# Patient Record
Sex: FEMALE | Race: WHITE | NOT HISPANIC OR LATINO | Employment: OTHER | URBAN - METROPOLITAN AREA
[De-identification: names, ages, dates, MRNs, and addresses within clinical notes are randomized per-mention and may not be internally consistent; named-entity substitution may affect disease eponyms.]

---

## 2017-04-06 DIAGNOSIS — I10 ESSENTIAL HYPERTENSION: ICD-10-CM

## 2017-04-06 RX ORDER — AMLODIPINE BESYLATE 10 MG/1
TABLET ORAL
Qty: 90 TABLET | Refills: 2 | OUTPATIENT
Start: 2017-04-06

## 2018-10-01 ENCOUNTER — OFFICE VISIT (OUTPATIENT)
Dept: FAMILY MEDICINE | Facility: CLINIC | Age: 53
End: 2018-10-01
Payer: MEDICARE

## 2018-10-01 VITALS
HEART RATE: 82 BPM | WEIGHT: 114.69 LBS | DIASTOLIC BLOOD PRESSURE: 70 MMHG | BODY MASS INDEX: 19.11 KG/M2 | HEIGHT: 65 IN | SYSTOLIC BLOOD PRESSURE: 130 MMHG | TEMPERATURE: 99 F | OXYGEN SATURATION: 99 %

## 2018-10-01 DIAGNOSIS — Z12.39 SCREENING FOR BREAST CANCER: ICD-10-CM

## 2018-10-01 DIAGNOSIS — Z09 HOSPITAL DISCHARGE FOLLOW-UP: Primary | ICD-10-CM

## 2018-10-01 DIAGNOSIS — Z23 NEED FOR IMMUNIZATION AGAINST INFLUENZA: ICD-10-CM

## 2018-10-01 PROCEDURE — 99204 OFFICE O/P NEW MOD 45 MIN: CPT | Mod: ,,, | Performed by: FAMILY MEDICINE

## 2018-10-01 RX ORDER — ONDANSETRON 4 MG/1
8 TABLET, ORALLY DISINTEGRATING ORAL
Qty: 20 TABLET | Refills: 0 | Status: SHIPPED | OUTPATIENT
Start: 2018-10-01 | End: 2020-01-01

## 2018-10-01 RX ORDER — PROMETHAZINE HYDROCHLORIDE 25 MG/1
25 TABLET ORAL EVERY 6 HOURS PRN
Qty: 20 TABLET | Refills: 0 | Status: SHIPPED | OUTPATIENT
Start: 2018-10-01 | End: 2019-01-04 | Stop reason: SDUPTHER

## 2018-10-01 RX ORDER — LISINOPRIL 20 MG/1
1 TABLET ORAL NIGHTLY
Refills: 3 | COMMUNITY
Start: 2018-09-09

## 2018-10-01 NOTE — PROGRESS NOTES
SUBJECTIVE:    Patient ID: Aurelia Saucedo is a 53 y.o. female.    Chief Complaint: Establish Care    HPI   53-year-old  female new to this provider presents today for hospital follow-up patient was admitted on September 12 discharged on September 13, patient was admitted for nausea vomiting and diarrhea after taking lactulose prescribed by her nephrologist. Due to some atypical chest pain symptoms patient was ruled out for ACS with serial cardiac enzymes and EKGs and diarrhea resolved after stopping taking  Lactulose. She is discharged on 7 days of Augmentin.    I reviewed and reconciled her hospital discharge medications with her home medications.    Patient's being treated for cirrhosis secondary to hepatitis C by Dr. Villaseñor.  Patient is on dialysis, followed by nephrology Dr. Mele Fragoso. She understood undergoes dialysis Monday Wednesday Friday she has been on dialysis for the past 5 years patient has renal failure secondary to hypertension.  She's had 2 MIs and 1 stent placed she is not being followed by cardiology.        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 since 12/16/13 8/19/2015    Gastritis     Hypertension 8/19/2015    Kidney transplant candidate 8/19/2015    Renal dialysis status     M, W, F    Secondary hyperparathyroidism of renal origin 8/19/2015     Social History     Socioeconomic History    Marital status:      Spouse name: Not on file    Number of children: 2    Years of education: Not on file    Highest education level: Not on file   Social Needs    Financial resource strain: Not on file    Food insecurity - worry: Not on file    Food insecurity - inability: Not on file    Transportation needs - medical: Not on file    Transportation needs - non-medical: Not on file   Occupational History    Not on file   Tobacco Use    Smoking  status: Current Every Day Smoker     Packs/day: 0.75     Years: 30.00     Pack years: 22.50    Smokeless tobacco: Never Used    Tobacco comment: pt reports actively trying to quit but struggling.  Counseling and resources given.   Substance and Sexual Activity    Alcohol use: No     Alcohol/week: 0.0 oz    Drug use: Yes     Types: Marijuana     Comment: Last used 2 days ago    Sexual activity: Not Currently     Partners: Male     Birth control/protection: Surgical   Other Topics Concern    Are you pregnant or think you may be? Not Asked    Breast-feeding Not Asked   Social History Narrative    Not on file     Past Surgical History:   Procedure Laterality Date    AV FISTULA PLACEMENT  2013    ESOPHAGOGASTRODUODENOSCOPY (EGD) N/A 10/10/2016    [facility]    ESOPHAGOGASTRODUODENOSCOPY (EGD) N/A 2/4/2016    Performed by Adis Isaac MD at [facility] ENDO (4TH FLR)    SALPINGOOPHORECTOMY Right 1997    TOTAL ABDOMINAL HYSTERECTOMY  1999    TRANSJUGULAR LIVER BIOPSY N/A 12/15/2015    Performed by [facility] Diagnostic Provider at [facility] OR 2ND FLR     Family History   Problem Relation Age of Onset    Kidney disease Mother     Stroke Father     Psoriasis Brother     Breast cancer Neg Hx     Cancer Neg Hx     Colon cancer Neg Hx     Ovarian cancer Neg Hx      Current Outpatient Medications   Medication Sig Dispense Refill    amlodipine (NORVASC) 10 MG tablet Take 1 tablet (10 mg total) by mouth once daily. 90 tablet 3    aspirin (ECOTRIN) 81 MG EC tablet Take 1 tablet (81 mg total) by mouth once daily. 90 tablet 1    dicyclomine (BENTYL) 20 mg tablet Take 20 mg by mouth every 6 (six) hours as needed.      FA-VIT B COMP&C-SELENIUM-ZINC 3-70-15 MG-MCG-MG ORAL TAB Take 1 tablet by mouth once daily.      lisinopril (PRINIVIL,ZESTRIL) 20 MG tablet Take 1 tablet by mouth once daily.  3    metoprolol succinate (TOPROL-XL) 50 MG 24 hr tablet Take 50 mg by mouth once daily.       "ondansetron (ZOFRAN-ODT) 4 MG TbDL Take 2 tablets (8 mg total) by mouth every hour as needed. nausea 20 tablet 0    pantoprazole (PROTONIX) 40 MG tablet Take 40 mg by mouth once daily.      promethazine (PHENERGAN) 25 MG tablet Take 1 tablet (25 mg total) by mouth every 6 (six) hours as needed for Nausea. 20 tablet 0     No current facility-administered medications for this visit.      Review of patient's allergies indicates:   Allergen Reactions    Levaquin [levofloxacin] Itching    Ciprofloxacin Itching    Codeine Nausea Only       Review of Systems   Constitutional: Negative for activity change, appetite change, diaphoresis, fatigue, fever and unexpected weight change.   HENT: Negative for ear pain, rhinorrhea, sinus pressure and sinus pain.    Eyes: Negative for pain, discharge, redness and itching.   Respiratory: Negative for chest tightness, shortness of breath, wheezing and stridor.    Cardiovascular: Negative for chest pain, palpitations and leg swelling.   Gastrointestinal: Negative for abdominal distention, abdominal pain, constipation, diarrhea and nausea.   Genitourinary: Positive for decreased urine volume. Negative for difficulty urinating, dysuria, enuresis, flank pain, hematuria and urgency.   Musculoskeletal: Negative for arthralgias, back pain, myalgias and neck pain.   Neurological: Negative for dizziness, seizures, syncope, speech difficulty and numbness.          Blood pressure 130/70, pulse 82, temperature 99 °F (37.2 °C), height 5' 5" (1.651 m), weight 52 kg (114 lb 11.2 oz), SpO2 99 %. Body mass index is 19.09 kg/m².   Objective:      Physical Exam        Assessment:       1. Hospital discharge follow-up    2. Screening for breast cancer    3. Need for immunization against influenza         Plan:           Hospital discharge follow-up  -     ondansetron (ZOFRAN-ODT) 4 MG TbDL; Take 2 tablets (8 mg total) by mouth every hour as needed. nausea  Dispense: 20 tablet; Refill: 0  -     " promethazine (PHENERGAN) 25 MG tablet; Take 1 tablet (25 mg total) by mouth every 6 (six) hours as needed for Nausea.  Dispense: 20 tablet; Refill: 0  53-year-old female with chronic renal failure on dialysis, chronic hepatitis C with cirrhosis, hypertension being treated with 3 medications, recently discharged for nausea vomiting diarrhea. Patient normally keeps Zofran and Phenergan on hand for after she does dialysis she is often nauseated. Will refill her medications, patient in follow-up in 6 months.   Patient is due for influenza vaccination she states she gets when she goes to dialysis.      Screening for breast cancer  -     Mammo Digital Screening Bilat with Tomosynthesis CAD; Future; Expected date: 10/01/2018

## 2018-10-09 ENCOUNTER — TELEPHONE (OUTPATIENT)
Dept: FAMILY MEDICINE | Facility: CLINIC | Age: 53
End: 2018-10-09

## 2018-10-10 ENCOUNTER — TELEPHONE (OUTPATIENT)
Dept: FAMILY MEDICINE | Facility: CLINIC | Age: 53
End: 2018-10-10

## 2018-10-10 RX ORDER — TRAMADOL HYDROCHLORIDE 50 MG/1
50 TABLET ORAL
Qty: 120 TABLET | Refills: 0 | Status: SHIPPED | OUTPATIENT
Start: 2018-10-10 | End: 2018-10-15

## 2018-10-10 NOTE — TELEPHONE ENCOUNTER
I have placed the order, her previous prescription of 120 lasted since December, I expect this prescription to last just as long.

## 2018-10-10 NOTE — TELEPHONE ENCOUNTER
"Pt informed her medications were sent to pharmacy. She stated, "I took pictures of my leg for the  to see r/t blisters in heat".  "

## 2018-10-12 ENCOUNTER — TELEPHONE (OUTPATIENT)
Dept: FAMILY MEDICINE | Facility: CLINIC | Age: 53
End: 2018-10-12

## 2018-10-15 ENCOUNTER — OFFICE VISIT (OUTPATIENT)
Dept: FAMILY MEDICINE | Facility: CLINIC | Age: 53
End: 2018-10-15
Payer: MEDICARE

## 2018-10-15 VITALS
HEIGHT: 65 IN | DIASTOLIC BLOOD PRESSURE: 60 MMHG | HEART RATE: 76 BPM | TEMPERATURE: 98 F | OXYGEN SATURATION: 96 % | BODY MASS INDEX: 18.88 KG/M2 | WEIGHT: 113.31 LBS | SYSTOLIC BLOOD PRESSURE: 100 MMHG

## 2018-10-15 DIAGNOSIS — R21 RASH AND NONSPECIFIC SKIN ERUPTION: Primary | ICD-10-CM

## 2018-10-15 PROCEDURE — 99213 OFFICE O/P EST LOW 20 MIN: CPT | Mod: ,,, | Performed by: FAMILY MEDICINE

## 2018-10-15 RX ORDER — LIDOCAINE AND PRILOCAINE 25; 25 MG/G; MG/G
CREAM TOPICAL
Status: DISCONTINUED | OUTPATIENT
Start: 2018-10-15 | End: 2018-10-15

## 2018-10-15 RX ORDER — HYDROCODONE BITARTRATE AND ACETAMINOPHEN 10; 325 MG/1; MG/1
1 TABLET ORAL
Qty: 20 TABLET | Refills: 0 | Status: SHIPPED | OUTPATIENT
Start: 2018-10-15 | End: 2018-10-25 | Stop reason: SDUPTHER

## 2018-10-15 RX ORDER — PREDNISONE 20 MG/1
20 TABLET ORAL DAILY
Qty: 15 TABLET | Refills: 0 | Status: SHIPPED | OUTPATIENT
Start: 2018-10-15 | End: 2018-10-25 | Stop reason: SDUPTHER

## 2018-10-15 NOTE — PROGRESS NOTES
SUBJECTIVE:    Patient ID: Aurelia Saucedo is a 53 y.o. female.    Chief Complaint: rash on both legs    HPI  52 yo female presents to clinic with a longstanding rash to her lower extremities that is described as painful. Pt states that the rash first appeared about 8 years ago she has seen dermatology in the past was given topical steroid cream but this was ineffective, pt does not remember either the dermatologist's name or the name of the condition, pt states it starts as a warm painful spot on the lower leg that progresses to a blister/ulcer that develops into a scar that remains purple and visible. Pt states that this skin condition waxes and wanes. Pt is currently being followed by GI for Hep C and Nephrology for renal failure secondary to either HTN or Hep C treatment (Interferon).    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 since 12/16/13 8/19/2015    Gastritis     Hypertension 8/19/2015    Kidney transplant candidate 8/19/2015    Renal dialysis status     M, W, F    Secondary hyperparathyroidism of renal origin 8/19/2015     Social History     Socioeconomic History    Marital status:      Spouse name: Not on file    Number of children: 2    Years of education: Not on file    Highest education level: Not on file   Social Needs    Financial resource strain: Not on file    Food insecurity - worry: Not on file    Food insecurity - inability: Not on file    Transportation needs - medical: Not on file    Transportation needs - non-medical: Not on file   Occupational History    Occupation: baker    Tobacco Use    Smoking status: Current Every Day Smoker     Packs/day: 0.75     Years: 30.00     Pack years: 22.50    Smokeless tobacco: Never Used    Tobacco comment: pt reports actively trying to quit but struggling.  Counseling and resources given.   Substance and  Sexual Activity    Alcohol use: No     Alcohol/week: 0.0 oz    Drug use: Yes     Types: Marijuana     Comment: Last used 2 days ago    Sexual activity: Not Currently     Partners: Male     Birth control/protection: Surgical   Other Topics Concern    Are you pregnant or think you may be? Not Asked    Breast-feeding Not Asked   Social History Narrative    Not on file     Past Surgical History:   Procedure Laterality Date    AV FISTULA PLACEMENT  2013    ESOPHAGOGASTRODUODENOSCOPY (EGD) N/A 10/10/2016    [facility]formed [facility] ENDO    ESOPHAGOGASTRODUODENOSCOPY (EGD) N/A 2/4/2016    Performed by Adis Isaac MD at [facility] ENDO (4TH FLR)    SALPINGOOPHORECTOMY Right 1997    TOTAL ABDOMINAL HYSTERECTOMY  1999    TRANSJUGULAR LIVER BIOPSY N/A 12/15/2015    Performed by [facility] Diagnostic Provider at [facility] OR 2ND FLR     Family History   Problem Relation Age of Onset    Kidney disease Mother     Stroke Father     Psoriasis Brother     Breast cancer Neg Hx     Cancer Neg Hx     Colon cancer Neg Hx     Ovarian cancer Neg Hx      Current Outpatient Medications   Medication Sig Dispense Refill    amlodipine (NORVASC) 10 MG tablet Take 1 tablet (10 mg total) by mouth once daily. 90 tablet 3    aspirin (ECOTRIN) 81 MG EC tablet Take 1 tablet (81 mg total) by mouth once daily. 90 tablet 1    dicyclomine (BENTYL) 20 mg tablet Take 20 mg by mouth every 6 (six) hours as needed.      FA-VIT B COMP&C-SELENIUM-ZINC 3-70-15 MG-MCG-MG ORAL TAB Take 1 tablet by mouth once daily.      lisinopril (PRINIVIL,ZESTRIL) 20 MG tablet Take 1 tablet by mouth once daily.  3    metoprolol succinate (TOPROL-XL) 50 MG 24 hr tablet Take 50 mg by mouth once daily.      ondansetron (ZOFRAN-ODT) 4 MG TbDL Take 2 tablets (8 mg total) by mouth every hour as needed. nausea 20 tablet 0    pantoprazole (PROTONIX) 40 MG tablet Take 40 mg by mouth once daily.      promethazine (PHENERGAN) 25 MG tablet Take 1 tablet (25  "mg total) by mouth every 6 (six) hours as needed for Nausea. 20 tablet 0    HYDROcodone-acetaminophen (NORCO)  mg per tablet Take 1 tablet by mouth every 24 hours as needed for Pain. 20 tablet 0    predniSONE (DELTASONE) 20 MG tablet Take 1 tablet (20 mg total) by mouth once daily. for 10 days 15 tablet 0     Current Facility-Administered Medications   Medication Dose Route Frequency Provider Last Rate Last Dose    lidocaine-prilocaine cream   Topical (Top) 1 time in Clinic/HOD Dustin Ireland MD         Review of patient's allergies indicates:   Allergen Reactions    Levaquin [levofloxacin] Itching    Ciprofloxacin Itching    Codeine Nausea Only       Review of Systems   Constitutional: Negative for activity change, appetite change, chills, diaphoresis, fatigue, fever and unexpected weight change.   Musculoskeletal: Negative for arthralgias, gait problem, joint swelling and myalgias.   Skin: Positive for rash.          Blood pressure 100/60, pulse 76, temperature 98.1 °F (36.7 °C), height 5' 5" (1.651 m), weight 51.4 kg (113 lb 4.8 oz), SpO2 96 %. Body mass index is 18.85 kg/m².   Objective:      Physical Exam   Constitutional: She appears well-developed and well-nourished. No distress.   HENT:   Head: Normocephalic and atraumatic.   Eyes: Conjunctivae and EOM are normal. Pupils are equal, round, and reactive to light.   Skin: Skin is warm and dry. Capillary refill takes less than 2 seconds. She is not diaphoretic.   Tender warm lumps appear under the skin, especially on the lower legs that develop into blisters and ulceration. There are larger inflammatory confluent purple plaques. As the plaques heal, they leave patches of postinflammatory pigmentation and scaling.           Assessment:       1. Rash and nonspecific skin eruption         Plan:           Rash and nonspecific skin eruption  -     predniSONE (DELTASONE) 20 MG tablet; Take 1 tablet (20 mg total) by mouth once daily. for 10 days  Dispense: " 15 tablet; Refill: 0  -     Ambulatory referral to Dermatology  -     lidocaine-prilocaine cream; Apply topically one time.  -     HYDROcodone-acetaminophen (NORCO)  mg per tablet; Take 1 tablet by mouth every 24 hours as needed for Pain.  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

## 2018-10-15 NOTE — TELEPHONE ENCOUNTER
Please call the patient, I saw her for hospital follow up for diarrhea secondary to medications, She called for a refill of her tramadol, for leg pain. Please if she is having leg pain she may need an appointment so I can evaluate what I am treating.

## 2018-10-17 ENCOUNTER — OFFICE VISIT (OUTPATIENT)
Dept: GASTROENTEROLOGY | Facility: CLINIC | Age: 53
End: 2018-10-17
Payer: MEDICARE

## 2018-10-17 VITALS
SYSTOLIC BLOOD PRESSURE: 140 MMHG | HEIGHT: 65 IN | BODY MASS INDEX: 19.16 KG/M2 | DIASTOLIC BLOOD PRESSURE: 82 MMHG | RESPIRATION RATE: 16 BRPM | TEMPERATURE: 98 F | WEIGHT: 115 LBS | HEART RATE: 78 BPM

## 2018-10-17 DIAGNOSIS — I85.00 ESOPHAGEAL VARICES WITHOUT BLEEDING, UNSPECIFIED ESOPHAGEAL VARICES TYPE: ICD-10-CM

## 2018-10-17 DIAGNOSIS — Z87.19 HISTORY OF GASTROINTESTINAL BLEEDING: ICD-10-CM

## 2018-10-17 DIAGNOSIS — Z76.82 ORGAN TRANSPLANT CANDIDATE: ICD-10-CM

## 2018-10-17 DIAGNOSIS — I10 ESSENTIAL HYPERTENSION: ICD-10-CM

## 2018-10-17 DIAGNOSIS — K74.60 HEPATIC CIRRHOSIS, UNSPECIFIED HEPATIC CIRRHOSIS TYPE, UNSPECIFIED WHETHER ASCITES PRESENT: ICD-10-CM

## 2018-10-17 DIAGNOSIS — B18.2 CHRONIC HEPATITIS C WITHOUT HEPATIC COMA: Primary | ICD-10-CM

## 2018-10-17 DIAGNOSIS — N18.5 CHRONIC KIDNEY DISEASE (CKD), STAGE V: ICD-10-CM

## 2018-10-17 DIAGNOSIS — K76.6 PORTAL HYPERTENSION: ICD-10-CM

## 2018-10-17 PROCEDURE — 99215 OFFICE O/P EST HI 40 MIN: CPT | Mod: ,,, | Performed by: INTERNAL MEDICINE

## 2018-10-21 NOTE — PROGRESS NOTES
[facility] Established Patient Visit    Subjective:           PCP: Dustin Ireland    Chief Complaint: Hospital Follow Up and Ulcerative Colitis       HPI:  Aurelia Saucedo is a 53 y.o. female here for hospital follow up and ulcerative colitis. Patient has hx of Hep C, fatigue, htn, anemia of chronic renal failure, hx of gastrointestinal hemorrhage with melena. Patient is on kidney dialysis. Patient has ckd stage and is on hemodialysis. Patient has a rash on both lower extremities. Dermatology is on consult. Biopsies have been taken. An evaluation will be done for Hep C and patient may be a candidate for oral antiviral therapy. Stool and lab work were reviewed and is consistent with patient's medical conditions. Patient's occult blood is positive and will probably need an upper and lower endoscopy at some point      ROS:   Constitutional: No fevers, chills, weight loss  ENT: No allergies, sore throat, rhinorrhea  CV: No chest pain, palpitations, edema  Pulm: No cough, shortness of breath, wheezing  Ophtho: No vision changes  GI: No blood in stools, change in bowel habits, nausea/vomiting  Denies alternating diarrhea/constipation.   Derm: No rash  Heme: No easy bruising or lymphadenopathy  MSK: No arthralgias or myalgias  : No dysuria, hematuria, frequency, polyuria, or flank tenderness  Endo: No hot or cold intolerance  Neuro: No syncope or seizure, or dizziness  Psych: No hallucination, depression or suicidal ideation      Medical History:  has a past medical history of Anemia of chronic renal failure, stage 5 (8/19/2015), Chronic hepatitis C (8/19/2015), Colitis, ESRD 2/2 MPGN on HD since 12/16/13  (8/19/2015), Gastritis, Hypertension (8/19/2015), Kidney transplant candidate (8/19/2015), Renal dialysis status, and Secondary hyperparathyroidism of renal origin (8/19/2015).      Surgical History:  has a past surgical history that includes Total abdominal hysterectomy (1999); Salpingoophorectomy  (Right, 1997); AV fistula placement (2013); ESOPHAGOGASTRODUODENOSCOPY (EGD) (N/A, 10/10/2016); ESOPHAGOGASTRODUODENOSCOPY (EGD) (N/A, 2/4/2016); and TRANSJUGULAR LIVER BIOPSY (N/A, 12/15/2015).    Family History:   Family History   Problem Relation Age of Onset    Kidney disease Mother     Stroke Father     Psoriasis Brother     Breast cancer Neg Hx     Cancer Neg Hx     Colon cancer Neg Hx     Ovarian cancer Neg Hx        Social History:   Social History     Tobacco Use    Smoking status: Current Every Day Smoker     Packs/day: 0.75     Years: 30.00     Pack years: 22.50    Smokeless tobacco: Never Used    Tobacco comment: pt reports actively trying to quit but struggling.  Counseling and resources given.   Substance Use Topics    Alcohol use: No     Alcohol/week: 0.0 oz    Drug use: Yes     Types: Marijuana     Comment: Last used 2 days ago       The patient's social and family histories were reviewed by me and updated in the appropriate section of the electronic medical record.    Allergies:   Review of patient's allergies indicates:   Allergen Reactions    Levaquin [levofloxacin] Itching    Ciprofloxacin Itching    Codeine Nausea Only         Medications:   Current Outpatient Medications   Medication Sig Dispense Refill    amlodipine (NORVASC) 10 MG tablet Take 1 tablet (10 mg total) by mouth once daily. 90 tablet 3    aspirin (ECOTRIN) 81 MG EC tablet Take 1 tablet (81 mg total) by mouth once daily. 90 tablet 1    dicyclomine (BENTYL) 20 mg tablet Take 20 mg by mouth every 6 (six) hours as needed.      FA-VIT B COMP&C-SELENIUM-ZINC 3-70-15 MG-MCG-MG ORAL TAB Take 1 tablet by mouth once daily.      HYDROcodone-acetaminophen (NORCO)  mg per tablet Take 1 tablet by mouth every 24 hours as needed for Pain. 20 tablet 0    lisinopril (PRINIVIL,ZESTRIL) 20 MG tablet Take 1 tablet by mouth once daily.  3    metoprolol succinate (TOPROL-XL) 50 MG 24 hr tablet Take 50 mg by mouth once daily.   "    ondansetron (ZOFRAN-ODT) 4 MG TbDL Take 2 tablets (8 mg total) by mouth every hour as needed. nausea 20 tablet 0    pantoprazole (PROTONIX) 40 MG tablet Take 40 mg by mouth once daily.      predniSONE (DELTASONE) 20 MG tablet Take 1 tablet (20 mg total) by mouth once daily. for 10 days 15 tablet 0    promethazine (PHENERGAN) 25 MG tablet Take 1 tablet (25 mg total) by mouth every 6 (six) hours as needed for Nausea. 20 tablet 0     No current facility-administered medications for this visit.      All medications and past history have been reviewed.        Objective:        Vital Signs:  Blood pressure (!) 140/82, pulse 78, temperature 98.2 °F (36.8 °C), resp. rate 16, height 5' 5" (1.651 m), weight 52.2 kg (115 lb). Body mass index is 19.14 kg/m².    Physical Exam:   General Appearance: Well appearing in no acute distress, well developed, well                nourished  Head: Normocephalic, without obvious abnormality  Eyes:  Pupils equal, round and reactive to light  Throat: Lips, mucosa, and tongue normal; teeth and gums normal  Lungs: CTA bilaterally in anterior and posterior fields, no wheezes, no crackles  Heart:  Regular rate and rhythm, no murmurs heard  Abdomen: Soft, non tender, non distended with positive bowel sounds in all four quadrants. No hepatosplenomegaly, ascites, or mass. Negative for succusion splash  : female   Extremities: No cyanosis, edema or deformity  Skin: No rash  Neurologic: Normal exam    Labs:  Lab Results   Component Value Date    WBC 3.60 (L) 10/12/2016    HGB 8.6 (L) 10/12/2016    HCT 26.3 (L) 10/12/2016     10/12/2016    CHOL 154 08/19/2015    TRIG 116 08/19/2015    HDL 46 08/19/2015    ALT 38 10/12/2016    AST 42 (H) 10/12/2016     10/12/2016    K 3.8 10/12/2016     10/12/2016    CREATININE 2.9 (H) 10/12/2016    BUN 28 (H) 10/12/2016    CO2 26 10/12/2016    TSH 1.736 02/11/2016    INR 1.0 10/09/2016    HGBA1C 4.9 10/10/2016       Imaging:     All lab " results and imaging results have been reviewed and discussed with the patient      Assessment:       1. Chronic hepatitis C without hepatic coma          See visit diagnoses  Plan:       Aurelia was seen today for hospital follow up and ulcerative colitis.    Diagnoses and all orders for this visit:    Chronic hepatitis C without hepatic coma  -     Hepatitis C RNA, quantitative, PCR; Future  -     Hepatitis C genotype; Future  -     Hepatitis B core antibody, total; Future  -     Hepatitis B Surface Antibody, Qual/Quant; Future  -     Hepatitis C RNA, quantitative, PCR  -     Hepatitis C genotype  -     Hepatitis B core antibody, total  -     Hepatitis B Surface Antibody, Qual/Quant        See HPI    Follow-up in about 8 weeks (around 12/12/2018).    The plan was discussed with the patient and all questions/concerns have been answered to the patient's satisfaction.        Electronically signed by Margarita Saul MD    This note was dictated using voice recognition software and may contain grammatical errors.

## 2018-10-25 DIAGNOSIS — R21 RASH AND NONSPECIFIC SKIN ERUPTION: ICD-10-CM

## 2018-10-25 RX ORDER — HYDROCODONE BITARTRATE AND ACETAMINOPHEN 10; 325 MG/1; MG/1
1 TABLET ORAL
Qty: 20 TABLET | Refills: 0 | Status: SHIPPED | OUTPATIENT
Start: 2018-10-25 | End: 2018-12-05

## 2018-10-25 RX ORDER — PREDNISONE 20 MG/1
20 TABLET ORAL DAILY
Qty: 15 TABLET | Refills: 0 | Status: SHIPPED | OUTPATIENT
Start: 2018-10-25 | End: 2018-11-04

## 2018-10-25 NOTE — TELEPHONE ENCOUNTER
Patient states that she has already seen the dermatologist and that they are waiting for the biopsies to come back. She states that the rash is doing ok. Once the biopsies came back they would go from there on a treatment plan.

## 2018-10-25 NOTE — TELEPHONE ENCOUNTER
Please call patient and find out how the RASH is doing and does she have an appointment with derm if not or is too long to wait (Past) November let me know I will place a consult to a new derm in town who should have openings.

## 2018-10-30 LAB — HBV SURFACE AB SER-ACNC: NON REACTIVE M[IU]/ML

## 2018-11-09 ENCOUNTER — TELEPHONE (OUTPATIENT)
Dept: FAMILY MEDICINE | Facility: CLINIC | Age: 53
End: 2018-11-09

## 2018-11-09 RX ORDER — DOXYCYCLINE 100 MG/1
100 CAPSULE ORAL 2 TIMES DAILY
Qty: 20 CAPSULE | Refills: 0 | Status: SHIPPED | OUTPATIENT
Start: 2018-11-09 | End: 2018-12-05

## 2018-11-09 NOTE — TELEPHONE ENCOUNTER
Patient called to state that the rash/sores have become infected. She would like to to know if something can be called into the pharmacy to help beat the infection. She does have an appointment this coming Tuesday.

## 2018-11-12 RX ORDER — LACTULOSE 10 G/15ML
30 SOLUTION ORAL; RECTAL 3 TIMES DAILY
Refills: 3 | COMMUNITY
Start: 2018-09-10 | End: 2018-12-05

## 2018-11-13 ENCOUNTER — OFFICE VISIT (OUTPATIENT)
Dept: FAMILY MEDICINE | Facility: CLINIC | Age: 53
End: 2018-11-13
Payer: MEDICARE

## 2018-11-13 VITALS
DIASTOLIC BLOOD PRESSURE: 80 MMHG | TEMPERATURE: 98 F | RESPIRATION RATE: 16 BRPM | WEIGHT: 111.5 LBS | OXYGEN SATURATION: 98 % | SYSTOLIC BLOOD PRESSURE: 120 MMHG | HEART RATE: 83 BPM | HEIGHT: 65 IN | BODY MASS INDEX: 18.58 KG/M2

## 2018-11-13 DIAGNOSIS — D89.1 CRYOGLOBULINEMIA: ICD-10-CM

## 2018-11-13 DIAGNOSIS — R21 RASH: Primary | ICD-10-CM

## 2018-11-13 PROCEDURE — 99213 OFFICE O/P EST LOW 20 MIN: CPT | Mod: ,,, | Performed by: FAMILY MEDICINE

## 2018-11-13 NOTE — PROGRESS NOTES
SUBJECTIVE:    Patient ID: Aurelia Saucedo is a 53 y.o. female.    Chief Complaint: Rash  Pt with a bilateral lower extremity rash since [id number], it is painful and blisters. I had seen this rash previously and referred to derm pt states that Derm's biopsy was inconclusive. 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 There are plans by Dr Saul to begin treatment again in one month. Pt called me last week because the open sores of the rash had become infected and was RED, Hot and Purulent so I called in a prescription of Doxycycline and the patient states that it looks much better.    Rash   This is a chronic problem. The current episode started more than 1 year ago. The problem has been waxing and waning since onset. The affected locations include the left lower leg and right lower leg. The rash is characterized by blistering. Associated with: Hep C. Pertinent negatives include no fatigue or fever. Treatments tried: Topical steroids, oral steroids. The treatment provided no relief.         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 since 12/16/13 8/19/2015    Gastritis     Hypertension 8/19/2015    Kidney transplant candidate 8/19/2015    Renal dialysis status     M, W, F    Secondary hyperparathyroidism of renal origin 8/19/2015     Social History     Socioeconomic History    Marital status:      Spouse name: Not on file    Number of children: 2    Years of education: Not on file    Highest education level: Not on file   Social Needs    Financial resource strain: Not on file    Food insecurity - worry: Not on file    Food insecurity - inability: Not on file    Transportation needs - medical: Not on file    Transportation needs - non-medical: Not on file   Occupational History     Occupation: baker    Tobacco Use    Smoking status: Current Every Day Smoker     Packs/day: 0.75     Years: 30.00     Pack years: 22.50    Smokeless tobacco: Never Used    Tobacco comment: pt reports actively trying to quit but struggling.  Counseling and resources given.   Substance and Sexual Activity    Alcohol use: No     Alcohol/week: 0.0 oz    Drug use: Yes     Types: Marijuana     Comment: Last used 2 days ago    Sexual activity: Not Currently     Partners: Male     Birth control/protection: Surgical   Other Topics Concern    Are you pregnant or think you may be? Not Asked    Breast-feeding Not Asked   Social History Narrative    Not on file     Past Surgical History:   Procedure Laterality Date    AV FISTULA PLACEMENT  2013    ESOPHAGOGASTRODUODENOSCOPY (EGD) N/A 10/10/2016    [facility]    ESOPHAGOGASTRODUODENOSCOPY (EGD) N/A 2/4/2016    Performed by Adis Isaac [facility] at [facility] ENDO (4TH FLR)    SALPINGOOPHORECTOMY Right 1997    TOTAL ABDOMINAL HYSTERECTOMY  1999    TRANSJUGULAR LIVER BIOPSY N/A 12/15/2015    Performed by [facility] Diagnostic Provider at [facility] OR 2ND FLR     Family History   Problem Relation Age of Onset    Kidney disease Mother     Stroke Father     Psoriasis Brother     Breast cancer Neg Hx     Cancer Neg Hx     Colon cancer Neg Hx     Ovarian cancer Neg Hx      Current Outpatient Medications   Medication Sig Dispense Refill    amlodipine (NORVASC) 10 MG tablet Take 1 tablet (10 mg total) by mouth once daily. 90 tablet 3    aspirin (ECOTRIN) 81 MG EC tablet Take 1 tablet (81 mg total) by mouth once daily. 90 tablet 1    dicyclomine (BENTYL) 20 mg tablet Take 20 mg by mouth every 6 (six) hours as needed.      doxycycline (VIBRAMYCIN) 100 MG Cap Take 1 capsule (100 mg total) by mouth 2 (two) times daily. 20 capsule 0    FA-VIT B COMP&C-SELENIUM-ZINC 3-70-15 MG-MCG-MG ORAL TAB Take 1 tablet by mouth once daily.       "HYDROcodone-acetaminophen (NORCO)  mg per tablet Take 1 tablet by mouth every 24 hours as needed for Pain. 20 tablet 0    lactulose (CHRONULAC) 10 gram/15 mL solution Take 30 mLs by mouth 3 (three) times daily.  3    lisinopril (PRINIVIL,ZESTRIL) 20 MG tablet Take 1 tablet by mouth once daily.  3    metoprolol succinate (TOPROL-XL) 50 MG 24 hr tablet Take 50 mg by mouth once daily.      ondansetron (ZOFRAN-ODT) 4 MG TbDL Take 2 tablets (8 mg total) by mouth every hour as needed. nausea 20 tablet 0    pantoprazole (PROTONIX) 40 MG tablet Take 40 mg by mouth once daily.      promethazine (PHENERGAN) 25 MG tablet Take 1 tablet (25 mg total) by mouth every 6 (six) hours as needed for Nausea. 20 tablet 0     No current facility-administered medications for this visit.      Review of patient's allergies indicates:   Allergen Reactions    Levaquin [levofloxacin] Itching    Ciprofloxacin Itching    Codeine Nausea Only       Review of Systems   Constitutional: Negative for activity change, appetite change, fatigue, fever and unexpected weight change.   Skin: Positive for rash.          Blood pressure 120/80, pulse 83, temperature 97.9 °F (36.6 °C), temperature source Oral, resp. rate 16, height 5' 5" (1.651 m), weight 50.6 kg (111 lb 8 oz), SpO2 98 %. Body mass index is 18.55 kg/m².   Objective:      Physical Exam   Skin:   lesions consist of erythematous macules and purpuric papules of the lower extremities, hemorrhagic crusts, and ulcers            Assessment:       1. 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                    "

## 2018-11-27 ENCOUNTER — OFFICE VISIT (OUTPATIENT)
Dept: FAMILY MEDICINE | Facility: CLINIC | Age: 53
End: 2018-11-27
Payer: MEDICARE

## 2018-11-27 VITALS
OXYGEN SATURATION: 98 % | TEMPERATURE: 98 F | WEIGHT: 108.31 LBS | BODY MASS INDEX: 18.05 KG/M2 | HEART RATE: 88 BPM | SYSTOLIC BLOOD PRESSURE: 122 MMHG | RESPIRATION RATE: 16 BRPM | HEIGHT: 65 IN | DIASTOLIC BLOOD PRESSURE: 86 MMHG

## 2018-11-27 DIAGNOSIS — N39.0 URINARY TRACT INFECTION WITHOUT HEMATURIA, SITE UNSPECIFIED: Primary | ICD-10-CM

## 2018-11-27 LAB
BILIRUB UR QL STRIP: POSITIVE
GLUCOSE UR QL STRIP: NEGATIVE
KETONES UR QL STRIP: NEGATIVE
LEUKOCYTE ESTERASE UR QL STRIP: NEGATIVE
PH, POC UA: 7
POC BLOOD, URINE: POSITIVE
POC NITRATES, URINE: NEGATIVE
PROT UR QL STRIP: POSITIVE
SP GR UR STRIP: 1.01 (ref 1–1.03)
UROBILINOGEN UR STRIP-ACNC: NORMAL (ref 0.1–1.1)

## 2018-11-27 PROCEDURE — 81003 URINALYSIS AUTO W/O SCOPE: CPT | Mod: QW,,, | Performed by: FAMILY MEDICINE

## 2018-11-27 PROCEDURE — 99213 OFFICE O/P EST LOW 20 MIN: CPT | Mod: 25,,, | Performed by: FAMILY MEDICINE

## 2018-11-27 RX ORDER — SULFAMETHOXAZOLE AND TRIMETHOPRIM 400; 80 MG/1; MG/1
1 TABLET ORAL 2 TIMES DAILY
Qty: 6 TABLET | Refills: 0 | Status: SHIPPED | OUTPATIENT
Start: 2018-11-27 | End: 2018-12-05

## 2018-11-27 NOTE — PROGRESS NOTES
SUBJECTIVE:    Patient ID: Aurelia Saucedo is a 53 y.o. female.    Chief Complaint: Urinary Tract Infection    52 yo female with chronic renal disease on dialysis 3 times a week, is complaining of frequency and uregncy. Pt normally has dark concentrated urine her dialysis dry weight is 50kg and she is fluid restricted.  Pt is denying and fevers chills, or flank pain.    Urinary Tract Infection    This is a new problem. The current episode started yesterday. The problem occurs every urination. The problem has been unchanged. The quality of the pain is described as burning and stabbing. The pain is at a severity of 4/10. There has been no fever. She is sexually active. There is no history of pyelonephritis. Associated symptoms include frequency and urgency. Pertinent negatives include no flank pain, hematuria or possible pregnancy. She has tried nothing for the symptoms. The treatment provided no relief.         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 since 12/16/13 8/19/2015    Gastritis     Hypertension 8/19/2015    Kidney transplant candidate 8/19/2015    Renal dialysis status     M, W, F    Secondary hyperparathyroidism of renal origin 8/19/2015     Social History     Socioeconomic History    Marital status:      Spouse name: Not on file    Number of children: 2    Years of education: Not on file    Highest education level: Not on file   Social Needs    Financial resource strain: Not on file    Food insecurity - worry: Not on file    Food insecurity - inability: Not on file    Transportation needs - medical: Not on file    Transportation needs - non-medical: Not on file   Occupational History    Occupation: baker    Tobacco Use    Smoking status: Current Every Day Smoker     Packs/day: 0.75     Years: 30.00     Pack years: 22.50    Smokeless tobacco: Never Used     Tobacco comment: pt reports actively trying to quit but struggling.  Counseling and resources given.   Substance and Sexual Activity    Alcohol use: No     Alcohol/week: 0.0 oz    Drug use: Yes     Types: Marijuana     Comment: Last used 2 days ago    Sexual activity: Not Currently     Partners: Male     Birth control/protection: Surgical   Other Topics Concern    Are you pregnant or think you may be? Not Asked    Breast-feeding Not Asked   Social History Narrative    Not on file     Past Surgical History:   Procedure Laterality Date    AV FISTULA PLACEMENT  2013    ESOPHAGOGASTRODUODENOSCOPY (EGD) N/A 10/10/2016    [facility] at [facility] END[facility]    ESOPHAGOGASTRODUODENOSCOPY (EGD) N/A 2/4/2016    Performed by Adis Isaac MD at [facility] ENDO (4TH FLR)    SALPINGOOPHORECTOMY Right 1997    TOTAL ABDOMINAL HYSTERECTOMY  1999    TRANSJUGULAR LIVER BIOPSY N/A 12/15/2015    Performed by [facility] Diagnostic Provider at [facility] OR 2ND FLR     Family History   Problem Relation Age of Onset    Kidney disease Mother     Stroke Father     Psoriasis Brother     Breast cancer Neg Hx     Cancer Neg Hx     Colon cancer Neg Hx     Ovarian cancer Neg Hx      Current Outpatient Medications   Medication Sig Dispense Refill    amlodipine (NORVASC) 10 MG tablet Take 1 tablet (10 mg total) by mouth once daily. 90 tablet 3    aspirin (ECOTRIN) 81 MG EC tablet Take 1 tablet (81 mg total) by mouth once daily. 90 tablet 1    dicyclomine (BENTYL) 20 mg tablet Take 20 mg by mouth every 6 (six) hours as needed.      doxycycline (VIBRAMYCIN) 100 MG Cap Take 1 capsule (100 mg total) by mouth 2 (two) times daily. 20 capsule 0    FA-VIT B COMP&C-SELENIUM-ZINC 3-70-15 MG-MCG-MG ORAL TAB Take 1 tablet by mouth once daily.      HYDROcodone-acetaminophen (NORCO)  mg per tablet Take 1 tablet by mouth every 24 hours as needed for Pain. 20 tablet 0    lactulose (CHRONULAC) 10 gram/15 mL solution Take 30 mLs by  "mouth 3 (three) times daily.  3    lisinopril (PRINIVIL,ZESTRIL) 20 MG tablet Take 1 tablet by mouth once daily.  3    metoprolol succinate (TOPROL-XL) 50 MG 24 hr tablet Take 50 mg by mouth once daily.      ondansetron (ZOFRAN-ODT) 4 MG TbDL Take 2 tablets (8 mg total) by mouth every hour as needed. nausea 20 tablet 0    pantoprazole (PROTONIX) 40 MG tablet Take 40 mg by mouth once daily.      promethazine (PHENERGAN) 25 MG tablet Take 1 tablet (25 mg total) by mouth every 6 (six) hours as needed for Nausea. 20 tablet 0    sulfamethoxazole-trimethoprim 400-80mg (BACTRIM,SEPTRA) 400-80 mg per tablet Take 1 tablet by mouth 2 (two) times daily. 6 tablet 0     No current facility-administered medications for this visit.      Review of patient's allergies indicates:   Allergen Reactions    Levaquin [levofloxacin] Itching    Ciprofloxacin Itching    Codeine Nausea Only       Review of Systems   Constitutional: Negative for activity change, appetite change, fatigue, fever and unexpected weight change.   Genitourinary: Positive for frequency and urgency. Negative for decreased urine volume, difficulty urinating, dysuria, flank pain, hematuria and pelvic pain.          Blood pressure 122/86, pulse 88, temperature 98.2 °F (36.8 °C), temperature source Oral, resp. rate 16, height 5' 5" (1.651 m), weight 49.1 kg (108 lb 4.8 oz), SpO2 98 %. Body mass index is 18.02 kg/m².   Objective:      Physical Exam   Constitutional: She is oriented to person, place, and time. She appears well-developed and well-nourished. No distress.   Eyes: Conjunctivae and EOM are normal. Pupils are equal, round, and reactive to light.   Abdominal: Soft. She exhibits no distension. There is no tenderness. There is no guarding.   Neurological: She is alert and oriented to person, place, and time.   Skin: Skin is warm and dry. She is not diaphoretic.           Assessment:       1. Urinary tract infection without hematuria, site unspecified       "   Plan:           Urinary tract infection without hematuria, site unspecified  -     POCT Urinalysis, Dipstick, Automated, W/O Scope  -     sulfamethoxazole-trimethoprim 400-80mg (BACTRIM,SEPTRA) 400-80 mg per tablet; Take 1 tablet by mouth 2 (two) times daily.  Dispense: 6 tablet; Refill: 0  -     Urinalysis, Complete with Reflex To Culture    Pt with symptoms consistent with a UTI, pt has had Urinary tract infections in the past and this feel similar. Will treat with 3 days of bactrum at half of the normal dose secondary to renal failure and on hemodialysis. I have placed an order for Urine with reflex culture if her symptoms persist so we can identify the organism and focus the treatment.

## 2018-11-28 ENCOUNTER — TELEPHONE (OUTPATIENT)
Dept: GASTROENTEROLOGY | Facility: CLINIC | Age: 53
End: 2018-11-28

## 2018-11-28 DIAGNOSIS — B19.20 HEPATITIS C VIRUS INFECTION WITHOUT HEPATIC COMA, UNSPECIFIED CHRONICITY: Primary | ICD-10-CM

## 2018-11-28 NOTE — TELEPHONE ENCOUNTER
You want to start the patient on Mavyret but you did not order a Fibrosure. All other labs are in.

## 2018-12-05 ENCOUNTER — OFFICE VISIT (OUTPATIENT)
Dept: GASTROENTEROLOGY | Facility: CLINIC | Age: 53
End: 2018-12-05
Payer: MEDICARE

## 2018-12-05 VITALS
WEIGHT: 108.88 LBS | DIASTOLIC BLOOD PRESSURE: 82 MMHG | BODY MASS INDEX: 18.14 KG/M2 | TEMPERATURE: 99 F | RESPIRATION RATE: 18 BRPM | HEIGHT: 65 IN | SYSTOLIC BLOOD PRESSURE: 148 MMHG | HEART RATE: 90 BPM

## 2018-12-05 DIAGNOSIS — Z76.82 ORGAN TRANSPLANT CANDIDATE: ICD-10-CM

## 2018-12-05 DIAGNOSIS — I10 ESSENTIAL HYPERTENSION: ICD-10-CM

## 2018-12-05 DIAGNOSIS — K21.9 GASTROESOPHAGEAL REFLUX DISEASE WITHOUT ESOPHAGITIS: ICD-10-CM

## 2018-12-05 DIAGNOSIS — Z23 NEED FOR INFLUENZA VACCINATION: ICD-10-CM

## 2018-12-05 DIAGNOSIS — I85.00 ESOPHAGEAL VARICES WITHOUT BLEEDING, UNSPECIFIED ESOPHAGEAL VARICES TYPE: ICD-10-CM

## 2018-12-05 DIAGNOSIS — K74.60 HEPATIC CIRRHOSIS, UNSPECIFIED HEPATIC CIRRHOSIS TYPE, UNSPECIFIED WHETHER ASCITES PRESENT: ICD-10-CM

## 2018-12-05 DIAGNOSIS — R11.0 NAUSEA: ICD-10-CM

## 2018-12-05 DIAGNOSIS — N18.5 ANEMIA OF CHRONIC RENAL FAILURE, STAGE 5: ICD-10-CM

## 2018-12-05 DIAGNOSIS — B18.2 CHRONIC HEPATITIS C WITHOUT HEPATIC COMA: Primary | ICD-10-CM

## 2018-12-05 DIAGNOSIS — D63.1 ANEMIA OF CHRONIC RENAL FAILURE, STAGE 5: ICD-10-CM

## 2018-12-05 DIAGNOSIS — D89.1 CRYOGLOBULINEMIA: ICD-10-CM

## 2018-12-05 PROCEDURE — 99215 OFFICE O/P EST HI 40 MIN: CPT | Mod: 25,,, | Performed by: INTERNAL MEDICINE

## 2018-12-05 PROCEDURE — 90686 IIV4 VACC NO PRSV 0.5 ML IM: CPT | Mod: ,,, | Performed by: INTERNAL MEDICINE

## 2018-12-05 PROCEDURE — G0008 ADMIN INFLUENZA VIRUS VAC: HCPCS | Mod: ,,, | Performed by: INTERNAL MEDICINE

## 2018-12-05 NOTE — PROGRESS NOTES
[facility] Patient Visit    Subjective:           PCP: Dustin Ireland    Chief Complaint: Follow-up and Hepatitis C       HPI:  Aurelia Saucedo is a 53 y.o. female here for treatment of hepatitis C patient's lab tests were reviewed, hep  B reviewed need to get hepatitis B core antibody and hepatitis B surface antigen A. She has cryoglobulinemia and this was discussed with Dr. Mejia. Patient is probably a candidate for Mavik, we will get this approved from  the appropriate authorities. Used to smoke and has been appropriately counseled.  She has cut down and ultimately will stop. Continue supportive care at this time. Current labs reveal leukopenia, anemia, abnormal liver function, creatinine and bun elevated.       ROS:   Constitutional: No fevers, chills, weight loss  ENT: No allergies, sore throat, rhinorrhea  CV: No chest pain, palpitations, edema  Pulm: No cough, shortness of breath, wheezing  Ophtho: No vision changes  GI: No blood in stools, change in bowel habits, nausea/vomiting  Denies alternating diarrhea/constipation.   Derm: No rash  Heme: No easy bruising or lymphadenopathy  MSK: No arthralgias or myalgias  : No dysuria, hematuria, frequency, polyuria, or flank tenderness  Endo: No hot or cold intolerance  Neuro: No syncope or seizure, or dizziness  Psych: No hallucination, depression or suicidal ideation      Medical History:  has a past medical history of Anemia of chronic renal failure, stage 5 (8/19/2015), Chronic hepatitis C (8/19/2015), Colitis, ESRD 2/2 MPGN on HD since 12/16/13  (8/19/2015), Gastritis, Hypertension (8/19/2015), Kidney transplant candidate (8/19/2015), Renal dialysis status, and Secondary hyperparathyroidism of renal origin (8/19/2015).      Surgical History:  has a past surgical history that includes Total abdominal hysterectomy (1999); Salpingoophorectomy (Right, 1997); AV fistula placement (2013); ESOPHAGOGASTRODUODENOSCOPY (EGD) (N/A,  10/10/2016); ESOPHAGOGASTRODUODENOSCOPY (EGD) (N/A, 2/4/2016); and TRANSJUGULAR LIVER BIOPSY (N/A, 12/15/2015).    Family History:   Family History   Problem Relation Age of Onset    Kidney disease Mother     Stroke Father     Psoriasis Brother     Breast cancer Neg Hx     Cancer Neg Hx     Colon cancer Neg Hx     Ovarian cancer Neg Hx        Social History:   Social History     Tobacco Use    Smoking status: Current Every Day Smoker     Packs/day: 0.75     Years: 30.00     Pack years: 22.50    Smokeless tobacco: Never Used    Tobacco comment: pt reports actively trying to quit but struggling.  Counseling and resources given.   Substance Use Topics    Alcohol use: No     Alcohol/week: 0.0 oz    Drug use: Yes     Types: Marijuana     Comment: Last used 2 days ago       The patient's social and family histories were reviewed by me and updated in the appropriate section of the electronic medical record.    Allergies:   Review of patient's allergies indicates:   Allergen Reactions    Levaquin [levofloxacin] Itching    Ciprofloxacin Itching    Codeine Nausea Only         Medications:   Current Outpatient Medications   Medication Sig Dispense Refill    amlodipine (NORVASC) 10 MG tablet Take 1 tablet (10 mg total) by mouth once daily. 90 tablet 3    aspirin (ECOTRIN) 81 MG EC tablet Take 1 tablet (81 mg total) by mouth once daily. 90 tablet 1    dicyclomine (BENTYL) 20 mg tablet Take 20 mg by mouth every 6 (six) hours as needed.      FA-VIT B COMP&C-SELENIUM-ZINC 3-70-15 MG-MCG-MG ORAL TAB Take 1 tablet by mouth once daily.      lisinopril (PRINIVIL,ZESTRIL) 20 MG tablet Take 1 tablet by mouth once daily.  3    metoprolol succinate (TOPROL-XL) 50 MG 24 hr tablet Take 50 mg by mouth once daily.      ondansetron (ZOFRAN-ODT) 4 MG TbDL Take 2 tablets (8 mg total) by mouth every hour as needed. nausea 20 tablet 0    pantoprazole (PROTONIX) 40 MG tablet Take 40 mg by mouth once daily.      promethazine  "(PHENERGAN) 25 MG tablet Take 1 tablet (25 mg total) by mouth every 6 (six) hours as needed for Nausea. 20 tablet 0     No current facility-administered medications for this visit.      All medications and past history have been reviewed.        Objective:        Vital Signs:  Blood pressure (!) 148/82, pulse 90, temperature 98.6 °F (37 °C), resp. rate 18, height 5' 5" (1.651 m), weight 49.4 kg (108 lb 14.4 oz). Body mass index is 18.12 kg/m².    Physical Exam:   General Appearance: Well appearing in no acute distress, well developed, well                nourished  Head: Normocephalic, without obvious abnormality  Eyes:  Pupils equal, round and reactive to light  Throat: Lips, mucosa, and tongue normal; teeth and gums normal  Lungs: CTA bilaterally in anterior and posterior fields, no wheezes, no crackles  Heart:  Regular rate and rhythm, no murmurs heard  Abdomen: Soft, non tender, non distended with positive bowel sounds in all four quadrants. No hepatosplenomegaly, ascites, or mass. Negative for succusion splash  : female  No hernia  Extremities: No cyanosis, edema or deformity  Skin: No rash  Neurologic: Normal exam    Labs:  Lab Results   Component Value Date    WBC 3.60 (L) 10/12/2016    HGB 8.6 (L) 10/12/2016    HCT 26.3 (L) 10/12/2016     10/12/2016    CHOL 154 08/19/2015    TRIG 116 08/19/2015    HDL 46 08/19/2015    ALT 38 10/12/2016    AST 42 (H) 10/12/2016     10/12/2016    K 3.8 10/12/2016     10/12/2016    CREATININE 2.9 (H) 10/12/2016    BUN 28 (H) 10/12/2016    CO2 26 10/12/2016    TSH 1.736 02/11/2016    INR 1.0 10/09/2016    HGBA1C 4.9 10/10/2016       Imaging:     All lab results and imaging results have been reviewed and discussed with the patient      Assessment:       No diagnosis found.    1. Cryoglobulinemia  2. Hep C  3. Cirrhosis  4. Anemia  5. Kidney transplant candidate  6. Nausea  7. GERD  8. ESRD  See visit diagnoses  Plan:       There are no diagnoses linked to " this encounter.    See HPI    No Follow-up on file.    The plan was discussed with the patient and all questions/concerns have been answered to the patient's satisfaction.        Electronically signed by Margarita Saul MD    This note was dictated using voice recognition software and may contain grammatical errors.

## 2019-01-01 ENCOUNTER — TELEPHONE (OUTPATIENT)
Dept: TRANSPLANT | Facility: CLINIC | Age: 54
End: 2019-01-01

## 2019-01-01 ENCOUNTER — HOSPITAL ENCOUNTER (OUTPATIENT)
Facility: HOSPITAL | Age: 54
LOS: 1 days | Discharge: HOME | End: 2019-12-23
Attending: EMERGENCY MEDICINE | Admitting: FAMILY MEDICINE
Payer: MEDICARE

## 2019-01-01 ENCOUNTER — TELEPHONE (OUTPATIENT)
Dept: FAMILY MEDICINE | Facility: CLINIC | Age: 54
End: 2019-01-01

## 2019-01-01 ENCOUNTER — OFFICE VISIT (OUTPATIENT)
Dept: GASTROENTEROLOGY | Facility: CLINIC | Age: 54
End: 2019-01-01
Payer: MEDICARE

## 2019-01-01 ENCOUNTER — HOSPITAL ENCOUNTER (EMERGENCY)
Facility: HOSPITAL | Age: 54
Discharge: HOME | End: 2019-12-19
Attending: STUDENT IN AN ORGANIZED HEALTH CARE EDUCATION/TRAINING PROGRAM | Admitting: STUDENT IN AN ORGANIZED HEALTH CARE EDUCATION/TRAINING PROGRAM
Payer: MEDICARE

## 2019-01-01 ENCOUNTER — TELEPHONE (OUTPATIENT)
Dept: GASTROENTEROLOGY | Facility: CLINIC | Age: 54
End: 2019-01-01

## 2019-01-01 ENCOUNTER — HOSPITAL ENCOUNTER (INPATIENT)
Facility: HOSPITAL | Age: 54
LOS: 1 days | Discharge: HOME | DRG: 441 | End: 2019-10-18
Attending: EMERGENCY MEDICINE | Admitting: INTERNAL MEDICINE
Payer: MEDICARE

## 2019-01-01 ENCOUNTER — LAB VISIT (OUTPATIENT)
Dept: LAB | Facility: HOSPITAL | Age: 54
End: 2019-01-01
Attending: INTERNAL MEDICINE
Payer: MEDICARE

## 2019-01-01 ENCOUNTER — HOSPITAL ENCOUNTER (OUTPATIENT)
Facility: HOSPITAL | Age: 54
LOS: 2 days | Discharge: HOME | End: 2019-12-03
Attending: EMERGENCY MEDICINE | Admitting: HOSPITALIST
Payer: MEDICARE

## 2019-01-01 ENCOUNTER — HOSPITAL ENCOUNTER (INPATIENT)
Facility: HOSPITAL | Age: 54
LOS: 4 days | Discharge: HOME | DRG: 193 | End: 2019-11-23
Attending: INTERNAL MEDICINE | Admitting: INTERNAL MEDICINE
Payer: MEDICARE

## 2019-01-01 ENCOUNTER — OFFICE VISIT (OUTPATIENT)
Dept: FAMILY MEDICINE | Facility: CLINIC | Age: 54
End: 2019-01-01
Payer: MEDICARE

## 2019-01-01 ENCOUNTER — HOSPITAL ENCOUNTER (OUTPATIENT)
Dept: RADIOLOGY | Facility: HOSPITAL | Age: 54
Discharge: HOME | End: 2019-11-13
Attending: INTERNAL MEDICINE
Payer: MEDICARE

## 2019-01-01 ENCOUNTER — CLINICAL SUPPORT (OUTPATIENT)
Dept: CARDIOLOGY | Facility: HOSPITAL | Age: 54
DRG: 193 | End: 2019-01-01
Attending: INTERNAL MEDICINE
Payer: MEDICARE

## 2019-01-01 VITALS
HEIGHT: 65 IN | BODY MASS INDEX: 18.06 KG/M2 | TEMPERATURE: 99 F | BODY MASS INDEX: 20.99 KG/M2 | HEART RATE: 84 BPM | HEIGHT: 65 IN | DIASTOLIC BLOOD PRESSURE: 60 MMHG | HEART RATE: 101 BPM | SYSTOLIC BLOOD PRESSURE: 110 MMHG | SYSTOLIC BLOOD PRESSURE: 103 MMHG | RESPIRATION RATE: 16 BRPM | TEMPERATURE: 99 F | OXYGEN SATURATION: 98 % | DIASTOLIC BLOOD PRESSURE: 70 MMHG | WEIGHT: 126 LBS | WEIGHT: 108.38 LBS

## 2019-01-01 VITALS
RESPIRATION RATE: 16 BRPM | HEIGHT: 65 IN | DIASTOLIC BLOOD PRESSURE: 68 MMHG | HEART RATE: 84 BPM | WEIGHT: 112 LBS | OXYGEN SATURATION: 98 % | TEMPERATURE: 97 F | SYSTOLIC BLOOD PRESSURE: 110 MMHG | BODY MASS INDEX: 18.66 KG/M2

## 2019-01-01 VITALS
SYSTOLIC BLOOD PRESSURE: 121 MMHG | BODY MASS INDEX: 19.43 KG/M2 | HEART RATE: 85 BPM | OXYGEN SATURATION: 98 % | HEIGHT: 65 IN | DIASTOLIC BLOOD PRESSURE: 75 MMHG | RESPIRATION RATE: 18 BRPM | WEIGHT: 116.63 LBS | TEMPERATURE: 98 F

## 2019-01-01 VITALS
RESPIRATION RATE: 20 BRPM | SYSTOLIC BLOOD PRESSURE: 106 MMHG | BODY MASS INDEX: 21.67 KG/M2 | TEMPERATURE: 98 F | WEIGHT: 130.06 LBS | HEIGHT: 65 IN | OXYGEN SATURATION: 98 % | HEART RATE: 91 BPM | DIASTOLIC BLOOD PRESSURE: 58 MMHG

## 2019-01-01 VITALS
HEART RATE: 86 BPM | RESPIRATION RATE: 20 BRPM | SYSTOLIC BLOOD PRESSURE: 122 MMHG | OXYGEN SATURATION: 99 % | DIASTOLIC BLOOD PRESSURE: 66 MMHG

## 2019-01-01 VITALS
SYSTOLIC BLOOD PRESSURE: 135 MMHG | OXYGEN SATURATION: 100 % | RESPIRATION RATE: 18 BRPM | HEIGHT: 65 IN | BODY MASS INDEX: 17.74 KG/M2 | HEART RATE: 79 BPM | DIASTOLIC BLOOD PRESSURE: 79 MMHG | TEMPERATURE: 98 F | WEIGHT: 106.5 LBS

## 2019-01-01 VITALS
WEIGHT: 129.88 LBS | BODY MASS INDEX: 22.17 KG/M2 | TEMPERATURE: 98 F | SYSTOLIC BLOOD PRESSURE: 161 MMHG | HEART RATE: 91 BPM | DIASTOLIC BLOOD PRESSURE: 81 MMHG | RESPIRATION RATE: 16 BRPM | OXYGEN SATURATION: 100 % | HEIGHT: 64 IN

## 2019-01-01 VITALS
RESPIRATION RATE: 19 BRPM | OXYGEN SATURATION: 96 % | HEART RATE: 81 BPM | WEIGHT: 126 LBS | DIASTOLIC BLOOD PRESSURE: 79 MMHG | SYSTOLIC BLOOD PRESSURE: 134 MMHG | HEIGHT: 64 IN | TEMPERATURE: 98 F | BODY MASS INDEX: 21.51 KG/M2

## 2019-01-01 DIAGNOSIS — R18.8 OTHER ASCITES: ICD-10-CM

## 2019-01-01 DIAGNOSIS — G47.09 OTHER INSOMNIA: ICD-10-CM

## 2019-01-01 DIAGNOSIS — K76.6 PORTAL HYPERTENSION: ICD-10-CM

## 2019-01-01 DIAGNOSIS — N18.6 ESRD (END STAGE RENAL DISEASE): ICD-10-CM

## 2019-01-01 DIAGNOSIS — K74.60 HEPATIC CIRRHOSIS, UNSPECIFIED HEPATIC CIRRHOSIS TYPE, UNSPECIFIED WHETHER ASCITES PRESENT: ICD-10-CM

## 2019-01-01 DIAGNOSIS — I10 ESSENTIAL HYPERTENSION: ICD-10-CM

## 2019-01-01 DIAGNOSIS — Z09 HOSPITAL DISCHARGE FOLLOW-UP: Primary | ICD-10-CM

## 2019-01-01 DIAGNOSIS — R06.02 SHORTNESS OF BREATH: ICD-10-CM

## 2019-01-01 DIAGNOSIS — E87.70 VOLUME OVERLOAD: ICD-10-CM

## 2019-01-01 DIAGNOSIS — R07.9 CHEST PAIN: ICD-10-CM

## 2019-01-01 DIAGNOSIS — R18.8 CIRRHOSIS OF LIVER WITH ASCITES, UNSPECIFIED HEPATIC CIRRHOSIS TYPE: ICD-10-CM

## 2019-01-01 DIAGNOSIS — N25.81 SECONDARY HYPERPARATHYROIDISM OF RENAL ORIGIN: Chronic | ICD-10-CM

## 2019-01-01 DIAGNOSIS — K74.60 HEPATIC CIRRHOSIS, UNSPECIFIED HEPATIC CIRRHOSIS TYPE, UNSPECIFIED WHETHER ASCITES PRESENT: Primary | ICD-10-CM

## 2019-01-01 DIAGNOSIS — E87.5 HYPERKALEMIA: ICD-10-CM

## 2019-01-01 DIAGNOSIS — R18.8 OTHER ASCITES: Primary | ICD-10-CM

## 2019-01-01 DIAGNOSIS — R06.02 SOB (SHORTNESS OF BREATH): ICD-10-CM

## 2019-01-01 DIAGNOSIS — E87.70 HYPERVOLEMIA: ICD-10-CM

## 2019-01-01 DIAGNOSIS — J18.9 PNEUMONIA DUE TO INFECTIOUS ORGANISM, UNSPECIFIED LATERALITY, UNSPECIFIED PART OF LUNG: ICD-10-CM

## 2019-01-01 DIAGNOSIS — E87.5 HYPERPOTASSEMIA: Primary | ICD-10-CM

## 2019-01-01 DIAGNOSIS — R10.9 ABDOMINAL PAIN, UNSPECIFIED ABDOMINAL LOCATION: ICD-10-CM

## 2019-01-01 DIAGNOSIS — K70.11 ASCITES DUE TO ALCOHOLIC HEPATITIS: ICD-10-CM

## 2019-01-01 DIAGNOSIS — K72.10 END STAGE LIVER DISEASE: ICD-10-CM

## 2019-01-01 DIAGNOSIS — K72.90 LIVER FAILURE WITHOUT HEPATIC COMA, UNSPECIFIED CHRONICITY: Primary | ICD-10-CM

## 2019-01-01 DIAGNOSIS — I50.33 ACUTE ON CHRONIC DIASTOLIC HEART FAILURE: Chronic | ICD-10-CM

## 2019-01-01 DIAGNOSIS — J18.9 PNEUMONIA DUE TO INFECTIOUS ORGANISM: ICD-10-CM

## 2019-01-01 DIAGNOSIS — R18.8 ASCITES: ICD-10-CM

## 2019-01-01 DIAGNOSIS — R06.00 DYSPNEA, UNSPECIFIED TYPE: ICD-10-CM

## 2019-01-01 DIAGNOSIS — R06.03 ACUTE RESPIRATORY DISTRESS: ICD-10-CM

## 2019-01-01 DIAGNOSIS — R79.89 TROPONIN LEVEL ELEVATED: ICD-10-CM

## 2019-01-01 DIAGNOSIS — B18.2 HEP C W/O COMA, CHRONIC: Primary | ICD-10-CM

## 2019-01-01 DIAGNOSIS — R19.00 ABDOMINAL SWELLING: ICD-10-CM

## 2019-01-01 DIAGNOSIS — K21.9 GASTROESOPHAGEAL REFLUX DISEASE, ESOPHAGITIS PRESENCE NOT SPECIFIED: ICD-10-CM

## 2019-01-01 DIAGNOSIS — K74.60 CIRRHOSIS OF LIVER WITH ASCITES, UNSPECIFIED HEPATIC CIRRHOSIS TYPE: ICD-10-CM

## 2019-01-01 DIAGNOSIS — B19.20 HEPATITIS C VIRUS INFECTION WITHOUT HEPATIC COMA, UNSPECIFIED CHRONICITY: Primary | ICD-10-CM

## 2019-01-01 DIAGNOSIS — J18.9 PNEUMONIA OF RIGHT MIDDLE LOBE DUE TO INFECTIOUS ORGANISM: Primary | ICD-10-CM

## 2019-01-01 LAB
ABO + RH BLD: NORMAL
ALBUMIN FLD-MCNC: 1.3 G/DL
ALBUMIN SERPL BCP-MCNC: 2.4 G/DL (ref 3.5–5.2)
ALBUMIN SERPL BCP-MCNC: 2.6 G/DL (ref 3.5–5.2)
ALBUMIN SERPL BCP-MCNC: 2.7 G/DL (ref 3.5–5.2)
ALBUMIN SERPL BCP-MCNC: 2.7 G/DL (ref 3.5–5.2)
ALBUMIN SERPL BCP-MCNC: 2.8 G/DL (ref 3.5–5.2)
ALBUMIN SERPL BCP-MCNC: 2.8 G/DL (ref 3.5–5.2)
ALBUMIN SERPL BCP-MCNC: 3.1 G/DL (ref 3.5–5.2)
ALBUMIN SERPL BCP-MCNC: 3.2 G/DL (ref 3.5–5.2)
ALP SERPL-CCNC: 59 U/L (ref 55–135)
ALP SERPL-CCNC: 59 U/L (ref 55–135)
ALP SERPL-CCNC: 61 U/L (ref 55–135)
ALP SERPL-CCNC: 70 U/L (ref 55–135)
ALP SERPL-CCNC: 72 U/L (ref 55–135)
ALP SERPL-CCNC: 75 U/L (ref 55–135)
ALP SERPL-CCNC: 75 U/L (ref 55–135)
ALP SERPL-CCNC: 76 U/L (ref 55–135)
ALP SERPL-CCNC: 79 U/L (ref 55–135)
ALP SERPL-CCNC: 79 U/L (ref 55–135)
ALP SERPL-CCNC: 80 U/L (ref 55–135)
ALP SERPL-CCNC: 82 U/L (ref 55–135)
ALT SERPL W/O P-5'-P-CCNC: 13 U/L (ref 10–44)
ALT SERPL W/O P-5'-P-CCNC: 14 U/L (ref 10–44)
ALT SERPL W/O P-5'-P-CCNC: 15 U/L (ref 10–44)
ALT SERPL W/O P-5'-P-CCNC: 15 U/L (ref 10–44)
ALT SERPL W/O P-5'-P-CCNC: 16 U/L (ref 10–44)
ALT SERPL W/O P-5'-P-CCNC: 24 U/L (ref 10–44)
ALT SERPL W/O P-5'-P-CCNC: 24 U/L (ref 10–44)
ALT SERPL W/O P-5'-P-CCNC: 27 U/L (ref 10–44)
ALT SERPL W/O P-5'-P-CCNC: 32 U/L (ref 10–44)
ALT SERPL W/O P-5'-P-CCNC: 32 U/L (ref 10–44)
ALT SERPL W/O P-5'-P-CCNC: 41 U/L (ref 10–44)
ALT SERPL W/O P-5'-P-CCNC: 44 U/L (ref 10–44)
AMMONIA PLAS-SCNC: 17 UMOL/L (ref 10–50)
AMPHET+METHAMPHET UR QL: NEGATIVE
AMYLASE SERPL-CCNC: 48 U/L (ref 20–110)
ANION GAP SERPL CALC-SCNC: 10 MMOL/L (ref 8–16)
ANION GAP SERPL CALC-SCNC: 11 MMOL/L (ref 8–16)
ANION GAP SERPL CALC-SCNC: 12 MMOL/L (ref 8–16)
ANION GAP SERPL CALC-SCNC: 13 MMOL/L (ref 8–16)
ANION GAP SERPL CALC-SCNC: 14 MMOL/L (ref 8–16)
ANION GAP SERPL CALC-SCNC: 15 MMOL/L (ref 8–16)
ANION GAP SERPL CALC-SCNC: 15 MMOL/L (ref 8–16)
ANION GAP SERPL CALC-SCNC: 16 MMOL/L (ref 8–16)
ANION GAP SERPL CALC-SCNC: 18 MMOL/L (ref 8–16)
AORTIC ROOT ANNULUS: 3.11 CM
AORTIC VALVE CUSP SEPERATION: 0.47 CM
APPEARANCE FLD: CLEAR
APTT PPP: 29.8 SEC (ref 23.6–33.3)
APTT PPP: 30.3 SEC (ref 23.6–33.3)
AST SERPL-CCNC: 19 U/L (ref 10–40)
AST SERPL-CCNC: 19 U/L (ref 10–40)
AST SERPL-CCNC: 22 U/L (ref 10–40)
AST SERPL-CCNC: 28 U/L (ref 10–40)
AST SERPL-CCNC: 29 U/L (ref 10–40)
AST SERPL-CCNC: 30 U/L (ref 10–40)
AST SERPL-CCNC: 30 U/L (ref 10–40)
AST SERPL-CCNC: 31 U/L (ref 10–40)
AST SERPL-CCNC: 31 U/L (ref 10–40)
AST SERPL-CCNC: 34 U/L (ref 10–40)
AST SERPL-CCNC: 38 U/L (ref 10–40)
AST SERPL-CCNC: 43 U/L (ref 10–40)
AV INDEX (PROSTH): 0.59
AV MEAN GRADIENT: 16 MMHG
AV PEAK GRADIENT: 23 MMHG
AV VALVE AREA: 1.7 CM2
AV VELOCITY RATIO: 53.35
BACTERIA #/AREA URNS HPF: ABNORMAL /HPF
BACTERIA #/AREA URNS HPF: NEGATIVE /HPF
BACTERIA #/AREA URNS HPF: NEGATIVE /HPF
BACTERIA FLD AEROBE CULT: NO GROWTH
BACTERIA FLD AEROBE CULT: NO GROWTH
BACTERIA UR CULT: ABNORMAL
BACTERIA UR CULT: NORMAL
BACTERIA UR CULT: NORMAL
BARBITURATES UR QL SCN>200 NG/ML: NEGATIVE
BASOPHILS # BLD AUTO: 0.01 K/UL (ref 0–0.2)
BASOPHILS # BLD AUTO: 0.02 K/UL (ref 0–0.2)
BASOPHILS # BLD AUTO: 0.03 K/UL (ref 0–0.2)
BASOPHILS NFR BLD: 0.1 % (ref 0–1.9)
BASOPHILS NFR BLD: 0.2 % (ref 0–1.9)
BASOPHILS NFR BLD: 0.3 % (ref 0–1.9)
BASOPHILS NFR BLD: 0.3 % (ref 0–1.9)
BASOPHILS NFR BLD: 0.4 % (ref 0–1.9)
BASOPHILS NFR BLD: 0.5 % (ref 0–1.9)
BASOPHILS NFR BLD: 0.5 % (ref 0–1.9)
BASOPHILS NFR BLD: 0.6 % (ref 0–1.9)
BASOPHILS NFR BLD: 0.7 % (ref 0–1.9)
BENZODIAZ UR QL SCN>200 NG/ML: NEGATIVE
BILIRUB SERPL-MCNC: 0.7 MG/DL (ref 0.1–1)
BILIRUB SERPL-MCNC: 0.8 MG/DL (ref 0.1–1)
BILIRUB SERPL-MCNC: 0.9 MG/DL (ref 0.1–1)
BILIRUB SERPL-MCNC: 1 MG/DL (ref 0.1–1)
BILIRUB SERPL-MCNC: 1.3 MG/DL (ref 0.1–1)
BILIRUB UR QL STRIP: NEGATIVE
BLD GP AB SCN CELLS X3 SERPL QL: NORMAL
BLD PROD TYP BPU: NORMAL
BLOOD UNIT EXPIRATION DATE: NORMAL
BLOOD UNIT TYPE CODE: 9500
BLOOD UNIT TYPE: NORMAL
BNP SERPL-MCNC: 224 PG/ML (ref 0–99)
BNP SERPL-MCNC: 469 PG/ML (ref 0–99)
BNP SERPL-MCNC: 573 PG/ML (ref 0–99)
BNP SERPL-MCNC: 573 PG/ML (ref 0–99)
BODY FLD TYPE: NORMAL
BODY FLUID SOURCE, LDH: NORMAL
BSA FOR ECHO PROCEDURE: 1.56 M2
BUN SERPL-MCNC: 125 MG/DL (ref 6–20)
BUN SERPL-MCNC: 126 MG/DL (ref 6–20)
BUN SERPL-MCNC: 130 MG/DL (ref 6–20)
BUN SERPL-MCNC: 35 MG/DL (ref 6–20)
BUN SERPL-MCNC: 35 MG/DL (ref 6–20)
BUN SERPL-MCNC: 38 MG/DL (ref 6–20)
BUN SERPL-MCNC: 38 MG/DL (ref 6–20)
BUN SERPL-MCNC: 39 MG/DL (ref 6–20)
BUN SERPL-MCNC: 42 MG/DL (ref 6–20)
BUN SERPL-MCNC: 44 MG/DL (ref 6–20)
BUN SERPL-MCNC: 49 MG/DL (ref 6–20)
BUN SERPL-MCNC: 49 MG/DL (ref 6–20)
BUN SERPL-MCNC: 51 MG/DL (ref 6–20)
BUN SERPL-MCNC: 51 MG/DL (ref 6–20)
BUN SERPL-MCNC: 65 MG/DL (ref 6–20)
BUN SERPL-MCNC: 74 MG/DL (ref 6–20)
BUN SERPL-MCNC: 82 MG/DL (ref 6–20)
BUN SERPL-MCNC: 84 MG/DL (ref 6–20)
BZE UR QL SCN: NEGATIVE
CALCIUM SERPL-MCNC: 7.7 MG/DL (ref 8.7–10.5)
CALCIUM SERPL-MCNC: 7.7 MG/DL (ref 8.7–10.5)
CALCIUM SERPL-MCNC: 8.2 MG/DL (ref 8.7–10.5)
CALCIUM SERPL-MCNC: 8.2 MG/DL (ref 8.7–10.5)
CALCIUM SERPL-MCNC: 8.3 MG/DL (ref 8.7–10.5)
CALCIUM SERPL-MCNC: 8.3 MG/DL (ref 8.7–10.5)
CALCIUM SERPL-MCNC: 8.4 MG/DL (ref 8.7–10.5)
CALCIUM SERPL-MCNC: 8.5 MG/DL (ref 8.7–10.5)
CALCIUM SERPL-MCNC: 8.6 MG/DL (ref 8.7–10.5)
CALCIUM SERPL-MCNC: 8.7 MG/DL (ref 8.7–10.5)
CALCIUM SERPL-MCNC: 8.8 MG/DL (ref 8.7–10.5)
CALCIUM SERPL-MCNC: 8.8 MG/DL (ref 8.7–10.5)
CALCIUM SERPL-MCNC: 8.9 MG/DL (ref 8.7–10.5)
CANNABINOIDS UR QL SCN: NORMAL
CHLORIDE SERPL-SCNC: 100 MMOL/L (ref 95–110)
CHLORIDE SERPL-SCNC: 100 MMOL/L (ref 95–110)
CHLORIDE SERPL-SCNC: 102 MMOL/L (ref 95–110)
CHLORIDE SERPL-SCNC: 102 MMOL/L (ref 95–110)
CHLORIDE SERPL-SCNC: 94 MMOL/L (ref 95–110)
CHLORIDE SERPL-SCNC: 95 MMOL/L (ref 95–110)
CHLORIDE SERPL-SCNC: 96 MMOL/L (ref 95–110)
CHLORIDE SERPL-SCNC: 96 MMOL/L (ref 95–110)
CHLORIDE SERPL-SCNC: 97 MMOL/L (ref 95–110)
CHLORIDE SERPL-SCNC: 98 MMOL/L (ref 95–110)
CHLORIDE SERPL-SCNC: 99 MMOL/L (ref 95–110)
CHOLEST SERPL-MCNC: 109 MG/DL (ref 120–199)
CHOLEST/HDLC SERPL: 4.7 {RATIO} (ref 2–5)
CK MB SERPL-MCNC: 2.2 NG/ML (ref 0.1–6.5)
CK MB SERPL-MCNC: 2.3 NG/ML (ref 0.1–6.5)
CK MB SERPL-MCNC: 2.3 NG/ML (ref 0.1–6.5)
CK MB SERPL-MCNC: 6.9 NG/ML (ref 0.1–6.5)
CK SERPL-CCNC: 32 U/L (ref 20–180)
CK SERPL-CCNC: 32 U/L (ref 20–180)
CK SERPL-CCNC: 37 U/L (ref 20–180)
CK SERPL-CCNC: 47 U/L (ref 20–180)
CLARITY UR: ABNORMAL
CLARITY UR: ABNORMAL
CLARITY UR: CLEAR
CO2 SERPL-SCNC: 19 MMOL/L (ref 23–29)
CO2 SERPL-SCNC: 19 MMOL/L (ref 23–29)
CO2 SERPL-SCNC: 20 MMOL/L (ref 23–29)
CO2 SERPL-SCNC: 23 MMOL/L (ref 23–29)
CO2 SERPL-SCNC: 24 MMOL/L (ref 23–29)
CO2 SERPL-SCNC: 25 MMOL/L (ref 23–29)
CO2 SERPL-SCNC: 26 MMOL/L (ref 23–29)
CO2 SERPL-SCNC: 27 MMOL/L (ref 23–29)
CO2 SERPL-SCNC: 27 MMOL/L (ref 23–29)
CO2 SERPL-SCNC: 29 MMOL/L (ref 23–29)
CO2 SERPL-SCNC: 30 MMOL/L (ref 23–29)
CODING SYSTEM: NORMAL
COLOR FLD: YELLOW
COLOR UR: ABNORMAL
COLOR UR: YELLOW
COLOR UR: YELLOW
CREAT SERPL-MCNC: 10.3 MG/DL (ref 0.5–1.4)
CREAT SERPL-MCNC: 10.4 MG/DL (ref 0.5–1.4)
CREAT SERPL-MCNC: 10.4 MG/DL (ref 0.5–1.4)
CREAT SERPL-MCNC: 10.6 MG/DL (ref 0.5–1.4)
CREAT SERPL-MCNC: 10.7 MG/DL (ref 0.5–1.4)
CREAT SERPL-MCNC: 11.4 MG/DL (ref 0.5–1.4)
CREAT SERPL-MCNC: 11.4 MG/DL (ref 0.5–1.4)
CREAT SERPL-MCNC: 5.2 MG/DL (ref 0.5–1.4)
CREAT SERPL-MCNC: 5.2 MG/DL (ref 0.5–1.4)
CREAT SERPL-MCNC: 5.5 MG/DL (ref 0.5–1.4)
CREAT SERPL-MCNC: 6.1 MG/DL (ref 0.5–1.4)
CREAT SERPL-MCNC: 6.7 MG/DL (ref 0.5–1.4)
CREAT SERPL-MCNC: 6.7 MG/DL (ref 0.5–1.4)
CREAT SERPL-MCNC: 6.8 MG/DL (ref 0.5–1.4)
CREAT SERPL-MCNC: 6.8 MG/DL (ref 0.5–1.4)
CREAT SERPL-MCNC: 6.9 MG/DL (ref 0.5–1.4)
CREAT SERPL-MCNC: 7.6 MG/DL (ref 0.5–1.4)
CREAT SERPL-MCNC: 8.3 MG/DL (ref 0.5–1.4)
CREAT SERPL-MCNC: 8.5 MG/DL (ref 0.5–1.4)
CREAT UR-MCNC: 53 MG/DL (ref 15–325)
CV ECHO LV RWT: 0.36 CM
DIFFERENTIAL METHOD: ABNORMAL
DISPENSE STATUS: NORMAL
DOP CALC AO PEAK VEL: 2.41 M/S
DOP CALC AO VTI: 48.53 CM
DOP CALC LVOT AREA: 2.9 CM2
DOP CALC LVOT DIAMETER: 1.92 CM
DOP CALC LVOT PEAK VEL: 128.58 M/S
DOP CALC LVOT STROKE VOLUME: 82.73 CM3
DOP CALCLVOT PEAK VEL VTI: 28.59 CM
E WAVE DECELERATION TIME: 219.63 MSEC
E/A RATIO: 0.74
E/E' RATIO: 14 M/S
ECHO LV POSTERIOR WALL: 0.9 CM (ref 0.6–1.1)
EOSINOPHIL # BLD AUTO: 0 K/UL (ref 0–0.5)
EOSINOPHIL # BLD AUTO: 0.1 K/UL (ref 0–0.5)
EOSINOPHIL # BLD AUTO: 0.2 K/UL (ref 0–0.5)
EOSINOPHIL # BLD AUTO: 0.2 K/UL (ref 0–0.5)
EOSINOPHIL NFR BLD: 0.7 % (ref 0–8)
EOSINOPHIL NFR BLD: 0.8 % (ref 0–8)
EOSINOPHIL NFR BLD: 0.8 % (ref 0–8)
EOSINOPHIL NFR BLD: 0.9 % (ref 0–8)
EOSINOPHIL NFR BLD: 0.9 % (ref 0–8)
EOSINOPHIL NFR BLD: 1.3 % (ref 0–8)
EOSINOPHIL NFR BLD: 1.5 % (ref 0–8)
EOSINOPHIL NFR BLD: 1.5 % (ref 0–8)
EOSINOPHIL NFR BLD: 1.7 % (ref 0–8)
EOSINOPHIL NFR BLD: 2.2 % (ref 0–8)
EOSINOPHIL NFR BLD: 2.3 % (ref 0–8)
EOSINOPHIL NFR BLD: 2.3 % (ref 0–8)
EOSINOPHIL NFR BLD: 2.4 % (ref 0–8)
EOSINOPHIL NFR BLD: 2.8 % (ref 0–8)
EOSINOPHIL NFR BLD: 3.1 % (ref 0–8)
EOSINOPHIL NFR BLD: 3.1 % (ref 0–8)
EOSINOPHIL NFR BLD: 3.2 % (ref 0–8)
EOSINOPHIL NFR BLD: 3.2 % (ref 0–8)
EOSINOPHIL NFR BLD: 3.7 % (ref 0–8)
EOSINOPHIL NFR BLD: 3.7 % (ref 0–8)
EOSINOPHIL NFR FLD MANUAL: 3 %
ERYTHROCYTE [DISTWIDTH] IN BLOOD BY AUTOMATED COUNT: 13.4 % (ref 11.5–14.5)
ERYTHROCYTE [DISTWIDTH] IN BLOOD BY AUTOMATED COUNT: 13.4 % (ref 11.5–14.5)
ERYTHROCYTE [DISTWIDTH] IN BLOOD BY AUTOMATED COUNT: 13.5 % (ref 11.5–14.5)
ERYTHROCYTE [DISTWIDTH] IN BLOOD BY AUTOMATED COUNT: 13.6 % (ref 11.5–14.5)
ERYTHROCYTE [DISTWIDTH] IN BLOOD BY AUTOMATED COUNT: 13.8 % (ref 11.5–14.5)
ERYTHROCYTE [DISTWIDTH] IN BLOOD BY AUTOMATED COUNT: 14.1 % (ref 11.5–14.5)
ERYTHROCYTE [DISTWIDTH] IN BLOOD BY AUTOMATED COUNT: 14.1 % (ref 11.5–14.5)
ERYTHROCYTE [DISTWIDTH] IN BLOOD BY AUTOMATED COUNT: 14.4 % (ref 11.5–14.5)
ERYTHROCYTE [DISTWIDTH] IN BLOOD BY AUTOMATED COUNT: 14.4 % (ref 11.5–14.5)
ERYTHROCYTE [DISTWIDTH] IN BLOOD BY AUTOMATED COUNT: 14.7 % (ref 11.5–14.5)
ERYTHROCYTE [DISTWIDTH] IN BLOOD BY AUTOMATED COUNT: 14.7 % (ref 11.5–14.5)
ERYTHROCYTE [DISTWIDTH] IN BLOOD BY AUTOMATED COUNT: 15.1 % (ref 11.5–14.5)
ERYTHROCYTE [DISTWIDTH] IN BLOOD BY AUTOMATED COUNT: 15.3 % (ref 11.5–14.5)
ERYTHROCYTE [DISTWIDTH] IN BLOOD BY AUTOMATED COUNT: 15.3 % (ref 11.5–14.5)
ERYTHROCYTE [DISTWIDTH] IN BLOOD BY AUTOMATED COUNT: 15.4 % (ref 11.5–14.5)
ERYTHROCYTE [DISTWIDTH] IN BLOOD BY AUTOMATED COUNT: 15.4 % (ref 11.5–14.5)
ERYTHROCYTE [DISTWIDTH] IN BLOOD BY AUTOMATED COUNT: 16.3 % (ref 11.5–14.5)
EST. GFR  (AFRICAN AMERICAN): 10.2 ML/MIN/1.73 M^2
EST. GFR  (AFRICAN AMERICAN): 10.2 ML/MIN/1.73 M^2
EST. GFR  (AFRICAN AMERICAN): 3.9 ML/MIN/1.73 M^2
EST. GFR  (AFRICAN AMERICAN): 3.9 ML/MIN/1.73 M^2
EST. GFR  (AFRICAN AMERICAN): 4.3 ML/MIN/1.73 M^2
EST. GFR  (AFRICAN AMERICAN): 4.3 ML/MIN/1.73 M^2
EST. GFR  (AFRICAN AMERICAN): 4.4 ML/MIN/1.73 M^2
EST. GFR  (AFRICAN AMERICAN): 4.4 ML/MIN/1.73 M^2
EST. GFR  (AFRICAN AMERICAN): 4.5 ML/MIN/1.73 M^2
EST. GFR  (AFRICAN AMERICAN): 5.6 ML/MIN/1.73 M^2
EST. GFR  (AFRICAN AMERICAN): 5.8 ML/MIN/1.73 M^2
EST. GFR  (AFRICAN AMERICAN): 6.4 ML/MIN/1.73 M^2
EST. GFR  (AFRICAN AMERICAN): 7.2 ML/MIN/1.73 M^2
EST. GFR  (AFRICAN AMERICAN): 7.4 ML/MIN/1.73 M^2
EST. GFR  (AFRICAN AMERICAN): 7.4 ML/MIN/1.73 M^2
EST. GFR  (AFRICAN AMERICAN): 7.5 ML/MIN/1.73 M^2
EST. GFR  (AFRICAN AMERICAN): 7.5 ML/MIN/1.73 M^2
EST. GFR  (AFRICAN AMERICAN): 8.4 ML/MIN/1.73 M^2
EST. GFR  (AFRICAN AMERICAN): 9.5 ML/MIN/1.73 M^2
EST. GFR  (NON AFRICAN AMERICAN): 3.4 ML/MIN/1.73 M^2
EST. GFR  (NON AFRICAN AMERICAN): 3.4 ML/MIN/1.73 M^2
EST. GFR  (NON AFRICAN AMERICAN): 3.7 ML/MIN/1.73 M^2
EST. GFR  (NON AFRICAN AMERICAN): 3.7 ML/MIN/1.73 M^2
EST. GFR  (NON AFRICAN AMERICAN): 3.8 ML/MIN/1.73 M^2
EST. GFR  (NON AFRICAN AMERICAN): 3.8 ML/MIN/1.73 M^2
EST. GFR  (NON AFRICAN AMERICAN): 3.9 ML/MIN/1.73 M^2
EST. GFR  (NON AFRICAN AMERICAN): 4.9 ML/MIN/1.73 M^2
EST. GFR  (NON AFRICAN AMERICAN): 5 ML/MIN/1.73 M^2
EST. GFR  (NON AFRICAN AMERICAN): 5.6 ML/MIN/1.73 M^2
EST. GFR  (NON AFRICAN AMERICAN): 6.3 ML/MIN/1.73 M^2
EST. GFR  (NON AFRICAN AMERICAN): 6.4 ML/MIN/1.73 M^2
EST. GFR  (NON AFRICAN AMERICAN): 6.4 ML/MIN/1.73 M^2
EST. GFR  (NON AFRICAN AMERICAN): 6.5 ML/MIN/1.73 M^2
EST. GFR  (NON AFRICAN AMERICAN): 6.5 ML/MIN/1.73 M^2
EST. GFR  (NON AFRICAN AMERICAN): 7.3 ML/MIN/1.73 M^2
EST. GFR  (NON AFRICAN AMERICAN): 8.3 ML/MIN/1.73 M^2
EST. GFR  (NON AFRICAN AMERICAN): 8.8 ML/MIN/1.73 M^2
EST. GFR  (NON AFRICAN AMERICAN): 8.8 ML/MIN/1.73 M^2
ESTIMATED AVG GLUCOSE: NORMAL MG/DL (ref 68–131)
FRACTIONAL SHORTENING: 40 % (ref 28–44)
GLUCOSE SERPL-MCNC: 101 MG/DL (ref 70–110)
GLUCOSE SERPL-MCNC: 101 MG/DL (ref 70–110)
GLUCOSE SERPL-MCNC: 103 MG/DL (ref 70–110)
GLUCOSE SERPL-MCNC: 103 MG/DL (ref 70–110)
GLUCOSE SERPL-MCNC: 108 MG/DL (ref 70–110)
GLUCOSE SERPL-MCNC: 110 MG/DL (ref 70–110)
GLUCOSE SERPL-MCNC: 116 MG/DL (ref 70–110)
GLUCOSE SERPL-MCNC: 80 MG/DL (ref 70–110)
GLUCOSE SERPL-MCNC: 89 MG/DL (ref 70–110)
GLUCOSE SERPL-MCNC: 91 MG/DL (ref 70–110)
GLUCOSE SERPL-MCNC: 93 MG/DL (ref 70–110)
GLUCOSE SERPL-MCNC: 94 MG/DL (ref 70–110)
GLUCOSE SERPL-MCNC: 95 MG/DL (ref 70–110)
GLUCOSE SERPL-MCNC: 95 MG/DL (ref 70–110)
GLUCOSE SERPL-MCNC: 96 MG/DL (ref 70–110)
GLUCOSE SERPL-MCNC: 96 MG/DL (ref 70–110)
GLUCOSE SERPL-MCNC: 99 MG/DL (ref 70–110)
GLUCOSE UR QL STRIP: NEGATIVE
GRAM STN SPEC: NORMAL
GRAM STN SPEC: NORMAL
HBA1C MFR BLD HPLC: NORMAL % (ref 4.5–6.2)
HBV CORE AB SERPL QL IA: NEGATIVE
HBV CORE AB SERPL QL IA: NEGATIVE
HBV SURFACE AB SER QL: NON REACTIVE
HBV SURFACE AB SER QL: NON REACTIVE
HBV SURFACE AG SERPL QL IA: NEGATIVE
HBV SURFACE AG SERPL QL IA: NEGATIVE
HCT VFR BLD AUTO: 22.7 % (ref 37–48.5)
HCT VFR BLD AUTO: 22.7 % (ref 37–48.5)
HCT VFR BLD AUTO: 25.5 % (ref 37–48.5)
HCT VFR BLD AUTO: 26.1 % (ref 37–48.5)
HCT VFR BLD AUTO: 26.3 % (ref 37–48.5)
HCT VFR BLD AUTO: 26.3 % (ref 37–48.5)
HCT VFR BLD AUTO: 26.4 % (ref 37–48.5)
HCT VFR BLD AUTO: 26.6 % (ref 37–48.5)
HCT VFR BLD AUTO: 26.9 % (ref 37–48.5)
HCT VFR BLD AUTO: 26.9 % (ref 37–48.5)
HCT VFR BLD AUTO: 27.7 % (ref 37–48.5)
HCT VFR BLD AUTO: 29 % (ref 37–48.5)
HCT VFR BLD AUTO: 29.5 % (ref 37–48.5)
HCT VFR BLD AUTO: 29.5 % (ref 37–48.5)
HCT VFR BLD AUTO: 31.5 % (ref 37–48.5)
HCT VFR BLD AUTO: 32.4 % (ref 37–48.5)
HCT VFR BLD AUTO: 34.6 % (ref 37–48.5)
HCT VFR BLD AUTO: 34.7 % (ref 37–48.5)
HDLC SERPL-MCNC: 23 MG/DL (ref 40–75)
HDLC SERPL: 21.1 % (ref 20–50)
HGB BLD-MCNC: 10.3 G/DL (ref 12–16)
HGB BLD-MCNC: 10.7 G/DL (ref 12–16)
HGB BLD-MCNC: 11.8 G/DL (ref 12–16)
HGB BLD-MCNC: 11.8 G/DL (ref 12–16)
HGB BLD-MCNC: 7.6 G/DL (ref 12–16)
HGB BLD-MCNC: 7.6 G/DL (ref 12–16)
HGB BLD-MCNC: 8.4 G/DL (ref 12–16)
HGB BLD-MCNC: 8.6 G/DL (ref 12–16)
HGB BLD-MCNC: 8.6 G/DL (ref 12–16)
HGB BLD-MCNC: 8.7 G/DL (ref 12–16)
HGB BLD-MCNC: 8.9 G/DL (ref 12–16)
HGB BLD-MCNC: 8.9 G/DL (ref 12–16)
HGB BLD-MCNC: 9 G/DL (ref 12–16)
HGB BLD-MCNC: 9.7 G/DL (ref 12–16)
HGB UR QL STRIP: ABNORMAL
HYALINE CASTS #/AREA URNS LPF: 1 /LPF
HYALINE CASTS #/AREA URNS LPF: 14 /LPF
HYALINE CASTS #/AREA URNS LPF: 3 /LPF
IMM GRANULOCYTES # BLD AUTO: 0.01 K/UL (ref 0–0.04)
IMM GRANULOCYTES # BLD AUTO: 0.01 K/UL (ref 0–0.04)
IMM GRANULOCYTES # BLD AUTO: 0.02 K/UL (ref 0–0.04)
IMM GRANULOCYTES # BLD AUTO: 0.03 K/UL (ref 0–0.04)
IMM GRANULOCYTES # BLD AUTO: 0.04 K/UL (ref 0–0.04)
IMM GRANULOCYTES # BLD AUTO: 0.05 K/UL (ref 0–0.04)
IMM GRANULOCYTES # BLD AUTO: 0.05 K/UL (ref 0–0.04)
IMM GRANULOCYTES NFR BLD AUTO: 0.2 % (ref 0–0.5)
IMM GRANULOCYTES NFR BLD AUTO: 0.2 % (ref 0–0.5)
IMM GRANULOCYTES NFR BLD AUTO: 0.3 % (ref 0–0.5)
IMM GRANULOCYTES NFR BLD AUTO: 0.4 % (ref 0–0.5)
IMM GRANULOCYTES NFR BLD AUTO: 0.5 % (ref 0–0.5)
IMM GRANULOCYTES NFR BLD AUTO: 0.5 % (ref 0–0.5)
IMM GRANULOCYTES NFR BLD AUTO: 0.6 % (ref 0–0.5)
IMM GRANULOCYTES NFR BLD AUTO: 0.7 % (ref 0–0.5)
IMM GRANULOCYTES NFR BLD AUTO: 0.7 % (ref 0–0.5)
IMM GRANULOCYTES NFR BLD AUTO: 0.8 % (ref 0–0.5)
IMM GRANULOCYTES NFR BLD AUTO: 0.8 % (ref 0–0.5)
IMM GRANULOCYTES NFR BLD AUTO: 1 % (ref 0–0.5)
IMM GRANULOCYTES NFR BLD AUTO: 1 % (ref 0–0.5)
IMM GRANULOCYTES NFR BLD AUTO: 1.2 % (ref 0–0.5)
IMM GRANULOCYTES NFR BLD AUTO: 1.2 % (ref 0–0.5)
INFLUENZA A, MOLECULAR: NEGATIVE
INFLUENZA B, MOLECULAR: NEGATIVE
INR PPP: 1
INR PPP: 1.1
INTERVENTRICULAR SEPTUM: 1.23 CM (ref 0.6–1.1)
KETONES UR QL STRIP: ABNORMAL
KETONES UR QL STRIP: NEGATIVE
KETONES UR QL STRIP: NEGATIVE
LABCORP MISC TEST CODE: 1453
LABCORP MISC TEST NAME: NORMAL
LABCORP MISCELLANEOUS TEST: NORMAL
LACTATE SERPL-SCNC: 0.8 MMOL/L (ref 0.5–1.9)
LDH FLD L TO P-CCNC: 65 U/L
LDH SERPL L TO P-CCNC: 142 U/L (ref 110–260)
LDLC SERPL CALC-MCNC: 56.8 MG/DL (ref 63–159)
LEFT ATRIUM SIZE: 4.45 CM
LEFT INTERNAL DIMENSION IN SYSTOLE: 3.05 CM (ref 2.1–4)
LEFT VENTRICLE MASS INDEX: 129 G/M2
LEFT VENTRICULAR INTERNAL DIMENSION IN DIASTOLE: 5.05 CM (ref 3.5–6)
LEFT VENTRICULAR MASS: 201.41 G
LEUKOCYTE ESTERASE UR QL STRIP: ABNORMAL
LEUKOCYTE ESTERASE UR QL STRIP: ABNORMAL
LEUKOCYTE ESTERASE UR QL STRIP: NEGATIVE
LIPASE SERPL-CCNC: 22 U/L (ref 4–60)
LIPASE SERPL-CCNC: 29 U/L (ref 4–60)
LIPASE SERPL-CCNC: 37 U/L (ref 4–60)
LV LATERAL E/E' RATIO: 13 M/S
LV SEPTAL E/E' RATIO: 15.17 M/S
LYMPHOCYTES # BLD AUTO: 0.4 K/UL (ref 1–4.8)
LYMPHOCYTES # BLD AUTO: 0.5 K/UL (ref 1–4.8)
LYMPHOCYTES # BLD AUTO: 0.5 K/UL (ref 1–4.8)
LYMPHOCYTES # BLD AUTO: 0.6 K/UL (ref 1–4.8)
LYMPHOCYTES # BLD AUTO: 0.7 K/UL (ref 1–4.8)
LYMPHOCYTES # BLD AUTO: 0.9 K/UL (ref 1–4.8)
LYMPHOCYTES # BLD AUTO: 0.9 K/UL (ref 1–4.8)
LYMPHOCYTES # BLD AUTO: 1 K/UL (ref 1–4.8)
LYMPHOCYTES # BLD AUTO: 1.1 K/UL (ref 1–4.8)
LYMPHOCYTES # BLD AUTO: 1.1 K/UL (ref 1–4.8)
LYMPHOCYTES # BLD AUTO: 1.2 K/UL (ref 1–4.8)
LYMPHOCYTES NFR BLD: 10.2 % (ref 18–48)
LYMPHOCYTES NFR BLD: 11.9 % (ref 18–48)
LYMPHOCYTES NFR BLD: 11.9 % (ref 18–48)
LYMPHOCYTES NFR BLD: 13.1 % (ref 18–48)
LYMPHOCYTES NFR BLD: 13.1 % (ref 18–48)
LYMPHOCYTES NFR BLD: 14.2 % (ref 18–48)
LYMPHOCYTES NFR BLD: 14.2 % (ref 18–48)
LYMPHOCYTES NFR BLD: 14.7 % (ref 18–48)
LYMPHOCYTES NFR BLD: 16.4 % (ref 18–48)
LYMPHOCYTES NFR BLD: 16.4 % (ref 18–48)
LYMPHOCYTES NFR BLD: 16.9 % (ref 18–48)
LYMPHOCYTES NFR BLD: 17.5 % (ref 18–48)
LYMPHOCYTES NFR BLD: 17.9 % (ref 18–48)
LYMPHOCYTES NFR BLD: 18.3 % (ref 18–48)
LYMPHOCYTES NFR BLD: 20.2 % (ref 18–48)
LYMPHOCYTES NFR BLD: 21.8 % (ref 18–48)
LYMPHOCYTES NFR BLD: 22 % (ref 18–48)
LYMPHOCYTES NFR BLD: 6.2 % (ref 18–48)
LYMPHOCYTES NFR BLD: 7.4 % (ref 18–48)
LYMPHOCYTES NFR BLD: 7.4 % (ref 18–48)
LYMPHOCYTES NFR FLD MANUAL: 55 %
MAGNESIUM SERPL-MCNC: 2.1 MG/DL (ref 1.6–2.6)
MAGNESIUM SERPL-MCNC: 2.2 MG/DL (ref 1.6–2.6)
MAGNESIUM SERPL-MCNC: 2.2 MG/DL (ref 1.6–2.6)
MAGNESIUM SERPL-MCNC: 2.3 MG/DL (ref 1.6–2.6)
MAGNESIUM SERPL-MCNC: 2.4 MG/DL (ref 1.6–2.6)
MAGNESIUM SERPL-MCNC: 2.6 MG/DL (ref 1.6–2.6)
MAGNESIUM SERPL-MCNC: 2.6 MG/DL (ref 1.6–2.6)
MCH RBC QN AUTO: 31.2 PG (ref 27–31)
MCH RBC QN AUTO: 31.2 PG (ref 27–31)
MCH RBC QN AUTO: 31.4 PG (ref 27–31)
MCH RBC QN AUTO: 31.4 PG (ref 27–31)
MCH RBC QN AUTO: 31.5 PG (ref 27–31)
MCH RBC QN AUTO: 31.5 PG (ref 27–31)
MCH RBC QN AUTO: 31.6 PG (ref 27–31)
MCH RBC QN AUTO: 31.9 PG (ref 27–31)
MCH RBC QN AUTO: 32.1 PG (ref 27–31)
MCH RBC QN AUTO: 32.2 PG (ref 27–31)
MCH RBC QN AUTO: 32.3 PG (ref 27–31)
MCH RBC QN AUTO: 32.3 PG (ref 27–31)
MCH RBC QN AUTO: 32.4 PG (ref 27–31)
MCH RBC QN AUTO: 32.5 PG (ref 27–31)
MCH RBC QN AUTO: 32.6 PG (ref 27–31)
MCHC RBC AUTO-ENTMCNC: 32.5 G/DL (ref 32–36)
MCHC RBC AUTO-ENTMCNC: 32.6 G/DL (ref 32–36)
MCHC RBC AUTO-ENTMCNC: 32.7 G/DL (ref 32–36)
MCHC RBC AUTO-ENTMCNC: 32.9 G/DL (ref 32–36)
MCHC RBC AUTO-ENTMCNC: 33 G/DL (ref 32–36)
MCHC RBC AUTO-ENTMCNC: 33 G/DL (ref 32–36)
MCHC RBC AUTO-ENTMCNC: 33.1 G/DL (ref 32–36)
MCHC RBC AUTO-ENTMCNC: 33.4 G/DL (ref 32–36)
MCHC RBC AUTO-ENTMCNC: 33.5 G/DL (ref 32–36)
MCHC RBC AUTO-ENTMCNC: 33.5 G/DL (ref 32–36)
MCHC RBC AUTO-ENTMCNC: 34 G/DL (ref 32–36)
MCHC RBC AUTO-ENTMCNC: 34.1 G/DL (ref 32–36)
MCV RBC AUTO: 94 FL (ref 82–98)
MCV RBC AUTO: 94 FL (ref 82–98)
MCV RBC AUTO: 95 FL (ref 82–98)
MCV RBC AUTO: 95 FL (ref 82–98)
MCV RBC AUTO: 96 FL (ref 82–98)
MCV RBC AUTO: 97 FL (ref 82–98)
MCV RBC AUTO: 98 FL (ref 82–98)
MCV RBC AUTO: 99 FL (ref 82–98)
MICROSCOPIC COMMENT: ABNORMAL
MONOCYTES # BLD AUTO: 0.3 K/UL (ref 0.3–1)
MONOCYTES # BLD AUTO: 0.4 K/UL (ref 0.3–1)
MONOCYTES # BLD AUTO: 0.5 K/UL (ref 0.3–1)
MONOCYTES NFR BLD: 10.9 % (ref 4–15)
MONOCYTES NFR BLD: 10.9 % (ref 4–15)
MONOCYTES NFR BLD: 4.8 % (ref 4–15)
MONOCYTES NFR BLD: 4.8 % (ref 4–15)
MONOCYTES NFR BLD: 6.1 % (ref 4–15)
MONOCYTES NFR BLD: 6.1 % (ref 4–15)
MONOCYTES NFR BLD: 6.4 % (ref 4–15)
MONOCYTES NFR BLD: 6.5 % (ref 4–15)
MONOCYTES NFR BLD: 7.1 % (ref 4–15)
MONOCYTES NFR BLD: 7.1 % (ref 4–15)
MONOCYTES NFR BLD: 7.4 % (ref 4–15)
MONOCYTES NFR BLD: 7.5 % (ref 4–15)
MONOCYTES NFR BLD: 8.3 % (ref 4–15)
MONOCYTES NFR BLD: 8.3 % (ref 4–15)
MONOCYTES NFR BLD: 8.4 % (ref 4–15)
MONOCYTES NFR BLD: 8.6 % (ref 4–15)
MONOCYTES NFR BLD: 8.6 % (ref 4–15)
MONOCYTES NFR BLD: 8.9 % (ref 4–15)
MONOCYTES NFR BLD: 9.3 % (ref 4–15)
MONOCYTES NFR BLD: 9.9 % (ref 4–15)
MONOS+MACROS NFR FLD MANUAL: 8 %
MRSA SCREEN BY PCR: NEGATIVE
MV PEAK A VEL: 1.23 M/S
MV PEAK E VEL: 0.91 M/S
NEUTROPHILS # BLD AUTO: 2.5 K/UL (ref 1.8–7.7)
NEUTROPHILS # BLD AUTO: 2.5 K/UL (ref 1.8–7.7)
NEUTROPHILS # BLD AUTO: 3 K/UL (ref 1.8–7.7)
NEUTROPHILS # BLD AUTO: 3.1 K/UL (ref 1.8–7.7)
NEUTROPHILS # BLD AUTO: 3.1 K/UL (ref 1.8–7.7)
NEUTROPHILS # BLD AUTO: 3.4 K/UL (ref 1.8–7.7)
NEUTROPHILS # BLD AUTO: 3.5 K/UL (ref 1.8–7.7)
NEUTROPHILS # BLD AUTO: 3.9 K/UL (ref 1.8–7.7)
NEUTROPHILS # BLD AUTO: 4 K/UL (ref 1.8–7.7)
NEUTROPHILS # BLD AUTO: 4.9 K/UL (ref 1.8–7.7)
NEUTROPHILS # BLD AUTO: 5 K/UL (ref 1.8–7.7)
NEUTROPHILS # BLD AUTO: 5 K/UL (ref 1.8–7.7)
NEUTROPHILS # BLD AUTO: 5.6 K/UL (ref 1.8–7.7)
NEUTROPHILS # BLD AUTO: 5.8 K/UL (ref 1.8–7.7)
NEUTROPHILS # BLD AUTO: 5.8 K/UL (ref 1.8–7.7)
NEUTROPHILS NFR BLD: 65.5 % (ref 38–73)
NEUTROPHILS NFR BLD: 65.8 % (ref 38–73)
NEUTROPHILS NFR BLD: 68.2 % (ref 38–73)
NEUTROPHILS NFR BLD: 68.2 % (ref 38–73)
NEUTROPHILS NFR BLD: 70.3 % (ref 38–73)
NEUTROPHILS NFR BLD: 70.5 % (ref 38–73)
NEUTROPHILS NFR BLD: 70.7 % (ref 38–73)
NEUTROPHILS NFR BLD: 72.6 % (ref 38–73)
NEUTROPHILS NFR BLD: 74.1 % (ref 38–73)
NEUTROPHILS NFR BLD: 75.9 % (ref 38–73)
NEUTROPHILS NFR BLD: 75.9 % (ref 38–73)
NEUTROPHILS NFR BLD: 76.4 % (ref 38–73)
NEUTROPHILS NFR BLD: 76.4 % (ref 38–73)
NEUTROPHILS NFR BLD: 76.6 % (ref 38–73)
NEUTROPHILS NFR BLD: 83.4 % (ref 38–73)
NEUTROPHILS NFR BLD: 83.8 % (ref 38–73)
NEUTROPHILS NFR BLD: 83.8 % (ref 38–73)
NEUTROPHILS NFR BLD: 87.5 % (ref 38–73)
NEUTROPHILS NFR FLD MANUAL: 34 %
NITRITE UR QL STRIP: NEGATIVE
NONHDLC SERPL-MCNC: 86 MG/DL
NRBC BLD-RTO: 0 /100 WBC
NUM UNITS TRANS PACKED RBC: NORMAL
OPIATES UR QL SCN: NORMAL
PCP UR QL SCN>25 NG/ML: NEGATIVE
PH UR STRIP: 7 [PH] (ref 5–8)
PH UR STRIP: >8 [PH] (ref 5–8)
PH UR STRIP: >8 [PH] (ref 5–8)
PHOSPHATE SERPL-MCNC: 4.3 MG/DL (ref 2.7–4.5)
PHOSPHATE SERPL-MCNC: 8.9 MG/DL (ref 2.7–4.5)
PISA TR MAX VEL: 2.98 M/S
PLATELET # BLD AUTO: 127 K/UL (ref 150–350)
PLATELET # BLD AUTO: 127 K/UL (ref 150–350)
PLATELET # BLD AUTO: 130 K/UL (ref 150–350)
PLATELET # BLD AUTO: 130 K/UL (ref 150–350)
PLATELET # BLD AUTO: 139 K/UL (ref 150–350)
PLATELET # BLD AUTO: 139 K/UL (ref 150–350)
PLATELET # BLD AUTO: 140 K/UL (ref 150–350)
PLATELET # BLD AUTO: 140 K/UL (ref 150–350)
PLATELET # BLD AUTO: 146 K/UL (ref 150–350)
PLATELET # BLD AUTO: 147 K/UL (ref 150–350)
PLATELET # BLD AUTO: 147 K/UL (ref 150–350)
PLATELET # BLD AUTO: 148 K/UL (ref 150–350)
PLATELET # BLD AUTO: 148 K/UL (ref 150–350)
PLATELET # BLD AUTO: 151 K/UL (ref 150–350)
PLATELET # BLD AUTO: 158 K/UL (ref 150–350)
PLATELET # BLD AUTO: 163 K/UL (ref 150–350)
PLATELET # BLD AUTO: 166 K/UL (ref 150–350)
PLATELET # BLD AUTO: 175 K/UL (ref 150–350)
PLATELET # BLD AUTO: 176 K/UL (ref 150–350)
PLATELET # BLD AUTO: 213 K/UL (ref 150–350)
PLATELET # BLD AUTO: 285 K/UL (ref 150–350)
PMV BLD AUTO: 10.1 FL (ref 9.2–12.9)
PMV BLD AUTO: 9 FL (ref 9.2–12.9)
PMV BLD AUTO: 9.1 FL (ref 9.2–12.9)
PMV BLD AUTO: 9.1 FL (ref 9.2–12.9)
PMV BLD AUTO: 9.3 FL (ref 9.2–12.9)
PMV BLD AUTO: 9.3 FL (ref 9.2–12.9)
PMV BLD AUTO: 9.4 FL (ref 9.2–12.9)
PMV BLD AUTO: 9.6 FL (ref 9.2–12.9)
PMV BLD AUTO: 9.7 FL (ref 9.2–12.9)
PMV BLD AUTO: 9.7 FL (ref 9.2–12.9)
PMV BLD AUTO: 9.8 FL (ref 9.2–12.9)
POTASSIUM SERPL-SCNC: 3.9 MMOL/L (ref 3.5–5.1)
POTASSIUM SERPL-SCNC: 3.9 MMOL/L (ref 3.5–5.1)
POTASSIUM SERPL-SCNC: 4 MMOL/L (ref 3.5–5.1)
POTASSIUM SERPL-SCNC: 4.1 MMOL/L (ref 3.5–5.1)
POTASSIUM SERPL-SCNC: 4.1 MMOL/L (ref 3.5–5.1)
POTASSIUM SERPL-SCNC: 4.3 MMOL/L (ref 3.5–5.1)
POTASSIUM SERPL-SCNC: 4.4 MMOL/L (ref 3.5–5.1)
POTASSIUM SERPL-SCNC: 4.6 MMOL/L (ref 3.5–5.1)
POTASSIUM SERPL-SCNC: 4.6 MMOL/L (ref 3.5–5.1)
POTASSIUM SERPL-SCNC: 4.7 MMOL/L (ref 3.5–5.1)
POTASSIUM SERPL-SCNC: 5 MMOL/L (ref 3.5–5.1)
POTASSIUM SERPL-SCNC: 5.3 MMOL/L (ref 3.5–5.1)
POTASSIUM SERPL-SCNC: 5.3 MMOL/L (ref 3.5–5.1)
POTASSIUM SERPL-SCNC: 5.4 MMOL/L (ref 3.5–5.1)
POTASSIUM SERPL-SCNC: 5.6 MMOL/L (ref 3.5–5.1)
POTASSIUM SERPL-SCNC: 5.6 MMOL/L (ref 3.5–5.1)
POTASSIUM SERPL-SCNC: 5.8 MMOL/L (ref 3.5–5.1)
POTASSIUM SERPL-SCNC: 5.9 MMOL/L (ref 3.5–5.1)
POTASSIUM SERPL-SCNC: 6.1 MMOL/L (ref 3.5–5.1)
PROCALCITONIN SERPL IA-MCNC: 0.67 NG/ML (ref 0–0.5)
PROCALCITONIN SERPL IA-MCNC: 3.21 NG/ML (ref 0–0.5)
PROT FLD-MCNC: <3 G/DL
PROT SERPL-MCNC: 5.2 G/DL (ref 6–8.4)
PROT SERPL-MCNC: 5.2 G/DL (ref 6–8.4)
PROT SERPL-MCNC: 5.4 G/DL (ref 6–8.4)
PROT SERPL-MCNC: 5.5 G/DL (ref 6–8.4)
PROT SERPL-MCNC: 5.6 G/DL (ref 6–8.4)
PROT SERPL-MCNC: 5.6 G/DL (ref 6–8.4)
PROT SERPL-MCNC: 5.7 G/DL (ref 6–8.4)
PROT SERPL-MCNC: 5.7 G/DL (ref 6–8.4)
PROT SERPL-MCNC: 6 G/DL (ref 6–8.4)
PROT SERPL-MCNC: 6.1 G/DL (ref 6–8.4)
PROT SERPL-MCNC: 6.2 G/DL (ref 6–8.4)
PROT SERPL-MCNC: 6.3 G/DL (ref 6–8.4)
PROT UR QL STRIP: ABNORMAL
PROTHROMBIN TIME: 12.8 SEC (ref 10.6–14.8)
PROTHROMBIN TIME: 12.9 SEC (ref 10.6–14.8)
PROTHROMBIN TIME: 13 SEC (ref 10.6–14.8)
PROTHROMBIN TIME: 13.2 SEC (ref 10.6–14.8)
PROTHROMBIN TIME: 13.3 SEC (ref 10.6–14.8)
PROTHROMBIN TIME: 13.4 SEC (ref 10.6–14.8)
PROTHROMBIN TIME: 13.9 SEC (ref 10.6–14.8)
PV PEAK VELOCITY: 94.11 CM/S
RA PRESSURE: 8 MMHG
RBC # BLD AUTO: 2.33 M/UL (ref 4–5.4)
RBC # BLD AUTO: 2.33 M/UL (ref 4–5.4)
RBC # BLD AUTO: 2.6 M/UL (ref 4–5.4)
RBC # BLD AUTO: 2.68 M/UL (ref 4–5.4)
RBC # BLD AUTO: 2.7 M/UL (ref 4–5.4)
RBC # BLD AUTO: 2.7 M/UL (ref 4–5.4)
RBC # BLD AUTO: 2.72 M/UL (ref 4–5.4)
RBC # BLD AUTO: 2.76 M/UL (ref 4–5.4)
RBC # BLD AUTO: 2.76 M/UL (ref 4–5.4)
RBC # BLD AUTO: 2.79 M/UL (ref 4–5.4)
RBC # BLD AUTO: 2.83 M/UL (ref 4–5.4)
RBC # BLD AUTO: 2.83 M/UL (ref 4–5.4)
RBC # BLD AUTO: 2.99 M/UL (ref 4–5.4)
RBC # BLD AUTO: 3.01 M/UL (ref 4–5.4)
RBC # BLD AUTO: 3.01 M/UL (ref 4–5.4)
RBC # BLD AUTO: 3.23 M/UL (ref 4–5.4)
RBC # BLD AUTO: 3.32 M/UL (ref 4–5.4)
RBC # BLD AUTO: 3.62 M/UL (ref 4–5.4)
RBC # BLD AUTO: 3.63 M/UL (ref 4–5.4)
RBC #/AREA URNS HPF: 30 /HPF (ref 0–4)
RBC #/AREA URNS HPF: 30 /HPF (ref 0–4)
RBC #/AREA URNS HPF: >100 /HPF (ref 0–4)
RIGHT VENTRICULAR END-DIASTOLIC DIMENSION: 217 CM
SODIUM SERPL-SCNC: 132 MMOL/L (ref 136–145)
SODIUM SERPL-SCNC: 133 MMOL/L (ref 136–145)
SODIUM SERPL-SCNC: 134 MMOL/L (ref 136–145)
SODIUM SERPL-SCNC: 135 MMOL/L (ref 136–145)
SODIUM SERPL-SCNC: 136 MMOL/L (ref 136–145)
SODIUM SERPL-SCNC: 137 MMOL/L (ref 136–145)
SODIUM SERPL-SCNC: 138 MMOL/L (ref 136–145)
SODIUM SERPL-SCNC: 140 MMOL/L (ref 136–145)
SP GR UR STRIP: 1.01 (ref 1–1.03)
SPECIMEN SOURCE: NORMAL
SQUAMOUS #/AREA URNS HPF: 1 /HPF
SQUAMOUS #/AREA URNS HPF: 3 /HPF
TDI LATERAL: 0.07 M/S
TDI SEPTAL: 0.06 M/S
TDI: 0.07 M/S
TOXICOLOGY INFORMATION: NORMAL
TR MAX PG: 36 MMHG
TRIGL SERPL-MCNC: 146 MG/DL (ref 30–150)
TROPONIN I SERPL DL<=0.01 NG/ML-MCNC: 0.03 NG/ML (ref 0.02–0.04)
TROPONIN I SERPL DL<=0.01 NG/ML-MCNC: 0.04 NG/ML (ref 0.02–0.04)
TROPONIN I SERPL DL<=0.01 NG/ML-MCNC: 0.04 NG/ML (ref 0.02–0.04)
TROPONIN I SERPL DL<=0.01 NG/ML-MCNC: 0.07 NG/ML (ref 0.02–0.04)
TROPONIN I SERPL DL<=0.01 NG/ML-MCNC: 0.07 NG/ML (ref 0.02–0.04)
TROPONIN I SERPL DL<=0.01 NG/ML-MCNC: 0.22 NG/ML
TROPONIN I SERPL DL<=0.01 NG/ML-MCNC: 0.26 NG/ML
TROPONIN I SERPL DL<=0.01 NG/ML-MCNC: 0.26 NG/ML
TROPONIN I SERPL DL<=0.01 NG/ML-MCNC: 0.27 NG/ML
TROPONIN I SERPL DL<=0.01 NG/ML-MCNC: 2.63 NG/ML (ref 0.02–0.04)
TROPONIN I SERPL DL<=0.01 NG/ML-MCNC: 3.05 NG/ML (ref 0.02–0.04)
TROPONIN I SERPL DL<=0.01 NG/ML-MCNC: 3.14 NG/ML (ref 0.02–0.04)
TROPONIN I SERPL DL<=0.01 NG/ML-MCNC: 3.69 NG/ML (ref 0.02–0.04)
TROPONIN I SERPL DL<=0.01 NG/ML-MCNC: <0.03 NG/ML (ref 0.02–0.04)
TSH SERPL DL<=0.005 MIU/L-ACNC: 5.52 UIU/ML (ref 0.34–5.6)
TV REST PULMONARY ARTERY PRESSURE: 44 MMHG
URN SPEC COLLECT METH UR: ABNORMAL
UROBILINOGEN UR STRIP-ACNC: NEGATIVE EU/DL
WBC # BLD AUTO: 3.59 K/UL (ref 3.9–12.7)
WBC # BLD AUTO: 3.59 K/UL (ref 3.9–12.7)
WBC # BLD AUTO: 4.09 K/UL (ref 3.9–12.7)
WBC # BLD AUTO: 4.09 K/UL (ref 3.9–12.7)
WBC # BLD AUTO: 4.12 K/UL (ref 3.9–12.7)
WBC # BLD AUTO: 4.12 K/UL (ref 3.9–12.7)
WBC # BLD AUTO: 4.55 K/UL (ref 3.9–12.7)
WBC # BLD AUTO: 4.55 K/UL (ref 3.9–12.7)
WBC # BLD AUTO: 4.6 K/UL (ref 3.9–12.7)
WBC # BLD AUTO: 4.82 K/UL (ref 3.9–12.7)
WBC # BLD AUTO: 4.98 K/UL (ref 3.9–12.7)
WBC # BLD AUTO: 5.21 K/UL (ref 3.9–12.7)
WBC # BLD AUTO: 5.21 K/UL (ref 3.9–12.7)
WBC # BLD AUTO: 5.42 K/UL (ref 3.9–12.7)
WBC # BLD AUTO: 5.7 K/UL (ref 3.9–12.7)
WBC # BLD AUTO: 5.94 K/UL (ref 3.9–12.7)
WBC # BLD AUTO: 5.94 K/UL (ref 3.9–12.7)
WBC # BLD AUTO: 6.63 K/UL (ref 3.9–12.7)
WBC # BLD AUTO: 6.66 K/UL (ref 3.9–12.7)
WBC # BLD AUTO: 6.93 K/UL (ref 3.9–12.7)
WBC # BLD AUTO: 7.29 K/UL (ref 3.9–12.7)
WBC # FLD: 12 /CU MM
WBC #/AREA URNS HPF: 1 /HPF (ref 0–5)
WBC #/AREA URNS HPF: >100 /HPF (ref 0–5)
WBC #/AREA URNS HPF: >100 /HPF (ref 0–5)

## 2019-01-01 PROCEDURE — 25000003 PHARM REV CODE 250: Performed by: INTERNAL MEDICINE

## 2019-01-01 PROCEDURE — 85025 COMPLETE CBC W/AUTO DIFF WBC: CPT

## 2019-01-01 PROCEDURE — 63600175 PHARM REV CODE 636 W HCPCS: Performed by: NURSE PRACTITIONER

## 2019-01-01 PROCEDURE — 83735 ASSAY OF MAGNESIUM: CPT

## 2019-01-01 PROCEDURE — G0378 HOSPITAL OBSERVATION PER HR: HCPCS

## 2019-01-01 PROCEDURE — 84484 ASSAY OF TROPONIN QUANT: CPT | Mod: 91

## 2019-01-01 PROCEDURE — 25000242 PHARM REV CODE 250 ALT 637 W/ HCPCS: Performed by: INTERNAL MEDICINE

## 2019-01-01 PROCEDURE — 84484 ASSAY OF TROPONIN QUANT: CPT

## 2019-01-01 PROCEDURE — 80307 DRUG TEST PRSMV CHEM ANLYZR: CPT

## 2019-01-01 PROCEDURE — 90935 HEMODIALYSIS ONE EVALUATION: CPT

## 2019-01-01 PROCEDURE — 96365 THER/PROPH/DIAG IV INF INIT: CPT

## 2019-01-01 PROCEDURE — 81001 URINALYSIS AUTO W/SCOPE: CPT

## 2019-01-01 PROCEDURE — 85610 PROTHROMBIN TIME: CPT

## 2019-01-01 PROCEDURE — 83036 HEMOGLOBIN GLYCOSYLATED A1C: CPT

## 2019-01-01 PROCEDURE — P9047 ALBUMIN (HUMAN), 25%, 50ML: HCPCS | Mod: JG | Performed by: INTERNAL MEDICINE

## 2019-01-01 PROCEDURE — 99215 OFFICE O/P EST HI 40 MIN: CPT | Performed by: FAMILY MEDICINE

## 2019-01-01 PROCEDURE — 63600175 PHARM REV CODE 636 W HCPCS: Performed by: HOSPITALIST

## 2019-01-01 PROCEDURE — 87205 SMEAR GRAM STAIN: CPT

## 2019-01-01 PROCEDURE — 94640 AIRWAY INHALATION TREATMENT: CPT

## 2019-01-01 PROCEDURE — 63600175 PHARM REV CODE 636 W HCPCS: Performed by: INTERNAL MEDICINE

## 2019-01-01 PROCEDURE — 21400001 HC TELEMETRY ROOM

## 2019-01-01 PROCEDURE — 87502 INFLUENZA DNA AMP PROBE: CPT

## 2019-01-01 PROCEDURE — P9016 RBC LEUKOCYTES REDUCED: HCPCS

## 2019-01-01 PROCEDURE — 86706 HEP B SURFACE ANTIBODY: CPT

## 2019-01-01 PROCEDURE — 94761 N-INVAS EAR/PLS OXIMETRY MLT: CPT

## 2019-01-01 PROCEDURE — 80048 BASIC METABOLIC PNL TOTAL CA: CPT

## 2019-01-01 PROCEDURE — 99233 SBSQ HOSP IP/OBS HIGH 50: CPT | Mod: ,,, | Performed by: INTERNAL MEDICINE

## 2019-01-01 PROCEDURE — 25000242 PHARM REV CODE 250 ALT 637 W/ HCPCS: Performed by: STUDENT IN AN ORGANIZED HEALTH CARE EDUCATION/TRAINING PROGRAM

## 2019-01-01 PROCEDURE — 96375 TX/PRO/DX INJ NEW DRUG ADDON: CPT

## 2019-01-01 PROCEDURE — 99223 PR INITIAL HOSPITAL CARE,LEVL III: ICD-10-PCS | Mod: ,,, | Performed by: INTERNAL MEDICINE

## 2019-01-01 PROCEDURE — 99285 EMERGENCY DEPT VISIT HI MDM: CPT | Mod: 25

## 2019-01-01 PROCEDURE — 83605 ASSAY OF LACTIC ACID: CPT

## 2019-01-01 PROCEDURE — 85730 THROMBOPLASTIN TIME PARTIAL: CPT

## 2019-01-01 PROCEDURE — 80061 LIPID PANEL: CPT

## 2019-01-01 PROCEDURE — 49083 ABD PARACENTESIS W/IMAGING: CPT

## 2019-01-01 PROCEDURE — 82150 ASSAY OF AMYLASE: CPT

## 2019-01-01 PROCEDURE — 96374 THER/PROPH/DIAG INJ IV PUSH: CPT

## 2019-01-01 PROCEDURE — 96366 THER/PROPH/DIAG IV INF ADDON: CPT

## 2019-01-01 PROCEDURE — 93005 ELECTROCARDIOGRAM TRACING: CPT

## 2019-01-01 PROCEDURE — 80053 COMPREHEN METABOLIC PANEL: CPT

## 2019-01-01 PROCEDURE — 83615 LACTATE (LD) (LDH) ENZYME: CPT

## 2019-01-01 PROCEDURE — 93306 TTE W/DOPPLER COMPLETE: CPT

## 2019-01-01 PROCEDURE — 36430 TRANSFUSION BLD/BLD COMPNT: CPT

## 2019-01-01 PROCEDURE — 99900035 HC TECH TIME PER 15 MIN (STAT)

## 2019-01-01 PROCEDURE — 89051 BODY FLUID CELL COUNT: CPT

## 2019-01-01 PROCEDURE — 86920 COMPATIBILITY TEST SPIN: CPT

## 2019-01-01 PROCEDURE — 36415 COLL VENOUS BLD VENIPUNCTURE: CPT

## 2019-01-01 PROCEDURE — 82553 CREATINE MB FRACTION: CPT

## 2019-01-01 PROCEDURE — 99213 OFFICE O/P EST LOW 20 MIN: CPT | Mod: S$PBB,,, | Performed by: FAMILY MEDICINE

## 2019-01-01 PROCEDURE — 63600175 PHARM REV CODE 636 W HCPCS: Mod: JG | Performed by: INTERNAL MEDICINE

## 2019-01-01 PROCEDURE — 25000003 PHARM REV CODE 250: Performed by: EMERGENCY MEDICINE

## 2019-01-01 PROCEDURE — 83880 ASSAY OF NATRIURETIC PEPTIDE: CPT

## 2019-01-01 PROCEDURE — 27000221 HC OXYGEN, UP TO 24 HOURS

## 2019-01-01 PROCEDURE — 96375 TX/PRO/DX INJ NEW DRUG ADDON: CPT | Mod: 59

## 2019-01-01 PROCEDURE — 84443 ASSAY THYROID STIM HORMONE: CPT

## 2019-01-01 PROCEDURE — 87070 CULTURE OTHR SPECIMN AEROBIC: CPT

## 2019-01-01 PROCEDURE — 25000003 PHARM REV CODE 250: Performed by: STUDENT IN AN ORGANIZED HEALTH CARE EDUCATION/TRAINING PROGRAM

## 2019-01-01 PROCEDURE — 63600175 PHARM REV CODE 636 W HCPCS: Performed by: RADIOLOGY

## 2019-01-01 PROCEDURE — 84132 ASSAY OF SERUM POTASSIUM: CPT | Mod: AY

## 2019-01-01 PROCEDURE — 82550 ASSAY OF CK (CPK): CPT

## 2019-01-01 PROCEDURE — 84145 PROCALCITONIN (PCT): CPT

## 2019-01-01 PROCEDURE — 82553 CREATINE MB FRACTION: CPT | Mod: 91

## 2019-01-01 PROCEDURE — 25000242 PHARM REV CODE 250 ALT 637 W/ HCPCS: Performed by: NURSE PRACTITIONER

## 2019-01-01 PROCEDURE — 83690 ASSAY OF LIPASE: CPT

## 2019-01-01 PROCEDURE — 99233 PR SUBSEQUENT HOSPITAL CARE,LEVL III: ICD-10-PCS | Mod: ,,, | Performed by: INTERNAL MEDICINE

## 2019-01-01 PROCEDURE — 94799 UNLISTED PULMONARY SVC/PX: CPT

## 2019-01-01 PROCEDURE — 87077 CULTURE AEROBIC IDENTIFY: CPT

## 2019-01-01 PROCEDURE — 99223 1ST HOSP IP/OBS HIGH 75: CPT | Mod: ,,, | Performed by: INTERNAL MEDICINE

## 2019-01-01 PROCEDURE — 63600175 PHARM REV CODE 636 W HCPCS: Performed by: STUDENT IN AN ORGANIZED HEALTH CARE EDUCATION/TRAINING PROGRAM

## 2019-01-01 PROCEDURE — 86901 BLOOD TYPING SEROLOGIC RH(D): CPT

## 2019-01-01 PROCEDURE — 96365 THER/PROPH/DIAG IV INF INIT: CPT | Mod: 59

## 2019-01-01 PROCEDURE — 82140 ASSAY OF AMMONIA: CPT

## 2019-01-01 PROCEDURE — 99213 PR OFFICE/OUTPT VISIT, EST, LEVL III, 20-29 MIN: ICD-10-PCS | Mod: S$PBB,,, | Performed by: FAMILY MEDICINE

## 2019-01-01 PROCEDURE — 84100 ASSAY OF PHOSPHORUS: CPT

## 2019-01-01 PROCEDURE — 25000003 PHARM REV CODE 250: Performed by: NURSE PRACTITIONER

## 2019-01-01 PROCEDURE — 85610 PROTHROMBIN TIME: CPT | Mod: 91

## 2019-01-01 PROCEDURE — 82042 OTHER SOURCE ALBUMIN QUAN EA: CPT

## 2019-01-01 PROCEDURE — 30000890 LABCORP MISCELLANEOUS TEST

## 2019-01-01 PROCEDURE — 86704 HEP B CORE ANTIBODY TOTAL: CPT

## 2019-01-01 PROCEDURE — 87340 HEPATITIS B SURFACE AG IA: CPT

## 2019-01-01 PROCEDURE — 51702 INSERT TEMP BLADDER CATH: CPT

## 2019-01-01 PROCEDURE — 99215 PR OFFICE/OUTPT VISIT, EST, LEVL V, 40-54 MIN: ICD-10-PCS | Mod: S$GLB,,, | Performed by: INTERNAL MEDICINE

## 2019-01-01 PROCEDURE — 87086 URINE CULTURE/COLONY COUNT: CPT

## 2019-01-01 PROCEDURE — 25500020 PHARM REV CODE 255: Performed by: STUDENT IN AN ORGANIZED HEALTH CARE EDUCATION/TRAINING PROGRAM

## 2019-01-01 PROCEDURE — 84157 ASSAY OF PROTEIN OTHER: CPT

## 2019-01-01 PROCEDURE — 99215 OFFICE O/P EST HI 40 MIN: CPT | Mod: S$GLB,,, | Performed by: INTERNAL MEDICINE

## 2019-01-01 PROCEDURE — 87641 MR-STAPH DNA AMP PROBE: CPT

## 2019-01-01 PROCEDURE — 87186 SC STD MICRODIL/AGAR DIL: CPT

## 2019-01-01 PROCEDURE — 63600175 PHARM REV CODE 636 W HCPCS: Performed by: EMERGENCY MEDICINE

## 2019-01-01 PROCEDURE — 88305 TISSUE EXAM BY PATHOLOGIST: CPT | Mod: TC

## 2019-01-01 PROCEDURE — 99283 EMERGENCY DEPT VISIT LOW MDM: CPT

## 2019-01-01 RX ORDER — AMOXICILLIN 250 MG
1 CAPSULE ORAL 2 TIMES DAILY
Status: DISCONTINUED | OUTPATIENT
Start: 2019-01-01 | End: 2019-01-01 | Stop reason: HOSPADM

## 2019-01-01 RX ORDER — POTASSIUM CHLORIDE 7.45 MG/ML
40 INJECTION INTRAVENOUS
Status: DISCONTINUED | OUTPATIENT
Start: 2019-01-01 | End: 2019-01-01 | Stop reason: HOSPADM

## 2019-01-01 RX ORDER — METOPROLOL SUCCINATE 25 MG/1
50 TABLET, EXTENDED RELEASE ORAL NIGHTLY
Status: DISCONTINUED | OUTPATIENT
Start: 2019-01-01 | End: 2019-01-01 | Stop reason: HOSPADM

## 2019-01-01 RX ORDER — DEXTROSE 50 % IN WATER (D50W) INTRAVENOUS SYRINGE
25
Status: COMPLETED | OUTPATIENT
Start: 2019-01-01 | End: 2019-01-01

## 2019-01-01 RX ORDER — FUROSEMIDE 10 MG/ML
20 INJECTION INTRAMUSCULAR; INTRAVENOUS ONCE
Status: DISCONTINUED | OUTPATIENT
Start: 2019-01-01 | End: 2019-01-01 | Stop reason: HOSPADM

## 2019-01-01 RX ORDER — MAGNESIUM SULFATE HEPTAHYDRATE 40 MG/ML
2 INJECTION, SOLUTION INTRAVENOUS
Status: DISCONTINUED | OUTPATIENT
Start: 2019-01-01 | End: 2019-01-01 | Stop reason: HOSPADM

## 2019-01-01 RX ORDER — LORAZEPAM 2 MG/ML
0.5 INJECTION INTRAMUSCULAR
Status: COMPLETED | OUTPATIENT
Start: 2019-01-01 | End: 2019-01-01

## 2019-01-01 RX ORDER — HYDROMORPHONE HYDROCHLORIDE 1 MG/ML
0.5 INJECTION, SOLUTION INTRAMUSCULAR; INTRAVENOUS; SUBCUTANEOUS EVERY 4 HOURS PRN
Status: DISCONTINUED | OUTPATIENT
Start: 2019-01-01 | End: 2019-01-01

## 2019-01-01 RX ORDER — POTASSIUM CHLORIDE 20 MEQ/1
40 TABLET, EXTENDED RELEASE ORAL
Status: DISCONTINUED | OUTPATIENT
Start: 2019-01-01 | End: 2019-01-01 | Stop reason: HOSPADM

## 2019-01-01 RX ORDER — METOPROLOL SUCCINATE 25 MG/1
50 TABLET, EXTENDED RELEASE ORAL DAILY
Status: DISCONTINUED | OUTPATIENT
Start: 2019-01-01 | End: 2019-01-01 | Stop reason: HOSPADM

## 2019-01-01 RX ORDER — ACETAMINOPHEN 325 MG/1
650 TABLET ORAL EVERY 8 HOURS PRN
Status: DISCONTINUED | OUTPATIENT
Start: 2019-01-01 | End: 2019-01-01 | Stop reason: HOSPADM

## 2019-01-01 RX ORDER — URSODIOL 250 MG/1
250 TABLET, FILM COATED ORAL DAILY
Status: DISCONTINUED | OUTPATIENT
Start: 2019-01-01 | End: 2019-01-01

## 2019-01-01 RX ORDER — ONDANSETRON 2 MG/ML
4 INJECTION INTRAMUSCULAR; INTRAVENOUS EVERY 8 HOURS PRN
Status: DISCONTINUED | OUTPATIENT
Start: 2019-01-01 | End: 2019-01-01 | Stop reason: HOSPADM

## 2019-01-01 RX ORDER — INDOMETHACIN 25 MG/1
50 CAPSULE ORAL
Status: COMPLETED | OUTPATIENT
Start: 2019-01-01 | End: 2019-01-01

## 2019-01-01 RX ORDER — FUROSEMIDE 10 MG/ML
100 INJECTION INTRAMUSCULAR; INTRAVENOUS
Status: COMPLETED | OUTPATIENT
Start: 2019-01-01 | End: 2019-01-01

## 2019-01-01 RX ORDER — ONDANSETRON 4 MG/1
8 TABLET, ORALLY DISINTEGRATING ORAL EVERY 6 HOURS PRN
Status: DISCONTINUED | OUTPATIENT
Start: 2019-01-01 | End: 2019-01-01 | Stop reason: HOSPADM

## 2019-01-01 RX ORDER — HYDROCODONE BITARTRATE AND ACETAMINOPHEN 5; 325 MG/1; MG/1
1 TABLET ORAL EVERY 6 HOURS PRN
Status: DISCONTINUED | OUTPATIENT
Start: 2019-01-01 | End: 2019-01-01 | Stop reason: HOSPADM

## 2019-01-01 RX ORDER — ASPIRIN 81 MG/1
81 TABLET ORAL DAILY
Status: DISCONTINUED | OUTPATIENT
Start: 2019-01-01 | End: 2019-01-01 | Stop reason: HOSPADM

## 2019-01-01 RX ORDER — HYDROCODONE BITARTRATE AND ACETAMINOPHEN 10; 325 MG/1; MG/1
1 TABLET ORAL EVERY 8 HOURS PRN
Qty: 60 TABLET | Refills: 0 | Status: SHIPPED | OUTPATIENT
Start: 2019-01-01 | End: 2019-01-01 | Stop reason: SDUPTHER

## 2019-01-01 RX ORDER — AMLODIPINE BESYLATE 5 MG/1
10 TABLET ORAL NIGHTLY
Status: DISCONTINUED | OUTPATIENT
Start: 2019-01-01 | End: 2019-01-01 | Stop reason: HOSPADM

## 2019-01-01 RX ORDER — URSODIOL 300 MG/1
300 CAPSULE ORAL DAILY
Status: DISCONTINUED | OUTPATIENT
Start: 2019-01-01 | End: 2019-01-01 | Stop reason: HOSPADM

## 2019-01-01 RX ORDER — ONDANSETRON 2 MG/ML
4 INJECTION INTRAMUSCULAR; INTRAVENOUS
Status: COMPLETED | OUTPATIENT
Start: 2019-01-01 | End: 2019-01-01

## 2019-01-01 RX ORDER — IBUPROFEN 200 MG
24 TABLET ORAL
Status: DISCONTINUED | OUTPATIENT
Start: 2019-01-01 | End: 2019-01-01 | Stop reason: HOSPADM

## 2019-01-01 RX ORDER — BISACODYL 10 MG
10 SUPPOSITORY, RECTAL RECTAL DAILY PRN
Status: DISCONTINUED | OUTPATIENT
Start: 2019-01-01 | End: 2019-01-01 | Stop reason: HOSPADM

## 2019-01-01 RX ORDER — PANTOPRAZOLE SODIUM 40 MG/1
40 TABLET, DELAYED RELEASE ORAL NIGHTLY
Status: DISCONTINUED | OUTPATIENT
Start: 2019-01-01 | End: 2019-01-01 | Stop reason: HOSPADM

## 2019-01-01 RX ORDER — BALSAM PERU/CASTOR OIL
OINTMENT (GRAM) TOPICAL 2 TIMES DAILY PRN
Status: DISCONTINUED | OUTPATIENT
Start: 2019-01-01 | End: 2019-01-01 | Stop reason: HOSPADM

## 2019-01-01 RX ORDER — ALBUMIN HUMAN 50 G/1000ML
50 SOLUTION INTRAVENOUS ONCE AS NEEDED
Status: DISCONTINUED | OUTPATIENT
Start: 2019-01-01 | End: 2019-01-01 | Stop reason: HOSPADM

## 2019-01-01 RX ORDER — SODIUM CHLORIDE 9 MG/ML
INJECTION, SOLUTION INTRAVENOUS
Status: DISCONTINUED | OUTPATIENT
Start: 2019-01-01 | End: 2019-01-01 | Stop reason: HOSPADM

## 2019-01-01 RX ORDER — METOPROLOL SUCCINATE 50 MG/1
50 TABLET, EXTENDED RELEASE ORAL NIGHTLY
Status: DISCONTINUED | OUTPATIENT
Start: 2019-01-01 | End: 2019-01-01 | Stop reason: HOSPADM

## 2019-01-01 RX ORDER — MUPIROCIN 20 MG/G
OINTMENT TOPICAL 2 TIMES DAILY
Status: CANCELLED | OUTPATIENT
Start: 2019-01-01 | End: 2019-01-01

## 2019-01-01 RX ORDER — HYDROMORPHONE HYDROCHLORIDE 1 MG/ML
0.5 INJECTION, SOLUTION INTRAMUSCULAR; INTRAVENOUS; SUBCUTANEOUS EVERY 4 HOURS PRN
Status: DISCONTINUED | OUTPATIENT
Start: 2019-01-01 | End: 2019-01-01 | Stop reason: HOSPADM

## 2019-01-01 RX ORDER — POTASSIUM CHLORIDE 7.45 MG/ML
20 INJECTION INTRAVENOUS
Status: DISCONTINUED | OUTPATIENT
Start: 2019-01-01 | End: 2019-01-01 | Stop reason: HOSPADM

## 2019-01-01 RX ORDER — SODIUM CHLORIDE 0.9 % (FLUSH) 0.9 %
10 SYRINGE (ML) INJECTION
Status: DISCONTINUED | OUTPATIENT
Start: 2019-01-01 | End: 2019-01-01 | Stop reason: HOSPADM

## 2019-01-01 RX ORDER — LISINOPRIL 5 MG/1
20 TABLET ORAL DAILY
Status: DISCONTINUED | OUTPATIENT
Start: 2019-01-01 | End: 2019-01-01 | Stop reason: HOSPADM

## 2019-01-01 RX ORDER — PANTOPRAZOLE SODIUM 40 MG/1
40 TABLET, DELAYED RELEASE ORAL DAILY
Status: DISCONTINUED | OUTPATIENT
Start: 2019-01-01 | End: 2019-01-01 | Stop reason: HOSPADM

## 2019-01-01 RX ORDER — GLUCAGON 1 MG
1 KIT INJECTION
Status: DISCONTINUED | OUTPATIENT
Start: 2019-01-01 | End: 2019-01-01 | Stop reason: HOSPADM

## 2019-01-01 RX ORDER — ONDANSETRON 2 MG/ML
4 INJECTION INTRAMUSCULAR; INTRAVENOUS ONCE
Status: COMPLETED | OUTPATIENT
Start: 2019-01-01 | End: 2019-01-01

## 2019-01-01 RX ORDER — AMLODIPINE BESYLATE 5 MG/1
10 TABLET ORAL DAILY
Status: DISCONTINUED | OUTPATIENT
Start: 2019-01-01 | End: 2019-01-01 | Stop reason: HOSPADM

## 2019-01-01 RX ORDER — TRAZODONE HYDROCHLORIDE 50 MG/1
50 TABLET ORAL NIGHTLY
Qty: 30 TABLET | Refills: 11 | Status: SHIPPED | OUTPATIENT
Start: 2019-01-01 | End: 2020-01-01

## 2019-01-01 RX ORDER — HYDROMORPHONE HYDROCHLORIDE 1 MG/ML
0.5 INJECTION, SOLUTION INTRAMUSCULAR; INTRAVENOUS; SUBCUTANEOUS
Status: COMPLETED | OUTPATIENT
Start: 2019-01-01 | End: 2019-01-01

## 2019-01-01 RX ORDER — AMLODIPINE BESYLATE 5 MG/1
5 TABLET ORAL NIGHTLY
Status: DISCONTINUED | OUTPATIENT
Start: 2019-01-01 | End: 2019-01-01

## 2019-01-01 RX ORDER — AZITHROMYCIN 250 MG/1
TABLET, FILM COATED ORAL
Qty: 6 TABLET | Refills: 0 | Status: SHIPPED | OUTPATIENT
Start: 2019-01-01 | End: 2019-01-01

## 2019-01-01 RX ORDER — HYDROCODONE BITARTRATE AND ACETAMINOPHEN 10; 325 MG/1; MG/1
1 TABLET ORAL EVERY 6 HOURS PRN
Status: DISCONTINUED | OUTPATIENT
Start: 2019-01-01 | End: 2019-01-01 | Stop reason: HOSPADM

## 2019-01-01 RX ORDER — TRAZODONE HYDROCHLORIDE 50 MG/1
50 TABLET ORAL NIGHTLY
Status: DISCONTINUED | OUTPATIENT
Start: 2019-01-01 | End: 2019-01-01 | Stop reason: HOSPADM

## 2019-01-01 RX ORDER — HYDROCODONE BITARTRATE AND ACETAMINOPHEN 10; 325 MG/1; MG/1
1 TABLET ORAL EVERY 8 HOURS PRN
Qty: 60 TABLET | Refills: 0 | Status: ON HOLD | OUTPATIENT
Start: 2019-01-01 | End: 2020-01-01 | Stop reason: SDUPTHER

## 2019-01-01 RX ORDER — DEXTROSE 50 % IN WATER (D50W) INTRAVENOUS SYRINGE
25
Status: ACTIVE | OUTPATIENT
Start: 2019-01-01 | End: 2019-01-01

## 2019-01-01 RX ORDER — HYDROMORPHONE HYDROCHLORIDE 1 MG/ML
1 INJECTION, SOLUTION INTRAMUSCULAR; INTRAVENOUS; SUBCUTANEOUS
Status: COMPLETED | OUTPATIENT
Start: 2019-01-01 | End: 2019-01-01

## 2019-01-01 RX ORDER — POTASSIUM CHLORIDE 20 MEQ/1
20 TABLET, EXTENDED RELEASE ORAL
Status: DISCONTINUED | OUTPATIENT
Start: 2019-01-01 | End: 2019-01-01 | Stop reason: HOSPADM

## 2019-01-01 RX ORDER — PROMETHAZINE HYDROCHLORIDE 25 MG/1
25 TABLET ORAL EVERY 6 HOURS PRN
Status: DISCONTINUED | OUTPATIENT
Start: 2019-01-01 | End: 2019-01-01 | Stop reason: HOSPADM

## 2019-01-01 RX ORDER — ALBUMIN HUMAN 250 G/1000ML
25 SOLUTION INTRAVENOUS ONCE
Status: COMPLETED | OUTPATIENT
Start: 2019-01-01 | End: 2019-01-01

## 2019-01-01 RX ORDER — IBUPROFEN 200 MG
16 TABLET ORAL
Status: DISCONTINUED | OUTPATIENT
Start: 2019-01-01 | End: 2019-01-01 | Stop reason: HOSPADM

## 2019-01-01 RX ORDER — MUPIROCIN 20 MG/G
OINTMENT TOPICAL 2 TIMES DAILY
Status: DISCONTINUED | OUTPATIENT
Start: 2019-01-01 | End: 2019-01-01 | Stop reason: HOSPADM

## 2019-01-01 RX ORDER — ACETAMINOPHEN 325 MG/1
650 TABLET ORAL EVERY 4 HOURS PRN
Status: DISCONTINUED | OUTPATIENT
Start: 2019-01-01 | End: 2019-01-01 | Stop reason: HOSPADM

## 2019-01-01 RX ORDER — MORPHINE SULFATE 2 MG/ML
2 INJECTION, SOLUTION INTRAMUSCULAR; INTRAVENOUS
Status: COMPLETED | OUTPATIENT
Start: 2019-01-01 | End: 2019-01-01

## 2019-01-01 RX ORDER — AMLODIPINE BESYLATE 5 MG/1
10 TABLET ORAL DAILY
Status: DISCONTINUED | OUTPATIENT
Start: 2019-01-01 | End: 2019-01-01

## 2019-01-01 RX ORDER — SODIUM CHLORIDE 9 MG/ML
INJECTION, SOLUTION INTRAVENOUS ONCE
Status: DISCONTINUED | OUTPATIENT
Start: 2019-01-01 | End: 2019-01-01 | Stop reason: HOSPADM

## 2019-01-01 RX ORDER — SODIUM CHLORIDE 9 MG/ML
INJECTION, SOLUTION INTRAVENOUS
Status: CANCELLED | OUTPATIENT
Start: 2019-01-01

## 2019-01-01 RX ORDER — HYDROCODONE BITARTRATE AND ACETAMINOPHEN 10; 325 MG/1; MG/1
1 TABLET ORAL EVERY 8 HOURS PRN
Status: DISCONTINUED | OUTPATIENT
Start: 2019-01-01 | End: 2019-01-01 | Stop reason: HOSPADM

## 2019-01-01 RX ORDER — MAGNESIUM SULFATE 1 G/100ML
1 INJECTION INTRAVENOUS
Status: DISCONTINUED | OUTPATIENT
Start: 2019-01-01 | End: 2019-01-01 | Stop reason: HOSPADM

## 2019-01-01 RX ORDER — SODIUM CHLORIDE 9 MG/ML
INJECTION, SOLUTION INTRAVENOUS ONCE
Status: CANCELLED | OUTPATIENT
Start: 2019-01-01 | End: 2019-01-01

## 2019-01-01 RX ORDER — MAGNESIUM SULFATE HEPTAHYDRATE 40 MG/ML
4 INJECTION, SOLUTION INTRAVENOUS
Status: DISCONTINUED | OUTPATIENT
Start: 2019-01-01 | End: 2019-01-01 | Stop reason: HOSPADM

## 2019-01-01 RX ORDER — HYDROMORPHONE HYDROCHLORIDE 1 MG/ML
2 INJECTION, SOLUTION INTRAMUSCULAR; INTRAVENOUS; SUBCUTANEOUS EVERY 6 HOURS PRN
Status: DISCONTINUED | OUTPATIENT
Start: 2019-01-01 | End: 2019-01-01 | Stop reason: HOSPADM

## 2019-01-01 RX ORDER — OXYCODONE HYDROCHLORIDE 5 MG/1
5 TABLET ORAL EVERY 6 HOURS PRN
Status: DISCONTINUED | OUTPATIENT
Start: 2019-01-01 | End: 2019-01-01 | Stop reason: HOSPADM

## 2019-01-01 RX ORDER — ONDANSETRON 2 MG/ML
4 INJECTION INTRAMUSCULAR; INTRAVENOUS EVERY 6 HOURS PRN
Status: DISCONTINUED | OUTPATIENT
Start: 2019-01-01 | End: 2019-01-01 | Stop reason: HOSPADM

## 2019-01-01 RX ORDER — LISINOPRIL 5 MG/1
20 TABLET ORAL NIGHTLY
Status: DISCONTINUED | OUTPATIENT
Start: 2019-01-01 | End: 2019-01-01

## 2019-01-01 RX ORDER — HEPARIN SODIUM 5000 [USP'U]/ML
5000 INJECTION, SOLUTION INTRAVENOUS; SUBCUTANEOUS EVERY 8 HOURS
Status: DISCONTINUED | OUTPATIENT
Start: 2019-01-01 | End: 2019-01-01 | Stop reason: HOSPADM

## 2019-01-01 RX ORDER — POLYETHYLENE GLYCOL 3350 17 G/17G
17 POWDER, FOR SOLUTION ORAL DAILY
Status: DISCONTINUED | OUTPATIENT
Start: 2019-01-01 | End: 2019-01-01 | Stop reason: HOSPADM

## 2019-01-01 RX ORDER — IPRATROPIUM BROMIDE AND ALBUTEROL SULFATE 2.5; .5 MG/3ML; MG/3ML
3 SOLUTION RESPIRATORY (INHALATION) EVERY 6 HOURS
Status: DISCONTINUED | OUTPATIENT
Start: 2019-01-01 | End: 2019-01-01

## 2019-01-01 RX ORDER — LISINOPRIL 20 MG/1
20 TABLET ORAL NIGHTLY
Status: DISCONTINUED | OUTPATIENT
Start: 2019-01-01 | End: 2019-01-01 | Stop reason: HOSPADM

## 2019-01-01 RX ORDER — TALC
6 POWDER (GRAM) TOPICAL NIGHTLY PRN
Status: DISCONTINUED | OUTPATIENT
Start: 2019-01-01 | End: 2019-01-01 | Stop reason: HOSPADM

## 2019-01-01 RX ORDER — OXYCODONE HYDROCHLORIDE 5 MG/1
10 TABLET ORAL EVERY 6 HOURS PRN
Status: DISCONTINUED | OUTPATIENT
Start: 2019-01-01 | End: 2019-01-01 | Stop reason: HOSPADM

## 2019-01-01 RX ORDER — SODIUM CHLORIDE 0.9 % (FLUSH) 0.9 %
3 SYRINGE (ML) INJECTION EVERY 6 HOURS PRN
Status: DISCONTINUED | OUTPATIENT
Start: 2019-01-01 | End: 2019-01-01 | Stop reason: HOSPADM

## 2019-01-01 RX ORDER — ALBUMIN HUMAN 250 G/1000ML
12.5 SOLUTION INTRAVENOUS ONCE
Status: DISCONTINUED | OUTPATIENT
Start: 2019-01-01 | End: 2019-01-01

## 2019-01-01 RX ORDER — CALCIUM CHLORIDE IN 0.9 % NACL 1 G/100 ML
1 INTRAVENOUS SOLUTION, PIGGYBACK (ML) INTRAVENOUS
Status: DISCONTINUED | OUTPATIENT
Start: 2019-01-01 | End: 2019-01-01 | Stop reason: HOSPADM

## 2019-01-01 RX ORDER — HYDROCODONE BITARTRATE AND ACETAMINOPHEN 500; 5 MG/1; MG/1
TABLET ORAL
Status: DISCONTINUED | OUTPATIENT
Start: 2019-01-01 | End: 2019-01-01 | Stop reason: HOSPADM

## 2019-01-01 RX ORDER — HYDRALAZINE HYDROCHLORIDE 20 MG/ML
5 INJECTION INTRAMUSCULAR; INTRAVENOUS EVERY 8 HOURS PRN
Status: DISCONTINUED | OUTPATIENT
Start: 2019-01-01 | End: 2019-01-01 | Stop reason: HOSPADM

## 2019-01-01 RX ORDER — IPRATROPIUM BROMIDE AND ALBUTEROL SULFATE 2.5; .5 MG/3ML; MG/3ML
3 SOLUTION RESPIRATORY (INHALATION) EVERY 8 HOURS
Status: DISCONTINUED | OUTPATIENT
Start: 2019-01-01 | End: 2019-01-01 | Stop reason: HOSPADM

## 2019-01-01 RX ORDER — ONDANSETRON 4 MG/1
8 TABLET, ORALLY DISINTEGRATING ORAL EVERY 8 HOURS PRN
Status: DISCONTINUED | OUTPATIENT
Start: 2019-01-01 | End: 2019-01-01 | Stop reason: HOSPADM

## 2019-01-01 RX ORDER — ASPIRIN 81 MG/1
81 TABLET ORAL DAILY
COMMUNITY

## 2019-01-01 RX ORDER — TALC
8 POWDER (GRAM) TOPICAL NIGHTLY PRN
Status: DISCONTINUED | OUTPATIENT
Start: 2019-01-01 | End: 2019-01-01 | Stop reason: HOSPADM

## 2019-01-01 RX ORDER — SODIUM CHLORIDE 9 MG/ML
INJECTION, SOLUTION INTRAVENOUS ONCE
Status: COMPLETED | OUTPATIENT
Start: 2019-01-01 | End: 2019-01-01

## 2019-01-01 RX ORDER — TRAMADOL HYDROCHLORIDE 50 MG/1
50 TABLET ORAL EVERY 6 HOURS PRN
Status: DISCONTINUED | OUTPATIENT
Start: 2019-01-01 | End: 2019-01-01 | Stop reason: HOSPADM

## 2019-01-01 RX ORDER — IPRATROPIUM BROMIDE AND ALBUTEROL SULFATE 2.5; .5 MG/3ML; MG/3ML
3 SOLUTION RESPIRATORY (INHALATION)
Status: DISCONTINUED | OUTPATIENT
Start: 2019-01-01 | End: 2019-01-01 | Stop reason: HOSPADM

## 2019-01-01 RX ORDER — AZITHROMYCIN 250 MG/1
500 TABLET, FILM COATED ORAL
Status: COMPLETED | OUTPATIENT
Start: 2019-01-01 | End: 2019-01-01

## 2019-01-01 RX ORDER — IODIXANOL 320 MG/ML
100 INJECTION, SOLUTION INTRAVASCULAR
Status: COMPLETED | OUTPATIENT
Start: 2019-01-01 | End: 2019-01-01

## 2019-01-01 RX ORDER — AMLODIPINE BESYLATE 10 MG/1
5 TABLET ORAL NIGHTLY
Qty: 30 TABLET | Refills: 2 | Status: SHIPPED | OUTPATIENT
Start: 2019-01-01 | End: 2020-01-01

## 2019-01-01 RX ORDER — ALBUMIN HUMAN 250 G/1000ML
50 SOLUTION INTRAVENOUS ONCE
Status: COMPLETED | OUTPATIENT
Start: 2019-01-01 | End: 2019-01-01

## 2019-01-01 RX ORDER — LANOLIN ALCOHOL/MO/W.PET/CERES
800 CREAM (GRAM) TOPICAL
Status: DISCONTINUED | OUTPATIENT
Start: 2019-01-01 | End: 2019-01-01 | Stop reason: HOSPADM

## 2019-01-01 RX ADMIN — CEFTRIAXONE 1 G: 1 INJECTION, SOLUTION INTRAVENOUS at 08:11

## 2019-01-01 RX ADMIN — HYDROMORPHONE HYDROCHLORIDE 0.5 MG: 1 INJECTION, SOLUTION INTRAMUSCULAR; INTRAVENOUS; SUBCUTANEOUS at 08:12

## 2019-01-01 RX ADMIN — METOPROLOL SUCCINATE 50 MG: 25 TABLET, FILM COATED, EXTENDED RELEASE ORAL at 08:10

## 2019-01-01 RX ADMIN — ONDANSETRON 4 MG: 2 INJECTION INTRAMUSCULAR; INTRAVENOUS at 11:11

## 2019-01-01 RX ADMIN — METOPROLOL SUCCINATE 50 MG: 25 TABLET, FILM COATED, EXTENDED RELEASE ORAL at 08:11

## 2019-01-01 RX ADMIN — ONDANSETRON 4 MG: 2 INJECTION INTRAMUSCULAR; INTRAVENOUS at 08:12

## 2019-01-01 RX ADMIN — LISINOPRIL 20 MG: 5 TABLET ORAL at 11:12

## 2019-01-01 RX ADMIN — ONDANSETRON 4 MG: 2 INJECTION INTRAMUSCULAR; INTRAVENOUS at 11:12

## 2019-01-01 RX ADMIN — TRAZODONE HYDROCHLORIDE 50 MG: 50 TABLET ORAL at 10:11

## 2019-01-01 RX ADMIN — MUPIROCIN: 20 OINTMENT TOPICAL at 12:12

## 2019-01-01 RX ADMIN — IPRATROPIUM BROMIDE AND ALBUTEROL SULFATE 3 ML: .5; 3 SOLUTION RESPIRATORY (INHALATION) at 12:11

## 2019-01-01 RX ADMIN — METOPROLOL SUCCINATE 50 MG: 50 TABLET, FILM COATED, EXTENDED RELEASE ORAL at 09:12

## 2019-01-01 RX ADMIN — TRAZODONE HYDROCHLORIDE 50 MG: 50 TABLET ORAL at 08:11

## 2019-01-01 RX ADMIN — IPRATROPIUM BROMIDE AND ALBUTEROL SULFATE 3 ML: .5; 3 SOLUTION RESPIRATORY (INHALATION) at 07:11

## 2019-01-01 RX ADMIN — MUPIROCIN: 20 OINTMENT TOPICAL at 08:11

## 2019-01-01 RX ADMIN — MUPIROCIN: 20 OINTMENT TOPICAL at 09:12

## 2019-01-01 RX ADMIN — LISINOPRIL 20 MG: 5 TABLET ORAL at 08:11

## 2019-01-01 RX ADMIN — ALBUMIN (HUMAN) 25 G: 25 SOLUTION INTRAVENOUS at 11:12

## 2019-01-01 RX ADMIN — IPRATROPIUM BROMIDE AND ALBUTEROL SULFATE 3 ML: .5; 3 SOLUTION RESPIRATORY (INHALATION) at 08:11

## 2019-01-01 RX ADMIN — HUMAN INSULIN 10 UNITS: 100 INJECTION, SOLUTION SUBCUTANEOUS at 11:10

## 2019-01-01 RX ADMIN — ALBUMIN (HUMAN) 25 G: 25 SOLUTION INTRAVENOUS at 03:12

## 2019-01-01 RX ADMIN — ONDANSETRON 4 MG: 2 INJECTION INTRAMUSCULAR; INTRAVENOUS at 03:12

## 2019-01-01 RX ADMIN — IPRATROPIUM BROMIDE AND ALBUTEROL SULFATE 3 ML: .5; 3 SOLUTION RESPIRATORY (INHALATION) at 12:12

## 2019-01-01 RX ADMIN — CALCIUM GLUCONATE 1 G: 98 INJECTION, SOLUTION INTRAVENOUS at 07:12

## 2019-01-01 RX ADMIN — AZITHROMYCIN MONOHYDRATE 500 MG: 500 INJECTION, POWDER, LYOPHILIZED, FOR SOLUTION INTRAVENOUS at 09:11

## 2019-01-01 RX ADMIN — TRAZODONE HYDROCHLORIDE 50 MG: 50 TABLET ORAL at 09:12

## 2019-01-01 RX ADMIN — PANTOPRAZOLE SODIUM 40 MG: 40 TABLET, DELAYED RELEASE ORAL at 06:10

## 2019-01-01 RX ADMIN — ONDANSETRON 4 MG: 2 INJECTION INTRAMUSCULAR; INTRAVENOUS at 03:11

## 2019-01-01 RX ADMIN — IPRATROPIUM BROMIDE AND ALBUTEROL SULFATE 3 ML: .5; 3 SOLUTION RESPIRATORY (INHALATION) at 02:11

## 2019-01-01 RX ADMIN — HYDROMORPHONE HYDROCHLORIDE 0.5 MG: 1 INJECTION, SOLUTION INTRAMUSCULAR; INTRAVENOUS; SUBCUTANEOUS at 04:12

## 2019-01-01 RX ADMIN — SODIUM CHLORIDE: 0.9 INJECTION, SOLUTION INTRAVENOUS at 09:10

## 2019-01-01 RX ADMIN — LISINOPRIL 20 MG: 20 TABLET ORAL at 09:12

## 2019-01-01 RX ADMIN — HYDROMORPHONE HYDROCHLORIDE 2 MG: 1 INJECTION, SOLUTION INTRAMUSCULAR; INTRAVENOUS; SUBCUTANEOUS at 10:11

## 2019-01-01 RX ADMIN — HYDROCODONE BITARTRATE AND ACETAMINOPHEN 1 TABLET: 10; 325 TABLET ORAL at 08:11

## 2019-01-01 RX ADMIN — PANTOPRAZOLE SODIUM 40 MG: 40 TABLET, DELAYED RELEASE ORAL at 11:12

## 2019-01-01 RX ADMIN — PANTOPRAZOLE SODIUM 40 MG: 40 TABLET, DELAYED RELEASE ORAL at 05:11

## 2019-01-01 RX ADMIN — IPRATROPIUM BROMIDE AND ALBUTEROL SULFATE 3 ML: .5; 3 SOLUTION RESPIRATORY (INHALATION) at 09:11

## 2019-01-01 RX ADMIN — EPOETIN ALFA-EPBX 2400 UNITS: 10000 INJECTION, SOLUTION INTRAVENOUS; SUBCUTANEOUS at 03:10

## 2019-01-01 RX ADMIN — CEFTRIAXONE 1 G: 1 INJECTION, SOLUTION INTRAVENOUS at 07:11

## 2019-01-01 RX ADMIN — CEFTRIAXONE 1 G: 1 INJECTION, SOLUTION INTRAVENOUS at 02:11

## 2019-01-01 RX ADMIN — HYDROMORPHONE HYDROCHLORIDE 1 MG: 1 INJECTION, SOLUTION INTRAMUSCULAR; INTRAVENOUS; SUBCUTANEOUS at 03:11

## 2019-01-01 RX ADMIN — DEXTROSE MONOHYDRATE 25 G: 500 INJECTION PARENTERAL at 11:10

## 2019-01-01 RX ADMIN — TRAZODONE HYDROCHLORIDE 50 MG: 50 TABLET ORAL at 12:12

## 2019-01-01 RX ADMIN — ONDANSETRON 4 MG: 2 INJECTION INTRAMUSCULAR; INTRAVENOUS at 10:10

## 2019-01-01 RX ADMIN — LISINOPRIL 20 MG: 5 TABLET ORAL at 09:11

## 2019-01-01 RX ADMIN — PANTOPRAZOLE SODIUM 40 MG: 40 TABLET, DELAYED RELEASE ORAL at 06:11

## 2019-01-01 RX ADMIN — IODIXANOL 100 ML: 320 INJECTION, SOLUTION INTRAVASCULAR at 01:11

## 2019-01-01 RX ADMIN — ONDANSETRON 4 MG: 2 INJECTION INTRAMUSCULAR; INTRAVENOUS at 04:12

## 2019-01-01 RX ADMIN — AMLODIPINE BESYLATE 10 MG: 5 TABLET ORAL at 09:11

## 2019-01-01 RX ADMIN — AMLODIPINE BESYLATE 10 MG: 5 TABLET ORAL at 08:10

## 2019-01-01 RX ADMIN — HYDROMORPHONE HYDROCHLORIDE 0.5 MG: 1 INJECTION, SOLUTION INTRAMUSCULAR; INTRAVENOUS; SUBCUTANEOUS at 09:12

## 2019-01-01 RX ADMIN — OXYCODONE HYDROCHLORIDE 10 MG: 5 TABLET ORAL at 11:12

## 2019-01-01 RX ADMIN — AMLODIPINE BESYLATE 10 MG: 5 TABLET ORAL at 09:12

## 2019-01-01 RX ADMIN — ACETAMINOPHEN 650 MG: 325 TABLET ORAL at 09:11

## 2019-01-01 RX ADMIN — AZITHROMYCIN MONOHYDRATE 500 MG: 500 INJECTION, POWDER, LYOPHILIZED, FOR SOLUTION INTRAVENOUS at 08:11

## 2019-01-01 RX ADMIN — MUPIROCIN: 20 OINTMENT TOPICAL at 09:11

## 2019-01-01 RX ADMIN — PANTOPRAZOLE SODIUM 40 MG: 40 TABLET, DELAYED RELEASE ORAL at 05:12

## 2019-01-01 RX ADMIN — MUPIROCIN: 20 OINTMENT TOPICAL at 10:11

## 2019-01-01 RX ADMIN — FUROSEMIDE 100 MG: 10 INJECTION, SOLUTION INTRAMUSCULAR; INTRAVENOUS at 11:10

## 2019-01-01 RX ADMIN — AMLODIPINE BESYLATE 10 MG: 5 TABLET ORAL at 10:11

## 2019-01-01 RX ADMIN — CEFTRIAXONE 1 G: 1 INJECTION, SOLUTION INTRAVENOUS at 10:11

## 2019-01-01 RX ADMIN — HYDROCODONE BITARTRATE AND ACETAMINOPHEN 1 TABLET: 10; 325 TABLET ORAL at 09:10

## 2019-01-01 RX ADMIN — LORAZEPAM 0.5 MG: 2 INJECTION INTRAMUSCULAR; INTRAVENOUS at 11:10

## 2019-01-01 RX ADMIN — HYDROCODONE BITARTRATE AND ACETAMINOPHEN 1 TABLET: 10; 325 TABLET ORAL at 06:11

## 2019-01-01 RX ADMIN — HYDROCODONE BITARTRATE AND ACETAMINOPHEN 1 TABLET: 10; 325 TABLET ORAL at 11:11

## 2019-01-01 RX ADMIN — SODIUM BICARBONATE 50 MEQ: 84 INJECTION, SOLUTION INTRAVENOUS at 11:10

## 2019-01-01 RX ADMIN — CALCIUM GLUCONATE 1 G: 98 INJECTION, SOLUTION INTRAVENOUS at 11:10

## 2019-01-01 RX ADMIN — AZITHROMYCIN 500 MG: 250 TABLET, FILM COATED ORAL at 02:11

## 2019-01-01 RX ADMIN — PANTOPRAZOLE SODIUM 40 MG: 40 TABLET, DELAYED RELEASE ORAL at 10:11

## 2019-01-01 RX ADMIN — METOPROLOL SUCCINATE 50 MG: 25 TABLET, FILM COATED, EXTENDED RELEASE ORAL at 09:11

## 2019-01-01 RX ADMIN — IPRATROPIUM BROMIDE AND ALBUTEROL SULFATE 3 ML: .5; 3 SOLUTION RESPIRATORY (INHALATION) at 07:12

## 2019-01-01 RX ADMIN — SODIUM BICARBONATE 50 MEQ: 84 INJECTION, SOLUTION INTRAVENOUS at 07:12

## 2019-01-01 RX ADMIN — MORPHINE SULFATE 2 MG: 2 INJECTION, SOLUTION INTRAMUSCULAR; INTRAVENOUS at 08:12

## 2019-01-01 RX ADMIN — HYDROMORPHONE HYDROCHLORIDE 0.5 MG: 1 INJECTION, SOLUTION INTRAMUSCULAR; INTRAVENOUS; SUBCUTANEOUS at 10:10

## 2019-01-01 RX ADMIN — MUPIROCIN: 20 OINTMENT TOPICAL at 10:10

## 2019-01-01 RX ADMIN — ALBUMIN (HUMAN) 50 G: 0.25 INJECTION, SOLUTION INTRAVENOUS at 01:11

## 2019-01-01 RX ADMIN — ONDANSETRON 4 MG: 2 INJECTION INTRAMUSCULAR; INTRAVENOUS at 09:11

## 2019-01-01 RX ADMIN — METOPROLOL SUCCINATE 50 MG: 25 TABLET, FILM COATED, EXTENDED RELEASE ORAL at 10:11

## 2019-01-01 RX ADMIN — METOPROLOL SUCCINATE 50 MG: 25 TABLET, FILM COATED, EXTENDED RELEASE ORAL at 11:12

## 2019-01-01 RX ADMIN — ONDANSETRON 8 MG: 4 TABLET, ORALLY DISINTEGRATING ORAL at 10:11

## 2019-01-01 RX ADMIN — PANTOPRAZOLE SODIUM 40 MG: 40 TABLET, DELAYED RELEASE ORAL at 06:12

## 2019-01-01 RX ADMIN — IPRATROPIUM BROMIDE AND ALBUTEROL SULFATE 3 ML: .5; 3 SOLUTION RESPIRATORY (INHALATION) at 01:11

## 2019-01-01 RX ADMIN — TRAZODONE HYDROCHLORIDE 50 MG: 50 TABLET ORAL at 11:12

## 2019-01-04 DIAGNOSIS — Z09 HOSPITAL DISCHARGE FOLLOW-UP: ICD-10-CM

## 2019-01-04 RX ORDER — PROMETHAZINE HYDROCHLORIDE 25 MG/1
25 TABLET ORAL EVERY 6 HOURS PRN
Qty: 20 TABLET | Refills: 0 | Status: SHIPPED | OUTPATIENT
Start: 2019-01-04 | End: 2019-01-07 | Stop reason: SDUPTHER

## 2019-01-04 NOTE — TELEPHONE ENCOUNTER
Patient called the office and requested a RX for Promethazine. She stated that her Gastro doctor is out of the country.    Patient was last seen on 11/27/2018.

## 2019-01-07 ENCOUNTER — OFFICE VISIT (OUTPATIENT)
Dept: FAMILY MEDICINE | Facility: CLINIC | Age: 54
End: 2019-01-07
Payer: MEDICARE

## 2019-01-07 VITALS
SYSTOLIC BLOOD PRESSURE: 118 MMHG | BODY MASS INDEX: 18.18 KG/M2 | WEIGHT: 109.13 LBS | DIASTOLIC BLOOD PRESSURE: 80 MMHG | TEMPERATURE: 99 F | HEART RATE: 87 BPM | RESPIRATION RATE: 16 BRPM | HEIGHT: 65 IN | OXYGEN SATURATION: 97 %

## 2019-01-07 DIAGNOSIS — D89.1 CRYOGLOBULINEMIA: ICD-10-CM

## 2019-01-07 DIAGNOSIS — R11.0 NAUSEA: Primary | ICD-10-CM

## 2019-01-07 PROCEDURE — 99213 OFFICE O/P EST LOW 20 MIN: CPT | Mod: ,,, | Performed by: FAMILY MEDICINE

## 2019-01-07 PROCEDURE — 99213 PR OFFICE/OUTPT VISIT, EST, LEVL III, 20-29 MIN: ICD-10-PCS | Mod: ,,, | Performed by: FAMILY MEDICINE

## 2019-01-07 RX ORDER — PROMETHAZINE HYDROCHLORIDE 25 MG/1
25 TABLET ORAL EVERY 6 HOURS PRN
Qty: 20 TABLET | Refills: 0 | Status: SHIPPED | OUTPATIENT
Start: 2019-01-07 | End: 2020-01-01

## 2019-01-07 NOTE — PROGRESS NOTES
SUBJECTIVE:    Patient ID: Aurelia Saucedo is a 53 y.o. female.    Chief Complaint: leg wounds and Nausea  54 yo female with chronic Hep C and renal failure on dialysis 3 times a week, presents to follow up with her chronic leg rash secondary to Cryoglobulinemia, pts wounds are improving since starting wound care.  Pt also has chronic cholitis and known gall bladder disease with a 9mm stone last imaged in 2016. Pt has nausea that she is associating with her gallbladder disease with right upper quadrant tenderness. Pt has been able to manage her gallbladder disease with diet, she is not currently intrested in surgery. Pt initially called on Friday for an appointment but she states that the pain appears to be resolving.      Nausea   This is a recurrent problem. The current episode started in the past 7 days. The problem occurs intermittently. The problem has been waxing and waning. Associated symptoms include abdominal pain, nausea and a rash. Pertinent negatives include no anorexia, arthralgias, change in bowel habit, chest pain, chills, congestion, coughing, diaphoresis, fatigue, fever, headaches, joint swelling, myalgias, neck pain, numbness, sore throat, swollen glands, urinary symptoms, vertigo, visual change, vomiting or weakness. The symptoms are aggravated by eating. She has tried nothing for the symptoms. The treatment provided no relief.       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 since 12/16/13 8/19/2015    Gastritis     Hypertension 8/19/2015    Kidney transplant candidate 8/19/2015    Renal dialysis status     M, W, F    Secondary hyperparathyroidism of renal origin 8/19/2015     Social History     Socioeconomic History    Marital status:      Spouse name: Not on file    Number of children: 2    Years of education: Not on file    Highest education level: Not  on file   Social Needs    Financial resource strain: Not on file    Food insecurity - worry: Not on file    Food insecurity - inability: Not on file    Transportation needs - medical: Not on file    Transportation needs - non-medical: Not on file   Occupational History    Occupation: baker    Tobacco Use    Smoking status: Current Every Day Smoker     Packs/day: 0.75     Years: 30.00     Pack years: 22.50    Smokeless tobacco: Never Used    Tobacco comment: pt reports actively trying to quit but struggling.  Counseling and resources given.   Substance and Sexual Activity    Alcohol use: No     Alcohol/week: 0.0 oz    Drug use: Yes     Types: Marijuana     Comment: Last used 2 days ago    Sexual activity: Not Currently     Partners: Male     Birth control/protection: Surgical   Other Topics Concern    Are you pregnant or think you may be? Not Asked    Breast-feeding Not Asked   Social History Narrative    Not on file     Past Surgical History:   Procedure Laterality Date    AV FISTULA PLACEMENT  2013    ESOPHAGOGASTRODUODENOSCOPY (EGD) N/A 10/10/2016    [facility]    ESOPHAGOGASTRODUODENOSCOPY (EGD) N/A 2/4/2016    Performed by [facility] at [facility] ENDO (4TH FLR)    SALPINGOOPHORECTOMY Right 1997    TOTAL ABDOMINAL HYSTERECTOMY  1999    TRANSJUGULAR LIVER BIOPSY N/A 12/15/2015    Performed by [facility] Diagnostic Provider at [facility] OR 2ND FLR     Family History   Problem Relation Age of Onset    Kidney disease Mother     Stroke Father     Psoriasis Brother     Breast cancer Neg Hx     Cancer Neg Hx     Colon cancer Neg Hx     Ovarian cancer Neg Hx      Current Outpatient Medications   Medication Sig Dispense Refill    amlodipine (NORVASC) 10 MG tablet Take 1 tablet (10 mg total) by mouth once daily. 90 tablet 3    aspirin (ECOTRIN) 81 MG EC tablet Take 1 tablet (81 mg total) by mouth once daily. 90 tablet 1    dicyclomine (BENTYL) 20 mg tablet Take  "20 mg by mouth every 6 (six) hours as needed.      FA-VIT B COMP&C-SELENIUM-ZINC 3-70-15 MG-MCG-MG ORAL TAB Take 1 tablet by mouth once daily.      lisinopril (PRINIVIL,ZESTRIL) 20 MG tablet Take 1 tablet by mouth once daily.  3    metoprolol succinate (TOPROL-XL) 50 MG 24 hr tablet Take 50 mg by mouth once daily.      ondansetron (ZOFRAN-ODT) 4 MG TbDL Take 2 tablets (8 mg total) by mouth every hour as needed. nausea 20 tablet 0    pantoprazole (PROTONIX) 40 MG tablet Take 40 mg by mouth once daily.      promethazine (PHENERGAN) 25 MG tablet Take 1 tablet (25 mg total) by mouth every 6 (six) hours as needed for Nausea. 20 tablet 0     No current facility-administered medications for this visit.      Review of patient's allergies indicates:   Allergen Reactions    Levaquin [levofloxacin] Itching    Ciprofloxacin Itching    Codeine Nausea Only       Review of Systems   Constitutional: Negative for chills, diaphoresis, fatigue and fever.   HENT: Negative for congestion and sore throat.    Respiratory: Negative for cough.    Cardiovascular: Negative for chest pain.   Gastrointestinal: Positive for abdominal pain and nausea. Negative for anal bleeding, anorexia, blood in stool, change in bowel habit, constipation, diarrhea and vomiting.   Musculoskeletal: Negative for arthralgias, joint swelling, myalgias and neck pain.   Skin: Positive for rash.   Neurological: Negative for vertigo, weakness, numbness and headaches.          Blood pressure 118/80, pulse 87, temperature 99.1 °F (37.3 °C), temperature source Oral, resp. rate 16, height 5' 5" (1.651 m), weight 49.5 kg (109 lb 1.6 oz), SpO2 97 %. Body mass index is 18.16 kg/m².   Objective:      Physical Exam   Constitutional: No distress.   Cachectic, with yellow tinged skin   HENT:   Head: Normocephalic and atraumatic.   Mouth/Throat: No oropharyngeal exudate.   Eyes: Conjunctivae and EOM are normal. Pupils are equal, round, and reactive to light. No scleral " icterus.   Cardiovascular: Normal rate, regular rhythm and normal heart sounds.   No murmur heard.  Pulmonary/Chest: Effort normal and breath sounds normal. No respiratory distress. She has no wheezes.   Abdominal: Soft. Bowel sounds are normal. She exhibits no distension and no mass. There is tenderness. There is no rebound and no guarding.   RUQ tenderness and tenderness along her lower abdomen.   Skin: Skin is warm and dry. Rash noted. She is not diaphoretic.   lesions consist of erythematous macules and purpuric papules of the lower extremities, hemorrhagic crusts, and ulcers            Assessment:       1. Nausea    2. Cryoglobulinemia         Plan:           Nausea  -     promethazine (PHENERGAN) 25 MG tablet; Take 1 tablet (25 mg total) by mouth every 6 (six) hours as needed for Nausea.  Dispense: 20 tablet; Refill: 0  Suspect that the nausea is secondary to her chronic gall bladder disease, discussed surgical referral pt declined, she will be starting treatment for her Hep C in a couple weeks, she would like to consider surgery after the Hep C treatment. Physical exam demonstrated a tender RUQ but not currently an acute abdomen.    Cryoglobulinemia  Rash is not improving but the wounds appear to be drying up and no evidence of infection.

## 2019-01-15 ENCOUNTER — TELEPHONE (OUTPATIENT)
Dept: INTERNAL MEDICINE | Facility: CLINIC | Age: 54
End: 2019-01-15

## 2019-01-15 NOTE — TELEPHONE ENCOUNTER
HER INS DENIED MAVYRET, WILL ONLY PAY FOR ZEPATEIR.  DOES DR HERNANDEZ WANT TO CHANGE TO THAT?  IF SO, [facility] WILL SEND NEW REFERRAL FORM TO [facility]

## 2019-01-15 NOTE — TELEPHONE ENCOUNTER
----- Message from Giana Pickard sent at 1/10/2019  5:22 PM CST -----  Contact: [facility]  HER INS DENIED MAVYRET, WILL ONLY PAY FOR ZEPATEIR.  DOES DR HERNANDEZ WANT TO CHANGE TO THAT?  IF SO, [facility] WILL SEND NEW REFERRAL FORM TO [facility].

## 2019-02-14 ENCOUNTER — TELEPHONE (OUTPATIENT)
Dept: GASTROENTEROLOGY | Facility: CLINIC | Age: 54
End: 2019-02-14

## 2019-02-14 NOTE — TELEPHONE ENCOUNTER
----- Message from Giana Pickard sent at 2/14/2019 12:27 PM CST -----  Contact: [facility]  INS IS AGAIN DENYING MAVYRET.  THEY WANT HER TO TAKE ZEPETIER.  GENE LOOKED UP GUIDELINES FOR PEOPLE WITH CKD STAGE 4 OF 5.  THIS PATIENT IS STAGE 4.  THE GUIDELINES RECOMMEND ZEPETIER FOR 12 WEEKS OR MAVYRET FOR 8-12 WEEKS.  CAN THE MEDS BE CHANGED TO ZEPETIER?  PLEASE CALL [facility] [phone].

## 2019-02-25 ENCOUNTER — OFFICE VISIT (OUTPATIENT)
Dept: GASTROENTEROLOGY | Facility: CLINIC | Age: 54
End: 2019-02-25
Payer: MEDICARE

## 2019-02-25 VITALS
RESPIRATION RATE: 17 BRPM | BODY MASS INDEX: 18.49 KG/M2 | HEIGHT: 65 IN | TEMPERATURE: 100 F | DIASTOLIC BLOOD PRESSURE: 88 MMHG | WEIGHT: 111 LBS | HEART RATE: 94 BPM | SYSTOLIC BLOOD PRESSURE: 162 MMHG

## 2019-02-25 DIAGNOSIS — R10.9 ABDOMINAL PAIN, UNSPECIFIED ABDOMINAL LOCATION: Primary | ICD-10-CM

## 2019-02-25 DIAGNOSIS — D63.1 ANEMIA OF CHRONIC RENAL FAILURE, STAGE 5: ICD-10-CM

## 2019-02-25 DIAGNOSIS — B18.2 CHRONIC HEPATITIS C WITHOUT HEPATIC COMA: ICD-10-CM

## 2019-02-25 DIAGNOSIS — K76.6 PORTAL HYPERTENSION: ICD-10-CM

## 2019-02-25 DIAGNOSIS — R14.0 ABDOMINAL DISTENSION: ICD-10-CM

## 2019-02-25 DIAGNOSIS — N18.5 ANEMIA OF CHRONIC RENAL FAILURE, STAGE 5: ICD-10-CM

## 2019-02-25 DIAGNOSIS — K74.60 HEPATIC CIRRHOSIS, UNSPECIFIED HEPATIC CIRRHOSIS TYPE, UNSPECIFIED WHETHER ASCITES PRESENT: ICD-10-CM

## 2019-02-25 DIAGNOSIS — I10 ESSENTIAL HYPERTENSION: ICD-10-CM

## 2019-02-25 DIAGNOSIS — N18.6 ESRD (END STAGE RENAL DISEASE): ICD-10-CM

## 2019-02-25 DIAGNOSIS — K21.9 GASTROESOPHAGEAL REFLUX DISEASE, ESOPHAGITIS PRESENCE NOT SPECIFIED: ICD-10-CM

## 2019-02-25 DIAGNOSIS — R11.0 NAUSEA: ICD-10-CM

## 2019-02-25 PROCEDURE — 99215 PR OFFICE/OUTPT VISIT, EST, LEVL V, 40-54 MIN: ICD-10-PCS | Mod: ,,, | Performed by: INTERNAL MEDICINE

## 2019-02-25 PROCEDURE — 99215 OFFICE O/P EST HI 40 MIN: CPT | Mod: ,,, | Performed by: INTERNAL MEDICINE

## 2019-02-25 NOTE — PROGRESS NOTES
[facility] Established Patient Visit    Subjective:           PCP: Dustin Ireland    Chief Complaint: Hospital Follow Up; Abdominal Pain; and Gastroesophageal Reflux       HPI:  Aurelia Saucedo is a 53 y.o. female here for follow up. She was recently discharged from the hospital. Patient was given antibiotics which she cannot take. Trying to get her an alternative. She has abdominal pain, nausea, and distention which become worse on eating. There are no fever, chills, or rigors. She has cirrhosis with decompensation and end-stage renal disease. She underwent dialysis today and had dry weight was 50.5 kg. Will try to find out if she can take the Alinia and give her a 10 day course. Will see what happens. She is scheduled for an ultrasound. She is high-risk and she will try to as soon as possible.      ROS:   Constitutional: No fevers, chills, weight loss  ENT: No allergies, sore throat, rhinorrhea  CV: No chest pain, palpitations, edema  Pulm: No cough, shortness of breath, wheezing  Ophtho: No vision changes  GI: Nausea, abd pain, abd distension  Denies alternating diarrhea/constipation.   Derm: No rash  Heme: No easy bruising or lymphadenopathy  MSK: No arthralgias or myalgias  : No dysuria, hematuria, frequency, polyuria, or flank tenderness  Endo: No hot or cold intolerance  Neuro: No syncope or seizure, or dizziness  Psych: No hallucination, depression or suicidal ideation      Medical History:  has a past medical history of Anemia of chronic renal failure, stage 5 (8/19/2015), Chronic hepatitis C (8/19/2015), Colitis, ESRD 2/2 MPGN on HD since 12/16/13  (8/19/2015), Gastritis, Hypertension (8/19/2015), Kidney transplant candidate (8/19/2015), Renal dialysis status, and Secondary hyperparathyroidism of renal origin (8/19/2015).      Surgical History:  has a past surgical history that includes Total abdominal hysterectomy (1999); Salpingoophorectomy (Right, 1997); and AV fistula placement  (2013).    Family History:   Family History   Problem Relation Age of Onset    Kidney disease Mother     Stroke Father     Psoriasis Brother     Breast cancer Neg Hx     Cancer Neg Hx     Colon cancer Neg Hx     Ovarian cancer Neg Hx        Social History:   Social History     Tobacco Use    Smoking status: Current Every Day Smoker     Packs/day: 0.75     Years: 30.00     Pack years: 22.50    Smokeless tobacco: Never Used    Tobacco comment: pt reports actively trying to quit but struggling.  Counseling and resources given.   Substance Use Topics    Alcohol use: No     Alcohol/week: 0.0 oz    Drug use: Yes     Types: Marijuana     Comment: Last used 2 days ago       The patient's social and family histories were reviewed by me and updated in the appropriate section of the electronic medical record.    Allergies:   Review of patient's allergies indicates:   Allergen Reactions    Levaquin [levofloxacin] Itching    Ciprofloxacin Itching    Codeine Nausea Only         Medications:   Current Outpatient Medications   Medication Sig Dispense Refill    amlodipine (NORVASC) 10 MG tablet Take 1 tablet (10 mg total) by mouth once daily. 90 tablet 3    aspirin (ECOTRIN) 81 MG EC tablet Take 1 tablet (81 mg total) by mouth once daily. 90 tablet 1    dicyclomine (BENTYL) 20 mg tablet Take 20 mg by mouth every 6 (six) hours as needed.      FA-VIT B COMP&C-SELENIUM-ZINC 3-70-15 MG-MCG-MG ORAL TAB Take 1 tablet by mouth once daily.      lisinopril (PRINIVIL,ZESTRIL) 20 MG tablet Take 1 tablet by mouth once daily.  3    metoprolol succinate (TOPROL-XL) 50 MG 24 hr tablet Take 50 mg by mouth once daily.      ondansetron (ZOFRAN-ODT) 4 MG TbDL Take 2 tablets (8 mg total) by mouth every hour as needed. nausea 20 tablet 0    pantoprazole (PROTONIX) 40 MG tablet Take 40 mg by mouth once daily.      promethazine (PHENERGAN) 25 MG tablet Take 1 tablet (25 mg total) by mouth every 6 (six) hours as needed for Nausea.  "20 tablet 0    ursodiol (ACTIGALL) 250 mg Tab Take 1 tablet (250 mg total) by mouth once daily. 90 tablet 0     No current facility-administered medications for this visit.      All medications and past history have been reviewed.        Objective:        Vital Signs:  Blood pressure (!) 162/88, pulse 94, temperature 100 °F (37.8 °C), resp. rate 17, height 5' 5" (1.651 m), weight 50.3 kg (111 lb). Body mass index is 18.47 kg/m².    Physical Exam:   General Appearance: Well appearing in no acute distress, well developed, well                nourished  Head: Normocephalic, without obvious abnormality  Eyes:  Pupils equal, round and reactive to light  Throat: Lips, mucosa, and tongue normal; teeth and gums normal  Lungs: CTA bilaterally in anterior and posterior fields, no wheezes, no crackles  Heart:  Regular rate and rhythm, no murmurs heard  Abdomen: Soft, non tender, non distended with positive bowel sounds in all four quadrants. No hepatosplenomegaly, ascites, or mass. Negative for succusion splash  : female   Extremities: No cyanosis, edema or deformity  Skin: No rash  Neurologic: Normal exam    Labs:  Lab Results   Component Value Date    WBC 3.60 (L) 10/12/2016    HGB 8.6 (L) 10/12/2016    HCT 26.3 (L) 10/12/2016     10/12/2016    CHOL 154 08/19/2015    TRIG 116 08/19/2015    HDL 46 08/19/2015    ALT 38 10/12/2016    AST 42 (H) 10/12/2016     10/12/2016    K 3.8 10/12/2016     10/12/2016    CREATININE 2.9 (H) 10/12/2016    BUN 28 (H) 10/12/2016    CO2 26 10/12/2016    TSH 1.736 02/11/2016    INR 1.0 10/09/2016    HGBA1C 4.9 10/10/2016       Imaging:     All lab results and imaging results have been reviewed and discussed with the patient      Assessment:       1. Abdominal pain, unspecified abdominal location    2. Hepatic cirrhosis, unspecified hepatic cirrhosis type, unspecified whether ascites present    3. Abdominal distension    4. Nausea    5. Chronic hepatitis C without hepatic coma "    6. Gastroesophageal reflux disease, esophagitis presence not specified    7. Anemia of chronic renal failure, stage 5    8. Portal hypertension    9. Essential hypertension    10. ESRD 2/2 MPGN on HD since 12/16/13         Plan:       Aurelia was seen today for hospital follow up, abdominal pain and gastroesophageal reflux.    Diagnoses and all orders for this visit:    Abdominal pain, unspecified abdominal location    Hepatic cirrhosis, unspecified hepatic cirrhosis type, unspecified whether ascites present    Abdominal distension    Nausea    Chronic hepatitis C without hepatic coma    Gastroesophageal reflux disease, esophagitis presence not specified    Anemia of chronic renal failure, stage 5    Portal hypertension    Essential hypertension    ESRD 2/2 MPGN on HD since 12/16/13         See HPI    Follow-up in about 4 weeks (around 3/25/2019).    The plan was discussed with the patient and all questions/concerns have been answered to the patient's satisfaction.        Electronically signed by Margarita Saul MD    This note was dictated using voice recognition software and may contain grammatical errors.

## 2019-03-07 DIAGNOSIS — K80.20 GALLSTONES: Primary | ICD-10-CM

## 2019-03-07 RX ORDER — URSODIOL 250 MG/1
250 TABLET, FILM COATED ORAL DAILY
Qty: 90 TABLET | Refills: 0 | Status: SHIPPED | OUTPATIENT
Start: 2019-03-07 | End: 2020-01-01

## 2019-03-07 NOTE — TELEPHONE ENCOUNTER
----- Message from Giana Pickard sent at 3/7/2019 11:02 AM CST -----  DR HERNANDEZ SPOKE TO THE PATIENT ABOUT HER RESULTS AND HER STOMACH.  HE WOULD LIKE HER TO START TAKING LIAT, THE LOWEST STRENGTH, 1 TAB ONCE A DAY.

## 2019-03-11 ENCOUNTER — TELEPHONE (OUTPATIENT)
Dept: GASTROENTEROLOGY | Facility: CLINIC | Age: 54
End: 2019-03-11

## 2019-03-11 NOTE — TELEPHONE ENCOUNTER
----- Message from Giana Pickard sent at 3/11/2019  2:31 PM CDT -----  Contact: [employer]  Pt ins company is now saying that they will only pay for zepetier if it is given in conjunction with ribavirin.  Tamar has no idea about ribavirin.

## 2019-03-15 NOTE — TELEPHONE ENCOUNTER
Please call [facility] and leave a message for her to call Dr. Saul regarding the Ribavarin for Ms. Saucedo.

## 2019-03-21 NOTE — TELEPHONE ENCOUNTER
Tamar is appealing the Ribavirin because she did some research and found nothing that stated she had to take it.

## 2019-04-02 ENCOUNTER — TELEPHONE (OUTPATIENT)
Dept: GASTROENTEROLOGY | Facility: CLINIC | Age: 54
End: 2019-04-02

## 2019-04-02 NOTE — TELEPHONE ENCOUNTER
Patient states she is still having trouble with her stomach. She looks like she is 5 month pregnant. She is really, really uncomfortable all the time. Please give her a call at [phone]

## 2019-04-09 ENCOUNTER — OFFICE VISIT (OUTPATIENT)
Dept: FAMILY MEDICINE | Facility: CLINIC | Age: 54
End: 2019-04-09
Payer: MEDICARE

## 2019-04-09 VITALS
DIASTOLIC BLOOD PRESSURE: 72 MMHG | HEART RATE: 87 BPM | BODY MASS INDEX: 17.94 KG/M2 | OXYGEN SATURATION: 98 % | HEIGHT: 65 IN | TEMPERATURE: 98 F | WEIGHT: 107.69 LBS | RESPIRATION RATE: 16 BRPM | SYSTOLIC BLOOD PRESSURE: 116 MMHG

## 2019-04-09 DIAGNOSIS — N30.00 ACUTE CYSTITIS WITHOUT HEMATURIA: Primary | ICD-10-CM

## 2019-04-09 PROCEDURE — 99213 OFFICE O/P EST LOW 20 MIN: CPT | Mod: ,,, | Performed by: FAMILY MEDICINE

## 2019-04-09 PROCEDURE — 99213 PR OFFICE/OUTPT VISIT, EST, LEVL III, 20-29 MIN: ICD-10-PCS | Mod: ,,, | Performed by: FAMILY MEDICINE

## 2019-04-09 RX ORDER — CEPHALEXIN 500 MG/1
500 CAPSULE ORAL DAILY
COMMUNITY
End: 2019-04-23

## 2019-04-09 NOTE — PROGRESS NOTES
SUBJECTIVE:    Patient ID: Aurelia Saucedo is a 53 y.o. female.    Chief Complaint: Urinary Tract Infection    52 yo female ESRD on dialysis 3 days a week  Was started on Keflex  500mg q day for her urinary tract symptoms while at dialysis, pt did not give a urine sampe for C&S,  She is currently asymptomatic and getting a sample after starting abx would not be useful.        Urinary Tract Infection    This is a new problem. The current episode started in the past 7 days. The problem has been resolved. The quality of the pain is described as burning. The patient is experiencing no pain. There has been no fever. She is not sexually active. There is no history of pyelonephritis. Pertinent negatives include no chills, flank pain, frequency, hematuria, hesitancy, nausea, urgency or vomiting. She has tried antibiotics for the symptoms. The treatment provided significant relief.         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 since 12/16/13 8/19/2015    Gastritis     Hypertension 8/19/2015    Kidney transplant candidate 8/19/2015    Renal dialysis status     M, W, F    Secondary hyperparathyroidism of renal origin 8/19/2015     Social History     Socioeconomic History    Marital status:      Spouse name: Not on file    Number of children: 2    Years of education: Not on file    Highest education level: Not on file   Occupational History    Occupation: baker    Social Needs    Financial resource strain: Not on file    Food insecurity:     Worry: Not on file     Inability: Not on file    Transportation needs:     Medical: Not on file     Non-medical: Not on file   Tobacco Use    Smoking status: Current Every Day Smoker     Packs/day: 0.75     Years: 30.00     Pack years: 22.50    Smokeless tobacco: Never Used    Tobacco comment: pt reports actively trying to quit but  struggling.  Counseling and resources given.   Substance and Sexual Activity    Alcohol use: No     Alcohol/week: 0.0 oz    Drug use: Yes     Types: Marijuana     Comment: Last used 2 days ago    Sexual activity: Not Currently     Partners: Male     Birth control/protection: Surgical   Lifestyle    Physical activity:     Days per week: Not on file     Minutes per session: Not on file    Stress: Not on file   Relationships    Social connections:     Talks on phone: Not on file     Gets together: Not on file     Attends Christian service: Not on file     Active member of club or organization: Not on file     Attends meetings of clubs or organizations: Not on file     Relationship status: Not on file   Other Topics Concern    Are you pregnant or think you may be? Not Asked    Breast-feeding Not Asked   Social History Narrative    Not on file     Past Surgical History:   Procedure Laterality Date    AV FISTULA PLACEMENT  2013    ESOPHAGOGASTRODUODENOSCOPY (EGD) N/A 10/10/2016    [facility] ENDO    ESOPHAGOGASTRODUODENOSCOPY (EGD) N/A 2/4/2016    Performed by [facility] at [facility] ENDO (4TH FLR)    SALPINGOOPHORECTOMY Right 1997    TOTAL ABDOMINAL HYSTERECTOMY  1999    TRANSJUGULAR LIVER BIOPSY N/A 12/15/2015    Performed by [facility] Diagnostic Provider at [facility] OR 2ND FLR     Family History   Problem Relation Age of Onset    Kidney disease Mother     Stroke Father     Psoriasis Brother     Breast cancer Neg Hx     Cancer Neg Hx     Colon cancer Neg Hx     Ovarian cancer Neg Hx      Current Outpatient Medications   Medication Sig Dispense Refill    amlodipine (NORVASC) 10 MG tablet Take 1 tablet (10 mg total) by mouth once daily. 90 tablet 3    aspirin (ECOTRIN) 81 MG EC tablet Take 1 tablet (81 mg total) by mouth once daily. 90 tablet 1    cephALEXin (KEFLEX) 500 MG capsule Take 500 mg by mouth Daily.      dicyclomine (BENTYL) 20 mg tablet Take 20 mg by mouth  "every 6 (six) hours as needed.      FA-VIT B COMP&C-SELENIUM-ZINC 3-70-15 MG-MCG-MG ORAL TAB Take 1 tablet by mouth once daily.      lisinopril (PRINIVIL,ZESTRIL) 20 MG tablet Take 1 tablet by mouth once daily.  3    metoprolol succinate (TOPROL-XL) 50 MG 24 hr tablet Take 50 mg by mouth once daily.      ondansetron (ZOFRAN-ODT) 4 MG TbDL Take 2 tablets (8 mg total) by mouth every hour as needed. nausea 20 tablet 0    pantoprazole (PROTONIX) 40 MG tablet Take 40 mg by mouth once daily.      promethazine (PHENERGAN) 25 MG tablet Take 1 tablet (25 mg total) by mouth every 6 (six) hours as needed for Nausea. 20 tablet 0    ursodiol (ACTIGALL) 250 mg Tab Take 1 tablet (250 mg total) by mouth once daily. 90 tablet 0     No current facility-administered medications for this visit.      Review of patient's allergies indicates:   Allergen Reactions    Levaquin [levofloxacin] Itching    Ciprofloxacin Itching    Codeine Nausea Only       Review of Systems   Constitutional: Negative for activity change, chills, diaphoresis, fatigue, fever and unexpected weight change.   Cardiovascular: Negative for chest pain and palpitations.   Gastrointestinal: Negative for abdominal pain, nausea and vomiting.   Genitourinary: Negative for dysuria, enuresis, flank pain, frequency, hematuria, hesitancy and urgency.   Musculoskeletal: Negative for back pain.          Blood pressure 116/72, pulse 87, temperature 98 °F (36.7 °C), temperature source Oral, resp. rate 16, height 5' 5" (1.651 m), weight 48.9 kg (107 lb 11.2 oz), SpO2 98 %. Body mass index is 17.92 kg/m².   Objective:      Physical Exam        Assessment:       1. Acute cystitis without hematuria         Plan:           Acute cystitis without hematuria    Pt to continue the Keflex until completed, if the symptoms return she will call and we can order a UA with culture.                    "

## 2019-04-16 NOTE — TELEPHONE ENCOUNTER
Spoke to pt.  Still in the hospital.  After dischage, may order u/s gallbladder.  Make office appt in 2 weeks.

## 2019-04-23 ENCOUNTER — OFFICE VISIT (OUTPATIENT)
Dept: GASTROENTEROLOGY | Facility: CLINIC | Age: 54
End: 2019-04-23
Payer: MEDICARE

## 2019-04-23 VITALS
SYSTOLIC BLOOD PRESSURE: 118 MMHG | DIASTOLIC BLOOD PRESSURE: 66 MMHG | HEIGHT: 65 IN | RESPIRATION RATE: 16 BRPM | HEART RATE: 95 BPM | TEMPERATURE: 99 F | BODY MASS INDEX: 18.56 KG/M2 | WEIGHT: 111.38 LBS

## 2019-04-23 DIAGNOSIS — R11.0 NAUSEA: ICD-10-CM

## 2019-04-23 DIAGNOSIS — B19.20 HEPATITIS C VIRUS INFECTION WITHOUT HEPATIC COMA, UNSPECIFIED CHRONICITY: ICD-10-CM

## 2019-04-23 DIAGNOSIS — K21.9 GASTROESOPHAGEAL REFLUX DISEASE, ESOPHAGITIS PRESENCE NOT SPECIFIED: ICD-10-CM

## 2019-04-23 DIAGNOSIS — R14.0 ABDOMINAL DISTENSION: ICD-10-CM

## 2019-04-23 DIAGNOSIS — R10.9 ABDOMINAL PAIN, UNSPECIFIED ABDOMINAL LOCATION: ICD-10-CM

## 2019-04-23 DIAGNOSIS — R18.8 CIRRHOSIS OF LIVER WITH ASCITES, UNSPECIFIED HEPATIC CIRRHOSIS TYPE: Primary | ICD-10-CM

## 2019-04-23 DIAGNOSIS — K74.60 CIRRHOSIS OF LIVER WITH ASCITES, UNSPECIFIED HEPATIC CIRRHOSIS TYPE: Primary | ICD-10-CM

## 2019-04-23 DIAGNOSIS — N18.6 ESRD (END STAGE RENAL DISEASE): ICD-10-CM

## 2019-04-23 PROCEDURE — 99215 PR OFFICE/OUTPT VISIT, EST, LEVL V, 40-54 MIN: ICD-10-PCS | Mod: ,,, | Performed by: INTERNAL MEDICINE

## 2019-04-23 PROCEDURE — 99215 OFFICE O/P EST HI 40 MIN: CPT | Mod: ,,, | Performed by: INTERNAL MEDICINE

## 2019-04-23 NOTE — PROGRESS NOTES
[facility] Established Patient Visit    Subjective:           PCP: Dustin Ireland    Chief Complaint: Bloated and Abdominal Pain       HPI:  Aurelia Saucedo is a 53 y.o. female here for hospital followed up admitted for lower GI bleeding with possible enteritis seen on CAT scan, colitis, she may have distal small vessel disease of the colon,and has hepatitis C and is being contemplated to be started on  antiviral therapy and has a distended abdomen and may benefit from paracentesis. Mild tenderness abdomen but no definite evidenceof SBP. Incidental paracentesis with cell count and culture will be ordered.  Patient has ESRD, hepatitis C with cirrhosis, chronic anemia, abdomen pain. Takes Zofran for nausea. Protonix for GERD. Hospital records reviewed. Spent more than 1 hours with patient explaining her medical condition and answering questions.        ROS:   Constitutional: No fevers, chills, weight loss  ENT: No allergies, sore throat, rhinorrhea  CV: No chest pain, palpitations, edema  Pulm: No cough, shortness of breath, wheezing  Ophtho: No vision changes  GI: No blood in stools, change in bowel habits, nausea/vomiting  Denies alternating diarrhea/constipation.   Derm: No rash  Heme: No easy bruising or lymphadenopathy  MSK: No arthralgias or myalgias  : No dysuria, hematuria, frequency, polyuria, or flank tenderness  Endo: No hot or cold intolerance  Neuro: No syncope or seizure, or dizziness  Psych: No hallucination, depression or suicidal ideation      Medical History:  has a past medical history of Anemia of chronic renal failure, stage 5 (8/19/2015), Chronic hepatitis C (8/19/2015), Colitis, ESRD 2/2 MPGN on HD since 12/16/13  (8/19/2015), Gastritis, Hypertension (8/19/2015), Kidney transplant candidate (8/19/2015), Renal dialysis status, and Secondary hyperparathyroidism of renal origin (8/19/2015).      Surgical History:  has a past surgical history that includes Total abdominal  hysterectomy (1999); Salpingoophorectomy (Right, 1997); and AV fistula placement (2013).    Family History:   Family History   Problem Relation Age of Onset    Kidney disease Mother     Stroke Father     Psoriasis Brother     Breast cancer Neg Hx     Cancer Neg Hx     Colon cancer Neg Hx     Ovarian cancer Neg Hx        Social History:   Social History     Tobacco Use    Smoking status: Current Every Day Smoker     Packs/day: 0.75     Years: 30.00     Pack years: 22.50    Smokeless tobacco: Never Used    Tobacco comment: pt reports actively trying to quit but struggling.  Counseling and resources given.   Substance Use Topics    Alcohol use: No     Alcohol/week: 0.0 oz    Drug use: Yes     Types: Marijuana     Comment: Last used 2 days ago       The patient's social and family histories were reviewed by me and updated in the appropriate section of the electronic medical record.    Allergies:   Review of patient's allergies indicates:   Allergen Reactions    Levaquin [levofloxacin] Itching    Ciprofloxacin Itching    Codeine Nausea Only         Medications:   Current Outpatient Medications   Medication Sig Dispense Refill    amlodipine (NORVASC) 10 MG tablet Take 1 tablet (10 mg total) by mouth once daily. 90 tablet 3    aspirin (ECOTRIN) 81 MG EC tablet Take 1 tablet (81 mg total) by mouth once daily. 90 tablet 1    dicyclomine (BENTYL) 20 mg tablet Take 20 mg by mouth every 6 (six) hours as needed.      FA-VIT B COMP&C-SELENIUM-ZINC 3-70-15 MG-MCG-MG ORAL TAB Take 1 tablet by mouth once daily.      lisinopril (PRINIVIL,ZESTRIL) 20 MG tablet Take 1 tablet by mouth once daily.  3    metoprolol succinate (TOPROL-XL) 50 MG 24 hr tablet Take 50 mg by mouth once daily.      ondansetron (ZOFRAN-ODT) 4 MG TbDL Take 2 tablets (8 mg total) by mouth every hour as needed. nausea 20 tablet 0    pantoprazole (PROTONIX) 40 MG tablet Take 40 mg by mouth once daily.      promethazine (PHENERGAN) 25 MG tablet  "Take 1 tablet (25 mg total) by mouth every 6 (six) hours as needed for Nausea. 20 tablet 0    ursodiol (ACTIGALL) 250 mg Tab Take 1 tablet (250 mg total) by mouth once daily. 90 tablet 0     No current facility-administered medications for this visit.      All medications and past history have been reviewed.        Objective:        Vital Signs:  Blood pressure 118/66, pulse 95, temperature 99.1 °F (37.3 °C), resp. rate 16, height 5' 5" (1.651 m), weight 50.5 kg (111 lb 6.4 oz). Body mass index is 18.54 kg/m².    Physical Exam:   General Appearance: Well appearing in no acute distress, well developed, well                nourished  Head: Normocephalic, without obvious abnormality  Eyes:  Pupils equal, round and reactive to light  Throat: Lips, mucosa, and tongue normal; teeth and gums normal  Lungs: CTA bilaterally in anterior and posterior fields, no wheezes, no crackles  Heart:  Regular rate and rhythm, no murmurs heard  Abdomen: Soft, non tender, non distended with positive bowel sounds in all four quadrants. No hepatosplenomegaly, ascites, or mass. Negative for succusion splash  : female   Extremities: No cyanosis, edema or deformity  Skin: No rash  Neurologic: Normal exam    Labs:  Lab Results   Component Value Date    WBC 3.60 (L) 10/12/2016    HGB 8.6 (L) 10/12/2016    HCT 26.3 (L) 10/12/2016     10/12/2016    CHOL 154 08/19/2015    TRIG 116 08/19/2015    HDL 46 08/19/2015    ALT 38 10/12/2016    AST 42 (H) 10/12/2016     10/12/2016    K 3.8 10/12/2016     10/12/2016    CREATININE 2.9 (H) 10/12/2016    BUN 28 (H) 10/12/2016    CO2 26 10/12/2016    TSH 1.736 02/11/2016    INR 1.0 10/09/2016    HGBA1C 4.9 10/10/2016       Imaging:     All lab results and imaging results have been reviewed and discussed with the patient      Assessment:       1. Cirrhosis of liver with ascites, unspecified hepatic cirrhosis type    2. Hepatitis C virus infection without hepatic coma, unspecified chronicity  "   3. Abdominal pain, unspecified abdominal location    4. Abdominal distension    5. ESRD (end stage renal disease)    6. Nausea    7. Gastroesophageal reflux disease, esophagitis presence not specified        Plan:       Aurelia was seen today for bloated and abdominal pain.    Diagnoses and all orders for this visit:    Cirrhosis of liver with ascites, unspecified hepatic cirrhosis type  -     Paracentesis; Future    Hepatitis C virus infection without hepatic coma, unspecified chronicity  -     Paracentesis; Future    Abdominal pain, unspecified abdominal location    Abdominal distension    ESRD (end stage renal disease)    Nausea    Gastroesophageal reflux disease, esophagitis presence not specified        See HPI    Follow up if symptoms worsen or fail to improve, for After Test(s).    The plan was discussed with the patient and all questions/concerns have been answered to the patient's satisfaction.        Electronically signed by Margarita Saul MD    This note was dictated using voice recognition software and may contain grammatical errors.

## 2019-05-13 ENCOUNTER — TELEPHONE (OUTPATIENT)
Dept: GASTROENTEROLOGY | Facility: CLINIC | Age: 54
End: 2019-05-13

## 2019-05-13 DIAGNOSIS — R18.8 OTHER ASCITES: ICD-10-CM

## 2019-05-13 DIAGNOSIS — R06.02 SOB (SHORTNESS OF BREATH): ICD-10-CM

## 2019-05-13 DIAGNOSIS — K72.10 END STAGE LIVER DISEASE: Primary | ICD-10-CM

## 2019-05-13 NOTE — TELEPHONE ENCOUNTER
----- Message from James Mast MA sent at 5/9/2019 12:59 PM CDT -----  I called the patient to see how she is doing and she stated she needs another Paracentesis.

## 2019-05-14 LAB
APTT PPP: 28 SEC (ref 26.2–34.7)
BASOPHILS NFR BLD: 0 K/UL (ref 0–0.2)
BASOPHILS NFR BLD: 0.2 %
EOSINOPHIL NFR BLD: 0.1 K/UL (ref 0–0.7)
EOSINOPHIL NFR BLD: 1.2 %
ERYTHROCYTE [DISTWIDTH] IN BLOOD BY AUTOMATED COUNT: 15.2 % (ref 11.7–14.9)
GRAN #: 3.1 K/UL (ref 1.4–6.5)
GRAN%: 75.9 %
HCT VFR BLD AUTO: 35.7 % (ref 36–48)
HGB BLD-MCNC: 11.3 G/DL (ref 12–15)
IMMATURE GRANS (ABS): 0 K/UL (ref 0–1)
IMMATURE GRANULOCYTES: 0.5 %
INR PPP: 1
LYMPH #: 0.6 K/UL (ref 1.2–3.4)
LYMPH%: 15.3 %
MCH RBC QN AUTO: 30.1 PG (ref 25–35)
MCHC RBC AUTO-ENTMCNC: 31.7 G/DL (ref 31–36)
MCV RBC AUTO: 95.2 FL (ref 79–98)
MONO #: 0.3 K/UL (ref 0.1–0.6)
MONO%: 6.9 %
NUCLEATED RBCS: 0 %
PLATELET # BLD AUTO: 179 K/UL (ref 140–440)
PMV BLD AUTO: 9.7 FL (ref 8.8–12.7)
PROTHROMBIN TIME: 13 SEC (ref 11.7–14)
RBC # BLD AUTO: 3.75 M/UL (ref 3.5–5.5)
WBC # BLD AUTO: 4.1 K/UL (ref 5–10)

## 2019-05-20 ENCOUNTER — TELEPHONE (OUTPATIENT)
Dept: GASTROENTEROLOGY | Facility: CLINIC | Age: 54
End: 2019-05-20

## 2019-05-20 NOTE — TELEPHONE ENCOUNTER
----- Message from Giana Neeraj sent at 5/20/2019  9:15 AM CDT -----  Contact: self  Pt had her paracentesis last Tuesday, 5/14/19.  They only took out 1 litre and it was foamy and tinged with blood.  They had to try several times to get anything out.  She was back to being swollen within 24 hours.  She cant eat or sleep more than 45 minutes, and is short of breath.  Can Dr Saul order another one?  If so, she does NOT want whoever did the last one to do it again.

## 2019-05-23 ENCOUNTER — TELEPHONE (OUTPATIENT)
Dept: GASTROENTEROLOGY | Facility: CLINIC | Age: 54
End: 2019-05-23

## 2019-05-23 NOTE — TELEPHONE ENCOUNTER
----- Message from Giana Pickard sent at 5/23/2019 11:35 AM CDT -----  Contact: SELF  PT IS IN TEARS.  SHE IS IN A LOT OF PAIN, CAN'T SLEEP, CAN'T EAT.  PLEASE DO SOMETHING, SHE JUST CAN'T TAKE IT ANYMORE.  FEELS LIKE HER STOMACH IS GOING TO EXPLODE.

## 2019-05-24 DIAGNOSIS — R18.8 OTHER ASCITES: Primary | ICD-10-CM

## 2019-05-24 DIAGNOSIS — K74.60 HEPATIC CIRRHOSIS, UNSPECIFIED HEPATIC CIRRHOSIS TYPE, UNSPECIFIED WHETHER ASCITES PRESENT: ICD-10-CM

## 2019-05-24 DIAGNOSIS — R10.9 ABDOMINAL PAIN, UNSPECIFIED ABDOMINAL LOCATION: ICD-10-CM

## 2019-06-14 ENCOUNTER — OFFICE VISIT (OUTPATIENT)
Dept: FAMILY MEDICINE | Facility: CLINIC | Age: 54
End: 2019-06-14
Payer: MEDICARE

## 2019-06-14 VITALS
TEMPERATURE: 98 F | BODY MASS INDEX: 18.27 KG/M2 | OXYGEN SATURATION: 100 % | HEART RATE: 92 BPM | SYSTOLIC BLOOD PRESSURE: 120 MMHG | HEIGHT: 65 IN | WEIGHT: 109.69 LBS | DIASTOLIC BLOOD PRESSURE: 70 MMHG | RESPIRATION RATE: 16 BRPM

## 2019-06-14 DIAGNOSIS — R18.8 CIRRHOSIS OF LIVER WITH ASCITES, UNSPECIFIED HEPATIC CIRRHOSIS TYPE: ICD-10-CM

## 2019-06-14 DIAGNOSIS — I10 ESSENTIAL HYPERTENSION: Primary | ICD-10-CM

## 2019-06-14 DIAGNOSIS — K74.60 CIRRHOSIS OF LIVER WITH ASCITES, UNSPECIFIED HEPATIC CIRRHOSIS TYPE: ICD-10-CM

## 2019-06-14 PROCEDURE — 99213 OFFICE O/P EST LOW 20 MIN: CPT | Mod: ,,, | Performed by: FAMILY MEDICINE

## 2019-06-14 PROCEDURE — 99213 PR OFFICE/OUTPT VISIT, EST, LEVL III, 20-29 MIN: ICD-10-PCS | Mod: ,,, | Performed by: FAMILY MEDICINE

## 2019-06-14 RX ORDER — HYDROCODONE BITARTRATE AND ACETAMINOPHEN 10; 325 MG/1; MG/1
1 TABLET ORAL EVERY 8 HOURS PRN
Qty: 60 TABLET | Refills: 0 | Status: SHIPPED | OUTPATIENT
Start: 2019-06-14 | End: 2019-07-30 | Stop reason: SDUPTHER

## 2019-06-14 NOTE — PROGRESS NOTES
"  SUBJECTIVE:    Patient ID: Aurelia Saucedo is a 54 y.o. female.    Chief Complaint: Anemia and Hypertension    55 yo female with ESRD on dialysis, hepatitis C with cirrhosis with ascites, chronic anemia, and abdominal pain secondary to ascites. She is here today to follow up on HTN, keep me informed of her current treatments, and discuss treatment option for her ascites. She has an appointment with a "Liver transplant surgeon next week" pt pt states that she is not a candidate for transplant due to due to her history of PAD? She is being considered for antiviral therapy apparently there is a logistical issue currently getting the medication shipped.     According to the patient she has had 2 recent paracentesis, I can see labs from one of these event and she does not appear to have any evidence of infection.       Hypertension   This is a chronic problem. The current episode started more than 1 year ago. The problem is unchanged. The problem is controlled. Pertinent negatives include no chest pain, palpitations or shortness of breath. There are no associated agents to hypertension. There are no known risk factors for coronary artery disease. The current treatment provides significant improvement. There are no compliance problems.          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 since 12/16/13 8/19/2015    Gastritis     Hypertension 8/19/2015    Kidney transplant candidate 8/19/2015    Renal dialysis status     M, W, F    Secondary hyperparathyroidism of renal origin 8/19/2015     Social History     Socioeconomic History    Marital status:      Spouse name: Not on file    Number of children: 2    Years of education: Not on file    Highest education level: Not on file   Occupational History    Occupation: baker    Social Needs    Financial resource strain: Not on file    " Food insecurity:     Worry: Not on file     Inability: Not on file    Transportation needs:     Medical: Not on file     Non-medical: Not on file   Tobacco Use    Smoking status: Current Every Day Smoker     Packs/day: 0.75     Years: 30.00     Pack years: 22.50    Smokeless tobacco: Never Used    Tobacco comment: pt reports actively trying to quit but struggling.  Counseling and resources given.   Substance and Sexual Activity    Alcohol use: No     Alcohol/week: 0.0 oz    Drug use: Yes     Types: Marijuana     Comment: nightly to help sleep    Sexual activity: Not Currently     Partners: Male     Birth control/protection: Surgical   Lifestyle    Physical activity:     Days per week: Not on file     Minutes per session: Not on file    Stress: To some extent   Relationships    Social connections:     Talks on phone: Not on file     Gets together: Not on file     Attends Buddhist service: Not on file     Active member of club or organization: Not on file     Attends meetings of clubs or organizations: Not on file     Relationship status: Not on file   Other Topics Concern    Are you pregnant or think you may be? Not Asked    Breast-feeding Not Asked   Social History Narrative    Not on file     Past Surgical History:   Procedure Laterality Date    AV FISTULA PLACEMENT  2013    ESOPHAGOGASTRODUODENOSCOPY (EGD) N/A 10/10/2016    [facility]    ESOPHAGOGASTRODUODENOSCOPY (EGD) N/A 2/4/2016    Performed by Adis Isaac [facility] at [facility] ENDO (4TH FLR)    SALPINGOOPHORECTOMY Right 1997    TOTAL ABDOMINAL HYSTERECTOMY  1999    TRANSJUGULAR LIVER BIOPSY N/A 12/15/2015    Performed by [facility] Provider at [facility] OR 2ND FLR     Family History   Problem Relation Age of Onset    Kidney disease Mother     Stroke Father     Psoriasis Brother     Breast cancer Neg Hx     Cancer Neg Hx     Colon cancer Neg Hx     Ovarian cancer Neg Hx      Current Outpatient  Medications   Medication Sig Dispense Refill    amlodipine (NORVASC) 10 MG tablet Take 1 tablet (10 mg total) by mouth once daily. 90 tablet 3    aspirin (ECOTRIN) 81 MG EC tablet Take 1 tablet (81 mg total) by mouth once daily. 90 tablet 1    dicyclomine (BENTYL) 20 mg tablet Take 20 mg by mouth every 6 (six) hours as needed.      FA-VIT B COMP&C-SELENIUM-ZINC 3-70-15 MG-MCG-MG ORAL TAB Take 1 tablet by mouth once daily.      lisinopril (PRINIVIL,ZESTRIL) 20 MG tablet Take 1 tablet by mouth once daily.  3    metoprolol succinate (TOPROL-XL) 50 MG 24 hr tablet Take 50 mg by mouth once daily.      ondansetron (ZOFRAN-ODT) 4 MG TbDL Take 2 tablets (8 mg total) by mouth every hour as needed. nausea 20 tablet 0    pantoprazole (PROTONIX) 40 MG tablet Take 40 mg by mouth once daily.      promethazine (PHENERGAN) 25 MG tablet Take 1 tablet (25 mg total) by mouth every 6 (six) hours as needed for Nausea. 20 tablet 0    ursodiol (ACTIGALL) 250 mg Tab Take 1 tablet (250 mg total) by mouth once daily. 90 tablet 0    HYDROcodone-acetaminophen (NORCO)  mg per tablet Take 1 tablet by mouth every 8 (eight) hours as needed for Pain. 60 tablet 0     No current facility-administered medications for this visit.      Review of patient's allergies indicates:   Allergen Reactions    Levaquin [levofloxacin] Itching    Ciprofloxacin Itching    Codeine Nausea Only       Review of Systems   Constitutional: Negative for activity change, appetite change, diaphoresis, fatigue, fever and unexpected weight change.   HENT: Negative for congestion, rhinorrhea, sinus pressure and sinus pain.    Respiratory: Negative for cough, chest tightness, shortness of breath and wheezing.    Cardiovascular: Negative for chest pain and palpitations.   Gastrointestinal: Positive for abdominal distention and abdominal pain. Negative for constipation, diarrhea, nausea and vomiting.   Genitourinary: Negative for dysuria, flank pain, frequency  "and urgency.          Blood pressure 120/70, pulse 92, temperature 97.6 °F (36.4 °C), temperature source Oral, resp. rate 16, height 5' 5" (1.651 m), weight 49.8 kg (109 lb 11.2 oz), SpO2 100 %. Body mass index is 18.26 kg/m².   Objective:      Physical Exam   Constitutional: She is oriented to person, place, and time. She appears well-developed and well-nourished. No distress.   HENT:   Head: Normocephalic and atraumatic.   Nose: Nose normal.   Mouth/Throat: Oropharynx is clear and moist.   Eyes: Pupils are equal, round, and reactive to light. Conjunctivae and EOM are normal. No scleral icterus.   Cardiovascular: Normal rate, regular rhythm and normal heart sounds.   No murmur heard.  Pulmonary/Chest: Effort normal and breath sounds normal. No respiratory distress. She has no wheezes.   Abdominal: Bowel sounds are normal. She exhibits distension. She exhibits no mass. There is tenderness. There is no rebound and no guarding.   Neurological: She is alert and oriented to person, place, and time.   Skin: Skin is warm and dry. Capillary refill takes less than 2 seconds. She is not diaphoretic. No pallor.           Assessment:       1. Essential hypertension    2. Cirrhosis of liver with ascites, unspecified hepatic cirrhosis type         Plan:           Essential hypertension  Pt doing well on her medications, will continue.    Cirrhosis of liver with ascites, unspecified hepatic cirrhosis type  -     HYDROcodone-acetaminophen (NORCO)  mg per tablet; Take 1 tablet by mouth every 8 (eight) hours as needed for Pain.  Dispense: 60 tablet; Refill: 0  Discussed different  Methods for decreasing her ascites, controlling HTN, decreasing sodium, periodic paracentesis, I suspect that her appoint on Monday she will be offered possibly a TIPS procedure.                        "

## 2019-06-27 ENCOUNTER — TELEPHONE (OUTPATIENT)
Dept: GASTROENTEROLOGY | Facility: CLINIC | Age: 54
End: 2019-06-27

## 2019-06-27 NOTE — TELEPHONE ENCOUNTER
----- Message from Giana Pickard sent at 6/27/2019  3:29 PM CDT -----  Contact: SELF  PT FEELS SHE NEED PARACENTESIS AS SOON AS POSSIBLE.  MAY BE ABLE TO HOLD ON FOR A WEEK, NOT SURE.  PLEASE CALL HER [phone].

## 2019-07-03 NOTE — TELEPHONE ENCOUNTER
PT CALLED TO SEE WHEN DR HERNANDEZ IS GOING TO GIVE HER AN ORDER FOR A PARACENTESIS.  SHE HAS CX HER PLANS FOR THE FOURTH OF July BECAUSE SHE IS NOT ABLE TO GET AROUND WITH THE EXTRA WEIGHT IN HER STOMACH.  HE WANTS TO REVIEW DR GISSELLE STEELE NOTES FIRST, WHICH HE HAS.

## 2019-07-08 DIAGNOSIS — K74.60 HEPATIC CIRRHOSIS, UNSPECIFIED HEPATIC CIRRHOSIS TYPE, UNSPECIFIED WHETHER ASCITES PRESENT: ICD-10-CM

## 2019-07-08 DIAGNOSIS — R18.8 OTHER ASCITES: Primary | ICD-10-CM

## 2019-07-10 LAB
APTT PPP: 27.6 SEC (ref 26.2–34.7)
HCT VFR BLD AUTO: 36.9 % (ref 36–48)
HGB BLD-MCNC: 12 G/DL (ref 12–15)
INR PPP: 1
MCH RBC QN AUTO: 30.8 PG (ref 25–35)
MCHC RBC AUTO-ENTMCNC: 32.5 G/DL (ref 31–36)
MCV RBC AUTO: 94.9 FL (ref 79–98)
NUCLEATED RBCS: 0 %
PLATELET # BLD AUTO: 140 K/UL (ref 140–440)
PROTHROMBIN TIME: 13 SEC (ref 11.7–14)
RBC # BLD AUTO: 3.89 M/UL (ref 3.5–5.5)
WBC # BLD AUTO: 4.2 K/UL (ref 5–10)

## 2019-07-30 DIAGNOSIS — K74.60 CIRRHOSIS OF LIVER WITH ASCITES, UNSPECIFIED HEPATIC CIRRHOSIS TYPE: ICD-10-CM

## 2019-07-30 DIAGNOSIS — R18.8 CIRRHOSIS OF LIVER WITH ASCITES, UNSPECIFIED HEPATIC CIRRHOSIS TYPE: ICD-10-CM

## 2019-07-30 RX ORDER — CEPHALEXIN 500 MG/1
500 CAPSULE ORAL DAILY
Qty: 5 CAPSULE | Refills: 0 | Status: ON HOLD | OUTPATIENT
Start: 2019-07-30 | End: 2019-01-01 | Stop reason: HOSPADM

## 2019-07-30 RX ORDER — HYDROCODONE BITARTRATE AND ACETAMINOPHEN 10; 325 MG/1; MG/1
1 TABLET ORAL EVERY 8 HOURS PRN
Qty: 60 TABLET | Refills: 0 | Status: SHIPPED | OUTPATIENT
Start: 2019-07-30 | End: 2019-09-09 | Stop reason: SDUPTHER

## 2019-07-30 NOTE — TELEPHONE ENCOUNTER
C/o another UTI and needs antibiotic and also a refill of Hydrocodone 10 mg last written 6/14/2019 and last seen 06/14/2019

## 2019-07-31 ENCOUNTER — TELEPHONE (OUTPATIENT)
Dept: GASTROENTEROLOGY | Facility: CLINIC | Age: 54
End: 2019-07-31

## 2019-07-31 DIAGNOSIS — K74.60 CIRRHOSIS OF LIVER WITH ASCITES, UNSPECIFIED HEPATIC CIRRHOSIS TYPE: Primary | ICD-10-CM

## 2019-07-31 DIAGNOSIS — R18.8 CIRRHOSIS OF LIVER WITH ASCITES, UNSPECIFIED HEPATIC CIRRHOSIS TYPE: Primary | ICD-10-CM

## 2019-07-31 NOTE — TELEPHONE ENCOUNTER
----- Message from Giana Pickard sent at 7/31/2019  2:40 PM CDT -----  Contact: self  Thinks its time for another paracentesis.  Is getting very uncomfortable and starting to get hard to breathe.  Is sleeping sitting up again for about a week.

## 2019-08-06 NOTE — TELEPHONE ENCOUNTER
Pt called again.  Is getting to the point where she doesn't know what to do.  Please order a paracentesis.

## 2019-08-08 ENCOUNTER — TELEPHONE (OUTPATIENT)
Dept: PREADMISSION TESTING | Facility: HOSPITAL | Age: 54
End: 2019-08-08

## 2019-08-13 ENCOUNTER — HOSPITAL ENCOUNTER (OUTPATIENT)
Dept: RADIOLOGY | Facility: HOSPITAL | Age: 54
Discharge: HOME | End: 2019-08-13
Attending: INTERNAL MEDICINE
Payer: MEDICARE

## 2019-08-13 VITALS
DIASTOLIC BLOOD PRESSURE: 78 MMHG | SYSTOLIC BLOOD PRESSURE: 131 MMHG | RESPIRATION RATE: 16 BRPM | HEART RATE: 72 BPM | OXYGEN SATURATION: 99 %

## 2019-08-13 DIAGNOSIS — K74.60 CIRRHOSIS OF LIVER WITH ASCITES, UNSPECIFIED HEPATIC CIRRHOSIS TYPE: ICD-10-CM

## 2019-08-13 DIAGNOSIS — R18.8 CIRRHOSIS OF LIVER WITH ASCITES, UNSPECIFIED HEPATIC CIRRHOSIS TYPE: ICD-10-CM

## 2019-08-13 DIAGNOSIS — K74.60 CHRONIC HEPATITIS C WITH CIRRHOSIS: Primary | ICD-10-CM

## 2019-08-13 DIAGNOSIS — B18.2 CHRONIC HEPATITIS C WITH CIRRHOSIS: Primary | ICD-10-CM

## 2019-08-13 PROCEDURE — 27202445

## 2019-08-13 PROCEDURE — 49083 ABD PARACENTESIS W/IMAGING: CPT

## 2019-08-13 NOTE — PLAN OF CARE
Procedure ended, pt deisy well.  Band aid applied to right abd, no reddness/ bleeding noted.  Pt given d/c instructions, verbalized understanding. Pt amb out of dept with steady gait.

## 2019-09-03 ENCOUNTER — HOSPITAL ENCOUNTER (OUTPATIENT)
Dept: RADIOLOGY | Facility: HOSPITAL | Age: 54
Discharge: HOME | End: 2019-09-03
Attending: INTERNAL MEDICINE
Payer: MEDICARE

## 2019-09-03 VITALS
SYSTOLIC BLOOD PRESSURE: 104 MMHG | HEART RATE: 83 BPM | RESPIRATION RATE: 18 BRPM | DIASTOLIC BLOOD PRESSURE: 70 MMHG | OXYGEN SATURATION: 100 %

## 2019-09-03 DIAGNOSIS — R06.00 DYSPNEA: ICD-10-CM

## 2019-09-03 DIAGNOSIS — R10.84 ABDOMINAL PAIN, GENERALIZED: ICD-10-CM

## 2019-09-03 DIAGNOSIS — R06.02 SHORTNESS OF BREATH: ICD-10-CM

## 2019-09-03 DIAGNOSIS — R18.8 ASCITIC FLUID: Primary | ICD-10-CM

## 2019-09-03 DIAGNOSIS — R18.8 ASCITIC FLUID: ICD-10-CM

## 2019-09-03 PROCEDURE — 27000342 US GUIDED PARACENTESIS INC IMAGING

## 2019-09-03 NOTE — DISCHARGE INSTRUCTIONS
Discharge Instructions:  Resume diet   Resume medications  Rest today. Avoid strenuous activities for 1 day.  Do not remove dressing for 24 hours.  Report to MD:  Any severe pain, new onset pain or worsening symptoms   Excessive bleeding, bruising or swelling.  Temperature 101 or above  Any sudden shortness of breath  IV site: may become tender. If so place a warm compress, wet compress on IV site 4 x per day. This should relieve symptoms in a few days.

## 2019-09-09 DIAGNOSIS — R18.8 CIRRHOSIS OF LIVER WITH ASCITES, UNSPECIFIED HEPATIC CIRRHOSIS TYPE: ICD-10-CM

## 2019-09-09 DIAGNOSIS — K74.60 CIRRHOSIS OF LIVER WITH ASCITES, UNSPECIFIED HEPATIC CIRRHOSIS TYPE: ICD-10-CM

## 2019-09-09 RX ORDER — HYDROCODONE BITARTRATE AND ACETAMINOPHEN 10; 325 MG/1; MG/1
1 TABLET ORAL EVERY 8 HOURS PRN
Qty: 60 TABLET | Refills: 0 | Status: SHIPPED | OUTPATIENT
Start: 2019-09-09 | End: 2019-01-01 | Stop reason: SDUPTHER

## 2019-09-18 ENCOUNTER — TELEPHONE (OUTPATIENT)
Dept: GASTROENTEROLOGY | Facility: CLINIC | Age: 54
End: 2019-09-18

## 2019-09-18 DIAGNOSIS — K74.60 HEPATIC CIRRHOSIS, UNSPECIFIED HEPATIC CIRRHOSIS TYPE, UNSPECIFIED WHETHER ASCITES PRESENT: Primary | ICD-10-CM

## 2019-09-18 DIAGNOSIS — R18.8 OTHER ASCITES: ICD-10-CM

## 2019-09-18 NOTE — TELEPHONE ENCOUNTER
----- Message from Giana Pickard sent at 9/18/2019  3:03 PM CDT -----  Contact: self  She needs another paracentesis, no later that the beginning of next week.  Last time she had to wait 5 weeks and they drained 5.3 liters off her.  She said that's about 11 pounds and she doesn't weigh hardly anything.  Cannot get that big again.

## 2019-09-20 ENCOUNTER — OFFICE VISIT (OUTPATIENT)
Dept: FAMILY MEDICINE | Facility: CLINIC | Age: 54
End: 2019-09-20
Payer: MEDICARE

## 2019-09-20 VITALS
WEIGHT: 117.63 LBS | SYSTOLIC BLOOD PRESSURE: 110 MMHG | HEART RATE: 85 BPM | TEMPERATURE: 98 F | BODY MASS INDEX: 19.6 KG/M2 | DIASTOLIC BLOOD PRESSURE: 60 MMHG | HEIGHT: 65 IN | RESPIRATION RATE: 16 BRPM | OXYGEN SATURATION: 98 %

## 2019-09-20 DIAGNOSIS — I10 ESSENTIAL HYPERTENSION: Primary | Chronic | ICD-10-CM

## 2019-09-20 PROCEDURE — 99215 OFFICE O/P EST HI 40 MIN: CPT | Performed by: FAMILY MEDICINE

## 2019-09-20 PROCEDURE — 99213 PR OFFICE/OUTPT VISIT, EST, LEVL III, 20-29 MIN: ICD-10-PCS | Mod: S$PBB,,, | Performed by: FAMILY MEDICINE

## 2019-09-20 PROCEDURE — 99213 OFFICE O/P EST LOW 20 MIN: CPT | Mod: S$PBB,,, | Performed by: FAMILY MEDICINE

## 2019-09-20 NOTE — PROGRESS NOTES
SUBJECTIVE:    Patient ID: Aurelia Saucedo is a 54 y.o. female.    Chief Complaint: Hypertension and Ascites  53 yo female with multiple chronic medical issues that follows up routinely. She is doing well today her only complaint is the pain caused by her ascites. She has been getting paracentesis approximately once a month that is usually scheduled by  her gastroenterologist. Last paracentesis 3 Sep 2019.    Pt thinks that she needs a new cardiologist, has has a hx of coronary stenting in 2014, but no hx of MI.     PMHX:  Chronic Hepatitis C: not being treated.  Anemia of chronic renal failure  Secondary Hyperparathyroidism  ESRD on dialysis since 2013  Cryoglobulinemia  HTN; she has HTN that has been well controlled on [id number], Lisinopril 20, Metoprolol XL 50.    Specialists:  Gastro: Dr Saul  Nephrologist: Dr Ramos  Cardiologist:?    Hypertension   This is a chronic problem. The current episode started more than 1 year ago. The problem is unchanged. The problem is controlled. Pertinent negatives include no anxiety, blurred vision, chest pain, headaches, malaise/fatigue, neck pain, orthopnea, palpitations, peripheral edema, PND, shortness of breath or sweats. There are no associated agents to hypertension. Risk factors for coronary artery disease include post-menopausal state. Past treatments include calcium channel blockers, ACE inhibitors and beta blockers. The current treatment provides moderate improvement. There are no compliance problems.  Hypertensive end-organ damage includes kidney disease and CAD/MI.         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 since 12/16/13 8/19/2015    Gastritis     Hypertension 8/19/2015    Kidney transplant candidate 8/19/2015    Renal dialysis status     M, W, F    Secondary hyperparathyroidism of renal origin 8/19/2015     Social  History     Socioeconomic History    Marital status:      Spouse name: Not on file    Number of children: 2    Years of education: Not on file    Highest education level: Not on file   Occupational History    Occupation: baker    Social Needs    Financial resource strain: Not on file    Food insecurity:     Worry: Not on file     Inability: Not on file    Transportation needs:     Medical: Not on file     Non-medical: Not on file   Tobacco Use    Smoking status: Current Every Day Smoker     Packs/day: 0.75     Years: 30.00     Pack years: 22.50    Smokeless tobacco: Never Used    Tobacco comment: pt reports actively trying to quit but struggling.  Counseling and resources given.   Substance and Sexual Activity    Alcohol use: No     Alcohol/week: 0.0 oz    Drug use: Yes     Types: Marijuana     Comment: nightly to help sleep    Sexual activity: Not Currently     Partners: Male     Birth control/protection: Surgical   Lifestyle    Physical activity:     Days per week: Not on file     Minutes per session: Not on file    Stress: To some extent   Relationships    Social connections:     Talks on phone: Not on file     Gets together: Not on file     Attends Hinduism service: Not on file     Active member of club or organization: Not on file     Attends meetings of clubs or organizations: Not on file     Relationship status: Not on file   Other Topics Concern    Are you pregnant or think you may be? Not Asked    Breast-feeding Not Asked   Social History Narrative    Not on file     Past Surgical History:   Procedure Laterality Date    AV FISTULA PLACEMENT  2013    ESOPHAGOGASTRODUODENOSCOPY (EGD) N/A 10/10/2016    Performed by [facility]le [facility]    ESOPHAGOGASTRODUODENOSCOPY (EGD) N/A 2/4/2016    Performed by Adis Isaac [facility] at [facility] (4TH FLR)    SALPINGOOPHORECTOMY Right 1997    TOTAL ABDOMINAL HYSTERECTOMY  1999    TRANSJUGULAR LIVER BIOPSY N/A 12/15/2015     Performed by [facility] Provider at [facility] OR [address] [place] [address]     Family History   Problem Relation Age of Onset    Kidney disease Mother     Stroke Father     Psoriasis Brother     Breast cancer Neg Hx     Cancer Neg Hx     Colon cancer Neg Hx     Ovarian cancer Neg Hx      Current Outpatient Medications   Medication Sig Dispense Refill    amlodipine (NORVASC) 10 MG tablet Take 1 tablet (10 mg total) by mouth once daily. 90 tablet 3    aspirin (ECOTRIN) 81 MG EC tablet Take 1 tablet (81 mg total) by mouth once daily. 90 tablet 1    cephALEXin (KEFLEX) 500 MG capsule Take 1 capsule (500 mg total) by mouth once daily. On dialysis days take after dialysis 5 capsule 0    dicyclomine (BENTYL) 20 mg tablet Take 20 mg by mouth every 6 (six) hours as needed.      FA-VIT B COMP&C-SELENIUM-ZINC 3-70-15 MG-MCG-MG ORAL TAB Take 1 tablet by mouth once daily.      HYDROcodone-acetaminophen (NORCO)  mg per tablet Take 1 tablet by mouth every 8 (eight) hours as needed for Pain. 60 tablet 0    lisinopril (PRINIVIL,ZESTRIL) 20 MG tablet Take 1 tablet by mouth once daily.  3    metoprolol succinate (TOPROL-XL) 50 MG 24 hr tablet Take 50 mg by mouth once daily.      ondansetron (ZOFRAN-ODT) 4 MG TbDL Take 2 tablets (8 mg total) by mouth every hour as needed. nausea 20 tablet 0    pantoprazole (PROTONIX) 40 MG tablet Take 40 mg by mouth once daily.      promethazine (PHENERGAN) 25 MG tablet Take 1 tablet (25 mg total) by mouth every 6 (six) hours as needed for Nausea. 20 tablet 0    ursodiol (ACTIGALL) 250 mg Tab Take 1 tablet (250 mg total) by mouth once daily. 90 tablet 0     No current facility-administered medications for this visit.      Review of patient's allergies indicates:   Allergen Reactions    Levaquin [levofloxacin] Itching    Ciprofloxacin Itching    Codeine Nausea Only       Review of Systems   Constitutional: Negative for activity change, appetite change, diaphoresis, fatigue, malaise/fatigue and  "unexpected weight change.   HENT: Negative for congestion, rhinorrhea, sinus pressure, sinus pain and sneezing.    Eyes: Negative for blurred vision, pain, discharge, redness and itching.   Respiratory: Negative for cough, chest tightness, shortness of breath and wheezing.    Cardiovascular: Negative for chest pain, palpitations, orthopnea, leg swelling and PND.   Gastrointestinal: Positive for abdominal distention and abdominal pain. Negative for blood in stool, constipation, diarrhea, nausea and vomiting.   Genitourinary: Negative.    Musculoskeletal: Negative.  Negative for neck pain.   Neurological: Negative for headaches.          Blood pressure 110/60, pulse 85, temperature 97.9 °F (36.6 °C), temperature source Oral, resp. rate 16, height 5' 5" (1.651 m), weight 53.3 kg (117 lb 9.6 oz), SpO2 98 %. Body mass index is 19.57 kg/m².   Objective:      Physical Exam   Constitutional: She is oriented to person, place, and time. No distress.   Very thin, with large ascitic abdomin   HENT:   Head: Normocephalic and atraumatic.   Nose: Nose normal.   Mouth/Throat: Oropharynx is clear and moist.   Eyes: Pupils are equal, round, and reactive to light. Conjunctivae and EOM are normal. No scleral icterus.   Cardiovascular: Normal rate, regular rhythm and normal heart sounds.   No murmur heard.  Pulmonary/Chest: Effort normal and breath sounds normal. No respiratory distress. She has no wheezes.   Abdominal: Bowel sounds are normal. She exhibits distension. She exhibits no mass. There is no tenderness. There is no guarding.   Neurological: She is alert and oriented to person, place, and time.   Skin: Skin is warm and dry. Capillary refill takes less than 2 seconds. She is not diaphoretic. No pallor.           Assessment:       1. Essential hypertension         Plan:           Essential hypertension  -     Ambulatory referral to Cardiology    Sent message to Dr Kg niño the ascites, pt is stable and doing well today. " Recommended that she r educe the amount of salt in your diet; Lose weight; Avoid drinking too much alcohol; Exercise at least 30 minutes per day most days of the week.  Continue current medications and home BP monitoring.

## 2019-09-26 DIAGNOSIS — R18.8 CIRRHOSIS OF LIVER WITH ASCITES, UNSPECIFIED HEPATIC CIRRHOSIS TYPE: Primary | ICD-10-CM

## 2019-09-26 DIAGNOSIS — K74.60 CIRRHOSIS OF LIVER WITH ASCITES, UNSPECIFIED HEPATIC CIRRHOSIS TYPE: Primary | ICD-10-CM

## 2019-09-27 ENCOUNTER — LAB VISIT (OUTPATIENT)
Dept: LAB | Facility: HOSPITAL | Age: 54
End: 2019-09-27
Attending: RADIOLOGY
Payer: COMMERCIAL

## 2019-09-27 ENCOUNTER — HOSPITAL ENCOUNTER (OUTPATIENT)
Dept: RADIOLOGY | Facility: HOSPITAL | Age: 54
Discharge: HOME | End: 2019-09-27
Attending: INTERNAL MEDICINE
Payer: MEDICARE

## 2019-09-27 VITALS
HEART RATE: 75 BPM | SYSTOLIC BLOOD PRESSURE: 120 MMHG | DIASTOLIC BLOOD PRESSURE: 81 MMHG | RESPIRATION RATE: 18 BRPM | OXYGEN SATURATION: 100 %

## 2019-09-27 DIAGNOSIS — R18.8 CIRRHOSIS OF LIVER WITH ASCITES, UNSPECIFIED HEPATIC CIRRHOSIS TYPE: ICD-10-CM

## 2019-09-27 DIAGNOSIS — K74.60 CIRRHOSIS OF LIVER WITH ASCITES, UNSPECIFIED HEPATIC CIRRHOSIS TYPE: ICD-10-CM

## 2019-09-27 DIAGNOSIS — K74.60 HEPATIC CIRRHOSIS, UNSPECIFIED HEPATIC CIRRHOSIS TYPE, UNSPECIFIED WHETHER ASCITES PRESENT: ICD-10-CM

## 2019-09-27 DIAGNOSIS — R18.8 OTHER ASCITES: ICD-10-CM

## 2019-09-27 LAB
APTT PPP: 28.1 SEC (ref 26.2–34.7)
BASOPHILS # BLD AUTO: 0.02 K/UL (ref 0–0.2)
BASOPHILS NFR BLD: 0.4 % (ref 0–1.9)
DIFFERENTIAL METHOD: ABNORMAL
EOSINOPHIL # BLD AUTO: 0.1 K/UL (ref 0–0.5)
EOSINOPHIL NFR BLD: 1 % (ref 0–8)
ERYTHROCYTE [DISTWIDTH] IN BLOOD BY AUTOMATED COUNT: 15.8 % (ref 11.5–14.5)
HCT VFR BLD AUTO: 37.5 % (ref 37–48.5)
HGB BLD-MCNC: 12.4 G/DL (ref 12–16)
IMM GRANULOCYTES # BLD AUTO: 0.03 K/UL (ref 0–0.04)
IMM GRANULOCYTES NFR BLD AUTO: 0.6 % (ref 0–0.5)
INR PPP: 1
LYMPHOCYTES # BLD AUTO: 0.8 K/UL (ref 1–4.8)
LYMPHOCYTES NFR BLD: 16.9 % (ref 18–48)
MCH RBC QN AUTO: 32.5 PG (ref 27–31)
MCHC RBC AUTO-ENTMCNC: 33.1 G/DL (ref 32–36)
MCV RBC AUTO: 98 FL (ref 82–98)
MONOCYTES # BLD AUTO: 0.4 K/UL (ref 0.3–1)
MONOCYTES NFR BLD: 8.1 % (ref 4–15)
NEUTROPHILS # BLD AUTO: 3.6 K/UL (ref 1.8–7.7)
NEUTROPHILS NFR BLD: 73 % (ref 38–73)
NRBC BLD-RTO: 0 /100 WBC
PLATELET # BLD AUTO: 174 K/UL (ref 150–350)
PMV BLD AUTO: 9.8 FL (ref 9.2–12.9)
PROTHROMBIN TIME: 13 SEC (ref 11.7–14)
RBC # BLD AUTO: 3.82 M/UL (ref 4–5.4)
WBC # BLD AUTO: 4.92 K/UL (ref 3.9–12.7)

## 2019-09-27 PROCEDURE — 49083 ABD PARACENTESIS W/IMAGING: CPT

## 2019-09-27 PROCEDURE — 85610 PROTHROMBIN TIME: CPT

## 2019-09-27 PROCEDURE — 36415 COLL VENOUS BLD VENIPUNCTURE: CPT

## 2019-09-27 PROCEDURE — 85730 THROMBOPLASTIN TIME PARTIAL: CPT

## 2019-09-27 PROCEDURE — 85025 COMPLETE CBC W/AUTO DIFF WBC: CPT

## 2019-10-10 ENCOUNTER — OFFICE VISIT (OUTPATIENT)
Dept: GASTROENTEROLOGY | Facility: CLINIC | Age: 54
End: 2019-10-10
Payer: MEDICARE

## 2019-10-10 VITALS
BODY MASS INDEX: 20.26 KG/M2 | DIASTOLIC BLOOD PRESSURE: 76 MMHG | WEIGHT: 121.63 LBS | SYSTOLIC BLOOD PRESSURE: 131 MMHG | TEMPERATURE: 98 F | HEART RATE: 85 BPM | HEIGHT: 65 IN

## 2019-10-10 DIAGNOSIS — B19.20 HEPATITIS C VIRUS INFECTION WITHOUT HEPATIC COMA, UNSPECIFIED CHRONICITY: ICD-10-CM

## 2019-10-10 DIAGNOSIS — R18.8 CIRRHOSIS OF LIVER WITH ASCITES, UNSPECIFIED HEPATIC CIRRHOSIS TYPE: ICD-10-CM

## 2019-10-10 DIAGNOSIS — K74.60 HEPATIC CIRRHOSIS, UNSPECIFIED HEPATIC CIRRHOSIS TYPE, UNSPECIFIED WHETHER ASCITES PRESENT: Primary | ICD-10-CM

## 2019-10-10 DIAGNOSIS — Z76.82 ORGAN TRANSPLANT CANDIDATE: ICD-10-CM

## 2019-10-10 DIAGNOSIS — R10.9 ABDOMINAL PAIN, UNSPECIFIED ABDOMINAL LOCATION: ICD-10-CM

## 2019-10-10 DIAGNOSIS — I10 ESSENTIAL HYPERTENSION: ICD-10-CM

## 2019-10-10 DIAGNOSIS — K76.6 PORTAL HYPERTENSION: ICD-10-CM

## 2019-10-10 DIAGNOSIS — K21.9 GASTROESOPHAGEAL REFLUX DISEASE, ESOPHAGITIS PRESENCE NOT SPECIFIED: ICD-10-CM

## 2019-10-10 DIAGNOSIS — N18.6 ESRD (END STAGE RENAL DISEASE): ICD-10-CM

## 2019-10-10 DIAGNOSIS — K74.60 CIRRHOSIS OF LIVER WITH ASCITES, UNSPECIFIED HEPATIC CIRRHOSIS TYPE: ICD-10-CM

## 2019-10-10 DIAGNOSIS — K72.10 END STAGE LIVER DISEASE: ICD-10-CM

## 2019-10-10 DIAGNOSIS — Z87.19 HISTORY OF GASTROINTESTINAL BLEEDING: ICD-10-CM

## 2019-10-10 DIAGNOSIS — D89.1 CRYOGLOBULINEMIA: ICD-10-CM

## 2019-10-10 PROCEDURE — 99215 OFFICE O/P EST HI 40 MIN: CPT | Mod: S$GLB,,, | Performed by: INTERNAL MEDICINE

## 2019-10-10 PROCEDURE — 99215 PR OFFICE/OUTPT VISIT, EST, LEVL V, 40-54 MIN: ICD-10-PCS | Mod: S$GLB,,, | Performed by: INTERNAL MEDICINE

## 2019-10-10 RX ORDER — ELBASVIR AND GRAZOPREVIR 50; 100 MG/1; MG/1
1 TABLET, FILM COATED ORAL DAILY
COMMUNITY
Start: 2019-10-03 | End: 2019-01-01

## 2019-10-10 NOTE — PROGRESS NOTES
[facility] Established Patient Visit    Subjective:           PCP: Dustin Lang by our [address] by our ED  Chief Complaint: Follow-up (start [facility] MEDS)       HPI:  Aurelia Saucedo is a 54 y.o. female here for evaluation of treatment of hepatitis C. Patient is genotype 1. She is on chronic hemodialysis. Will get rheumatoid factor and cryoglobulinemia draw. Consideration is being given for possible Vosevi x 12 weeks vs foe 16 weeks. Patient presents a complex problem. Spoke with the [employer] pharmacist and explained to him the reason why this patient needs these medications and applied for complete prior authorization for the medications. The company is probably going to send one of the two drugs. Zepetier is unfortunately not a good alternative.     ROS:   Constitutional: No fevers, chills, weight loss review by our ED  ENT: No allergies, sore throat, rhinorrhea  CV: No chest pain, palpitations, edema  Pulm: No cough, shortness of breath, wheezing  Ophtho: No vision changes  GI: No blood in stools, change in bowel habits, nausea/vomiting  Denies alternating diarrhea/constipation.   Derm: No rash  Heme: No easy bruising or lymphadenopathy  MSK: No arthralgias or myalgias  : No dysuria, hematuria, frequency, polyuria, or flank tenderness  Endo: No hot or cold intolerance  Neuro: No syncope or seizure, or dizziness  Psych: No hallucination, depression or suicidal ideation      Medical History:  has a past medical history of Anemia of chronic renal failure, stage 5 (8/19/2015), Chronic hepatitis C (8/19/2015), Colitis, ESRD 2/2 MPGN on HD since 12/16/13  (8/19/2015), Gastritis, Hypertension (8/19/2015), Kidney transplant candidate (8/19/2015), Renal dialysis status, and Secondary hyperparathyroidism of renal origin (8/19/2015).      Surgical History:  has a past surgical history that includes Total abdominal hysterectomy (1999); Salpingoophorectomy (Right, 1997); and AV fistula  placement (2013).    Family History:   Family History   Problem Relation Age of Onset    Kidney disease Mother     Stroke Father     Psoriasis Brother     Breast cancer Neg Hx     Cancer Neg Hx     Colon cancer Neg Hx     Ovarian cancer Neg Hx        Social History:   Social History     Tobacco Use    Smoking status: Current Every Day Smoker     Packs/day: 0.75     Years: 30.00     Pack years: 22.50    Smokeless tobacco: Never Used    Tobacco comment: pt reports actively trying to quit but struggling.  Counseling and resources given.   Substance Use Topics    Alcohol use: No     Alcohol/week: 0.0 standard drinks    Drug use: Yes     Types: Marijuana     Comment: nightly to help sleep       The patient's social and family histories were reviewed by me and updated in the appropriate section of the electronic medical record.    Allergies:   Review of patient's allergies indicates:   Allergen Reactions    Levaquin [levofloxacin] Itching    Ciprofloxacin Itching    Codeine Nausea Only         Medications:   Current Outpatient Medications   Medication Sig Dispense Refill    amlodipine (NORVASC) 10 MG tablet Take 1 tablet (10 mg total) by mouth once daily. 90 tablet 3    dicyclomine (BENTYL) 20 mg tablet Take 20 mg by mouth every 6 (six) hours as needed.      FA-VIT B COMP&C-SELENIUM-ZINC 3-70-15 MG-MCG-MG ORAL TAB Take 1 tablet by mouth once daily.      HYDROcodone-acetaminophen (NORCO)  mg per tablet Take 1 tablet by mouth every 8 (eight) hours as needed for Pain. 60 tablet 0    lisinopril (PRINIVIL,ZESTRIL) 20 MG tablet Take 1 tablet by mouth once daily.  3    metoprolol succinate (TOPROL-XL) 50 MG 24 hr tablet Take 50 mg by mouth once daily.      ondansetron (ZOFRAN-ODT) 4 MG TbDL Take 2 tablets (8 mg total) by mouth every hour as needed. nausea 20 tablet 0    pantoprazole (PROTONIX) 40 MG tablet Take 40 mg by mouth once daily.      promethazine (PHENERGAN) 25 MG tablet Take 1 tablet (25  "mg total) by mouth every 6 (six) hours as needed for Nausea. 20 tablet 0    ursodiol (ACTIGALL) 250 mg Tab Take 1 tablet (250 mg total) by mouth once daily. 90 tablet 0    aspirin (ECOTRIN) 81 MG EC tablet Take 1 tablet (81 mg total) by mouth once daily. 90 tablet 1    cephALEXin (KEFLEX) 500 MG capsule Take 1 capsule (500 mg total) by mouth once daily. On dialysis days take after dialysis 5 capsule 0    ZEPATIER  mg Tab Take 1 tablet by mouth once daily.       No current facility-administered medications for this visit.      All medications and past history have been reviewed.        Objective:        Vital Signs:  Blood pressure 131/76, pulse 85, temperature 97.6 °F (36.4 °C), height 5' 5" (1.651 m), weight 55.2 kg (121 lb 9.6 oz). Body mass index is 20.24 kg/m².    Physical Exam:   General Appearance: Well appearing in no acute distress, well developed, well                nourished  Head: Normocephalic, without obvious abnormality  Eyes:  Pupils equal, round and reactive to light  Throat: Lips, mucosa, and tongue normal; teeth and gums normal  Lungs: CTA bilaterally in anterior and posterior fields, no wheezes, no crackles  Heart:  Regular rate and rhythm, no murmurs heard  Abdomen: Soft, non tender, non distended with positive bowel sounds in all four quadrants. No hepatosplenomegaly, ascites, or mass. Negative for succusion splash  : female   Extremities: No cyanosis, edema or deformity  Skin: No rash  Neurologic: Normal exam    Labs:  Lab Results   Component Value Date    WBC 4.92 09/27/2019    HGB 12.4 09/27/2019    HCT 37.5 09/27/2019     09/27/2019    CHOL 154 08/19/2015    TRIG 116 08/19/2015    HDL 46 08/19/2015    ALT 38 10/12/2016    AST 42 (H) 10/12/2016     10/12/2016    K 3.8 10/12/2016     10/12/2016    CREATININE 2.9 (H) 10/12/2016    BUN 28 (H) 10/12/2016    CO2 26 10/12/2016    TSH 1.736 02/11/2016    INR 1.0 09/27/2019    HGBA1C 4.9 10/10/2016       Imaging: "     All lab results and imaging results have been reviewed and discussed with the patient      Assessment:       No diagnosis found.    Plan:       There are no diagnoses linked to this encounter.    See HPI    No follow-ups on file.    The plan was discussed with the patient and all questions/concerns have been answered to the patient's satisfaction.        Electronically signed by Margarita Saul MD    This note was dictated using voice recognition software and may contain grammatical errors.

## 2019-10-14 NOTE — TELEPHONE ENCOUNTER
----- Message from Giana Pickard sent at 10/14/2019  1:01 PM CDT -----  Contact: [employer] Prior Auth  She has a couple of questions about the auth for Mavyret.  Please call [phone] and reference Case #[id number].

## 2019-10-14 NOTE — TELEPHONE ENCOUNTER
Giana Bell Staff   Caller: [employer] Prior Auth (Today,  1:01 PM)             She has a couple of questions about the auth for Mavyret.  Please call [phone] and reference Case #[id number].

## 2019-10-14 NOTE — TELEPHONE ENCOUNTER
Message from Giana Pickard sent at 10/11/2019  8:35 AM CDT -----  Dr. Saul does not want her to take the HCV meds she received.  He wants her to take Mavyret.  Wants to know how to send these back and get what she needs.  He was on the phone last night initiating a prior auth with [employer].  He wants the following labs done at dialysis on Wednesday so they can be sent to get the Mavyret:  Genosure, NS3, NS4A, NS5A, Rheumatoid Factor, and Cryoglobulinemia.  He also needs to speak to Chanelle at Dialysis.  She can be reached at [phone].

## 2019-10-14 NOTE — TELEPHONE ENCOUNTER
----- Message from Giana Pickard sent at 10/11/2019  8:35 AM CDT -----  Dr. Saul does not want her to take the HCV meds she received.  He wants her to take Mavyret.  Wants to know how to send these back and get what she needs.  He was on the phone last night initiating a prior auth with [employer].  He wants the following labs done at dialysis on Wednesday so they can be sent to get the Mavyret:  Genosure, NS3, NS4A, NS5A, Rheumatoid Factor, and Cryoglobulinemia.  He also needs to speak to Chanelle at Dialysis.  She can be reached at [phone].

## 2019-10-17 PROBLEM — R18.8 OTHER ASCITES: Status: ACTIVE | Noted: 2019-01-01

## 2019-10-17 PROBLEM — E87.70 HYPERVOLEMIA: Status: ACTIVE | Noted: 2019-01-01

## 2019-10-17 NOTE — PROGRESS NOTES
[facility]  Progress Note    Patient Name: Aurelia Saucedo  MRN: [id number]  Patient Class: IP- Inpatient   Admission Date: 10/16/2019  Length of Stay: 0 days  Attending Physician: Yessenia Barrera MD  Primary Care Provider: Dustin Ireland MD        Subjective:     Principal Problem:Other ascites        HPI:  54-year-old  female presents to the emergency room with complaints increased abdominal distension and shortness of breath.      She also complaints note and chest discomfort.  This patient has a known history of end-stage renal disease which is requiring hemodialysis twice a week on Mondays and Fridays, end-stage liver disease secondary to hep, MP she in and hyperlipidemia, anemia of chronic kidney disease and secondary hyperparathyroid disease.  The patient states over the past 4-5 increased abdominal spoke falling girth.  T    he patient stayed about ago she did have a paracentesis performed and a removed a little over 5 L of fluid.      The patient states she normally dialyzes on Mondays and Fridays but she missed her last hemodialysis session secondary to abdominal swelling and the inability to fit in her car per the per the patient's report         .      Overview/Hospital Course:  Patient has significant ascites on examination  She missed her dialysis as an outpatient  Per patient her usual dialysis days going to be tomorrow  Patient sitting comfortably on the bed without any issues    Interval History:     Review of Systems   Constitutional: Negative for activity change and appetite change.   HENT: Negative for congestion and dental problem.    Eyes: Negative for discharge and itching.   Respiratory: Positive for shortness of breath.    Cardiovascular: Negative for chest pain.   Gastrointestinal: Negative for abdominal distention and abdominal pain.   Endocrine: Negative for cold intolerance.   Genitourinary: Negative for difficulty urinating and dysuria.    Musculoskeletal: Negative for arthralgias and back pain.   Skin: Negative for color change.   Neurological: Negative for dizziness and facial asymmetry.   Hematological: Negative for adenopathy.   Psychiatric/Behavioral: Negative for agitation and behavioral problems.     Objective:     Vital Signs (Most Recent):  Temp: 98.1 °F (36.7 °C) (10/17/19 1553)  Pulse: 75 (10/17/19 1615)  Resp: 19 (10/17/19 1615)  BP: 122/66 (10/17/19 1615)  SpO2: 98 % (10/17/19 1615) Vital Signs (24h Range):  Temp:  [97.6 °F (36.4 °C)-98.1 °F (36.7 °C)] 98.1 °F (36.7 °C)  Pulse:  [75-88] 75  Resp:  [15-20] 19  SpO2:  [96 %-100 %] 98 %  BP: (122-155)/(66-90) 122/66     Weight: 54.6 kg (120 lb 5.9 oz)  Body mass index is 20.03 kg/m².    Intake/Output Summary (Last 24 hours) at 10/17/2019 1636  Last data filed at 10/17/2019 0800  Gross per 24 hour   Intake 100 ml   Output 350 ml   Net -250 ml      Physical Exam   Constitutional: She is oriented to person, place, and time. No distress.   Eyes: Pupils are equal, round, and reactive to light. EOM are normal.   Neck: Neck supple.   Cardiovascular: Normal rate.   Pulmonary/Chest: Effort normal and breath sounds normal.   Reduced air entry to bases   Abdominal: Soft. Bowel sounds are normal.   Ascites   Musculoskeletal: Normal range of motion. She exhibits no edema.   Neurological: She is alert and oriented to person, place, and time.   Skin: Skin is warm.   Psychiatric: She has a normal mood and affect.   Nursing note and vitals reviewed.      Significant Labs:   CBC:   Recent Labs   Lab 10/16/19  2134 10/17/19  0600   WBC 5.70 4.82   HGB 11.8* 11.8*   HCT 34.6* 34.7*    158     CMP:   Recent Labs   Lab 10/16/19  2134 10/17/19  0102 10/17/19  0559   * 136 136   K 5.8* 4.7 5.0   CL 99 100 99   CO2 19* 20* 19*   GLU 93 80 89   * 126* 125*   CREATININE 10.4* 10.4* 10.7*   CALCIUM 8.5* 8.8 8.8   PROT 6.0  --  6.3   ALBUMIN 3.1*  --  3.2*   BILITOT 1.0  --  1.3*   ALKPHOS 72  --   75   AST 43*  --  38   ALT 44  --  41   ANIONGAP 15 16 18*   EGFRNONAA 3.8* 3.8* 3.7*       Significant Imaging: I have reviewed all pertinent imaging results/findings within the past 24 hours.      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 in the background of chronic hepatitis-C  Patient will have had dialysis tomorrow      Anemia of chronic renal failure, stage 5  Anemia of chronic disease the background of end-stage renal disease      Secondary hyperparathyroidism of renal origin  -continue binders with meals          Essential hypertension  Blood pressure level stable  Will monitor      Chronic hepatitis C with cirrhosis  History of chronic hepatitis C with cirrhosis  History of cryoglobulinemia  She has been rejected by higher centers for liver transplantation        VTE Risk Mitigation (From admission, onward)         Ordered     Place sequential compression device  Until discontinued      10/17/19 0229     IP VTE LOW RISK PATIENT  Once      10/17/19 0229                      Matthew Dickinson MD  Department of Hospital Medicine   [facility]

## 2019-10-17 NOTE — SUBJECTIVE & OBJECTIVE
Interval History:     Review of Systems   Constitutional: Negative for activity change and appetite change.   HENT: Negative for congestion and dental problem.    Eyes: Negative for discharge and itching.   Respiratory: Positive for shortness of breath.    Cardiovascular: Negative for chest pain.   Gastrointestinal: Negative for abdominal distention and abdominal pain.   Endocrine: Negative for cold intolerance.   Genitourinary: Negative for difficulty urinating and dysuria.   Musculoskeletal: Negative for arthralgias and back pain.   Skin: Negative for color change.   Neurological: Negative for dizziness and facial asymmetry.   Hematological: Negative for adenopathy.   Psychiatric/Behavioral: Negative for agitation and behavioral problems.     Objective:     Vital Signs (Most Recent):  Temp: 98.1 °F (36.7 °C) (10/17/19 1553)  Pulse: 75 (10/17/19 1615)  Resp: 19 (10/17/19 1615)  BP: 122/66 (10/17/19 1615)  SpO2: 98 % (10/17/19 1615) Vital Signs (24h Range):  Temp:  [97.6 °F (36.4 °C)-98.1 °F (36.7 °C)] 98.1 °F (36.7 °C)  Pulse:  [75-88] 75  Resp:  [15-20] 19  SpO2:  [96 %-100 %] 98 %  BP: (122-155)/(66-90) 122/66     Weight: 54.6 kg (120 lb 5.9 oz)  Body mass index is 20.03 kg/m².    Intake/Output Summary (Last 24 hours) at 10/17/2019 1636  Last data filed at 10/17/2019 0800  Gross per 24 hour   Intake 100 ml   Output 350 ml   Net -250 ml      Physical Exam   Constitutional: She is oriented to person, place, and time. No distress.   Eyes: Pupils are equal, round, and reactive to light. EOM are normal.   Neck: Neck supple.   Cardiovascular: Normal rate.   Pulmonary/Chest: Effort normal and breath sounds normal.   Reduced air entry to bases   Abdominal: Soft. Bowel sounds are normal.   Ascites   Musculoskeletal: Normal range of motion. She exhibits no edema.   Neurological: She is alert and oriented to person, place, and time.   Skin: Skin is warm.   Psychiatric: She has a normal mood and affect.   Nursing note and  vitals reviewed.      Significant Labs:   CBC:   Recent Labs   Lab 10/16/19  2134 10/17/19  0600   WBC 5.70 4.82   HGB 11.8* 11.8*   HCT 34.6* 34.7*    158     CMP:   Recent Labs   Lab 10/16/19  2134 10/17/19  0102 10/17/19  0559   * 136 136   K 5.8* 4.7 5.0   CL 99 100 99   CO2 19* 20* 19*   GLU 93 80 89   * 126* 125*   CREATININE 10.4* 10.4* 10.7*   CALCIUM 8.5* 8.8 8.8   PROT 6.0  --  6.3   ALBUMIN 3.1*  --  3.2*   BILITOT 1.0  --  1.3*   ALKPHOS 72  --  75   AST 43*  --  38   ALT 44  --  41   ANIONGAP 15 16 18*   EGFRNONAA 3.8* 3.8* 3.7*       Significant Imaging: I have reviewed all pertinent imaging results/findings within the past 24 hours.

## 2019-10-17 NOTE — ASSESSMENT & PLAN NOTE
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

## 2019-10-17 NOTE — PLAN OF CARE
Problem: Oral Intake Inadequate  Goal: Improved Oral Intake  Outcome: Ongoing, Progressing  Intervention: Promote and Optimize Oral Intake  Flowsheets (Taken 10/17/2019 1840)  Oral Nutrition Promotion: other (see comments)   Encourage PO intake.

## 2019-10-17 NOTE — ASSESSMENT & PLAN NOTE
Patient has significant ascites on examination  She will be scheduled to have a paracentesis today or tomorrow  Pt  is asymptomatic

## 2019-10-17 NOTE — ED PROVIDER NOTES
Encounter Date: 10/16/2019       History     Chief Complaint   Patient presents with    Abdominal Pain     swelling    Shortness of Breath     54-year-old female with a past medical history of end-stage liver disease secondary to hepatitis-C, end-stage renal disease secondary to MPGN (HD on Mondays and Fridays-last HD on Friday), hypertension presents to the emergency department with chest pain, dyspnea and abdominal swelling.  The patient states that for the past 4-5 days she has had progressively worsening abdominal distention.  This has led to progressively worsening pressure-like chest ruthy and dyspnea initially with exertion and now at baseline.  The patient endorses a tightness sensation her abdomen but denies any danae pain. She has now fevers or chills.  She is requesting a paracentesis.  She states her last 1 was 3 weeks ago and they removed over 5 L at that time.  Additionally she missed her dialysis on Monday because she could not fit in her car because her abdomen was so distended.  She states she is taking all her medications as prescribed.        Review of patient's allergies indicates:   Allergen Reactions    Quinolones Itching    Levaquin [levofloxacin] Itching    Ciprofloxacin Itching    Codeine Nausea Only     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 since 12/16/13 8/19/2015    Gastritis     Hypertension 8/19/2015    Kidney transplant candidate 8/19/2015    Renal dialysis status     M, W, F    Secondary hyperparathyroidism of renal origin 8/19/2015     Past Surgical History:   Procedure Laterality Date    AV FISTULA PLACEMENT  2013    SALPINGOOPHORECTOMY Right 1997    TOTAL ABDOMINAL HYSTERECTOMY  1999     Family History   Problem Relation Age of Onset    Kidney disease Mother     Stroke Father     Psoriasis Brother     Breast cancer Neg Hx     Cancer Neg  Hx     Colon cancer Neg Hx     Ovarian cancer Neg Hx      Social History     Tobacco Use    Smoking status: Current Every Day Smoker     Packs/day: 0.75     Years: 30.00     Pack years: 22.50    Smokeless tobacco: Never Used    Tobacco comment: pt reports actively trying to quit but struggling.  Counseling and resources given.   Substance Use Topics    Alcohol use: No     Alcohol/week: 0.0 standard drinks    Drug use: Yes     Types: Marijuana     Comment: nightly to help sleep     Review of Systems   Constitutional: Negative for chills, diaphoresis, fatigue and fever.   HENT: Negative for congestion.    Eyes: Negative for visual disturbance.   Respiratory: Positive for shortness of breath. Negative for cough, wheezing and stridor.    Cardiovascular: Positive for chest pain. Negative for palpitations.   Gastrointestinal: Positive for abdominal distention. Negative for diarrhea, nausea and vomiting.   Genitourinary: Negative for dysuria, frequency, hematuria and urgency.   Musculoskeletal: Negative for back pain.   Skin: Negative for pallor.   Neurological: Negative for weakness, light-headedness, numbness and headaches.   Psychiatric/Behavioral: Negative for confusion.   All other systems reviewed and are negative.      Physical Exam     Initial Vitals [10/16/19 1908]   BP Pulse Resp Temp SpO2   (!) 142/66 84 20 97.8 °F (36.6 °C) 100 %      MAP       --         Physical Exam    Nursing note and vitals reviewed.  Constitutional: She appears well-developed and well-nourished. She appears distressed.   HENT:   Head: Normocephalic and atraumatic.   Eyes: EOM are normal.   Neck: Normal range of motion. Neck supple.   Cardiovascular: Normal rate, regular rhythm and intact distal pulses.   Murmur (4/6 systolic ejection murmur) heard.  Pulmonary/Chest: She has no wheezes. She has no rhonchi. She has no rales.   Decreased air entry   Abdominal: Soft. She exhibits distension. There is no tenderness. There is no rebound  and no guarding.   + fluid wave   Genitourinary:   Genitourinary Comments: No cva tenderness   Musculoskeletal: Normal range of motion. She exhibits no edema.   Neurological: She is alert and oriented to person, place, and time. She has normal strength. GCS score is 15. GCS eye subscore is 4. GCS verbal subscore is 5. GCS motor subscore is 6.   Skin: Skin is warm and dry. Capillary refill takes less than 2 seconds.   Hyperpigmentation with multiple excoriated lesions to bilateral lower extremities   Psychiatric: She has a normal mood and affect.         ED Course   Procedures  Labs Reviewed   CBC W/ AUTO DIFFERENTIAL - Abnormal; Notable for the following components:       Result Value    RBC 3.62 (*)     Hemoglobin 11.8 (*)     Hematocrit 34.6 (*)     Mean Corpuscular Hemoglobin 32.6 (*)     All other components within normal limits   COMPREHENSIVE METABOLIC PANEL - Abnormal; Notable for the following components:    Sodium 133 (*)     Potassium 5.8 (*)     CO2 19 (*)     BUN, Bld 130 (*)     Creatinine 10.4 (*)     Calcium 8.5 (*)     Albumin 3.1 (*)     AST 43 (*)     eGFR if  4.4 (*)     eGFR if non  3.8 (*)     All other components within normal limits   B-TYPE NATRIURETIC PEPTIDE - Abnormal; Notable for the following components:     (*)     All other components within normal limits   LIPASE   PROTIME-INR   TROPONIN I   B-TYPE NATRIURETIC PEPTIDE   TROPONIN I   URINALYSIS, REFLEX TO URINE CULTURE   BASIC METABOLIC PANEL     EKG Readings: (Independently Interpreted)   Normal sinus rhythm at a rate of 91 beats per minute with no ST elevations or depressions and normal intervals       Imaging Results          US Abdomen Complete (Final result)  Result time 10/16/19 23:35:27    Final result by Weston Ernst MD (10/16/19 23:35:27)                 Narrative:    Abdominal ultrasound complete    CLINICAL DATA: Abdominal swelling    FINDINGS: Sonographic assessment of abdomen is  compared to prior  studies, the most recent from ultrasound-guided paracentesis dated  September 27, 2019.    The pancreas is mostly obscured by bowel gas. Visualized portions of  the aorta and inferior vena cava are unremarkable, although these are  also partially obscured.    No hepatic mass lesion is identified. Liver contour is slightly  nodular. Hepatopedal flow is noted within the portal vein.    Small intraluminal gallstones are noted. There is no gallbladder wall  thickening or pericholecystic edema. Biliary tree is nondilated.  Common bile duct is difficult to visualized, however.    Right kidney measures 7 cm in length, with slightly increased cortical  echogenicity. Left kidney measures 8.7 cm in length with slightly  increased renal cortical echogenicity. The spleen is enlarged,  measuring 19 cm in longitudinal dimension.    Note is made of a moderate to large amount of ascites throughout the  abdomen, similar to prior studies.    IMPRESSION:  1. Moderate to large amount of ascites, similar to prior studies.  2. Slightly nodular liver contour suggesting cirrhosis. The spleen is  enlarged. Hepatopedal flow is noted within the portal vein.  3. Small intraluminal gallstones without evidence of cholecystitis or  obstruction.        Electronically Signed by Feliciano Ernst M.D. on 10/16/2019 11:53 PM                             X-Ray Chest AP Portable (In process)               X-Rays:   Independently Interpreted Readings:   Chest X-Ray: No acute abnormalities.     Medical Decision Making:   ED Management:  54-year-old female with abdominal distention, shortness of breath and chest pain. The patient has a mild anemia.  She is mildly acidotic with a potassium of 5.8 and a BUN of 130 consistent with her end-stage renal disease.  Troponin is undetectable would BNP is marginally elevated.  Ultrasound with moderate to large volume ascites as well as cholelithiasis but no other acute abnormalities.  I have very low  suspicion for SBP and thus emergent paracentesis is not indicated at this time.  I did discuss the patient's hyperkalemia with the on-call nephrologist, Dr Campos, who recommends temporizing with medications and he will dialyze her 1st thing in the morning.  The patient will require admission for dialysis and therapeutic paracentesis.    Moriah Guerin MD  Emergency Medicine  10/17/2019 1:23 AM                     ED Course as of Oct 17 0117   Wed Oct 16, 2019   2317 EKG normal sinus rhythm at a rate of 77 beats per minute with no ST elevations or depressions and normal intervals, no peaked T-waves, no STEMI    [EF]      ED Course User Index  [EF] Moriah Guerin MD     Clinical Impression:       ICD-10-CM ICD-9-CM   1. Shortness of breath R06.02 786.05   2. SOB (shortness of breath) R06.02 786.05   3. Abdominal swelling R19.00 789.30                                Moriah Guerin MD  10/17/19 0123

## 2019-10-17 NOTE — SUBJECTIVE & OBJECTIVE
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 since 12/16/13 8/19/2015    Gastritis     Hypertension 8/19/2015    Kidney transplant candidate 8/19/2015    Renal dialysis status     M, W, F    Secondary hyperparathyroidism of renal origin 8/19/2015       Past Surgical History:   Procedure Laterality Date    AV FISTULA PLACEMENT  2013    SALPINGOOPHORECTOMY Right 1997    TOTAL ABDOMINAL HYSTERECTOMY  1999       Review of patient's allergies indicates:   Allergen Reactions    Quinolones Itching    Levaquin [levofloxacin] Itching    Ciprofloxacin Itching    Codeine Nausea Only       No current facility-administered medications on file prior to encounter.      Current Outpatient Medications on File Prior to Encounter   Medication Sig    amlodipine (NORVASC) 10 MG tablet Take 1 tablet (10 mg total) by mouth once daily.    aspirin (ECOTRIN) 81 MG EC tablet Take 1 tablet (81 mg total) by mouth once daily.    cephALEXin (KEFLEX) 500 MG capsule Take 1 capsule (500 mg total) by mouth once daily. On dialysis days take after dialysis    dicyclomine (BENTYL) 20 mg tablet Take 20 mg by mouth every 6 (six) hours as needed.    FA-VIT B COMP&C-SELENIUM-ZINC 3-70-15 MG-MCG-MG ORAL TAB Take 1 tablet by mouth once daily.    HYDROcodone-acetaminophen (NORCO)  mg per tablet Take 1 tablet by mouth every 8 (eight) hours as needed for Pain.    lisinopril (PRINIVIL,ZESTRIL) 20 MG tablet Take 1 tablet by mouth once daily.    metoprolol succinate (TOPROL-XL) 50 MG 24 hr tablet Take 50 mg by mouth once daily.    ondansetron (ZOFRAN-ODT) 4 MG TbDL Take 2 tablets (8 mg total) by mouth every hour as needed. nausea    pantoprazole (PROTONIX) 40 MG tablet Take 40 mg by mouth once daily.    promethazine (PHENERGAN) 25 MG tablet Take 1 tablet (25 mg total) by mouth every 6 (six) hours as needed for Nausea.     ursodiol (ACTIGALL) 250 mg Tab Take 1 tablet (250 mg total) by mouth once daily.    ZEPATIER  mg Tab Take 1 tablet by mouth once daily.     Family History     Problem Relation (Age of Onset)    Kidney disease Mother    Psoriasis Brother    Stroke Father        Tobacco Use    Smoking status: Current Every Day Smoker     Packs/day: 0.75     Years: 30.00     Pack years: 22.50    Smokeless tobacco: Never Used    Tobacco comment: pt reports actively trying to quit but struggling.  Counseling and resources given.   Substance and Sexual Activity    Alcohol use: No     Alcohol/week: 0.0 standard drinks    Drug use: Yes     Types: Marijuana     Comment: nightly to help sleep    Sexual activity: Not Currently     Partners: Male     Birth control/protection: Surgical     Review of Systems   Constitutional: Positive for fatigue and unexpected weight change. Negative for activity change, chills and fever.   HENT: Negative for congestion, drooling, ear pain, hearing loss, nosebleeds, rhinorrhea, sinus pressure, sore throat and trouble swallowing.    Eyes: Negative for pain and visual disturbance.   Respiratory: Positive for chest tightness and shortness of breath. Negative for apnea, cough, choking and wheezing.    Cardiovascular: Positive for leg swelling. Negative for chest pain and palpitations.   Gastrointestinal: Negative for abdominal distention, abdominal pain, blood in stool, constipation, diarrhea, nausea and vomiting.   Endocrine: Negative for polydipsia, polyphagia and polyuria.   Genitourinary: Negative for decreased urine volume, dysuria, flank pain, frequency and hematuria.   Musculoskeletal: Negative for back pain, gait problem, joint swelling, myalgias, neck pain and neck stiffness.   Skin: Negative for rash and wound.   Neurological: Positive for weakness. Negative for dizziness, tremors, seizures, syncope, facial asymmetry, speech difficulty, light-headedness, numbness and headaches.    Psychiatric/Behavioral: Negative for behavioral problems, confusion, hallucinations and sleep disturbance. The patient is not nervous/anxious.      Objective:     Vital Signs (Most Recent):  Temp: 97.8 °F (36.6 °C) (10/16/19 1908)  Pulse: 77 (10/17/19 0130)  Resp: 15 (10/17/19 0130)  BP: 127/70 (10/17/19 0130)  SpO2: 100 % (10/17/19 0130) Vital Signs (24h Range):  Temp:  [97.8 °F (36.6 °C)] 97.8 °F (36.6 °C)  Pulse:  [76-84] 77  Resp:  [15-20] 15  SpO2:  [98 %-100 %] 100 %  BP: (126-143)/(66-75) 127/70     Weight: 49.9 kg (110 lb)  Body mass index is 18.3 kg/m².    Physical Exam   Constitutional: She is oriented to person, place, and time. She appears well-developed and well-nourished.   HENT:   Head: Normocephalic.   Eyes: Pupils are equal, round, and reactive to light. EOM are normal.   Neck: Normal range of motion. Neck supple.   Cardiovascular: Normal rate, regular rhythm and intact distal pulses. Exam reveals gallop.   Murmur heard.  Pulmonary/Chest: Effort normal and breath sounds normal.   Abdominal: Soft. Bowel sounds are normal. She exhibits distension.   Moderate ascites   Musculoskeletal: Normal range of motion.   Neurological: She is alert and oriented to person, place, and time.   Skin: Skin is warm and dry. Capillary refill takes less than 2 seconds.   Psychiatric: She has a normal mood and affect.         CRANIAL NERVES     CN III, IV, VI   Pupils are equal, round, and reactive to light.  Extraocular motions are normal.        Significant Labs:   CBC:   Recent Labs   Lab 10/16/19  2134   WBC 5.70   HGB 11.8*   HCT 34.6*        CMP:   Recent Labs   Lab 10/16/19  2134 10/17/19  0102   * 136   K 5.8* 4.7   CL 99 100   CO2 19* 20*   GLU 93 80   * 126*   CREATININE 10.4* 10.4*   CALCIUM 8.5* 8.8   PROT 6.0  --    ALBUMIN 3.1*  --    BILITOT 1.0  --    ALKPHOS 72  --    AST 43*  --    ALT 44  --    ANIONGAP 15 16   EGFRNONAA 3.8* 3.8*     Cardiac Markers:   Recent Labs   Lab  10/16/19  2134   *     Coagulation:   Recent Labs   Lab 10/16/19  2134   INR 1.0     Lactic Acid: No results for input(s): LACTATE in the last 48 hours.  Lipase:   Recent Labs   Lab 10/16/19  2134   LIPASE 37     Lipid Panel: No results for input(s): CHOL, HDL, LDLCALC, TRIG, CHOLHDL in the last 48 hours.  Magnesium: No results for input(s): MG in the last 48 hours.  Prealbumin: No results for input(s): PREALBUMIN in the last 48 hours.  Respiratory Culture: No results for input(s): GSRESP, RESPIRATORYC in the last 48 hours.  Troponin:   Recent Labs   Lab 10/16/19  2134   TROPONINI <0.030     TSH: No results for input(s): TSH in the last 4320 hours.  Urine Studies: No results for input(s): COLORU, APPEARANCEUA, PHUR, SPECGRAV, PROTEINUA, GLUCUA, KETONESU, BILIRUBINUA, OCCULTUA, NITRITE, UROBILINOGEN, LEUKOCYTESUR, RBCUA, WBCUA, BACTERIA, SQUAMEPITHEL, HYALINECASTS in the last 48 hours.    Invalid input(s): WRIGHTSUR  All pertinent labs within the past 24 hours have been reviewed.    Significant Imaging: I have reviewed all pertinent imaging results/findings within the past 24 hours.   Us Abdomen Complete    Result Date: 10/16/2019  Abdominal ultrasound complete CLINICAL DATA: Abdominal swelling FINDINGS: Sonographic assessment of abdomen is compared to prior studies, the most recent from ultrasound-guided paracentesis dated September 27, 2019. The pancreas is mostly obscured by bowel gas. Visualized portions of the aorta and inferior vena cava are unremarkable, although these are also partially obscured. No hepatic mass lesion is identified. Liver contour is slightly nodular. Hepatopedal flow is noted within the portal vein. Small intraluminal gallstones are noted. There is no gallbladder wall thickening or pericholecystic edema. Biliary tree is nondilated. Common bile duct is difficult to visualized, however. Right kidney measures 7 cm in length, with slightly increased cortical echogenicity. Left kidney  measures 8.7 cm in length with slightly increased renal cortical echogenicity. The spleen is enlarged, measuring 19 cm in longitudinal dimension. Note is made of a moderate to large amount of ascites throughout the abdomen, similar to prior studies. IMPRESSION: 1. Moderate to large amount of ascites, similar to prior studies. 2. Slightly nodular liver contour suggesting cirrhosis. The spleen is enlarged. Hepatopedal flow is noted within the portal vein. 3. Small intraluminal gallstones without evidence of cholecystitis or obstruction. Electronically Signed by Feliciano Ernst M.D. on 10/16/2019 11:53 PM    Us [facility]    Result Date: 9/27/2019  EXAMINATION: US GUIDED PARACENTESIS INC IMAGING CLINICAL HISTORY: Unspecified cirrhosis of liver Hepatic cirrhosis, ascites; COMPARISON: 09/03/2019 FINDINGS: After obtaining written informed consent, including a discussion of the risks and benefits of the procedure to include infection, bleeding, puncture of the bowel, bleeding into the belly requiring surgery, and allowing the patient the opportunity to ask questions, the patient agreed to proceed. Large volume of anechoic simple ascites was noted with ultrasound. A suitable skin entrance site overlying the right lower quadrant was localized with ultrasound guidance. The skin was prepped and draped in sterile fashion, with several milliliters of lidocaine 1% injected subcutaneously to achieve adequate local anesthesia. Following, a small skin incision was made, and a [id number]-B-[id number] catheter over a needle was advanced into the right lower quadrant peritoneal space. Approximately 5.3 L of ascites was obtained. There was no blood loss. The patient tolerated the procedure well, with no immediate postprocedural complications observed.     Successful ultrasound guided paracentesis. Electronically signed by: Harley Jimenez MD Date:    09/27/2019 Time:    15:13

## 2019-10-17 NOTE — PLAN OF CARE
Tolerated well.  5.8L clear, yellow fluid drained and sent for testing.  Discharged to [facility] [id number] via w/c

## 2019-10-17 NOTE — ASSESSMENT & PLAN NOTE
-hypovolemia most likely secondary to advanced liver disease and increased ascites in the setting end-stage renal disease with hemodialysis noncompliance with underlying component of heart failure    -plan for hemodialysis today    -consult GI for possible paracentesis    -continue with supplemental O2    -strictI&O's    - -the patient does have a Pedraza with about 400 cc of urine out since admit

## 2019-10-17 NOTE — ASSESSMENT & PLAN NOTE
-end-stage renal disease requiring hemodialysis twice a week the patient states she missed her hemodialysis on Monday her last dialysis was on Friday.      Nephrology was consulted and plan to dialyze the patient in the a.m.    -left upper arm fistula with good thrill and bruit

## 2019-10-17 NOTE — HPI
54-year-old  female presents to the emergency room with complaints increased abdominal distension and shortness of breath.      She also complaints note and chest discomfort.  This patient has a known history of end-stage renal disease which is requiring hemodialysis twice a week on Mondays and Fridays, end-stage liver disease secondary to hep, MP she in and hyperlipidemia, anemia of chronic kidney disease and secondary hyperparathyroid disease.  The patient states over the past 4-5 increased abdominal spoke falling girth.  T    he patient stayed about ago she did have a paracentesis performed and a removed a little over 5 L of fluid.      The patient states she normally dialyzes on Mondays and Fridays but she missed her last hemodialysis session secondary to abdominal swelling and the inability to fit in her car per the per the patient's report         .

## 2019-10-17 NOTE — PROGRESS NOTES
54 yr old female        10/17/19 1100 10/17/19 1415        Wound 10/17/19 1100 Other (comment)   Date First Assessed/Time First Assessed: 10/17/19 1100   Pre-existing: Yes  Primary Wound Type: (c) Other (comment)  Side: (c)    Wound Image  --      Dressing Appearance  --  Open to air   Appearance  --    (splotchy discolored red/maroon healing blister to the lt )        Pressure Injury 10/17/19 1107 Coccyx #1 Stage 1   Date First Assessed/Time First Assessed: 10/17/19 1107   Pressure Injury Present on Admission: yes  Location: Coccyx  Wound/PI Number (optional): #1  Is this injury device related?: No  Staging: Stage 1   Wound Image   --    Staging Stage 1  --    Dressing Appearance Intact  --    Drainage Amount None  --    Appearance Red  (very slow blanching  scar)  --    Periwound Area Redness  --    Wound Length (cm) 2 cm  --    Wound Width (cm) 1 cm  --    Wound Surface Area (cm^2) 2 cm^2  --    Dressing Island/border  --      Recommendation: sacral/coccyx  Clean area with chlorhexidine/ns  Pat dry  Apply venelex and cover with mepilex. Reposition Q2hr.   Turn reposition q2 as pt's condition will allow.  Float and elevate heels of bed with pillows.  Air mattress

## 2019-10-17 NOTE — PROGRESS NOTES
"[facility]  Adult Nutrition   Progress Note (Initial Assessment)     SUMMARY     Recommendations  Recommendation/Intervention:   1. Continue to encourage PO intake.     Goals:   1. Patient to meet at least 75% of estimated needs PO.     Nutrition Goal Status: new    Reason for Assessment    Reason For Assessment: identified at risk by screening criteria(MST 3 )  Diagnosis: liver disease, renal disease  Relevant Medical History: ESRD on HD, Cirrhosis, HTN, ascites     Nutrition Risk Screen    Nutrition Risk Screen: other (see comments)(MST 3 )       Pressure Injury Coccyx, Stage 1  MST Score: 3  Have you recently lost weight without trying?: Yes: Unsure how much  Weight loss score: 2  Have you been eating poorly because of a decreased appetite?: Yes  Appetite score: 1    Nutrition/Diet History    Patient Reported Diet/Restrictions/Preferences: renal  Spiritual, Cultural Beliefs, Voodoo Practices, Values that Affect Care: no  Supplemental Drinks or Food Habits: Other (see comments)(Ensure Max (Mocha Flavor only) )  Food Allergies: NKFA  Factors Affecting Nutritional Intake: abdominal pain, abdominal distension    Anthropometrics    Temp: 98.1 °F (36.7 °C)  Height Method: Stated  Height: 5' 5" (165.1 cm)  Height (inches): 65 in  Weight Method: Standard Scale  Weight: 54.6 kg (120 lb 5.9 oz)(dry weight per patient )  Weight (lb): 120.37 lb  Ideal Body Weight (IBW), Female: 125 lb  % Ideal Body Weight, Female (lb): 96.3 lb  BMI (Calculated): 20.1  BMI Grade: 18.5-24.9 - normal(BMI 18.6 using dry weight )  Usual Body Weight (UBW), k.5 kg(Dry weight per patient )  % Usual Body Weight: 108.34  % Weight Change From Usual Weight: 8.12 %       Weight History:  Wt Readings from Last 10 Encounters:   10/17/19 54.6 kg (120 lb 5.9 oz)   10/10/19 55.2 kg (121 lb 9.6 oz)   19 53.3 kg (117 lb 9.6 oz)   19 49.8 kg (109 lb 11.2 oz)   19 50.5 kg (111 lb 6.4 oz)   19 48.9 kg (107 lb 11.2 oz) "   02/25/19 50.3 kg (111 lb)   01/07/19 49.5 kg (109 lb 1.6 oz)   12/05/18 49.4 kg (108 lb 14.4 oz)   11/27/18 49.1 kg (108 lb 4.8 oz)     Lab/Procedures/Meds: Pertinent Labs Reviewed  Clinical Chemistry:  Recent Labs   Lab 10/16/19  2134 10/17/19  0559   * 136   K 5.8* 5.0   CL 99 99   CO2 19* 19*   GLU 93 89   * 125*   CREATININE 10.4* 10.7*   CALCIUM 8.5* 8.8   PROT 6.0 6.3   ALBUMIN 3.1* 3.2*   BILITOT 1.0 1.3*   ALKPHOS 72 75   AST 43* 38   ALT 44 41   ANIONGAP 15 18*   ESTGFRAFRICA 4.4* 4.3*   EGFRNONAA 3.8* 3.7*   LIPASE 37  --     < > = values in this interval not displayed.     CBC:   Recent Labs   Lab 10/17/19  0600   WBC 4.82   RBC 3.63*   HGB 11.8*   HCT 34.7*      MCV 96   MCH 32.5*   MCHC 34.0     Cardiac Profile:  Recent Labs   Lab 10/16/19  2134 10/17/19  0600 10/17/19  1319   *  --   --    TROPONINI <0.030 0.075* 0.070*     Medications: Pertinent Medications reviewed  Scheduled Meds:   amLODIPine  10 mg Oral Daily    aspirin  81 mg Oral Daily    elbasvir-grazoprevir  1 tablet Oral Daily    metoprolol succinate  50 mg Oral Daily    mupirocin   Nasal BID    pantoprazole  40 mg Oral Daily    ursodiol  300 mg Oral Daily     PRN Meds:.acetaminophen, albumin human 5%, balsam peru-castor oil, bisacodyl, HYDROcodone-acetaminophen, sodium chloride 0.9%    Estimated/Assessed Needs    Weight Used For Calorie Calculations: 50.5 kg (111 lb 5.3 oz)  Energy Calorie Requirements (kcal): 1515 - 1768 (30 - 35)   Energy Need Method: Kcal/kg  Protein Requirements: 76 - 101 (1.5 - 2.0 g/kg)   Weight Used For Protein Calculations: 50.5 kg (111 lb 5.3 oz)  Fluid Requirements (mL): UOP + 1000 ml/kg or per MD   Estimated Fluid Requirement Method: other (see comments)  RDA Method (mL): 1515       Nutrition Prescription Ordered    Current Diet Order: Low Sodium     Evaluation of Received Nutrient/Fluid Intake    Energy Calories Required: not meeting needs  Protein Required: not meeting  "needs  Fluid Required: not meeting needs  Comments: RD offered supplment, patient states she drinks ensure at home. Patient refused.   Tolerance: not tolerating     Intake/Output Summary (Last 24 hours) at 10/17/2019 1848  Last data filed at 10/17/2019 1834  Gross per 24 hour   Intake 280 ml   Output 350 ml   Net -70 ml      % Intake of Estimated Energy Needs: 0 - 25 %  % Meal Intake: 0 - 25 %    Dietitian Rounds Brief    PO intake poor due to "no room to fit food" due to ascites per patient. Patient states she will eat better at food. Patient did not seem accepting of any diet recommendations. RD offered supplment, patient states she drinks ensure at home. Patient refused.     Nutrition Risk    Level of Risk/Frequency of Follow-up: high     Monitor and Evaluation    Food and Nutrient Intake: energy intake, food and beverage intake  Food and Nutrient Adminstration: diet order  Physical Activity and Function: nutrition-related ADLs and IADLs  Anthropometric Measurements: weight, weight change  Biochemical Data, Medical Tests and Procedures: electrolyte and renal panel, glucose/endocrine profile, lipid profile, gastrointestinal profile, inflammatory profile  Nutrition-Focused Physical Findings: overall appearance     Nutrition Follow-Up    RD Follow-up?: Yes     Lelia Yepez RD 10/17/2019 6:52 PM     "

## 2019-10-17 NOTE — UM SECONDARY REVIEW
Patient is not Meeting Interqual  Inpatient Criteria. Referral sent to [facility] EHR. Dr Matthew Dickinson notified.  Decision pending. EHR referral faxed into [employer].

## 2019-10-17 NOTE — H&P
[facility] Medicine  History & Physical    Patient Name: Aurelia Saucedo  MRN: [id number]  Admission Date: 10/16/2019  Attending Physician: Dr. Barrera  Primary Care Provider: Dustin Ireland MD         Patient information was obtained from patient and ER records.     Subjective:     Principal Problem:Hypervolemia    Chief Complaint:   Chief Complaint   Patient presents with    Abdominal Pain     swelling    Shortness of Breath        HPI: 54-year-old  female presents to the emergency room with complaints increased abdominal distension and shortness of breath.      She also complaints note and chest discomfort.  This patient has a known history of end-stage renal disease which is requiring hemodialysis twice a week on Mondays and Fridays, end-stage liver disease secondary to hep, MP she in and hyperlipidemia, anemia of chronic kidney disease and secondary hyperparathyroid disease.  The patient states over the past 4-5 increased abdominal spoke falling girth.  T    he patient stayed about ago she did have a paracentesis performed and a removed a little over 5 L of fluid.      The patient states she normally dialyzes on Mondays and Fridays but she missed her last hemodialysis session secondary to abdominal swelling and the inability to fit in her car per the per the patient's report         .      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 since 12/16/13 8/19/2015    Gastritis     Hypertension 8/19/2015    Kidney transplant candidate 8/19/2015    Renal dialysis status     M, W, F    Secondary hyperparathyroidism of renal origin 8/19/2015       Past Surgical History:   Procedure Laterality Date    AV FISTULA PLACEMENT  2013    SALPINGOOPHORECTOMY Right 1997    TOTAL ABDOMINAL HYSTERECTOMY  1999       Review of patient's allergies indicates:   Allergen  Reactions    Quinolones Itching    Levaquin [levofloxacin] Itching    Ciprofloxacin Itching    Codeine Nausea Only       No current facility-administered medications on file prior to encounter.      Current Outpatient Medications on File Prior to Encounter   Medication Sig    amlodipine (NORVASC) 10 MG tablet Take 1 tablet (10 mg total) by mouth once daily.    aspirin (ECOTRIN) 81 MG EC tablet Take 1 tablet (81 mg total) by mouth once daily.    cephALEXin (KEFLEX) 500 MG capsule Take 1 capsule (500 mg total) by mouth once daily. On dialysis days take after dialysis    dicyclomine (BENTYL) 20 mg tablet Take 20 mg by mouth every 6 (six) hours as needed.    FA-VIT B COMP&C-SELENIUM-ZINC 3-70-15 MG-MCG-MG ORAL TAB Take 1 tablet by mouth once daily.    HYDROcodone-acetaminophen (NORCO)  mg per tablet Take 1 tablet by mouth every 8 (eight) hours as needed for Pain.    lisinopril (PRINIVIL,ZESTRIL) 20 MG tablet Take 1 tablet by mouth once daily.    metoprolol succinate (TOPROL-XL) 50 MG 24 hr tablet Take 50 mg by mouth once daily.    ondansetron (ZOFRAN-ODT) 4 MG TbDL Take 2 tablets (8 mg total) by mouth every hour as needed. nausea    pantoprazole (PROTONIX) 40 MG tablet Take 40 mg by mouth once daily.    promethazine (PHENERGAN) 25 MG tablet Take 1 tablet (25 mg total) by mouth every 6 (six) hours as needed for Nausea.    ursodiol (ACTIGALL) 250 mg Tab Take 1 tablet (250 mg total) by mouth once daily.    ZEPATIER  mg Tab Take 1 tablet by mouth once daily.     Family History     Problem Relation (Age of Onset)    Kidney disease Mother    Psoriasis Brother    Stroke Father        Tobacco Use    Smoking status: Current Every Day Smoker     Packs/day: 0.75     Years: 30.00     Pack years: 22.50    Smokeless tobacco: Never Used    Tobacco comment: pt reports actively trying to quit but struggling.  Counseling and resources given.   Substance and Sexual Activity    Alcohol use: No      Alcohol/week: 0.0 standard drinks    Drug use: Yes     Types: Marijuana     Comment: nightly to help sleep    Sexual activity: Not Currently     Partners: Male     Birth control/protection: Surgical     Review of Systems   Constitutional: Positive for fatigue and unexpected weight change. Negative for activity change, chills and fever.   HENT: Negative for congestion, drooling, ear pain, hearing loss, nosebleeds, rhinorrhea, sinus pressure, sore throat and trouble swallowing.    Eyes: Negative for pain and visual disturbance.   Respiratory: Positive for chest tightness and shortness of breath. Negative for apnea, cough, choking and wheezing.    Cardiovascular: Positive for leg swelling. Negative for chest pain and palpitations.   Gastrointestinal: Negative for abdominal distention, abdominal pain, blood in stool, constipation, diarrhea, nausea and vomiting.   Endocrine: Negative for polydipsia, polyphagia and polyuria.   Genitourinary: Negative for decreased urine volume, dysuria, flank pain, frequency and hematuria.   Musculoskeletal: Negative for back pain, gait problem, joint swelling, myalgias, neck pain and neck stiffness.   Skin: Negative for rash and wound.   Neurological: Positive for weakness. Negative for dizziness, tremors, seizures, syncope, facial asymmetry, speech difficulty, light-headedness, numbness and headaches.   Psychiatric/Behavioral: Negative for behavioral problems, confusion, hallucinations and sleep disturbance. The patient is not nervous/anxious.      Objective:     Vital Signs (Most Recent):  Temp: 97.8 °F (36.6 °C) (10/16/19 1908)  Pulse: 77 (10/17/19 0130)  Resp: 15 (10/17/19 0130)  BP: 127/70 (10/17/19 0130)  SpO2: 100 % (10/17/19 0130) Vital Signs (24h Range):  Temp:  [97.8 °F (36.6 °C)] 97.8 °F (36.6 °C)  Pulse:  [76-84] 77  Resp:  [15-20] 15  SpO2:  [98 %-100 %] 100 %  BP: (126-143)/(66-75) 127/70     Weight: 49.9 kg (110 lb)  Body mass index is 18.3 kg/m².    Physical Exam    Constitutional: She is oriented to person, place, and time. She appears well-developed and well-nourished.   HENT:   Head: Normocephalic.   Eyes: Pupils are equal, round, and reactive to light. EOM are normal.   Neck: Normal range of motion. Neck supple.   Cardiovascular: Normal rate, regular rhythm and intact distal pulses. Exam reveals gallop.   Murmur heard.  Pulmonary/Chest: Effort normal and breath sounds normal.   Abdominal: Soft. Bowel sounds are normal. She exhibits distension.   Moderate ascites   Musculoskeletal: Normal range of motion.   Neurological: She is alert and oriented to person, place, and time.   Skin: Skin is warm and dry. Capillary refill takes less than 2 seconds.   Psychiatric: She has a normal mood and affect.         CRANIAL NERVES     CN III, IV, VI   Pupils are equal, round, and reactive to light.  Extraocular motions are normal.        Significant Labs:   CBC:   Recent Labs   Lab 10/16/19  2134   WBC 5.70   HGB 11.8*   HCT 34.6*        CMP:   Recent Labs   Lab 10/16/19  2134 10/17/19  0102   * 136   K 5.8* 4.7   CL 99 100   CO2 19* 20*   GLU 93 80   * 126*   CREATININE 10.4* 10.4*   CALCIUM 8.5* 8.8   PROT 6.0  --    ALBUMIN 3.1*  --    BILITOT 1.0  --    ALKPHOS 72  --    AST 43*  --    ALT 44  --    ANIONGAP 15 16   EGFRNONAA 3.8* 3.8*     Cardiac Markers:   Recent Labs   Lab 10/16/19  2134   *     Coagulation:   Recent Labs   Lab 10/16/19  2134   INR 1.0     Lactic Acid: No results for input(s): LACTATE in the last 48 hours.  Lipase:   Recent Labs   Lab 10/16/19  2134   LIPASE 37     Lipid Panel: No results for input(s): CHOL, HDL, LDLCALC, TRIG, CHOLHDL in the last 48 hours.  Magnesium: No results for input(s): MG in the last 48 hours.  Prealbumin: No results for input(s): PREALBUMIN in the last 48 hours.  Respiratory Culture: No results for input(s): GSRESP, RESPIRATORYC in the last 48 hours.  Troponin:   Recent Labs   Lab 10/16/19  2134   TROPONINI  <0.030     TSH: No results for input(s): TSH in the last 4320 hours.  Urine Studies: No results for input(s): COLORU, APPEARANCEUA, PHUR, SPECGRAV, PROTEINUA, GLUCUA, KETONESU, BILIRUBINUA, OCCULTUA, NITRITE, UROBILINOGEN, LEUKOCYTESUR, RBCUA, WBCUA, BACTERIA, SQUAMEPITHEL, HYALINECASTS in the last 48 hours.    Invalid input(s): WRIGHTSUR  All pertinent labs within the past 24 hours have been reviewed.    Significant Imaging: I have reviewed all pertinent imaging results/findings within the past 24 hours.   Us Abdomen Complete    Result Date: 10/16/2019  Abdominal ultrasound complete CLINICAL DATA: Abdominal swelling FINDINGS: Sonographic assessment of abdomen is compared to prior studies, the most recent from ultrasound-guided paracentesis dated September 27, 2019. The pancreas is mostly obscured by bowel gas. Visualized portions of the aorta and inferior vena cava are unremarkable, although these are also partially obscured. No hepatic mass lesion is identified. Liver contour is slightly nodular. Hepatopedal flow is noted within the portal vein. Small intraluminal gallstones are noted. There is no gallbladder wall thickening or pericholecystic edema. Biliary tree is nondilated. Common bile duct is difficult to visualized, however. Right kidney measures 7 cm in length, with slightly increased cortical echogenicity. Left kidney measures 8.7 cm in length with slightly increased renal cortical echogenicity. The spleen is enlarged, measuring 19 cm in longitudinal dimension. Note is made of a moderate to large amount of ascites throughout the abdomen, similar to prior studies. IMPRESSION: 1. Moderate to large amount of ascites, similar to prior studies. 2. Slightly nodular liver contour suggesting cirrhosis. The spleen is enlarged. Hepatopedal flow is noted within the portal vein. 3. Small intraluminal gallstones without evidence of cholecystitis or obstruction. Electronically Signed by Feliciano Ernst M.D. on  10/16/2019 11:53 PM    Us Paracentesis Inc Imaging    Result Date: 9/27/2019  EXAMINATION: US GUIDED PARACENTESIS INC IMAGING CLINICAL HISTORY: Unspecified cirrhosis of liver Hepatic cirrhosis, ascites; COMPARISON: 09/03/2019 FINDINGS: After obtaining written informed consent, including a discussion of the risks and benefits of the procedure to include infection, bleeding, puncture of the bowel, bleeding into the belly requiring surgery, and allowing the patient the opportunity to ask questions, the patient agreed to proceed. Large volume of anechoic simple ascites was noted with ultrasound. A suitable skin entrance site overlying the right lower quadrant was localized with ultrasound guidance. The skin was prepped and draped in sterile fashion, with several milliliters of lidocaine 1% injected subcutaneously to achieve adequate local anesthesia. Following, a small skin incision was made, and a Vrb-E-jviumpdl catheter over a needle was advanced into the right lower quadrant peritoneal space. Approximately 5.3 L of ascites was obtained. There was no blood loss. The patient tolerated the procedure well, with no immediate postprocedural complications observed.     Successful ultrasound guided paracentesis. Electronically signed by: Harley Jimenez MD Date:    09/27/2019 Time:    15:13    Assessment/Plan:     * Hypervolemia  -hypovolemia most likely secondary to advanced liver disease and increased ascites in the setting end-stage renal disease with hemodialysis noncompliance with underlying component of heart failure    -plan for hemodialysis today    -consult GI for possible paracentesis    -continue with supplemental O2    -strictI&O's    - -the patient does have a Pedraza with about 400 cc of urine out since admit      Other ascites  -consult GI for possible paracentesis    - Dr. 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      Nephrology was consulted and plan to dialyze the patient in the a.m.    -left upper arm fistula with good thrill and bruit      Essential hypertension  -p.r.n. hydralazine for elevated blood pressure    -continue current pharmacologic regime to manage blood pressure        VTE Risk Mitigation (From admission, onward)         Ordered     Place sequential compression device  Until discontinued      10/17/19 0229     IP VTE LOW RISK PATIENT  Once      10/17/19 0229                   Barbara Borges NP  Department of Hospital Medicine   [facility]

## 2019-10-17 NOTE — PROGRESS NOTES
Urgent nephrology note    Case d/w ER MD.    Will receive temporizing measures for hyperkalemia, incl lasix, bicarb, kayaxelate, insulin and D50 and will be dialyzed in AM. Orders in. Dr. Parrish will formally consult in AM.    Luciano Campos MD

## 2019-10-17 NOTE — CONSULTS
INPATIENT NEPHROLOGY CONSULT   [facility] NEPHROLOGY    Patient Name: Aurelia Saucedo  Date: 10/17/2019    Reason for consultation: ESRD    Chief Complaint:   Chief Complaint   Patient presents with    Abdominal Pain     swelling    Shortness of Breath       History of Present Illness:  55 y/o F with hx of ESRD on HD MW and cirrhosis with ascites requiring q3-4 week paracenteses who p/w dyspnea, abd distention over the last week. She missed her HD treatment on Monday due to symptoms. She is due for a paracentesis. There are no exac/reliev factors. We are consulted for dialysis.    CXR clear  Abd u/s notes ascites    Plan of Care:    Assessment:  ESRD  HTN  Hyperkalemia  Metabolic acidosis  SHPT  Anemia of CKD  Cirrhosis with ascites    Plan:    - Ordered HD today but patient refuses- she wants a para only today. Will resume MF schedule.  - Ordered 1-3L UF as tolerated.  - She got calcium gluc, IV lasix, insulin/D50, IV bicarb push last night. K is better. Plan for 2K bath.  - Ordered 35 bicarb bath.  - Check phos.  - Heme parameters are at baseline. She doesn't need MERON therapy right now.  - GI has been consulted- will need para with albumin.    Thank you for allowing us to participate in this patient's care. We will continue to follow.    Vital Signs:  Temp Readings from Last 3 Encounters:   10/17/19 97.6 °F (36.4 °C) (Oral)   10/10/19 97.6 °F (36.4 °C)   09/20/19 97.9 °F (36.6 °C) (Oral)       Pulse Readings from Last 3 Encounters:   10/17/19 84   10/10/19 85   09/27/19 75       BP Readings from Last 3 Encounters:   10/17/19 (!) 148/86   10/10/19 131/76   09/27/19 120/81       Weight:  Wt Readings from Last 3 Encounters:   10/17/19 54.6 kg (120 lb 5.9 oz)   10/10/19 55.2 kg (121 lb 9.6 oz)   09/20/19 53.3 kg (117 lb 9.6 oz)       Past Medical & Surgical History:  Past Medical History:   Diagnosis Date    Anemia of chronic renal failure, stage 5 8/19/2015    Chronic hepatitis C 8/19/2015    Colitis     ESRD  2/2 MPGN on HD since 12/16/13 8/19/2015    Gastritis     Hypertension 8/19/2015    Kidney transplant candidate 8/19/2015    Renal dialysis status     M, W, F    Secondary hyperparathyroidism of renal origin 8/19/2015       Past Surgical History:   Procedure Laterality Date    AV FISTULA PLACEMENT  2013    SALPINGOOPHORECTOMY Right 1997    TOTAL ABDOMINAL HYSTERECTOMY  1999       Past Social History:  Social History     Socioeconomic History    Marital status:      Spouse name: Not on file    Number of children: 2    Years of education: Not on file    Highest education level: Not on file   Occupational History    Occupation: baker    Social Needs    Financial resource strain: Not on file    Food insecurity:     Worry: Not on file     Inability: Not on file    Transportation needs:     Medical: Not on file     Non-medical: Not on file   Tobacco Use    Smoking status: Current Every Day Smoker     Packs/day: 0.75     Years: 30.00     Pack years: 22.50    Smokeless tobacco: Never Used    Tobacco comment: pt reports actively trying to quit but struggling.  Counseling and resources given.   Substance and Sexual Activity    Alcohol use: No     Alcohol/week: 0.0 standard drinks    Drug use: Yes     Types: Marijuana     Comment: nightly to help sleep    Sexual activity: Not Currently     Partners: Male     Birth control/protection: Surgical   Lifestyle    Physical activity:     Days per week: Not on file     Minutes per session: Not on file    Stress: To some extent   Relationships    Social connections:     Talks on phone: Not on file     Gets together: Not on file     Attends Protestant service: Not on file     Active member of club or organization: Not on file     Attends meetings of clubs or organizations: Not on file     Relationship status: Not on file   Other Topics Concern    Are you pregnant or think you may be? Not Asked    Breast-feeding Not Asked   Social History Narrative    Not  on file       Medications:  No current facility-administered medications on file prior to encounter.      Current Outpatient Medications on File Prior to Encounter   Medication Sig Dispense Refill    amlodipine (NORVASC) 10 MG tablet Take 1 tablet (10 mg total) by mouth once daily. 90 tablet 3    aspirin (ECOTRIN) 81 MG EC tablet Take 1 tablet (81 mg total) by mouth once daily. 90 tablet 1    cephALEXin (KEFLEX) 500 MG capsule Take 1 capsule (500 mg total) by mouth once daily. On dialysis days take after dialysis 5 capsule 0    dicyclomine (BENTYL) 20 mg tablet Take 20 mg by mouth every 6 (six) hours as needed.      FA-VIT B COMP&C-SELENIUM-ZINC 3-70-15 MG-MCG-MG ORAL TAB Take 1 tablet by mouth once daily.      HYDROcodone-acetaminophen (NORCO)  mg per tablet Take 1 tablet by mouth every 8 (eight) hours as needed for Pain. 60 tablet 0    lisinopril (PRINIVIL,ZESTRIL) 20 MG tablet Take 1 tablet by mouth once daily.  3    metoprolol succinate (TOPROL-XL) 50 MG 24 hr tablet Take 50 mg by mouth once daily.      ondansetron (ZOFRAN-ODT) 4 MG TbDL Take 2 tablets (8 mg total) by mouth every hour as needed. nausea 20 tablet 0    pantoprazole (PROTONIX) 40 MG tablet Take 40 mg by mouth once daily.      promethazine (PHENERGAN) 25 MG tablet Take 1 tablet (25 mg total) by mouth every 6 (six) hours as needed for Nausea. 20 tablet 0    ursodiol (ACTIGALL) 250 mg Tab Take 1 tablet (250 mg total) by mouth once daily. 90 tablet 0    ZEPATIER  mg Tab Take 1 tablet by mouth once daily.       Scheduled Meds:   amLODIPine  10 mg Oral Daily    aspirin  81 mg Oral Daily    elbasvir-grazoprevir  1 tablet Oral Daily    metoprolol succinate  50 mg Oral Daily    mupirocin   Nasal BID    pantoprazole  40 mg Oral Daily    ursodiol  300 mg Oral Daily     Continuous Infusions:  PRN Meds:.acetaminophen, bisacodyl, HYDROcodone-acetaminophen, sodium chloride 0.9%    Allergies:  Quinolones; Levaquin [levofloxacin];  Ciprofloxacin; and Codeine    Past Family History:  Reviewed; refer to Hospitalist Admission Note    Review of Systems:  Review of Systems - All 14 systems reviewed and negative, except as noted in HPI    Physical Exam:  General Appearance:    NAD, AAO x 3, cooperative, appears stated age   Head:    Normocephalic, atraumatic   Eyes:    PER, EOMI, and conjunctiva/sclera clear bilaterally       Throat:   Moist mucus membranes   Back:     No CVA tenderness   Lungs:     Clear to auscultation bilaterally, no wheezes, crackles, rales    or rhonchi, symmetric air movement, respirations unlabored   Chest wall:    No tenderness or deformity   Heart:    Regular rate and rhythm, S1 and S2 normal, no murmur, rub   or gallop   Abdomen:     Soft, nontender, + ascites   Extremities:   Warm and well perfused, no peripheral edema   MSK:   No joint or muscle swelling, tenderness or deformity   Skin:   Skin color, texture, turgor normal, no rashes or lesions   Neurologic/Psychiatric:   CNII-XII intact, normal strength and sensation       throughout, no asterixis; normal affect, memory, judgement     and insight      Results:  Lab Results   Component Value Date     10/17/2019    K 5.0 10/17/2019    CL 99 10/17/2019    CO2 19 (L) 10/17/2019     (H) 10/17/2019    CREATININE 10.7 (H) 10/17/2019    CALCIUM 8.8 10/17/2019    ANIONGAP 18 (H) 10/17/2019    ESTGFRAFRICA 4.3 (A) 10/17/2019    EGFRNONAA 3.7 (A) 10/17/2019       Lab Results   Component Value Date    CALCIUM 8.8 10/17/2019    PHOS 3.9 10/12/2016       Recent Labs   Lab 10/17/19  0600   WBC 4.82   RBC 3.63*   HGB 11.8*   HCT 34.7*      MCV 96   MCH 32.5*   MCHC 34.0       I have personally reviewed pertinent radiological imaging and reports.    I have spent > 70 minutes providing care for this patient for the above diagnoses. These services have included chart/data/imaging review, evaluation, exam, formulation of plan, , note preparation, and  discussions with staff involved in this patient's care.    Amy Parrish MD MPH  [facility] [address] [address]  [phone] (p)  [phone] (f)

## 2019-10-17 NOTE — ASSESSMENT & PLAN NOTE
-hypovolemia most likely secondary to advanced liver disease and  associated increased ascites in the setting end-stage renal disease with hemodialysis noncompliance with underlying component of heart failure  Patient will have dialysis as scheduled

## 2019-10-17 NOTE — HOSPITAL COURSE
Patient has significant ascites on examination  She missed her dialysis as an outpatient  Per patient her usual dialysis days going to be tomorrow  Patient sitting comfortably on the bed without any issues

## 2019-10-17 NOTE — ASSESSMENT & PLAN NOTE
-p.r.n. hydralazine for elevated blood pressure    -continue current pharmacologic regime to manage blood pressure

## 2019-10-17 NOTE — PROGRESS NOTES
54 yr old female        10/17/19 1100        Pressure Injury 10/17/19 1107 Coccyx #1 Stage 1   Date First Assessed/Time First Assessed: 10/17/19 1107   Pressure Injury Present on Admission: yes  Location: Coccyx  Wound/PI Number (optional): #1  Is this injury device related?: No  Staging: Stage 1   Wound Image    Staging Stage 1   Dressing Appearance Intact   Drainage Amount None   Appearance Red  (very slow blanching  scar)   Periwound Area Redness   Wound Length (cm) 2 cm   Wound Width (cm) 1 cm   Wound Surface Area (cm^2) 2 cm^2   Dressing Island/border   Recommendation:  Clean area with chlorhexidine/ns  Pat dry  Apply venelex and cover with mepilex. Reposition Q2hr.

## 2019-10-17 NOTE — ASSESSMENT & PLAN NOTE
History of chronic hepatitis C with cirrhosis  History of cryoglobulinemia  She has been rejected by higher centers for liver transplantation

## 2019-10-18 NOTE — SUBJECTIVE & OBJECTIVE
Subjective:     Interval History:     Review of Systems   Constitutional: Positive for fatigue.        Dialysis today, fatigue afterwards   HENT: Negative for hearing loss and trouble swallowing.    Eyes: Negative for visual disturbance.        Wears glassses   Respiratory:        Breathing better since paracenthesis   Cardiovascular: Negative for chest pain.   Gastrointestinal: Positive for abdominal distention.        Decompensated liver disease  Hepatitis C   Endocrine: Negative.    Genitourinary:        Dialysis twice a week   Musculoskeletal:        ROM normal   Skin: Negative for rash and wound.   Allergic/Immunologic: Positive for immunocompromised state.   Neurological: Positive for weakness.   Hematological:        Anemic   Psychiatric/Behavioral: Negative for confusion.     Objective:     Vital Signs (Most Recent):  Temp: 98 °F (36.7 °C) (10/18/19 1640)  Pulse: 79 (10/18/19 1640)  Resp: 18 (10/18/19 1640)  BP: 135/79 (10/18/19 1640)  SpO2: 100 % (10/18/19 1223) Vital Signs (24h Range):  Temp:  [97.7 °F (36.5 °C)-98.2 °F (36.8 °C)] 98 °F (36.7 °C)  Pulse:  [63-82] 79  Resp:  [18-20] 18  SpO2:  [98 %-100 %] 100 %  BP: (104-158)/(65-88) 135/79     Weight: 48.3 kg (106 lb 7.7 oz) (10/18/19 0500)  Body mass index is 17.72 kg/m².      Intake/Output Summary (Last 24 hours) at 10/18/2019 1815  Last data filed at 10/18/2019 1640  Gross per 24 hour   Intake 860 ml   Output 1500 ml   Net -640 ml       Lines/Drains/Airways     Drain                 Hemodialysis AV Fistula Left upper arm -- days          Airway                 Airway - Non-Surgical 10/10/16 0913 Nasal Cannula 1103 days                Physical Exam   Constitutional: She is oriented to person, place, and time. She appears well-developed and well-nourished.   HENT:   Head: Normocephalic and atraumatic.   Eyes: Pupils are equal, round, and reactive to light. EOM are normal.   Neck: Normal range of motion. Neck supple.   Cardiovascular: Regular rhythm.    Pulmonary/Chest: Breath sounds normal.   Abdominal: Soft. Bowel sounds are normal. She exhibits distension. There is no rebound.   Genitourinary:   Genitourinary Comments: wnl   Musculoskeletal: Normal range of motion.   Neurological: She is oriented to person, place, and time.   Skin: No rash noted.   Psychiatric: She has a normal mood and affect. Her behavior is normal. Judgment normal.       Significant Labs:  CBC:   Recent Labs   Lab 10/16/19  2134 10/17/19  0600 10/18/19  0327   WBC 5.70 4.82 4.55  4.55   HGB 11.8* 11.8* 9.7*  9.7*   HCT 34.6* 34.7* 29.5*  29.5*    158 127*  127*     BMP:   Recent Labs   Lab 10/18/19  0327     103   *  135*   K 5.6*  5.6*     102   CO2 20*  20*   *  126*   CREATININE 11.4*  11.4*   CALCIUM 7.7*  7.7*         Significant Imaging:  Imaging results within the past 24 hours have been reviewed.

## 2019-10-18 NOTE — NURSING
Instructed by Dr. Saul to change diet to regular for one meal so that pt can eat prior to dialysis, being that pt refuses to eat anything that is available with her current- ordered, low sodium diet.

## 2019-10-18 NOTE — PROGRESS NOTES
[facility]  Gastroenterology  Progress Note    Patient Name: Aurelia Saucedo  MRN: [id number]  Admission Date: 10/16/2019  Hospital Length of Stay: 1 days  Code Status: Full Code   Attending Provider: Cherry Patton DO  Consulting Provider: Margarita Saul MD  Primary Care Physician: Dustin Ireland MD  Principal Problem: Other ascites      Subjective:     Interval History:     Review of Systems   Constitutional: Positive for fatigue.        Dialysis today, fatigue afterwards   HENT: Negative for hearing loss and trouble swallowing.    Eyes: Negative for visual disturbance.        Wears glassses   Respiratory:        Breathing better since paracenthesis   Cardiovascular: Negative for chest pain.   Gastrointestinal: Positive for abdominal distention.        Decompensated liver disease  Hepatitis C   Endocrine: Negative.    Genitourinary:        Dialysis twice a week   Musculoskeletal:        ROM normal   Skin: Negative for rash and wound.   Allergic/Immunologic: Positive for immunocompromised state.   Neurological: Positive for weakness.   Hematological:        Anemic   Psychiatric/Behavioral: Negative for confusion.     Objective:     Vital Signs (Most Recent):  Temp: 98 °F (36.7 °C) (10/18/19 1640)  Pulse: 79 (10/18/19 1640)  Resp: 18 (10/18/19 1640)  BP: 135/79 (10/18/19 1640)  SpO2: 100 % (10/18/19 1223) Vital Signs (24h Range):  Temp:  [97.7 °F (36.5 °C)-98.2 °F (36.8 °C)] 98 °F (36.7 °C)  Pulse:  [63-82] 79  Resp:  [18-20] 18  SpO2:  [98 %-100 %] 100 %  BP: (104-158)/(65-88) 135/79     Weight: 48.3 kg (106 lb 7.7 oz) (10/18/19 0500)  Body mass index is 17.72 kg/m².      Intake/Output Summary (Last 24 hours) at 10/18/2019 1815  Last data filed at 10/18/2019 1640  Gross per 24 hour   Intake 860 ml   Output 1500 ml   Net -640 ml       Lines/Drains/Airways     Drain                 Hemodialysis AV Fistula Left upper arm -- days          Airway                 Airway - Non-Surgical 10/10/16 7726  Nasal Cannula 1103 days                Physical Exam   Constitutional: She is oriented to person, place, and time. She appears well-developed and well-nourished.   HENT:   Head: Normocephalic and atraumatic.   Eyes: Pupils are equal, round, and reactive to light. EOM are normal.   Neck: Normal range of motion. Neck supple.   Cardiovascular: Regular rhythm.   Pulmonary/Chest: Breath sounds normal.   Abdominal: Soft. Bowel sounds are normal. She exhibits distension. There is no rebound.   Genitourinary:   Genitourinary Comments: wnl   Musculoskeletal: Normal range of motion.   Neurological: She is oriented to person, place, and time.   Skin: No rash noted.   Psychiatric: She has a normal mood and affect. Her behavior is normal. Judgment normal.       Significant Labs:  CBC:   Recent Labs   Lab 10/16/19  2134 10/17/19  0600 10/18/19  0327   WBC 5.70 4.82 4.55  4.55   HGB 11.8* 11.8* 9.7*  9.7*   HCT 34.6* 34.7* 29.5*  29.5*    158 127*  127*     BMP:   Recent Labs   Lab 10/18/19  0327     103   *  135*   K 5.6*  5.6*     102   CO2 20*  20*   *  126*   CREATININE 11.4*  11.4*   CALCIUM 7.7*  7.7*         Significant Imaging:  Imaging results within the past 24 hours have been reviewed.    Assessment/Plan:     * Other ascites  paracenthesis    Anemia of chronic renal failure, stage 5  Monitor blood work    ESRD 2/2 MPGN on HD since 12/16/13   dialysis        Thank you for your consult. I will follow-up with patient. Please contact us if you have any additional questions. Will follow up as outpatient.    Margarita Saul MD  Gastroenterology  [facility]

## 2019-10-18 NOTE — ASSESSMENT & PLAN NOTE
-continue binders with meals  - nephrology following  Lab Results   Component Value Date    .0 (H) 09/02/2015    CALCIUM 7.7 (L) 10/18/2019    CALCIUM 7.7 (L) 10/18/2019    PHOS 3.9 10/12/2016

## 2019-10-18 NOTE — ASSESSMENT & PLAN NOTE
-hypovolemia most likely secondary to advanced liver disease and  associated increased ascites in the setting end-stage renal disease with hemodialysis noncompliance with underlying component of heart failure  S/p paracentesis with 5.3 L fluid removal  HD planned for today

## 2019-10-18 NOTE — HPI
54-year-old  female presents to the emergency room with complaints increased abdominal distension and shortness of breath.   This patient has a known history of end-stage renal disease, which is requiring hemodialysis twice a week on Mondays and Fridays, end-stage liver disease secondary to hep, decompensated cirrhosis, CAD and hyperlipidemia, anemia of chronic kidney disease and secondary hyperparathyroid disease.  The patient had a paracentesis done three weeks ago and large amount of fluid removed.    Tolerated paracentesis. More than 5 L of fluid removed.    10-18 Just returned from dialysis.  Patient will be discharged tonight.  Will follow up in the office.

## 2019-10-18 NOTE — SUBJECTIVE & OBJECTIVE
Interval History: s/p paracentesis yesterday evening with 5.3 L obtained.  Status post albumin.  Patient did well overnight, afebrile vital signs stable.  Patient seen and examined at bedside this morning eating a hamburger.  Plans for HD today.   Denies any CP, SOB, N/V/D, headaches, fever or chills.       Review of Systems   Per interval history, all other systems reviewed and negative.       Objective:     Vital Signs (Most Recent):  Temp: 97.7 °F (36.5 °C) (10/18/19 1223)  Pulse: 76 (10/18/19 1223)  Resp: 20 (10/18/19 1223)  BP: 138/81 (10/18/19 1223)  SpO2: 100 % (10/18/19 1223) Vital Signs (24h Range):  Temp:  [97.7 °F (36.5 °C)-98.2 °F (36.8 °C)] 97.7 °F (36.5 °C)  Pulse:  [74-84] 76  Resp:  [18-20] 20  SpO2:  [97 %-100 %] 100 %  BP: (104-155)/(58-82) 138/81     Weight: 48.3 kg (106 lb 7.7 oz)  Body mass index is 17.72 kg/m².    Intake/Output Summary (Last 24 hours) at 10/18/2019 1348  Last data filed at 10/18/2019 1225  Gross per 24 hour   Intake 360 ml   Output --   Net 360 ml      Physical Exam   Constitutional: She is oriented to person, place, and time. No distress.   HENT:   Head: Atraumatic.   Mouth/Throat: No oropharyngeal exudate.   Eyes: Pupils are equal, round, and reactive to light. No scleral icterus.   Neck: Neck supple.   Cardiovascular: Normal rate.   Murmur heard.  Pulmonary/Chest: Effort normal and breath sounds normal. No respiratory distress.   Abdominal: Bowel sounds are normal. She exhibits distension (mild). There is no tenderness.   Musculoskeletal: She exhibits no edema.   Neurological: She is oriented to person, place, and time.   Skin: Skin is warm and dry. She is not diaphoretic.   Psychiatric: She has a normal mood and affect. Her behavior is normal.   Nursing note and vitals reviewed.      Significant Labs:   CBC:   Recent Labs   Lab 10/16/19  2134 10/17/19  0600 10/18/19  0327   WBC 5.70 4.82 4.55  4.55   HGB 11.8* 11.8* 9.7*  9.7*   HCT 34.6* 34.7* 29.5*  29.5*    158  127*  127*     CMP:   Recent Labs   Lab 10/16/19  2134 10/17/19  0102 10/17/19  0559 10/18/19  0327   * 136 136 135*  135*   K 5.8* 4.7 5.0 5.6*  5.6*   CL 99 100 99 102  102   CO2 19* 20* 19* 20*  20*   GLU 93 80 89 103  103   * 126* 125* 126*  126*   CREATININE 10.4* 10.4* 10.7* 11.4*  11.4*   CALCIUM 8.5* 8.8 8.8 7.7*  7.7*   PROT 6.0  --  6.3 5.2*  5.2*   ALBUMIN 3.1*  --  3.2* 2.6*  2.6*   BILITOT 1.0  --  1.3* 0.9  0.9   ALKPHOS 72  --  75 59  59   AST 43*  --  38 30  30   ALT 44  --  41 32  32   ANIONGAP 15 16 18* 13  13   EGFRNONAA 3.8* 3.8* 3.7* 3.4*  3.4*     All pertinent labs within the past 24 hours have been reviewed.    Significant Imaging: I have reviewed all pertinent imaging results/findings within the past 24 hours.

## 2019-10-18 NOTE — ASSESSMENT & PLAN NOTE
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

## 2019-10-18 NOTE — PROGRESS NOTES
Net UF: 1000 mL  Total UF: 1000 mL       10/18/19 1640   Vital Signs   Temp 98 °F (36.7 °C)   Temp src Oral   Pulse 79   Heart Rate Source Monitor   Resp 18   O2 Device (Oxygen Therapy) room air   /79   BP Location Right arm   BP Method Automatic   Patient Position Sitting   Post-Hemodialysis Assessment   Rinseback Volume (mL) 250 mL   Blood Volume Processed (Liters) 48.4 L   Dialyzer Clearance Lightly streaked   Duration of Treatment (minutes) 180 minutes   Hemodialysis Intake (mL) 500 mL   Total UF (mL) 1500 mL   Net Fluid Removal 1000   Patient Response to Treatment Tolerated well   Post-Treatment Weight 47.3 kg (104 lb 4.4 oz)   Treatment Weight Change -1   Arterial bleeding stop time (min) 5 min   Venous bleeding stop time (min) 5 min   Post-Hemodialysis Comments Tx complete; Pt stable.

## 2019-10-18 NOTE — PROGRESS NOTES
INPATIENT NEPHROLOGY PROGRESS NOTE  [facility] NEPHROLOGY    Patient Name: Aurelia Saucedo  Date: 10/18/2019    Reason for consultation: ESRD    Chief Complaint:   Chief Complaint   Patient presents with    Abdominal Pain     swelling    Shortness of Breath       History of Present Illness:  53 y/o F with hx of ESRD on HD MW and cirrhosis with ascites requiring q3-4 week paracenteses who p/w dyspnea, abd distention over the last week. She missed her HD treatment on Monday due to symptoms. She is due for a paracentesis. We are consulted for dialysis.    · Interval History/Subjective:    - got off 5.8L via para  - no cp, dyspnea, abd pain, edema  - she feels so much better    · Review of Systems: All 14 systems reviewed and negative, except as noted in HPI    Plan of Care:    Assessment:  ESRD  HTN  Hyperkalemia  Metabolic acidosis  SHPT  Anemia of CKD  Cirrhosis with ascites     Plan:     - Continue HD MF.  - BP/VS are stable. Ordered 1-3L UF as tolerated.  - Remains hyperK. Ordered 2K bath.  - Remains acidotic. Ordered 35 bicarb bath.  - Check phos.  - Heme parameters are at baseline. Ordered MERON 50 units/kg with HD.  - She is s/p para.    She is stable for d/c after HD today.    Thank you for allowing us to participate in this patient's care. We will continue to follow.    Medications:  No current facility-administered medications on file prior to encounter.      Current Outpatient Medications on File Prior to Encounter   Medication Sig Dispense Refill    amlodipine (NORVASC) 10 MG tablet Take 1 tablet (10 mg total) by mouth once daily. 90 tablet 3    aspirin (ECOTRIN) 81 MG EC tablet Take 1 tablet (81 mg total) by mouth once daily. 90 tablet 1    cephALEXin (KEFLEX) 500 MG capsule Take 1 capsule (500 mg total) by mouth once daily. On dialysis days take after dialysis 5 capsule 0    dicyclomine (BENTYL) 20 mg tablet Take 20 mg by mouth every 6 (six) hours as needed.      FA-VIT B COMP&C-SELENIUM-ZINC  3-70-15 MG-MCG-MG ORAL TAB Take 1 tablet by mouth once daily.      HYDROcodone-acetaminophen (NORCO)  mg per tablet Take 1 tablet by mouth every 8 (eight) hours as needed for Pain. 60 tablet 0    lisinopril (PRINIVIL,ZESTRIL) 20 MG tablet Take 1 tablet by mouth once daily.  3    metoprolol succinate (TOPROL-XL) 50 MG 24 hr tablet Take 50 mg by mouth once daily.      ondansetron (ZOFRAN-ODT) 4 MG TbDL Take 2 tablets (8 mg total) by mouth every hour as needed. nausea 20 tablet 0    pantoprazole (PROTONIX) 40 MG tablet Take 40 mg by mouth once daily.      promethazine (PHENERGAN) 25 MG tablet Take 1 tablet (25 mg total) by mouth every 6 (six) hours as needed for Nausea. 20 tablet 0    ursodiol (ACTIGALL) 250 mg Tab Take 1 tablet (250 mg total) by mouth once daily. 90 tablet 0    ZEPATIER  mg Tab Take 1 tablet by mouth once daily.       Scheduled Meds:   amLODIPine  10 mg Oral Daily    aspirin  81 mg Oral Daily    metoprolol succinate  50 mg Oral Daily    mupirocin   Nasal BID    pantoprazole  40 mg Oral Daily    ursodiol  300 mg Oral Daily     Continuous Infusions:  PRN Meds:.sodium chloride 0.9%, acetaminophen, albumin human 5%, balsam peru-castor oil, bisacodyl, HYDROcodone-acetaminophen, sodium chloride 0.9%    Allergies:  Quinolones; Levaquin [levofloxacin]; Ciprofloxacin; and Codeine    Vital Signs:  Temp Readings from Last 3 Encounters:   10/18/19 98.1 °F (36.7 °C) (Oral)   10/10/19 97.6 °F (36.4 °C)   09/20/19 97.9 °F (36.6 °C) (Oral)       Pulse Readings from Last 3 Encounters:   10/18/19 74   10/10/19 85   09/27/19 75       BP Readings from Last 3 Encounters:   10/18/19 134/82   10/10/19 131/76   09/27/19 120/81       Weight:  Wt Readings from Last 3 Encounters:   10/18/19 48.3 kg (106 lb 7.7 oz)   10/10/19 55.2 kg (121 lb 9.6 oz)   09/20/19 53.3 kg (117 lb 9.6 oz)       Physical Exam:  Constitutional: nad, aao x 3  Heart: rrr, no m/r/g, wwp, no edema  Lungs: ctab, no w/r/r/c, no  lb  Abdomen: s/nt/nd, +BS      Results:  Lab Results   Component Value Date     (L) 10/18/2019     (L) 10/18/2019    K 5.6 (H) 10/18/2019    K 5.6 (H) 10/18/2019     10/18/2019     10/18/2019    CO2 20 (L) 10/18/2019    CO2 20 (L) 10/18/2019     (H) 10/18/2019     (H) 10/18/2019    CREATININE 11.4 (H) 10/18/2019    CREATININE 11.4 (H) 10/18/2019    CALCIUM 7.7 (L) 10/18/2019    CALCIUM 7.7 (L) 10/18/2019    ANIONGAP 13 10/18/2019    ANIONGAP 13 10/18/2019    ESTGFRAFRICA 3.9 (A) 10/18/2019    ESTGFRAFRICA 3.9 (A) 10/18/2019    EGFRNONAA 3.4 (A) 10/18/2019    EGFRNONAA 3.4 (A) 10/18/2019       Lab Results   Component Value Date    CALCIUM 7.7 (L) 10/18/2019    CALCIUM 7.7 (L) 10/18/2019    PHOS 3.9 10/12/2016       Recent Labs   Lab 10/18/19  0327   WBC 4.55  4.55   RBC 3.01*  3.01*   HGB 9.7*  9.7*   HCT 29.5*  29.5*   *  127*   MCV 98  98   MCH 32.2*  32.2*   MCHC 32.9  32.9       I have personally reviewed pertinent radiological imaging and reports.    I have spent > 35 minutes providing care for this patient for the above diagnoses. These services have included chart/data/imaging review, evaluation, exam, formulation of plan, , note preparation, and discussions with staff involved in this patient's care.    Amy Parrish MD MPH  [facility] [address] [address]  [phone] (p)  [phone] (f)

## 2019-10-18 NOTE — DISCHARGE SUMMARY
"[facility] Medicine  Discharge Summary      Patient Name: Aurelia Saucedo  MRN: [id number]  Admission Date: 10/16/2019  Hospital Length of Stay: 1 days  Discharge Date and Time:  10/18/2019 6:02 PM  Attending Physician: Cherry Patton DO   Discharging Provider: Cherry Patton DO  Primary Care Provider: Dustin Ireland MD      HPI:   54-year-old  female presents to the emergency room with complaints increased abdominal distension and shortness of breath.      She also complaints note and chest discomfort.  This patient has a known history of end-stage renal disease which is requiring hemodialysis twice a week on Mondays and Fridays, end-stage liver disease secondary to hep, MP she in and hyperlipidemia, anemia of chronic kidney disease and secondary hyperparathyroid disease.  The patient states over the past 4-5 increased abdominal spoke falling girth.  T    the patient stayed about ago she did have a paracentesis performed and a removed a little over 5 L of fluid.      The patient states she normally dialyzes on Mondays and Fridays but she missed her last hemodialysis session secondary to abdominal swelling and the inability to fit in her car per the per the patient's report      Hospital Course:   Patient was admitted for hypovolemia.  Paracentesis was performed and approximately 5 L of fluid was removed.  Patient received IV albumin patient tolerated the procedure well and vital signs remained stable overnight.  Patient had routine Monday Friday dialysis.  Patient was seen and examined following dialysis, hemodynamically stable and deemed appropriate for discharge. Patient was agreeable to plan.  She will resume current regimen of dialysis Monday Fridays and paracentesis as needed.    Physical exam on day of discharge  /79 (BP Location: Right arm, Patient Position: Sitting)   Pulse 79   Temp 98 °F (36.7 °C) (Oral)   Resp 18   Ht 5' 5" (1.651 m)   Wt 48.3 kg (106 lb 7.7 " oz)   SpO2 100%   Breastfeeding? No   BMI 17.72 kg/m²   Gen: nad, sitting up in bed  CV: +murmur, regular rate  Resp: CTA B/l  AB: +bs soft mild ttp  Skin: dry, warm      Consults:   Consults (From admission, onward)        Status Ordering Provider     Inpatient consult to Gastroenterology  Once     Provider:  Margarita Saul MD    Acknowledged BARBARA OSUNA     Inpatient consult to Hospitalist  Once     Provider:  Barbara Osuna, PAVEL    Acknowledged JOSÉ MANUEL ROWELL     Inpatient consult to Nephrology  Once     Provider:  Mele Ramos MD    Completed BARBARA OSUNA          No new Assessment & Plan notes have been filed under this hospital service since the last note was generated.  Service: Hospital Medicine    Final Active Diagnoses:    Diagnosis Date Noted POA    PRINCIPAL PROBLEM:  Other ascites [R18.8] 10/17/2019 Yes    Hypervolemia [E87.70] 10/17/2019 Yes    Chronic hepatitis C with cirrhosis [B18.2, K74.60] 12/15/2015 Yes    Essential hypertension [I10] 08/19/2015 Yes     Chronic    ESRD 2/2 MPGN on HD since 12/16/13  [N18.6] 08/19/2015 Yes     Chronic    Anemia of chronic renal failure, stage 5 [N18.5, D63.1] 08/19/2015 Yes     Chronic    Secondary hyperparathyroidism of renal origin [N25.81] 08/19/2015 Yes     Chronic      Problems Resolved During this Admission:       Discharged Condition: stable    Disposition: Home or Self Care    Follow Up:  Follow-up Information     Dustin Ireland MD.    Specialty:  Family Medicine  Why:  As needed  Contact information:  [address] [place] [address]  [address] [place] [address] [id number] [phone]             routine HD .               Patient Instructions:      Diet renal     Notify your health care provider if you experience any of the following:  temperature >100.4     Notify your health care provider if you experience any of the following:  persistent nausea and vomiting or diarrhea     Notify your health care provider if you experience any of the following:   redness, tenderness, or signs of infection (pain, swelling, redness, odor or green/yellow discharge around incision site)     Notify your health care provider if you experience any of the following:  difficulty breathing or increased cough     Notify your health care provider if you experience any of the following:  persistent dizziness, light-headedness, or visual disturbances     Activity as tolerated       Significant Diagnostic Studies: Labs: All labs within the past 24 hours have been reviewed    Pending Diagnostic Studies:     Procedure Component Value Units Date/Time    Cytology Specimen-Medical Cytology (Fluid/Wash/Brush) [[id number]] Collected:  10/17/19 1625    Order Status:  Sent Lab Status:  No result     Specimen:  Ascites     Hepatitis B core antibody, total [[id number]] Collected:  10/18/19 1234    Order Status:  Sent Lab Status:  In process Updated:  10/18/19 1239    Specimen:  Blood     Hepatitis B surface antibody [[id number]] Collected:  10/18/19 1234    Order Status:  Sent Lab Status:  In process Updated:  10/18/19 1239    Specimen:  Blood     Hepatitis B surface antigen [[id number]] Collected:  10/18/19 1234    Order Status:  Sent Lab Status:  In process Updated:  10/18/19 1239    Specimen:  Blood          Medications:  Reconciled Home Medications:      Medication List      CONTINUE taking these medications    amLODIPine 10 MG tablet  Commonly known as:  NORVASC  Take 1 tablet (10 mg total) by mouth once daily.     aspirin 81 MG EC tablet  Commonly known as:  ECOTRIN  Take 1 tablet (81 mg total) by mouth once daily.     cephALEXin 500 MG capsule  Commonly known as:  KEFLEX  Take 1 capsule (500 mg total) by mouth once daily. On dialysis days take after dialysis     DIALYVITE 3000 3-70-15 mg-mcg-mg Tab  Generic drug:  folic acid-B oeqy-K-kgrnr-zinc  Take 1 tablet by mouth once daily.     dicyclomine 20 mg tablet  Commonly known as:  BENTYL  Take 20 mg by mouth every 6 (six) hours as needed.      HYDROcodone-acetaminophen  mg per tablet  Commonly known as:  NORCO  Take 1 tablet by mouth every 8 (eight) hours as needed for Pain.     lisinopril 20 MG tablet  Commonly known as:  PRINIVIL,ZESTRIL  Take 1 tablet by mouth once daily.     metoprolol succinate 50 MG 24 hr tablet  Commonly known as:  TOPROL-XL  Take 50 mg by mouth once daily.     ondansetron 4 MG Tbdl  Commonly known as:  ZOFRAN-ODT  Take 2 tablets (8 mg total) by mouth every hour as needed. nausea     pantoprazole 40 MG tablet  Commonly known as:  PROTONIX  Take 40 mg by mouth once daily.     promethazine 25 MG tablet  Commonly known as:  PHENERGAN  Take 1 tablet (25 mg total) by mouth every 6 (six) hours as needed for Nausea.     ursodiol 250 mg Tab  Commonly known as:  ACTIGALL  Take 1 tablet (250 mg total) by mouth once daily.     ZEPATIER  mg Tab  Generic drug:  elbasvir-grazoprevir  Take 1 tablet by mouth once daily.            Indwelling Lines/Drains at time of discharge:   Lines/Drains/Airways     Drain                 Hemodialysis AV Fistula Left upper arm -- days          Airway                 Airway - Non-Surgical 10/10/16 0913 Nasal Cannula 1103 days          Pressure Ulcer                 Pressure Injury 10/17/19 1107 Coccyx #1 Stage 1 1 day                Time spent on the discharge of patient: 35 minutes  Patient was seen and examined on the date of discharge and determined to be suitable for discharge.         Cherry Patton DO  Department of Hospital Medicine  [facility]

## 2019-10-18 NOTE — CONSULTS
[facility]  Gastroenterology  Consult Note    Patient Name: Aurelia Saucedo  MRN: [id number]  Admission Date: 10/16/2019  Hospital Length of Stay: 0 days  Code Status: Full Code   Attending Provider: Yessenia Barrera MD   Consulting Provider: Margarita Saul MD  Primary Care Physician: Dustin Ireland MD  Principal Problem:Other ascites    Consults  Subjective:     HPI:  54-year-old  female presents to the emergency room with complaints increased abdominal distension and shortness of breath.   This patient has a known history of end-stage renal disease, which is requiring hemodialysis twice a week on Mondays and Fridays, end-stage liver disease secondary to hep, decompensated cirrhosis, CAD and hyperlipidemia, anemia of chronic kidney disease and secondary hyperparathyroid disease.  The patient had a paracentesis done three weeks ago and large amount of fluid removed.    Patient has significant ascites        Review of Systems   Constitutional: Negative for activity change and appetite change.   HENT: Negative for congestion and dental problem.    Eyes: Negative for discharge and itching.   Respiratory: Positive for shortness of breath.    Cardiovascular: Negative for chest pain.   Gastrointestinal: Negative for abdominal distention and abdominal pain.   Endocrine: Negative for cold intolerance.   Genitourinary: Negative for difficulty urinating and dysuria.   Musculoskeletal: Negative for arthralgias and back pain.   Skin: Negative for color change.   Neurological: Negative for dizziness and facial asymmetry.   Hematological: Negative for adenopathy.   Psychiatric/Behavioral: Negative for agitation and behavioral problems.      Objective:      Vital Signs (Most Recent):  Temp: 98.1 °F (36.7 °C) (10/17/19 1553)  Pulse: 75 (10/17/19 1615)  Resp: 19 (10/17/19 1615)  BP: 122/66 (10/17/19 1615)  SpO2: 98 % (10/17/19 1615) Vital Signs (24h Range):  Temp:  [97.6 °F (36.4 °C)-98.1 °F (36.7  °C)] 98.1 °F (36.7 °C)  Pulse:  [75-88] 75  Resp:  [15-20] 19  SpO2:  [96 %-100 %] 98 %  BP: (122-155)/(66-90) 122/66      Weight: 54.6 kg (120 lb 5.9 oz)  Body mass index is 20.03 kg/m².     Intake/Output Summary (Last 24 hours) at 10/17/2019 1636  Last data filed at 10/17/2019 0800      Gross per 24 hour   Intake 100 ml   Output 350 ml   Net -250 ml      Physical Exam   Constitutional: She is oriented to person, place, and time. No distress.   Eyes: Pupils are equal, round, and reactive to light. EOM are normal.   Neck: Neck supple.   Cardiovascular: Normal rate.   Pulmonary/Chest: Effort normal and breath sounds normal.   Reduced air entry to bases   Abdominal: Soft. Bowel sounds are normal.   Ascites   Musculoskeletal: Normal range of motion. She exhibits no edema.   Neurological: She is alert and oriented to person, place, and time.   Skin: Skin is warm.   Psychiatric: She has a normal mood and affect.   Nursing note and vitals reviewed.        Significant Labs:   CBC:        Recent Labs   Lab 10/16/19  2134 10/17/19  0600   WBC 5.70 4.82   HGB 11.8* 11.8*   HCT 34.6* 34.7*    158      CMP:         Recent Labs   Lab 10/16/19  2134 10/17/19  0102 10/17/19  0559   * 136 136   K 5.8* 4.7 5.0   CL 99 100 99   CO2 19* 20* 19*   GLU 93 80 89   * 126* 125*   CREATININE 10.4* 10.4* 10.7*   CALCIUM 8.5* 8.8 8.8   PROT 6.0  --  6.3   ALBUMIN 3.1*  --  3.2*   BILITOT 1.0  --  1.3*   ALKPHOS 72  --  75   AST 43*  --  38   ALT 44  --  41   ANIONGAP 15 16 18*   EGFRNONAA 3.8* 3.8* 3.7*         Significant Imaging: I have reviewed all pertinent imaging results/findings within the past 24 hours.        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 in the background of chronic hepatitis-C  Patient will have had dialysis tomorrow        Anemia of chronic renal failure, stage 5  Anemia of chronic disease the background of end-stage renal disease        Secondary hyperparathyroidism of renal origin  -continue binders with meals              Essential hypertension  Blood pressure level stable  Will monitor        Chronic hepatitis C with cirrhosis  History of chronic hepatitis C with cirrhosis  History of cryoglobulinemia  She has been rejected by [facility] for liver transplantation               VTE Risk Mitigation (From admission, onward)                  Ordered        Place sequential compression device  Until discontinued      10/17/19 0229        IP VTE LOW RISK PATIENT  Once      10/17/19 0229                                     Thank you for your consult. I will follow-up with patient. Please contact us if you have any additional questions.    Margarita Saul MD  Gastroenterology  [facility]

## 2019-10-18 NOTE — UM SECONDARY REVIEW
Received Determination from [facility] EHR, Dr Magy Bashir Recommends Inpatient status. Scanned into Media Mgmt.             No

## 2019-10-18 NOTE — PROGRESS NOTES
[facility] Medicine  Progress Note    Patient Name: Aurelia Saucedo  MRN: [id number]  Patient Class: IP- Inpatient   Admission Date: 10/16/2019  Length of Stay: 1 days  Attending Physician: Cherry Patton DO  Primary Care Provider: Dustin Ireland MD      Subjective:     Principal Problem:Other ascites    HPI:  54-year-old  female presents to the emergency room with complaints increased abdominal distension and shortness of breath.      She also complaints note and chest discomfort.  This patient has a known history of end-stage renal disease which is requiring hemodialysis twice a week on Mondays and Fridays, end-stage liver disease secondary to hep, MP she in and hyperlipidemia, anemia of chronic kidney disease and secondary hyperparathyroid disease.  The patient states over the past 4-5 increased abdominal spoke falling girth.  T    he patient stayed about ago she did have a paracentesis performed and a removed a little over 5 L of fluid.      The patient states she normally dialyzes on Mondays and Fridays but she missed her last hemodialysis session secondary to abdominal swelling and the inability to fit in her car per the per the patient's report    Overview/Hospital Course:  Patient has significant ascites on examination  She missed her dialysis as an outpatient  Per patient her usual dialysis days going to be tomorrow  Patient sitting comfortably on the bed without any issues    Interval History: s/p paracentesis yesterday evening with 5.3 L obtained.  Status post albumin.  Patient did well overnight, afebrile vital signs stable.  Patient seen and examined at bedside this morning eating a hamburger.  Plans for HD today.   Denies any CP, SOB, N/V/D, headaches, fever or chills.       Review of Systems   Per interval history, all other systems reviewed and negative.       Objective:     Vital Signs (Most Recent):  Temp: 97.7 °F (36.5 °C) (10/18/19 1223)  Pulse: 76 (10/18/19  1223)  Resp: 20 (10/18/19 1223)  BP: 138/81 (10/18/19 1223)  SpO2: 100 % (10/18/19 1223) Vital Signs (24h Range):  Temp:  [97.7 °F (36.5 °C)-98.2 °F (36.8 °C)] 97.7 °F (36.5 °C)  Pulse:  [74-84] 76  Resp:  [18-20] 20  SpO2:  [97 %-100 %] 100 %  BP: (104-155)/(58-82) 138/81     Weight: 48.3 kg (106 lb 7.7 oz)  Body mass index is 17.72 kg/m².    Intake/Output Summary (Last 24 hours) at 10/18/2019 1348  Last data filed at 10/18/2019 1225  Gross per 24 hour   Intake 360 ml   Output --   Net 360 ml      Physical Exam   Constitutional: She is oriented to person, place, and time. No distress.   HENT:   Head: Atraumatic.   Mouth/Throat: No oropharyngeal exudate.   Eyes: Pupils are equal, round, and reactive to light. No scleral icterus.   Neck: Neck supple.   Cardiovascular: Normal rate.   Murmur heard.  Pulmonary/Chest: Effort normal and breath sounds normal. No respiratory distress.   Abdominal: Bowel sounds are normal. She exhibits distension (mild). There is no tenderness.   Musculoskeletal: She exhibits no edema.   Neurological: She is oriented to person, place, and time.   Skin: Skin is warm and dry. She is not diaphoretic.   Psychiatric: She has a normal mood and affect. Her behavior is normal.   Nursing note and vitals reviewed.      Significant Labs:   CBC:   Recent Labs   Lab 10/16/19  2134 10/17/19  0600 10/18/19  0327   WBC 5.70 4.82 4.55  4.55   HGB 11.8* 11.8* 9.7*  9.7*   HCT 34.6* 34.7* 29.5*  29.5*    158 127*  127*     CMP:   Recent Labs   Lab 10/16/19  2134 10/17/19  0102 10/17/19  0559 10/18/19  0327   * 136 136 135*  135*   K 5.8* 4.7 5.0 5.6*  5.6*   CL 99 100 99 102  102   CO2 19* 20* 19* 20*  20*   GLU 93 80 89 103  103   * 126* 125* 126*  126*   CREATININE 10.4* 10.4* 10.7* 11.4*  11.4*   CALCIUM 8.5* 8.8 8.8 7.7*  7.7*   PROT 6.0  --  6.3 5.2*  5.2*   ALBUMIN 3.1*  --  3.2* 2.6*  2.6*   BILITOT 1.0  --  1.3* 0.9  0.9   ALKPHOS 72  --  75 59  59   AST 43*  --  38  30  30   ALT 44  --  41 32  32   ANIONGAP 15 16 18* 13  13   EGFRNONAA 3.8* 3.8* 3.7* 3.4*  3.4*     All pertinent labs within the past 24 hours have been reviewed.    Significant Imaging: I have reviewed all pertinent imaging results/findings within the past 24 hours.      Assessment/Plan:      * Other ascites  S/p paracentesis yesterday  Continue with routine management      Hypervolemia  -hypovolemia most likely secondary to advanced liver disease and  associated increased ascites in the setting end-stage renal disease with hemodialysis noncompliance with underlying component of heart failure  S/p paracentesis with 5.3 L fluid removal  HD planned for today    Chronic hepatitis C with cirrhosis  History of chronic hepatitis C with cirrhosis  History of cryoglobulinemia  She has been rejected by 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

## 2019-10-21 NOTE — TELEPHONE ENCOUNTER
Attempted to reach out to patient about her 10/22/18 appt.  No response and VM has not been set up.  I did add to the appointment note that she was Hosp DC on 10/18

## 2019-10-22 NOTE — PROGRESS NOTES
SUBJECTIVE:    Patient ID: Aurelia Saucedo is a 54 y.o. female.    Chief Complaint: Hospital Follow Up (discharged 10-) and Hypertension    53 yo female presents to clinic today to follow up on her most recent hospital admission. She was admitted on 16Oct and discharged on 18 Oct . Paracentesis was performed and approximately 5 L of fluid was removed.  Patient received IV albumin patient tolerated the procedure well. Pt fell like when she was an inpatient too much fluids were removed via dialysis and the paracentesis, she is back up to her dry weight.  Since she was in the hospital in bed her chronic back pain has returned and she has been having a difficult time falling asleep.    PMHX:  Chronic Hepatitis C Anny Type 1B: Not currently being treated. But is planning to start 16 week [place] treatment  Anemia of chronic renal failure:  Secondary Hyperparathyroidism:  ESRD: on dialysis since 2013 current regimen of dialysis is mondays/fridays.  Cryoglobulinemia:  HTN; She has HTN that has been well controlled on Cebrrlmhlp74, Lisinopril 20, Metoprolol XL 50.  GERD: On Pantoprazole 40mg    Specialists:  Gastro: Dr Saul  Nephrologist: Dr Ramos  Cardiologist: Pt has appointment with Dr Patricia ARANA      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 since 12/16/13 8/19/2015    Gastritis     Hypertension 8/19/2015    Kidney transplant candidate 8/19/2015    Renal dialysis status     M, W, F    Secondary hyperparathyroidism of renal origin 8/19/2015     Social History     Socioeconomic History    Marital status:      Spouse name: Not on file    Number of children: 2    Years of education: Not on file    Highest education level: Not on file   Occupational History    Occupation: baker    Social Needs    Financial resource strain: Not on file    Food insecurity:     Worry:  Not on file     Inability: Not on file    Transportation needs:     Medical: Not on file     Non-medical: Not on file   Tobacco Use    Smoking status: Current Every Day Smoker     Packs/day: 0.75     Years: 30.00     Pack years: 22.50    Smokeless tobacco: Never Used    Tobacco comment: pt reports actively trying to quit but struggling.  Counseling and resources given.   Substance and Sexual Activity    Alcohol use: No     Alcohol/week: 0.0 standard drinks    Drug use: Yes     Types: Marijuana     Comment: nightly to help sleep    Sexual activity: Not Currently     Partners: Male     Birth control/protection: Surgical   Lifestyle    Physical activity:     Days per week: Not on file     Minutes per session: Not on file    Stress: To some extent   Relationships    Social connections:     Talks on phone: Not on file     Gets together: Not on file     Attends Buddhist service: Not on file     Active member of club or organization: Not on file     Attends meetings of clubs or organizations: Not on file     Relationship status: Not on file   Other Topics Concern    Are you pregnant or think you may be? Not Asked    Breast-feeding Not Asked   Social History Narrative    Not on file     Past Surgical History:   Procedure Laterality Date    AV FISTULA PLACEMENT  2013    SALPINGOOPHORECTOMY Right 1997    TOTAL ABDOMINAL HYSTERECTOMY  1999     Family History   Problem Relation Age of Onset    Kidney disease Mother     Stroke Father     Psoriasis Brother     Breast cancer Neg Hx     Cancer Neg Hx     Colon cancer Neg Hx     Ovarian cancer Neg Hx      Current Outpatient Medications   Medication Sig Dispense Refill    amlodipine (NORVASC) 10 MG tablet Take 1 tablet (10 mg total) by mouth once daily. 90 tablet 3    cephALEXin (KEFLEX) 500 MG capsule Take 1 capsule (500 mg total) by mouth once daily. On dialysis days take after dialysis 5 capsule 0    dicyclomine (BENTYL) 20 mg tablet Take 20 mg by mouth  "every 6 (six) hours as needed.      FA-VIT B COMP&C-SELENIUM-ZINC 3-70-15 MG-MCG-MG ORAL TAB Take 1 tablet by mouth once daily.      HYDROcodone-acetaminophen (NORCO)  mg per tablet Take 1 tablet by mouth every 8 (eight) hours as needed for Pain. 60 tablet 0    lisinopril (PRINIVIL,ZESTRIL) 20 MG tablet Take 1 tablet by mouth once daily.  3    metoprolol succinate (TOPROL-XL) 50 MG 24 hr tablet Take 50 mg by mouth once daily.      ondansetron (ZOFRAN-ODT) 4 MG TbDL Take 2 tablets (8 mg total) by mouth every hour as needed. nausea 20 tablet 0    pantoprazole (PROTONIX) 40 MG tablet Take 40 mg by mouth once daily.      promethazine (PHENERGAN) 25 MG tablet Take 1 tablet (25 mg total) by mouth every 6 (six) hours as needed for Nausea. 20 tablet 0    ursodiol (ACTIGALL) 250 mg Tab Take 1 tablet (250 mg total) by mouth once daily. 90 tablet 0    traZODone (DESYREL) 50 MG tablet Take 1 tablet (50 mg total) by mouth every evening. 30 tablet 11     No current facility-administered medications for this visit.      Review of patient's allergies indicates:   Allergen Reactions    Quinolones Itching    Levaquin [levofloxacin] Itching    Ciprofloxacin Itching    Codeine Nausea Only       Review of Systems   Constitutional: Negative for activity change, appetite change, diaphoresis and fatigue.   HENT: Negative.    Eyes: Negative.    Respiratory: Negative for cough, chest tightness, shortness of breath and wheezing.    Cardiovascular: Negative for chest pain, palpitations and leg swelling.   Gastrointestinal: Positive for abdominal distention (mild since the paracentesis). Negative for blood in stool, constipation and diarrhea.   Musculoskeletal: Positive for back pain.   Psychiatric/Behavioral: Positive for sleep disturbance.          Blood pressure 110/68, pulse 84, temperature 97.3 °F (36.3 °C), temperature source Oral, resp. rate 16, height 5' 5" (1.651 m), weight 50.8 kg (112 lb), SpO2 98 %. Body mass " index is 18.64 kg/m².   Objective:      Physical Exam   Constitutional: She is oriented to person, place, and time. No distress.   Thin cahchectic   HENT:   Head: Normocephalic and atraumatic.   Nose: Nose normal.   Mouth/Throat: Oropharynx is clear and moist.   Eyes: Pupils are equal, round, and reactive to light. Conjunctivae and EOM are normal. No scleral icterus.   Cardiovascular: Normal rate, regular rhythm and normal heart sounds.   No murmur heard.  Pulmonary/Chest: Effort normal and breath sounds normal. No respiratory distress. She has no wheezes.   Musculoskeletal:        Lumbar back: She exhibits tenderness. She exhibits normal range of motion, no swelling and no edema.   Neurological: She is alert and oriented to person, place, and time.   Skin: Skin is warm and dry. She is not diaphoretic.           Assessment:       1. Hospital discharge follow-up    2. Cirrhosis of liver with ascites, unspecified hepatic cirrhosis type    3. Other insomnia         Plan:           Hospital discharge follow-up  Pt is doing well since discharge will start on medications to help her get her sllep cycle back in order, and will treat her back pain, due to the tylenol in the norco to take only when her pain is severe, use a heating pad stretch and rest.  Cirrhosis of liver with ascites, unspecified hepatic cirrhosis type  -     HYDROcodone-acetaminophen (NORCO)  mg per tablet; Take 1 tablet by mouth every 8 (eight) hours as needed for Pain.  Dispense: 60 tablet; Refill: 0    Other insomnia  -     traZODone (DESYREL) 50 MG tablet; Take 1 tablet (50 mg total) by mouth every evening.  Dispense: 30 tablet; Refill: 11

## 2019-11-04 NOTE — TELEPHONE ENCOUNTER
----- Message from Giana Pickard sent at 11/4/2019 10:48 AM CST -----  Contact: self  She received a letter from [facility] saying they need more information from the doctor in order to make a decision on the HCV meds.  Any idea on when she might get them?

## 2019-11-07 NOTE — TELEPHONE ENCOUNTER
NEEDS ORDERS FOR PARACENTESIS.  CANNOT WALK 10 FEET WITHOUT FEELING LIKE SHE IS GOING TO COLLAPSE.  HARD TO BREATHE.  HAS A HORRIBLE BURNING IN HER CHEST, FEELS LIKE A HEART ATTACK.  THINKS ITS THE FLUID PRESSING ON HER HEART

## 2019-11-09 NOTE — TELEPHONE ENCOUNTER
Please check with [facility] to see about the hcv meds.  Please pend orders for paracentesis.  Referring pt to [place] [facility].

## 2019-11-13 NOTE — PLAN OF CARE
Pt. Nauseated and vomited approx. 40-50cc of green bile.  Dr. Patton aware and new order obtained.  1428-Zofran 4mg IVP given as ordered.

## 2019-11-13 NOTE — PLAN OF CARE
Tolerated well.   Denies any more nausea.  Lg. bandaid to LLQ.  5.8L clear yellow fluid drained.  Verbal and written discharge instructions give and patient verbalized understanding.  Discharged to home via private vehicle with spouse.

## 2019-11-14 NOTE — TELEPHONE ENCOUNTER
Phone call returned to Giana.  Dr. Saul will be referring patient for liver transplant, but requesting documentation of why patient previously declined.  Denial letter faxed to [phone].    =================================     ----- Message from Rik Bella sent at 11/14/2019 12:27 PM CST -----  Contact: Giana bee/ Dr. Nieto's Office  Calling to speak w/ a nurse to find out why pt was declined for a liver txp     Contact: [phone]   ----- Message -----  From: Jesus Ritter  Sent: 11/14/2019  12:18 PM CST  To: [facility] Liver Referral Pool    Calling to speak w/ a nurse to find out why pt was declined for a liver txp    Contact: [phone]

## 2019-11-18 PROBLEM — R79.89 TROPONIN LEVEL ELEVATED: Status: ACTIVE | Noted: 2019-01-01

## 2019-11-18 PROBLEM — J18.9 PNEUMONIA OF RIGHT MIDDLE LOBE DUE TO INFECTIOUS ORGANISM: Status: ACTIVE | Noted: 2019-01-01

## 2019-11-18 PROBLEM — J18.9 PNEUMONIA DUE TO INFECTIOUS ORGANISM: Status: ACTIVE | Noted: 2019-01-01

## 2019-11-18 NOTE — H&P
[facility] Medicine  History & Physical    Patient Name: Aurelia Saucedo  MRN: [id number]  Patient Class: OP- Observation   Admission Date: 11/18/2019  9:57 AM  Length of Stay: 0  Attending Physician: Jake Ingram MD  Primary Care Provider: Dustin Ireland MD  Face-to-Face encounter date: 11/18/2019  Code Status: DNR/DNI  MPOA:   Chief Complaint: Shortness of Breath        Patient information was obtained from patient, past medical records and ER records.   HISTORY OF PRESENT ILLNESS:   Mr. Aurelia Saucedo is a 54 y.o. female who has past history of Decompensated Cirrhosis due to hepatitis C complicated with ascites requiring paracentesis every 4-6 weeks by Dr. Hadley,, ESRD due to MPGN Dialysis dependent MF twice weekly complicated by secondary hyperparathyrodisim, Hypertension, Gastritis and Colitis presented today with her son and  to our emergency room with right sided chest pain, shortness of breath and associated fevers. As per the patient, she recently had paracentesis in her gastroenterologist office which she tolerated well. After two days of that she started to have severe sharp chest pain worst with breathing, with high grade fevers that broke yesterday. This morning around 2 AM she had to take 2 NORCO 10 mg to control her pain after which she presented here. She hasn't taken any thing beside the pain pills. She was diagnosed with Pneumonia in the emergency room and hospitalist service was called for evaluation.     At the time of my evaluation, patient was in sharp pain and wasn't able to have conversation. I had to ask the nurse to give her Dilaudid to that I can obtain further history. Dilaudid was able to keep the pain to tolerable limit. She reported not going to dialysis today and will require one. She has been evaluated for liver transplant but not accepted and will be referred to [place] for further evaluation.  REVIEW OF SYSTEMS:   10 Point  Review of System was performed and was found to be negative except for that mentioned already in the HPI above.     PAST MEDICAL HISTORY:   Decompensated Cirrhosis due to hepatitis C complicated with ascites requiring paracentesis every 4-6 weeks by Dr. Hadley,, ESRD due to MPGN Dialysis dependent MF twice weekly complicated by secondary hyperparathyrodisim, Hypertension, Gastritis and Colitis, cryoglobulinemia     PAST SURGICAL HISTORY:     Past Surgical History:   Procedure Laterality Date    AV FISTULA PLACEMENT  2013    SALPINGOOPHORECTOMY Right 1997    TOTAL ABDOMINAL HYSTERECTOMY  1999       ALLERGIES:   Quinolones; Levaquin [levofloxacin]; Ciprofloxacin; and Codeine    FAMILY HISTORY:     Family History   Problem Relation Age of Onset    Kidney disease Mother     Stroke Father     Psoriasis Brother     Breast cancer Neg Hx     Cancer Neg Hx     Colon cancer Neg Hx     Ovarian cancer Neg Hx        SOCIAL HISTORY:     Social History     Tobacco Use    Smoking status: Current Every Day Smoker     Packs/day: 0.75     Years: 30.00     Pack years: 22.50    Smokeless tobacco: Never Used    Tobacco comment: pt reports actively trying to quit but struggling.  Counseling and resources given.   Substance Use Topics    Alcohol use: No     Alcohol/week: 0.0 standard drinks        Social History     Substance and Sexual Activity   Sexual Activity Not Currently    Partners: Male    Birth control/protection: Surgical      40 pack year smoking history and still smoking. Lives with his  and son in [place], [place]    HOME MEDICATIONS:     Prior to Admission medications    Medication Sig Start Date End Date Taking? Authorizing Provider   amlodipine (NORVASC) 10 MG tablet Take 1 tablet (10 mg total) by mouth once daily. 3/17/16   Katlin Heaton MD   cephALEXin (KEFLEX) 500 MG capsule Take 1 capsule (500 mg total) by mouth once daily. On dialysis days take after dialysis 7/30/19   Dustin Ireland MD  "  dicyclomine (BENTYL) 20 mg tablet Take 20 mg by mouth every 6 (six) hours as needed.    Historical Provider, MD   FA-VIT B COMP&C-SELENIUM-ZINC 3-70-15 MG-MCG-MG ORAL TAB Take 1 tablet by mouth once daily.    Historical Provider, MD   HYDROcodone-acetaminophen (NORCO)  mg per tablet Take 1 tablet by mouth every 8 (eight) hours as needed for Pain. 10/22/19   Dustin Ireland MD   lisinopril (PRINIVIL,ZESTRIL) 20 MG tablet Take 1 tablet by mouth once daily. 9/9/18   Historical Provider, MD   metoprolol succinate (TOPROL-XL) 50 MG 24 hr tablet Take 50 mg by mouth once daily.    Historical Provider, MD   ondansetron (ZOFRAN-ODT) 4 MG TbDL Take 2 tablets (8 mg total) by mouth every hour as needed. nausea 10/1/18   Dustin Ireland MD   pantoprazole (PROTONIX) 40 MG tablet Take 40 mg by mouth once daily.    Historical Provider, MD   promethazine (PHENERGAN) 25 MG tablet Take 1 tablet (25 mg total) by mouth every 6 (six) hours as needed for Nausea. 1/7/19   Dustin Ireland MD   traZODone (DESYREL) 50 MG tablet Take 1 tablet (50 mg total) by mouth every evening. 10/22/19 10/21/20  Dustin Ireland MD   ursodiol (ACTIGALL) 250 mg Tab Take 1 tablet (250 mg total) by mouth once daily. 3/7/19 3/6/20  Margarita Saul MD         PHYSICAL EXAM:   BP (!) 146/89   Pulse 103   Temp 97.2 °F (36.2 °C) (Oral)   Resp (!) 22   Ht 5' 4" (1.626 m)   Wt 52.6 kg (116 lb)   SpO2 99%   BMI 19.91 kg/m²   Vitals Reviewed  General appearance: very thin, malnourished female in distress  Skin: rash all over lower extremities  Neuro: Motor and sensory exams grossly intact. Good tone. Power in all 4 extremities 5/5.   HENT: Atraumatic head. Moist mucous membranes of oral cavity.  Eyes: Normal extraocular movements.   Neck: Supple. No evidence of lymphadenopathy. No thyroidomegaly.  Lungs: Diminished breath sounds bilaterally. Coarse breath sounds on the RLL   Heart: Regular rate and rhythm. S1 and S2 present with no murmurs/gallop/rub. " No pedal edema. No JVD present.   Abdomen: Soft, mildly-distended, non-tender. No rebound tenderness/guarding. No masses or organomegaly. Some ascitic fluid present. Bowel sounds are normal. Bladder is not palpable.   Extremities: No cyanosis, clubbing, or edema.  Psych/mental status: Alert and oriented. Cooperative. Responds appropriately to questions.   EMERGENCY DEPARTMENT LABS AND IMAGING:     Labs Reviewed   TROPONIN I - Abnormal; Notable for the following components:       Result Value    Troponin I 3.054 (*)     All other components within normal limits    Narrative:       Troponin critical result(s) called and verbal readback obtained   from Pepito Ozuna RN/ED by JBGabe 11/18/2019 13:49   COMPREHENSIVE METABOLIC PANEL - Abnormal; Notable for the following components:    Potassium 5.3 (*)     BUN, Bld 74 (*)     Creatinine 8.3 (*)     Albumin 2.8 (*)     eGFR if  5.8 (*)     eGFR if non  5.0 (*)     All other components within normal limits   CK-MB - Abnormal; Notable for the following components:    CPK MB 6.9 (*)     All other components within normal limits   CBC W/ AUTO DIFFERENTIAL - Abnormal; Notable for the following components:    RBC 3.32 (*)     Hemoglobin 10.7 (*)     Hematocrit 32.4 (*)     Mean Corpuscular Hemoglobin 32.2 (*)     Lymph # 0.4 (*)     Gran% 87.5 (*)     Lymph% 6.2 (*)     All other components within normal limits   CULTURE, RESPIRATORY   CK   APTT   PROTIME-INR   AMYLASE   LIPASE   MAGNESIUM   AMMONIA   INFLUENZA A AND B ANTIGEN    Narrative:     Specimen Source->Nasopharyngeal Swab   HEPATITIS PANEL, ACUTE   DRUG SCREEN PANEL, URINE EMERGENCY   URINALYSIS, REFLEX TO URINE CULTURE   HEPATITIS B SURFACE ANTIBODY   HEPATITIS B CORE ANTIBODY, TOTAL   HEPATITIS B SURFACE ANTIGEN   TROPONIN I   TROPONIN I       CTA Chest Non-Coronary (PE Study)   Final Result      1. No pulmonary embolism, aortic aneurysm or dissection.   2. There is consolidation of the  right middle lobe, and patchy consolidation of the right lower lobe, consistent with pneumonia.   3. Mediastinal lymphadenopathy is felt to be reactive.         Electronically signed by: Guy Patton MD   Date:    11/18/2019   Time:    14:13      X-Ray Chest AP Portable   Final Result      Development of dense airspace consolidation involving the right lung base suspicious for pneumonia.      Follow-up PA and lateral chest radiography is recommended.         Electronically signed by: Harley Jimenez MD   Date:    11/18/2019   Time:    10:57          ASSESSMENT & PLAN:   Aurelia Saucedo is a 54 y.o. female admitted for    1. Typical CAP: Needs admission for inpatient antibiotics. I will continue Rocephin and azithromycin. Respiratory cultures. Inhalers q6h. Repeat CXR tomorrow    2. Troponin: Appears more of demand ischemia plus ESRD?. However she has enough risk factors that put her at very high risk and I believe this warrant further workup. I will consult cardiology to address this. For now, I will do serial troponin and repeat EKG. Ordered Echo for shortness of breath. C/s Dr. Mcdaniels    3. Decompensated Cirrhosis due to hepatitis C: Asymptomatic. Recently had paracentesis. Monitor.     4. Hepatitis C: Getting treatment outpatient.     5. Ascites: See as #3     6. ESRD: she has missed dialysis today. I will consult her Nephrologist    7. Hypertension: IV hydralazine.       DVT Prophylaxis: will be placed on Heparin for DVT prophylaxis and will be advised to be as mobile as possible and sit in a chair as tolerated.   ________________________________________________________________________________    Discharge Planning and Disposition: No mobility needs. Ambulating well. Good social support system. Patient will be discharged after completion of therapy  Patient high risk due to pneumonia requiring inpatient antibiotics complicated with multiple comorbidity and requires IV pain medications  Face-to-Face  encounter date: 11/18/2019  Encounter included review of the medical records, interviewing and examining the patient face-to-face, discussion with family and other health care providers, ordering and interpreting lab/test results and formulating a plan of care.   Medical Decision Making during this encounter was  [_] Low Complexity  [_] Moderate Complexity  [x] High Complexity  _________________________________________________________________________________    INPATIENT LIST OF MEDICATIONS     Current Facility-Administered Medications:     albuterol-ipratropium 2.5 mg-0.5 mg/3 mL nebulizer solution 3 mL, 3 mL, Nebulization, Q6H, Jake Ingram MD    [START ON 11/19/2019] amLODIPine tablet 10 mg, 10 mg, Oral, Daily, Jake Ingram MD    azithromycin 500 mg in dextrose 5 % 250 mL IVPB (ready to mix system), 500 mg, Intravenous, Q24H, Jake Ingram MD    cefTRIAXone (ROCEPHIN) 1 g in dextrose 5 % 50 mL IVPB, 1 g, Intravenous, Q24H, Jake Ingram MD    HYDROcodone-acetaminophen  mg per tablet 1 tablet, 1 tablet, Oral, Q6H PRN, Jake Ingram MD    [START ON 11/19/2019] lisinopril tablet 20 mg, 20 mg, Oral, Daily, Jake Ingram MD    [START ON 11/19/2019] metoprolol succinate (TOPROL-XL) 24 hr tablet 50 mg, 50 mg, Oral, Daily, Jake Ingram MD    mupirocin 2 % ointment, , Nasal, BID, Luciano Campos MD    ondansetron disintegrating tablet 8 mg, 8 mg, Oral, Q6H PRN, Jake Ingram MD    [START ON 11/19/2019] pantoprazole EC tablet 40 mg, 40 mg, Oral, Daily, Jake Ingram MD    promethazine tablet 25 mg, 25 mg, Oral, Q6H PRN, Jake Ingram MD    traZODone tablet 50 mg, 50 mg, Oral, QHS, Jake Ingram MD      Scheduled Meds:   albuterol-ipratropium  3 mL Nebulization Q6H    [START ON 11/19/2019] amLODIPine  10 mg Oral Daily    azithromycin  500 mg Intravenous Q24H    cefTRIAXone (ROCEPHIN) IVPB  1 g Intravenous Q24H    [START ON  11/19/2019] lisinopril  20 mg Oral Daily    [START ON 11/19/2019] metoprolol succinate  50 mg Oral Daily    mupirocin   Nasal BID    [START ON 11/19/2019] pantoprazole  40 mg Oral Daily    traZODone  50 mg Oral QHS     Continuous Infusions:  PRN Meds:.HYDROcodone-acetaminophen, ondansetron, promethazine

## 2019-11-18 NOTE — ED PROVIDER NOTES
Encounter Date: 11/18/2019       History     Chief Complaint   Patient presents with    Shortness of Breath     HPI   54-year-old female history of end-stage renal disease dialysis Monday Wednesday Friday, cirrhosis secondary to hep C presenting to the ED with complaints of right sided pleuritic chest pain since 02/30 this morning.  Patient reports dry cough over the last 3 days.  Patient also notes low-grade fevers high of 100.8 this weekend.  Patient denies nausea vomiting. Denies abdominal pain. Patient had a paracentesis on 11/11/19 where they remove several L of fluid.  Patient was last dialyzed on Friday.  Patient states she was taking Norco for pain with relief.  Patient has been taking Tylenol as needed for fever.  Patient denies shortness of breath. Patient states she has still been able to ambulate.  Patient is primarily here due to pain with deep breathing.  Patient denies lower extremity swelling. Denies history of DVT or PE.  Denies active cancer.  Patient denies any trauma.    The patient had a flu shot and pneumonia vaccine that is up-to-date.    Review of patient's allergies indicates:   Allergen Reactions    Quinolones Itching    Levaquin [levofloxacin] Itching    Ciprofloxacin Itching    Codeine Nausea Only     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 since 12/16/13 8/19/2015    Gastritis     Hypertension 8/19/2015    Kidney transplant candidate 8/19/2015    Renal dialysis status     M, W, F    Secondary hyperparathyroidism of renal origin 8/19/2015     Past Surgical History:   Procedure Laterality Date    AV FISTULA PLACEMENT  2013    SALPINGOOPHORECTOMY Right 1997    TOTAL ABDOMINAL HYSTERECTOMY  1999     Family History   Problem Relation Age of Onset    Kidney disease Mother     Stroke Father     Psoriasis Brother     Breast cancer Neg Hx     Cancer Neg  Hx     Colon cancer Neg Hx     Ovarian cancer Neg Hx      Social History     Tobacco Use    Smoking status: Current Every Day Smoker     Packs/day: 0.75     Years: 30.00     Pack years: 22.50    Smokeless tobacco: Never Used    Tobacco comment: pt reports actively trying to quit but struggling.  Counseling and resources given.   Substance Use Topics    Alcohol use: No     Alcohol/week: 0.0 standard drinks    Drug use: Yes     Types: Marijuana     Comment: nightly to help sleep     Review of Systems   Constitutional: Positive for fatigue and fever. Negative for diaphoresis.   HENT: Negative for sore throat.    Respiratory: Positive for cough. Negative for shortness of breath.    Cardiovascular: Positive for chest pain.        Rib pain    Gastrointestinal: Positive for nausea. Negative for blood in stool, diarrhea and vomiting.   Musculoskeletal: Negative for back pain.   Skin: Negative for rash.   Neurological: Negative for syncope, weakness, light-headedness and headaches.   Hematological: Does not bruise/bleed easily.       Physical Exam     Initial Vitals [11/18/19 0959]   BP Pulse Resp Temp SpO2   109/61 103 18 97.2 °F (36.2 °C) 100 %      MAP       --         Physical Exam    Constitutional:   Chronically ill-appearing female.  No acute respiratory distress.   HENT:   Head: Normocephalic and atraumatic.   Eyes: EOM are normal. Pupils are equal, round, and reactive to light.   Neck: Normal range of motion.   Cardiovascular:   No murmur heard.  Heart rate 80 in the room, regular rhythm.   Pulmonary/Chest:   Crackles appreciated with adventitious sounds on the right middle lung fields and lower lung fields.  Left lung fields sound clear.  No respiratory distress. No wheezing appreciated   Abdominal:   Distended.  There is some tenderness over the right upper abdomen and right ribs.  No crepitus appreciated. No bruising.   Musculoskeletal:   Lower extremities with venous stasis.  1+ DP pulses. No swelling.   No erythema or warmth.  Legs are symmetric.   Neurological: She is alert and oriented to person, place, and time. No cranial nerve deficit or sensory deficit.   Skin: Skin is warm and dry. Capillary refill takes less than 2 seconds.         ED Course   Procedures  Labs Reviewed   TROPONIN I   DRUG SCREEN PANEL, URINE EMERGENCY   URINALYSIS, REFLEX TO URINE CULTURE   CK   COMPREHENSIVE METABOLIC PANEL   CK-MB   CBC W/ AUTO DIFFERENTIAL   APTT   PROTIME-INR   AMYLASE   LIPASE   MAGNESIUM   AMMONIA         MDM PGY 3  54-year-old history of end-stage renal disease, cirrhosis presenting to the ED with complaints of right-sided pleuritic chest pain acute onset at 230am, cough and fevers x 3d.  Vital signs notable for blood pressure of 109/61, pulse of 85, satting 100%, afebrile here.  On exam patient has asymmetric lung exam with crackles appreciated on the right side.  No crepitus over right ribs.  No bruising.  Abdomen is mildly tender diffusely without rebound or guarding. Chest x-ray shows a lower pneumonia on the right side which is new from prior imaging.  The patient's disposition is pending lab evaluation.  Given patient is a dialysis and cirrhotic patient will cover for healthcare related pneumonia's. Pt notes being more immobile due to her chronic medical conditions. Will evaluate with CT PE study given pleuritic chest pain that is acute in onset, recent immobility, elevated HR.  Influenza swab pending.    Will treat with CAP coverage with ceftriaxone and azithromycin.     Will admit to medicine. Will require dialysis today after receiving contrast.     [facility]   Emergency Medicine PGY2   12:00 PM    UPDATE   The patient has an elevated K of 5.3 without signs of hyperkalemia on EKG. The patient has an elevated troponin of 3 without c/f CP, SOB, N/V, or lightheadedness. CT PE study is negative for PE but shows a multilobar pneumonia.     Pt admitted to hospital medicine for IV abx, dialysis and trending  "of abnormal labs.     Kayli Bosch   Emergency Medicine PGY2   3:02 PM        Imaging Results          X-Ray Chest AP Portable (Final result)  Result time 11/18/19 10:57:12    Final result by Harley Jimenez MD (11/18/19 10:57:12)                 Impression:      Development of dense airspace consolidation involving the right lung base suspicious for pneumonia.    Follow-up PA and lateral chest radiography is recommended.      Electronically signed by: Harley Jimenez MD  Date:    11/18/2019  Time:    10:57             Narrative:    EXAMINATION:  XR CHEST AP PORTABLE    CLINICAL HISTORY:  Shortness of breath    COMPARISON:  10/16/2019    FINDINGS:  Since the reference exam there has been development of extensive consolidative airspace disease involving the right lung base.  Minimal left basilar atelectasis or scarring.  Left lung is otherwise clear.  No pneumothorax.  Atherosclerotic calcification of the aorta.  Stable cardiac silhouette size.                                                   ED Course as of Nov 18 1146   Mon Nov 18, 2019   1002 53 Y/o female with ho/ hep c and cirrhosis with recurrent ascites. Had over 5 liters removed last week and here today complaining of increased swelling SOB with R "rib" pain.  Dr. Yousif is patient's gastroenterologist.    Patient also does hemodialysis with Dr. Ramos    [BF]      ED Course User Index  [BF] SYBIL Beverly                Clinical Impression:       ICD-10-CM ICD-9-CM   1. Pneumonia of right middle lobe due to infectious organism J18.1 486   2. SOB (shortness of breath) R06.02 786.05   3. Troponin level elevated R79.89 790.6                             Kayli Bosch MD  Resident  11/18/19 1502    "

## 2019-11-18 NOTE — CONSULTS
Nephrology Consult Note        Patient Name: Aurelia Saucedo  MRN: [id number]    Patient Class: Emergency   Admission Date: 11/18/2019  Length of Stay: 0 days  Date of Service: 11/18/2019    Attending Physician: No att. providers found  Primary Care Provider: Dustin Ireland MD    Reason for Consult: esrd/anemia/htn/shpt/pna    SUBJECTIVE:     HPI: 54F with ESRD on HD MWF is admitted with chest pain after recent thoracentesis, because XR shows PNA and she has low-grade fever. She has been feeling tired also for a few days. She did outpatient dialysis per schedule.    Seen and examined on HD today, tolerating well, no complains.    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 since 12/16/13 8/19/2015    Gastritis     Hypertension 8/19/2015    Kidney transplant candidate 8/19/2015    Renal dialysis status     M, W, F    Secondary hyperparathyroidism of renal origin 8/19/2015     Past Surgical History:   Procedure Laterality Date    AV FISTULA PLACEMENT  2013    SALPINGOOPHORECTOMY Right 1997    TOTAL ABDOMINAL HYSTERECTOMY  1999     Family History   Problem Relation Age of Onset    Kidney disease Mother     Stroke Father     Psoriasis Brother     Breast cancer Neg Hx     Cancer Neg Hx     Colon cancer Neg Hx     Ovarian cancer Neg Hx      Social History     Tobacco Use    Smoking status: Current Every Day Smoker     Packs/day: 0.75     Years: 30.00     Pack years: 22.50    Smokeless tobacco: Never Used    Tobacco comment: pt reports actively trying to quit but struggling.  Counseling and resources given.   Substance Use Topics    Alcohol use: No     Alcohol/week: 0.0 standard drinks    Drug use: Yes     Types: Marijuana     Comment: nightly to help sleep       Review of patient's allergies indicates:   Allergen Reactions    Quinolones Itching    Levaquin [levofloxacin] Itching     Ciprofloxacin Itching    Codeine Nausea Only       Outpatient meds:  No current facility-administered medications on file prior to encounter.      Current Outpatient Medications on File Prior to Encounter   Medication Sig Dispense Refill    amlodipine (NORVASC) 10 MG tablet Take 1 tablet (10 mg total) by mouth once daily. 90 tablet 3    cephALEXin (KEFLEX) 500 MG capsule Take 1 capsule (500 mg total) by mouth once daily. On dialysis days take after dialysis 5 capsule 0    dicyclomine (BENTYL) 20 mg tablet Take 20 mg by mouth every 6 (six) hours as needed.      FA-VIT B COMP&C-SELENIUM-ZINC 3-70-15 MG-MCG-MG ORAL TAB Take 1 tablet by mouth once daily.      HYDROcodone-acetaminophen (NORCO)  mg per tablet Take 1 tablet by mouth every 8 (eight) hours as needed for Pain. 60 tablet 0    lisinopril (PRINIVIL,ZESTRIL) 20 MG tablet Take 1 tablet by mouth once daily.  3    metoprolol succinate (TOPROL-XL) 50 MG 24 hr tablet Take 50 mg by mouth once daily.      ondansetron (ZOFRAN-ODT) 4 MG TbDL Take 2 tablets (8 mg total) by mouth every hour as needed. nausea 20 tablet 0    pantoprazole (PROTONIX) 40 MG tablet Take 40 mg by mouth once daily.      promethazine (PHENERGAN) 25 MG tablet Take 1 tablet (25 mg total) by mouth every 6 (six) hours as needed for Nausea. 20 tablet 0    traZODone (DESYREL) 50 MG tablet Take 1 tablet (50 mg total) by mouth every evening. 30 tablet 11    ursodiol (ACTIGALL) 250 mg Tab Take 1 tablet (250 mg total) by mouth once daily. 90 tablet 0       Scheduled meds:   azithromycin  500 mg Oral ED 1 Time    cefTRIAXone (ROCEPHIN) IVPB  1 g Intravenous Once       Infusions:      PRN meds:      Review of Systems:  Review of Systems   Constitutional: Negative for chills, fever, malaise/fatigue and weight loss.   HENT: Negative for hearing loss and nosebleeds.    Eyes: Negative for blurred vision, double vision and photophobia.   Respiratory: Negative for cough, shortness of breath  and wheezing.    Cardiovascular: Negative for chest pain, palpitations and leg swelling.   Gastrointestinal: Negative for abdominal pain, constipation, diarrhea, heartburn, nausea and vomiting.   Genitourinary: Negative for dysuria, frequency and urgency.   Musculoskeletal: Negative for falls, joint pain and myalgias.   Skin: Negative for itching and rash.   Neurological: Negative for dizziness, speech change, focal weakness, loss of consciousness and headaches.   Endo/Heme/Allergies: Does not bruise/bleed easily.   Psychiatric/Behavioral: Negative for depression and substance abuse. The patient is not nervous/anxious.        OBJECTIVE:     Vital Signs and IO (Last 24H):  Temp:  [97.2 °F (36.2 °C)]   Pulse:  [103]   Resp:  [18]   BP: (109)/(61)   SpO2:  [100 %]   No intake/output data recorded.    Wt Readings from Last 5 Encounters:   11/18/19 52.6 kg (116 lb)   10/22/19 50.8 kg (112 lb)   10/18/19 48.3 kg (106 lb 7.7 oz)   10/10/19 55.2 kg (121 lb 9.6 oz)   09/20/19 53.3 kg (117 lb 9.6 oz)         Physical Exam:  Physical Exam   Constitutional: She is oriented to person, place, and time. She appears well-developed and well-nourished.   HENT:   Head: Normocephalic and atraumatic.   Mouth/Throat: Oropharynx is clear and moist.   Eyes: Pupils are equal, round, and reactive to light. EOM are normal. No scleral icterus.   Neck: Neck supple.   Cardiovascular: Normal rate and regular rhythm.   Pulmonary/Chest: Effort normal. No stridor. No respiratory distress.   Abdominal: Soft. She exhibits no distension.   Musculoskeletal: Normal range of motion. She exhibits no edema or deformity.   Neurological: She is alert and oriented to person, place, and time. No cranial nerve deficit.   Skin: Skin is warm and dry. No rash noted. She is not diaphoretic. No erythema.   Psychiatric: She has a normal mood and affect. Her behavior is normal.       Body mass index is 19.91 kg/m².    Laboratory:  Recent Labs   Lab 11/14/19  1454   K  6.1*       No results for input(s): CALCIUM, ALBUMIN, PHOS, MG in the last 168 hours.          No results for input(s): POCTGLUCOSE in the last 168 hours.          Recent Labs   Lab 11/18/19  1216   WBC 6.66   HGB 10.7*   HCT 32.4*      MCV 98   MCHC 33.0   MONO 4.8  0.3       No results for input(s): BILITOT, BILIDIR, PROT, ALBUMIN, ALKPHOS, ALT, AST in the last 168 hours.    Recent Labs   Lab 11/27/18  0924 10/17/19  0850   Color, UA  --  [place] A   Appearance, UA  --  Hazy A   pH, UA 7.0 7.0   Specific Volin, UA  --  1.010   Protein, UA  --  2+ A   Glucose, UA  --  Negative   Ketones, UA  --  Trace A   Urobilinogen, UA  --  Negative   Bilirubin (UA)  --  Negative   Occult Blood UA  --  3+ A   Nitrite, UA  --  Negative   RBC, UA  --  >100 H   WBC, UA  --  >100 H   Bacteria  --  Negative   Hyaline Casts, UA  --  14 A             Microbiology Results (last 7 days)     ** No results found for the last 168 hours. **          ASSESSMENT/PLAN:     ESRD on HD MWF via AVF  Continue current dialysis prescription.  Next HD on Wed or PRN.  Renal diet - low K, low phos.  No IVs or BP checks on access and/or non-dominant arm.    Anemia of CKD  Hgb and HCT are acceptable. Monitor.  Will provide MERON and/or IV iron PRN.    MBD / Secondary HPT  Ca, phos, PTH and vitamin D levels are acceptable.   Phos binders, vitamin D analogues and calcimimetics as needed.    HTN  BP seem controlled.   Tolerate asymptomatic HTN up to -160.  Continue home meds.  Low sodium diet.    PNA  Agree with abx and management per primary team.    Thank you for allowing us to participate in the care of your patient!   We will follow the patient and provide recommendations as needed.    Luciano Campos MD    [facility]  [address] [place] [address]  [place] [id number]    [phone] - tel  [phone] - fax    11/18/2019 12:56 PM

## 2019-11-18 NOTE — TELEPHONE ENCOUNTER
----- Message from Giana Pickard sent at 11/14/2019  3:07 PM CST -----  Contact: SELF  Wanted Dr Saul to know that she threw up when they gave her the albumin.  She has never done that before.  Also, solid foods make her feel burning pain from her throat to her sternum and up under her left armpit.  Is there a test to make sure nothing got damaged with all the fluid buildup?  The heartburn and burning are worse when she is full of fluid.

## 2019-11-19 NOTE — ASSESSMENT & PLAN NOTE
ESRD   - HD MF,  access  AVF, makes urine  - Nephrology consulted  - s/p HD 11/18  - plans for next HD Wednesday or PRN per nephrology   - c/w home medications

## 2019-11-19 NOTE — PLAN OF CARE
11/19/19 0938   HEART Message   Medicare Outpatient and Observation Notification regarding financial responsibility Given to patient/caregiver;Explained to patient/caregiver;Signed/date by patient/caregiver   Date HEART was signed 11/19/19   Time HEART was signed 0926     Introduced self and asked pt if ok to take few min to discuss Medicare form, and ok with pt.  Explained that pt in observation status and Medicare wants to inform them of HEART form, pt verbalized an understanding to form, form signed and form scanned into CM notes.

## 2019-11-19 NOTE — ASSESSMENT & PLAN NOTE
Demand ischemia in setting of ESRD and PNA vs ACS  Cardiology consulted  Trop trended and peaked at 3.692 now downtrending  EKG no signs of acute ischemic events

## 2019-11-19 NOTE — CONSULTS
[facility]  Cardiology  Consult Note    Patient Name: Aurelia Saucedo  MRN: [id number]  Admission Date: 11/18/2019  Hospital Length of Stay: 0 days  Code Status: DNR   Attending Provider: Cherry Patton DO   Consulting Provider: SYBIL Cornell  Primary Care Physician: Dustin Ireland MD  Principal Problem:Pneumonia due to infectious organism    Patient information was obtained from patient, past medical records and ER records.     Inpatient consult to Cardiology  Consult performed by: SYBIL Cornell  Consult ordered by: Jake Ingram MD        Subjective:     Chief Complaint:  weakness     HPI: 55 y/o female PMHx ESRD on HD, Liver cirrhosis/hep C, recurrent ascites/paracentesis, 2014 CAD h/o RCA 3x12 Promus,  Dynamic Severe MR by MAXINE 2013, noncompliant with cardiology follow-up since 2014 admitted with pneumonia and abnormal troponin.  Has multiple complains but does have some chest discomfort at times.  EKG shows high lat TWI which is new.  TI 3  Has had hypotension issues on HD.    Echo - pt. Refused to have it done earlier when the tech came by.     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 since 12/16/13 8/19/2015    Gastritis     Hypertension 8/19/2015    Kidney transplant candidate 8/19/2015    Renal dialysis status     M, W, F    Secondary hyperparathyroidism of renal origin 8/19/2015       Past Surgical History:   Procedure Laterality Date    AV FISTULA PLACEMENT  2013    SALPINGOOPHORECTOMY Right 1997    TOTAL ABDOMINAL HYSTERECTOMY  1999       Review of patient's allergies indicates:   Allergen Reactions    Quinolones Itching    Levaquin [levofloxacin] Itching    Ciprofloxacin Itching    Codeine Nausea Only       No current facility-administered medications on file prior to encounter.      Current Outpatient Medications on File Prior to Encounter    Medication Sig    amlodipine (NORVASC) 10 MG tablet Take 1 tablet (10 mg total) by mouth once daily.    cephALEXin (KEFLEX) 500 MG capsule Take 1 capsule (500 mg total) by mouth once daily. On dialysis days take after dialysis    dicyclomine (BENTYL) 20 mg tablet Take 20 mg by mouth every 6 (six) hours as needed.    FA-VIT B COMP&C-SELENIUM-ZINC 3-70-15 MG-MCG-MG ORAL TAB Take 1 tablet by mouth once daily.    HYDROcodone-acetaminophen (NORCO)  mg per tablet Take 1 tablet by mouth every 8 (eight) hours as needed for Pain.    lisinopril (PRINIVIL,ZESTRIL) 20 MG tablet Take 1 tablet by mouth once daily.    metoprolol succinate (TOPROL-XL) 50 MG 24 hr tablet Take 50 mg by mouth once daily.    ondansetron (ZOFRAN-ODT) 4 MG TbDL Take 2 tablets (8 mg total) by mouth every hour as needed. nausea    pantoprazole (PROTONIX) 40 MG tablet Take 40 mg by mouth once daily.    promethazine (PHENERGAN) 25 MG tablet Take 1 tablet (25 mg total) by mouth every 6 (six) hours as needed for Nausea.    traZODone (DESYREL) 50 MG tablet Take 1 tablet (50 mg total) by mouth every evening.    ursodiol (ACTIGALL) 250 mg Tab Take 1 tablet (250 mg total) by mouth once daily.     Family History     Problem Relation (Age of Onset)    Kidney disease Mother    Psoriasis Brother    Stroke Father        Tobacco Use    Smoking status: Current Every Day Smoker     Packs/day: 0.75     Years: 30.00     Pack years: 22.50    Smokeless tobacco: Never Used    Tobacco comment: pt reports actively trying to quit but struggling.  Counseling and resources given.   Substance and Sexual Activity    Alcohol use: No     Alcohol/week: 0.0 standard drinks    Drug use: Yes     Types: Marijuana     Comment: nightly to help sleep    Sexual activity: Not Currently     Partners: Male     Birth control/protection: Surgical     Review of Systems   Cardiovascular: Positive for chest pain and dyspnea on exertion. Negative for irregular heartbeat, leg  swelling, near-syncope, orthopnea, palpitations, paroxysmal nocturnal dyspnea and syncope.     Objective:     Vital Signs (Most Recent):  Temp: (!) 100.8 °F (38.2 °C) (11/19/19 0931)  Pulse: 98 (11/19/19 0727)  Resp: 20 (11/19/19 0727)  BP: 109/66 (11/19/19 0727)  SpO2: (!) 94 % (11/19/19 0727) Vital Signs (24h Range):  Temp:  [98.5 °F (36.9 °C)-100.8 °F (38.2 °C)] 100.8 °F (38.2 °C)  Pulse:  [] 98  Resp:  [16-23] 20  SpO2:  [94 %-99 %] 94 %  BP: (101-146)/(55-89) 109/66     Weight: 52.4 kg (115 lb 8.3 oz)  Body mass index is 19.22 kg/m².    SpO2: (!) 94 %  O2 Device (Oxygen Therapy): room air      Intake/Output Summary (Last 24 hours) at 11/19/2019 1019  Last data filed at 11/19/2019 0841  Gross per 24 hour   Intake 5340 ml   Output 2000 ml   Net 3340 ml       Lines/Drains/Airways     Drain                 Hemodialysis AV Fistula Left upper arm -- days          Airway                 Airway - Non-Surgical 10/10/16 0913 Nasal Cannula 1135 days          Peripheral Intravenous Line                 Peripheral IV - Single Lumen 11/18/19 1210 20 G Right Forearm less than 1 day                Physical Exam   Constitutional: She is oriented to person, place, and time. She appears well-developed and well-nourished.   Neck: No JVD present.   Bilat. Bruits Vs radiation of murmur or bruit from AV fistual   Cardiovascular: Normal rate and regular rhythm.   Murmur (3/6 systolic murmur apex) heard.  Pulses:       Dorsalis pedis pulses are 1+ on the right side, and 1+ on the left side.   Pulmonary/Chest: Effort normal and breath sounds normal.   Abdominal: Soft.   Musculoskeletal: She exhibits no edema.   Neurological: She is alert and oriented to person, place, and time.   Skin: Skin is warm and dry.   Chronic skin changes to legs   Psychiatric: She has a normal mood and affect.       Significant Labs:   Recent Lab Results       11/19/19  0447   11/19/19  0318   11/19/19  0316   11/18/19  2247   11/18/19  1218         Influenza A, Molecular         Negative     Influenza B, Molecular         Negative     Benzodiazepines   Negative           Phencyclidine   Negative           Albumin 2.6              2.6             Alkaline Phosphatase 79              79             ALT 15              15             Ammonia               Amphetamine Screen, Ur   Negative           Amylase               Anion Gap 14              14              14             Appearance, UA     Hazy         aPTT               AST 31              31             Bacteria, UA     Moderate         Barbiturate Screen, Ur   Negative           Baso # 0.01              0.01             Basophil% 0.2              0.2             Bilirubin (UA)     Negative         BILIRUBIN TOTAL 0.7  Comment:  For infants and newborns, interpretation of results should be based  on gestational age, weight and in agreement with clinical  observations.  Premature Infant recommended reference ranges:  Up to 24 hours.............<8.0 mg/dL  Up to 48 hours............<12.0 mg/dL  3-5 days..................<15.0 mg/dL  6-29 days.................<15.0 mg/dL                0.7  Comment:  For infants and newborns, interpretation of results should be based  on gestational age, weight and in agreement with clinical  observations.  Premature Infant recommended reference ranges:  Up to 24 hours.............<8.0 mg/dL  Up to 48 hours............<12.0 mg/dL  3-5 days..................<15.0 mg/dL  6-29 days.................<15.0 mg/dL               BUN, Bld 39              39              39             Calcium 8.7              8.7              8.7             Chloride 95              95              95             Cholesterol 109  Comment:  The National Cholesterol Education Program (NCEP) has set the  following guidelines (reference ranges) for Cholesterol:  Optimal.....................<200 mg/dL  Borderline High.............200-239 mg/dL  High........................> or = 240 mg/dL               CO2  26              26              26             Cocaine (Metab.)   Negative           Color, UA     Yellow         CPK               CPK MB               Creatinine 5.5              5.5              5.5             Creatinine, Random Ur   53.0  Comment:  The random urine reference ranges provided were established   for 24 hour urine collections.  No reference ranges exist for  random urine specimens.  Correlate clinically.             Differential Method Automated              Automated             eGFR if  9.5              9.5              9.5             eGFR if non  8.3  Comment:  Calculation used to obtain the estimated glomerular filtration  rate (eGFR) is the CKD-EPI equation.                 8.3  Comment:  Calculation used to obtain the estimated glomerular filtration  rate (eGFR) is the CKD-EPI equation.                 8.3  Comment:  Calculation used to obtain the estimated glomerular filtration  rate (eGFR) is the CKD-EPI equation.                Eos # 0.1              0.1             Eosinophil% 0.8              0.8             Flu A & B Source         Nasal swab     Glucose 108              108              108             Glucose, UA     Negative         Gran # (ANC) 5.0              5.0             Gran% 83.8              83.8             HDL 23  Comment:  The National Cholesterol Education Program (NCEP) has set the  following guidelines (reference values) for HDL Cholesterol:  Low...............<40 mg/dL  Optimal...........>60 mg/dL               Hdl/Cholesterol Ratio 21.1             Hematocrit 26.6              26.6             Hemoglobin 8.7              8.7             Hep B Core Total Ab               Hep B S Ab               Hepatitis B Surface Ag               Hyaline Casts, UA     1         Immature Grans (Abs) 0.04  Comment:  Mild elevation in immature granulocytes is non specific and   can be seen in a variety of conditions including stress response,   acute  inflammation, trauma and pregnancy. Correlation with other   laboratory and clinical findings is essential.                0.04  Comment:  Mild elevation in immature granulocytes is non specific and   can be seen in a variety of conditions including stress response,   acute inflammation, trauma and pregnancy. Correlation with other   laboratory and clinical findings is essential.               Immature Granulocytes 0.7              0.7             Coumadin Monitoring INR               Ketones, UA     Negative         LDL Cholesterol External 56.8  Comment:  The National Cholesterol Education Program (NCEP) has set the  following guidelines (reference values) for LDL Cholesterol:  Optimal.......................<130 mg/dL  Borderline High...............130-159 mg/dL  High..........................160-189 mg/dL  Very High.....................>190 mg/dL               Leukocytes, UA     3+         Lipase               Lymph # 0.4              0.4             Lymph% 7.4              7.4             Magnesium 2.1              2.1             MCH 32.2              32.2             MCHC 32.7              32.7             MCV 99              99             Microscopic Comment     SEE COMMENT  Comment:  Other formed elements not mentioned in the report are not   present in the microscopic examination.            Mono # 0.4              0.4             Mono% 7.1              7.1             MPV 9.6              9.6             NITRITE UA     Negative         Non-HDL Cholesterol 86  Comment:  Risk category and Non-HDL cholesterol goals:  Coronary heart disease (CHD)or equivalent (10-year risk of CHD >20%):  Non-HDL cholesterol goal     <130 mg/dL  Two or more CHD risk factors and 10-year risk of CHD <= 20%:  Non-HDL cholesterol goal     <160 mg/dL  0 to 1 CHD risk factor:  Non-HDL cholesterol goal     <190 mg/dL               nRBC 0              0             Occult Blood UA     2+         Opiate Scrn, Ur   Presumptive Positive            pH, UA     >8.0         Phosphorus 4.3             Platelets 147              147             Potassium 4.4              4.4              4.4             PROTEIN TOTAL 5.7              5.7             Protein, UA     2+  Comment:  Recommend a 24 hour urine protein or a urine   protein/creatinine ratio if globulin induced proteinuria is  clinically suspected.           PT               RBC 2.70              2.70             RBC, UA     30         RDW 13.8              13.8             Sodium 135              135              135             Specific [place], UA     1.010         Specimen UA     Urine, Clean Catch         Marijuana (THC) Metabolite   Presumptive Positive           Total Cholesterol/HDL Ratio 4.7             Toxicology Information   SEE COMMENT  Comment:  This screen includes the following classes of drugs at the   listed cut-off:  Benzodiazepines                  200 ng/ml  Cocaine metabolite               300 ng/ml  Opiates                          300 ng/ml  Barbiturates                     200 ng/ml  Amphetamines                    1000 ng/ml  Marijuana metabs (THC)            50 ng/ml  Phencyclidine (PCP)               25 ng/ml  High concentrations of Methylenedioxymethamphetamine (MDMA aka  Ectasy) and other structurally similar compounds may cross-   react with the Amphetamine/Methamphetamine screening   immunoassay giving a false positive result.  Note: This exception list includes only more common   interferants in toxicology screen testing.  Because of many   cross-reactantspositive results on toxicology drug screens   should be confirmed whenever results do not correlate with   clinical presentation.  This report is intended for use in clinical monitoring and  management of patients. It is not intended for use in   employment related drug testing.  Because of any cross-reactants, positive results on toxicology  drug screens should be confirmed whenever results do not  correlate with  clinical presentation.  Presumptive positive results are unconfirmed and may be used   only for medical purposes.             Triglycerides 146  Comment:  The National Cholesterol Education Program (NCEP) has set the  following guidelines (reference values) for triglycerides:  Normal......................<150 mg/dL  Borderline High.............150-199 mg/dL  High........................200-499 mg/dL               Troponin I 3.141  Comment:  Troponin critical result(s) called and verbal readback obtained   from Jamia Hurtado RN Cardio B  by MS1 11/19/2019 06:06       3.692  Comment:  Troponin critical result(s) called and verbal readback obtained   from Ruchi Callahan RN Cardio B by MS1 11/19/2019 00:16         UROBILINOGEN UA     Negative         WBC, UA     >100         WBC 5.94              5.94                              11/18/19  1216   11/18/19  1115        Influenza A, Molecular         Influenza B, Molecular         Benzodiazepines         Phencyclidine         Albumin 2.8       Alkaline Phosphatase 82       ALT 14       Ammonia   17     Amphetamine Screen, Ur         Amylase 48       Anion Gap 15       Appearance, UA         aPTT 30.3  Comment:  Therapeutic Range of 67.5-105.1 secs.  Equivalent to Heparin Concentration of anti-Xa 0.3-0.7 IU/ml.         AST 19       Bacteria, UA         Barbiturate Screen, Ur         Baso # 0.02       Basophil% 0.3       Bilirubin (UA)         BILIRUBIN TOTAL 0.8  Comment:  For infants and newborns, interpretation of results should be based  on gestational age, weight and in agreement with clinical  observations.  Premature Infant recommended reference ranges:  Up to 24 hours.............<8.0 mg/dL  Up to 48 hours............<12.0 mg/dL  3-5 days..................<15.0 mg/dL  6-29 days.................<15.0 mg/dL         BUN, Bld 74       Calcium 8.9       Chloride 97       Cholesterol         CO2 24       Cocaine (Metab.)         Color, UA         CPK 47       CPK MB 6.9        Creatinine 8.3       Creatinine, Random Ur         Differential Method Automated       eGFR if African American 5.8       eGFR if non  5.0  Comment:  Calculation used to obtain the estimated glomerular filtration  rate (eGFR) is the CKD-EPI equation.          Eos # 0.1       Eosinophil% 0.9       Flu A & B Source         Glucose 110       Glucose, UA         Gran # (ANC) 5.8       Gran% 87.5       HDL         Hdl/Cholesterol Ratio         Hematocrit 32.4       Hemoglobin 10.7       Hep B Core Total Ab Negative  Comment:  Performed at:  [address]  [id number]  : Frantz Araujo MD, Phone:  [phone]         Hep B S Ab Non Reactive  Comment:  Non Reactive: Inconsistent with immunity,  less than 10 mIU/mL  Reactive:     Consistent with immunity,  greater than 9.9 mIU/mL  Performed at:  [address]  [id number]  : Frantz Araujo MD, Phone:  [phone]         Hepatitis B Surface Ag Negative  Comment:  Performed at:  [address]  [id number]  : Frantz Araujo MD, Phone:  [phone]         Hyaline Casts, UA         Immature Grans (Abs) 0.02  Comment:  Mild elevation in immature granulocytes is non specific and   can be seen in a variety of conditions including stress response,   acute inflammation, trauma and pregnancy. Correlation with other   laboratory and clinical findings is essential.         Immature Granulocytes 0.3       Coumadin Monitoring INR 1.1  Comment:  Coumadin Therapy:  INR: 2.0-3.0 conventional anticoagulation  INR: 2.5-3.5 intensive anticoagulation         Ketones, UA         LDL Cholesterol External         Leukocytes, UA         Lipase 22       Lymph # 0.4       Lymph% 6.2       Magnesium 2.4       MCH 32.2       MCHC 33.0       MCV 98       Microscopic Comment         Mono # 0.3       Mono% 4.8        MPV 9.8       NITRITE UA         Non-HDL Cholesterol         nRBC 0       Occult Blood UA         Opiate Scrn, Ur         pH, UA         Phosphorus         Platelets 151       Potassium 5.3       PROTEIN TOTAL 6.2       Protein, UA         PT 13.9       RBC 3.32       RBC, UA         RDW 13.8       Sodium 136       Specific Gravity, UA         Specimen UA         Marijuana (THC) Metabolite         Total Cholesterol/HDL Ratio         Toxicology Information         Triglycerides         Troponin I 3.054  Comment:  Troponin critical result(s) called and verbal readback obtained   from Pepito Ozuna RN/ED by BEATRICE 11/18/2019 13:49         UROBILINOGEN UA         WBC, UA         WBC 6.66             Significant Imaging:   Assessment and Plan:     CAP  NSTEMI  2014 CAD RCA KARL, otherwise mild-mod CAD  2013 Severe Dynamic MR - pt declined surgery   Carotid bruits Vs radiation of murmur/AV fistula bruit   Noncompliant with cardiology f/u since 2014  ESRD on HD  Liver cirrhosis/Hep C, recurrent ascites  Anemia chronic disease    Plan    Echo - pt. States she is now agreeable  Will discuss cardiac cath, anti-platelets/anti-coagulant with Dr. Starkey - pt. May not be a candidate due to GI bleeds.   No statin due to liver cirrhosis.     SYBIL Cornell  Cardiology   [facility]

## 2019-11-19 NOTE — CONSULTS
Nephrology Consult Note        Patient Name: Aurelia Saucedo  MRN: [id number]    Patient Class: OP- Observation   Admission Date: 11/18/2019  Length of Stay: 0 days  Date of Service: 11/19/2019    Attending Physician: Jake Ingram MD  Primary Care Provider: Dustin Ireland MD    Reason for Consult: esrd/anemia/htn/shpt/pna    SUBJECTIVE:     HPI: 54F with ESRD on HD MWF is admitted with chest pain after recent thoracentesis, because XR shows PNA and she has low-grade fever. She has been feeling tired also for a few days. She did outpatient dialysis per schedule.    11/18 Seen and examined on HD today, tolerating well, no complains.  111/19 VSS, no new complains, HD in AM. Can be dc today if otherwise stable.    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 since 12/16/13 8/19/2015    Gastritis     Hypertension 8/19/2015    Kidney transplant candidate 8/19/2015    Renal dialysis status     M, W, F    Secondary hyperparathyroidism of renal origin 8/19/2015     Past Surgical History:   Procedure Laterality Date    AV FISTULA PLACEMENT  2013    SALPINGOOPHORECTOMY Right 1997    TOTAL ABDOMINAL HYSTERECTOMY  1999     Family History   Problem Relation Age of Onset    Kidney disease Mother     Stroke Father     Psoriasis Brother     Breast cancer Neg Hx     Cancer Neg Hx     Colon cancer Neg Hx     Ovarian cancer Neg Hx      Social History     Tobacco Use    Smoking status: Current Every Day Smoker     Packs/day: 0.75     Years: 30.00     Pack years: 22.50    Smokeless tobacco: Never Used    Tobacco comment: pt reports actively trying to quit but struggling.  Counseling and resources given.   Substance Use Topics    Alcohol use: No     Alcohol/week: 0.0 standard drinks    Drug use: Yes     Types: Marijuana     Comment: nightly to help sleep       Review of patient's allergies  indicates:   Allergen Reactions    Quinolones Itching    Levaquin [levofloxacin] Itching    Ciprofloxacin Itching    Codeine Nausea Only       Outpatient meds:  No current facility-administered medications on file prior to encounter.      Current Outpatient Medications on File Prior to Encounter   Medication Sig Dispense Refill    amlodipine (NORVASC) 10 MG tablet Take 1 tablet (10 mg total) by mouth once daily. 90 tablet 3    cephALEXin (KEFLEX) 500 MG capsule Take 1 capsule (500 mg total) by mouth once daily. On dialysis days take after dialysis 5 capsule 0    dicyclomine (BENTYL) 20 mg tablet Take 20 mg by mouth every 6 (six) hours as needed.      FA-VIT B COMP&C-SELENIUM-ZINC 3-70-15 MG-MCG-MG ORAL TAB Take 1 tablet by mouth once daily.      HYDROcodone-acetaminophen (NORCO)  mg per tablet Take 1 tablet by mouth every 8 (eight) hours as needed for Pain. 60 tablet 0    lisinopril (PRINIVIL,ZESTRIL) 20 MG tablet Take 1 tablet by mouth once daily.  3    metoprolol succinate (TOPROL-XL) 50 MG 24 hr tablet Take 50 mg by mouth once daily.      ondansetron (ZOFRAN-ODT) 4 MG TbDL Take 2 tablets (8 mg total) by mouth every hour as needed. nausea 20 tablet 0    pantoprazole (PROTONIX) 40 MG tablet Take 40 mg by mouth once daily.      promethazine (PHENERGAN) 25 MG tablet Take 1 tablet (25 mg total) by mouth every 6 (six) hours as needed for Nausea. 20 tablet 0    traZODone (DESYREL) 50 MG tablet Take 1 tablet (50 mg total) by mouth every evening. 30 tablet 11    ursodiol (ACTIGALL) 250 mg Tab Take 1 tablet (250 mg total) by mouth once daily. 90 tablet 0       Scheduled meds:   albuterol-ipratropium  3 mL Nebulization Q6H    amLODIPine  10 mg Oral Daily    azithromycin  500 mg Intravenous Q24H    cefTRIAXone (ROCEPHIN) IVPB  1 g Intravenous Q24H    lisinopril  20 mg Oral Daily    metoprolol succinate  50 mg Oral Daily    mupirocin   Nasal BID    pantoprazole  40 mg Oral Daily    traZODone  50  mg Oral QHS       Infusions:      PRN meds:      Review of Systems:  ROS    OBJECTIVE:     Vital Signs and IO (Last 24H):  Temp:  [97.2 °F (36.2 °C)-100.8 °F (38.2 °C)]   Pulse:  []   Resp:  [16-23]   BP: (101-146)/(55-89)   SpO2:  [94 %-100 %]   I/O last 3 completed shifts:  In: 5100 [P.O.:50; Other:5000; IV Piggyback:50]  Out: 2000 [Other:2000]    Wt Readings from Last 5 Encounters:   11/19/19 52.4 kg (115 lb 8.3 oz)   10/22/19 50.8 kg (112 lb)   10/18/19 48.3 kg (106 lb 7.7 oz)   10/10/19 55.2 kg (121 lb 9.6 oz)   09/20/19 53.3 kg (117 lb 9.6 oz)         Physical Exam:  Physical Exam   Constitutional: She is oriented to person, place, and time. She appears well-developed and well-nourished.   HENT:   Head: Normocephalic and atraumatic.   Mouth/Throat: Oropharynx is clear and moist.   Eyes: Pupils are equal, round, and reactive to light. EOM are normal. No scleral icterus.   Neck: Neck supple.   Cardiovascular: Normal rate and regular rhythm.   Pulmonary/Chest: Effort normal. No stridor. No respiratory distress.   Abdominal: Soft. She exhibits no distension.   Musculoskeletal: Normal range of motion. She exhibits no edema or deformity.   Neurological: She is alert and oriented to person, place, and time. No cranial nerve deficit.   Skin: Skin is warm and dry. No rash noted. She is not diaphoretic. No erythema.   Psychiatric: She has a normal mood and affect. Her behavior is normal.       Body mass index is 19.22 kg/m².    Laboratory:  Recent Labs   Lab 11/14/19  1454 11/18/19  1216 11/19/19  0447   NA  --  136 135*  135*  135*   K 6.1* 5.3* 4.4  4.4  4.4   CL  --  97 95  95  95   CO2  --  24 26  26  26   BUN  --  74* 39*  39*  39*   CREATININE  --  8.3* 5.5*  5.5*  5.5*   ESTGFRAFRICA  --  5.8* 9.5*  9.5*  9.5*   EGFRNONAA  --  5.0* 8.3*  8.3*  8.3*   GLU  --  110 108  108  108       Recent Labs   Lab 11/18/19  1216 11/19/19  0447   CALCIUM 8.9 8.7  8.7  8.7   ALBUMIN 2.8* 2.6*  2.6*    PHOS  --  4.3   MG 2.4 2.1  2.1             No results for input(s): POCTGLUCOSE in the last 168 hours.          Recent Labs   Lab 11/18/19  1216 11/19/19  0447   WBC 6.66 5.94  5.94   HGB 10.7* 8.7*  8.7*   HCT 32.4* 26.6*  26.6*    147*  147*   MCV 98 99*  99*   MCHC 33.0 32.7  32.7   MONO 4.8  0.3 7.1  7.1  0.4  0.4       Recent Labs   Lab 11/18/19  1216 11/19/19  0447   BILITOT 0.8 0.7  0.7   PROT 6.2 5.7*  5.7*   ALBUMIN 2.8* 2.6*  2.6*   ALKPHOS 82 79  79   ALT 14 15  15   AST 19 31  31       Recent Labs   Lab 11/27/18  0924 10/17/19  0850 11/19/19  0316   Color, UA  --  Orange A Yellow   Appearance, UA  --  Hazy A Hazy A   pH, UA 7.0 7.0 >8.0 A   Specific Gravity, UA  --  1.010 1.010   Protein, UA  --  2+ A 2+ A   Glucose, UA  --  Negative Negative   Ketones, UA  --  Trace A Negative   Urobilinogen, UA  --  Negative Negative   Bilirubin (UA)  --  Negative Negative   Occult Blood UA  --  3+ A 2+ A   Nitrite, UA  --  Negative Negative   RBC, UA  --  >100 H 30 H   WBC, UA  --  >100 H >100 H   Bacteria  --  Negative Moderate A   Hyaline Casts, UA  --  14 A 1             Microbiology Results (last 7 days)     Procedure Component Value Units Date/Time    Urine culture [[id number]] Collected:  11/19/19 0316    Order Status:  No result Specimen:  Urine Updated:  11/19/19 0430    Culture, Respiratory with Gram Stain [[id number]]     Order Status:  Sent Specimen:  Respiratory from Sputum           ASSESSMENT/PLAN:     ESRD on HD MWF via AVF  Continue current dialysis prescription.  Next HD on Wed or PRN.  Renal diet - low K, low phos.  No IVs or BP checks on access and/or non-dominant arm.    Anemia of CKD  Hgb and HCT are acceptable. Monitor.  Will provide MERON and/or IV iron PRN.    MBD / Secondary HPT  Ca, phos, PTH and vitamin D levels are acceptable.   Phos binders, vitamin D analogues and calcimimetics as needed.    HTN  BP seem controlled.   Tolerate asymptomatic HTN up to SBP  150-160.  Continue home meds.  Low sodium diet.    PNA  Agree with abx and management per primary team.    Thank you for allowing us to participate in the care of your patient!   We will follow the patient and provide recommendations as needed.    Luciano Campos MD    [facility]  [address] [place] [address]  [place] [id number]    [phone] - tel  [phone] - fax    11/19/2019 12:56 PM

## 2019-11-19 NOTE — PROGRESS NOTES
[facility] Medicine  Progress Note    Patient Name: Aurelia Saucedo  MRN: [id number]  Patient Class: IP- Inpatient   Admission Date: 11/18/2019  Length of Stay: 0 days  Attending Physician: Cherry Patton DO  Primary Care Provider: Dustin Ireland MD        Subjective:     Principal Problem:Pneumonia due to infectious organism   Chief Complaint   Patient presents with    Shortness of Breath       Interval History:  Tmax 100.8 at 930am.  Procalcitonin elevated 3.1.  S/p HD yesterday. Trop elevated with peak 3.69 to now down trending.  Cardiology consulted.  Patient seen and examined at bedside.  States she feels somewhat better today.  Reports mild abdominal discomfort secondary to distension.  Denies chest pain, shortness of breath, palpitations, nausea, cough, vomiting, diarrhea, constipation, fever or chills.     Review of Systems   Per interval history, all other systems reviewed and negative.     Objective:     Vital Signs (Most Recent):  Temp: 99.2 °F (37.3 °C) (11/19/19 1135)  Pulse: 89 (11/19/19 1135)  Resp: 16 (11/19/19 1135)  BP: (!) 97/58 (11/19/19 1135)  SpO2: (!) 94 % (11/19/19 0727) Vital Signs (24h Range):  Temp:  [98.5 °F (36.9 °C)-100.8 °F (38.2 °C)] 99.2 °F (37.3 °C)  Pulse:  [] 89  Resp:  [16-23] 16  SpO2:  [94 %-99 %] 94 %  BP: ()/(55-89) 97/58     Weight: 52.4 kg (115 lb 8.3 oz)  Body mass index is 19.22 kg/m².    Intake/Output Summary (Last 24 hours) at 11/19/2019 1342  Last data filed at 11/19/2019 1137  Gross per 24 hour   Intake 5340 ml   Output 2100 ml   Net 3240 ml      Physical Exam  Constitutional: She is oriented to person, place, and time. No distress.   HENT:   Head: Atraumatic.   Mouth/Throat: No oropharyngeal exudate.   Eyes: Pupils are equal, round, and reactive to light. No scleral icterus.   Neck: Neck supple.   Cardiovascular: Normal rate.   Murmur heard.  Pulmonary/Chest: Effort normal and breath sounds normal. No respiratory distress.    Abdominal: Bowel sounds are normal. She exhibits distension (mild). There is no tenderness.   Musculoskeletal: She exhibits no edema.   Neurological: She is oriented to person, place, and time.   Skin: Skin is warm and dry. She is not diaphoretic.   Psychiatric: She has a normal mood and affect. Her behavior is normal.   Nursing note and vitals reviewed.      Significant Labs:   CBC:   Recent Labs   Lab 11/18/19  1216 11/19/19  0447   WBC 6.66 5.94  5.94   HGB 10.7* 8.7*  8.7*   HCT 32.4* 26.6*  26.6*    147*  147*     CMP:   Recent Labs   Lab 11/18/19  1216 11/19/19  0447    135*  135*  135*   K 5.3* 4.4  4.4  4.4   CL 97 95  95  95   CO2 24 26  26  26    108  108  108   BUN 74* 39*  39*  39*   CREATININE 8.3* 5.5*  5.5*  5.5*   CALCIUM 8.9 8.7  8.7  8.7   PROT 6.2 5.7*  5.7*   ALBUMIN 2.8* 2.6*  2.6*   BILITOT 0.8 0.7  0.7   ALKPHOS 82 79  79   AST 19 31  31   ALT 14 15  15   ANIONGAP 15 14  14  14   EGFRNONAA 5.0* 8.3*  8.3*  8.3*     Troponin:   Recent Labs   Lab 11/18/19  2247 11/19/19  0447 11/19/19  1028   TROPONINI 3.692* 3.141* 2.631*     PCT: 3.21    All pertinent labs within the past 24 hours have been reviewed.    Significant Imaging: I have reviewed all pertinent imaging results/findings within the past 24 hours.   CXR  Persistent dense alveolar consolidation in the right lower lobe suggestive of pneumonia.  Continued follow-up is recommended to document clearing  ECHO pending      Assessment/Plan:      * Pneumonia due to infectious organism  Pneumonia of right middle lobe due to infectious organism  CXR: Persistent dense alveolar consolidation in the right lower lobe suggestive of pneumonia  Tmax 100.8  Blood, resp, urine cx pending  PCT elevated at 3.21  IVabx:  Azithromycin and Rocephin        Troponin level elevated  Demand ischemia in setting of ESRD and PNA vs ACS  Cardiology consulted  Trop trended and peaked at 3.692 now downtrending  EKG no  signs of acute ischemic events        ESRD 2/2 MPGN on HD since 12/16/13   ESRD   - HD MF,  access  AVF, makes urine  - Nephrology consulted  - s/p HD 11/18  - plans for next HD Wednesday or PRN per nephrology   - c/w home medications    econdary hyperparathyroidism of renal origin  Phos binders, vitamin D analogues and calcimimetics prn        Anemia of chronic renal failure, stage 5  Chronic  Stable  MERON and or IV iron prn per nephrology      Essential hypertension  Stable continue with home medications      Decompensated cirrhosis secondary to hep C with ascites  monitoring  Receiving outpatient treatment for hep C  paracentesis p.r.n.  patient recently had paracentesis      VTE Risk Mitigation (From admission, onward)         Ordered     Place sequential compression device  Until discontinued      11/18/19 8227                  Cherry Patton DO  Department of Hospital Medicine   [facility]

## 2019-11-19 NOTE — SUBJECTIVE & OBJECTIVE
Interval History:  Tmax 100.8 at 930am.  Procalcitonin elevated 3.1.  S/p HD yesterday. Trop elevated with peak 3.69 to now down trending.  Cardiology consulted.  Patient seen and examined at bedside.  States she feels somewhat better today.  Reports mild abdominal discomfort secondary to distension.  Denies chest pain, shortness of breath, palpitations, nausea, cough, vomiting, diarrhea, constipation, fever or chills.     Review of Systems   Per interval history, all other systems reviewed and negative.     Objective:     Vital Signs (Most Recent):  Temp: 99.2 °F (37.3 °C) (11/19/19 1135)  Pulse: 89 (11/19/19 1135)  Resp: 16 (11/19/19 1135)  BP: (!) 97/58 (11/19/19 1135)  SpO2: (!) 94 % (11/19/19 0727) Vital Signs (24h Range):  Temp:  [98.5 °F (36.9 °C)-100.8 °F (38.2 °C)] 99.2 °F (37.3 °C)  Pulse:  [] 89  Resp:  [16-23] 16  SpO2:  [94 %-99 %] 94 %  BP: ()/(55-89) 97/58     Weight: 52.4 kg (115 lb 8.3 oz)  Body mass index is 19.22 kg/m².    Intake/Output Summary (Last 24 hours) at 11/19/2019 1342  Last data filed at 11/19/2019 1137  Gross per 24 hour   Intake 5340 ml   Output 2100 ml   Net 3240 ml      Physical Exam  Constitutional: She is oriented to person, place, and time. No distress.   HENT:   Head: Atraumatic.   Mouth/Throat: No oropharyngeal exudate.   Eyes: Pupils are equal, round, and reactive to light. No scleral icterus.   Neck: Neck supple.   Cardiovascular: Normal rate.   Murmur heard.  Pulmonary/Chest: Effort normal and breath sounds normal. No respiratory distress.   Abdominal: Bowel sounds are normal. She exhibits distension (mild). There is no tenderness.   Musculoskeletal: She exhibits no edema.   Neurological: She is oriented to person, place, and time.   Skin: Skin is warm and dry. She is not diaphoretic.   Psychiatric: She has a normal mood and affect. Her behavior is normal.   Nursing note and vitals reviewed.      Significant Labs:   CBC:   Recent Labs   Lab 11/18/19  1216  11/19/19  0447   WBC 6.66 5.94  5.94   HGB 10.7* 8.7*  8.7*   HCT 32.4* 26.6*  26.6*    147*  147*     CMP:   Recent Labs   Lab 11/18/19  1216 11/19/19  0447    135*  135*  135*   K 5.3* 4.4  4.4  4.4   CL 97 95  95  95   CO2 24 26  26  26    108  108  108   BUN 74* 39*  39*  39*   CREATININE 8.3* 5.5*  5.5*  5.5*   CALCIUM 8.9 8.7  8.7  8.7   PROT 6.2 5.7*  5.7*   ALBUMIN 2.8* 2.6*  2.6*   BILITOT 0.8 0.7  0.7   ALKPHOS 82 79  79   AST 19 31  31   ALT 14 15  15   ANIONGAP 15 14  14  14   EGFRNONAA 5.0* 8.3*  8.3*  8.3*     Troponin:   Recent Labs   Lab 11/18/19  2247 11/19/19 0447 11/19/19  1028   TROPONINI 3.692* 3.141* 2.631*     PCT: 3.21    All pertinent labs within the past 24 hours have been reviewed.    Significant Imaging: I have reviewed all pertinent imaging results/findings within the past 24 hours.   CXR  Persistent dense alveolar consolidation in the right lower lobe suggestive of pneumonia.  Continued follow-up is recommended to document clearing  ECHO pending

## 2019-11-19 NOTE — NURSING
Patient had home medications at bedside while patient in dialysis. Will keep secure in sealed envelope. Narcotics counted with Krystle JONES and given to Charge Nurse.

## 2019-11-19 NOTE — CARE UPDATE
11/18/19 2155   Patient Assessment/Suction   Level of Consciousness (AVPU) alert   Respiratory Effort Unlabored   Expansion/Accessory Muscles/Retractions no use of accessory muscles   All Lung Fields Breath Sounds clear;diminished   Rhythm/Pattern, Respiratory no shortness of breath reported   Cough Frequency no cough   PRE-TX-O2   SpO2 98 %   Pulse (!) 112   Resp 17   Aerosol Therapy   $ Aerosol Therapy Charges Aerosol Treatment   Daily Review of Necessity (SVN) completed   Respiratory Treatment Status (SVN) given   Treatment Route (SVN) mask;oxygen   Patient Position (SVN) HOB elevated   Post Treatment Assessment (SVN) breath sounds improved   Signs of Intolerance (SVN) none   Breath Sounds Post-Respiratory Treatment   Throughout All Fields Post-Treatment All Fields   Throughout All Fields Post-Treatment clear;diminished   Post-treatment Heart Rate (beats/min) 85   Post-treatment Resp Rate (breaths/min) 15

## 2019-11-19 NOTE — ASSESSMENT & PLAN NOTE
CXR: Persistent dense alveolar consolidation in the right lower lobe suggestive of pneumonia  Tmax 100.8  Blood, resp, urine cx pending  PCT elevated at 3.21  IVabx:  Azithromycin and Rocephin

## 2019-11-19 NOTE — NURSING
Called interventional cardiology and notified them that pt no longer refusing echo. Nurse put pt back on schedule     Cecile Maria RN

## 2019-11-20 NOTE — CONSULTS
Nephrology Consult Note        Patient Name: Aurelia Saucedo  MRN: [id number]    Patient Class: IP- Inpatient   Admission Date: 11/18/2019  Length of Stay: 1 days  Date of Service: 11/20/2019    Attending Physician: Cherry Patton DO  Primary Care Provider: Dustin Ireland MD    Reason for Consult: esrd/anemia/htn/shpt/pna    SUBJECTIVE:     HPI: 54F with ESRD on HD MWF is admitted with chest pain after recent thoracentesis, because XR shows PNA and she has low-grade fever. She has been feeling tired also for a few days. She did outpatient dialysis per schedule.    11/18 Seen and examined on HD today, tolerating well, no complains.  11/19 VSS, no new complains, HD in AM. Can be dc today if otherwise stable.  11/20 Seen and examined on HD today, tolerating well, no complains. Also giving 1 PRBC for anemia.    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 since 12/16/13 8/19/2015    Gastritis     Hypertension 8/19/2015    Kidney transplant candidate 8/19/2015    Renal dialysis status     M, W, F    Secondary hyperparathyroidism of renal origin 8/19/2015     Past Surgical History:   Procedure Laterality Date    AV FISTULA PLACEMENT  2013    SALPINGOOPHORECTOMY Right 1997    TOTAL ABDOMINAL HYSTERECTOMY  1999     Family History   Problem Relation Age of Onset    Kidney disease Mother     Stroke Father     Psoriasis Brother     Breast cancer Neg Hx     Cancer Neg Hx     Colon cancer Neg Hx     Ovarian cancer Neg Hx      Social History     Tobacco Use    Smoking status: Current Every Day Smoker     Packs/day: 0.75     Years: 30.00     Pack years: 22.50    Smokeless tobacco: Never Used    Tobacco comment: pt reports actively trying to quit but struggling.  Counseling and resources given.   Substance Use Topics    Alcohol use: No     Alcohol/week: 0.0 standard drinks    Drug use: Yes      Types: Marijuana     Comment: nightly to help sleep       Review of patient's allergies indicates:   Allergen Reactions    Quinolones Itching    Levaquin [levofloxacin] Itching    Ciprofloxacin Itching    Codeine Nausea Only       Outpatient meds:  No current facility-administered medications on file prior to encounter.      Current Outpatient Medications on File Prior to Encounter   Medication Sig Dispense Refill    amlodipine (NORVASC) 10 MG tablet Take 1 tablet (10 mg total) by mouth once daily. 90 tablet 3    cephALEXin (KEFLEX) 500 MG capsule Take 1 capsule (500 mg total) by mouth once daily. On dialysis days take after dialysis 5 capsule 0    dicyclomine (BENTYL) 20 mg tablet Take 20 mg by mouth every 6 (six) hours as needed.      FA-VIT B COMP&C-SELENIUM-ZINC 3-70-15 MG-MCG-MG ORAL TAB Take 1 tablet by mouth once daily.      HYDROcodone-acetaminophen (NORCO)  mg per tablet Take 1 tablet by mouth every 8 (eight) hours as needed for Pain. 60 tablet 0    lisinopril (PRINIVIL,ZESTRIL) 20 MG tablet Take 1 tablet by mouth once daily.  3    metoprolol succinate (TOPROL-XL) 50 MG 24 hr tablet Take 50 mg by mouth once daily.      ondansetron (ZOFRAN-ODT) 4 MG TbDL Take 2 tablets (8 mg total) by mouth every hour as needed. nausea 20 tablet 0    pantoprazole (PROTONIX) 40 MG tablet Take 40 mg by mouth once daily.      promethazine (PHENERGAN) 25 MG tablet Take 1 tablet (25 mg total) by mouth every 6 (six) hours as needed for Nausea. 20 tablet 0    traZODone (DESYREL) 50 MG tablet Take 1 tablet (50 mg total) by mouth every evening. 30 tablet 11    ursodiol (ACTIGALL) 250 mg Tab Take 1 tablet (250 mg total) by mouth once daily. 90 tablet 0       Scheduled meds:   albuterol-ipratropium  3 mL Nebulization Q6H    amLODIPine  10 mg Oral Daily    azithromycin  500 mg Intravenous Q24H    cefTRIAXone (ROCEPHIN) IVPB  1 g Intravenous Q24H    lisinopril  20 mg Oral Daily    metoprolol succinate  50 mg  Oral Daily    mupirocin   Nasal BID    pantoprazole  40 mg Oral Daily    traZODone  50 mg Oral QHS       Infusions:      PRN meds:      Review of Systems:  ROS    OBJECTIVE:     Vital Signs and IO (Last 24H):  Temp:  [98.4 °F (36.9 °C)-99.9 °F (37.7 °C)]   Pulse:  []   Resp:  [16-18]   BP: ()/(58-69)   SpO2:  [95 %-99 %]   I/O last 3 completed shifts:  In: 5770 [P.O.:770; Other:5000]  Out: 2500 [Urine:500; Other:2000]    Wt Readings from Last 5 Encounters:   11/20/19 53.3 kg (117 lb 8.1 oz)   10/22/19 50.8 kg (112 lb)   10/18/19 48.3 kg (106 lb 7.7 oz)   10/10/19 55.2 kg (121 lb 9.6 oz)   09/20/19 53.3 kg (117 lb 9.6 oz)         Physical Exam:  Physical Exam   Constitutional: She is oriented to person, place, and time. She appears well-developed and well-nourished.   HENT:   Head: Normocephalic and atraumatic.   Mouth/Throat: Oropharynx is clear and moist.   Eyes: Pupils are equal, round, and reactive to light. EOM are normal. No scleral icterus.   Neck: Neck supple.   Cardiovascular: Normal rate and regular rhythm.   Pulmonary/Chest: Effort normal. No stridor. No respiratory distress.   Abdominal: Soft. She exhibits no distension.   Musculoskeletal: Normal range of motion. She exhibits no edema or deformity.   Neurological: She is alert and oriented to person, place, and time. No cranial nerve deficit.   Skin: Skin is warm and dry. No rash noted. She is not diaphoretic. No erythema.   Psychiatric: She has a normal mood and affect. Her behavior is normal.       Body mass index is 19.55 kg/m².    Laboratory:  Recent Labs   Lab 11/18/19  1216 11/19/19  0447 11/20/19  0456    135*  135*  135* 134*  134*   K 5.3* 4.4  4.4  4.4 4.6  4.6   CL 97 95  95  95 95  95   CO2 24 26  26  26 25  25   BUN 74* 39*  39*  39* 51*  51*   CREATININE 8.3* 5.5*  5.5*  5.5* 6.8*  6.8*   ESTGFRAFRICA 5.8* 9.5*  9.5*  9.5* 7.4*  7.4*   EGFRNONAA 5.0* 8.3*  8.3*  8.3* 6.4*  6.4*    108  108   108 101  101       Recent Labs   Lab 11/18/19  1216 11/19/19  0447 11/20/19  0456   CALCIUM 8.9 8.7  8.7  8.7 8.5*  8.5*   ALBUMIN 2.8* 2.6*  2.6*  --    PHOS  --  4.3  --    MG 2.4 2.1  2.1 2.1  2.1             No results for input(s): POCTGLUCOSE in the last 168 hours.          Recent Labs   Lab 11/18/19  1216 11/19/19  0447 11/20/19  0457   WBC 6.66 5.94  5.94 5.21  5.21   HGB 10.7* 8.7*  8.7* 7.6*  7.6*   HCT 32.4* 26.6*  26.6* 22.7*  22.7*    147*  147* 139*  139*   MCV 98 99*  99* 97  97   MCHC 33.0 32.7  32.7 33.5  33.5   MONO 4.8  0.3 7.1  7.1  0.4  0.4 8.6  8.6  0.5  0.5       Recent Labs   Lab 11/18/19  1216 11/19/19  0447   BILITOT 0.8 0.7  0.7   PROT 6.2 5.7*  5.7*   ALBUMIN 2.8* 2.6*  2.6*   ALKPHOS 82 79  79   ALT 14 15  15   AST 19 31  31       Recent Labs   Lab 11/27/18  0924 10/17/19  0850 11/19/19  0316   Color, UA  --  Orange A Yellow   Appearance, UA  --  Hazy A Hazy A   pH, UA 7.0 7.0 >8.0 A   Specific Gravity, UA  --  1.010 1.010   Protein, UA  --  2+ A 2+ A   Glucose, UA  --  Negative Negative   Ketones, UA  --  Trace A Negative   Urobilinogen, UA  --  Negative Negative   Bilirubin (UA)  --  Negative Negative   Occult Blood UA  --  3+ A 2+ A   Nitrite, UA  --  Negative Negative   RBC, UA  --  >100 H 30 H   WBC, UA  --  >100 H >100 H   Bacteria  --  Negative Moderate A   Hyaline Casts, UA  --  14 A 1             Microbiology Results (last 7 days)     Procedure Component Value Units Date/Time    Urine culture [[id number]] Collected:  11/19/19 0316    Order Status:  Completed Specimen:  Urine Updated:  11/20/19 0812     Urine Culture, Routine Multiple organisms isolated. None in predominance.  Repeat if      clinically necessary.    Narrative:       Specimen Source->Urine    Culture, Respiratory with Gram Stain [[id number]]     Order Status:  Sent Specimen:  Respiratory from Sputum           ASSESSMENT/PLAN:     ESRD on HD MWF via AVF  Continue current  dialysis prescription.  Next HD on Wed or PRN.  Renal diet - low K, low phos.  No IVs or BP checks on access and/or non-dominant arm.    Anemia of CKD  Hgb and HCT are acceptable. Monitor.  Will provide MERON and/or IV iron PRN.  Will transfuse 1 PRBC today for severe anemia.    MBD / Secondary HPT  Ca, phos, PTH and vitamin D levels are acceptable.   Phos binders, vitamin D analogues and calcimimetics as needed.    HTN  BP seem controlled.   Tolerate asymptomatic HTN up to -160.  Continue home meds.  Low sodium diet.    PNA  Agree with abx and management per primary team.    Thank you for allowing us to participate in the care of your patient!   We will follow the patient and provide recommendations as needed.    Luciano Campos MD    [facility]  [address] [place] [address]  [place] [id number]    [phone] - tel  [phone] - fax    11/20/2019 12:56 PM

## 2019-11-20 NOTE — PROGRESS NOTES
Cardiac Rehab     Aurelia Saucedo   [id number]   11/20/2019         Cardiac Rehab Phase Taught: Phase 1    Teaching Method: Verbal    Handouts: Cardiac Rehab Tear Sheet    Educational Videos: None    Understanding:  Learning indicated by feedback and Verbalize understanding    Comments: Education on heart attack, cad risk factors and cardiac rehab. Questions answered.    Total Time Spent: 15 min            Isela Leon RN

## 2019-11-20 NOTE — SUBJECTIVE & OBJECTIVE
Interval History: last temp 100.8 @ 0931 11/19.  IV access obtained yesterday reassessed today with good flow.      Patient seen and examined at bedside. She reports feeling tired and fatigued. +chest discomfort and sob when ambulating to restroom but none at rest. + dysuria.  Denies any  N/V/D, headaches, fever or chills.       Review of Systems   Per interval history, all other systems reviewed and negative.     Objective:     Vital Signs (Most Recent):  Temp: 99.1 °F (37.3 °C) (11/20/19 0748)  Pulse: 104 (11/20/19 0748)  Resp: 16 (11/20/19 0748)  BP: 110/66 (11/20/19 0748)  SpO2: 98 % (11/20/19 0748) Vital Signs (24h Range):  Temp:  [98.4 °F (36.9 °C)-99.9 °F (37.7 °C)] 99.1 °F (37.3 °C)  Pulse:  [] 104  Resp:  [16-18] 16  SpO2:  [95 %-99 %] 98 %  BP: ()/(58-69) 110/66     Weight: 53.3 kg (117 lb 8.1 oz)  Body mass index is 19.55 kg/m².    Intake/Output Summary (Last 24 hours) at 11/20/2019 1016  Last data filed at 11/20/2019 0949  Gross per 24 hour   Intake 720 ml   Output 500 ml   Net 220 ml      Physical Exam   Constitutional: She is oriented to person, place, and time. She appears well-developed and well-nourished.   HENT:   Head: Atraumatic.   Eyes: Pupils are equal, round, and reactive to light. Conjunctivae and EOM are normal.   Neck: Normal range of motion. Neck supple. No JVD present. No thyromegaly present.   Cardiovascular: Normal rate and regular rhythm. Exam reveals no gallop and no friction rub.   Murmur heard.  Pulmonary/Chest: Effort normal and breath sounds normal. She has no wheezes. She has no rales.   Abdominal: Soft. Bowel sounds are normal. She exhibits distension (mild). There is no tenderness.   Musculoskeletal: Normal range of motion.   Neurological: She is alert and oriented to person, place, and time. She has normal reflexes.   Skin: Skin is warm and dry.   Psychiatric: She has a normal mood and affect. Her behavior is normal.   Nursing note and vitals  reviewed.      Significant Labs:   CBC:   Recent Labs   Lab 11/18/19  1216 11/19/19  0447 11/20/19  0457   WBC 6.66 5.94  5.94 5.21  5.21   HGB 10.7* 8.7*  8.7* 7.6*  7.6*   HCT 32.4* 26.6*  26.6* 22.7*  22.7*    147*  147* 139*  139*     CMP:   Recent Labs   Lab 11/18/19  1216 11/19/19  0447 11/20/19  0456    135*  135*  135* 134*  134*   K 5.3* 4.4  4.4  4.4 4.6  4.6   CL 97 95  95  95 95  95   CO2 24 26  26  26 25  25    108  108  108 101  101   BUN 74* 39*  39*  39* 51*  51*   CREATININE 8.3* 5.5*  5.5*  5.5* 6.8*  6.8*   CALCIUM 8.9 8.7  8.7  8.7 8.5*  8.5*   PROT 6.2 5.7*  5.7*  --    ALBUMIN 2.8* 2.6*  2.6*  --    BILITOT 0.8 0.7  0.7  --    ALKPHOS 82 79  79  --    AST 19 31  31  --    ALT 14 15  15  --    ANIONGAP 15 14  14  14 14  14   EGFRNONAA 5.0* 8.3*  8.3*  8.3* 6.4*  6.4*     All pertinent labs within the past 24 hours have been reviewed.    Significant Imaging: no new images today     Microbiology Results (last 7 days)     Procedure Component Value Units Date/Time    Urine culture [[id number]] Collected:  11/19/19 0316    Order Status:  Completed Specimen:  Urine Updated:  11/20/19 0812     Urine Culture, Routine Multiple organisms isolated. None in predominance.  Repeat if      clinically necessary.    Narrative:       Specimen Source->Urine    Culture, Respiratory with Gram Stain [[id number]]     Order Status:  Sent Specimen:  Respiratory from Sputum

## 2019-11-20 NOTE — TELEPHONE ENCOUNTER
----- Message from Tri Santiago sent at 11/20/2019  2:29 PM CST -----  Contact: Giana bee/ Dr. Nieto's Office  Giana bee/ Dr. Nieto's office is calling to see why pt was denied.    Giana# [phone]

## 2019-11-20 NOTE — PROGRESS NOTES
[facility]  Cardiology Note      Subjective:     HPI: Seen and examined this AM. Anemia worsening, hemoglobin 7.6. Scheduled for HD M/F. ECHO pending read. Trop trended down from 3.692 to 2.631. BP controlled after holding Norvasc this AM, HR low 100s in SR.     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 since 12/16/13 8/19/2015    Gastritis     Hypertension 8/19/2015    Kidney transplant candidate 8/19/2015    Renal dialysis status     M, W, F    Secondary hyperparathyroidism of renal origin 8/19/2015       Past Surgical History:   Procedure Laterality Date    AV FISTULA PLACEMENT  2013    SALPINGOOPHORECTOMY Right 1997    TOTAL ABDOMINAL HYSTERECTOMY  1999       Review of patient's allergies indicates:   Allergen Reactions    Quinolones Itching    Levaquin [levofloxacin] Itching    Ciprofloxacin Itching    Codeine Nausea Only       No current facility-administered medications on file prior to encounter.      Current Outpatient Medications on File Prior to Encounter   Medication Sig    amlodipine (NORVASC) 10 MG tablet Take 1 tablet (10 mg total) by mouth once daily.    cephALEXin (KEFLEX) 500 MG capsule Take 1 capsule (500 mg total) by mouth once daily. On dialysis days take after dialysis    dicyclomine (BENTYL) 20 mg tablet Take 20 mg by mouth every 6 (six) hours as needed.    FA-VIT B COMP&C-SELENIUM-ZINC 3-70-15 MG-MCG-MG ORAL TAB Take 1 tablet by mouth once daily.    HYDROcodone-acetaminophen (NORCO)  mg per tablet Take 1 tablet by mouth every 8 (eight) hours as needed for Pain.    lisinopril (PRINIVIL,ZESTRIL) 20 MG tablet Take 1 tablet by mouth once daily.    metoprolol succinate (TOPROL-XL) 50 MG 24 hr tablet Take 50 mg by mouth once daily.    ondansetron (ZOFRAN-ODT) 4 MG TbDL Take 2 tablets (8 mg total) by mouth every hour as needed. nausea     pantoprazole (PROTONIX) 40 MG tablet Take 40 mg by mouth once daily.    promethazine (PHENERGAN) 25 MG tablet Take 1 tablet (25 mg total) by mouth every 6 (six) hours as needed for Nausea.    traZODone (DESYREL) 50 MG tablet Take 1 tablet (50 mg total) by mouth every evening.    ursodiol (ACTIGALL) 250 mg Tab Take 1 tablet (250 mg total) by mouth once daily.     Family History     Problem Relation (Age of Onset)    Kidney disease Mother    Psoriasis Brother    Stroke Father        Tobacco Use    Smoking status: Current Every Day Smoker     Packs/day: 0.75     Years: 30.00     Pack years: 22.50    Smokeless tobacco: Never Used    Tobacco comment: pt reports actively trying to quit but struggling.  Counseling and resources given.   Substance and Sexual Activity    Alcohol use: No     Alcohol/week: 0.0 standard drinks    Drug use: Yes     Types: Marijuana     Comment: nightly to help sleep    Sexual activity: Not Currently     Partners: Male     Birth control/protection: Surgical       Objective:     Vital Signs (Most Recent):  Temp: 99.1 °F (37.3 °C) (11/20/19 0748)  Pulse: 104 (11/20/19 0748)  Resp: 16 (11/20/19 0748)  BP: 110/66 (11/20/19 0748)  SpO2: 98 % (11/20/19 0748) Vital Signs (24h Range):  Temp:  [98.4 °F (36.9 °C)-99.9 °F (37.7 °C)] 99.1 °F (37.3 °C)  Pulse:  [] 104  Resp:  [16-18] 16  SpO2:  [95 %-99 %] 98 %  BP: ()/(58-69) 110/66     Weight: 53.3 kg (117 lb 8.1 oz)  Body mass index is 19.55 kg/m².    SpO2: 98 %  O2 Device (Oxygen Therapy): room air      Intake/Output Summary (Last 24 hours) at 11/20/2019 1107  Last data filed at 11/20/2019 0949  Gross per 24 hour   Intake 720 ml   Output 500 ml   Net 220 ml       Lines/Drains/Airways     Drain                 Hemodialysis AV Fistula Left upper arm -- days          Airway                 Airway - Non-Surgical 10/10/16 0913 Nasal Cannula 1136 days          Peripheral Intravenous Line                 Peripheral IV - Single Lumen  11/19/19 1108 20 G;Other (Comments) Anterior;Right Forearm less than 1 day                Physical Exam   Constitutional: She is oriented to person, place, and time. She appears well-developed and well-nourished.   Neck: No JVD present.   Bilat. Bruits Vs radiation of murmur or bruit from AV fistual   Cardiovascular: Normal rate and regular rhythm.   Murmur (3/6 systolic murmur apex) heard.  Pulses:       Dorsalis pedis pulses are 1+ on the right side, and 1+ on the left side.   Pulmonary/Chest: Effort normal and breath sounds normal.   Abdominal: Soft.   Musculoskeletal: She exhibits no edema.   Neurological: She is alert and oriented to person, place, and time.   Skin: Skin is warm and dry.   Chronic skin changes to legs   Psychiatric: She has a normal mood and affect.       Significant Labs:   Recent Lab Results       11/20/19 0457   11/20/19 0456        Anion Gap   14        14     Baso # 0.01        0.01       Basophil% 0.2        0.2       BUN, Bld   51        51     Calcium   8.5        8.5     Chloride   95        95     CO2   25        25     Creatinine   6.8        6.8     Differential Method Automated        Automated       eGFR if    7.4        7.4     eGFR if non    6.4  Comment:  Calculation used to obtain the estimated glomerular filtration  rate (eGFR) is the CKD-EPI equation.           6.4  Comment:  Calculation used to obtain the estimated glomerular filtration  rate (eGFR) is the CKD-EPI equation.        Eos # 0.1        0.1       Eosinophil% 2.3        2.3       Glucose   101        101     Gran # (ANC) 4.0        4.0       Gran% 76.4        76.4       Hematocrit 22.7        22.7       Hemoglobin 7.6        7.6       Immature Grans (Abs) 0.03  Comment:  Mild elevation in immature granulocytes is non specific and   can be seen in a variety of conditions including stress response,   acute inflammation, trauma and pregnancy. Correlation with other   laboratory and  clinical findings is essential.          0.03  Comment:  Mild elevation in immature granulocytes is non specific and   can be seen in a variety of conditions including stress response,   acute inflammation, trauma and pregnancy. Correlation with other   laboratory and clinical findings is essential.         Immature Granulocytes 0.6        0.6       Lymph # 0.6        0.6       Lymph% 11.9        11.9       Magnesium   2.1        2.1     MCH 32.6        32.6       MCHC 33.5        33.5       MCV 97        97       Mono # 0.5        0.5       Mono% 8.6        8.6       MPV 9.3        9.3       nRBC 0        0       Platelets 139        139       Potassium   4.6        4.6     RBC 2.33        2.33       RDW 13.6        13.6       Sodium   134        134     WBC 5.21        5.21             Significant Imaging:   Assessment and Plan:     ASSESSMENT:  CAP  NSTEMI  2014 CAD RCA KARL, otherwise mild-mod CAD  2013 Severe Dynamic MR - pt declined surgery   Carotid bruits Vs radiation of murmur/AV fistula bruit   Noncompliant with cardiology f/u since 2014  ESRD on HD  Liver cirrhosis/Hep C, recurrent ascites  Anemia chronic disease    PLAN:  ECHO pending read.  Hold off on LHC at this time with demand ischemia likely reasoning for elevated trop considering worsening anemia (GIB?), ESRD, and PNA. Consider outpatient MPI.   pRBC transfusion per hospital medicine/nephrology.  Hold Norvasc indefinitely, continue Toprol and Lisinorpil.   No statin due to liver cirrhosis.     Alex Casas PA-C  Cardiology   [facility]

## 2019-11-20 NOTE — PLAN OF CARE
"   11/20/19 1114   Discharge Assessment   Assessment Type Discharge Planning Assessment   Confirmed/corrected address and phone number on facesheet? Yes   Assessment information obtained from? Patient   Communicated expected length of stay with patient/caregiver yes   Prior to hospitilization cognitive status: Alert/Oriented   Prior to hospitalization functional status: Independent   Current cognitive status: Alert/Oriented   Current Functional Status: Independent   Lives With spouse;child(tegan), dependent  (Pt resides with her spouse, Jose Saucedo [phone])   Able to Return to Prior Arrangements yes   Is patient able to care for self after discharge? Yes   Who are your caregiver(s) and their phone number(s)? Pt's primary caregiver is her spouse, Jose [phone]   Patient's perception of discharge disposition home or selfcare   Readmission Within the Last 30 Days no previous admission in last 30 days;current reason for admission unrelated to previous admission   If yes, most recent facility name: [facility]   Patient currently being followed by outpatient case management? No   Patient currently receives any other outside agency services? No   Equipment Currently Used at Home none   Do you have any problems affording any of your prescribed medications? Yes   If yes, what medications? "Pretty much anything"    Is the patient taking medications as prescribed? yes   Does the patient have transportation home? Yes   Transportation Anticipated family or friend will provide   Dialysis Name and Scheduled days [facility] on [address] in [place] on Monday and Friday   Does the patient receive services at the [facility]? No   Discharge Plan A Home;Home with family   Discharge Plan B Home;Home with family   DME Needed Upon Discharge  none   Patient/Family in Agreement with Plan yes   Readmission Questionnaire   At the time of your discharge, did someone talk to you about what your health problems were? Yes   At the time of " discharge, did someone talk to you about what to watch out for regarding worsening of your health problem? Yes   At the time of discharge, did someone talk to you about what to do if you experienced worsening of your health problem? Yes   At the time of discharge, did someone talk to you about which medication to take when you left the hospital and which ones to stop taking? Yes   At the time of discharge, did someone talk to you about when and where to follow up with a doctor after you left the hospital? Yes   How often do you need to have someone help you when you read instructions, pamphlets, or other written material from your doctor or pharmacy? Never   Do you have problems taking your medications as prescribed? No   Do you have any problems affording any of  your prescribed medications? Yes   Do you have problems obtaining/receiving your medications? Yes   Does the patient have transportation to healthcare appointments? Yes   Living Arrangements house   Does the patient have family/friends to help with healtcare needs after discharge? yes   Does your caregiver provide all the help you need? Yes   Are you currently feeling confused? No   Are you currently having problems thinking? No   Are you currently having memory problems? No

## 2019-11-20 NOTE — PROGRESS NOTES
[facility]  Progress Note    Patient Name: Aurelia Saucedo  MRN: [id number]  Patient Class: IP- Inpatient   Admission Date: 11/18/2019  Length of Stay: 1 days  Attending Physician: Cherry Patton DO  Primary Care Provider: Dustin Ireland MD      Subjective:     Principal Problem:Pneumonia due to infectious organism  Chief Complaint   Patient presents with    Shortness of Breath       Interval History: last temp 100.8 @ 0931 11/19.  IV access obtained yesterday reassessed today with good flow.      Patient seen and examined at bedside. She reports feeling tired and fatigued. +chest discomfort and sob when ambulating to restroom but none at rest. + dysuria.  Denies any  N/V/D, headaches, fever or chills.       Review of Systems   Per interval history, all other systems reviewed and negative.     Objective:     Vital Signs (Most Recent):  Temp: 99.1 °F (37.3 °C) (11/20/19 0748)  Pulse: 104 (11/20/19 0748)  Resp: 16 (11/20/19 0748)  BP: 110/66 (11/20/19 0748)  SpO2: 98 % (11/20/19 0748) Vital Signs (24h Range):  Temp:  [98.4 °F (36.9 °C)-99.9 °F (37.7 °C)] 99.1 °F (37.3 °C)  Pulse:  [] 104  Resp:  [16-18] 16  SpO2:  [95 %-99 %] 98 %  BP: ()/(58-69) 110/66     Weight: 53.3 kg (117 lb 8.1 oz)  Body mass index is 19.55 kg/m².    Intake/Output Summary (Last 24 hours) at 11/20/2019 1016  Last data filed at 11/20/2019 0949  Gross per 24 hour   Intake 720 ml   Output 500 ml   Net 220 ml      Physical Exam   Constitutional: She is oriented to person, place, and time. She appears well-developed and well-nourished.   HENT:   Head: Atraumatic.   Eyes: Pupils are equal, round, and reactive to light. Conjunctivae and EOM are normal.   Neck: Normal range of motion. Neck supple. No JVD present. No thyromegaly present.   Cardiovascular: Normal rate and regular rhythm. Exam reveals no gallop and no friction rub.   Murmur heard.  Pulmonary/Chest: Effort normal and breath sounds normal. She  has no wheezes. She has no rales.   Abdominal: Soft. Bowel sounds are normal. She exhibits distension (mild). There is no tenderness.   Musculoskeletal: Normal range of motion.   Neurological: She is alert and oriented to person, place, and time. She has normal reflexes.   Skin: Skin is warm and dry.   Psychiatric: She has a normal mood and affect. Her behavior is normal.   Nursing note and vitals reviewed.      Significant Labs:   CBC:   Recent Labs   Lab 11/18/19  1216 11/19/19 0447 11/20/19 0457   WBC 6.66 5.94  5.94 5.21  5.21   HGB 10.7* 8.7*  8.7* 7.6*  7.6*   HCT 32.4* 26.6*  26.6* 22.7*  22.7*    147*  147* 139*  139*     CMP:   Recent Labs   Lab 11/18/19  1216 11/19/19 0447 11/20/19 0456    135*  135*  135* 134*  134*   K 5.3* 4.4  4.4  4.4 4.6  4.6   CL 97 95  95  95 95  95   CO2 24 26  26  26 25  25    108  108  108 101  101   BUN 74* 39*  39*  39* 51*  51*   CREATININE 8.3* 5.5*  5.5*  5.5* 6.8*  6.8*   CALCIUM 8.9 8.7  8.7  8.7 8.5*  8.5*   PROT 6.2 5.7*  5.7*  --    ALBUMIN 2.8* 2.6*  2.6*  --    BILITOT 0.8 0.7  0.7  --    ALKPHOS 82 79  79  --    AST 19 31  31  --    ALT 14 15  15  --    ANIONGAP 15 14  14  14 14  14   EGFRNONAA 5.0* 8.3*  8.3*  8.3* 6.4*  6.4*     All pertinent labs within the past 24 hours have been reviewed.    Significant Imaging: no new images today     Microbiology Results (last 7 days)     Procedure Component Value Units Date/Time    Urine culture [[id number]] Collected:  11/19/19 0316    Order Status:  Completed Specimen:  Urine Updated:  11/20/19 0812     Urine Culture, Routine Multiple organisms isolated. None in predominance.  Repeat if      clinically necessary.    Narrative:       Specimen Source->Urine    Culture, Respiratory with Gram Stain [[id number]]     Order Status:  Sent Specimen:  Respiratory from Sputum               Assessment/Plan:      Pneumonia due to infectious organism  Pneumonia of right  middle lobe due to infectious organism  CXR: Persistent dense alveolar consolidation in the right lower lobe suggestive of pneumonia  Tmax 100.8 yesterday morning  resp cx: pending  urine cx multiple organisms none and predominance  PCT elevated at 3.21  IVabx:  Azithromycin and Rocephin           Troponin level elevated  Demand ischemia in setting of ESRD and PNA vs ACS  Cardiology consulted, patient states she was seen by Cardiology this morning.  They will defer continued cardiac workup until current infection resolves and in an outpatient setting.  Trop trended and peaked at 3.692 now downtrending  EKG no signs of acute ischemic events           ESRD 2/2 MPGN on HD since 12/16/13   ESRD   - HD MF,  access  AVF, makes urine  - Nephrology consulted  - s/p HD 11/18      - c/w home medications     secondary hyperparathyroidism of renal origin  Phos binders, vitamin D analogues and calcimimetics prn        Anemia of chronic renal failure, stage 5  Chronic  Stable  MERON and or IV iron prn per nephrology        Essential hypertension  Stable continue with home medications        Decompensated cirrhosis secondary to hep C with ascites  monitoring  Receiving outpatient treatment for hep C  paracentesis p.r.n.  patient recently had paracentesis    Dysuria  - will repeat ua with ucx today  -  rocephin      VTE Risk Mitigation (From admission, onward)         Ordered     Place sequential compression device  Until discontinued      11/18/19 2665                  Cherry Patton DO  Department of Hospital Medicine   [facility]

## 2019-11-20 NOTE — PLAN OF CARE
"   11/20/19 0731   Patient Assessment/Suction   Level of Consciousness (AVPU) alert   Respiratory Effort Unlabored;Normal   Expansion/Accessory Muscles/Retractions no use of accessory muscles   All Lung Fields Breath Sounds clear   Rhythm/Pattern, Respiratory no shortness of breath reported   Cough Frequency no cough   PRE-TX-O2   O2 Device (Oxygen Therapy) room air   SpO2 97 %   Pulse Oximetry Type Intermittent   $ Pulse Oximetry - Multiple Charge Pulse Oximetry - Multiple   Pulse 99   Resp 18   Aerosol Therapy   $ Aerosol Therapy Charges Aerosol Treatment   Respiratory Treatment Status (SVN) given   Treatment Route (SVN) mask;oxygen   Patient Position (SVN) semi-Magallanes's   Post Treatment Assessment (SVN) breath sounds unchanged   Signs of Intolerance (SVN) none   Breath Sounds Post-Respiratory Treatment   Post-treatment Heart Rate (beats/min) 95   Post-treatment Resp Rate (breaths/min) 20       Pt is agitated with staff. Feels like she is "being ignored." Also stated "People don't want to do their job. I don't like [facility]'s rules or regulations." Patient advocate will be notified to make a visit.   "

## 2019-11-20 NOTE — PROGRESS NOTES
54 yr old female       11/20/19 1454        Pressure Injury 11/19/19 1107 Coccyx #1 Stage 2   Date First Assessed/Time First Assessed: 11/19/19 1107   Pressure Injury Present on Admission: yes  Location: Coccyx  Wound/PI Number (optional): #1  Is this injury device related?: No  Staging: Stage 2   Wound Image      Staging Stage 2   Dressing Appearance Dry   Drainage Amount None   Appearance [place]   Periwound Area Intact;Dry;Redness   Wound Length (cm) 0.8 cm   Wound Width (cm) 0.8 cm   Wound Depth (cm) 0.1 cm   Wound Volume (cm^3) 0.06 cm^3   Wound Surface Area (cm^2) 0.64 cm^2   Dressing Island/border     Recommendation: sacral  Clean area with chlorhexidine/ns  Pat dry  Apply venelex and cover with mepilex. Reposition Q2hr.

## 2019-11-20 NOTE — PHYSICIAN QUERY
PT Name: Aurelia Saucedo  MR #: [id number]     Physician Query Form - Documentation Clarification      CDS/: Diana Harris Pe               Contact information: [phone]    This form is a permanent document in the medical record.     Query Date: November 20, 2019    By submitting this query, we are merely seeking further clarification of documentation. Please utilize your independent clinical judgment when addressing the question(s) below.    The Medical record reflects the following:    Supporting Clinical Findings     11/19 - UA - Hazy, > 100 WBC, 2+ Blood,  Moderate Bacteria                                                                                Doctor, Please specify diagnosis or diagnoses associated with above clinical findings.    Provider Use Only        Likely UTI pending cultures                                                                                                                         [  ] Clinically Undetermined

## 2019-11-20 NOTE — PROGRESS NOTES
54 yr old female        11/20/19 1454        Pressure Injury 11/19/19 1107 Coccyx #1 Stage 2   Date First Assessed/Time First Assessed: 11/19/19 1107   Pressure Injury Present on Admission: yes  Location: Coccyx  Wound/PI Number (optional): #1  Is this injury device related?: No  Staging: Stage 2   Staging Stage 2   Dressing Appearance Dry   Drainage Amount None   Appearance [place]   Periwound Area Intact;Dry;Redness   Wound Length (cm) 0.8 cm   Wound Width (cm) 0.8 cm   Wound Depth (cm) 0.1 cm   Wound Volume (cm^3) 0.06 cm^3   Wound Surface Area (cm^2) 0.64 cm^2   Dressing Island/border   Recommendation:  Clean area with chlorhexidine/ns  Pat dry  Apply venelex and cover with mepilex. Reposition Q2hr.

## 2019-11-21 NOTE — PLAN OF CARE
11/21/19 1441   Patient Assessment/Suction   Level of Consciousness (AVPU) alert   Respiratory Effort Normal;Unlabored   All Lung Fields Breath Sounds crackles;diminished   LEXIS Breath Sounds diminished   LLL Breath Sounds crackles   RUL Breath Sounds diminished   RML Breath Sounds crackles   RLL Breath Sounds crackles   Cough Frequency infrequent   Cough Type nonproductive   PRE-TX-O2   SpO2 97 %   Pulse 80   Resp 18   Aerosol Therapy   $ Aerosol Therapy Charges Aerosol Treatment   Daily Review of Necessity (SVN) completed   Respiratory Treatment Status (SVN) given   Treatment Route (SVN) mask;oxygen   Patient Position (SVN) semi-Magallanes's   Post Treatment Assessment (SVN) increased aeration   Signs of Intolerance (SVN) none   Breath Sounds Post-Respiratory Treatment   Throughout All Fields Post-Treatment All Fields   Throughout All Fields Post-Treatment crackles;aeration increased   Post-treatment Heart Rate (beats/min) 90   Post-treatment Resp Rate (breaths/min) 16   CONTINUE THERAPY AS ORDERED

## 2019-11-21 NOTE — PROGRESS NOTES
[facility]  Cardiology Note      Subjective:     HPI: Seen and examined this AM. Anemia improved following pRBC transfusion with dialysis yesterday with hemoglobin now 8.9.  2 L removed during session. Echocardiogram yesterday demonstrated normal LVEF with stage III diastolic dysfunction representing restrictive physiology, mild aortic stenosis with OPAL 1.7 cm2, moderate AI/MR. Patient reports no major change in symptoms and denies any cardiac complaints this morning. Vital signs stable.    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 since 12/16/13 8/19/2015    Gastritis     Hypertension 8/19/2015    Kidney transplant candidate 8/19/2015    Renal dialysis status     M, W, F    Secondary hyperparathyroidism of renal origin 8/19/2015       Past Surgical History:   Procedure Laterality Date    AV FISTULA PLACEMENT  2013    SALPINGOOPHORECTOMY Right 1997    TOTAL ABDOMINAL HYSTERECTOMY  1999       Review of patient's allergies indicates:   Allergen Reactions    Quinolones Itching    Levaquin [levofloxacin] Itching    Ciprofloxacin Itching    Codeine Nausea Only       No current facility-administered medications on file prior to encounter.      Current Outpatient Medications on File Prior to Encounter   Medication Sig    amlodipine (NORVASC) 10 MG tablet Take 1 tablet (10 mg total) by mouth once daily.    cephALEXin (KEFLEX) 500 MG capsule Take 1 capsule (500 mg total) by mouth once daily. On dialysis days take after dialysis    dicyclomine (BENTYL) 20 mg tablet Take 20 mg by mouth every 6 (six) hours as needed.    FA-VIT B COMP&C-SELENIUM-ZINC 3-70-15 MG-MCG-MG ORAL TAB Take 1 tablet by mouth once daily.    HYDROcodone-acetaminophen (NORCO)  mg per tablet Take 1 tablet by mouth every 8 (eight) hours as needed for Pain.    lisinopril (PRINIVIL,ZESTRIL) 20 MG  tablet Take 1 tablet by mouth once daily.    metoprolol succinate (TOPROL-XL) 50 MG 24 hr tablet Take 50 mg by mouth once daily.    ondansetron (ZOFRAN-ODT) 4 MG TbDL Take 2 tablets (8 mg total) by mouth every hour as needed. nausea    pantoprazole (PROTONIX) 40 MG tablet Take 40 mg by mouth once daily.    promethazine (PHENERGAN) 25 MG tablet Take 1 tablet (25 mg total) by mouth every 6 (six) hours as needed for Nausea.    traZODone (DESYREL) 50 MG tablet Take 1 tablet (50 mg total) by mouth every evening.    ursodiol (ACTIGALL) 250 mg Tab Take 1 tablet (250 mg total) by mouth once daily.     Family History     Problem Relation (Age of Onset)    Kidney disease Mother    Psoriasis Brother    Stroke Father        Tobacco Use    Smoking status: Current Every Day Smoker     Packs/day: 0.75     Years: 30.00     Pack years: 22.50    Smokeless tobacco: Never Used    Tobacco comment: pt reports actively trying to quit but struggling.  Counseling and resources given.   Substance and Sexual Activity    Alcohol use: No     Alcohol/week: 0.0 standard drinks    Drug use: Yes     Types: Marijuana     Comment: nightly to help sleep    Sexual activity: Not Currently     Partners: Male     Birth control/protection: Surgical       Objective:     Vital Signs (Most Recent):  Temp: 98.8 °F (37.1 °C) (11/21/19 0726)  Pulse: 90 (11/21/19 0924)  Resp: 18 (11/21/19 0924)  BP: 116/69 (11/21/19 0726)  SpO2: 97 % (11/21/19 0924) Vital Signs (24h Range):  Temp:  [97.7 °F (36.5 °C)-99.2 °F (37.3 °C)] 98.8 °F (37.1 °C)  Pulse:  [73-98] 90  Resp:  [16-20] 18  SpO2:  [95 %-100 %] 97 %  BP: ()/(60-75) 116/69     Weight: 51.6 kg (113 lb 12.1 oz)  Body mass index is 18.93 kg/m².    SpO2: 97 %  O2 Device (Oxygen Therapy): room air      Intake/Output Summary (Last 24 hours) at 11/21/2019 1108  Last data filed at 11/21/2019 0500  Gross per 24 hour   Intake 1040 ml   Output 2500 ml   Net -1460 ml       Lines/Drains/Airways     Drain                  Hemodialysis AV Fistula Left upper arm -- days          Airway                 Airway - Non-Surgical 10/10/16 0913 Nasal Cannula 1137 days          Peripheral Intravenous Line                 Peripheral IV - Single Lumen 11/19/19 1108 20 G;Other (Comments) Anterior;Right Forearm 2 days                Physical Exam   Constitutional: She is oriented to person, place, and time. She appears well-developed and well-nourished.   Neck: No JVD present.   Bilat. Bruits Vs radiation of murmur or bruit from AV fistual   Cardiovascular: Normal rate and regular rhythm.   Murmur (3/6 systolic murmur apex) heard.  Pulses:       Dorsalis pedis pulses are 1+ on the right side, and 1+ on the left side.   Pulmonary/Chest: Effort normal and breath sounds normal.   Abdominal: Soft.   Musculoskeletal: She exhibits no edema.   Neurological: She is alert and oriented to person, place, and time.   Skin: Skin is warm and dry.   Chronic skin changes to legs   Psychiatric: She has a normal mood and affect.       Significant Labs:   Recent Lab Results       11/21/19  0444        Anion Gap 11      11     Baso # 0.02      0.02     Basophil% 0.5      0.5     BUN, Bld 38      38     Calcium 8.6      8.6     Chloride 98      98     CO2 26      26     Creatinine 5.5      5.5     Differential Method Automated      Automated     eGFR if  9.5      9.5     eGFR if non  8.3  Comment:  Calculation used to obtain the estimated glomerular filtration  rate (eGFR) is the CKD-EPI equation.         8.3  Comment:  Calculation used to obtain the estimated glomerular filtration  rate (eGFR) is the CKD-EPI equation.        Eos # 0.1      0.1     Eosinophil% 3.2      3.2     Glucose 95      95     Gran # (ANC) 3.1      3.1     Gran% 75.9      75.9     Hematocrit 26.9      26.9     Hemoglobin 8.9      8.9     Immature Grans (Abs) 0.05  Comment:  Mild elevation in immature granulocytes is non specific and   can be seen in a  variety of conditions including stress response,   acute inflammation, trauma and pregnancy. Correlation with other   laboratory and clinical findings is essential.        0.05  Comment:  Mild elevation in immature granulocytes is non specific and   can be seen in a variety of conditions including stress response,   acute inflammation, trauma and pregnancy. Correlation with other   laboratory and clinical findings is essential.       Immature Granulocytes 1.2      1.2     Lymph # 0.5      0.5     Lymph% 13.1      13.1     Magnesium 2.2      2.2     MCH 31.4      31.4     MCHC 33.1      33.1     MCV 95      95     Mono # 0.3      0.3     Mono% 6.1      6.1     MPV 9.1      9.1     nRBC 0      0     Platelets 140      140     Potassium 4.0      4.0     RBC 2.83      2.83     RDW 14.7      14.7     Sodium 135      135     WBC 4.12      4.12           Assessment and Plan:     ASSESSMENT:  CAP  NSTEMI  2014 CAD RCA KARL, otherwise mild-mod CAD  2013 Severe Dynamic MR - pt declined surgery   Carotid bruits Vs radiation of murmur/AV fistula bruit   Noncompliant with cardiology f/u since 2014  ESRD on HD  Liver cirrhosis/Hep C, recurrent ascites  Anemia chronic disease    PLAN:  Hold off on LHC at this time with demand ischemia likely reasoning for elevated trop considering worsening anemia (GIB?), ESRD, and PNA. Consider outpatient MPI.   Hold Norvasc indefinitely, continue Toprol and Lisinorpil. Consider changing lisinopril to Entresto for possible diastolic dysfunction treatment if approved by Nephrology and if her blood pressure will tolerate this change.  No statin due to liver cirrhosis.     CRISTIANA WongC  Cardiology   [facility]

## 2019-11-21 NOTE — PLAN OF CARE
Plan of care reviewed including; comfort, safety, diet, elimination, medication schedule and treatment plan. Opportunity for questions provided, understanding voiced.

## 2019-11-21 NOTE — PROGRESS NOTES
"[facility] Medicine  Progress Note     Patient Name: Aurelia Saucedo  MRN: [id number]  Patient Class: IP- Inpatient     Admission Date: 11/18/2019  Attending Physician: Cherry Patton DO  Primary Care Provider: Dustin Ireland MD        Subjective:      Principal Problem:Pneumonia due to infectious organism      Chief Complaint   Patient presents with    Shortness of Breath         Interval History: last temp 100.8 @ 0931 11/19. S/p HD yesterday    Patient seen and examined. States t that she is feeling somewhat better.  Has more energy. Denies any CP, SOB, N/V/D, headaches, fever or chills.        Review of Systems   Per interval history, all other systems reviewed and negative.      Objective:      Temp:  [97.5 °F (36.4 °C)-99.2 °F (37.3 °C)]   Pulse:  [73-95]   Resp:  [16-20]   BP: ()/(60-75)   SpO2:  [95 %-100 %]   /69 (BP Location: Right arm, Patient Position: Lying)   Pulse 84   Temp 97.5 °F (36.4 °C) (Oral)   Resp 19   Ht 5' 5" (1.651 m)   Wt 51.6 kg (113 lb 12.1 oz)   SpO2 96%   Breastfeeding? No   BMI 18.93 kg/m²     Intake/Output Summary (Last 24 hours) at 11/21/2019 1431  Last data filed at 11/21/2019 0500  Gross per 24 hour   Intake 1040 ml   Output 2500 ml   Net -1460 ml       Physical Exam   Constitutional: She is oriented to person, place, and time. She appears well-developed and well-nourished.   HENT:   Head: Atraumatic.   Eyes: Pupils are equal, round, and reactive to light. Conjunctivae and EOM are normal.   Neck: Normal range of motion. Neck supple. No JVD present. No thyromegaly present.   Cardiovascular: Normal rate and regular rhythm. Exam reveals no gallop and no friction rub.   Murmur heard.  Pulmonary/Chest: Effort normal and breath sounds normal. She has no wheezes. She has no rales.   Abdominal: Soft. Bowel sounds are normal. She exhibits distension (mild). There is no tenderness.   Musculoskeletal: Normal range of motion.   Neurological: She " is alert and oriented to person, place, and time. She has normal reflexes.   Skin: Skin is warm and dry.   Psychiatric: She has a normal mood and affect. Her behavior is normal.   Nursing note and vitals reviewed.        Significant Labs:   CBC  Recent Labs   Lab 11/19/19 0447 11/20/19 0457 11/21/19 0444   WBC 5.94  5.94 5.21  5.21 4.12  4.12   RBC 2.70*  2.70* 2.33*  2.33* 2.83*  2.83*   HGB 8.7*  8.7* 7.6*  7.6* 8.9*  8.9*   HCT 26.6*  26.6* 22.7*  22.7* 26.9*  26.9*   *  147* 139*  139* 140*  140*   MCV 99*  99* 97  97 95  95   MCH 32.2*  32.2* 32.6*  32.6* 31.4*  31.4*   MCHC 32.7  32.7 33.5  33.5 33.1  33.1     BMP  Recent Labs   Lab 11/19/19 0447 11/20/19 0456 11/21/19 0444   *  135*  135* 134*  134* 135*  135*   K 4.4  4.4  4.4 4.6  4.6 4.0  4.0   CO2 26  26  26 25  25 26  26   CL 95  95  95 95  95 98  98   BUN 39*  39*  39* 51*  51* 38*  38*   CREATININE 5.5*  5.5*  5.5* 6.8*  6.8* 5.5*  5.5*     108  108 101  101 95  95       Recent Labs   Lab 11/19/19 0447 11/20/19 0456 11/21/19 0444   CALCIUM 8.7  8.7  8.7 8.5*  8.5* 8.6*  8.6*   MG 2.1  2.1 2.1  2.1 2.2  2.2   PHOS 4.3  --   --      LFT  Recent Labs   Lab 11/18/19  1216 11/19/19 0447   PROT 6.2 5.7*  5.7*   ALBUMIN 2.8* 2.6*  2.6*   BILITOT 0.8 0.7  0.7   AST 19 31  31   ALKPHOS 82 79  79   ALT 14 15  15       COAGS  Recent Labs   Lab 11/18/19  1216   INR 1.1   APTT 30.3       All pertinent labs within the past 24 hours have been reviewed.     Significant Imaging: no new images today   Microbiology Results (last 7 days)     Procedure Component Value Units Date/Time    Urine Culture High Risk [[id number]]     Order Status:  No result Specimen:  Urine, Clean Catch     Urine culture [[id number]] Collected:  11/19/19 0316    Order Status:  Completed Specimen:  Urine Updated:  11/20/19 0812     Urine Culture, Routine Multiple organisms isolated. None in predominance.   Repeat if      clinically necessary.    Narrative:       Specimen Source->Urine    Culture, Respiratory with Gram Stain [[id number]]     Order Status:  Sent Specimen:  Respiratory from Sputum                 Assessment/Plan:      Pneumonia due to infectious organism  Pneumonia of right middle lobe due to infectious organism  CXR: Persistent dense alveolar consolidation in the right lower lobe suggestive of pneumonia  AF > 24 hours  resp cx: pending  urine cx multiple organisms none and predominance  PCT elevated at 3.21  IVabx:  Azithromycin and Rocephin     Troponin level elevated  Demand ischemia in setting of ESRD and PNA vs ACS  Cardiology consulted:  They will defer continued cardiac workup until current infection resolves and in an outpatient setting.  Trop trended and peaked at 3.692 now downtrending  EKG no signs of acute ischemic events           ESRD 2/2 MPGN on HD since 12/16/13   ESRD   - HD MF,  access  AVF, makes urine  - Nephrology consulted  - s/p HD 11/18, 11/20      - c/w home medications     secondary hyperparathyroidism of renal origin  Phos binders, vitamin D analogues and calcimimetics prn        Anemia of chronic renal failure, stage 5  Chronic  Stable  MERON and or IV iron prn per nephrology        Essential hypertension  Stable   continue with home medications        Decompensated cirrhosis secondary to hep C with ascites  monitoring  Receiving outpatient treatment for hep C  paracentesis p.r.n.  patient recently had paracentesis     Dysuria  - 11/19 urine cultures multiple organisms  - repeat UA/ucx ordered  -  IV rocephin            VTE Risk Mitigation (From admission, onward)                  Ordered        Place sequential compression device  Until discontinued      11/18/19 5087                       Cherry Patton DO  Department of Hospital Medicine   [facility]

## 2019-11-21 NOTE — CONSULTS
Nephrology Consult Note        Patient Name: Aurelia Saucedo  MRN: [id number]    Patient Class: IP- Inpatient   Admission Date: 11/18/2019  Length of Stay: 2 days  Date of Service: 11/21/2019    Attending Physician: Cherry Patton DO  Primary Care Provider: Dustin Ireland MD    Reason for Consult: esrd/anemia/htn/shpt/pna    SUBJECTIVE:     HPI: 54F with ESRD on HD MWF is admitted with chest pain after recent thoracentesis, because XR shows PNA and she has low-grade fever. She has been feeling tired also for a few days. She did outpatient dialysis per schedule.    11/18 Seen and examined on HD today, tolerating well, no complains.  11/19 VSS, no new complains, HD in AM. Can be dc today if otherwise stable.  11/20 Seen and examined on HD today, tolerating well, no complains. Also giving 1 PRBC for anemia.  11/21 VSS, no new complains, HD in AM. Can be dc today if otherwise stable. Entresto can be tried as outpatient, unclear to know how it will affect her BP a priori. She is already on Lisinopril, so suspect trading Lisinopril for Entresto will not be a big deal...    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 since 12/16/13 8/19/2015    Gastritis     Hypertension 8/19/2015    Kidney transplant candidate 8/19/2015    Renal dialysis status     M, W, F    Secondary hyperparathyroidism of renal origin 8/19/2015     Past Surgical History:   Procedure Laterality Date    AV FISTULA PLACEMENT  2013    SALPINGOOPHORECTOMY Right 1997    TOTAL ABDOMINAL HYSTERECTOMY  1999     Family History   Problem Relation Age of Onset    Kidney disease Mother     Stroke Father     Psoriasis Brother     Breast cancer Neg Hx     Cancer Neg Hx     Colon cancer Neg Hx     Ovarian cancer Neg Hx      Social History     Tobacco Use    Smoking status: Current Every Day Smoker     Packs/day: 0.75     Years:  30.00     Pack years: 22.50    Smokeless tobacco: Never Used    Tobacco comment: pt reports actively trying to quit but struggling.  Counseling and resources given.   Substance Use Topics    Alcohol use: No     Alcohol/week: 0.0 standard drinks    Drug use: Yes     Types: Marijuana     Comment: nightly to help sleep       Review of patient's allergies indicates:   Allergen Reactions    Quinolones Itching    Levaquin [levofloxacin] Itching    Ciprofloxacin Itching    Codeine Nausea Only       Outpatient meds:  No current facility-administered medications on file prior to encounter.      Current Outpatient Medications on File Prior to Encounter   Medication Sig Dispense Refill    amlodipine (NORVASC) 10 MG tablet Take 1 tablet (10 mg total) by mouth once daily. 90 tablet 3    cephALEXin (KEFLEX) 500 MG capsule Take 1 capsule (500 mg total) by mouth once daily. On dialysis days take after dialysis 5 capsule 0    dicyclomine (BENTYL) 20 mg tablet Take 20 mg by mouth every 6 (six) hours as needed.      FA-VIT B COMP&C-SELENIUM-ZINC 3-70-15 MG-MCG-MG ORAL TAB Take 1 tablet by mouth once daily.      HYDROcodone-acetaminophen (NORCO)  mg per tablet Take 1 tablet by mouth every 8 (eight) hours as needed for Pain. 60 tablet 0    lisinopril (PRINIVIL,ZESTRIL) 20 MG tablet Take 1 tablet by mouth once daily.  3    metoprolol succinate (TOPROL-XL) 50 MG 24 hr tablet Take 50 mg by mouth once daily.      ondansetron (ZOFRAN-ODT) 4 MG TbDL Take 2 tablets (8 mg total) by mouth every hour as needed. nausea 20 tablet 0    pantoprazole (PROTONIX) 40 MG tablet Take 40 mg by mouth once daily.      promethazine (PHENERGAN) 25 MG tablet Take 1 tablet (25 mg total) by mouth every 6 (six) hours as needed for Nausea. 20 tablet 0    traZODone (DESYREL) 50 MG tablet Take 1 tablet (50 mg total) by mouth every evening. 30 tablet 11    ursodiol (ACTIGALL) 250 mg Tab Take 1 tablet (250 mg total) by mouth once daily. 90  tablet 0       Scheduled meds:   albuterol-ipratropium  3 mL Nebulization Q6H    azithromycin  500 mg Intravenous Q24H    cefTRIAXone (ROCEPHIN) IVPB  1 g Intravenous Q24H    lisinopril  20 mg Oral Daily    metoprolol succinate  50 mg Oral Daily    mupirocin   Nasal BID    pantoprazole  40 mg Oral Daily    traZODone  50 mg Oral QHS       Infusions:      PRN meds:      Review of Systems:  ROS    OBJECTIVE:     Vital Signs and IO (Last 24H):  Temp:  [97.5 °F (36.4 °C)-99.2 °F (37.3 °C)]   Pulse:  [73-95]   Resp:  [16-20]   BP: ()/(60-75)   SpO2:  [95 %-100 %]   I/O last 3 completed shifts:  In: 1760 [P.O.:960; Blood:250; Other:500; IV Piggyback:50]  Out: 2900 [Urine:400; Other:2500]    Wt Readings from Last 5 Encounters:   11/21/19 51.6 kg (113 lb 12.1 oz)   10/22/19 50.8 kg (112 lb)   10/18/19 48.3 kg (106 lb 7.7 oz)   10/10/19 55.2 kg (121 lb 9.6 oz)   09/20/19 53.3 kg (117 lb 9.6 oz)         Physical Exam:  Physical Exam   Constitutional: She is oriented to person, place, and time. She appears well-developed and well-nourished.   HENT:   Head: Normocephalic and atraumatic.   Mouth/Throat: Oropharynx is clear and moist.   Eyes: Pupils are equal, round, and reactive to light. EOM are normal. No scleral icterus.   Neck: Neck supple.   Cardiovascular: Normal rate and regular rhythm.   Pulmonary/Chest: Effort normal. No stridor. No respiratory distress.   Abdominal: Soft. She exhibits no distension.   Musculoskeletal: Normal range of motion. She exhibits no edema or deformity.   Neurological: She is alert and oriented to person, place, and time. No cranial nerve deficit.   Skin: Skin is warm and dry. No rash noted. She is not diaphoretic. No erythema.   Psychiatric: She has a normal mood and affect. Her behavior is normal.       Body mass index is 18.93 kg/m².    Laboratory:  Recent Labs   Lab 11/19/19  0447 11/20/19  0456 11/21/19  0444   *  135*  135* 134*  134* 135*  135*   K 4.4  4.4  4.4  4.6  4.6 4.0  4.0   CL 95  95  95 95  95 98  98   CO2 26  26  26 25  25 26  26   BUN 39*  39*  39* 51*  51* 38*  38*   CREATININE 5.5*  5.5*  5.5* 6.8*  6.8* 5.5*  5.5*   ESTGFRAFRICA 9.5*  9.5*  9.5* 7.4*  7.4* 9.5*  9.5*   EGFRNONAA 8.3*  8.3*  8.3* 6.4*  6.4* 8.3*  8.3*     108  108 101  101 95  95       Recent Labs   Lab 11/18/19 1216 11/19/19 0447 11/20/19 0456 11/21/19 0444   CALCIUM 8.9 8.7  8.7  8.7 8.5*  8.5* 8.6*  8.6*   ALBUMIN 2.8* 2.6*  2.6*  --   --    PHOS  --  4.3  --   --    MG 2.4 2.1  2.1 2.1  2.1 2.2  2.2             No results for input(s): POCTGLUCOSE in the last 168 hours.          Recent Labs   Lab 11/19/19 0447 11/20/19 0457 11/21/19 0444   WBC 5.94  5.94 5.21  5.21 4.12  4.12   HGB 8.7*  8.7* 7.6*  7.6* 8.9*  8.9*   HCT 26.6*  26.6* 22.7*  22.7* 26.9*  26.9*   *  147* 139*  139* 140*  140*   MCV 99*  99* 97  97 95  95   MCHC 32.7  32.7 33.5  33.5 33.1  33.1   MONO 7.1  7.1  0.4  0.4 8.6  8.6  0.5  0.5 6.1  6.1  0.3  0.3       Recent Labs   Lab 11/18/19 1216 11/19/19 0447   BILITOT 0.8 0.7  0.7   PROT 6.2 5.7*  5.7*   ALBUMIN 2.8* 2.6*  2.6*   ALKPHOS 82 79  79   ALT 14 15  15   AST 19 31  31       Recent Labs   Lab 11/27/18  0924 10/17/19  0850 11/19/19  0316   Color, UA  --  Orange A Yellow   Appearance, UA  --  Hazy A Hazy A   pH, UA 7.0 7.0 >8.0 A   Specific Gravity, UA  --  1.010 1.010   Protein, UA  --  2+ A 2+ A   Glucose, UA  --  Negative Negative   Ketones, UA  --  Trace A Negative   Urobilinogen, UA  --  Negative Negative   Bilirubin (UA)  --  Negative Negative   Occult Blood UA  --  3+ A 2+ A   Nitrite, UA  --  Negative Negative   RBC, UA  --  >100 H 30 H   WBC, UA  --  >100 H >100 H   Bacteria  --  Negative Moderate A   Hyaline Casts, UA  --  14 A 1             Microbiology Results (last 7 days)     Procedure Component Value Units Date/Time    Urine Culture High Risk [[id number]]      Order Status:  No result Specimen:  Urine, Clean Catch     Urine culture [[id number]] Collected:  11/19/19 0316    Order Status:  Completed Specimen:  Urine Updated:  11/20/19 0812     Urine Culture, Routine Multiple organisms isolated. None in predominance.  Repeat if      clinically necessary.    Narrative:       Specimen Source->Urine    Culture, Respiratory with Gram Stain [[id number]]     Order Status:  Sent Specimen:  Respiratory from Sputum           ASSESSMENT/PLAN:     ESRD on HD MWF via AVF  Continue current dialysis prescription.  Next HD on Wed or PRN.  Renal diet - low K, low phos.  No IVs or BP checks on access and/or non-dominant arm.    Anemia of CKD  Hgb and HCT are acceptable. Monitor.  Will provide MERON and/or IV iron PRN.  Will transfuse 1 PRBC today for severe anemia.    MBD / Secondary HPT  Ca, phos, PTH and vitamin D levels are acceptable.   Phos binders, vitamin D analogues and calcimimetics as needed.    HTN  BP seem controlled.   Tolerate asymptomatic HTN up to -160.  Continue home meds.  Low sodium diet.    Entresto can be tried as outpatient, impossible to know a priori how it will affect her BP. She is already on Lisinopril, so I suspect trading Lisinopril for Entresto will not be a big deal...    PNA  Agree with abx and management per primary team.    Thank you for allowing us to participate in the care of your patient!   We will follow the patient and provide recommendations as needed.    Luciano Campos MD    [facility]  [address] [place] [address]  [place] [address]    [phone] - tel  [phone] - fax    11/21/2019 12:56 PM

## 2019-11-21 NOTE — PLAN OF CARE
11/21/19 0924   Patient Assessment/Suction   Level of Consciousness (AVPU) alert   Respiratory Effort Normal;Unlabored   All Lung Fields Breath Sounds crackles   PRE-TX-O2   O2 Device (Oxygen Therapy) room air   SpO2 97 %   Pulse Oximetry Type Intermittent   $ Pulse Oximetry - Multiple Charge Pulse Oximetry - Multiple   Pulse 90   Resp 18   Aerosol Therapy   $ Aerosol Therapy Charges Aerosol Treatment   Daily Review of Necessity (SVN) completed   Respiratory Treatment Status (SVN) given   Treatment Route (SVN) mask;oxygen   Patient Position (SVN) semi-Magallanes's   Post Treatment Assessment (SVN) breath sounds improved   Signs of Intolerance (SVN) none

## 2019-11-21 NOTE — PROGRESS NOTES
11/20/19 1645   Post-Hemodialysis Assessment   Rinseback Volume (mL) 250 mL   Blood Volume Processed (Liters) 34.1 L   Dialyzer Clearance Lightly streaked   Duration of Treatment (minutes) 120 minutes   Hemodialysis Intake (mL) 500 mL   Total UF (mL) 2500 mL   Net Fluid Removal 2000   Patient Response to Treatment tolerated well   Post-Treatment Weight 51.3 kg (113 lb 1.5 oz)   Treatment Weight Change -1.3   Arterial bleeding stop time (min) 5 min   Venous bleeding stop time (min) 5 min   Post-Hemodialysis Comments tx complete, pt stable. lines re infused, needles removed. hemostasis achieved. thrill and bruit present post treatment.

## 2019-11-22 NOTE — PROGRESS NOTES
"[facility] Medicine  Progress Note     Patient Name: Aurelia Saucedo  MRN: [id number]  Patient Class: IP- Inpatient     Admission Date: 11/18/2019  Attending Physician: Cherry Patton DO  Primary Care Provider: Dustin Ireland MD        Subjective:      Principal Problem:Pneumonia due to infectious organism        Chief Complaint   Patient presents with    Shortness of Breath         Interval History: afvss, HD today.    Patient seen and examined.  Has more energy.  Denies any CP, SOB, cough, N/V/D, headaches, fever or chills.         Review of Systems   Per interval history, all other systems reviewed and negative.      Objective:   /75   Pulse 96   Temp 97 °F (36.1 °C)   Resp 18   Ht 5' 5" (1.651 m)   Wt 52.9 kg (116 lb 10 oz)   SpO2 98%   Breastfeeding? No   BMI 19.41 kg/m²       Intake/Output Summary (Last 24 hours) at 11/22/2019 1532  Last data filed at 11/22/2019 1330  Gross per 24 hour   Intake 1210 ml   Output 1200 ml   Net 10 ml          Physical Exam   Constitutional: She is oriented to person, place, and time. She appears well-developed and well-nourished.   HENT:   Head: Atraumatic.   Eyes: Pupils are equal, round, and reactive to light. Conjunctivae and EOM are normal.   Neck: Normal range of motion. Neck supple. No JVD present. No thyromegaly present.   Cardiovascular: Normal rate and regular rhythm. Exam reveals no gallop and no friction rub.   Murmur heard.  Pulmonary/Chest: Effort normal and breath sounds normal. She has no wheezes. She has no rales.   Abdominal: Soft. Bowel sounds are normal. She exhibits distension (mild). There is no tenderness.   Musculoskeletal: Normal range of motion.   Neurological: She is alert and oriented to person, place, and time. She has normal reflexes.   Skin: Skin is warm and dry.   Psychiatric: She has a normal mood and affect. Her behavior is normal.   Nursing note and vitals reviewed.        Significant Labs:   CBC  Recent " Labs   Lab 11/20/19 0457 11/21/19 0444 11/22/19 0445   WBC 5.21  5.21 4.12  4.12 4.09  4.09   RBC 2.33*  2.33* 2.83*  2.83* 2.79*  2.79*   HGB 7.6*  7.6* 8.9*  8.9* 8.7*  8.7*   HCT 22.7*  22.7* 26.9*  26.9* 26.3*  26.3*   *  139* 140*  140* 148*  148*   MCV 97  97 95  95 94  94   MCH 32.6*  32.6* 31.4*  31.4* 31.2*  31.2*   MCHC 33.5  33.5 33.1  33.1 33.1  33.1     BMP  Recent Labs   Lab 11/20/19 0456 11/21/19 0444 11/22/19 0444   *  134* 135*  135* 133*  133*   K 4.6  4.6 4.0  4.0 4.1  4.1   CO2 25  25 26  26 23  23   CL 95  95 98  98 96  96   BUN 51*  51* 38*  38* 49*  49*   CREATININE 6.8*  6.8* 5.5*  5.5* 6.7*  6.7*     101 95  95 96  96       Recent Labs   Lab 11/19/19 0447 11/20/19 0456 11/21/19 0444 11/22/19 0444   CALCIUM 8.7  8.7  8.7 8.5*  8.5* 8.6*  8.6* 8.6*  8.6*   MG 2.1  2.1 2.1  2.1 2.2  2.2 2.3  2.3   PHOS 4.3  --   --   --      LFT  Recent Labs   Lab 11/18/19  1216 11/19/19 0447   PROT 6.2 5.7*  5.7*   ALBUMIN 2.8* 2.6*  2.6*   BILITOT 0.8 0.7  0.7   AST 19 31  31   ALKPHOS 82 79  79   ALT 14 15  15     All pertinent labs within the past 24 hours have been reviewed.     Significant Imaging: no new images today     Microbiology Results (last 7 days)     Procedure Component Value Units Date/Time    Urine Culture High Risk [[id number]]     Order Status:  No result Specimen:  Urine, Clean Catch     Urine culture [[id number]] Collected:  11/19/19 0316    Order Status:  Completed Specimen:  Urine Updated:  11/20/19 0812     Urine Culture, Routine Multiple organisms isolated. None in predominance.  Repeat if      clinically necessary.    Narrative:       Specimen Source->Urine    Culture, Respiratory with Gram Stain [[id number]]     Order Status:  Sent Specimen:  Respiratory from Sputum            Assessment/Plan:      Pneumonia due to infectious organism  Pneumonia of right middle lobe due to infectious  organism  CXR: Persistent dense alveolar consolidation in the right lower lobe suggestive of pneumonia  AF > 48 hours  resp cx: pending  urine cx multiple organisms none and predominance  PCT elevated at 3.21  IVabx:  Azithromycin and Rocephin     Troponin level elevated  Demand ischemia in setting of ESRD and PNA vs ACS  Cardiology consulted:  They will defer continued cardiac workup until current infection resolves and in an outpatient setting. Will start entresto    Trop trended and peaked at 3.692 now downtrending  EKG no signs of acute ischemic events       ESRD 2/2 MPGN on HD since 12/16/13   ESRD   - HD MF,  access  AVF, makes urine  - Nephrology consulted  - s/p HD 11/18, 11/20, 11/22      - c/w home medications     secondary hyperparathyroidism of renal origin  Phos binders, vitamin D analogues and calcimimetics prn        Anemia of chronic renal failure, stage 5  Chronic  Stable  MERON and or IV iron prn per nephrology        Essential hypertension  Stable   continue with home medications        Decompensated cirrhosis secondary to hep C with ascites  monitoring  Receiving outpatient treatment for hep C  paracentesis p.r.n.  patient recently had paracentesis     Dysuria  - 11/19 urine cultures multiple organisms  - repeat UA/ucx pending  -  IV rocephin                    VTE Risk Mitigation (From admission, onward)                        Ordered         Place sequential compression device  Until discontinued      11/18/19 9831                              Cherry Patton DO  Department of Hospital Medicine   Formerly [facility]

## 2019-11-22 NOTE — PROGRESS NOTES
[facility]  Cardiology Note      Subjective:     HPI: Seen and examined this AM. Denies any cardiac complaints. VSS. Hemoglobin 8.7.     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 since 12/16/13 8/19/2015    Gastritis     Hypertension 8/19/2015    Kidney transplant candidate 8/19/2015    Renal dialysis status     M, W, F    Secondary hyperparathyroidism of renal origin 8/19/2015       Past Surgical History:   Procedure Laterality Date    AV FISTULA PLACEMENT  2013    SALPINGOOPHORECTOMY Right 1997    TOTAL ABDOMINAL HYSTERECTOMY  1999       Review of patient's allergies indicates:   Allergen Reactions    Quinolones Itching    Levaquin [levofloxacin] Itching    Ciprofloxacin Itching    Codeine Nausea Only       No current facility-administered medications on file prior to encounter.      Current Outpatient Medications on File Prior to Encounter   Medication Sig    amlodipine (NORVASC) 10 MG tablet Take 1 tablet (10 mg total) by mouth once daily.    cephALEXin (KEFLEX) 500 MG capsule Take 1 capsule (500 mg total) by mouth once daily. On dialysis days take after dialysis    dicyclomine (BENTYL) 20 mg tablet Take 20 mg by mouth every 6 (six) hours as needed.    FA-VIT B COMP&C-SELENIUM-ZINC 3-70-15 MG-MCG-MG ORAL TAB Take 1 tablet by mouth once daily.    HYDROcodone-acetaminophen (NORCO)  mg per tablet Take 1 tablet by mouth every 8 (eight) hours as needed for Pain.    lisinopril (PRINIVIL,ZESTRIL) 20 MG tablet Take 1 tablet by mouth once daily.    metoprolol succinate (TOPROL-XL) 50 MG 24 hr tablet Take 50 mg by mouth once daily.    ondansetron (ZOFRAN-ODT) 4 MG TbDL Take 2 tablets (8 mg total) by mouth every hour as needed. nausea    pantoprazole (PROTONIX) 40 MG tablet Take 40 mg by mouth once daily.    promethazine (PHENERGAN) 25 MG tablet Take 1 tablet  (25 mg total) by mouth every 6 (six) hours as needed for Nausea.    traZODone (DESYREL) 50 MG tablet Take 1 tablet (50 mg total) by mouth every evening.    ursodiol (ACTIGALL) 250 mg Tab Take 1 tablet (250 mg total) by mouth once daily.     Family History     Problem Relation (Age of Onset)    Kidney disease Mother    Psoriasis Brother    Stroke Father        Tobacco Use    Smoking status: Current Every Day Smoker     Packs/day: 0.75     Years: 30.00     Pack years: 22.50    Smokeless tobacco: Never Used    Tobacco comment: pt reports actively trying to quit but struggling.  Counseling and resources given.   Substance and Sexual Activity    Alcohol use: No     Alcohol/week: 0.0 standard drinks    Drug use: Yes     Types: Marijuana     Comment: nightly to help sleep    Sexual activity: Not Currently     Partners: Male     Birth control/protection: Surgical       Objective:     Vital Signs (Most Recent):  Temp: 98.3 °F (36.8 °C) (11/22/19 0735)  Pulse: 86 (11/22/19 0745)  Resp: 18 (11/22/19 0745)  BP: 117/67 (11/22/19 0735)  SpO2: 98 % (11/22/19 0745) Vital Signs (24h Range):  Temp:  [97.5 °F (36.4 °C)-99.1 °F (37.3 °C)] 98.3 °F (36.8 °C)  Pulse:  [80-96] 86  Resp:  [16-19] 18  SpO2:  [95 %-98 %] 98 %  BP: (109-128)/(67-77) 117/67     Weight: 52.9 kg (116 lb 10 oz)  Body mass index is 19.41 kg/m².    SpO2: 98 %  O2 Device (Oxygen Therapy): room air      Intake/Output Summary (Last 24 hours) at 11/22/2019 0838  Last data filed at 11/22/2019 0408  Gross per 24 hour   Intake 590 ml   Output 700 ml   Net -110 ml       Lines/Drains/Airways     Drain                 Hemodialysis AV Fistula Left upper arm -- days          Airway                 Airway - Non-Surgical 10/10/16 0913 Nasal Cannula 1138 days          Peripheral Intravenous Line                 Peripheral IV - Single Lumen 11/19/19 1108 20 G;Other (Comments) Anterior;Right Forearm 2 days                Physical Exam   Constitutional: She is oriented to  person, place, and time. She appears well-developed and well-nourished.   Neck: No JVD present.   Bilat. Bruits Vs radiation of murmur or bruit from AV fistual   Cardiovascular: Normal rate and regular rhythm.   Murmur (3/6 systolic murmur apex) heard.  Pulses:       Dorsalis pedis pulses are 1+ on the right side, and 1+ on the left side.   Pulmonary/Chest: Effort normal and breath sounds normal.   Abdominal: Soft.   Musculoskeletal: She exhibits no edema.   Neurological: She is alert and oriented to person, place, and time.   Skin: Skin is warm and dry.   Chronic skin changes to legs   Psychiatric: She has a normal mood and affect.       Significant Labs:   Recent Lab Results       11/22/19  0445   11/22/19  0444        Anion Gap   14        14     Baso # 0.01        0.01       Basophil% 0.2        0.2       BUN, Bld   49        49     Calcium   8.6        8.6     Chloride   96        96     CO2   23        23     Creatinine   6.7        6.7     Differential Method Automated        Automated       eGFR if    7.5        7.5     eGFR if non    6.5  Comment:  Calculation used to obtain the estimated glomerular filtration  rate (eGFR) is the CKD-EPI equation.           6.5  Comment:  Calculation used to obtain the estimated glomerular filtration  rate (eGFR) is the CKD-EPI equation.        Eos # 0.2        0.2       Eosinophil% 3.7        3.7       Glucose   96        96     Gran # (ANC) 3.0        3.0       Gran% 72.6        72.6       Hematocrit 26.3        26.3       Hemoglobin 8.7        8.7       Immature Grans (Abs) 0.04  Comment:  Mild elevation in immature granulocytes is non specific and   can be seen in a variety of conditions including stress response,   acute inflammation, trauma and pregnancy. Correlation with other   laboratory and clinical findings is essential.          0.04  Comment:  Mild elevation in immature granulocytes is non specific and   can be seen in a variety  of conditions including stress response,   acute inflammation, trauma and pregnancy. Correlation with other   laboratory and clinical findings is essential.         Immature Granulocytes 1.0        1.0       Lymph # 0.6        0.6       Lymph% 14.2        14.2       Magnesium   2.3        2.3     MCH 31.2        31.2       MCHC 33.1        33.1       MCV 94        94       Mono # 0.3        0.3       Mono% 8.3        8.3       MPV 9.0        9.0       nRBC 0        0       Platelets 148        148       Potassium   4.1        4.1     RBC 2.79        2.79       RDW 14.4        14.4       Sodium   133        133     WBC 4.09        4.09             Assessment and Plan:     ASSESSMENT:  CAP  NSTEMI  2014 CAD RCA KARL, otherwise mild-mod CAD  2013 Severe Dynamic MR - pt declined surgery   Carotid bruits Vs radiation of murmur/AV fistula bruit   Noncompliant with cardiology f/u since 2014  ESRD on HD  Liver cirrhosis/Hep C, recurrent ascites  Anemia chronic disease    PLAN:  Hold off on LHC at this time with demand ischemia likely reasoning for elevated trop considering worsening anemia (GIB?), ESRD, and PNA. Consider outpatient MPI.   Transitioning Lisinopril to Entresto to address the patients significant diastolic dysfunction approved by Nephrology. Will consider this change an outpatient basis and as blood pressure allows. Continue lisinopril at this time.  No statin due to liver cirrhosis.     CRISTIANA WongC  Cardiology   [facility]

## 2019-11-22 NOTE — PLAN OF CARE
11/21/19 2035   Patient Assessment/Suction   Respiratory Effort Unlabored   All Lung Fields Breath Sounds crackles   Rhythm/Pattern, Respiratory pattern regular   PRE-TX-O2   O2 Device (Oxygen Therapy) room air   SpO2 95 %   Pulse 94   Resp 16   Aerosol Therapy   $ Aerosol Therapy Charges Aerosol Treatment   Respiratory Treatment Status (SVN) given   Treatment Route (SVN) mask   Patient Position (SVN) semi-Magallanes's   Post Treatment Assessment (SVN) breath sounds unchanged   Signs of Intolerance (SVN) none   Breath Sounds Post-Respiratory Treatment   Throughout All Fields Post-Treatment All Fields   Throughout All Fields Post-Treatment no change   Post-treatment Heart Rate (beats/min) 94   Post-treatment Resp Rate (breaths/min) 20

## 2019-11-22 NOTE — PLAN OF CARE
Problem: Fall Injury Risk  Goal: Absence of Fall and Fall-Related Injury  Outcome: Ongoing, Progressing     Problem: Device-Related Complication Risk (Hemodialysis)  Goal: Safe, Effective Therapy Delivery  Outcome: Ongoing, Progressing     Problem: Hemodynamic Instability (Hemodialysis)  Goal: Vital Signs Remain in Desired Range  Outcome: Ongoing, Progressing     Problem: Infection (Hemodialysis)  Goal: Absence of Infection Signs/Symptoms  Outcome: Ongoing, Progressing     Problem: Adult Inpatient Plan of Care  Goal: Plan of Care Review  Outcome: Ongoing, Progressing  Goal: Patient-Specific Goal (Individualization)  Outcome: Ongoing, Progressing  Goal: Absence of Hospital-Acquired Illness or Injury  Outcome: Ongoing, Progressing  Goal: Optimal Comfort and Wellbeing  Outcome: Ongoing, Progressing  Goal: Readiness for Transition of Care  Outcome: Ongoing, Progressing  Goal: Rounds/Family Conference  Outcome: Ongoing, Progressing      Discussed with patient and Dr Patton about patient being dialyzed tomorrow and discharged to home after.

## 2019-11-22 NOTE — PLAN OF CARE
Plan of care discussed including ; safety, comfort, diet, elimination, activity, medication schedule and treatment plan. Opportunity for questions provided, understanding voiced.

## 2019-11-22 NOTE — PHYSICIAN QUERY
PT Name: Aurelia Saucedo  MR #: [id number]    Physician Query Form - Consultant Diagnosis Clarification     CDS/: Diana Harris Pe               Contact information:  This form is a permanent document in the medical record.     Query Date: November 22, 2019      By submitting this query, we are merely seeking further clarification of documentation.  Please utilize your independent clinical judgment when addressing the question(s) below.      The Medical record contains the following:   Supporting Clinical Information     11/20 - Wound Care Assessment - Stage II Pressure Injury to Coccyx - POA         Do you agree with the Consultants diagnosis of Stage II Pressure Injury to Coccyx - POA ?    [  x] Yes   [  ] Yes, but it resolved prior to my assessment of the patient   [  ] No   [  ] Clinically insignificant   [  ] Other/Clarification of findings:   [  ] Clinically undetermined

## 2019-11-22 NOTE — PLAN OF CARE
11/22/19 0745   Patient Assessment/Suction   Level of Consciousness (AVPU) alert   Respiratory Effort Unlabored   All Lung Fields Breath Sounds   (fairly clear, crackles bases)   Cough Type nonproductive   PRE-TX-O2   O2 Device (Oxygen Therapy) room air   SpO2 98 %   Pulse Oximetry Type Intermittent   $ Pulse Oximetry - Multiple Charge Pulse Oximetry - Multiple   Pulse 86   Resp 18   Aerosol Therapy   $ Aerosol Therapy Charges Aerosol Treatment   Respiratory Treatment Status (SVN) given   Treatment Route (SVN) mask   Patient Position (SVN) sitting in chair   Signs of Intolerance (SVN) none   Breath Sounds Post-Respiratory Treatment   Throughout All Fields Post-Treatment All Fields   Throughout All Fields Post-Treatment no change   Post-treatment Heart Rate (beats/min) 87   Post-treatment Resp Rate (breaths/min) 18

## 2019-11-22 NOTE — PROGRESS NOTES
"[facility]  Adult Nutrition   Progress Note (Initial Assessment)     SUMMARY     Recommendations/Interventions:  1. Added 2g Sodium Diet restriction per Nephrology.   2. Offered ONS, pt declined.  3. Recommend that a medical diagnosis of malnutrition be added to patient's problem list if physician agrees with dietitian's Nutrition Focused Physical Exam (NFPE) findings that support medical diagnosis  · Severe Protein Energy Malnutrition RT decrease appetite caused by ascites per patient statement AEB severe subcutaneous fat loss of orbitals, triceps and thoracic and lumbar region and severe muscle mass depletion of temples, clavicle region, scapular region, interosseous muscle and lower extremities.  Goals:   1. Pt to meet at least 75% of estimated needs via PO intake of meals and supplements.  2. Medical Diagnosis of malnutrition will be added to patients problem list if MD agrees with RD findings.  Nutrition Goal Status: new    Reason for Assessment    Reason For Assessment: length of stay  Diagnosis: renal disease  Relevant Medical History: Colitis, ESRD on HD, HTN, hep C  Interdisciplinary Rounds: attended    Nutrition Risk Screen    Nutrition Risk Screen: no indicators present    Nutrition/Diet History    Patient Reported Diet/Restrictions/Preferences: renal  Food Allergies: NKFA  Factors Affecting Nutritional Intake: None identified at this time    Anthropometrics    Temp: 98.3 °F (36.8 °C)  Height Method: Stated  Height: 5' 5" (165.1 cm)  Height (inches): 65 in  Weight Method: Standard Scale  Weight: 52.9 kg (116 lb 10 oz)  Weight (lb): 116.62 lb  Ideal Body Weight (IBW), Female: 125 lb  % Ideal Body Weight, Female (lb): 94.01 lb  BMI (Calculated): 19.4  BMI Grade: 18.5-24.9 - normal     Weight History:  Wt Readings from Last 10 Encounters:   11/22/19 52.9 kg (116 lb 10 oz)   10/22/19 50.8 kg (112 lb)   10/18/19 48.3 kg (106 lb 7.7 oz)   10/10/19 55.2 kg (121 lb 9.6 oz)   09/20/19 53.3 kg (117 " lb 9.6 oz)   06/14/19 49.8 kg (109 lb 11.2 oz)   04/23/19 50.5 kg (111 lb 6.4 oz)   04/09/19 48.9 kg (107 lb 11.2 oz)   02/25/19 50.3 kg (111 lb)   01/07/19 49.5 kg (109 lb 1.6 oz)     Lab/Procedures/Meds: Pertinent Labs Reviewed  Clinical Chemistry:  Recent Labs   Lab 11/18/19  1216 11/19/19 0447 11/22/19 0444    135*  135*  135* 133*  133*   K 5.3* 4.4  4.4  4.4 4.1  4.1   CL 97 95  95  95 96  96   CO2 24 26  26  26 23  23    108  108  108 96  96   BUN 74* 39*  39*  39* 49*  49*   CREATININE 8.3* 5.5*  5.5*  5.5* 6.7*  6.7*   CALCIUM 8.9 8.7  8.7  8.7 8.6*  8.6*   PROT 6.2 5.7*  5.7*  --    ALBUMIN 2.8* 2.6*  2.6*  --    BILITOT 0.8 0.7  0.7  --    ALKPHOS 82 79  79  --    AST 19 31  31  --    ALT 14 15  15  --    ANIONGAP 15 14  14  14 14  14   ESTGFRAFRICA 5.8* 9.5*  9.5*  9.5* 7.5*  7.5*   EGFRNONAA 5.0* 8.3*  8.3*  8.3* 6.5*  6.5*   MG 2.4 2.1  2.1 2.3  2.3   PHOS  --  4.3  --    AMYLASE 48  --   --    LIPASE 22  --   --     < > = values in this interval not displayed.     CBC:   Recent Labs   Lab 11/22/19 0445   WBC 4.09  4.09   RBC 2.79*  2.79*   HGB 8.7*  8.7*   HCT 26.3*  26.3*   *  148*   MCV 94  94   MCH 31.2*  31.2*   MCHC 33.1  33.1     Lipid Panel:  Recent Labs   Lab 11/19/19 0447   CHOL 109*   HDL 23*   LDLCALC 56.8*   TRIG 146   CHOLHDL 21.1     Cardiac Profile:  Recent Labs   Lab 11/18/19  1216 11/18/19  2247 11/19/19  0447 11/19/19  1028   CPK 47  --   --   --    CPKMB 6.9*  --   --   --    TROPONINI 3.054* 3.692* 3.141* 2.631*     Medications: Pertinent Medications reviewed  Scheduled Meds:   sodium chloride 0.9%   Intravenous Once    albuterol-ipratropium  3 mL Nebulization TID WAKE    azithromycin  500 mg Intravenous Q24H    cefTRIAXone (ROCEPHIN) IVPB  1 g Intravenous Q24H    lisinopril  20 mg Oral Daily    metoprolol succinate  50 mg Oral Daily    mupirocin   Nasal BID    pantoprazole  40 mg Oral Daily     traZODone  50 mg Oral QHS     Continuous Infusions:  PRN Meds:.sodium chloride, sodium chloride 0.9%, acetaminophen, calcium chloride IVPB, calcium chloride IVPB, calcium chloride IVPB, dextrose 50%, dextrose 50%, glucagon (human recombinant), glucose, glucose, hydrALAZINE, HYDROcodone-acetaminophen, HYDROcodone-acetaminophen, HYDROmorphone, magnesium oxide, magnesium sulfate IVPB, magnesium sulfate IVPB, magnesium sulfate IVPB, magnesium sulfate IVPB, melatonin, ondansetron, ondansetron, ondansetron, potassium chloride in water, potassium chloride in water, potassium chloride in water, potassium chloride in water, potassium chloride, potassium chloride, potassium chloride, potassium chloride, promethazine, sodium chloride 0.9%, sodium phosphate IVPB, sodium phosphate IVPB, sodium phosphate IVPB, sodium phosphate IVPB, sodium phosphate IVPB    Estimated/Assessed Needs    Weight Used For Calorie Calculations: 52.9 kg (116 lb 10 oz)  Energy Calorie Requirements (kcal): 8164-0192 kcals/day (30-35 kcals/kg)  Energy Need Method: Kcal/kg  Protein Requirements: 32-42 g/day (0.6-0.8 g/kg)  Weight Used For Protein Calculations: 52.9 kg (116 lb 10 oz)     Estimated Fluid Requirement Method: RDA Method    Nutrition Prescription Ordered    Current Diet Order: Renal Diet     Evaluation of Received Nutrient/Fluid Intake    Energy Calories Required: not meeting needs  Protein Required: not meeting needs  Fluid Required: meeting needs  Tolerance: tolerating  % Intake of Estimated Energy Needs: 0 - 25 %  % Meal Intake: 0 - 25 %    Intake/Output Summary (Last 24 hours) at 11/22/2019 1044  Last data filed at 11/22/2019 0900  Gross per 24 hour   Intake 950 ml   Output 700 ml   Net 250 ml      Nutrition Risk    Level of Risk/Frequency of Follow-up: high     Dietitian Rounds Brief:  · Patient is going through a lot of financial struggles and does not have much help at home. Stated she is about to lose her house.  · States she doesn't eat  very much due to ascites. Dry weight is normally 50.5 kg-55 kg. Tries to drink Ensure at home but states she only drinks one a day due to the product being too expensive.  · Patient shows Severe protein energy malnutrition after NFPE was completed.  · Offered ONS, patient refused because we do not carry the kind we have at home. States she follows a renal/cardiac diet at home and is very compliant.  · Takes a phosphorus binder at home but patient stated that she may need a K+ binder as well.    Monitor and Evaluation    Food and Nutrient Intake: energy intake, food and beverage intake  Food and Nutrient Adminstration: diet order  Physical Activity and Function: nutrition-related ADLs and IADLs  Anthropometric Measurements: weight change, weight  Biochemical Data, Medical Tests and Procedures: electrolyte and renal panel, lipid profile, gastrointestinal profile, glucose/endocrine profile, inflammatory profile  Nutrition-Focused Physical Findings: overall appearance     Malnutrition Assessment  Clinical Characteristic:    Malnutrition in the context of chronic illness  Energy Intake:    <50% of estimated energy requirement for > 1 month  Interpretation of Weight Loss:    Could not assess  Physical Findings   Body Fat Depletion:   o severe depletion of orbitals, triceps and thoracic and lumbar region    Muscle Mass Depletion:   o severe depletion of temples, clavicle region, scapular region, interosseous muscle and lower extremities    Fluid Accumulation:    o N/A   Reduced  Strength:   o N/A    Nutrition Follow-Up    RD Follow-up?: Yes  Alva Osborne RD 11/22/2019 10:54 AM

## 2019-11-22 NOTE — CONSULTS
Nephrology Consult Note        Patient Name: Aurelia Saucedo  MRN: [id number]    Patient Class: IP- Inpatient   Admission Date: 11/18/2019  Length of Stay: 3 days  Date of Service: 11/22/2019    Attending Physician: Cherry Patton DO  Primary Care Provider: Dustin Ireland MD    Reason for Consult: esrd/anemia/htn/shpt/pna    SUBJECTIVE:     HPI: 54F with ESRD on HD MWF is admitted with chest pain after recent thoracentesis, because XR shows PNA and she has low-grade fever. She has been feeling tired also for a few days. She did outpatient dialysis per schedule.    11/18 Seen and examined on HD today, tolerating well, no complains.  11/19 VSS, no new complains, HD in AM. Can be dc today if otherwise stable.  11/20 Seen and examined on HD today, tolerating well, no complains. Also giving 1 PRBC for anemia.  11/21 VSS, no new complains, HD in AM. Can be dc today if otherwise stable. Entresto can be tried as outpatient, unclear to know how it will affect her BP a priori. She is already on Lisinopril, so suspect trading Lisinopril for Entresto will not be a big deal...  11/22 VSS, no new complains. Can be dc today if otherwise stable. Seen and examined on HD today, tolerating well, no complains.    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 since 12/16/13 8/19/2015    Gastritis     Hypertension 8/19/2015    Kidney transplant candidate 8/19/2015    Renal dialysis status     M, W, F    Secondary hyperparathyroidism of renal origin 8/19/2015     Past Surgical History:   Procedure Laterality Date    AV FISTULA PLACEMENT  2013    SALPINGOOPHORECTOMY Right 1997    TOTAL ABDOMINAL HYSTERECTOMY  1999     Family History   Problem Relation Age of Onset    Kidney disease Mother     Stroke Father     Psoriasis Brother     Breast cancer Neg Hx     Cancer Neg Hx     Colon cancer Neg Hx     Ovarian  cancer Neg Hx      Social History     Tobacco Use    Smoking status: Current Every Day Smoker     Packs/day: 0.75     Years: 30.00     Pack years: 22.50    Smokeless tobacco: Never Used    Tobacco comment: pt reports actively trying to quit but struggling.  Counseling and resources given.   Substance Use Topics    Alcohol use: No     Alcohol/week: 0.0 standard drinks    Drug use: Yes     Types: Marijuana     Comment: nightly to help sleep       Review of patient's allergies indicates:   Allergen Reactions    Quinolones Itching    Levaquin [levofloxacin] Itching    Ciprofloxacin Itching    Codeine Nausea Only       Outpatient meds:  No current facility-administered medications on file prior to encounter.      Current Outpatient Medications on File Prior to Encounter   Medication Sig Dispense Refill    amlodipine (NORVASC) 10 MG tablet Take 1 tablet (10 mg total) by mouth once daily. 90 tablet 3    cephALEXin (KEFLEX) 500 MG capsule Take 1 capsule (500 mg total) by mouth once daily. On dialysis days take after dialysis 5 capsule 0    dicyclomine (BENTYL) 20 mg tablet Take 20 mg by mouth every 6 (six) hours as needed.      FA-VIT B COMP&C-SELENIUM-ZINC 3-70-15 MG-MCG-MG ORAL TAB Take 1 tablet by mouth once daily.      HYDROcodone-acetaminophen (NORCO)  mg per tablet Take 1 tablet by mouth every 8 (eight) hours as needed for Pain. 60 tablet 0    lisinopril (PRINIVIL,ZESTRIL) 20 MG tablet Take 1 tablet by mouth once daily.  3    metoprolol succinate (TOPROL-XL) 50 MG 24 hr tablet Take 50 mg by mouth once daily.      ondansetron (ZOFRAN-ODT) 4 MG TbDL Take 2 tablets (8 mg total) by mouth every hour as needed. nausea 20 tablet 0    pantoprazole (PROTONIX) 40 MG tablet Take 40 mg by mouth once daily.      promethazine (PHENERGAN) 25 MG tablet Take 1 tablet (25 mg total) by mouth every 6 (six) hours as needed for Nausea. 20 tablet 0    traZODone (DESYREL) 50 MG tablet Take 1 tablet (50 mg total) by  mouth every evening. 30 tablet 11    ursodiol (ACTIGALL) 250 mg Tab Take 1 tablet (250 mg total) by mouth once daily. 90 tablet 0       Scheduled meds:   sodium chloride 0.9%   Intravenous Once    albuterol-ipratropium  3 mL Nebulization TID WAKE    azithromycin  500 mg Intravenous Q24H    cefTRIAXone (ROCEPHIN) IVPB  1 g Intravenous Q24H    lisinopril  20 mg Oral Daily    metoprolol succinate  50 mg Oral Daily    mupirocin   Nasal BID    pantoprazole  40 mg Oral Daily    traZODone  50 mg Oral QHS       Infusions:      PRN meds:      Review of Systems:  ROS    OBJECTIVE:     Vital Signs and IO (Last 24H):  Temp:  [97 °F (36.1 °C)-99.1 °F (37.3 °C)]   Pulse:  [80-96]   Resp:  [16-19]   BP: (109-132)/(67-77)   SpO2:  [95 %-98 %]   I/O last 3 completed shifts:  In: 1120 [P.O.:1070; IV Piggyback:50]  Out: 700 [Urine:700]    Wt Readings from Last 5 Encounters:   11/22/19 52.9 kg (116 lb 10 oz)   10/22/19 50.8 kg (112 lb)   10/18/19 48.3 kg (106 lb 7.7 oz)   10/10/19 55.2 kg (121 lb 9.6 oz)   09/20/19 53.3 kg (117 lb 9.6 oz)         Physical Exam:  Physical Exam   Constitutional: She is oriented to person, place, and time. She appears well-developed and well-nourished.   HENT:   Head: Normocephalic and atraumatic.   Mouth/Throat: Oropharynx is clear and moist.   Eyes: Pupils are equal, round, and reactive to light. EOM are normal. No scleral icterus.   Neck: Neck supple.   Cardiovascular: Normal rate and regular rhythm.   Pulmonary/Chest: Effort normal. No stridor. No respiratory distress.   Abdominal: Soft. She exhibits no distension.   Musculoskeletal: Normal range of motion. She exhibits no edema or deformity.   Neurological: She is alert and oriented to person, place, and time. No cranial nerve deficit.   Skin: Skin is warm and dry. No rash noted. She is not diaphoretic. No erythema.   Psychiatric: She has a normal mood and affect. Her behavior is normal.       Body mass index is 19.41  kg/m².    Laboratory:  Recent Labs   Lab 11/20/19 0456 11/21/19 0444 11/22/19 0444   *  134* 135*  135* 133*  133*   K 4.6  4.6 4.0  4.0 4.1  4.1   CL 95  95 98  98 96  96   CO2 25  25 26  26 23  23   BUN 51*  51* 38*  38* 49*  49*   CREATININE 6.8*  6.8* 5.5*  5.5* 6.7*  6.7*   ESTGFRAFRICA 7.4*  7.4* 9.5*  9.5* 7.5*  7.5*   EGFRNONAA 6.4*  6.4* 8.3*  8.3* 6.5*  6.5*     101 95  95 96  96       Recent Labs   Lab 11/18/19  1216 11/19/19 0447 11/20/19 0456 11/21/19 0444 11/22/19 0444   CALCIUM 8.9 8.7  8.7  8.7 8.5*  8.5* 8.6*  8.6* 8.6*  8.6*   ALBUMIN 2.8* 2.6*  2.6*  --   --   --    PHOS  --  4.3  --   --   --    MG 2.4 2.1  2.1 2.1  2.1 2.2  2.2 2.3  2.3             No results for input(s): POCTGLUCOSE in the last 168 hours.          Recent Labs   Lab 11/20/19 0457 11/21/19 0444 11/22/19 0445   WBC 5.21  5.21 4.12  4.12 4.09  4.09   HGB 7.6*  7.6* 8.9*  8.9* 8.7*  8.7*   HCT 22.7*  22.7* 26.9*  26.9* 26.3*  26.3*   *  139* 140*  140* 148*  148*   MCV 97  97 95  95 94  94   MCHC 33.5  33.5 33.1  33.1 33.1  33.1   MONO 8.6  8.6  0.5  0.5 6.1  6.1  0.3  0.3 8.3  8.3  0.3  0.3       Recent Labs   Lab 11/18/19  1216 11/19/19  0447   BILITOT 0.8 0.7  0.7   PROT 6.2 5.7*  5.7*   ALBUMIN 2.8* 2.6*  2.6*   ALKPHOS 82 79  79   ALT 14 15  15   AST 19 31  31       Recent Labs   Lab 11/27/18  0924 10/17/19  0850 11/19/19  0316   Color, UA  --  Orange A Yellow   Appearance, UA  --  Hazy A Hazy A   pH, UA 7.0 7.0 >8.0 A   Specific Gravity, UA  --  1.010 1.010   Protein, UA  --  2+ A 2+ A   Glucose, UA  --  Negative Negative   Ketones, UA  --  Trace A Negative   Urobilinogen, UA  --  Negative Negative   Bilirubin (UA)  --  Negative Negative   Occult Blood UA  --  3+ A 2+ A   Nitrite, UA  --  Negative Negative   RBC, UA  --  >100 H 30 H   WBC, UA  --  >100 H >100 H   Bacteria  --  Negative Moderate A   Hyaline Casts, UA  --  14 A 1              Microbiology Results (last 7 days)     Procedure Component Value Units Date/Time    Urine Culture High Risk [[id number]]     Order Status:  No result Specimen:  Urine, Clean Catch     Urine culture [[id number]] Collected:  11/19/19 0316    Order Status:  Completed Specimen:  Urine Updated:  11/20/19 0812     Urine Culture, Routine Multiple organisms isolated. None in predominance.  Repeat if      clinically necessary.    Narrative:       Specimen Source->Urine    Culture, Respiratory with Gram Stain [[id number]]     Order Status:  Sent Specimen:  Respiratory from Sputum           ASSESSMENT/PLAN:     ESRD on HD MWF via AVF  Continue current dialysis prescription.  Next HD on Wed or PRN.  Renal diet - low K, low phos.  No IVs or BP checks on access and/or non-dominant arm.    Anemia of CKD  Hgb and HCT are acceptable. Monitor.  Will provide MERON and/or IV iron PRN.  Will transfuse 1 PRBC today for severe anemia.    MBD / Secondary HPT  Ca, phos, PTH and vitamin D levels are acceptable.   Phos binders, vitamin D analogues and calcimimetics as needed.    HTN  BP seem controlled.   Tolerate asymptomatic HTN up to -160.  Continue home meds.  Low sodium diet.    Entresto can be tried as outpatient, impossible to know a priori how it will affect her BP. She is already on Lisinopril, so I suspect trading Lisinopril for Entresto will not be a big deal...    PNA  Agree with abx and management per primary team.    Thank you for allowing us to participate in the care of your patient!   We will follow the patient and provide recommendations as needed.    Luciano Campos MD    [facility]  [address] [place] [address]  [place] [address]    [phone] - tel  [phone] - fax    11/22/2019 12:56 PM

## 2019-11-22 NOTE — PLAN OF CARE
Problem: Oral Intake Inadequate  Goal: Improved Oral Intake  Outcome: Ongoing, Progressing  Intervention: Promote and Optimize Oral Intake  Flowsheets (Taken 11/22/2019 105)  Oral Nutrition Promotion: -- (Diet restrictions)   Added 2g Sodium Diet restriction per Nephrology.

## 2019-11-22 NOTE — CARE UPDATE
11/22/19 0037   Patient Assessment/Suction   Level of Consciousness (AVPU) responds to voice   Respiratory Effort Normal;Unlabored   Expansion/Accessory Muscles/Retractions expansion symmetric;no retractions;no use of accessory muscles   All Lung Fields Breath Sounds crackles, fine;clear   LLL Breath Sounds crackles, fine   RLL Breath Sounds crackles, fine   Rhythm/Pattern, Respiratory no shortness of breath reported;pattern regular;unlabored   Cough Type good;nonproductive   PRE-TX-O2   O2 Device (Oxygen Therapy) room air   SpO2 96 %   Pulse Oximetry Type Intermittent   $ Pulse Oximetry - Multiple Charge Pulse Oximetry - Multiple   Pulse 91   Resp 18   Aerosol Therapy   $ Aerosol Therapy Charges Aerosol Treatment;Mouth rinsed post treatment   Daily Review of Necessity (SVN) completed   Respiratory Treatment Status (SVN) given   Treatment Route (SVN) mask;oxygen   Patient Position (SVN) sitting in chair   Post Treatment Assessment (SVN) increased aeration   Signs of Intolerance (SVN) none   Breath Sounds Post-Respiratory Treatment   Throughout All Fields Post-Treatment All Fields   Throughout All Fields Post-Treatment aeration increased   Post-treatment Heart Rate (beats/min) 89   Post-treatment Resp Rate (breaths/min) 18   Respiratory Interventions   Cough And Deep Breathing done with encouragement   Breathing Techniques/Airway Clearance deep/controlled cough encouraged;diaphragmatic breathing promoted

## 2019-11-22 NOTE — CARE UPDATE
Readmission Prevention    DX: PNA-GEORGETTE dx.  OTHER:  ESRD-HD, hep C w/cirrhosis, CKD S 5, anemia, elevated troponin.    PC discussed PCP, Dr. Ireland, with pt.  Pt has many specialists (nephrology, cardiology). PC made a sticky note on front of chart regarding depressed mood for all providers to note.  Pt reports not  having any difficulty making appts nor obtaining medications at this time.   [place] is [place] resident, received [place] community services and resources [place],  [facility] physician's list,  2019 cheat sheet and [place] [facility] for additional home resources.    Elicia NEW,LPN    Transitions of Care  11:35am, 11/22/2019

## 2019-11-22 NOTE — PHYSICIAN QUERY
PT Name: Aurelia Saucedo  MR #: [id number]     Physician Query Form - Documentation Clarification      CDS/: Diana Harris Pe               Contact information: [phone]    This form is a permanent document in the medical record.     Query Date: November 22, 2019    By submitting this query, we are merely seeking further clarification of documentation. Please utilize your independent clinical judgment when addressing the question(s) below.    The Medical record reflects the following:    Documentation per Cardiology - NSTEMI / Demand Ischemia with anemia,ESRD and PNA    Documentation per PN - Demand Ischemia in setting of ESRD and PNA                                                                         Doctor, Please clarify the diagnosis below:     Provider Use Only         _x____   Type II  NSTEMI (due to Demand Ischemia w/ ESRD and PNA)                                Definition of nstemi type II = elevated trop without signs of MI in setting of increased oxygen demand.             _____    Demand Ischemia with no NSTEMI       _____    NSTEMI       _____    Other (please specify)                                                                                                             [  ] Clinically Undetermined

## 2019-11-22 NOTE — PHYSICIAN QUERY
PT Name: Aurelia Saucedo  MR #: [id number]    Physician Query Form - Consultant Diagnosis Clarification     CDS/: Diana Harris Pe               Contact information: [phone]  This form is a permanent document in the medical record.     Query Date: 2019      By submitting this query, we are merely seeking further clarification of documentation.  Please utilize your independent clinical judgment when addressing the question(s) below.      The Medical record contains the followin/22 - Documentation per Dietary : Severe Protein Energy Malnutrition RT decrease appetite caused by ascites per patient statement AEB severe subcutaneous fat loss of orbitals, triceps and thoracic  and lumbar region and severe muscle mass depletion of temples, clavicle region, scapular region, interosseous muscle and lower extremities.       Do you agree with the Consultants diagnosis of  Severe Protein Energy Malnutrition ?    [ x ] Yes   [  ] Yes, but it resolved prior to my assessment of the patient   [  ] No   [  ] Clinically insignificant   [  ] Other/Clarification of findings:   [  ] Clinically undetermined

## 2019-11-23 NOTE — CONSULTS
Nephrology Progress Note        Patient Name: Aurelia Saucedo  MRN: [id number]    Patient Class: IP- Inpatient   Admission Date: 11/18/2019  Length of Stay: 4 days  Date of Service: 11/23/2019    Attending Physician: Cherry Patton DO  Primary Care Provider: Dustin Ireland MD    Reason for Consult: esrd/anemia/htn/shpt/pna    SUBJECTIVE:     HPI: 54F with ESRD on HD MWF is admitted with chest pain after recent thoracentesis, because XR shows PNA and she has low-grade fever. She has been feeling tired also for a few days. She did outpatient dialysis per schedule.    11/18 Seen and examined on HD today, tolerating well, no complains.  11/19 VSS, no new complains, HD in AM. Can be dc today if otherwise stable.  11/20 Seen and examined on HD today, tolerating well, no complains. Also giving 1 PRBC for anemia.  11/21 VSS, no new complains, HD in AM. Can be dc today if otherwise stable. Entresto can be tried as outpatient, unclear to know how it will affect her BP a priori. She is already on Lisinopril, so suspect trading Lisinopril for Entresto will not be a big deal...  11/22 VSS, no new complains. Can be dc today if otherwise stable. Seen and examined on HD today, tolerating well, no complains.  11/23  No new complaints.  Is to be discharged to home per her report.  Will follow up in out patient HD.    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 since 12/16/13 8/19/2015    Gastritis     Hypertension 8/19/2015    Kidney transplant candidate 8/19/2015    Renal dialysis status     M, W, F    Secondary hyperparathyroidism of renal origin 8/19/2015     Past Surgical History:   Procedure Laterality Date    AV FISTULA PLACEMENT  2013    SALPINGOOPHORECTOMY Right 1997    TOTAL ABDOMINAL HYSTERECTOMY  1999     Family History   Problem Relation Age of Onset    Kidney disease Mother     Stroke  Father     Psoriasis Brother     Breast cancer Neg Hx     Cancer Neg Hx     Colon cancer Neg Hx     Ovarian cancer Neg Hx      Social History     Tobacco Use    Smoking status: Current Every Day Smoker     Packs/day: 0.75     Years: 30.00     Pack years: 22.50    Smokeless tobacco: Never Used    Tobacco comment: pt reports actively trying to quit but struggling.  Counseling and resources given.   Substance Use Topics    Alcohol use: No     Alcohol/week: 0.0 standard drinks    Drug use: Yes     Types: Marijuana     Comment: nightly to help sleep       Review of patient's allergies indicates:   Allergen Reactions    Quinolones Itching    Levaquin [levofloxacin] Itching    Ciprofloxacin Itching    Codeine Nausea Only       Outpatient meds:  No current facility-administered medications on file prior to encounter.      Current Outpatient Medications on File Prior to Encounter   Medication Sig Dispense Refill    amlodipine (NORVASC) 10 MG tablet Take 1 tablet (10 mg total) by mouth once daily. 90 tablet 3    dicyclomine (BENTYL) 20 mg tablet Take 20 mg by mouth every 6 (six) hours as needed.      FA-VIT B COMP&C-SELENIUM-ZINC 3-70-15 MG-MCG-MG ORAL TAB Take 1 tablet by mouth once daily.      HYDROcodone-acetaminophen (NORCO)  mg per tablet Take 1 tablet by mouth every 8 (eight) hours as needed for Pain. 60 tablet 0    lisinopril (PRINIVIL,ZESTRIL) 20 MG tablet Take 1 tablet by mouth once daily.  3    metoprolol succinate (TOPROL-XL) 50 MG 24 hr tablet Take 50 mg by mouth once daily.      ondansetron (ZOFRAN-ODT) 4 MG TbDL Take 2 tablets (8 mg total) by mouth every hour as needed. nausea 20 tablet 0    pantoprazole (PROTONIX) 40 MG tablet Take 40 mg by mouth once daily.      promethazine (PHENERGAN) 25 MG tablet Take 1 tablet (25 mg total) by mouth every 6 (six) hours as needed for Nausea. 20 tablet 0    traZODone (DESYREL) 50 MG tablet Take 1 tablet (50 mg total) by mouth every evening. 30  tablet 11    ursodiol (ACTIGALL) 250 mg Tab Take 1 tablet (250 mg total) by mouth once daily. 90 tablet 0    [DISCONTINUED] cephALEXin (KEFLEX) 500 MG capsule Take 1 capsule (500 mg total) by mouth once daily. On dialysis days take after dialysis 5 capsule 0       Scheduled meds:   sodium chloride 0.9%   Intravenous Once    albuterol-ipratropium  3 mL Nebulization TID WAKE    azithromycin  500 mg Intravenous Q24H    cefTRIAXone (ROCEPHIN) IVPB  1 g Intravenous Q24H    lisinopril  20 mg Oral Daily    metoprolol succinate  50 mg Oral Daily    mupirocin   Nasal BID    pantoprazole  40 mg Oral Daily    traZODone  50 mg Oral QHS       Infusions:      PRN meds:      Review of Systems:  Review of Systems   All other systems reviewed and are negative.      OBJECTIVE:     Vital Signs and IO (Last 24H):  Temp:  [97 °F (36.1 °C)-98.3 °F (36.8 °C)]   Pulse:  [81-97]   Resp:  [16-18]   BP: (117-128)/(66-77)   SpO2:  [96 %-99 %]   I/O last 3 completed shifts:  In: 1210 [P.O.:710; Other:500]  Out: 1200 [Urine:700; Other:500]    Wt Readings from Last 5 Encounters:   11/22/19 52.9 kg (116 lb 10 oz)   10/22/19 50.8 kg (112 lb)   10/18/19 48.3 kg (106 lb 7.7 oz)   10/10/19 55.2 kg (121 lb 9.6 oz)   09/20/19 53.3 kg (117 lb 9.6 oz)         Physical Exam:  Physical Exam   Constitutional: She is oriented to person, place, and time. She appears well-developed and well-nourished.   HENT:   Head: Normocephalic and atraumatic.   Mouth/Throat: Oropharynx is clear and moist.   Eyes: Pupils are equal, round, and reactive to light. EOM are normal. No scleral icterus.   Neck: Neck supple.   Cardiovascular: Normal rate and regular rhythm.   Pulmonary/Chest: Effort normal. No stridor. No respiratory distress.   Abdominal: Soft. She exhibits no distension.   Musculoskeletal: Normal range of motion. She exhibits no edema or deformity.   Neurological: She is alert and oriented to person, place, and time. No cranial nerve deficit.   Skin:  Skin is warm and dry. No rash noted. She is not diaphoretic. No erythema.   Psychiatric: She has a normal mood and affect. Her behavior is normal.       Body mass index is 19.41 kg/m².    Laboratory:  Recent Labs   Lab 11/21/19 0444 11/22/19 0444 11/23/19 0423   *  135* 133*  133* 135*  135*   K 4.0  4.0 4.1  4.1 4.0  4.0   CL 98  98 96  96 98  98   CO2 26  26 23  23 27  27   BUN 38*  38* 49*  49* 35*  35*   CREATININE 5.5*  5.5* 6.7*  6.7* 5.2*  5.2*   ESTGFRAFRICA 9.5*  9.5* 7.5*  7.5* 10.2*  10.2*   EGFRNONAA 8.3*  8.3* 6.5*  6.5* 8.8*  8.8*   GLU 95  95 96  96 93  93       Recent Labs   Lab 11/18/19  1216 11/19/19 0447 11/21/19 0444 11/22/19 0444 11/23/19 0423   CALCIUM 8.9 8.7  8.7  8.7   < > 8.6*  8.6* 8.6*  8.6* 8.5*  8.5*   ALBUMIN 2.8* 2.6*  2.6*  --   --   --   --    PHOS  --  4.3  --   --   --   --    MG 2.4 2.1  2.1   < > 2.2  2.2 2.3  2.3 2.3  2.3    < > = values in this interval not displayed.             No results for input(s): POCTGLUCOSE in the last 168 hours.          Recent Labs   Lab 11/21/19 0444 11/22/19 0445 11/23/19 0423   WBC 4.12  4.12 4.09  4.09 3.59*  3.59*   HGB 8.9*  8.9* 8.7*  8.7* 8.7*  8.7*   HCT 26.9*  26.9* 26.3*  26.3* 26.6*  26.6*   *  140* 148*  148* 130*  130*   MCV 95  95 94  94 96  96   MCHC 33.1  33.1 33.1  33.1 32.7  32.7   MONO 6.1  6.1  0.3  0.3 8.3  8.3  0.3  0.3 10.9  10.9  0.4  0.4       Recent Labs   Lab 11/18/19  1216 11/19/19  0447   BILITOT 0.8 0.7  0.7   PROT 6.2 5.7*  5.7*   ALBUMIN 2.8* 2.6*  2.6*   ALKPHOS 82 79  79   ALT 14 15  15   AST 19 31  31       Recent Labs   Lab 11/27/18  0924 10/17/19  0850 11/19/19  0316   Color, UA  --  Orange A Yellow   Appearance, UA  --  Hazy A Hazy A   pH, UA 7.0 7.0 >8.0 A   Specific Gravity, UA  --  1.010 1.010   Protein, UA  --  2+ A 2+ A   Glucose, UA  --  Negative Negative   Ketones, UA  --  Trace A Negative   Urobilinogen, UA  --   Negative Negative   Bilirubin (UA)  --  Negative Negative   Occult Blood UA  --  3+ A 2+ A   Nitrite, UA  --  Negative Negative   RBC, UA  --  >100 H 30 H   WBC, UA  --  >100 H >100 H   Bacteria  --  Negative Moderate A   Hyaline Casts, UA  --  14 A 1             Microbiology Results (last 7 days)     Procedure Component Value Units Date/Time    Urine Culture High Risk [[id number]]     Order Status:  No result Specimen:  Urine, Clean Catch     Urine culture [[id number]] Collected:  11/19/19 0316    Order Status:  Completed Specimen:  Urine Updated:  11/20/19 0812     Urine Culture, Routine Multiple organisms isolated. None in predominance.  Repeat if      clinically necessary.    Narrative:       Specimen Source->Urine    Culture, Respiratory with Gram Stain [[id number]]     Order Status:  Sent Specimen:  Respiratory from Sputum           ASSESSMENT/PLAN:     ESRD on HD MWF via AVF  Continue current dialysis prescription.  Next HD on Wed or PRN.  Renal diet - low K, low phos.  No IVs or BP checks on access and/or non-dominant arm.    Anemia of CKD  Hgb and HCT are acceptable. Monitor.  Will provide MERON and/or IV iron PRN.  Will transfuse 1 PRBC today for severe anemia.    MBD / Secondary HPT  Ca, phos, PTH and vitamin D levels are acceptable.   Phos binders, vitamin D analogues and calcimimetics as needed.    HTN  BP seem controlled.   Tolerate asymptomatic HTN up to -160.  Continue home meds.  Low sodium diet.    Entresto can be tried as outpatient, impossible to know a priori how it will affect her BP. She is already on Lisinopril, so I suspect trading Lisinopril for Entresto will not be a big deal...    PNA  Agree with abx and management per primary team.    Thank you for allowing us to participate in the care of your patient!   We will follow the patient and provide recommendations as needed.    Isabel Felton MD    [facility]  [address] [place] [address]  [place] [address] [phone] - tel  [phone] - fax    11/23/2019 12:56 PM

## 2019-11-23 NOTE — PLAN OF CARE
11/22/19 2100   Positioning   Body Position position maintained   Head of Bed (HOB) HOB elevated   Positioning/Transfer Devices pillows;in use

## 2019-11-23 NOTE — DISCHARGE INSTRUCTIONS
Notify MD of any persistent nausea, vomiting or diarrhea  Notify MD of any difficulty breathing or increased cough  Notify MD of any persistent HA  Notify MD of any persistent dizziness, lightheadedness or visual disturbances  Notify MD of any increased confusion or weakness

## 2019-11-23 NOTE — PLAN OF CARE
11/23/19 0827   Patient Assessment/Suction   Level of Consciousness (AVPU) alert   Respiratory Effort Normal;Unlabored   Expansion/Accessory Muscles/Retractions expansion symmetric;no use of accessory muscles   All Lung Fields Breath Sounds clear   Rhythm/Pattern, Respiratory pattern regular   PRE-TX-O2   O2 Device (Oxygen Therapy) room air   SpO2 98 %   Pulse Oximetry Type Intermittent   Pulse 85   Resp 18   Positioning HOB elevated 90 degrees   Aerosol Therapy   $ Aerosol Therapy Charges Aerosol Treatment   Daily Review of Necessity (SVN) completed   Respiratory Treatment Status (SVN) given   Treatment Route (SVN) mask;oxygen   Patient Position (SVN) HOB elevated   Post Treatment Assessment (SVN) breath sounds unchanged   Signs of Intolerance (SVN) none   Breath Sounds Post-Respiratory Treatment   Throughout All Fields Post-Treatment All Fields   Throughout All Fields Post-Treatment no change   Post-treatment Heart Rate (beats/min) 82   Post-treatment Resp Rate (breaths/min) 18

## 2019-11-23 NOTE — DISCHARGE SUMMARY
[facility]  Discharge Summary      Patient Name: Aurelia Saucedo  MRN: [id number]  Admission Date: 11/18/2019  Hospital Length of Stay: 4 days  Discharge Date and Time: 11/23/2019 11:10 AM  Attending Physician: Cherry Patton DO   Discharging Provider: Cherry Patton DO  Primary Care Provider: Dustin Ireland MD      [facility] 11/18 per Dr. Ingram  Mr. Aurelia Saucedo is a 54 y.o. female who has past history of Decompensated Cirrhosis due to hepatitis C complicated with ascites requiring paracentesis every 4-6 weeks by Dr. Hadley,, ESRD due to MPGN Dialysis dependent MF twice weekly complicated by secondary hyperparathyrodisim, Hypertension, Gastritis and Colitis presented today with her son and  to our emergency room with right sided chest pain, shortness of breath and associated fevers. As per the patient, she recently had paracentesis in her gastroenterologist office which she tolerated well. After two days of that she started to have severe sharp chest pain worst with breathing, with high grade fevers that broke yesterday. This morning around 2 AM she had to take 2 NORCO 10 mg to control her pain after which she presented here. She hasn't taken any thing beside the pain pills. She was diagnosed with Pneumonia in the emergency room and hospitalist service was called for evaluation.      At the time of my evaluation, patient was in sharp pain and wasn't able to have conversation. I had to ask the nurse to give her Dilaudid to that I can obtain further history. Dilaudid was able to keep the pain to tolerable limit. She reported not going to dialysis today and will require one. She has been evaluated for liver transplant but not accepted and will be referred to [place] for further evaluation.      Hospital Course:   Patient was admitted for pneumonia and elevated troponins. Started on IV Rocephin and azithromycin and completed 5 days of each prior to discharge. She will  "continue 5 days of oral azithromycin to complete 10 day course of antibiotics for the pneumonia.  Cardiology was consulted for elevated troponins which were deemed secondary to demand ischemia in the setting of ESRD and pneumonia.  Id continued cardiac workup was deferred.  Patient was recommended to start and entresto.  Nephrology was consulted as patient is ESRD.  She received dialysis on 11/18, 11/20, 11/22.  She will resume her normal Monday Friday regimen on discharge. Patient was seen and examined on day of discharge. She was hemodynamically stable satting well on room air and requesting discharge home.  Return precautions given.    Physical exam on day of discharge  /75 (BP Location: Right arm, Patient Position: Lying)   Pulse 85   Temp 98 °F (36.7 °C) (Oral)   Resp 18   Ht 5' 5" (1.651 m)   Wt 52.9 kg (116 lb 10 oz)   SpO2 98%   Breastfeeding? No   BMI 19.41 kg/m²   Gen: nad, layingi n bed  CV: +murmur, regular rate  Resp: CTA B/l  AB: +bs soft mildly distented  Skin: dry, warm      Consults:   Consults (From admission, onward)        Status Ordering Provider     Inpatient consult to Cardiology  Once     Provider:  Asher Mcdaniels MD    Completed HERMELINDO GUZMAN     Inpatient consult to Hospitalist  Once     Provider:  Hermelindo Guzman MD    Acknowledged ALETA CONNELL     Inpatient consult to Nephrology  Once     Provider:  (Not yet assigned)    Acknowledged ALETA CONNELL     Inpatient consult to Nephrology  Once     Provider:  Luciano Campos MD    Acknowledged HERMELINDO GUZMAN          No new Assessment & Plan notes have been filed under this hospital service since the last note was generated.  Service: Hospital Medicine    Final Active Diagnoses:    Diagnosis Date Noted POA    PRINCIPAL PROBLEM:  Pneumonia due to infectious organism [J18.9] 11/18/2019 Yes    Troponin level elevated [R79.89] 11/18/2019 Yes    Pneumonia of right middle lobe due to infectious " organism [J18.1] 11/18/2019 Yes    ESRD 2/2 MPGN on HD since 12/16/13  [N18.6] 08/19/2015 Yes     Chronic    Essential hypertension [I10] 08/19/2015 Yes     Chronic    Secondary hyperparathyroidism of renal origin [N25.81] 08/19/2015 Yes     Chronic    Anemia of chronic renal failure, stage 5 [N18.5, D63.1] 08/19/2015 Yes     Chronic      Problems Resolved During this Admission:       Discharged Condition: stable    Disposition: Home or Self Care    Follow Up:  Follow-up Information     Dustin Ireland MD.    Specialty:  Family Medicine  Why:  As needed  Contact information:  [address] [place] [address]  [address] [place] [id number]  [phone]                 Patient Instructions:      Diet renal     Notify your health care provider if you experience any of the following:  persistent nausea and vomiting or diarrhea     Notify your health care provider if you experience any of the following:  difficulty breathing or increased cough     Notify your health care provider if you experience any of the following:  severe persistent headache     Notify your health care provider if you experience any of the following:  persistent dizziness, light-headedness, or visual disturbances     Notify your health care provider if you experience any of the following:  increased confusion or weakness     Activity as tolerated       Significant Diagnostic Studies: Labs:   CBC  Recent Labs   Lab 11/21/19 0444 11/22/19 0445 11/23/19 0423   WBC 4.12  4.12 4.09  4.09 3.59*  3.59*   RBC 2.83*  2.83* 2.79*  2.79* 2.76*  2.76*   HGB 8.9*  8.9* 8.7*  8.7* 8.7*  8.7*   HCT 26.9*  26.9* 26.3*  26.3* 26.6*  26.6*   *  140* 148*  148* 130*  130*   MCV 95  95 94  94 96  96   MCH 31.4*  31.4* 31.2*  31.2* 31.5*  31.5*   MCHC 33.1  33.1 33.1  33.1 32.7  32.7     BMP  Recent Labs   Lab 11/21/19 0444 11/22/19 0444 11/23/19  0423   *  135* 133*  133* 135*  135*   K 4.0  4.0 4.1  4.1 4.0  4.0   CO2 26  26 23  23  27  27   CL 98  98 96  96 98  98   BUN 38*  38* 49*  49* 35*  35*   CREATININE 5.5*  5.5* 6.7*  6.7* 5.2*  5.2*   GLU 95  95 96  96 93  93       Recent Labs   Lab 11/19/19  0447  11/21/19  0444 11/22/19 0444 11/23/19  0423   CALCIUM 8.7  8.7  8.7   < > 8.6*  8.6* 8.6*  8.6* 8.5*  8.5*   MG 2.1  2.1   < > 2.2  2.2 2.3  2.3 2.3  2.3   PHOS 4.3  --   --   --   --     < > = values in this interval not displayed.     LFT  Recent Labs   Lab 11/18/19  1216 11/19/19 0447   PROT 6.2 5.7*  5.7*   ALBUMIN 2.8* 2.6*  2.6*   BILITOT 0.8 0.7  0.7   AST 19 31  31   ALKPHOS 82 79  79   ALT 14 15  15       COAGS  Recent Labs   Lab 11/18/19  1216   INR 1.1   APTT 30.3       Medications:  Reconciled Home Medications:      Medication List      START taking these medications    azithromycin 250 MG tablet  Commonly known as:  Z-ANAM  Take 2 tablets by mouth on day 1; Take 1 tablet by mouth on days 2-5        CONTINUE taking these medications    amLODIPine 10 MG tablet  Commonly known as:  NORVASC  Take 1 tablet (10 mg total) by mouth once daily.     DIALYVITE 3000 3-70-15 mg-mcg-mg Tab  Generic drug:  folic acid-B fpyg-B-bmurj-zinc  Take 1 tablet by mouth once daily.     dicyclomine 20 mg tablet  Commonly known as:  BENTYL  Take 20 mg by mouth every 6 (six) hours as needed.     HYDROcodone-acetaminophen  mg per tablet  Commonly known as:  NORCO  Take 1 tablet by mouth every 8 (eight) hours as needed for Pain.     lisinopril 20 MG tablet  Commonly known as:  PRINIVIL,ZESTRIL  Take 1 tablet by mouth once daily.     metoprolol succinate 50 MG 24 hr tablet  Commonly known as:  TOPROL-XL  Take 50 mg by mouth once daily.     ondansetron 4 MG Tbdl  Commonly known as:  ZOFRAN-ODT  Take 2 tablets (8 mg total) by mouth every hour as needed. nausea     pantoprazole 40 MG tablet  Commonly known as:  PROTONIX  Take 40 mg by mouth once daily.     promethazine 25 MG tablet  Commonly known as:  PHENERGAN  Take 1 tablet  (25 mg total) by mouth every 6 (six) hours as needed for Nausea.     traZODone 50 MG tablet  Commonly known as:  DESYREL  Take 1 tablet (50 mg total) by mouth every evening.     ursodiol 250 mg Tab  Commonly known as:  ACTIGALL  Take 1 tablet (250 mg total) by mouth once daily.        STOP taking these medications    cephALEXin 500 MG capsule  Commonly known as:  KEFLEX            Indwelling Lines/Drains at time of discharge:   Lines/Drains/Airways     Drain                 Hemodialysis AV Fistula Left upper arm -- days          Airway                 Airway - Non-Surgical 10/10/16 0913 Nasal Cannula 1139 days          Pressure Ulcer                 Pressure Injury 11/19/19 1107 Coccyx #1 Stage 2 3 days                Time spent on the discharge of patient: 34 minutes  Patient was seen and examined on the date of discharge and determined to be suitable for discharge.         Cherry Patton DO  Department of Hospital Medicine  [facility]

## 2019-12-01 PROBLEM — R18.8 ASCITES: Status: ACTIVE | Noted: 2019-01-01

## 2019-12-01 PROBLEM — K70.11 ASCITES DUE TO ALCOHOLIC HEPATITIS: Status: ACTIVE | Noted: 2019-01-01

## 2019-12-01 PROBLEM — R18.8 ASCITES OF LIVER: Status: ACTIVE | Noted: 2019-01-01

## 2019-12-01 NOTE — ED NOTES
Patient requesting oralia for iv access states is a very difficult stick or to have someone that is very good with ultrasound she states [facility] did last time, notified [facility] charge nurse

## 2019-12-02 PROBLEM — I50.33 ACUTE ON CHRONIC DIASTOLIC HEART FAILURE: Chronic | Status: ACTIVE | Noted: 2019-01-01

## 2019-12-02 PROBLEM — R06.00 DYSPNEA: Status: ACTIVE | Noted: 2019-01-01

## 2019-12-02 NOTE — SUBJECTIVE & OBJECTIVE
Past Medical History:   Diagnosis Date    Anemia of chronic renal failure, stage 5 8/19/2015    Ascites     Chronic hepatitis C 8/19/2015    Colitis     ESRD 2/2 MPGN on HD since 12/16/13 8/19/2015    Gastritis     Hypertension 8/19/2015    Kidney transplant candidate 8/19/2015    Renal dialysis status     M, W, F    Secondary hyperparathyroidism of renal origin 8/19/2015       Past Surgical History:   Procedure Laterality Date    AV FISTULA PLACEMENT  2013    SALPINGOOPHORECTOMY Right 1997    TOTAL ABDOMINAL HYSTERECTOMY  1999       Review of patient's allergies indicates:   Allergen Reactions    Quinolones Itching    Levaquin [levofloxacin] Itching    Ciprofloxacin Itching    Codeine Nausea Only       No current facility-administered medications on file prior to encounter.      Current Outpatient Medications on File Prior to Encounter   Medication Sig    amlodipine (NORVASC) 10 MG tablet Take 1 tablet (10 mg total) by mouth once daily. (Patient taking differently: Take 10 mg by mouth nightly. )    lisinopril (PRINIVIL,ZESTRIL) 20 MG tablet Take 1 tablet by mouth nightly.     metoprolol succinate (TOPROL-XL) 50 MG 24 hr tablet Take 50 mg by mouth nightly.     traZODone (DESYREL) 50 MG tablet Take 1 tablet (50 mg total) by mouth every evening.    dicyclomine (BENTYL) 20 mg tablet Take 20 mg by mouth every 6 (six) hours as needed.    FA-VIT B COMP&C-SELENIUM-ZINC 3-70-15 MG-MCG-MG ORAL TAB Take 1 tablet by mouth once daily.    HYDROcodone-acetaminophen (NORCO)  mg per tablet Take 1 tablet by mouth every 8 (eight) hours as needed for Pain.    ondansetron (ZOFRAN-ODT) 4 MG TbDL Take 2 tablets (8 mg total) by mouth every hour as needed. nausea    pantoprazole (PROTONIX) 40 MG tablet Take 40 mg by mouth once daily.    promethazine (PHENERGAN) 25 MG tablet Take 1 tablet (25 mg total) by mouth every 6 (six) hours as needed for Nausea.    ursodiol (ACTIGALL) 250 mg Tab Take 1 tablet (250  mg total) by mouth once daily.     Family History     Problem Relation (Age of Onset)    Kidney disease Mother    Psoriasis Brother    Stroke Father        Tobacco Use    Smoking status: Current Every Day Smoker     Packs/day: 0.75     Years: 30.00     Pack years: 22.50    Smokeless tobacco: Never Used    Tobacco comment: pt reports actively trying to quit but struggling.  Counseling and resources given.   Substance and Sexual Activity    Alcohol use: No     Alcohol/week: 0.0 standard drinks    Drug use: Yes     Types: Marijuana     Comment: nightly to help sleep    Sexual activity: Not Currently     Partners: Male     Birth control/protection: Surgical     Review of Systems   Constitutional: Positive for fever. Negative for activity change, chills and fatigue.   HENT: Negative for congestion, drooling, ear pain, hearing loss, nosebleeds, rhinorrhea, sinus pressure, sore throat and trouble swallowing.    Eyes: Negative for pain and visual disturbance.   Respiratory: Positive for cough and shortness of breath. Negative for apnea, chest tightness and wheezing.    Cardiovascular: Negative for chest pain, palpitations and leg swelling.   Gastrointestinal: Positive for abdominal distention. Negative for abdominal pain, blood in stool, constipation, diarrhea, nausea and vomiting.   Endocrine: Negative for polydipsia, polyphagia and polyuria.   Genitourinary: Negative for decreased urine volume, dysuria, flank pain, frequency and hematuria.   Musculoskeletal: Negative for back pain, gait problem, joint swelling, myalgias, neck pain and neck stiffness.   Skin: Negative for rash and wound.   Neurological: Positive for weakness. Negative for dizziness, tremors, seizures, syncope, facial asymmetry, speech difficulty, light-headedness, numbness and headaches.   Psychiatric/Behavioral: Negative for behavioral problems, confusion, hallucinations and sleep disturbance. The patient is not nervous/anxious.      Objective:      Vital Signs (Most Recent):  Temp: 97.7 °F (36.5 °C) (12/01/19 1737)  Pulse: 84 (12/01/19 2149)  Resp: 16 (12/01/19 2149)  BP: 118/68 (12/01/19 2000)  SpO2: 96 % (12/01/19 2149) Vital Signs (24h Range):  Temp:  [97.7 °F (36.5 °C)] 97.7 °F (36.5 °C)  Pulse:  [79-85] 84  Resp:  [16-22] 16  SpO2:  [96 %-100 %] 96 %  BP: (113-118)/(58-75) 118/68     Weight: 52.7 kg (116 lb 4 oz)  Body mass index is 19.95 kg/m².    Physical Exam   Constitutional: She is oriented to person, place, and time. She appears well-developed and well-nourished.   Eyes: Pupils are equal, round, and reactive to light. EOM are normal.   Neck: Normal range of motion. Neck supple.   Cardiovascular: Normal rate, regular rhythm, normal heart sounds and intact distal pulses.   Pulmonary/Chest:   Moderate tachypnea with rhonchi   Abdominal: Soft. Bowel sounds are normal. She exhibits distension.   Musculoskeletal: Normal range of motion.   Neurological: She is alert and oriented to person, place, and time.   Skin: Skin is warm and dry. Capillary refill takes less than 2 seconds.   Psychiatric: She has a normal mood and affect.         CRANIAL NERVES     CN III, IV, VI   Pupils are equal, round, and reactive to light.  Extraocular motions are normal.     CN V   Facial sensation intact.   Right facial sensation deficit: none  Left facial sensation deficit: none  Right corneal reflex: normal  Left corneal reflex: normal  Jaw jerk: normal    CN VII   Facial expression full, symmetric.   Right facial weakness: none  Left facial weakness: none    CN VIII   CN VIII normal.   Hearing: intact    CN IX, X   CN IX normal.   CN X normal.   Palate: symmetric  Right gag reflex: normal  Left gag reflex: normal    CN XI   CN XI normal.   Right sternocleidomastoid strength: normal  Left sternocleidomastoid strength: normal  Right trapezius strength: normal  Left trapezius strength: normal    CN XII   CN XII normal.   Tongue: not atrophic  Tongue deviation: none        Significant Labs:   CBC:   Recent Labs   Lab 12/01/19 1815   WBC 5.42   HGB 9.0*   HCT 27.7*        CMP:   Recent Labs   Lab 12/01/19 1815      K 3.9      CO2 29   GLU 94   BUN 42*   CREATININE 6.9*   CALCIUM 8.2*   PROT 5.6*   ALBUMIN 2.8*   BILITOT 0.9   ALKPHOS 80   AST 34   ALT 27   ANIONGAP 11   EGFRNONAA 6.3*     Cardiac Markers:   Recent Labs   Lab 12/01/19 1815   *  573*     Coagulation:   Recent Labs   Lab 12/01/19 1815   INR 1.1     Lactic Acid: No results for input(s): LACTATE in the last 48 hours.  Lipase: No results for input(s): LIPASE in the last 48 hours.  Lipid Panel: No results for input(s): CHOL, HDL, LDLCALC, TRIG, CHOLHDL in the last 48 hours.  Magnesium: No results for input(s): MG in the last 48 hours.  Troponin:   Recent Labs   Lab 12/01/19 1815   TROPONINI 0.039     TSH: No results for input(s): TSH in the last 4320 hours.  Urine Studies: No results for input(s): COLORU, APPEARANCEUA, PHUR, SPECGRAV, PROTEINUA, GLUCUA, KETONESU, BILIRUBINUA, OCCULTUA, NITRITE, UROBILINOGEN, LEUKOCYTESUR, RBCUA, WBCUA, BACTERIA, SQUAMEPITHEL, HYALINECASTS in the last 48 hours.    Invalid input(s): WRIGHTSUR  All pertinent labs within the past 24 hours have been reviewed.    Significant Imaging: I have reviewed all pertinent imaging results/findings within the past 24 hours.   Cta Chest Non-coronary (pe Study)    Result Date: 11/18/2019  CMS MANDATED QUALITY DATA - CT RADIATION - 436 All CT scans at this facility utilize dose modulation, iterative reconstruction, and/or weight based dosing when appropriate to reduce radiation dose to as low as reasonably achievable. EXAMINATION: CTA CHEST NON CORONARY CLINICAL HISTORY: pleuritic chest pain, concern for PE, pulmonary infarct vs pneumonia; TECHNIQUE: CT angiography of thorax with 100 mL Omnipaque 350. Maximum intensity projection coronal reformations were created at a separate workstation and stored in the patient's permanent  medical record. COMPARISON: Chest x-ray 11/18/2019. FINDINGS: CTA THORAX: No pulmonary embolism identified.  The aorta is normal caliber.  No dissection identified.  There is mild prominence of the pulmonary arteries.  The heart is normal size.  Cardiac pacing leads are noted.  No pericardial effusion.  Enlarged mediastinal lymph nodes are identified, largest in the right paratracheal area measures 1.8 x 1.0 cm.  Calcified hilar lymph nodes are demonstrated bilaterally, right greater than left.  There is consolidation of the right middle lobe with air bronchograms.  There is also patchy consolidation at the right lung base.  The right upper lobe and left lung are clear.  No pleural effusions. Ascites is noted, and is incompletely evaluated in this exam.  The liver and spleen are grossly unremarkable. The osseous structures are unremarkable.     1. No pulmonary embolism, aortic aneurysm or dissection. 2. There is consolidation of the right middle lobe, and patchy consolidation of the right lower lobe, consistent with pneumonia. 3. Mediastinal lymphadenopathy is felt to be reactive. Electronically signed by: Guy Patton MD Date:    11/18/2019 Time:    14:13    X-ray Chest Ap Portable    Result Date: 12/1/2019  EXAMINATION: XR CHEST AP PORTABLE CLINICAL HISTORY: CHF; FINDINGS: Portable chest at 1742 compared with 11/19/2019 shows normal cardiomediastinal silhouette. Persistent linear airspace opacity affects the right lower lung zone with band like configuration.  Previous associated right lung base hazy opacity has nearly completely resolved.  Minor linear opacities persist in lateral left lung base suggesting atelectasis or scarring.  Pulmonary vasculature is normal. No acute osseous abnormality.     Extensive improving aeration of right lower lung zone opacification suggesting improving airspace consolidation which has yet to completely resolve, as persistent band like linear opacity occurs in right lung base  with minor linear opacities laterally in left lung base.  Continued radiographic follow-up recommended an additional 4-8 weeks to document resolution. Electronically signed by: Luis Powell MD Date:    12/01/2019 Time:    18:01    X-ray Chest Ap Portable    Result Date: 11/19/2019  EXAMINATION: XR CHEST AP PORTABLE CLINICAL HISTORY: pneumonia; FINDINGS: Portable chest at stable appearance of the chest with persistent dense airspace disease in the right lower lobe suggestive of pneumonia is compared to 11/18/2019 shows normal cardiomediastinal silhouette. There is persistent airspace disease in the right lung base.  There atelectasis in left lung base.  The upper lobes are clear.  Pulmonary vasculature is normal. No acute osseous abnormality.     Persistent dense alveolar consolidation in the right lower lobe suggestive of pneumonia.  Continued follow-up is recommended to document clearing Electronically signed by: Phoebe Pfeiffer MD Date:    11/19/2019 Time:    07:25    X-ray Chest Ap Portable    Result Date: 11/18/2019  EXAMINATION: XR CHEST AP PORTABLE CLINICAL HISTORY: Shortness of breath COMPARISON: 10/16/2019 FINDINGS: Since the reference exam there has been development of extensive consolidative airspace disease involving the right lung base.  Minimal left basilar atelectasis or scarring.  Left lung is otherwise clear.  No pneumothorax.  Atherosclerotic calcification of the aorta.  Stable cardiac silhouette size.     Development of dense airspace consolidation involving the right lung base suspicious for pneumonia. Follow-up PA and lateral chest radiography is recommended. Electronically signed by: Harley Jimenez MD Date:    11/18/2019 Time:    10:57    Us Guided Paracentesis    Result Date: 11/13/2019  EXAMINATION: US GUIDED PARACENTESIS INC IMAGING CLINICAL HISTORY: Unspecified cirrhosis of liver PROCEDURE: After obtaining informed consent, sonography was used to locate ascitic fluid in left lower quadrant  and the overlying skin was marked. Skin was then prepped and draped in usual sterile fashion and 1% Lidocaine was used to achieve adequate local anesthesia. A small skin incision was made and a 6-Nepalese needle and catheter were guided into the ascitic fluid without difficulty. The catheter was advanced, and needle removed. A total of 5.8 L of Straw-colored ascitic fluid were obtained without difficulty. Catheter was removed. Site was cleaned and bandage was placed. Patient tolerated the procedure well with no immediate complications and no significant blood loss.     Successful ultrasound-guided paracentesis. Electronically signed by: Guy Patton MD Date:    11/13/2019 Time:    14:11

## 2019-12-02 NOTE — PLAN OF CARE
Problem: Oral Intake Inadequate  Goal: Improved Oral Intake  Outcome: Ongoing, Progressing  Intervention: Promote and Optimize Oral Intake  Flowsheets (Taken 12/2/2019 1027)  Oral Nutrition Promotion: calorie dense liquids provided   When ready to start diet, Start Renal Diet due to Hx of ESRD.   RD to add Nepro TID to aid in meeting estimated needs.  Recommend that a medical diagnosis of malnutrition be added to patient's problem list if physician agrees with dietitian's Nutrition Focused Physical Exam (NFPE) findings that support medical diagnosis

## 2019-12-02 NOTE — PLAN OF CARE
12/02/19 0743   Patient Assessment/Suction   Level of Consciousness (AVPU) alert   Respiratory Effort Unlabored   All Lung Fields Breath Sounds clear  (diminished bases)   Cough Type nonproductive   PRE-TX-O2   O2 Device (Oxygen Therapy) room air   SpO2 (!) 93 %   Pulse Oximetry Type Intermittent   $ Pulse Oximetry - Multiple Charge Pulse Oximetry - Multiple   Pulse 84   Resp 20   Aerosol Therapy   $ Aerosol Therapy Charges Aerosol Treatment   Respiratory Treatment Status (SVN) given   Treatment Route (SVN) mask   Patient Position (SVN) HOB elevated   Signs of Intolerance (SVN) none   Breath Sounds Post-Respiratory Treatment   Throughout All Fields Post-Treatment All Fields   Throughout All Fields Post-Treatment aeration increased   Post-treatment Heart Rate (beats/min) 82   Post-treatment Resp Rate (breaths/min) 18

## 2019-12-02 NOTE — ASSESSMENT & PLAN NOTE
- multifactorial - residual pneumonia, hypervolemia for ESRD and ESLD and mild anemia complicated by continued tobacco use  - Consult GI for possible paracentesis  - Continue antibiotic for pneumonia   - encourage cigarette cessation  - IS

## 2019-12-02 NOTE — PLAN OF CARE
12/01/19 2140   Patient Assessment/Suction   Level of Consciousness (AVPU) alert   Respiratory Effort Normal;Unlabored   Expansion/Accessory Muscles/Retractions expansion symmetric;no retractions;no use of accessory muscles   All Lung Fields Breath Sounds clear   Rhythm/Pattern, Respiratory no shortness of breath reported;pattern regular;unlabored   PRE-TX-O2   O2 Device (Oxygen Therapy) room air   SpO2 96 %   Pulse 84   Resp 16   Respiratory Interventions   Cough And Deep Breathing done with encouragement   Breathing Techniques/Airway Clearance deep/controlled cough encouraged;diaphragmatic breathing promoted   Respiratory Evaluation   $ Care Plan Tech Time 15 min   Evaluation For Transfer   Admitting Diagnosis ASCITES   Home Oxygen   Has Home Oxygen? No   Home Aerosol, MDI, DPI, and Other Treatments/Therapies   Home Respiratory Therapy Per Patient/Review of Chart No   Respiratory assessment done. Pt does not require respiratory services at this time.

## 2019-12-02 NOTE — CONSULTS
INPATIENT NEPHROLOGY CONSULT   [facility] NEPHROLOGY    Aurelia Saucedo  12/02/2019    Reason for consultation:    esrd  Chief Complaint:   Chief Complaint   Patient presents with    ABD DISTENTION     X 8 DAYS, MAKING IT HARD TO BREATHE, HX ASCITES          History of Present Illness:     Pt with h/o esrd, MPGN secondary to HCV, anemia, CAD, and hpth was admitted for ascites.      12/2  C/o abd distension.  GONZALEZ.  Weakness.  No fever.  No urinary or bowel complaints.          Plan of Care:       Assessment:    esrd  --dialysis today    Anemia  --sue with hd    Ascites  -paracentesis per primary         Thank you for allowing us to participate in this patient's care. We will continue to follow.    Vital Signs:  Temp Readings from Last 3 Encounters:   12/02/19 98.8 °F (37.1 °C) (Oral)   11/23/19 98 °F (36.7 °C) (Oral)   10/22/19 97.3 °F (36.3 °C) (Oral)       Pulse Readings from Last 3 Encounters:   12/02/19 84   11/23/19 85   11/13/19 86       BP Readings from Last 3 Encounters:   12/02/19 122/79   11/23/19 121/75   11/13/19 122/66       Weight:  Wt Readings from Last 3 Encounters:   12/02/19 55.3 kg (121 lb 14.6 oz)   11/22/19 52.9 kg (116 lb 10 oz)   10/22/19 50.8 kg (112 lb)       Past Medical & Surgical History:  Past Medical History:   Diagnosis Date    Anemia of chronic renal failure, stage 5 8/19/2015    Ascites     Chronic hepatitis C 8/19/2015    Colitis     ESRD 2/2 MPGN on HD since 12/16/13 8/19/2015    Gastritis     Hypertension 8/19/2015    Kidney transplant candidate 8/19/2015    Renal dialysis status     M, W, F    Secondary hyperparathyroidism of renal origin 8/19/2015       Past Surgical History:   Procedure Laterality Date    AV FISTULA PLACEMENT  2013    SALPINGOOPHORECTOMY Right 1997    TOTAL ABDOMINAL HYSTERECTOMY  1999       Past Social History:  Social History     Socioeconomic History    Marital status:      Spouse name: Not on file    Number of children: 2    Years  of education: Not on file    Highest education level: Not on file   Occupational History    Occupation: baker    Social Needs    Financial resource strain: Not on file    Food insecurity:     Worry: Not on file     Inability: Not on file    Transportation needs:     Medical: Not on file     Non-medical: Not on file   Tobacco Use    Smoking status: Current Every Day Smoker     Packs/day: 0.75     Years: 30.00     Pack years: 22.50    Smokeless tobacco: Never Used    Tobacco comment: pt reports actively trying to quit but struggling.  Counseling and resources given.   Substance and Sexual Activity    Alcohol use: No     Alcohol/week: 0.0 standard drinks    Drug use: Yes     Types: Marijuana     Comment: nightly to help sleep    Sexual activity: Not Currently     Partners: Male     Birth control/protection: Surgical   Lifestyle    Physical activity:     Days per week: Not on file     Minutes per session: Not on file    Stress: To some extent   Relationships    Social connections:     Talks on phone: Not on file     Gets together: Not on file     Attends Latter day service: Not on file     Active member of club or organization: Not on file     Attends meetings of clubs or organizations: Not on file     Relationship status: Not on file   Other Topics Concern    Are you pregnant or think you may be? Not Asked    Breast-feeding Not Asked   Social History Narrative    Not on file       Medications:  No current facility-administered medications on file prior to encounter.      Current Outpatient Medications on File Prior to Encounter   Medication Sig Dispense Refill    amlodipine (NORVASC) 10 MG tablet Take 1 tablet (10 mg total) by mouth once daily. (Patient taking differently: Take 10 mg by mouth nightly. ) 90 tablet 3    lisinopril (PRINIVIL,ZESTRIL) 20 MG tablet Take 1 tablet by mouth nightly.   3    metoprolol succinate (TOPROL-XL) 50 MG 24 hr tablet Take 50 mg by mouth nightly.       traZODone  "(DESYREL) 50 MG tablet Take 1 tablet (50 mg total) by mouth every evening. 30 tablet 11    dicyclomine (BENTYL) 20 mg tablet Take 20 mg by mouth every 6 (six) hours as needed.      FA-VIT B COMP&C-SELENIUM-ZINC 3-70-15 MG-MCG-MG ORAL TAB Take 1 tablet by mouth once daily.      HYDROcodone-acetaminophen (NORCO)  mg per tablet Take 1 tablet by mouth every 8 (eight) hours as needed for Pain. 60 tablet 0    ondansetron (ZOFRAN-ODT) 4 MG TbDL Take 2 tablets (8 mg total) by mouth every hour as needed. nausea 20 tablet 0    pantoprazole (PROTONIX) 40 MG tablet Take 40 mg by mouth once daily.      promethazine (PHENERGAN) 25 MG tablet Take 1 tablet (25 mg total) by mouth every 6 (six) hours as needed for Nausea. 20 tablet 0    ursodiol (ACTIGALL) 250 mg Tab Take 1 tablet (250 mg total) by mouth once daily. 90 tablet 0     Scheduled Meds:   albuterol-ipratropium  3 mL Nebulization Q8H    amLODIPine  10 mg Oral Nightly    lisinopril  20 mg Oral Nightly    metoprolol succinate  50 mg Oral Nightly    pantoprazole  40 mg Oral Daily    traZODone  50 mg Oral QHS     Continuous Infusions:  PRN Meds:.acetaminophen, bisacodyl, HYDROmorphone, ondansetron, sodium chloride 0.9%    Allergies:  Quinolones; Levaquin [levofloxacin]; Ciprofloxacin; and Codeine    Past Family History:  Reviewed; refer to Hospitalist Admission Note    Review of Systems:  Review of Systems - All 14 systems reviewed and negative, except as noted in HPI    Physical Exam:    /79 (BP Location: Right arm, Patient Position: Lying)   Pulse 84   Temp 98.8 °F (37.1 °C) (Oral)   Resp 20   Ht 5' 5" (1.651 m)   Wt 55.3 kg (121 lb 14.6 oz)   SpO2 (!) 93%   Breastfeeding? No   BMI 20.29 kg/m²     General Appearance:    Alert, cooperative, no distress, appears stated age   Head:    Normocephalic, without obvious abnormality, atraumatic   Eyes:    PER, conjunctiva/corneas clear, EOM's intact in both eyes        Throat:   Lips, mucosa, and " tongue normal; teeth and gums normal   Back:     Symmetric, no curvature, ROM normal, no CVA tenderness   Lungs:     Clear to auscultation bilaterally, respirations unlabored   Chest wall:    No tenderness or deformity   Heart:    Regular rate and rhythm, S1 and S2 normal, no murmur, rub   or gallop   Abdomen:     Distended    Extremities:   Extremities normal, atraumatic, no cyanosis or edema   Pulses:   2+ and symmetric all extremities   MSK:   No joint or muscle swelling, tenderness or deformity   Skin:   Skin color, texture, turgor normal, no rashes or lesions   Neurologic:   CNII-XII intact, normal strength and sensation       Throughout.  No flap     Results:  Lab Results   Component Value Date     12/02/2019    K 3.9 12/02/2019    CL 98 12/02/2019    CO2 25 12/02/2019    BUN 44 (H) 12/02/2019    CREATININE 7.6 (H) 12/02/2019    CALCIUM 8.3 (L) 12/02/2019    ANIONGAP 14 12/02/2019    ESTGFRAFRICA 6.4 (A) 12/02/2019    EGFRNONAA 5.6 (A) 12/02/2019       Lab Results   Component Value Date    CALCIUM 8.3 (L) 12/02/2019    PHOS 4.3 11/19/2019       Recent Labs   Lab 12/02/19  0612   WBC 4.60   RBC 2.68*   HGB 8.6*   HCT 26.4*      MCV 99*   MCH 32.1*   MCHC 32.6       I have personally reviewed pertinent radiological imaging and reports.

## 2019-12-02 NOTE — PROGRESS NOTES
"Pending sale to [facility] Medicine Progress Note  Patient Name: Aurelia Saucedo MRN: [id number]   Patient Class: IP- Inpatient  Length of Stay: 1   Admission Date: 12/1/2019  5:39 PM Attending Physician: Jake Ingram MD   Primary Care Provider: Dustin Ireland MD Face-to-Face encounter date: 12/02/2019   Chief Complaint: ABD DISTENTION (X 8 DAYS, MAKING IT HARD TO BREATHE, HX ASCITES)      Assessment & Plan:   Aurelia Saucedo is a 54 y.o. female admitted for    1. Dyspnea likely to ascites: Discussed with GI. Recc to consult IR. Ordered paracentesis for tomorrow.     2. 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     4. Hepatitis C: Not treated and needs to be seen outpatient.    5. Ascites: See above     6. ESRD: Nephrology on board. She had dialysis today.    7. Hypertension: Lisinopril, amlodipine and metoprolol      Core measures:  - Code status: DNR  - GI PPx: NA  - DVT PPx: bilateral SCDs  - lines/ tubes: PIV,  Discharge Planning:   Discharge after paracentesis.    Subjective:    Interval History: I evaluated her in dialysis unit. She has distended belly and has trouble breathing. Denies chest pain, palpitations, dysuria.     Review of Systems All other Review of Systems were found to be negative expect for that mentioned already in HPI.   Objective:   Physical Exam  /70 (BP Location: Right arm, Patient Position: Lying)   Pulse 84   Temp 98.3 °F (36.8 °C)   Resp 20   Ht 5' 5" (1.651 m)   Wt 55.3 kg (121 lb 14.6 oz)   SpO2 99%   Breastfeeding? No   BMI 20.29 kg/m²   Vitals reviewed.    Constitutional: No distress.   HENT: Atraumatic.   Cardiovascular: Normal rate, regular rhythm and normal heart sounds.   Pulmonary/Chest: Effort normal. Clear to auscultation bilaterally. No wheezes.   Abdominal: Bowel sounds are normal. Very distended and hard. However no tenderness  Neurological: " Alert.   Skin: Skin is warm and dry.     Following labs were Reviewed   Recent Labs   Lab 12/02/19  0612   WBC 4.60   HGB 8.6*   HCT 26.4*      CALCIUM 8.3*   ALBUMIN 2.6*   PROT 5.4*      K 3.9   CO2 25   CL 98   BUN 44*   CREATININE 7.6*   ALKPHOS 70   ALT 24   AST 29   BILITOT 0.7     No results found for: POCTGLUCOSE       Microbiology Results (last 7 days)     Procedure Component Value Units Date/Time    MRSA Screen by PCR [[id number]] Collected:  12/02/19 0500    Order Status:  Completed Specimen:  Nasopharyngeal Swab from Nasal Updated:  12/02/19 1204     MRSA SCREEN BY PCR Negative        X-Ray Chest AP Portable   Final Result      Extensive improving aeration of right lower lung zone opacification suggesting improving airspace consolidation which has yet to completely resolve, as persistent band like linear opacity occurs in right lung base with minor linear opacities laterally in left lung base.  Continued radiographic follow-up recommended an additional 4-8 weeks to document resolution.         Electronically signed by: Luis Powell MD   Date:    12/01/2019   Time:    18:01      US Guided Paracentesis    (Results Pending)       Inpatient medications  Scheduled Meds:   sodium chloride 0.9%   Intravenous Once    albuterol-ipratropium  3 mL Nebulization Q8H    amLODIPine  10 mg Oral Nightly    lisinopril  20 mg Oral Nightly    metoprolol succinate  50 mg Oral Nightly    mupirocin   Nasal BID    pantoprazole  40 mg Oral Daily    traZODone  50 mg Oral QHS     Continuous Infusions:  PRN Meds:.sodium chloride 0.9%, acetaminophen, bisacodyl, HYDROmorphone, ondansetron, sodium chloride 0.9%    Above encounter included review of the medical records, interviewing and examining the patient face-to-face, discussion with family and other health care providers, ordering and interpreting lab/test results and formulating a plan of care.     Medical Decision Making:    [] Low Complexity  [x] Moderate  Complexity  [] High Complexity

## 2019-12-02 NOTE — ED PROVIDER NOTES
Encounter Date: 12/1/2019       History     Chief Complaint   Patient presents with    ABD DISTENTION     X 8 DAYS, MAKING IT HARD TO BREATHE, HX ASCITES     Patient here with reported abdominal distension progressing lasts a days causing her to have worsened shortness of breath he has a history of hepatitis-C with end-stage renal disease chronic ascites her last paracentesis was approximately 3 weeks ago she was able to attend dialysis yesterday however even after dialysis or dyspnea continues patient recently completed a course of Zithromax for pneumonia but continues to have coughing and shortness of breath no report cord fevers or chills no chest pains    The history is provided by the patient.     Review of patient's allergies indicates:   Allergen Reactions    Quinolones Itching    Levaquin [levofloxacin] Itching    Ciprofloxacin Itching    Codeine Nausea Only     Past Medical History:   Diagnosis Date    Anemia of chronic renal failure, stage 5 8/19/2015    Ascites     Chronic hepatitis C 8/19/2015    Colitis     ESRD 2/2 MPGN on HD since 12/16/13 8/19/2015    Gastritis     Hypertension 8/19/2015    Kidney transplant candidate 8/19/2015    Renal dialysis status     M, W, F    Secondary hyperparathyroidism of renal origin 8/19/2015     Past Surgical History:   Procedure Laterality Date    AV FISTULA PLACEMENT  2013    SALPINGOOPHORECTOMY Right 1997    TOTAL ABDOMINAL HYSTERECTOMY  1999     Family History   Problem Relation Age of Onset    Kidney disease Mother     Stroke Father     Psoriasis Brother     Breast cancer Neg Hx     Cancer Neg Hx     Colon cancer Neg Hx     Ovarian cancer Neg Hx      Social History     Tobacco Use    Smoking status: Current Every Day Smoker     Packs/day: 0.75     Years: 30.00     Pack years: 22.50    Smokeless tobacco: Never Used    Tobacco comment: pt reports actively trying to quit but struggling.  Counseling and resources given.   Substance Use  Topics    Alcohol use: No     Alcohol/week: 0.0 standard drinks    Drug use: Yes     Types: Marijuana     Comment: nightly to help sleep     Review of Systems   Constitutional: Negative for chills and fever.   HENT: Negative.    Eyes: Negative.    Respiratory: Positive for cough and shortness of breath. Negative for wheezing and stridor.    Cardiovascular: Negative.    Gastrointestinal: Positive for abdominal distention. Negative for abdominal pain, nausea and vomiting.   Endocrine: Negative.    Genitourinary: Negative.    Musculoskeletal: Negative.    Skin: Negative.    Allergic/Immunologic: Negative.    Neurological: Negative.    Hematological: Negative.    Psychiatric/Behavioral: Negative.        Physical Exam     Initial Vitals [12/01/19 1737]   BP Pulse Resp Temp SpO2   (!) 117/58 85 20 97.7 °F (36.5 °C) 100 %      MAP       --         Physical Exam    Constitutional: She appears well-developed and well-nourished. No distress.   HENT:   Head: Normocephalic and atraumatic.   Right Ear: External ear normal.   Left Ear: External ear normal.   Nose: Nose normal.   Mouth/Throat: Oropharynx is clear and moist.   Eyes: Conjunctivae and EOM are normal.   Neck: Normal range of motion. Neck supple.   Cardiovascular: Normal rate, regular rhythm, normal heart sounds and intact distal pulses.   Pulmonary/Chest: She is in respiratory distress. She has rhonchi. She exhibits no tenderness.   Abdominal: Soft. Bowel sounds are normal. She exhibits shifting dullness, distension, fluid wave and ascites. There is no tenderness. There is no guarding.   Musculoskeletal: Normal range of motion.   Neurological: She is alert and oriented to person, place, and time. She has normal strength. GCS score is 15. GCS eye subscore is 4. GCS verbal subscore is 5. GCS motor subscore is 6.   Skin: Skin is warm and dry. Capillary refill takes less than 2 seconds.   Psychiatric: She has a normal mood and affect. Her behavior is normal.         ED  Course   Procedures  Labs Reviewed   CBC W/ AUTO DIFFERENTIAL - Abnormal; Notable for the following components:       Result Value    RBC 2.79 (*)     Hemoglobin 9.0 (*)     Hematocrit 27.7 (*)     Mean Corpuscular Volume 99 (*)     Mean Corpuscular Hemoglobin 32.3 (*)     RDW 15.1 (*)     MPV 9.0 (*)     Lymph% 17.9 (*)     All other components within normal limits   COMPREHENSIVE METABOLIC PANEL - Abnormal; Notable for the following components:    BUN, Bld 42 (*)     Creatinine 6.9 (*)     Calcium 8.2 (*)     Total Protein 5.6 (*)     Albumin 2.8 (*)     eGFR if  7.2 (*)     eGFR if non  6.3 (*)     All other components within normal limits   B-TYPE NATRIURETIC PEPTIDE - Abnormal; Notable for the following components:     (*)     All other components within normal limits   B-TYPE NATRIURETIC PEPTIDE - Abnormal; Notable for the following components:     (*)     All other components within normal limits   TROPONIN I   PROTIME-INR        ECG Results          EKG 12-lead (In process)  Result time 12/01/19 18:31:17    In process by Interface, Lab In [facility] (12/01/19 18:31:17)                 Narrative:    Test Reason : R06.02,    Vent. Rate : 079 BPM     Atrial Rate : 079 BPM     P-R Int : 152 ms          QRS Dur : 076 ms      QT Int : 434 ms       P-R-T Axes : 065 006 071 degrees     QTc Int : 497 ms    Normal sinus rhythm  Possible Left atrial enlargement  Prolonged QT  Abnormal ECG  When compared with ECG of 19-NOV-2019 06:33,  Nonspecific T wave abnormality now evident in Lateral leads    Referred By: [email or web address]   SELF           Confirmed By:                             Imaging Results          X-Ray Chest AP Portable (Final result)  Result time 12/01/19 18:01:37    Final result by Luis Powell MD (12/01/19 18:01:37)                 Impression:      Extensive improving aeration of right lower lung zone opacification suggesting improving airspace consolidation which has  yet to completely resolve, as persistent band like linear opacity occurs in right lung base with minor linear opacities laterally in left lung base.  Continued radiographic follow-up recommended an additional 4-8 weeks to document resolution.      Electronically signed by: Luis Powell MD  Date:    12/01/2019  Time:    18:01             Narrative:    EXAMINATION:  XR CHEST AP PORTABLE    CLINICAL HISTORY:  CHF;    FINDINGS:  Portable chest at 1742 compared with 11/19/2019 shows normal cardiomediastinal silhouette.    Persistent linear airspace opacity affects the right lower lung zone with band like configuration.  Previous associated right lung base hazy opacity has nearly completely resolved.  Minor linear opacities persist in lateral left lung base suggesting atelectasis or scarring.  Pulmonary vasculature is normal. No acute osseous abnormality.                                                                 Clinical Impression:       ICD-10-CM ICD-9-CM   1. Other ascites R18.8 789.59   2. Shortness of breath R06.02 786.05   3. ESRD (end stage renal disease) N18.6 585.6                             Can Medeiros MD  12/01/19 2043

## 2019-12-02 NOTE — HPI
54-year-old  female presents to emergency room with shortness of breath abdominal pain and abdominal swelling.     This patient has a known history of chronic ascites, so hepatitis C with liver cirrhosis and end-stage renal disease requiring hemodialysis twice a week.  The patient states she usually dialyzes on Mondays and Fridays.      The patient states over the past 2 days she has been having increased abdominal swelling and shortness of breath.  The patient states she was recently treated for pneumonia and completed a course of Zithromax.  The patient states since completing her Zithromax and she has a persistent cough.      She denies fever, chills, hematemesis, hemoptysis black or bloody stools lightheadedness, chest pain dizziness or syncope.  She describes her symptoms as moderate in severity with no exacerbating or alleviating factors.      The patient states she had a paracentesis about 3 weeks ago with about 5 L of fluid removed

## 2019-12-02 NOTE — PROGRESS NOTES
"[facility]  Adult Nutrition   Progress Note (Initial Assessment)     SUMMARY     Recommendations/Interventions:  1. When ready to start diet, Start Renal Diet due to Hx of ESRD.   2. RD to add Nepro TID to aid in meeting estimated needs.  3. Recommend that a medical diagnosis of malnutrition be added to patient's problem list if physician agrees with dietitian's Nutrition Focused Physical Exam (NFPE) findings that support medical diagnosis  · Severe Protein Energy Malnutrition RT decreased intake due to ascites causing "feeling of fullness" per patient statement AEB severe subcutaneous fat loss of orbitals, triceps and thoracic and lumbar region and severe muscle mass depletion of temples, clavicle region, scapular region, interosseous muscle and lower extremities.  Goals:   1. Diet to advance and Pt to meet at least 75% of estimated needs via PO intake of meals and supplements.  2. Malnutrition diagnosis to be added to patient problem list if MD agrees with RD findings.  Nutrition Goal Status: new    Reason for Assessment    Reason For Assessment: (MST Score of 2 )  Diagnosis: renal disease  Relevant Medical History: Anemia of CKD, colitis, ESRD, HTN, Kidney transplant, hyperparathyroidism   Interdisciplinary Rounds: attended    Nutrition Risk Screen    Nutrition Risk Screen: no indicators present    Nutrition/Diet History    Patient Reported Diet/Restrictions/Preferences: general  Food Allergies: NKFA  Factors Affecting Nutritional Intake: decreased appetite    Anthropometrics    Temp: 98.8 °F (37.1 °C)  Height Method: Stated  Height: 5' 5" (165.1 cm)  Height (inches): 65 in  Weight Method: Standard Scale  Weight: 55.3 kg (121 lb 14.6 oz)  Weight (lb): 121.92 lb  Ideal Body Weight (IBW), Female: 125 lb  % Ideal Body Weight, Female (lb): 97.54 lb  BMI (Calculated): 20.3  BMI Grade: 18.5-24.9 - normal     Weight History:  Wt Readings from Last 10 Encounters:   12/02/19 55.3 kg (121 lb 14.6 oz) "   11/22/19 52.9 kg (116 lb 10 oz)   10/22/19 50.8 kg (112 lb)   10/18/19 48.3 kg (106 lb 7.7 oz)   10/10/19 55.2 kg (121 lb 9.6 oz)   09/20/19 53.3 kg (117 lb 9.6 oz)   06/14/19 49.8 kg (109 lb 11.2 oz)   04/23/19 50.5 kg (111 lb 6.4 oz)   04/09/19 48.9 kg (107 lb 11.2 oz)   02/25/19 50.3 kg (111 lb)     Lab/Procedures/Meds: Pertinent Labs Reviewed  Clinical Chemistry:  Recent Labs   Lab 12/02/19  0612      K 3.9   CL 98   CO2 25   GLU 91   BUN 44*   CREATININE 7.6*   CALCIUM 8.3*   PROT 5.4*   ALBUMIN 2.6*   BILITOT 0.7   ALKPHOS 70   AST 29   ALT 24   ANIONGAP 14   ESTGFRAFRICA 6.4*   EGFRNONAA 5.6*     CBC:   Recent Labs   Lab 12/02/19  0612   WBC 4.60   RBC 2.68*   HGB 8.6*   HCT 26.4*      MCV 99*   MCH 32.1*   MCHC 32.6     Cardiac Profile:  Recent Labs   Lab 12/01/19  1815 12/02/19  0006 12/02/19  0612   *  573*  --   --    TROPONINI 0.039 0.038 0.034     Medications: Pertinent Medications reviewed  Scheduled Meds:   albuterol-ipratropium  3 mL Nebulization Q8H    amLODIPine  10 mg Oral Nightly    lisinopril  20 mg Oral Nightly    metoprolol succinate  50 mg Oral Nightly    mupirocin   Nasal BID    pantoprazole  40 mg Oral Daily    traZODone  50 mg Oral QHS     Continuous Infusions:  PRN Meds:.acetaminophen, bisacodyl, HYDROmorphone, ondansetron, sodium chloride 0.9%    Estimated/Assessed Needs    Weight Used For Calorie Calculations: 55.3 kg (121 lb 14.6 oz)  Energy Calorie Requirements (kcal): 7103-1315 kcals/day (25-30 kcals/kg)  Energy Need Method: Kcal/kg  Protein Requirements: 44g/day (0.8g/kg)  Weight Used For Protein Calculations: 55.3 kg (121 lb 14.6 oz)     Estimated Fluid Requirement Method: RDA Method    Nutrition Prescription Ordered    Current Diet Order: NPO    Evaluation of Received Nutrient/Fluid Intake    Energy Calories Required: not meeting needs  Protein Required: not meeting needs  Fluid Required: meeting needs  Tolerance: (NPO)  % Intake of Estimated Energy  "Needs: 0%  % Meal Intake: NPO  No intake or output data in the 24 hours ending 12/02/19 1020   Nutrition Risk    Level of Risk/Frequency of Follow-up: high     Dietitian Rounds Brief:  · Patient admitted due to ascites. I saw this patient a week ago and completed an NFPE showing severe protein energy malnutrition. Findings remain the same. Pt has an appetite but feels as though nothing else can  Fit in her stomach due to the ascites. She is known to have poor PO intake because of this. She is also known to not like any of the food here. Would benefit from supplemental nutrition.  Monitor and Evaluation    Food and Nutrient Intake: energy intake, food and beverage intake  Food and Nutrient Adminstration: diet order  Physical Activity and Function: nutrition-related ADLs and IADLs  Anthropometric Measurements: weight, weight change  Biochemical Data, Medical Tests and Procedures: electrolyte and renal panel, inflammatory profile, lipid profile, gastrointestinal profile, glucose/endocrine profile  Nutrition-Focused Physical Findings: overall appearance     Malnutrition Assessment  Severe Protein Energy Malnutrition RT decreased intake due to ascites causing "feeling of fullness" per patient statement AEB severe subcutaneous fat loss of orbitals, triceps and thoracic and lumbar region and severe muscle mass depletion of temples, clavicle region, scapular region, interosseous muscle and lower extremities.    Nutrition Follow-Up    RD Follow-up?: Yes  Alva Osborne, ELLEN 12/02/2019 10:27 AM     "

## 2019-12-02 NOTE — ASSESSMENT & PLAN NOTE
-patient with chronic severe stage III diastolic dysfunction  -will continue IV Lasix  -trend cardiac enzymes and troponin  -consult cardiologist as needed  - will hold off on lasix as pt is currently hypotensive and planning hemodialysis in am

## 2019-12-02 NOTE — NURSING
"Pt has a meg sized healing wound on the coccyx. Pt refused mepalex. . Barrier applied and pt is resting on a " pressure relieving pillow" from home . Pictures were taken .   "

## 2019-12-02 NOTE — H&P
[facility] Medicine  History & Physical    Patient Name: Aurelia Saucedo  MRN: [id number]  Admission Date: 12/1/2019  Attending Physician: Luigi Nettles DO   Primary Care Provider: Dustin Ireland MD         Patient information was obtained from patient and ER records.     Subjective:     Principal Problem:Ascites    Chief Complaint:   Chief Complaint   Patient presents with    ABD DISTENTION     X 8 DAYS, MAKING IT HARD TO BREATHE, HX ASCITES        HPI: 54-year-old  female presents to emergency room with shortness of breath abdominal pain and abdominal swelling.     This patient has a known history of chronic ascites, so hepatitis C with liver cirrhosis and end-stage renal disease requiring hemodialysis twice a week.  The patient states she usually dialyzes on Mondays and Fridays.      The patient states over the past 2 days she has been having increased abdominal swelling and shortness of breath.  The patient states she was recently treated for pneumonia and completed a course of Zithromax.  The patient states since completing her Zithromax and she has a persistent cough.      She denies fever, chills, hematemesis, hemoptysis black or bloody stools lightheadedness, chest pain dizziness or syncope.  She describes her symptoms as moderate in severity with no exacerbating or alleviating factors.      The patient states she had a paracentesis about 3 weeks ago with about 5 L of fluid removed      Past Medical History:   Diagnosis Date    Anemia of chronic renal failure, stage 5 8/19/2015    Ascites     Chronic hepatitis C 8/19/2015    Colitis     ESRD 2/2 MPGN on HD since 12/16/13 8/19/2015    Gastritis     Hypertension 8/19/2015    Kidney transplant candidate 8/19/2015    Renal dialysis status     M, W, F    Secondary hyperparathyroidism of renal origin 8/19/2015       Past Surgical History:   Procedure Laterality Date    AV FISTULA PLACEMENT  2013     SALPINGOOPHORECTOMY Right 1997    TOTAL ABDOMINAL HYSTERECTOMY  1999       Review of patient's allergies indicates:   Allergen Reactions    Quinolones Itching    Levaquin [levofloxacin] Itching    Ciprofloxacin Itching    Codeine Nausea Only       No current facility-administered medications on file prior to encounter.      Current Outpatient Medications on File Prior to Encounter   Medication Sig    amlodipine (NORVASC) 10 MG tablet Take 1 tablet (10 mg total) by mouth once daily. (Patient taking differently: Take 10 mg by mouth nightly. )    lisinopril (PRINIVIL,ZESTRIL) 20 MG tablet Take 1 tablet by mouth nightly.     metoprolol succinate (TOPROL-XL) 50 MG 24 hr tablet Take 50 mg by mouth nightly.     traZODone (DESYREL) 50 MG tablet Take 1 tablet (50 mg total) by mouth every evening.    dicyclomine (BENTYL) 20 mg tablet Take 20 mg by mouth every 6 (six) hours as needed.    FA-VIT B COMP&C-SELENIUM-ZINC 3-70-15 MG-MCG-MG ORAL TAB Take 1 tablet by mouth once daily.    HYDROcodone-acetaminophen (NORCO)  mg per tablet Take 1 tablet by mouth every 8 (eight) hours as needed for Pain.    ondansetron (ZOFRAN-ODT) 4 MG TbDL Take 2 tablets (8 mg total) by mouth every hour as needed. nausea    pantoprazole (PROTONIX) 40 MG tablet Take 40 mg by mouth once daily.    promethazine (PHENERGAN) 25 MG tablet Take 1 tablet (25 mg total) by mouth every 6 (six) hours as needed for Nausea.    ursodiol (ACTIGALL) 250 mg Tab Take 1 tablet (250 mg total) by mouth once daily.     Family History     Problem Relation (Age of Onset)    Kidney disease Mother    Psoriasis Brother    Stroke Father        Tobacco Use    Smoking status: Current Every Day Smoker     Packs/day: 0.75     Years: 30.00     Pack years: 22.50    Smokeless tobacco: Never Used    Tobacco comment: pt reports actively trying to quit but struggling.  Counseling and resources given.   Substance and Sexual Activity    Alcohol use: No      Alcohol/week: 0.0 standard drinks    Drug use: Yes     Types: Marijuana     Comment: nightly to help sleep    Sexual activity: Not Currently     Partners: Male     Birth control/protection: Surgical     Review of Systems   Constitutional: Positive for fever. Negative for activity change, chills and fatigue.   HENT: Negative for congestion, drooling, ear pain, hearing loss, nosebleeds, rhinorrhea, sinus pressure, sore throat and trouble swallowing.    Eyes: Negative for pain and visual disturbance.   Respiratory: Positive for cough and shortness of breath. Negative for apnea, chest tightness and wheezing.    Cardiovascular: Negative for chest pain, palpitations and leg swelling.   Gastrointestinal: Positive for abdominal distention. Negative for abdominal pain, blood in stool, constipation, diarrhea, nausea and vomiting.   Endocrine: Negative for polydipsia, polyphagia and polyuria.   Genitourinary: Negative for decreased urine volume, dysuria, flank pain, frequency and hematuria.   Musculoskeletal: Negative for back pain, gait problem, joint swelling, myalgias, neck pain and neck stiffness.   Skin: Negative for rash and wound.   Neurological: Positive for weakness. Negative for dizziness, tremors, seizures, syncope, facial asymmetry, speech difficulty, light-headedness, numbness and headaches.   Psychiatric/Behavioral: Negative for behavioral problems, confusion, hallucinations and sleep disturbance. The patient is not nervous/anxious.      Objective:     Vital Signs (Most Recent):  Temp: 97.7 °F (36.5 °C) (12/01/19 1737)  Pulse: 84 (12/01/19 2149)  Resp: 16 (12/01/19 2149)  BP: 118/68 (12/01/19 2000)  SpO2: 96 % (12/01/19 2149) Vital Signs (24h Range):  Temp:  [97.7 °F (36.5 °C)] 97.7 °F (36.5 °C)  Pulse:  [79-85] 84  Resp:  [16-22] 16  SpO2:  [96 %-100 %] 96 %  BP: (113-118)/(58-75) 118/68     Weight: 52.7 kg (116 lb 4 oz)  Body mass index is 19.95 kg/m².    Physical Exam   Constitutional: She is oriented to  person, place, and time. She appears well-developed and well-nourished.   Eyes: Pupils are equal, round, and reactive to light. EOM are normal.   Neck: Normal range of motion. Neck supple.   Cardiovascular: Normal rate, regular rhythm, normal heart sounds and intact distal pulses.   Pulmonary/Chest:   Moderate tachypnea with rhonchi   Abdominal: Soft. Bowel sounds are normal. She exhibits distension.   Musculoskeletal: Normal range of motion.   Neurological: She is alert and oriented to person, place, and time.   Skin: Skin is warm and dry. Capillary refill takes less than 2 seconds.   Psychiatric: She has a normal mood and affect.         CRANIAL NERVES     CN III, IV, VI   Pupils are equal, round, and reactive to light.  Extraocular motions are normal.     CN V   Facial sensation intact.   Right facial sensation deficit: none  Left facial sensation deficit: none  Right corneal reflex: normal  Left corneal reflex: normal  Jaw jerk: normal    CN VII   Facial expression full, symmetric.   Right facial weakness: none  Left facial weakness: none    CN VIII   CN VIII normal.   Hearing: intact    CN IX, X   CN IX normal.   CN X normal.   Palate: symmetric  Right gag reflex: normal  Left gag reflex: normal    CN XI   CN XI normal.   Right sternocleidomastoid strength: normal  Left sternocleidomastoid strength: normal  Right trapezius strength: normal  Left trapezius strength: normal    CN XII   CN XII normal.   Tongue: not atrophic  Tongue deviation: none       Significant Labs:   CBC:   Recent Labs   Lab 12/01/19 1815   WBC 5.42   HGB 9.0*   HCT 27.7*        CMP:   Recent Labs   Lab 12/01/19 1815      K 3.9      CO2 29   GLU 94   BUN 42*   CREATININE 6.9*   CALCIUM 8.2*   PROT 5.6*   ALBUMIN 2.8*   BILITOT 0.9   ALKPHOS 80   AST 34   ALT 27   ANIONGAP 11   EGFRNONAA 6.3*     Cardiac Markers:   Recent Labs   Lab 12/01/19 1815   *  573*     Coagulation:   Recent Labs   Lab 12/01/19 1815   INR  1.1     Lactic Acid: No results for input(s): LACTATE in the last 48 hours.  Lipase: No results for input(s): LIPASE in the last 48 hours.  Lipid Panel: No results for input(s): CHOL, HDL, LDLCALC, TRIG, CHOLHDL in the last 48 hours.  Magnesium: No results for input(s): MG in the last 48 hours.  Troponin:   Recent Labs   Lab 12/01/19  1815   TROPONINI 0.039     TSH: No results for input(s): TSH in the last 4320 hours.  Urine Studies: No results for input(s): COLORU, APPEARANCEUA, PHUR, SPECGRAV, PROTEINUA, GLUCUA, KETONESU, BILIRUBINUA, OCCULTUA, NITRITE, UROBILINOGEN, LEUKOCYTESUR, RBCUA, WBCUA, BACTERIA, SQUAMEPITHEL, HYALINECASTS in the last 48 hours.    Invalid input(s): WRIGHTSUR  All pertinent labs within the past 24 hours have been reviewed.    Significant Imaging: I have reviewed all pertinent imaging results/findings within the past 24 hours.   Cta Chest Non-coronary (pe Study)    Result Date: 11/18/2019  CMS MANDATED QUALITY DATA - CT RADIATION - 436 All CT scans at this facility utilize dose modulation, iterative reconstruction, and/or weight based dosing when appropriate to reduce radiation dose to as low as reasonably achievable. EXAMINATION: CTA CHEST NON CORONARY CLINICAL HISTORY: pleuritic chest pain, concern for PE, pulmonary infarct vs pneumonia; TECHNIQUE: CT angiography of thorax with 100 mL Omnipaque 350. Maximum intensity projection coronal reformations were created at a separate workstation and stored in the patient's permanent medical record. COMPARISON: Chest x-ray 11/18/2019. FINDINGS: CTA THORAX: No pulmonary embolism identified.  The aorta is normal caliber.  No dissection identified.  There is mild prominence of the pulmonary arteries.  The heart is normal size.  Cardiac pacing leads are noted.  No pericardial effusion.  Enlarged mediastinal lymph nodes are identified, largest in the right paratracheal area measures 1.8 x 1.0 cm.  Calcified hilar lymph nodes are demonstrated bilaterally,  right greater than left.  There is consolidation of the right middle lobe with air bronchograms.  There is also patchy consolidation at the right lung base.  The right upper lobe and left lung are clear.  No pleural effusions. Ascites is noted, and is incompletely evaluated in this exam.  The liver and spleen are grossly unremarkable. The osseous structures are unremarkable.     1. No pulmonary embolism, aortic aneurysm or dissection. 2. There is consolidation of the right middle lobe, and patchy consolidation of the right lower lobe, consistent with pneumonia. 3. Mediastinal lymphadenopathy is felt to be reactive. Electronically signed by: Guy Patton MD Date:    11/18/2019 Time:    14:13    X-ray Chest Ap Portable    Result Date: 12/1/2019  EXAMINATION: XR CHEST AP PORTABLE CLINICAL HISTORY: CHF; FINDINGS: Portable chest at 1742 compared with 11/19/2019 shows normal cardiomediastinal silhouette. Persistent linear airspace opacity affects the right lower lung zone with band like configuration.  Previous associated right lung base hazy opacity has nearly completely resolved.  Minor linear opacities persist in lateral left lung base suggesting atelectasis or scarring.  Pulmonary vasculature is normal. No acute osseous abnormality.     Extensive improving aeration of right lower lung zone opacification suggesting improving airspace consolidation which has yet to completely resolve, as persistent band like linear opacity occurs in right lung base with minor linear opacities laterally in left lung base.  Continued radiographic follow-up recommended an additional 4-8 weeks to document resolution. Electronically signed by: Luis Powell MD Date:    12/01/2019 Time:    18:01    X-ray Chest Ap Portable    Result Date: 11/19/2019  EXAMINATION: XR CHEST AP PORTABLE CLINICAL HISTORY: pneumonia; FINDINGS: Portable chest at stable appearance of the chest with persistent dense airspace disease in the right lower lobe suggestive  of pneumonia is compared to 11/18/2019 shows normal cardiomediastinal silhouette. There is persistent airspace disease in the right lung base.  There atelectasis in left lung base.  The upper lobes are clear.  Pulmonary vasculature is normal. No acute osseous abnormality.     Persistent dense alveolar consolidation in the right lower lobe suggestive of pneumonia.  Continued follow-up is recommended to document clearing Electronically signed by: Phoebe Pfeiffer MD Date:    11/19/2019 Time:    07:25    X-ray Chest Ap Portable    Result Date: 11/18/2019  EXAMINATION: XR CHEST AP PORTABLE CLINICAL HISTORY: Shortness of breath COMPARISON: 10/16/2019 FINDINGS: Since the reference exam there has been development of extensive consolidative airspace disease involving the right lung base.  Minimal left basilar atelectasis or scarring.  Left lung is otherwise clear.  No pneumothorax.  Atherosclerotic calcification of the aorta.  Stable cardiac silhouette size.     Development of dense airspace consolidation involving the right lung base suspicious for pneumonia. Follow-up PA and lateral chest radiography is recommended. Electronically signed by: Harley Jimenez MD Date:    11/18/2019 Time:    10:57    Us Guided Paracentesis    Result Date: 11/13/2019  EXAMINATION: US GUIDED PARACENTESIS INC IMAGING CLINICAL HISTORY: Unspecified cirrhosis of liver PROCEDURE: After obtaining informed consent, sonography was used to locate ascitic fluid in left lower quadrant and the overlying skin was marked. Skin was then prepped and draped in usual sterile fashion and 1% Lidocaine was used to achieve adequate local anesthesia. A small skin incision was made and a 6-Arabic needle and catheter were guided into the ascitic fluid without difficulty. The catheter was advanced, and needle removed. A total of 5.8 L of Straw-colored ascitic fluid were obtained without difficulty. Catheter was removed. Site was cleaned and bandage was placed. Patient  tolerated the procedure well with no immediate complications and no significant blood loss.     Successful ultrasound-guided paracentesis. Electronically signed by: Guy Patton MD Date:    11/13/2019 Time:    14:11    Assessment/Plan:     *  Symptomatic ascites with hyervolemia  - Need HD  - Need paracentsis  - will consult appropriate specialist for support      Hypervolemia        Dyspnea  - multifactorial - residual pneumonia, hypervolemia for ESRD and ESLD and mild anemia complicated by continued tobacco use  - Consult GI for possible paracentesis  - Continue antibiotic for pneumonia   - encourage cigarette cessation  - IS        Pneumonia due to infectious organism  - no fever , no WBC elevation but xray with persistent pneumonia  -? Antibiotics-patient completed a course of antibiotics and is currently afebrile with a normal white cell count most likely do not need repeat antibiotics at this time      ESRD 2/2 MPGN on HD since 12/16/13   - Consult Nephrologist for HD in am      Acute on chronic diastolic heart failure  -patient with chronic severe stage III diastolic dysfunction  -will continue IV Lasix  -trend cardiac enzymes and troponin  -consult cardiologist as needed  - will hold off on lasix as pt is currently hypotensive and planning hemodialysis in am     Secondary hyperparathyroidism of renal origin  - continue binders with mealss         VTE Risk Mitigation (From admission, onward)         Ordered     Place sequential compression device  Until discontinued      12/01/19 2106     IP VTE LOW RISK PATIENT  Once      12/01/19 2106              Case discussed with NP , Agree with assessment ad plan as above ,  Dr Luigi Borges NP  Department of Hospital Medicine   [facility]

## 2019-12-02 NOTE — ASSESSMENT & PLAN NOTE
- no fever , no WBC elevation but xray with persistent pneumonia  -? Antibiotics-patient completed a course of antibiotics and is currently afebrile with a normal white cell count most likely do not need repeat antibiotics at this time

## 2019-12-02 NOTE — ED NOTES
States has been gaining a lot of weight abdomen distended states needs paracentesis last time was about 3 weeks ago now feeling short of breath due to swelling of abdomen

## 2019-12-03 NOTE — PLAN OF CARE
12/03/19 1515   Discharge Reassessment   Assessment Type Final Discharge Note   Anticipated Discharge Disposition Home     No CM d/c needs.

## 2019-12-03 NOTE — CONSULTS
Critical access hospital  Gastroenterology  Consult Note    Patient Name: Aurelia Saucedo  MRN: [id number]  Admission Date: 12/1/2019  Hospital Length of Stay: 1 days  Code Status: DNR   Attending Provider: Jake Ingram MD   Consulting Provider: Margarita Saul MD  Primary Care Physician: Dustin Ireland MD  Principal Problem:Ascites    Consults  Subjective:     HPI:  Unfortunate 54 year old  female with multiple episodes of pneumonia and diastolic heart failure. Patient has end stage liver disease on hemodialysis, end stage liver disease secondary to hepatitis C, not suitable for liver transplantation, secondary to diffuse arteriosclerosis and poor tissue perfusion. Admitted with increased girth in abdomen, sob, ascites.  Will have paracentesis tomorrow with radiologist.    Past Medical History:   Diagnosis Date    Anemia of chronic renal failure, stage 5 8/19/2015    Ascites     Chronic hepatitis C 8/19/2015    Colitis     ESRD 2/2 MPGN on HD since 12/16/13 8/19/2015    Gastritis     Hypertension 8/19/2015    Kidney transplant candidate 8/19/2015    Renal dialysis status     M, W, F    Secondary hyperparathyroidism of renal origin 8/19/2015       Past Surgical History:   Procedure Laterality Date    AV FISTULA PLACEMENT  2013    SALPINGOOPHORECTOMY Right 1997    TOTAL ABDOMINAL HYSTERECTOMY  1999       Review of patient's allergies indicates:   Allergen Reactions    Quinolones Itching    Levaquin [levofloxacin] Itching    Ciprofloxacin Itching    Codeine Nausea Only     Family History     Problem Relation (Age of Onset)    Kidney disease Mother    Psoriasis Brother    Stroke Father        Tobacco Use    Smoking status: Current Every Day Smoker     Packs/day: 0.75     Years: 30.00     Pack years: 22.50    Smokeless tobacco: Never Used    Tobacco comment: pt reports actively trying to quit but struggling.  Counseling and resources given.   Substance and Sexual Activity     Alcohol use: No     Alcohol/week: 0.0 standard drinks    Drug use: Yes     Types: Marijuana     Comment: nightly to help sleep    Sexual activity: Not Currently     Partners: Male     Birth control/protection: Surgical     Review of Systems   Constitutional: Positive for fatigue. Negative for chills and fever.   HENT: Negative for hearing loss, sneezing and trouble swallowing.    Eyes: Positive for visual disturbance.        Wears glasses   Respiratory: Positive for shortness of breath.    Cardiovascular: Negative for chest pain.   Gastrointestinal: Positive for abdominal distention.        Ascites, large volume  Decompensated liver disease  Hepatitis C   Genitourinary:        Dialysis status   Allergic/Immunologic: Positive for immunocompromised state.   Neurological: Positive for weakness.   Hematological:        Chronic disease anemia   Psychiatric/Behavioral: Positive for confusion.        Mild confusion     Objective:     Vital Signs (Most Recent):  Temp: 98.3 °F (36.8 °C) (12/02/19 1620)  Pulse: 84 (12/02/19 1620)  Resp: 20 (12/02/19 1620)  BP: 112/70 (12/02/19 1620)  SpO2: 99 % (12/02/19 1620) Vital Signs (24h Range):  Temp:  [97 °F (36.1 °C)-98.8 °F (37.1 °C)] 98.3 °F (36.8 °C)  Pulse:  [78-96] 84  Resp:  [16-22] 20  SpO2:  [93 %-99 %] 99 %  BP: (100-125)/(58-79) 112/70     Weight: 55.3 kg (121 lb 14.6 oz) (12/02/19 0500)  Body mass index is 20.29 kg/m².      Intake/Output Summary (Last 24 hours) at 12/2/2019 1935  Last data filed at 12/2/2019 1746  Gross per 24 hour   Intake 860 ml   Output 2500 ml   Net -1640 ml       Lines/Drains/Airways     Drain                 Hemodialysis AV Fistula Left upper arm -- days          Peripheral Intravenous Line                 Peripheral IV - Single Lumen 12/01/19 1815 20 G Right Forearm 1 day                Physical Exam   Constitutional: She is oriented to person, place, and time. She appears well-developed and well-nourished.   HENT:   Head: Normocephalic and  atraumatic.   Eyes: EOM are normal.   Neck: Normal range of motion. Neck supple.   Cardiovascular: Regular rhythm.   Pulmonary/Chest:   Sob due to ascites   Abdominal: Bowel sounds are normal. She exhibits distension.   Splash, shifting dullness   Musculoskeletal: Normal range of motion. She exhibits no deformity.   Neurological: She is alert and oriented to person, place, and time.   Skin: No rash noted.   Psychiatric: She has a normal mood and affect.   Nursing note and vitals reviewed.      Significant Labs:  CBC:   Recent Labs   Lab 12/01/19  1815 12/02/19  0612   WBC 5.42 4.60   HGB 9.0* 8.6*   HCT 27.7* 26.4*    213     BMP:   Recent Labs   Lab 12/02/19  0612   GLU 91      K 3.9   CL 98   CO2 25   BUN 44*   CREATININE 7.6*   CALCIUM 8.3*       Significant Imaging:  Imaging results within the past 24 hours have been reviewed.   Extensive improving aeration of right lower lung zone opacification suggesting improving airspace consolidation which has yet to completely resolve, as persistent band like linear opacity occurs in right lung base with minor linear opacities laterally in left lung base.  Continued radiographic follow-up recommended an additional 4-8 weeks to document resolution.    Assessment/Plan:     *  Symptomatic ascites with hyervolemia  paracenthesis    Acute on chronic diastolic heart failure  medications    Dyspnea  Paracentesis, dialysis    Pneumonia due to infectious organism  antibiotics    ESRD 2/2 MPGN on HD since 12/16/13   dialysis        Thank you for your consult. I will follow-up with patient. Please contact us if you have any additional questions.    Margarita Saul MD  Gastroenterology  [facility]

## 2019-12-03 NOTE — PLAN OF CARE
12/03/19 0810   Patient Assessment/Suction   Level of Consciousness (AVPU) alert   Respiratory Effort Normal;Unlabored   Expansion/Accessory Muscles/Retractions no use of accessory muscles   Rhythm/Pattern, Respiratory unlabored;pattern regular;depth regular   Cough Frequency no cough   PRE-TX-O2   O2 Device (Oxygen Therapy) room air   SpO2 99 %   Pulse 77   Resp 20

## 2019-12-03 NOTE — HOSPITAL COURSE
Patient was admitted with symptomatic ascites.  6 L clear fluid was removed.  Patient was given albumin infusion.  Later patient was discharged in stable condition to follow up with gastroenterologist as an outpatient.  Patient was not on Lasix or aldactone for unsure reason.  Her blood pressure is low side and  amlodipine dose was decreased to half.  Suggested to follow up with GI and possible need for permanent catheter since ascites is recollecting frequently.

## 2019-12-03 NOTE — PLAN OF CARE
Paracentesis complete.  Pt deisy well.  6 liters of clear yellow fluid drained. Pt returned to room via w/c with RN.

## 2019-12-03 NOTE — UM SECONDARY REVIEW
Secondary Review Outcome:    Dr. Anatoliy Rodriguez, DO with [employer] EHR recommends outpatient (observation) for 12-01-19 to 12-02-19.  Rec'd order to change to observation from Dr. Coronado.  Condition 44 completed.  Scanned condition 44 and EHR recommendation to .

## 2019-12-03 NOTE — PLAN OF CARE
12/03/19 0925   HEART Message   Medicare Outpatient and Observation Notification regarding financial responsibility Given to patient/caregiver;Explained to patient/caregiver;Signed/date by patient/caregiver   Date HEART was signed 12/03/19   Time HEART was signed 0925

## 2019-12-03 NOTE — CONSULTS
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      12/2  C/o abd distension.  GONZALEZ.  Weakness.  No fever.  No urinary or bowel complaints.    12/3 VSS, no new complains. HD in AM.      Plan of Care:    Assessment:    ESRD on HD MF, plan MWF while in hospital.  --HD in AM.  --Renal diet.    Anemia  --MERON with HD as needed    Ascites  --paracentesis per primary team    HTN/hypotension  --Monitor UF as tolerated.    Thank you for allowing us to participate in this patient's care. We will continue to follow.    Vital Signs:  Temp Readings from Last 3 Encounters:   12/03/19 98.5 °F (36.9 °C)   11/23/19 98 °F (36.7 °C) (Oral)   10/22/19 97.3 °F (36.3 °C) (Oral)       Pulse Readings from Last 3 Encounters:   12/03/19 83   11/23/19 85   11/13/19 86       BP Readings from Last 3 Encounters:   12/03/19 112/67   11/23/19 121/75   11/13/19 122/66       Weight:  Wt Readings from Last 3 Encounters:   12/03/19 59 kg (130 lb 1.1 oz)   11/22/19 52.9 kg (116 lb 10 oz)   10/22/19 50.8 kg (112 lb)       Past Medical & Surgical History:  Past Medical History:   Diagnosis Date    Anemia of chronic renal failure, stage 5 8/19/2015    Ascites     Chronic hepatitis C 8/19/2015    Colitis     ESRD 2/2 MPGN on HD since 12/16/13 8/19/2015    Gastritis     Hypertension 8/19/2015    Kidney transplant candidate 8/19/2015    Renal dialysis status     M, W, F    Secondary hyperparathyroidism of renal origin 8/19/2015       Past Surgical History:   Procedure Laterality Date    AV FISTULA PLACEMENT  2013    SALPINGOOPHORECTOMY Right 1997    TOTAL ABDOMINAL HYSTERECTOMY  1999       Past Social History:  Social History     Socioeconomic History    Marital status:      Spouse name: Not on file    Number of children: 2    Years  of education: Not on file    Highest education level: Not on file   Occupational History    Occupation: baker    Social Needs    Financial resource strain: Not on file    Food insecurity:     Worry: Not on file     Inability: Not on file    Transportation needs:     Medical: Not on file     Non-medical: Not on file   Tobacco Use    Smoking status: Current Every Day Smoker     Packs/day: 0.75     Years: 30.00     Pack years: 22.50    Smokeless tobacco: Never Used    Tobacco comment: pt reports actively trying to quit but struggling.  Counseling and resources given.   Substance and Sexual Activity    Alcohol use: No     Alcohol/week: 0.0 standard drinks    Drug use: Yes     Types: Marijuana     Comment: nightly to help sleep    Sexual activity: Not Currently     Partners: Male     Birth control/protection: Surgical   Lifestyle    Physical activity:     Days per week: Not on file     Minutes per session: Not on file    Stress: To some extent   Relationships    Social connections:     Talks on phone: Not on file     Gets together: Not on file     Attends Religion service: Not on file     Active member of club or organization: Not on file     Attends meetings of clubs or organizations: Not on file     Relationship status: Not on file   Other Topics Concern    Are you pregnant or think you may be? Not Asked    Breast-feeding Not Asked   Social History Narrative    Not on file       Medications:  No current facility-administered medications on file prior to encounter.      Current Outpatient Medications on File Prior to Encounter   Medication Sig Dispense Refill    amlodipine (NORVASC) 10 MG tablet Take 1 tablet (10 mg total) by mouth once daily. (Patient taking differently: Take 10 mg by mouth nightly. ) 90 tablet 3    lisinopril (PRINIVIL,ZESTRIL) 20 MG tablet Take 1 tablet by mouth nightly.   3    metoprolol succinate (TOPROL-XL) 50 MG 24 hr tablet Take 50 mg by mouth nightly.       traZODone  "(DESYREL) 50 MG tablet Take 1 tablet (50 mg total) by mouth every evening. 30 tablet 11    dicyclomine (BENTYL) 20 mg tablet Take 20 mg by mouth every 6 (six) hours as needed.      FA-VIT B COMP&C-SELENIUM-ZINC 3-70-15 MG-MCG-MG ORAL TAB Take 1 tablet by mouth once daily.      HYDROcodone-acetaminophen (NORCO)  mg per tablet Take 1 tablet by mouth every 8 (eight) hours as needed for Pain. 60 tablet 0    ondansetron (ZOFRAN-ODT) 4 MG TbDL Take 2 tablets (8 mg total) by mouth every hour as needed. nausea 20 tablet 0    pantoprazole (PROTONIX) 40 MG tablet Take 40 mg by mouth once daily.      promethazine (PHENERGAN) 25 MG tablet Take 1 tablet (25 mg total) by mouth every 6 (six) hours as needed for Nausea. 20 tablet 0    ursodiol (ACTIGALL) 250 mg Tab Take 1 tablet (250 mg total) by mouth once daily. 90 tablet 0     Scheduled Meds:   sodium chloride 0.9%   Intravenous Once    albuterol-ipratropium  3 mL Nebulization Q8H    amLODIPine  10 mg Oral Nightly    lisinopril  20 mg Oral Nightly    metoprolol succinate  50 mg Oral Nightly    mupirocin   Nasal BID    pantoprazole  40 mg Oral Daily    traZODone  50 mg Oral QHS     Continuous Infusions:  PRN Meds:.sodium chloride 0.9%, acetaminophen, bisacodyl, HYDROmorphone, ondansetron, sodium chloride 0.9%    Allergies:  Quinolones; Levaquin [levofloxacin]; Ciprofloxacin; and Codeine    Past Family History:  Reviewed; refer to Hospitalist Admission Note    Review of Systems:  Review of Systems - All 14 systems reviewed and negative, except as noted in HPI    Physical Exam:    /67   Pulse 83   Temp 98.5 °F (36.9 °C)   Resp 16   Ht 5' 5" (1.651 m)   Wt 59 kg (130 lb 1.1 oz)   SpO2 98%   Breastfeeding? No   BMI 21.64 kg/m²     General Appearance:    Alert, cooperative, no distress, appears stated age   Head:    Normocephalic, without obvious abnormality, atraumatic   Eyes:    PER, conjunctiva/corneas clear, EOM's intact in both eyes      "   Throat:   Lips, mucosa, and tongue normal; teeth and gums normal   Back:     Symmetric, no curvature, ROM normal, no CVA tenderness   Lungs:     Clear to auscultation bilaterally, respirations unlabored   Chest wall:    No tenderness or deformity   Heart:    Regular rate and rhythm, S1 and S2 normal, no murmur, rub   or gallop   Abdomen:     Distended    Extremities:   Extremities normal, atraumatic, no cyanosis or edema   Pulses:   2+ and symmetric all extremities   MSK:   No joint or muscle swelling, tenderness or deformity   Skin:   Skin color, texture, turgor normal, no rashes or lesions   Neurologic:   CNII-XII intact, normal strength and sensation       Throughout.  No flap     Results:  Lab Results   Component Value Date     12/03/2019    K 4.3 12/03/2019    CL 99 12/03/2019    CO2 26 12/03/2019    BUN 39 (H) 12/03/2019    CREATININE 6.1 (H) 12/03/2019    CALCIUM 8.3 (L) 12/03/2019    ANIONGAP 13 12/03/2019    ESTGFRAFRICA 8.4 (A) 12/03/2019    EGFRNONAA 7.3 (A) 12/03/2019       Lab Results   Component Value Date    CALCIUM 8.3 (L) 12/03/2019    PHOS 4.3 11/19/2019       Recent Labs   Lab 12/03/19  0513   WBC 4.98   RBC 2.60*   HGB 8.4*   HCT 25.5*      MCV 98   MCH 32.3*   MCHC 32.9       I have personally reviewed pertinent radiological imaging and reports.

## 2019-12-03 NOTE — PLAN OF CARE
Plan of care reviewed including comfort, safety, diet, elimination, activity, medication schedule and treatment plan. Opportunity for questions provided, understanding voiced.

## 2019-12-03 NOTE — UM SECONDARY REVIEW
Paracentesis completed.  Sent updated referral for secondary review to EHR for 12-03-19.

## 2019-12-03 NOTE — DISCHARGE SUMMARY
[facility] Medicine  Discharge Summary      Patient Name: Aurelia Saucedo  MRN: [id number]  Admission Date: 12/1/2019  Hospital Length of Stay: 2 days  Discharge Date and Time:  12/03/2019 4:19 PM  Attending Physician: Raul Coronado MD   Discharging Provider: Raul Coronado MD  Primary Care Provider: Dustin Ireland MD      HPI:   54-year-old  female presents to emergency room with shortness of breath abdominal pain and abdominal swelling.     This patient has a known history of chronic ascites, so hepatitis C with liver cirrhosis and end-stage renal disease requiring hemodialysis twice a week.  The patient states she usually dialyzes on Mondays and Fridays.      The patient states over the past 2 days she has been having increased abdominal swelling and shortness of breath.  The patient states she was recently treated for pneumonia and completed a course of Zithromax.  The patient states since completing her Zithromax and she has a persistent cough.      She denies fever, chills, hematemesis, hemoptysis black or bloody stools lightheadedness, chest pain dizziness or syncope.  She describes her symptoms as moderate in severity with no exacerbating or alleviating factors.      The patient states she had a paracentesis about 3 weeks ago with about 5 L of fluid removed      * No surgery found *      Hospital Course:   Patient was admitted with symptomatic ascites.  6 L clear fluid was removed.  Patient was given albumin infusion.  Later patient was discharged in stable condition to follow up with gastroenterologist as an outpatient.  Patient was not on Lasix or aldactone for unsure reason.  Her blood pressure is low side and  amlodipine dose was decreased to half.  Suggested to follow up with GI and possible need for permanent catheter since ascites is recollecting frequently.     Consults:   Consults (From admission, onward)        Status Ordering Provider     Inpatient consult to  Gastroenterology  Once     Provider:  Margarita Saul MD    Acknowledged BARBARA OSUNA     Inpatient consult to Hospitalist  Once     Provider:  Barbara Osuna NP    Acknowledged KISHORE ROSALES     Inpatient consult to Nephrology  Once     Provider:  Amy Parrish MD    Acknowledged BARBARA OSUNA          No new Assessment & Plan notes have been filed under this hospital service since the last note was generated.  Service: Hospital Medicine    Final Active Diagnoses:    Diagnosis Date Noted POA    PRINCIPAL PROBLEM:   Symptomatic ascites with hyervolemia [R18.8] 12/01/2019 Yes    Dyspnea [R06.00] 12/02/2019 Yes    Acute on chronic diastolic heart failure [I50.33] 12/02/2019 Yes     Chronic    Pneumonia due to infectious organism [J18.9] 11/18/2019 Yes    Ascites due to alcoholic hepatitis [K70.11] 10/17/2019 Yes    ESRD 2/2 MPGN on HD since 12/16/13  [N18.6] 08/19/2015 Yes     Chronic    Secondary hyperparathyroidism of renal origin [N25.81] 08/19/2015 Yes     Chronic      Problems Resolved During this Admission:       Discharged Condition: good    Disposition: Home or Self Care    Follow Up:  Follow-up Information     Dustin Ireland MD In 2 weeks.    Specialty:  Family Medicine  Contact information:  [address]  [address]  [place] [address]  [phone]             Margarita Saul MD In 2 weeks.    Specialties:  Hepatology, Gastroenterology  Contact information:  [address]  [address]  [place] [address]  [phone]                 Patient Instructions:      Diet Adult Regular     Diet Cardiac     Notify your health care provider if you experience any of the following:  severe uncontrolled pain     Activity as tolerated       Significant Diagnostic Studies: Labs:   CMP   Recent Labs   Lab 12/01/19  1815 12/02/19  0612 12/03/19  0513    137 138   K 3.9 3.9 4.3    98 99   CO2 29 25 26   GLU 94 91 99   BUN 42* 44* 39*   CREATININE 6.9* 7.6* 6.1*   CALCIUM 8.2* 8.3* 8.3*    PROT 5.6* 5.4* 5.5*   ALBUMIN 2.8* 2.6* 2.7*   BILITOT 0.9 0.7 0.8   ALKPHOS 80 70 75   AST 34 29 28   ALT 27 24 24   ANIONGAP 11 14 13   ESTGFRAFRICA 7.2* 6.4* 8.4*   EGFRNONAA 6.3* 5.6* 7.3*    and CBC   Recent Labs   Lab 12/01/19  1815 12/02/19  0612 12/03/19  0513   WBC 5.42 4.60 4.98   HGB 9.0* 8.6* 8.4*   HCT 27.7* 26.4* 25.5*    213 163       Pending Diagnostic Studies:     None         Medications:  Reconciled Home Medications:      Medication List      CHANGE how you take these medications    amLODIPine 10 MG tablet  Commonly known as:  NORVASC  Take 0.5 tablets (5 mg total) by mouth nightly.  What changed:    · how much to take  · when to take this        CONTINUE taking these medications    DIALYVITE 3000 3-70-15 mg-mcg-mg Tab  Generic drug:  folic acid-B [id number]  Take 1 tablet by mouth once daily.     dicyclomine 20 mg tablet  Commonly known as:  BENTYL  Take 20 mg by mouth every 6 (six) hours as needed.     HYDROcodone-acetaminophen  mg per tablet  Commonly known as:  NORCO  Take 1 tablet by mouth every 8 (eight) hours as needed for Pain.     lisinopril 20 MG tablet  Commonly known as:  PRINIVIL,ZESTRIL  Take 1 tablet by mouth nightly.     metoprolol succinate 50 MG 24 hr tablet  Commonly known as:  TOPROL-XL  Take 50 mg by mouth nightly.     ondansetron 4 MG Tbdl  Commonly known as:  ZOFRAN-ODT  Take 2 tablets (8 mg total) by mouth every hour as needed. nausea     pantoprazole 40 MG tablet  Commonly known as:  PROTONIX  Take 40 mg by mouth once daily.     promethazine 25 MG tablet  Commonly known as:  PHENERGAN  Take 1 tablet (25 mg total) by mouth every 6 (six) hours as needed for Nausea.     traZODone 50 MG tablet  Commonly known as:  DESYREL  Take 1 tablet (50 mg total) by mouth every evening.     ursodiol 250 mg Tab  Commonly known as:  ACTIGALL  Take 1 tablet (250 mg total) by mouth once daily.            Indwelling Lines/Drains at time of discharge:   Lines/Drains/Airways      Drain                 Hemodialysis AV Fistula Left upper arm -- days          Pressure Ulcer                 Pressure Injury 11/19/19 1107 Coccyx #1 Stage 2 14 days              Physical Exam:  General- Patient alert and oriented x3 in NAD  HEENT- PERRLA, EOMI, OP clear, MMM  Neck- No JVD, Lymphadenopathy, Thyromegaly  CV- Regular rate and rhythm, No Murmur/eulalia/rubs  Resp- Lungs CTA Bilaterally, No increased WOB  GI- Non tender/non-distended, BS normoactive x4 quads, no HSM  Extrem- No cyanosis, clubbing, edema. Pulses 2+ and symmetric  Neuro- Strength 5/5 flexors/extensors, DTRs 2+ and symmetric, Intact sensation to light touch grossly  Skin-  No masses, rashes or lesions noted on cursory skin exam.  Time spent on the discharge of patient: 32 minutes  Patient was seen and examined on the date of discharge and determined to be suitable for discharge.         Raul Coronado MD  Department of Hospital Medicine  [facility]

## 2019-12-03 NOTE — SUBJECTIVE & OBJECTIVE
Past Medical History:   Diagnosis Date    Anemia of chronic renal failure, stage 5 8/19/2015    Ascites     Chronic hepatitis C 8/19/2015    Colitis     ESRD 2/2 MPGN on HD since 12/16/13 8/19/2015    Gastritis     Hypertension 8/19/2015    Kidney transplant candidate 8/19/2015    Renal dialysis status     M, W, F    Secondary hyperparathyroidism of renal origin 8/19/2015       Past Surgical History:   Procedure Laterality Date    AV FISTULA PLACEMENT  2013    SALPINGOOPHORECTOMY Right 1997    TOTAL ABDOMINAL HYSTERECTOMY  1999       Review of patient's allergies indicates:   Allergen Reactions    Quinolones Itching    Levaquin [levofloxacin] Itching    Ciprofloxacin Itching    Codeine Nausea Only     Family History     Problem Relation (Age of Onset)    Kidney disease Mother    Psoriasis Brother    Stroke Father        Tobacco Use    Smoking status: Current Every Day Smoker     Packs/day: 0.75     Years: 30.00     Pack years: 22.50    Smokeless tobacco: Never Used    Tobacco comment: pt reports actively trying to quit but struggling.  Counseling and resources given.   Substance and Sexual Activity    Alcohol use: No     Alcohol/week: 0.0 standard drinks    Drug use: Yes     Types: Marijuana     Comment: nightly to help sleep    Sexual activity: Not Currently     Partners: Male     Birth control/protection: Surgical     Review of Systems   Constitutional: Positive for fatigue. Negative for chills and fever.   HENT: Negative for hearing loss, sneezing and trouble swallowing.    Eyes: Positive for visual disturbance.        Wears glasses   Respiratory: Positive for shortness of breath.    Cardiovascular: Negative for chest pain.   Gastrointestinal: Positive for abdominal distention.        Ascites, large volume  Decompensated liver disease  Hepatitis C   Genitourinary:        Dialysis status   Allergic/Immunologic: Positive for immunocompromised state.   Neurological: Positive for weakness.    Hematological:        Chronic disease anemia   Psychiatric/Behavioral: Positive for confusion.        Mild confusion     Objective:     Vital Signs (Most Recent):  Temp: 98.3 °F (36.8 °C) (12/02/19 1620)  Pulse: 84 (12/02/19 1620)  Resp: 20 (12/02/19 1620)  BP: 112/70 (12/02/19 1620)  SpO2: 99 % (12/02/19 1620) Vital Signs (24h Range):  Temp:  [97 °F (36.1 °C)-98.8 °F (37.1 °C)] 98.3 °F (36.8 °C)  Pulse:  [78-96] 84  Resp:  [16-22] 20  SpO2:  [93 %-99 %] 99 %  BP: (100-125)/(58-79) 112/70     Weight: 55.3 kg (121 lb 14.6 oz) (12/02/19 0500)  Body mass index is 20.29 kg/m².      Intake/Output Summary (Last 24 hours) at 12/2/2019 1935  Last data filed at 12/2/2019 1746  Gross per 24 hour   Intake 860 ml   Output 2500 ml   Net -1640 ml       Lines/Drains/Airways     Drain                 Hemodialysis AV Fistula Left upper arm -- days          Peripheral Intravenous Line                 Peripheral IV - Single Lumen 12/01/19 1815 20 G Right Forearm 1 day                Physical Exam   Constitutional: She is oriented to person, place, and time. She appears well-developed and well-nourished.   HENT:   Head: Normocephalic and atraumatic.   Eyes: EOM are normal.   Neck: Normal range of motion. Neck supple.   Cardiovascular: Regular rhythm.   Pulmonary/Chest:   Sob due to ascites   Abdominal: Bowel sounds are normal. She exhibits distension.   Splash, shifting dullness   Musculoskeletal: Normal range of motion. She exhibits no deformity.   Neurological: She is alert and oriented to person, place, and time.   Skin: No rash noted.   Psychiatric: She has a normal mood and affect.   Nursing note and vitals reviewed.      Significant Labs:  CBC:   Recent Labs   Lab 12/01/19  1815 12/02/19  0612   WBC 5.42 4.60   HGB 9.0* 8.6*   HCT 27.7* 26.4*    213     BMP:   Recent Labs   Lab 12/02/19  0612   GLU 91      K 3.9   CL 98   CO2 25   BUN 44*   CREATININE 7.6*   CALCIUM 8.3*       Significant Imaging:  Imaging results  within the past 24 hours have been reviewed.   Extensive improving aeration of right lower lung zone opacification suggesting improving airspace consolidation which has yet to completely resolve, as persistent band like linear opacity occurs in right lung base with minor linear opacities laterally in left lung base.  Continued radiographic follow-up recommended an additional 4-8 weeks to document resolution.

## 2019-12-03 NOTE — HPI
Unfortunate 54 year old  female with multiple episodes of pneumonia and diastolic heart failure. Patient has end stage liver disease on hemodialysis, end stage liver disease secondary to hepatitis C, not suitable for liver transplantation, secondary to diffuse arteriosclerosis and poor tissue perfusion. Admitted with increased girth in abdomen, sob, ascites.  Will have paracentesis tomorrow with radiologist.

## 2019-12-04 NOTE — UM SECONDARY REVIEW
Secondary Review outcome:    Per Dr Jimmy Joaquin MD, MS with [employer] EHR recommendation 12-03-19 no longer appropriate for hospital services.  Patient discharged 12-03-19.

## 2019-12-06 NOTE — PROGRESS NOTES
SUBJECTIVE:    Patient ID: Aurelia Saucedo is a 54 y.o. female.    Chief Complaint: Hospital Follow Up (pneumonia and fluid build up)  55 yo female here for hospital discharge follow up she is doing well, she appears to be filling up and needing a paracentesis at a rate of about every 2 weeks.    I have reviewed and reconciled her medications.    Admitted on 18 Nov and discharged on 23 Nov, for pneumonia and elevated troponins. The elevated troponins were deamed secondary to demand ischemia, she was discharged on azithromycin.    She was then admitted on  1 Dec, discharged on 3 Dec with symptomatic ascites. 6 L clear fluid was removed.  Patient was given albumin infusion. Later patient was discharged in stable condition to follow up with gastroenterologist as an outpatient.    Patient was not on Lasix or aldactone for unsure reason.  Her blood pressure is low side and  amlodipine dose was decreased to half. Her blood pressure today is well controlled.      HPI      Past Medical History:   Diagnosis Date    Anemia of chronic renal failure, stage 5 8/19/2015    Ascites     Chronic hepatitis C 8/19/2015    Colitis     ESRD 2/2 MPGN on HD since 12/16/13 8/19/2015    Gastritis     Hypertension 8/19/2015    Kidney transplant candidate 8/19/2015    Renal dialysis status     M, W, F    Secondary hyperparathyroidism of renal origin 8/19/2015     Social History     Socioeconomic History    Marital status:      Spouse name: Not on file    Number of children: 2    Years of education: Not on file    Highest education level: Not on file   Occupational History    Occupation: baker    Social Needs    Financial resource strain: Not on file    Food insecurity:     Worry: Not on file     Inability: Not on file    Transportation needs:     Medical: Not on file     Non-medical: Not on file   Tobacco Use    Smoking status: Current Every Day Smoker     Packs/day: 0.75     Years: 30.00     Pack years: 22.50     Smokeless tobacco: Never Used    Tobacco comment: pt reports actively trying to quit but struggling.  Counseling and resources given.   Substance and Sexual Activity    Alcohol use: No     Alcohol/week: 0.0 standard drinks    Drug use: Yes     Types: Marijuana     Comment: nightly to help sleep    Sexual activity: Not Currently     Partners: Male     Birth control/protection: Surgical   Lifestyle    Physical activity:     Days per week: Not on file     Minutes per session: Not on file    Stress: To some extent   Relationships    Social connections:     Talks on phone: Not on file     Gets together: Not on file     Attends Denominational service: Not on file     Active member of club or organization: Not on file     Attends meetings of clubs or organizations: Not on file     Relationship status: Not on file   Other Topics Concern    Are you pregnant or think you may be? Not Asked    Breast-feeding Not Asked   Social History Narrative    Not on file     Past Surgical History:   Procedure Laterality Date    AV FISTULA PLACEMENT  2013    SALPINGOOPHORECTOMY Right 1997    TOTAL ABDOMINAL HYSTERECTOMY  1999     Family History   Problem Relation Age of Onset    Kidney disease Mother     Stroke Father     Psoriasis Brother     Breast cancer Neg Hx     Cancer Neg Hx     Colon cancer Neg Hx     Ovarian cancer Neg Hx      Current Outpatient Medications   Medication Sig Dispense Refill    amLODIPine (NORVASC) 10 MG tablet Take 0.5 tablets (5 mg total) by mouth nightly. 30 tablet 2    dicyclomine (BENTYL) 20 mg tablet Take 20 mg by mouth every 6 (six) hours as needed.      FA-VIT B COMP&C-SELENIUM-ZINC 3-70-15 MG-MCG-MG ORAL TAB Take 1 tablet by mouth once daily.      HYDROcodone-acetaminophen (NORCO)  mg per tablet Take 1 tablet by mouth every 8 (eight) hours as needed for Pain. 60 tablet 0    lisinopril (PRINIVIL,ZESTRIL) 20 MG tablet Take 1 tablet by mouth nightly.   3    metoprolol succinate  "(TOPROL-XL) 50 MG 24 hr tablet Take 50 mg by mouth nightly.       ondansetron (ZOFRAN-ODT) 4 MG TbDL Take 2 tablets (8 mg total) by mouth every hour as needed. nausea 20 tablet 0    pantoprazole (PROTONIX) 40 MG tablet Take 40 mg by mouth once daily.      promethazine (PHENERGAN) 25 MG tablet Take 1 tablet (25 mg total) by mouth every 6 (six) hours as needed for Nausea. 20 tablet 0    traZODone (DESYREL) 50 MG tablet Take 1 tablet (50 mg total) by mouth every evening. 30 tablet 11    ursodiol (ACTIGALL) 250 mg Tab Take 1 tablet (250 mg total) by mouth once daily. 90 tablet 0     No current facility-administered medications for this visit.      Review of patient's allergies indicates:   Allergen Reactions    Quinolones Itching    Levaquin [levofloxacin] Itching    Ciprofloxacin Itching    Codeine Nausea Only       Review of Systems   Constitutional: Negative for activity change, appetite change, diaphoresis, fatigue, fever and unexpected weight change.   HENT: Negative for congestion, postnasal drip, rhinorrhea, sinus pressure, sinus pain and sneezing.    Respiratory: Negative for cough, chest tightness, shortness of breath and wheezing.    Cardiovascular: Negative for chest pain, palpitations and leg swelling.   Gastrointestinal: Negative for abdominal distention, abdominal pain, blood in stool and constipation.          Blood pressure 110/60, pulse 101, temperature 98.8 °F (37.1 °C), temperature source Oral, resp. rate 16, height 5' 5" (1.651 m), weight 49.2 kg (108 lb 6.4 oz), SpO2 98 %. Body mass index is 18.04 kg/m².   Objective:      Physical Exam   Constitutional: She is oriented to person, place, and time. She appears well-developed and well-nourished. No distress.   HENT:   Head: Normocephalic and atraumatic.   Nose: Nose normal.   Mouth/Throat: Oropharynx is clear and moist.   Eyes: Pupils are equal, round, and reactive to light. Conjunctivae and EOM are normal. No scleral icterus. "   Cardiovascular: Normal rate, regular rhythm and normal heart sounds.   No murmur heard.  Pulmonary/Chest: Effort normal and breath sounds normal. No respiratory distress. She has no wheezes.   Neurological: She is alert and oriented to person, place, and time.   Skin: Skin is warm and dry. Capillary refill takes less than 2 seconds. She is not diaphoretic.           Assessment:       1. Hospital discharge follow-up    2. Cirrhosis of liver with ascites, unspecified hepatic cirrhosis type         Plan:           Hospital discharge follow-up    Pt doing well she has follow up with cardiology today.      Cirrhosis of liver with ascites, unspecified hepatic cirrhosis type  -     HYDROcodone-acetaminophen (NORCO)  mg per tablet; Take 1 tablet by mouth every 8 (eight) hours as needed for Pain.  Dispense: 60 tablet; Refill: 0

## 2019-12-19 PROBLEM — R18.8 OTHER ASCITES: Status: ACTIVE | Noted: 2019-01-01

## 2019-12-19 NOTE — ED NOTES
Shunt noted to left upper arm. Shunt not accessed. Shunt currently used for dialysis purposes only.

## 2019-12-19 NOTE — ED PROVIDER NOTES
Encounter Date: 12/19/2019       History     Chief Complaint   Patient presents with    Abdominal Pain     abdominal swelling states needs paracentesis, last procedure was 16 days ago      Presents with request for paracentesis States she had same 16 days ago and now she has notices the fluid going down her extremities         Review of patient's allergies indicates:   Allergen Reactions    Quinolones Itching    Levaquin [levofloxacin] Itching    Ciprofloxacin Itching    Codeine Nausea Only     Past Medical History:   Diagnosis Date    Anemia of chronic renal failure, stage 5 8/19/2015    Ascites     Chronic hepatitis C 8/19/2015    Colitis     ESRD 2/2 MPGN on HD since 12/16/13 8/19/2015    Gastritis     Hypertension 8/19/2015    Kidney transplant candidate 8/19/2015    Renal dialysis status     M, W, F    Secondary hyperparathyroidism of renal origin 8/19/2015     Past Surgical History:   Procedure Laterality Date    AV FISTULA PLACEMENT  2013    SALPINGOOPHORECTOMY Right 1997    TOTAL ABDOMINAL HYSTERECTOMY  1999     Family History   Problem Relation Age of Onset    Kidney disease Mother     Stroke Father     Psoriasis Brother     Breast cancer Neg Hx     Cancer Neg Hx     Colon cancer Neg Hx     Ovarian cancer Neg Hx      Social History     Tobacco Use    Smoking status: Current Some Day Smoker     Packs/day: 0.75     Years: 30.00     Pack years: 22.50    Smokeless tobacco: Never Used    Tobacco comment: pt reports actively trying to quit but struggling stoped 6 days ago.  Counseling and resources given.   Substance Use Topics    Alcohol use: No     Alcohol/week: 0.0 standard drinks    Drug use: Yes     Types: Marijuana     Comment: nightly to help sleep     Review of Systems   Constitutional: Negative for fever.   Respiratory: Negative for cough, shortness of breath and wheezing.    Cardiovascular: Negative for chest pain, palpitations and leg swelling.   Gastrointestinal:  Positive for abdominal distention. Negative for abdominal pain, diarrhea, nausea and vomiting.   Genitourinary: Negative for dysuria.   Musculoskeletal: Negative for back pain.   Skin: Negative for rash.   Neurological: Negative for weakness.       Physical Exam     Initial Vitals [12/19/19 1313]   BP Pulse Resp Temp SpO2   131/60 76 18 97.9 °F (36.6 °C) 100 %      MAP       --         Physical Exam    Constitutional: She appears well-developed and well-nourished.   HENT:   Head: Normocephalic and atraumatic.   Eyes: Conjunctivae are normal.   Neck: Normal range of motion.   Cardiovascular: Normal rate and regular rhythm.   Pulmonary/Chest: Breath sounds normal. No respiratory distress. She has no wheezes.   Abdominal: Soft. Bowel sounds are normal. She exhibits distension. There is tenderness.   Abdomin is distended but soft   Musculoskeletal: Normal range of motion. She exhibits edema.   +1 edema worse left then right    Neurological: She is alert and oriented to person, place, and time. No sensory deficit. GCS score is 15. GCS eye subscore is 4. GCS verbal subscore is 5. GCS motor subscore is 6.   Skin: Skin is warm and dry. Capillary refill takes less than 2 seconds.   Psychiatric: She has a normal mood and affect. Thought content normal.         ED Course   Procedures  Labs Reviewed   CBC W/ AUTO DIFFERENTIAL   COMPREHENSIVE METABOLIC PANEL   PROTIME-INR   LIPASE          Imaging Results    None          Medical Decision Making:   Initial Assessment:   Requesting paracentesis Had same 16 days ago  Pt reports swelling now in her lower ext.  Have discussed this pt with DR. Saadia Zee in to examine pt Dr. Mcae has been in to examine this pt and feels she is ok with out pt treatment  I agree with this decision also.  Pt is not SOB or hypoxic   She has been sleeping comfortably most of her time here I have faxed an order for out patient paracentesis                                   Clinical Impression:        ICD-10-CM ICD-9-CM   1. Hepatic cirrhosis, unspecified hepatic cirrhosis type, unspecified whether ascites present K74.60 571.5   2. ESRD (end stage renal disease) N18.6 585.6                             Shanae Burch, PAVEL  12/19/19 7148

## 2019-12-19 NOTE — PROGRESS NOTES
[facility]  Subjective:           PCP: Dustin Ireland    Chief Complaint: Hospital Follow Up (Ascites)       HPI:  Aurelia Saucedo is a 54 y.o. female here for follow-up of hepatitis-C. Patient presents a complex complicated problem. She has end-stage liver disease with ascites. She has renal failure and is on hemodialysis. She is an ex-smoker. We will order the Johnie sure of ns3 ns4a ns5a rheumatoid factor try cryo globulinemia Depending on these lab tests we will have to decide what exact regimen to be given to the patient. Patient needs to be treated for hepatitis C if possible. Patient has not had any response to the treatment in the past. She has acites and SOB and probably needs a paracentesis. Last paracentesis 6 L of fluid was removed.     ROS:   Constitutional: No fevers, chills, weight loss  ENT: No allergies, sore throat, rhinorrhea  CV: No chest pain, palpitations, edema  Pulm: No cough, shortness of breath, wheezing  Ophtho: No vision changes  GI: No blood in stools, change in bowel habits, nausea/vomiting  Denies alternating diarrhea/constipation.   Derm: No rash  Heme: No easy bruising or lymphadenopathy  MSK: No arthralgias or myalgias  : No dysuria, hematuria, frequency, polyuria, or flank tenderness  Endo: No hot or cold intolerance  Neuro: No syncope or seizure, or dizziness  Psych: No hallucination, depression or suicidal ideation      Medical History:  has a past medical history of Anemia of chronic renal failure, stage 5 (8/19/2015), Ascites, Chronic hepatitis C (8/19/2015), Colitis, ESRD 2/2 MPGN on HD since 12/16/13  (8/19/2015), Gastritis, Hypertension (8/19/2015), Kidney transplant candidate (8/19/2015), Renal dialysis status, and Secondary hyperparathyroidism of renal origin (8/19/2015).      Surgical History:  has a past surgical history that includes Total abdominal hysterectomy (1999); Salpingoophorectomy (Right, 1997); and AV fistula placement (2013).    Family  History:   Family History   Problem Relation Age of Onset    Kidney disease Mother     Stroke Father     Psoriasis Brother     Breast cancer Neg Hx     Cancer Neg Hx     Colon cancer Neg Hx     Ovarian cancer Neg Hx        Social History:   Social History     Tobacco Use    Smoking status: Current Every Day Smoker     Packs/day: 0.75     Years: 30.00     Pack years: 22.50    Smokeless tobacco: Never Used    Tobacco comment: pt reports actively trying to quit but struggling.  Counseling and resources given.   Substance Use Topics    Alcohol use: No     Alcohol/week: 0.0 standard drinks    Drug use: Yes     Types: Marijuana     Comment: nightly to help sleep       The patient's social and family histories were reviewed by me and updated in the appropriate section of the electronic medical record.    Allergies:   Review of patient's allergies indicates:   Allergen Reactions    Quinolones Itching    Levaquin [levofloxacin] Itching    Ciprofloxacin Itching    Codeine Nausea Only         Medications:   Current Outpatient Medications   Medication Sig Dispense Refill    amLODIPine (NORVASC) 10 MG tablet Take 0.5 tablets (5 mg total) by mouth nightly. 30 tablet 2    aspirin (ECOTRIN) 81 MG EC tablet Take 81 mg by mouth once daily.      dicyclomine (BENTYL) 20 mg tablet Take 20 mg by mouth every 6 (six) hours as needed.      FA-VIT B COMP&C-SELENIUM-ZINC 3-70-15 MG-MCG-MG ORAL TAB Take 1 tablet by mouth once daily.      HYDROcodone-acetaminophen (NORCO)  mg per tablet Take 1 tablet by mouth every 8 (eight) hours as needed for Pain. 60 tablet 0    lisinopril (PRINIVIL,ZESTRIL) 20 MG tablet Take 1 tablet by mouth nightly.   3    metoprolol succinate (TOPROL-XL) 50 MG 24 hr tablet Take 50 mg by mouth nightly.       ondansetron (ZOFRAN-ODT) 4 MG TbDL Take 2 tablets (8 mg total) by mouth every hour as needed. nausea 20 tablet 0    pantoprazole (PROTONIX) 40 MG tablet Take 40 mg by mouth once daily.    "   promethazine (PHENERGAN) 25 MG tablet Take 1 tablet (25 mg total) by mouth every 6 (six) hours as needed for Nausea. 20 tablet 0    traZODone (DESYREL) 50 MG tablet Take 1 tablet (50 mg total) by mouth every evening. 30 tablet 11    ursodiol (ACTIGALL) 250 mg Tab Take 1 tablet (250 mg total) by mouth once daily. 90 tablet 0     No current facility-administered medications for this visit.      All medications and past history have been reviewed.        Objective:        Vital Signs:  Blood pressure 103/70, pulse 84, temperature 99 °F (37.2 °C), height 5' 5" (1.651 m), weight 57.2 kg (126 lb). Body mass index is 20.97 kg/m².    Physical Exam:   General Appearance: Well appearing in no acute distress, well developed, well                nourished  Head: Normocephalic, without obvious abnormality  Eyes:  Pupils equal, round and reactive to light  Throat: Lips, mucosa, and tongue normal; teeth and gums normal  Lungs: CTA bilaterally in anterior and posterior fields, no wheezes, no crackles  Heart:  Regular rate and rhythm, no murmurs heard  Abdomen: Soft, non tender, non distended with positive bowel sounds in all four quadrants. No hepatosplenomegaly, ascites, or mass. Negative for succusion splash  : female   Extremities: No cyanosis, edema or deformity  Skin: No rash  Neurologic: Normal exam    Labs:  Lab Results   Component Value Date    WBC 4.98 12/03/2019    HGB 8.4 (L) 12/03/2019    HCT 25.5 (L) 12/03/2019     12/03/2019    CHOL 109 (L) 11/19/2019    TRIG 146 11/19/2019    HDL 23 (L) 11/19/2019    ALT 24 12/03/2019    AST 28 12/03/2019     12/03/2019    K 4.3 12/03/2019    CL 99 12/03/2019    CREATININE 6.1 (H) 12/03/2019    BUN 39 (H) 12/03/2019    CO2 26 12/03/2019    TSH 1.736 02/11/2016    INR 1.0 12/03/2019    HGBA1C SEE COMMENT 11/19/2019       Imaging:     All lab results and imaging results have been reviewed and discussed with the patient      Assessment:       1. Hepatitis C virus " infection without hepatic coma, unspecified chronicity        Plan:       Aurelia was seen today for hospital follow up.    Diagnoses and all orders for this visit:    Hepatitis C virus infection without hepatic coma, unspecified chronicity  -     HCV RNA Quantitation With Reflex to HCV GenoSure® NS3/4A; Future  -     HCV NS5A DRUG RESISTANCE ASSAY; Future  -     Rheumatoid factor; Future  -     Cryoglobulin; Future  -     HCV RNA Quantitation With Reflex to HCV GenoSure® NS3/4A  -     HCV NS5A DRUG RESISTANCE ASSAY  -     Rheumatoid factor  -     Cryoglobulin        See HPI    No follow-ups on file.    The plan was discussed with the patient and all questions/concerns have been answered to the patient's satisfaction.        Electronically signed by Margarita Saul MD    This note was dictated using voice recognition software and may contain grammatical errors.

## 2019-12-20 NOTE — ASSESSMENT & PLAN NOTE
Discussed need to continue with HD compliance on Monday and Friday.  Patient feels that HD does not help her ascites- long discussion that HD is her way for volume control including the anasarca she is experiencing.

## 2019-12-20 NOTE — SUBJECTIVE & OBJECTIVE
Past Medical History:   Diagnosis Date    Anemia of chronic renal failure, stage 5 8/19/2015    Ascites     Chronic hepatitis C 8/19/2015    Colitis     ESRD 2/2 MPGN on HD since 12/16/13 8/19/2015    Gastritis     Hypertension 8/19/2015    Kidney transplant candidate 8/19/2015    Renal dialysis status     M, W, F    Secondary hyperparathyroidism of renal origin 8/19/2015       Past Surgical History:   Procedure Laterality Date    AV FISTULA PLACEMENT  2013    SALPINGOOPHORECTOMY Right 1997    TOTAL ABDOMINAL HYSTERECTOMY  1999       Review of patient's allergies indicates:   Allergen Reactions    Quinolones Itching    Levaquin [levofloxacin] Itching    Ciprofloxacin Itching    Codeine Nausea Only       No current facility-administered medications on file prior to encounter.      Current Outpatient Medications on File Prior to Encounter   Medication Sig    amLODIPine (NORVASC) 10 MG tablet Take 0.5 tablets (5 mg total) by mouth nightly.    aspirin (ECOTRIN) 81 MG EC tablet Take 81 mg by mouth once daily.    dicyclomine (BENTYL) 20 mg tablet Take 20 mg by mouth every 6 (six) hours as needed.    FA-VIT B COMP&C-SELENIUM-ZINC 3-70-15 MG-MCG-MG ORAL TAB Take 1 tablet by mouth once daily.    HYDROcodone-acetaminophen (NORCO)  mg per tablet Take 1 tablet by mouth every 8 (eight) hours as needed for Pain.    lisinopril (PRINIVIL,ZESTRIL) 20 MG tablet Take 1 tablet by mouth nightly.     metoprolol succinate (TOPROL-XL) 50 MG 24 hr tablet Take 50 mg by mouth nightly.     pantoprazole (PROTONIX) 40 MG tablet Take 40 mg by mouth every evening.     promethazine (PHENERGAN) 25 MG tablet Take 1 tablet (25 mg total) by mouth every 6 (six) hours as needed for Nausea.    traZODone (DESYREL) 50 MG tablet Take 1 tablet (50 mg total) by mouth every evening.    ondansetron (ZOFRAN-ODT) 4 MG TbDL Take 2 tablets (8 mg total) by mouth every hour as needed. nausea    ursodiol (ACTIGALL) 250 mg Tab Take 1  tablet (250 mg total) by mouth once daily.     Family History     Problem Relation (Age of Onset)    Kidney disease Mother    Psoriasis Brother    Stroke Father        Tobacco Use    Smoking status: Current Some Day Smoker     Packs/day: 0.75     Years: 30.00     Pack years: 22.50    Smokeless tobacco: Never Used    Tobacco comment: pt reports actively trying to quit but struggling stoped 6 days ago.  Counseling and resources given.   Substance and Sexual Activity    Alcohol use: No     Alcohol/week: 0.0 standard drinks    Drug use: Yes     Types: Marijuana     Comment: nightly to help sleep    Sexual activity: Not Currently     Partners: Male     Birth control/protection: Surgical     Review of Systems   Constitutional: Negative.    HENT: Negative.    Eyes: Negative.    Respiratory: Negative.    Cardiovascular: Positive for leg swelling.   Gastrointestinal: Positive for abdominal distention and abdominal pain.   Endocrine: Negative.    Genitourinary: Negative.    Musculoskeletal: Negative.    Skin: Negative.    Allergic/Immunologic: Negative.    Neurological: Negative.    Hematological: Negative.    Psychiatric/Behavioral: Negative.      Objective:     Vital Signs (Most Recent):  Temp: 97.9 °F (36.6 °C) (12/19/19 1313)  Pulse: 79 (12/19/19 1716)  Resp: 18 (12/19/19 1313)  BP: 136/77 (12/19/19 1700)  SpO2: 96 % (12/19/19 1716) Vital Signs (24h Range):  Temp:  [97.9 °F (36.6 °C)-99 °F (37.2 °C)] 97.9 °F (36.6 °C)  Pulse:  [76-84] 79  Resp:  [18] 18  SpO2:  [96 %-100 %] 96 %  BP: (103-136)/(60-81) 136/77     Weight: 57.2 kg (126 lb)  Body mass index is 21.63 kg/m².    Physical Exam   Constitutional: She is oriented to person, place, and time. She appears well-developed. No distress.   HENT:   Head: Normocephalic and atraumatic.   Mouth/Throat: Oropharynx is clear and moist.   Eyes: Pupils are equal, round, and reactive to light. EOM are normal.   Neck: Normal range of motion.   Cardiovascular: Regular rhythm.    No murmur heard.  Anasarca with some LE edema   Pulmonary/Chest: Effort normal and breath sounds normal. She has no wheezes.   Abdominal: Soft. She exhibits distension. There is no tenderness. There is no rebound and no guarding.   abd is soft but is full (consistent with ascites), not tense  Palpation does not appear to make reported pain worse   Musculoskeletal: Normal range of motion.   Neurological: She is alert and oriented to person, place, and time. No cranial nerve deficit or sensory deficit.   Skin: No rash noted.   Chronic dermatitis skin changes to BLE   Psychiatric: She has a normal mood and affect.   Nursing note and vitals reviewed.      Significant Labs:   CBC:   Recent Labs   Lab 12/19/19  1455   WBC 6.93   HGB 10.3*   HCT 31.5*        CMP:   Recent Labs   Lab 12/19/19  1455   *   K 5.4*   CL 94*   CO2 30*   GLU 99   BUN 65*   CREATININE 8.5*   CALCIUM 8.6*   PROT 6.1   ALBUMIN 2.7*   BILITOT 0.8   ALKPHOS 76   AST 22   ALT 16   ANIONGAP 10   EGFRNONAA 4.9*       Significant Imaging: None

## 2019-12-20 NOTE — HPI
54 year old female with PMHx HCV with ascites requiring past paracentesis presents with abdominal swelling and discomfort.  Patient reports last paracentesis 16 days ago.   She reports swelling has been going on over multiple days, persistent, moderately severe and now having swelling in her legs. Patient is on HD Monday and Friday.  She went to HD on Monday and is due to go tomorrow.  Patient does report pain in abdomen when being upright and walking.  Denies fevers and chills.

## 2019-12-20 NOTE — ASSESSMENT & PLAN NOTE
Ascites in setting of Cirrhosis and HCV.  Per my chart review, and patient also tells me, she is not a candidate for a liver transplant. Abd has evidence of ascites but is not tense.  She is not tachypneic or hypoxic.  She does voice concern of swelling in her LE and I do see she has anasarca that is not pitting in nature.  While I do believe she is not comfortable from the ascites, I do not think she requires an admission for inpatient paracentesis.  Patient did voice frustration that she's had to wait two weeks before being scheduled outpatient and I do empathize.  I called Radiology but the scheduling RN has gone home for the evening.  ED will fax over an outpatient paracentesis order and I will go to the radiology department tomorrow to facilitate scheduling as quickly as possible. I gave her a number to contact me here tomorrow for me to update her on the appointment.      Patient is understandably frustrated and understandably feels that she should be made more comfortable in this end stage process.  I did attempt to encourage her to speak to Dr. Saul about her options like regularly scheduled paracentesis appointments or if a drain can be safely placed and used.  She feels that he does not believe in scheduling appointments. I have encouraged her to at least ask. She also voiced frustration with getting to HD tomorrow.  She tells me she drove to the ED and I encouraged her to drive to her 545am HD appointment tomorrow.  I also inquired if her  could take her and she preferred me not to discuss with him/ask.  I will follow her wishes.  I again encouraged her to keep her HD appointment.     I discussed plan with ED provider and everyone is in agreement with plan.  Patient will be discharged with plans to schedule outpatient paracentesis.  Return precautions provided.

## 2019-12-22 PROBLEM — D63.8 ANEMIA OF CHRONIC DISEASE: Status: ACTIVE | Noted: 2019-01-01

## 2019-12-22 PROBLEM — E87.5 HYPERKALEMIA: Status: ACTIVE | Noted: 2019-01-01

## 2019-12-22 PROBLEM — E46 PROTEIN MALNUTRITION: Status: ACTIVE | Noted: 2019-01-01

## 2019-12-22 PROBLEM — E87.70 VOLUME OVERLOAD: Status: ACTIVE | Noted: 2019-01-01

## 2019-12-22 PROBLEM — I50.32 CHRONIC DIASTOLIC HEART FAILURE: Chronic | Status: ACTIVE | Noted: 2019-01-01

## 2019-12-23 PROBLEM — R79.89 TROPONIN I ABOVE REFERENCE RANGE: Status: RESOLVED | Noted: 2019-01-01 | Resolved: 2019-01-01

## 2019-12-23 PROBLEM — R06.03 ACUTE RESPIRATORY DISTRESS: Status: RESOLVED | Noted: 2019-01-01 | Resolved: 2019-01-01

## 2019-12-23 PROBLEM — R06.03 ACUTE RESPIRATORY DISTRESS: Status: ACTIVE | Noted: 2019-01-01

## 2019-12-23 PROBLEM — E87.5 HYPERKALEMIA: Status: RESOLVED | Noted: 2019-01-01 | Resolved: 2019-01-01

## 2019-12-23 PROBLEM — E87.70 VOLUME OVERLOAD: Status: RESOLVED | Noted: 2019-01-01 | Resolved: 2019-01-01

## 2019-12-23 NOTE — HPI
Patient presented to the ED with sob  She missed dialysis on Friday; still makes urine, states it is hard to urinate because of how much volume has accumulated in her abdomen  She states she has been accumulating fluid more rapidly  She estimates she has gained 2-3 L in the course of a few days  She is not on diuretic medication  Has Pain consistent with chronic pain: body aches no fever or chills, denied nausea or vomiting but has poor appetite.  Denied sinus congestion, wheezing, productive cough; states sputum is clear.     ER Course: cxr shows scarring in area of previously treated pneumonia    ROS: a ten point ros with pertinent positives and negative in hpi, otherwise negative.    Past Medical History:   Diagnosis Date    Anemia of chronic renal failure, stage 5 8/19/2015    Ascites     Chronic hepatitis C 8/19/2015    Colitis     ESRD 2/2 MPGN on HD since 12/16/13 8/19/2015    Gastritis     Hypertension 8/19/2015    Kidney transplant candidate 8/19/2015    Renal dialysis status     M, W, F    Secondary hyperparathyroidism of renal origin 8/19/2015     Past Surgical History:   Procedure Laterality Date    AV FISTULA PLACEMENT  2013    SALPINGOOPHORECTOMY Right 1997    TOTAL ABDOMINAL HYSTERECTOMY  1999     family history includes Kidney disease in her mother; Psoriasis in her brother; Stroke in her father.    Social History     Tobacco Use    Smoking status: Current Some Day Smoker     Packs/day: 0.75     Years: 30.00     Pack years: 22.50    Smokeless tobacco: Never Used    Tobacco comment: pt reports actively trying to quit but struggling stoped 6 days ago.  Counseling and resources given.   Substance Use Topics    Alcohol use: No     Alcohol/week: 0.0 standard drinks    Drug use: Yes     Types: Marijuana     Comment: nightly to help sleep      PHYSICAL EXAM  Vitals:    12/22/19 1830 12/22/19 1839 12/22/19 1845 12/22/19 1900   BP:    125/73   Pulse: 75 75 80 78   Resp: 17 16 16 18   Temp:        TempSrc:       SpO2: 100% 100% 100% 100%   Weight:       Height:         GeN: ao nad  HEENT: ncat  LUNGS: diminisheda t bases, no wheezing or crackles  CAR: nl s1s2  ABD: distended, fluid wave is present, hypoactive bowel sounds  Ext: ROM wnl  SKIN: warm+dry, non jaundiced  NEURO: no tremor, cn 2-12 grossly intact

## 2019-12-23 NOTE — ASSESSMENT & PLAN NOTE
presented with sob + ascites in setting of cirrhosis and history of missed dialysis  - io  - daily weight  - dialysis with electrolyte correction; nephrology is consulted  - CXR reviewed with bnp; procalcitonin is ordered  - IR consulted for paracentesis with labs and serum and body fluid LDH

## 2019-12-23 NOTE — PROGRESS NOTES
Blood pressure dropped in the 70's uf off. At the end of tx bp in the 80's gave 200 ns . Bp in the 90's. 743 ml net uf removed.

## 2019-12-23 NOTE — ASSESSMENT & PLAN NOTE
- paracentesis with labs  - describes pain consistent with chronic pain; assess paracentesis labs  - may need albumin replacement

## 2019-12-23 NOTE — ED NOTES
Patient requesting paracentesis states is due for it on Tuesday but the swelling is getting worse and causing shortness of breath to get worse, hx of dialysis , shunt in left upper arm has dialysis on mon and Friday

## 2019-12-23 NOTE — NURSING
Pt. Dc home via wheelchair to family members private vehicle. Pt. has all personal belongings. Dc tele and IV. Pt. VSS.

## 2019-12-23 NOTE — CARE UPDATE
12/22/19 2040   Patient Assessment/Suction   Level of Consciousness (AVPU) alert   Respiratory Effort Unlabored   Expansion/Accessory Muscles/Retractions no use of accessory muscles   All Lung Fields Breath Sounds clear   Rhythm/Pattern, Respiratory unlabored   Cough Frequency no cough   PRE-TX-O2   O2 Device (Oxygen Therapy) room air   SpO2 96 %   Pulse Oximetry Type Intermittent   $ Pulse Oximetry - Multiple Charge Pulse Oximetry - Multiple   Pulse 85   Resp 20   Positioning   Head of Bed (HOB) HOB elevated

## 2019-12-23 NOTE — ASSESSMENT & PLAN NOTE
- trend cardiac enzymes  - is not having chest pain so is likely secondary to volume overload and decreased clearance; has appointment with Dr Smith in January for assessment of CAD.  - continue asa, bb, and lisinopril.  - previous echo in September is reviewed.

## 2019-12-23 NOTE — CONSULTS
Nephrology Consult Note        Patient Name: Aurelia Sauceod  MRN: [id number]    Patient Class: IP- Inpatient   Admission Date: 12/22/2019  Length of Stay: 1 days  Date of Service: 12/23/2019    Attending Physician: Mirian Bautista MD  Primary Care Provider: Dustin Ireland MD    Reason for Consult: esrd/anemia/htn/shpt/cirrhosis/volume overload/hyponatremia/hyperkalemia    SUBJECTIVE:     HPI: 54F on HD MF and cirrhosis with ascites, getting periodic paracentesis is admitted after skipping HD, with worsening volume overload and SOB. Had 7.1L paracenthesis on 12/23.    12/23 Seen and examined on HD today, tolerating well, no complains.    Past Medical History:   Diagnosis Date    Anemia of chronic renal failure, stage 5 8/19/2015    Ascites     Chronic hepatitis C 8/19/2015    Colitis     ESRD 2/2 MPGN on HD since 12/16/13 8/19/2015    Gastritis     Hypertension 8/19/2015    Kidney transplant candidate 8/19/2015    Renal dialysis status     M, W, F    Secondary hyperparathyroidism of renal origin 8/19/2015     Past Surgical History:   Procedure Laterality Date    AV FISTULA PLACEMENT  2013    SALPINGOOPHORECTOMY Right 1997    TOTAL ABDOMINAL HYSTERECTOMY  1999     Family History   Problem Relation Age of Onset    Kidney disease Mother     Stroke Father     Psoriasis Brother     Breast cancer Neg Hx     Cancer Neg Hx     Colon cancer Neg Hx     Ovarian cancer Neg Hx      Social History     Tobacco Use    Smoking status: Current Some Day Smoker     Packs/day: 0.75     Years: 30.00     Pack years: 22.50    Smokeless tobacco: Never Used    Tobacco comment: pt reports actively trying to quit but struggling stoped 6 days ago.  Counseling and resources given.   Substance Use Topics    Alcohol use: No     Alcohol/week: 0.0 standard drinks    Drug use: Yes     Types: Marijuana     Comment: nightly to help sleep       Review of patient's allergies indicates:   Allergen Reactions    Quinolones  Itching    Levaquin [levofloxacin] Itching    Ciprofloxacin Itching    Codeine Nausea Only       Outpatient meds:  No current facility-administered medications on file prior to encounter.      Current Outpatient Medications on File Prior to Encounter   Medication Sig Dispense Refill    aspirin (ECOTRIN) 81 MG EC tablet Take 81 mg by mouth once daily.      dicyclomine (BENTYL) 20 mg tablet Take 20 mg by mouth every 6 (six) hours as needed.      FA-VIT B COMP&C-SELENIUM-ZINC 3-70-15 MG-MCG-MG ORAL TAB Take 1 tablet by mouth once daily.      HYDROcodone-acetaminophen (NORCO)  mg per tablet Take 1 tablet by mouth every 8 (eight) hours as needed for Pain. 60 tablet 0    lisinopril (PRINIVIL,ZESTRIL) 20 MG tablet Take 1 tablet by mouth nightly.   3    metoprolol succinate (TOPROL-XL) 50 MG 24 hr tablet Take 50 mg by mouth nightly.       ondansetron (ZOFRAN-ODT) 4 MG TbDL Take 2 tablets (8 mg total) by mouth every hour as needed. nausea 20 tablet 0    pantoprazole (PROTONIX) 40 MG tablet Take 40 mg by mouth every evening.       promethazine (PHENERGAN) 25 MG tablet Take 1 tablet (25 mg total) by mouth every 6 (six) hours as needed for Nausea. 20 tablet 0    traZODone (DESYREL) 50 MG tablet Take 1 tablet (50 mg total) by mouth every evening. 30 tablet 11    amLODIPine (NORVASC) 10 MG tablet Take 0.5 tablets (5 mg total) by mouth nightly. 30 tablet 2    ursodiol (ACTIGALL) 250 mg Tab Take 1 tablet (250 mg total) by mouth once daily. 90 tablet 0       Scheduled meds:   amLODIPine  5 mg Oral Nightly    aspirin  81 mg Oral Daily    furosemide  20 mg Intravenous Once    heparin (porcine)  5,000 Units Subcutaneous Q8H    lisinopril  20 mg Oral Nightly    metoprolol succinate  50 mg Oral Nightly    pantoprazole  40 mg Oral QHS    polyethylene glycol  17 g Oral Daily    senna-docusate 8.6-50 mg  1 tablet Oral BID    traZODone  50 mg Oral QHS       Infusions:      PRN meds:  dextrose 50%, dextrose 50%,  glucagon (human recombinant), glucose, glucose, melatonin, ondansetron, oxyCODONE, oxyCODONE, sodium chloride 0.9%, traMADol    Review of Systems:  Review of Systems   Constitutional: Negative for chills, fever, malaise/fatigue and weight loss.   HENT: Negative for hearing loss and nosebleeds.    Eyes: Negative for blurred vision, double vision and photophobia.   Respiratory: Negative for cough, shortness of breath and wheezing.    Cardiovascular: Negative for chest pain, palpitations and leg swelling.   Gastrointestinal: Negative for abdominal pain, constipation, diarrhea, heartburn, nausea and vomiting.   Genitourinary: Negative for dysuria, frequency and urgency.   Musculoskeletal: Negative for falls, joint pain and myalgias.   Skin: Negative for itching and rash.   Neurological: Negative for dizziness, speech change, focal weakness, loss of consciousness and headaches.   Endo/Heme/Allergies: Does not bruise/bleed easily.   Psychiatric/Behavioral: Negative for depression and substance abuse. The patient is not nervous/anxious.        OBJECTIVE:     Vital Signs and IO (Last 24H):  Temp:  [97.8 °F (36.6 °C)-98.3 °F (36.8 °C)]   Pulse:  [72-85]   Resp:  [14-20]   BP: ()/(58-82)   SpO2:  [94 %-100 %]   No intake/output data recorded.    Wt Readings from Last 5 Encounters:   12/23/19 58.9 kg (129 lb 13.6 oz)   12/19/19 57.2 kg (126 lb)   12/19/19 57.2 kg (126 lb)   12/06/19 49.2 kg (108 lb 6.4 oz)   12/03/19 59 kg (130 lb 1.1 oz)         Physical Exam:  Physical Exam   Constitutional: She is oriented to person, place, and time. She appears well-developed and well-nourished.   HENT:   Head: Normocephalic and atraumatic.   Mouth/Throat: Oropharynx is clear and moist.   Eyes: Pupils are equal, round, and reactive to light. EOM are normal. No scleral icterus.   Neck: Neck supple.   Cardiovascular: Normal rate and regular rhythm.   Pulmonary/Chest: Effort normal. No stridor. No respiratory distress.   Abdominal:  Soft. She exhibits no distension.   Musculoskeletal: Normal range of motion. She exhibits no edema or deformity.   Neurological: She is alert and oriented to person, place, and time. No cranial nerve deficit.   Skin: Skin is warm and dry. No rash noted. She is not diaphoretic. No erythema.   Psychiatric: She has a normal mood and affect. Her behavior is normal.       Body mass index is 22.29 kg/m².    Laboratory:  Recent Labs   Lab 12/19/19  1455 12/22/19  1800 12/23/19  0128   * 132* 135*   K 5.4* 5.9* 5.3*   CL 94* 95 99   CO2 30* 25 23   BUN 65* 82* 84*   CREATININE 8.5* 10.6* 10.3*   ESTGFRAFRICA 5.6* 4.3* 4.5*   EGFRNONAA 4.9* 3.7* 3.9*   GLU 99 99 116*       Recent Labs   Lab 12/19/19  1455 12/22/19  1800 12/23/19  0128   CALCIUM 8.6* 8.2* 8.4*   ALBUMIN 2.7* 2.4*  --    PHOS  --   --  8.9*   MG  --  2.6 2.6             No results for input(s): POCTGLUCOSE in the last 168 hours.    Recent Labs   Lab 11/19/19  0447   Hemoglobin A1C SEE COMMENT       Recent Labs   Lab 12/19/19 1455 12/22/19  1800 12/23/19  0128   WBC 6.93 6.63 7.29   HGB 10.3* 9.7* 8.6*   HCT 31.5* 29.0* 26.1*    166 146*   MCV 98 97 96   MCHC 32.7 33.4 33.0   MONO 4.8  0.3 6.5  0.4 6.4  0.5       Recent Labs   Lab 12/19/19  1455 12/22/19  1800   BILITOT 0.8 0.7   PROT 6.1 5.6*   ALBUMIN 2.7* 2.4*   ALKPHOS 76 61   ALT 16 13   AST 22 19       Recent Labs   Lab 10/17/19  0850 11/19/19  0316 12/23/19  0344   Color, UA Orange A Yellow Yellow   Appearance, UA Hazy A Hazy A Clear   pH, UA 7.0 >8.0 A >8.0 A   Specific [place], UA 1.010 1.010 1.015   Protein, UA 2+ A 2+ A 2+ A   Glucose, UA Negative Negative Negative   Ketones, UA Trace A Negative Negative   Urobilinogen, UA Negative Negative Negative   Bilirubin (UA) Negative Negative Negative   Occult Blood UA 3+ A 2+ A 2+ A   Nitrite, UA Negative Negative Negative   RBC, UA >100 H 30 H 30 H   WBC, UA >100 H >100 H 1   Bacteria Negative Moderate A Negative   Hyaline Casts, UA 14 A 1  3 A             Microbiology Results (last 7 days)     Procedure Component Value Units Date/Time    Culture, Body Fluid (Aerobic) w/ GS [[id number]] Collected:  12/23/19 0940    Order Status:  Sent Specimen:  Body Fluid from Ascites Updated:  12/23/19 1039    Aerobic culture [[id number]]     Order Status:  Canceled Specimen:  Ascites     Culture, Anaerobic [[id number]]     Order Status:  No result Specimen:  Ascites     Gram stain [[id number]]     Order Status:  No result Specimen:  Ascites           ASSESSMENT/PLAN:     Active Hospital Problems    Diagnosis  POA    *Volume overload [E87.70]  Yes    Anemia of chronic disease [D63.8]  Yes    Protein malnutrition, mild [E46]  Yes     - secondary to chronic liver disease  - encourage po intake, treat underlying disease  - low albumin associated with this, with secondary anasarca      Hyperkalemia [E87.5]  Yes     - related to ESRD and missed dialysis  - correction at dialysis      Chronic diastolic heart failure [I50.32]  Yes     Chronic    Troponin I above reference range; NSTEMI vs Type 2 MI secondary to voume overload with decreased clearence in setting of ESRD [R79.89]  Yes    Ascites due to alcoholic hepatitis [K70.11]  Yes    Hepatic cirrhosis with stigmata of disease: varices, ascites [K74.60]  Yes    Essential hypertension [I10]  Yes     Chronic    ESRD 2/2 MPGN on HD since 12/16/13  [N18.6]  Yes     Chronic    Chronic hepatitis C [B18.2]  Yes     Chronic      Resolved Hospital Problems   No resolved problems to display.       ESRD on HD MF via AVF  Cirrhosis with total body volume overload and third-spacing, with periodic paracentesis.  Hyponatremia  Hyperkalemia  Continue current dialysis prescription.  Next HD today, then MF.  Renal diet - low K, low phos.  No IVs or BP checks on access and/or non-dominant arm.    Anemia of CKD  Hgb and HCT are acceptable. Monitor.  Will provide MERON and/or IV iron PRN.    MBD / Secondary HPT  Ca, phos, PTH and vitamin  D levels are acceptable.   Phos binders, vitamin D analogues and calcimimetics as needed.    HTN  BP seem controlled.   Tolerate asymptomatic HTN up to -160.  Continue home meds.    Thank you for allowing us to participate in the care of your patient!   We will follow the patient and provide recommendations as needed.    Luciano Campos MD    [facility]  [address] [place] [address]  [place] [address]    [phone] - tel  [phone] - fax    12/23/2019 10:57 AM

## 2019-12-23 NOTE — PLAN OF CARE
Pt tolerated procedure well. 7.1 liters of peritoneal fluid obtained. Large bandaid applied. VSS. Pt's nurse Tracie instructed for pt to go to dialysis after paracentesis. Pt refused bc she didn't have all her things with her. Telephone report given to Tracie and informed pt wants to go back to room, not dialysis. Pt brought back to room 6881 and report given to KAREN Hodges in person. Pt awake and alert, resp even and unlabored. No distress. Denies pain. Pt placed back in hospital bed, call light with pt. Bed locked and low position.

## 2019-12-23 NOTE — PLAN OF CARE
Pt. Free from injury at this time, will continue to monitor. Pt. Pain controlled at this time, will continue to monitor. Safety precautions in place and call light left in reach.

## 2019-12-23 NOTE — DISCHARGE SUMMARY
"[facility]  Discharge Summary      Patient Name: Aurelia Saucedo  MRN: [id number]  Admission Date: 12/22/2019  Discharge Date and Time: 12/23/2019  4:51 PM  Attending Physician: Mirian Bautista MD   Discharging Provider: Mirian Bautista MD  Primary Care Provider: Dustin Ireland MD      HPI:   Patient presented to the ED with sob  She missed dialysis on Friday; still makes urine, states it is hard to urinate because of how much volume has accumulated in her abdomen  She states she has been accumulating fluid more rapidly  She estimates she has gained 2-3 L in the course of a few days  She is not on diuretic medication  Has Pain consistent with chronic pain: body aches no fever or chills, denied nausea or vomiting but has poor appetite.  Denied sinus congestion, wheezing, productive cough; states sputum is clear.     ER Course: cxr shows scarring in area of previously treated pneumonia    ROS: a ten point ros with pertinent positives and negative in hpi, otherwise negative.  * No surgery found *      Hospital Course:     Pt admitted for SOB 2/2 volume overload 2/2 missed HD session in an ESRD pt.  Pt also with liver cirrhosis 2/2 alcohol and chronic HCV.  Pt received 7L paracentesis followed by HD during inpt stay.  Pt improved after therapy and was considered stable for discharge and outpt follow-up.      DISCHARGE PHYSICAL EXAM  BP (!) 161/81   Pulse 91   Temp 98 °F (36.7 °C) (Oral)   Resp 16   Ht 5' 4" (1.626 m)   Wt 58.9 kg (129 lb 13.6 oz)   SpO2 100%   Breastfeeding? No   BMI 22.29 kg/m²   Gen: alert, responsive  HEENT:  Eyes - no pallor  External ears with no lesions  Nares patent  Mouth - lips chapped  CV: RRR  Lungs: CTA B/L  Abd: +BS, soft, NT, ND  Ext: no atrophy or edema  Skin: warm, dry  Neuro: grossly intact  Psych: pleasant     Consults:   Consults (From admission, onward)        Status Ordering Provider     Inpatient consult to Nephrology  Once     Provider:  " Luciano Campos MD    Completed NANCY SANTORO     Inpatient consult to   Once     Provider:  (Not yet assigned)    Completed NICOLE DOE        Final Active Diagnoses:    Diagnosis Date Noted POA    Anemia of chronic disease [D63.8] 12/22/2019 Yes    Protein malnutrition, mild [E46] 12/22/2019 Yes    Chronic diastolic heart failure [I50.32] 12/02/2019 Yes     Chronic    Ascites due to alcoholic hepatitis [K70.11] 10/17/2019 Yes    Hepatic cirrhosis with stigmata of disease: varices, ascites [K74.60] 10/10/2016 Yes    Essential hypertension [I10] 08/19/2015 Yes     Chronic    ESRD 2/2 MPGN on HD since 12/16/13  [N18.6] 08/19/2015 Yes     Chronic    Chronic hepatitis C [B18.2] 08/19/2015 Yes     Chronic      Problems Resolved During this Admission:    Diagnosis Date Noted Date Resolved POA    PRINCIPAL PROBLEM:  Volume overload [E87.70] 12/22/2019 12/23/2019 Yes    Acute respiratory distress [R06.03] 12/23/2019 12/23/2019 Yes    Hyperkalemia [E87.5] 12/22/2019 12/23/2019 Yes    Troponin I above reference range; NSTEMI vs Type 2 MI secondary to voume overload with decreased clearence in setting of ESRD [R79.89] 11/18/2019 12/23/2019 Yes       Discharged Condition: stable    Disposition: Home or Self Care    Follow Up:  Follow-up Information     Schedule an appointment as soon as possible for a visit with Dustin Ireland MD.    Specialty:  Family Medicine  Why:  for hospital discharge follow-up for volume overload with paracentesis and missed dialysis, now corrected.  Contact information:  [address] [place] [address]  [address]  [facility] [address]  [phone]                 Patient Instructions:   No discharge procedures on file.    Pending Diagnostic Studies:     Procedure Component Value Units Date/Time    Cytology Specimen-Medical Cytology (Fluid/Wash/Brush) [[id number]] Collected:  12/23/19 0940    Order Status:  Sent Lab Status:  No result     Specimen:  Ascites           Medications:  Reconciled Home Medications:      Medication List      CONTINUE taking these medications    amLODIPine 10 MG tablet  Commonly known as:  NORVASC  Take 0.5 tablets (5 mg total) by mouth nightly.     aspirin 81 MG EC tablet  Commonly known as:  ECOTRIN  Take 81 mg by mouth once daily.     DIALYVITE 3000 3-70-15 mg-mcg-mg Tab  Generic drug:  folic acid-B bvst-[id number]-evpuz-zinc  Take 1 tablet by mouth once daily.     dicyclomine 20 mg tablet  Commonly known as:  BENTYL  Take 20 mg by mouth every 6 (six) hours as needed.     HYDROcodone-acetaminophen  mg per tablet  Commonly known as:  NORCO  Take 1 tablet by mouth every 8 (eight) hours as needed for Pain.     lisinopril 20 MG tablet  Commonly known as:  PRINIVIL,ZESTRIL  Take 1 tablet by mouth nightly.     metoprolol succinate 50 MG 24 hr tablet  Commonly known as:  TOPROL-XL  Take 50 mg by mouth nightly.     ondansetron 4 MG Tbdl  Commonly known as:  ZOFRAN-ODT  Take 2 tablets (8 mg total) by mouth every hour as needed. nausea     pantoprazole 40 MG tablet  Commonly known as:  PROTONIX  Take 40 mg by mouth every evening.     promethazine 25 MG tablet  Commonly known as:  PHENERGAN  Take 1 tablet (25 mg total) by mouth every 6 (six) hours as needed for Nausea.     traZODone 50 MG tablet  Commonly known as:  DESYREL  Take 1 tablet (50 mg total) by mouth every evening.     ursodiol 250 mg Tab  Commonly known as:  ACTIGALL  Take 1 tablet (250 mg total) by mouth once daily.            Indwelling Lines/Drains at time of discharge:   Lines/Drains/Airways     Drain                 Hemodialysis AV Fistula Left upper arm -- days           Mirian Bautista MD  Department of Hospital Medicine  [facility]

## 2019-12-23 NOTE — ASSESSMENT & PLAN NOTE
Heparin sc is ordered for dvt prophylaxis  Coagulation profile not abnormal   No thrombocytopenia, asa is ordered form home meds  Hypoalbuminemia is secondary to this  Hyponatremia secondary to this  Anemia secondary to this

## 2019-12-23 NOTE — ED NOTES
Assumed care of pt at this time, Jeaneth JONES notified that pt has elevated trop.  States she has notified dr. Prado.

## 2019-12-23 NOTE — PLAN OF CARE
12/23/19 1500   HEART Message   Medicare Outpatient and Observation Notification regarding financial responsibility Given to patient/caregiver;Explained to patient/caregiver;Signed/date by patient/caregiver   Date HEART was signed 12/23/19   Time HEART was signed 7400

## 2019-12-23 NOTE — ED PROVIDER NOTES
Encounter Date: 12/22/2019       History     Chief Complaint   Patient presents with    Shortness of Breath     has ascities     Patient presents complaining of shortness of breath and abdominal distention.  Patient has a history of cirrhosis and recurring ascites.  She last had paracentesis earlier this month.  She complains of worsening ascites that is making her feel short of breath with minimal exertion.  Patient states she is scheduled on Tuesday to have paracentesis and dialysis but she cannot wait this long.  At the worst symptoms are moderate she has had this before.        Review of patient's allergies indicates:   Allergen Reactions    Quinolones Itching    Levaquin [levofloxacin] Itching    Ciprofloxacin Itching    Codeine Nausea Only     Past Medical History:   Diagnosis Date    Anemia of chronic renal failure, stage 5 8/19/2015    Ascites     Chronic hepatitis C 8/19/2015    Colitis     ESRD 2/2 MPGN on HD since 12/16/13 8/19/2015    Gastritis     Hypertension 8/19/2015    Kidney transplant candidate 8/19/2015    Renal dialysis status     M, W, F    Secondary hyperparathyroidism of renal origin 8/19/2015     Past Surgical History:   Procedure Laterality Date    AV FISTULA PLACEMENT  2013    SALPINGOOPHORECTOMY Right 1997    TOTAL ABDOMINAL HYSTERECTOMY  1999     Family History   Problem Relation Age of Onset    Kidney disease Mother     Stroke Father     Psoriasis Brother     Breast cancer Neg Hx     Cancer Neg Hx     Colon cancer Neg Hx     Ovarian cancer Neg Hx      Social History     Tobacco Use    Smoking status: Current Some Day Smoker     Packs/day: 0.75     Years: 30.00     Pack years: 22.50    Smokeless tobacco: Never Used    Tobacco comment: pt reports actively trying to quit but struggling stoped 6 days ago.  Counseling and resources given.   Substance Use Topics    Alcohol use: No     Alcohol/week: 0.0 standard drinks    Drug use: Yes     Types: Marijuana      Comment: nightly to help sleep     Review of Systems   Respiratory: Positive for shortness of breath.    Gastrointestinal: Positive for abdominal distention.   All other systems reviewed and are negative.      Physical Exam     Initial Vitals [12/22/19 1558]   BP Pulse Resp Temp SpO2   131/64 85 20 98 °F (36.7 °C) 100 %      MAP       --         Physical Exam    Nursing note and vitals reviewed.  Constitutional:   Patient appears frail and ill-appearing   HENT:   Head: Normocephalic and atraumatic.   Mouth/Throat: Oropharynx is clear and moist.   Eyes: EOM are normal.   Neck: Normal range of motion. Neck supple.   Cardiovascular: Normal rate, regular rhythm, normal heart sounds and intact distal pulses.   Pulmonary/Chest: Breath sounds normal. No respiratory distress.   Abdominal:   Abdomen has fluid wave and moderate ascites   Musculoskeletal: Normal range of motion. She exhibits no edema.   Neurological: She is alert and oriented to person, place, and time. She has normal strength.   Skin: Skin is warm and dry. Capillary refill takes less than 2 seconds.   Psychiatric: She has a normal mood and affect. Her behavior is normal. Judgment and thought content normal.         ED Course   Procedures  Labs Reviewed   COMPREHENSIVE METABOLIC PANEL - Abnormal; Notable for the following components:       Result Value    Sodium 132 (*)     Potassium 5.9 (*)     BUN, Bld 82 (*)     Creatinine 10.6 (*)     Calcium 8.2 (*)     Total Protein 5.6 (*)     Albumin 2.4 (*)     eGFR if  4.3 (*)     eGFR if non  3.7 (*)     All other components within normal limits   TROPONIN I - Abnormal; Notable for the following components:    Troponin I 0.260 (*)     All other components within normal limits    Narrative:     Troponin critical result(s) called and verbal readback obtained from   Jeaneth Lewis RN ER by MS1 12/22/2019 19:04   CBC W/ AUTO DIFFERENTIAL - Abnormal; Notable for the following components:    RBC  2.99 (*)     Hemoglobin 9.7 (*)     Hematocrit 29.0 (*)     Mean Corpuscular Hemoglobin 32.4 (*)     RDW 15.3 (*)     Gran% 74.1 (*)     Lymph% 16.9 (*)     All other components within normal limits   B-TYPE NATRIURETIC PEPTIDE - Abnormal; Notable for the following components:     (*)     All other components within normal limits   MAGNESIUM    Narrative:     Troponin critical result(s) called and verbal readback obtained from   Jeaneth Lewis RN ER by MS1 12/22/2019 19:04   PROTIME-INR          Imaging Results          X-ray Chest AP Portable (Final result)  Result time 12/22/19 18:15:13    Final result by Yumi Mcghee MD (12/22/19 18:15:13)                 Narrative:    PROCEDURE:   XR CHEST AP PORTABLE  dated  12/22/2019 5:26 PM    CLINICAL HISTORY:   Female 54 years of age.   sob    TECHNIQUE: AP view of the chest obtained portably at 5:26 PM.    PREVIOUS STUDIES:  December 1, 2019. CT November 18, 2019. Chest  radiography October 16, 2019    FINDINGS:    The heart is not enlarged. Mediastinal contours are normal.    Band of opacification of the right lower lung is unchanged compared  with the study December 1, 2019. This may be scarring at this point.  (There was acute pulmonary infiltrate on the CT in November.) Right  costophrenic angle blunting is similar to the prior exam. This may be  scarring or pleural effusion. Lungs are moderately expanded.    IMPRESSION:    Persistent and unchanged band of opacification in the right lower  lung, probably scarring from the prior pneumonia. Pleural  thickening/small effusion.    Electronically Signed by Yumi Mcghee on 12/22/2019 6:35 PM                               Medical Decision Making:   Differential Diagnosis:   Awaiting blood work I anticipate patient will be admitted for both dialysis tomorrow as well as interventional radiology for paracentesis in the morning and she is becoming symptomatic.  She currently is stable.    Patient's potassium of 5.9 which  is expected with her history of end-stage renal disease.  Patient was treated with calcium, bicarb, insulin, D50.  Patient does not display any EKG changes.                                   Clinical Impression:       ICD-10-CM ICD-9-CM   1. Liver failure without hepatic coma, unspecified chronicity K72.90 572.8   2. Other ascites R18.8 789.59   3. Hyperkalemia E87.5 276.7                             Sravan Prado MD  12/22/19 3138

## 2019-12-23 NOTE — H&P
Critical access hospital Medicine  History & Physical    Patient Name: Aurelia Saucedo  MRN: [id number]  Admission Date: 12/22/2019  Attending Physician: Wilbur Emery MD  Primary Care Provider: Dustin Ireland MD         Patient information was obtained from patient and ER records.     Subjective:     Principal Problem:Volume overload    Chief Complaint:   Chief Complaint   Patient presents with    Shortness of Breath     has ascities        HPI:   Patient presented to the ED with sob  She missed dialysis on Friday; still makes urine, states it is hard to urinate because of how much volume has accumulated in her abdomen  She states she has been accumulating fluid more rapidly  She estimates she has gained 2-3 L in the course of a few days  She is not on diuretic medication  Has Pain consistent with chronic pain: body aches no fever or chills, denied nausea or vomiting but has poor appetite.  Denied sinus congestion, wheezing, productive cough; states sputum is clear.     ER Course: cxr shows scarring in area of previously treated pneumonia    ROS: a ten point ros with pertinent positives and negative in hpi, otherwise negative.    Past Medical History:   Diagnosis Date    Anemia of chronic renal failure, stage 5 8/19/2015    Ascites     Chronic hepatitis C 8/19/2015    Colitis     ESRD 2/2 MPGN on HD since 12/16/13 8/19/2015    Gastritis     Hypertension 8/19/2015    Kidney transplant candidate 8/19/2015    Renal dialysis status     M, W, F    Secondary hyperparathyroidism of renal origin 8/19/2015     Past Surgical History:   Procedure Laterality Date    AV FISTULA PLACEMENT  2013    SALPINGOOPHORECTOMY Right 1997    TOTAL ABDOMINAL HYSTERECTOMY  1999     family history includes Kidney disease in her mother; Psoriasis in her brother; Stroke in her father.    Social History     Tobacco Use    Smoking status: Current Some Day Smoker     Packs/day: 0.75     Years: 30.00     Pack years:  22.50    Smokeless tobacco: Never Used    Tobacco comment: pt reports actively trying to quit but struggling stoped 6 days ago.  Counseling and resources given.   Substance Use Topics    Alcohol use: No     Alcohol/week: 0.0 standard drinks    Drug use: Yes     Types: Marijuana     Comment: nightly to help sleep      PHYSICAL EXAM  Vitals:    12/22/19 1830 12/22/19 1839 12/22/19 1845 12/22/19 1900   BP:    125/73   Pulse: 75 75 80 78   Resp: 17 16 16 18   Temp:       TempSrc:       SpO2: 100% 100% 100% 100%   Weight:       Height:         GeN: ao nad  HEENT: ncat  LUNGS: diminisheda t bases, no wheezing or crackles  CAR: nl s1s2  ABD: distended, fluid wave is present, hypoactive bowel sounds  Ext: ROM wnl  SKIN: warm+dry, non jaundiced  NEURO: no tremor, cn 2-12 grossly intact    Assessment/Plan:     * Volume overload  presented with sob + ascites in setting of cirrhosis and history of missed dialysis; cxr with small effusion  - one dose lasix  - io  - daily weight  - dialysis with electrolyte correction; nephrology is consulted  - CXR reviewed with bnp; procalcitonin is ordered  - IR consulted for paracentesis with labs and serum and body fluid LDH     Troponin elevation  - secondary to volume overload with decrease clearance in setting of ESRD  - trend cardiac enzymes  - is chest pain free  - continue asa, bb, ace/arb  - Has appointment in January for assessment of CAD with Dr. Smith    Anemia of chronic disease  - monitor      Chronic diastolic heart failure  Grade three diastolic dysfunction by echo  - lisinopril and metoprolol are continued.   Previous echo from September reviewed  Volume overload secondary to non cardiac cause  Do not suspect acute heart failure exacerbation at this time  Io  Daily wt      Ascites due to alcoholic hepatitis  - paracentesis with labs  - describes pain consistent with chronic pain; assess paracentesis labs  - may need albumin replacement      Hepatic cirrhosis with stigmata  of disease: varices, ascites  Heparin sc is ordered for dvt prophylaxis  Coagulation profile not abnormal   No thrombocytopenia, asa is ordered form home meds  Hypoalbuminemia is secondary to this  Hyponatremia secondary to this  Anemia secondary to this      Chronic hepatitis C  Check paracentesis cytology;   Serial us exam  Serial follow up with GI  At some point needs AFP screen      ESRD 2/2 MPGN on HD since 12/16/13   Dialysis per schedule  Nephrology consulted  Electrolytes to be corrected at dialysis      Essential hypertension  Monitor  Antihypertensives are ordered    Chronic pain  - prn medication is ordered  - needs follow up with PCP outpatient for ongoing management    Tobacco use  - cessation advised   - does not want nicoderm patch at this time    VTE Risk Mitigation (From admission, onward)         Ordered     heparin (porcine) injection 5,000 Units  Every 8 hours      12/22/19 1953     IP VTE HIGH RISK PATIENT  Once      12/22/19 1953                   Wilbur Emery MD  Department of Hospital Medicine   [facility]

## 2019-12-23 NOTE — ASSESSMENT & PLAN NOTE
Grade three diastolic dysfunction by echo  - lisinopril and metoprolol are continued.   Previous echo from September reviewed  Volume overload secondary to non cardiac cause  Do not suspect acute heart failure exacerbation at this time  Io  Daily wt

## 2019-12-23 NOTE — [facility] SECONDARY REVIEW
Secondary Review Outcome:    Per Dr. Michelle Pacheco MD, MPH with [facility] EHR recommendation 12-22-19 is outpatient (observation).  Recommendation will be scanned to Media.  Condition 44 initiated.

## 2020-01-01 ENCOUNTER — LAB VISIT (OUTPATIENT)
Dept: LAB | Facility: HOSPITAL | Age: 55
End: 2020-01-01
Attending: RADIOLOGY
Payer: MEDICARE

## 2020-01-01 ENCOUNTER — EXTERNAL HOME HEALTH (OUTPATIENT)
Dept: HOME HEALTH SERVICES | Facility: HOSPITAL | Age: 55
End: 2020-01-01

## 2020-01-01 ENCOUNTER — HOSPITAL ENCOUNTER (OUTPATIENT)
Dept: RADIOLOGY | Facility: HOSPITAL | Age: 55
Discharge: HOME | End: 2020-04-17
Attending: FAMILY MEDICINE
Payer: MEDICARE

## 2020-01-01 ENCOUNTER — DOCUMENT SCAN (OUTPATIENT)
Dept: HOME HEALTH SERVICES | Facility: HOSPITAL | Age: 55
End: 2020-01-01

## 2020-01-01 ENCOUNTER — HOSPITAL ENCOUNTER (OUTPATIENT)
Dept: RADIOLOGY | Facility: HOSPITAL | Age: 55
Discharge: HOME | End: 2020-06-12
Attending: FAMILY MEDICINE
Payer: COMMERCIAL

## 2020-01-01 ENCOUNTER — TELEPHONE (OUTPATIENT)
Dept: VASCULAR SURGERY | Facility: CLINIC | Age: 55
End: 2020-01-01

## 2020-01-01 ENCOUNTER — CLINICAL SUPPORT (OUTPATIENT)
Dept: CARDIOLOGY | Facility: HOSPITAL | Age: 55
DRG: 280 | End: 2020-01-01
Attending: EMERGENCY MEDICINE
Payer: MEDICARE

## 2020-01-01 ENCOUNTER — HOSPITAL ENCOUNTER (OUTPATIENT)
Dept: RADIOLOGY | Facility: HOSPITAL | Age: 55
Discharge: HOME | End: 2020-02-21
Attending: FAMILY MEDICINE
Payer: MEDICARE

## 2020-01-01 ENCOUNTER — CLINICAL SUPPORT (OUTPATIENT)
Dept: CARDIOLOGY | Facility: HOSPITAL | Age: 55
End: 2020-01-01
Attending: SPECIALIST
Payer: MEDICARE

## 2020-01-01 ENCOUNTER — CLINICAL SUPPORT (OUTPATIENT)
Dept: CARDIOLOGY | Facility: HOSPITAL | Age: 55
DRG: 280 | End: 2020-01-01
Attending: THORACIC SURGERY (CARDIOTHORACIC VASCULAR SURGERY)
Payer: MEDICARE

## 2020-01-01 ENCOUNTER — HOSPITAL ENCOUNTER (OUTPATIENT)
Dept: RADIOLOGY | Facility: HOSPITAL | Age: 55
Discharge: HOME | End: 2020-05-01
Attending: FAMILY MEDICINE
Payer: MEDICARE

## 2020-01-01 ENCOUNTER — TELEPHONE (OUTPATIENT)
Dept: FAMILY MEDICINE | Facility: CLINIC | Age: 55
End: 2020-01-01

## 2020-01-01 ENCOUNTER — OFFICE VISIT (OUTPATIENT)
Dept: FAMILY MEDICINE | Facility: CLINIC | Age: 55
End: 2020-01-01
Payer: MEDICARE

## 2020-01-01 ENCOUNTER — HOSPITAL ENCOUNTER (OUTPATIENT)
Facility: HOSPITAL | Age: 55
Discharge: HOME HEALTH | End: 2020-01-09
Attending: EMERGENCY MEDICINE | Admitting: INTERNAL MEDICINE
Payer: MEDICARE

## 2020-01-01 ENCOUNTER — HOSPITAL ENCOUNTER (OUTPATIENT)
Dept: RADIOLOGY | Facility: HOSPITAL | Age: 55
Discharge: HOME | End: 2020-05-15
Attending: FAMILY MEDICINE
Payer: MEDICARE

## 2020-01-01 ENCOUNTER — HOSPITAL ENCOUNTER (INPATIENT)
Facility: HOSPITAL | Age: 55
LOS: 5 days | Discharge: HOME HEALTH | DRG: 280 | End: 2020-10-07
Attending: EMERGENCY MEDICINE | Admitting: INTERNAL MEDICINE
Payer: MEDICARE

## 2020-01-01 ENCOUNTER — HOSPITAL ENCOUNTER (OUTPATIENT)
Dept: RADIOLOGY | Facility: HOSPITAL | Age: 55
Discharge: HOME | End: 2020-02-07
Attending: FAMILY MEDICINE
Payer: MEDICARE

## 2020-01-01 ENCOUNTER — HOSPITAL ENCOUNTER (OUTPATIENT)
Dept: RADIOLOGY | Facility: HOSPITAL | Age: 55
Discharge: HOME | End: 2020-08-07
Attending: FAMILY MEDICINE
Payer: MEDICARE

## 2020-01-01 ENCOUNTER — HOSPITAL ENCOUNTER (INPATIENT)
Facility: HOSPITAL | Age: 55
LOS: 2 days | DRG: 640 | End: 2020-10-14
Attending: EMERGENCY MEDICINE | Admitting: INTERNAL MEDICINE
Payer: MEDICARE

## 2020-01-01 ENCOUNTER — HOSPITAL ENCOUNTER (OUTPATIENT)
Dept: RADIOLOGY | Facility: HOSPITAL | Age: 55
Discharge: HOME | End: 2020-04-03
Attending: FAMILY MEDICINE
Payer: MEDICARE

## 2020-01-01 ENCOUNTER — HOSPITAL ENCOUNTER (OUTPATIENT)
Dept: PREADMISSION TESTING | Facility: HOSPITAL | Age: 55
Discharge: HOME | End: 2020-01-10
Attending: SPECIALIST
Payer: MEDICARE

## 2020-01-01 ENCOUNTER — OFFICE VISIT (OUTPATIENT)
Dept: CARDIOTHORACIC SURGERY | Facility: CLINIC | Age: 55
End: 2020-01-01
Payer: MEDICARE

## 2020-01-01 ENCOUNTER — TELEPHONE (OUTPATIENT)
Dept: HOME HEALTH SERVICES | Facility: HOSPITAL | Age: 55
End: 2020-01-01

## 2020-01-01 ENCOUNTER — TELEPHONE (OUTPATIENT)
Dept: GASTROENTEROLOGY | Facility: CLINIC | Age: 55
End: 2020-01-01

## 2020-01-01 ENCOUNTER — HOSPITAL ENCOUNTER (OUTPATIENT)
Dept: RADIOLOGY | Facility: HOSPITAL | Age: 55
Discharge: HOME | End: 2020-09-18
Attending: FAMILY MEDICINE
Payer: MEDICARE

## 2020-01-01 ENCOUNTER — HOSPITAL ENCOUNTER (OUTPATIENT)
Dept: RADIOLOGY | Facility: HOSPITAL | Age: 55
Discharge: HOME | End: 2020-06-26
Attending: FAMILY MEDICINE
Payer: COMMERCIAL

## 2020-01-01 ENCOUNTER — HOSPITAL ENCOUNTER (INPATIENT)
Facility: HOSPITAL | Age: 55
LOS: 6 days | Discharge: HOME HEALTH | DRG: 280 | End: 2020-09-08
Attending: EMERGENCY MEDICINE | Admitting: INTERNAL MEDICINE
Payer: MEDICARE

## 2020-01-01 ENCOUNTER — PATIENT OUTREACH (OUTPATIENT)
Dept: FAMILY MEDICINE | Facility: CLINIC | Age: 55
End: 2020-01-01

## 2020-01-01 ENCOUNTER — TELEPHONE (OUTPATIENT)
Dept: RADIOLOGY | Facility: HOSPITAL | Age: 55
End: 2020-01-01

## 2020-01-01 ENCOUNTER — HOSPITAL ENCOUNTER (OUTPATIENT)
Facility: HOSPITAL | Age: 55
Discharge: HOME | End: 2020-09-27
Attending: EMERGENCY MEDICINE | Admitting: INTERNAL MEDICINE
Payer: MEDICARE

## 2020-01-01 ENCOUNTER — HOSPITAL ENCOUNTER (OUTPATIENT)
Dept: RADIOLOGY | Facility: HOSPITAL | Age: 55
Discharge: HOME | End: 2020-05-29
Attending: FAMILY MEDICINE
Payer: MEDICARE

## 2020-01-01 ENCOUNTER — LAB VISIT (OUTPATIENT)
Dept: LAB | Facility: HOSPITAL | Age: 55
End: 2020-01-01
Attending: RADIOLOGY
Payer: COMMERCIAL

## 2020-01-01 ENCOUNTER — HOSPITAL ENCOUNTER (OUTPATIENT)
Dept: RADIOLOGY | Facility: HOSPITAL | Age: 55
Discharge: HOME | End: 2020-07-24
Attending: FAMILY MEDICINE
Payer: COMMERCIAL

## 2020-01-01 ENCOUNTER — HOSPITAL ENCOUNTER (OUTPATIENT)
Dept: RADIOLOGY | Facility: HOSPITAL | Age: 55
Discharge: HOME | End: 2020-01-27
Attending: INTERNAL MEDICINE
Payer: MEDICARE

## 2020-01-01 ENCOUNTER — TELEPHONE (OUTPATIENT)
Dept: CARDIOTHORACIC SURGERY | Facility: CLINIC | Age: 55
End: 2020-01-01

## 2020-01-01 ENCOUNTER — HOSPITAL ENCOUNTER (OUTPATIENT)
Dept: RADIOLOGY | Facility: HOSPITAL | Age: 55
Discharge: HOME | End: 2020-07-10
Attending: FAMILY MEDICINE
Payer: COMMERCIAL

## 2020-01-01 ENCOUNTER — HOSPITAL ENCOUNTER (OUTPATIENT)
Facility: HOSPITAL | Age: 55
Discharge: HOME | End: 2020-01-15
Attending: SPECIALIST | Admitting: SPECIALIST
Payer: MEDICARE

## 2020-01-01 ENCOUNTER — TELEPHONE (OUTPATIENT)
Dept: HEPATOLOGY | Facility: CLINIC | Age: 55
End: 2020-01-01

## 2020-01-01 ENCOUNTER — HOSPITAL ENCOUNTER (OUTPATIENT)
Dept: RADIOLOGY | Facility: HOSPITAL | Age: 55
Discharge: HOME | End: 2020-08-21
Attending: FAMILY MEDICINE
Payer: MEDICARE

## 2020-01-01 ENCOUNTER — HOSPITAL ENCOUNTER (OUTPATIENT)
Dept: RADIOLOGY | Facility: HOSPITAL | Age: 55
Discharge: HOME | End: 2020-03-06
Attending: FAMILY MEDICINE
Payer: MEDICARE

## 2020-01-01 ENCOUNTER — HOSPITAL ENCOUNTER (OUTPATIENT)
Dept: RADIOLOGY | Facility: HOSPITAL | Age: 55
Discharge: HOME | End: 2020-03-20
Attending: FAMILY MEDICINE
Payer: MEDICARE

## 2020-01-01 VITALS
TEMPERATURE: 98 F | HEIGHT: 64 IN | SYSTOLIC BLOOD PRESSURE: 110 MMHG | OXYGEN SATURATION: 98 % | RESPIRATION RATE: 16 BRPM | BODY MASS INDEX: 22.85 KG/M2 | HEART RATE: 76 BPM | DIASTOLIC BLOOD PRESSURE: 76 MMHG | WEIGHT: 133.81 LBS

## 2020-01-01 VITALS
RESPIRATION RATE: 18 BRPM | DIASTOLIC BLOOD PRESSURE: 80 MMHG | HEART RATE: 93 BPM | HEART RATE: 75 BPM | OXYGEN SATURATION: 100 % | DIASTOLIC BLOOD PRESSURE: 99 MMHG | OXYGEN SATURATION: 100 % | SYSTOLIC BLOOD PRESSURE: 156 MMHG | SYSTOLIC BLOOD PRESSURE: 133 MMHG | RESPIRATION RATE: 19 BRPM

## 2020-01-01 VITALS
DIASTOLIC BLOOD PRESSURE: 69 MMHG | HEIGHT: 64 IN | TEMPERATURE: 98 F | RESPIRATION RATE: 18 BRPM | HEART RATE: 82 BPM | OXYGEN SATURATION: 95 % | BODY MASS INDEX: 21.76 KG/M2 | WEIGHT: 127.44 LBS | SYSTOLIC BLOOD PRESSURE: 112 MMHG

## 2020-01-01 VITALS
OXYGEN SATURATION: 100 % | HEART RATE: 84 BPM | OXYGEN SATURATION: 100 % | DIASTOLIC BLOOD PRESSURE: 89 MMHG | RESPIRATION RATE: 20 BRPM | DIASTOLIC BLOOD PRESSURE: 89 MMHG | RESPIRATION RATE: 20 BRPM | HEART RATE: 82 BPM | SYSTOLIC BLOOD PRESSURE: 139 MMHG | SYSTOLIC BLOOD PRESSURE: 152 MMHG

## 2020-01-01 VITALS
TEMPERATURE: 98 F | HEIGHT: 64 IN | DIASTOLIC BLOOD PRESSURE: 62 MMHG | SYSTOLIC BLOOD PRESSURE: 113 MMHG | HEART RATE: 81 BPM | BODY MASS INDEX: 23.41 KG/M2 | OXYGEN SATURATION: 96 % | WEIGHT: 137.13 LBS | RESPIRATION RATE: 18 BRPM

## 2020-01-01 VITALS
OXYGEN SATURATION: 93 % | TEMPERATURE: 99 F | RESPIRATION RATE: 18 BRPM | HEART RATE: 78 BPM | WEIGHT: 142.63 LBS | HEIGHT: 64 IN | SYSTOLIC BLOOD PRESSURE: 95 MMHG | DIASTOLIC BLOOD PRESSURE: 58 MMHG | BODY MASS INDEX: 24.35 KG/M2

## 2020-01-01 VITALS
DIASTOLIC BLOOD PRESSURE: 65 MMHG | HEART RATE: 80 BPM | RESPIRATION RATE: 20 BRPM | OXYGEN SATURATION: 100 % | SYSTOLIC BLOOD PRESSURE: 172 MMHG

## 2020-01-01 VITALS
TEMPERATURE: 98 F | HEIGHT: 64 IN | HEART RATE: 72 BPM | OXYGEN SATURATION: 99 % | BODY MASS INDEX: 21.34 KG/M2 | RESPIRATION RATE: 18 BRPM | DIASTOLIC BLOOD PRESSURE: 72 MMHG | SYSTOLIC BLOOD PRESSURE: 128 MMHG | HEART RATE: 96 BPM | DIASTOLIC BLOOD PRESSURE: 74 MMHG | RESPIRATION RATE: 18 BRPM | OXYGEN SATURATION: 96 % | SYSTOLIC BLOOD PRESSURE: 118 MMHG | WEIGHT: 125 LBS

## 2020-01-01 VITALS
RESPIRATION RATE: 18 BRPM | HEIGHT: 65 IN | BODY MASS INDEX: 22.33 KG/M2 | SYSTOLIC BLOOD PRESSURE: 122 MMHG | TEMPERATURE: 97 F | WEIGHT: 134 LBS | BODY MASS INDEX: 24.38 KG/M2 | DIASTOLIC BLOOD PRESSURE: 86 MMHG | WEIGHT: 146.31 LBS | OXYGEN SATURATION: 96 % | HEIGHT: 65 IN | HEART RATE: 83 BPM

## 2020-01-01 VITALS
HEART RATE: 84 BPM | OXYGEN SATURATION: 98 % | SYSTOLIC BLOOD PRESSURE: 137 MMHG | RESPIRATION RATE: 16 BRPM | DIASTOLIC BLOOD PRESSURE: 87 MMHG

## 2020-01-01 VITALS
RESPIRATION RATE: 18 BRPM | DIASTOLIC BLOOD PRESSURE: 77 MMHG | HEART RATE: 85 BPM | OXYGEN SATURATION: 97 % | DIASTOLIC BLOOD PRESSURE: 81 MMHG | RESPIRATION RATE: 19 BRPM | SYSTOLIC BLOOD PRESSURE: 135 MMHG | OXYGEN SATURATION: 100 % | SYSTOLIC BLOOD PRESSURE: 154 MMHG | HEART RATE: 70 BPM

## 2020-01-01 VITALS
DIASTOLIC BLOOD PRESSURE: 72 MMHG | OXYGEN SATURATION: 100 % | HEART RATE: 69 BPM | RESPIRATION RATE: 16 BRPM | SYSTOLIC BLOOD PRESSURE: 129 MMHG

## 2020-01-01 VITALS
HEART RATE: 84 BPM | HEART RATE: 78 BPM | OXYGEN SATURATION: 100 % | SYSTOLIC BLOOD PRESSURE: 141 MMHG | RESPIRATION RATE: 18 BRPM | OXYGEN SATURATION: 99 % | SYSTOLIC BLOOD PRESSURE: 130 MMHG | DIASTOLIC BLOOD PRESSURE: 85 MMHG | RESPIRATION RATE: 20 BRPM | DIASTOLIC BLOOD PRESSURE: 77 MMHG

## 2020-01-01 VITALS
RESPIRATION RATE: 18 BRPM | SYSTOLIC BLOOD PRESSURE: 143 MMHG | OXYGEN SATURATION: 99 % | HEART RATE: 78 BPM | DIASTOLIC BLOOD PRESSURE: 86 MMHG

## 2020-01-01 VITALS
TEMPERATURE: 97 F | DIASTOLIC BLOOD PRESSURE: 81 MMHG | BODY MASS INDEX: 22.43 KG/M2 | HEIGHT: 64 IN | HEART RATE: 75 BPM | SYSTOLIC BLOOD PRESSURE: 137 MMHG | WEIGHT: 131.38 LBS | OXYGEN SATURATION: 98 %

## 2020-01-01 VITALS
DIASTOLIC BLOOD PRESSURE: 101 MMHG | RESPIRATION RATE: 18 BRPM | SYSTOLIC BLOOD PRESSURE: 153 MMHG | OXYGEN SATURATION: 100 % | HEART RATE: 74 BPM

## 2020-01-01 VITALS — HEIGHT: 64 IN | BODY MASS INDEX: 19.68 KG/M2 | WEIGHT: 115.31 LBS

## 2020-01-01 VITALS
DIASTOLIC BLOOD PRESSURE: 86 MMHG | HEART RATE: 80 BPM | OXYGEN SATURATION: 100 % | SYSTOLIC BLOOD PRESSURE: 159 MMHG | RESPIRATION RATE: 20 BRPM

## 2020-01-01 VITALS
HEIGHT: 65 IN | SYSTOLIC BLOOD PRESSURE: 123 MMHG | HEART RATE: 85 BPM | DIASTOLIC BLOOD PRESSURE: 82 MMHG | WEIGHT: 135.13 LBS | RESPIRATION RATE: 18 BRPM | OXYGEN SATURATION: 99 % | BODY MASS INDEX: 22.51 KG/M2 | TEMPERATURE: 98 F

## 2020-01-01 VITALS
DIASTOLIC BLOOD PRESSURE: 99 MMHG | RESPIRATION RATE: 20 BRPM | HEART RATE: 78 BPM | OXYGEN SATURATION: 100 % | SYSTOLIC BLOOD PRESSURE: 170 MMHG

## 2020-01-01 VITALS — DIASTOLIC BLOOD PRESSURE: 80 MMHG | HEART RATE: 67 BPM | RESPIRATION RATE: 16 BRPM | SYSTOLIC BLOOD PRESSURE: 131 MMHG

## 2020-01-01 VITALS — HEIGHT: 64 IN | BODY MASS INDEX: 22.71 KG/M2 | WEIGHT: 133 LBS

## 2020-01-01 DIAGNOSIS — R18.8 CIRRHOSIS OF LIVER WITH ASCITES, UNSPECIFIED HEPATIC CIRRHOSIS TYPE: ICD-10-CM

## 2020-01-01 DIAGNOSIS — I25.10 CORONARY ARTERY DISEASE INVOLVING NATIVE CORONARY ARTERY OF NATIVE HEART WITHOUT ANGINA PECTORIS: ICD-10-CM

## 2020-01-01 DIAGNOSIS — B18.2 CHRONIC HEPATITIS C WITH CIRRHOSIS: ICD-10-CM

## 2020-01-01 DIAGNOSIS — Z01.810 PREPROCEDURAL CARDIOVASCULAR EXAMINATION: Primary | ICD-10-CM

## 2020-01-01 DIAGNOSIS — N18.6 ESRD (END STAGE RENAL DISEASE): Chronic | ICD-10-CM

## 2020-01-01 DIAGNOSIS — K74.60 CIRRHOSIS OF LIVER WITH ASCITES, UNSPECIFIED HEPATIC CIRRHOSIS TYPE: ICD-10-CM

## 2020-01-01 DIAGNOSIS — Z09 HOSPITAL DISCHARGE FOLLOW-UP: Primary | ICD-10-CM

## 2020-01-01 DIAGNOSIS — R18.8 CIRRHOSIS OF LIVER WITH ASCITES, UNSPECIFIED HEPATIC CIRRHOSIS TYPE: Primary | ICD-10-CM

## 2020-01-01 DIAGNOSIS — K74.60 CIRRHOSIS OF LIVER WITH ASCITES, UNSPECIFIED HEPATIC CIRRHOSIS TYPE: Primary | ICD-10-CM

## 2020-01-01 DIAGNOSIS — K74.60 CHRONIC HEPATITIS C WITH CIRRHOSIS: ICD-10-CM

## 2020-01-01 DIAGNOSIS — R60.1 ANASARCA: ICD-10-CM

## 2020-01-01 DIAGNOSIS — I25.10 CAD (CORONARY ARTERY DISEASE): ICD-10-CM

## 2020-01-01 DIAGNOSIS — K70.11 ASCITES DUE TO ALCOHOLIC HEPATITIS: ICD-10-CM

## 2020-01-01 DIAGNOSIS — R07.9 CHEST PAIN: ICD-10-CM

## 2020-01-01 DIAGNOSIS — Z99.2 HEMODIALYSIS STATUS: ICD-10-CM

## 2020-01-01 DIAGNOSIS — I25.10 ATHEROSCLEROSIS OF NATIVE CORONARY ARTERY OF NATIVE HEART WITHOUT ANGINA PECTORIS: Primary | ICD-10-CM

## 2020-01-01 DIAGNOSIS — B18.2 CHRONIC HEPATITIS C WITHOUT HEPATIC COMA: ICD-10-CM

## 2020-01-01 DIAGNOSIS — I21.4 NSTEMI (NON-ST ELEVATED MYOCARDIAL INFARCTION): Primary | ICD-10-CM

## 2020-01-01 DIAGNOSIS — I21.9 MYOCARDIAL INFARCTION, UNSPECIFIED MI TYPE, UNSPECIFIED ARTERY: ICD-10-CM

## 2020-01-01 DIAGNOSIS — K74.60 HEPATIC CIRRHOSIS, UNSPECIFIED HEPATIC CIRRHOSIS TYPE, UNSPECIFIED WHETHER ASCITES PRESENT: ICD-10-CM

## 2020-01-01 DIAGNOSIS — R07.9 CHEST PAIN, UNSPECIFIED TYPE: Primary | Chronic | ICD-10-CM

## 2020-01-01 DIAGNOSIS — I10 ESSENTIAL HYPERTENSION: Chronic | ICD-10-CM

## 2020-01-01 DIAGNOSIS — E87.5 HYPERKALEMIA: ICD-10-CM

## 2020-01-01 DIAGNOSIS — Z98.890 HISTORY OF ABDOMINAL PARACENTESIS: ICD-10-CM

## 2020-01-01 DIAGNOSIS — I50.32 CHRONIC DIASTOLIC HEART FAILURE: Chronic | ICD-10-CM

## 2020-01-01 DIAGNOSIS — I50.9 CHF (CONGESTIVE HEART FAILURE): ICD-10-CM

## 2020-01-01 DIAGNOSIS — I25.10 CORONARY ARTERY DISEASE, ANGINA PRESENCE UNSPECIFIED, UNSPECIFIED VESSEL OR LESION TYPE, UNSPECIFIED WHETHER NATIVE OR TRANSPLANTED HEART: ICD-10-CM

## 2020-01-01 DIAGNOSIS — R18.8 OTHER ASCITES: ICD-10-CM

## 2020-01-01 DIAGNOSIS — R10.10 PAIN OF UPPER ABDOMEN: ICD-10-CM

## 2020-01-01 DIAGNOSIS — N18.6 ESRD (END STAGE RENAL DISEASE) ON DIALYSIS: ICD-10-CM

## 2020-01-01 DIAGNOSIS — R53.81 MALAISE: ICD-10-CM

## 2020-01-01 DIAGNOSIS — E87.5 ACUTE HYPERKALEMIA: ICD-10-CM

## 2020-01-01 DIAGNOSIS — I25.10 CORONARY ARTERY DISEASE INVOLVING NATIVE HEART, ANGINA PRESENCE UNSPECIFIED, UNSPECIFIED VESSEL OR LESION TYPE: ICD-10-CM

## 2020-01-01 DIAGNOSIS — I25.10 CORONARY ARTERY DISEASE INVOLVING NATIVE CORONARY ARTERY OF NATIVE HEART WITHOUT ANGINA PECTORIS: Primary | ICD-10-CM

## 2020-01-01 DIAGNOSIS — R18.8 OTHER ASCITES: Primary | ICD-10-CM

## 2020-01-01 DIAGNOSIS — K70.11 ASCITES DUE TO ALCOHOLIC HEPATITIS: Primary | ICD-10-CM

## 2020-01-01 DIAGNOSIS — Z99.2 STAGE 5 CHRONIC KIDNEY DISEASE ON CHRONIC DIALYSIS: Primary | ICD-10-CM

## 2020-01-01 DIAGNOSIS — N18.6 STAGE 5 CHRONIC KIDNEY DISEASE ON CHRONIC DIALYSIS: Primary | ICD-10-CM

## 2020-01-01 DIAGNOSIS — Z99.2 ESRD (END STAGE RENAL DISEASE) ON DIALYSIS: ICD-10-CM

## 2020-01-01 DIAGNOSIS — I21.9 MI (MYOCARDIAL INFARCTION): ICD-10-CM

## 2020-01-01 DIAGNOSIS — K74.60 HEPATIC CIRRHOSIS, UNSPECIFIED HEPATIC CIRRHOSIS TYPE, UNSPECIFIED WHETHER ASCITES PRESENT: Primary | ICD-10-CM

## 2020-01-01 LAB
ABO + RH BLD: NORMAL
ABO + RH BLD: NORMAL
ALBUMIN FLD-MCNC: 1.1 G/DL
ALBUMIN SERPL BCP-MCNC: 1.9 G/DL (ref 3.5–5.2)
ALBUMIN SERPL BCP-MCNC: 2.1 G/DL (ref 3.5–5.2)
ALBUMIN SERPL BCP-MCNC: 2.2 G/DL (ref 3.5–5.2)
ALBUMIN SERPL BCP-MCNC: 2.4 G/DL (ref 3.5–5.2)
ALBUMIN SERPL BCP-MCNC: 2.4 G/DL (ref 3.5–5.2)
ALBUMIN SERPL BCP-MCNC: 2.5 G/DL (ref 3.5–5.2)
ALBUMIN SERPL BCP-MCNC: 2.5 G/DL (ref 3.5–5.2)
ALBUMIN SERPL BCP-MCNC: 2.6 G/DL (ref 3.5–5.2)
ALBUMIN SERPL BCP-MCNC: 2.7 G/DL (ref 3.5–5.2)
ALP SERPL-CCNC: 51 U/L (ref 55–135)
ALP SERPL-CCNC: 51 U/L (ref 55–135)
ALP SERPL-CCNC: 58 U/L (ref 55–135)
ALP SERPL-CCNC: 58 U/L (ref 55–135)
ALP SERPL-CCNC: 63 U/L (ref 55–135)
ALP SERPL-CCNC: 67 U/L (ref 55–135)
ALP SERPL-CCNC: 67 U/L (ref 55–135)
ALP SERPL-CCNC: 68 U/L (ref 55–135)
ALP SERPL-CCNC: 72 U/L (ref 55–135)
ALP SERPL-CCNC: 72 U/L (ref 55–135)
ALP SERPL-CCNC: 78 U/L (ref 55–135)
ALP SERPL-CCNC: 84 U/L (ref 55–135)
ALP SERPL-CCNC: 85 U/L (ref 55–135)
ALT SERPL W/O P-5'-P-CCNC: 15 U/L (ref 10–44)
ALT SERPL W/O P-5'-P-CCNC: 16 U/L (ref 10–44)
ALT SERPL W/O P-5'-P-CCNC: 17 U/L (ref 10–44)
ALT SERPL W/O P-5'-P-CCNC: 18 U/L (ref 10–44)
ALT SERPL W/O P-5'-P-CCNC: 19 U/L (ref 10–44)
ALT SERPL W/O P-5'-P-CCNC: 19 U/L (ref 10–44)
ALT SERPL W/O P-5'-P-CCNC: 20 U/L (ref 10–44)
ALT SERPL W/O P-5'-P-CCNC: 21 U/L (ref 10–44)
ALT SERPL W/O P-5'-P-CCNC: 21 U/L (ref 10–44)
ALT SERPL W/O P-5'-P-CCNC: 22 U/L (ref 10–44)
ALT SERPL W/O P-5'-P-CCNC: 25 U/L (ref 10–44)
AMPHET+METHAMPHET UR QL: NEGATIVE
ANION GAP SERPL CALC-SCNC: 10 MMOL/L (ref 8–16)
ANION GAP SERPL CALC-SCNC: 11 MMOL/L (ref 8–16)
ANION GAP SERPL CALC-SCNC: 12 MMOL/L (ref 8–16)
ANION GAP SERPL CALC-SCNC: 13 MMOL/L (ref 8–16)
ANION GAP SERPL CALC-SCNC: 14 MMOL/L (ref 8–16)
ANION GAP SERPL CALC-SCNC: 15 MMOL/L (ref 8–16)
ANION GAP SERPL CALC-SCNC: 15 MMOL/L (ref 8–16)
ANION GAP SERPL CALC-SCNC: 17 MMOL/L (ref 8–16)
ANION GAP SERPL CALC-SCNC: 19 MMOL/L (ref 8–16)
ANION GAP SERPL CALC-SCNC: 9 MMOL/L (ref 8–16)
AORTIC ROOT ANNULUS: 3.17 CM
AORTIC ROOT ANNULUS: 3.23 CM
AORTIC VALVE CUSP SEPERATION: 1.5 CM
AORTIC VALVE CUSP SEPERATION: 1.67 CM
APPEARANCE FLD: NORMAL
APTT PPP: 114.1 SEC (ref 23.6–33.3)
APTT PPP: 28.1 SEC (ref 23.6–33.3)
APTT PPP: 28.8 SEC (ref 23.6–33.3)
APTT PPP: 28.8 SEC (ref 23.6–33.3)
APTT PPP: 29.2 SEC (ref 23.6–33.3)
APTT PPP: 29.2 SEC (ref 23.6–33.3)
APTT PPP: 29.8 SEC (ref 23.6–33.3)
APTT PPP: 30.3 SEC (ref 23.6–33.3)
APTT PPP: 30.3 SEC (ref 23.6–33.3)
APTT PPP: 33.4 SEC (ref 23.6–33.3)
APTT PPP: 40.5 SEC (ref 23.6–33.3)
APTT PPP: 63.6 SEC (ref 23.6–33.3)
APTT PPP: 63.6 SEC (ref 23.6–33.3)
APTT PPP: 72.9 SEC (ref 23.6–33.3)
AST SERPL-CCNC: 20 U/L (ref 10–40)
AST SERPL-CCNC: 21 U/L (ref 10–40)
AST SERPL-CCNC: 23 U/L (ref 10–40)
AST SERPL-CCNC: 23 U/L (ref 10–40)
AST SERPL-CCNC: 24 U/L (ref 10–40)
AST SERPL-CCNC: 26 U/L (ref 10–40)
AST SERPL-CCNC: 27 U/L (ref 10–40)
AST SERPL-CCNC: 28 U/L (ref 10–40)
AST SERPL-CCNC: 28 U/L (ref 10–40)
AST SERPL-CCNC: 33 U/L (ref 10–40)
AST SERPL-CCNC: 52 U/L (ref 10–40)
AV INDEX (PROSTH): 0.42
AV INDEX (PROSTH): 0.66
AV MEAN GRADIENT: 13 MMHG
AV MEAN GRADIENT: 18 MMHG
AV PEAK GRADIENT: 23 MMHG
AV PEAK GRADIENT: 30 MMHG
AV VALVE AREA: 1.35 CM2
AV VALVE AREA: 2.1 CM2
AV VELOCITY RATIO: 36.98
AV VELOCITY RATIO: 63.73
BACTERIA #/AREA URNS HPF: NEGATIVE /HPF
BACTERIA SPEC ANAEROBE CULT: NORMAL
BARBITURATES UR QL SCN>200 NG/ML: NEGATIVE
BASOPHILS # BLD AUTO: 0.01 K/UL (ref 0–0.2)
BASOPHILS # BLD AUTO: 0.02 K/UL (ref 0–0.2)
BASOPHILS # BLD AUTO: 0.03 K/UL (ref 0–0.2)
BASOPHILS # BLD AUTO: 0.04 K/UL (ref 0–0.2)
BASOPHILS NFR BLD: 0.1 % (ref 0–1.9)
BASOPHILS NFR BLD: 0.2 % (ref 0–1.9)
BASOPHILS NFR BLD: 0.3 % (ref 0–1.9)
BASOPHILS NFR BLD: 0.4 % (ref 0–1.9)
BASOPHILS NFR BLD: 0.5 % (ref 0–1.9)
BASOPHILS NFR BLD: 0.6 % (ref 0–1.9)
BASOPHILS NFR BLD: 0.7 % (ref 0–1.9)
BASOPHILS NFR BLD: 0.7 % (ref 0–1.9)
BASOPHILS NFR BLD: 0.8 % (ref 0–1.9)
BASOPHILS NFR BLD: 0.8 % (ref 0–1.9)
BENZODIAZ UR QL SCN>200 NG/ML: NEGATIVE
BILIRUB DIRECT SERPL-MCNC: <0.1 MG/DL (ref 0.1–0.3)
BILIRUB SERPL-MCNC: 0.4 MG/DL (ref 0.1–1)
BILIRUB SERPL-MCNC: 0.5 MG/DL (ref 0.1–1)
BILIRUB SERPL-MCNC: 0.6 MG/DL (ref 0.1–1)
BILIRUB SERPL-MCNC: 0.6 MG/DL (ref 0.1–1)
BILIRUB SERPL-MCNC: 0.7 MG/DL (ref 0.1–1)
BILIRUB SERPL-MCNC: 0.8 MG/DL (ref 0.1–1)
BILIRUB SERPL-MCNC: 0.9 MG/DL (ref 0.1–1)
BILIRUB UR QL STRIP: NEGATIVE
BLD GP AB SCN CELLS X3 SERPL QL: NORMAL
BLD GP AB SCN CELLS X3 SERPL QL: NORMAL
BLD PROD TYP BPU: NORMAL
BLOOD UNIT EXPIRATION DATE: NORMAL
BLOOD UNIT TYPE CODE: 6200
BLOOD UNIT TYPE: NORMAL
BNP SERPL-MCNC: 1710 PG/ML (ref 0–99)
BNP SERPL-MCNC: 2099 PG/ML (ref 0–99)
BNP SERPL-MCNC: 2642 PG/ML (ref 0–99)
BNP SERPL-MCNC: 3213 PG/ML (ref 0–99)
BNP SERPL-MCNC: 3213 PG/ML (ref 0–99)
BNP SERPL-MCNC: 740 PG/ML (ref 0–99)
BODY FLD TYPE: NORMAL
BODY FLUID SOURCE, LDH: NORMAL
BSA FOR ECHO PROCEDURE: 1.65 M2
BSA FOR ECHO PROCEDURE: 1.67 M2
BUN SERPL-MCNC: 32 MG/DL (ref 6–20)
BUN SERPL-MCNC: 37 MG/DL (ref 6–20)
BUN SERPL-MCNC: 37 MG/DL (ref 6–20)
BUN SERPL-MCNC: 39 MG/DL (ref 6–20)
BUN SERPL-MCNC: 41 MG/DL (ref 6–20)
BUN SERPL-MCNC: 42 MG/DL (ref 6–20)
BUN SERPL-MCNC: 42 MG/DL (ref 6–20)
BUN SERPL-MCNC: 44 MG/DL (ref 6–20)
BUN SERPL-MCNC: 44 MG/DL (ref 6–20)
BUN SERPL-MCNC: 45 MG/DL (ref 6–20)
BUN SERPL-MCNC: 46 MG/DL (ref 6–20)
BUN SERPL-MCNC: 48 MG/DL (ref 6–20)
BUN SERPL-MCNC: 48 MG/DL (ref 6–20)
BUN SERPL-MCNC: 49 MG/DL (ref 6–20)
BUN SERPL-MCNC: 53 MG/DL (ref 6–20)
BUN SERPL-MCNC: 53 MG/DL (ref 6–20)
BUN SERPL-MCNC: 54 MG/DL (ref 6–20)
BUN SERPL-MCNC: 55 MG/DL (ref 6–20)
BUN SERPL-MCNC: 56 MG/DL (ref 6–20)
BUN SERPL-MCNC: 61 MG/DL (ref 6–20)
BUN SERPL-MCNC: 62 MG/DL (ref 6–20)
BUN SERPL-MCNC: 63 MG/DL (ref 6–20)
BUN SERPL-MCNC: 63 MG/DL (ref 6–20)
BUN SERPL-MCNC: 65 MG/DL (ref 6–20)
BUN SERPL-MCNC: 70 MG/DL (ref 6–20)
BUN SERPL-MCNC: 77 MG/DL (ref 6–20)
BUN SERPL-MCNC: 81 MG/DL (ref 6–20)
BZE UR QL SCN: NEGATIVE
CALCIUM SERPL-MCNC: 7.8 MG/DL (ref 8.7–10.5)
CALCIUM SERPL-MCNC: 7.9 MG/DL (ref 8.7–10.5)
CALCIUM SERPL-MCNC: 8 MG/DL (ref 8.7–10.5)
CALCIUM SERPL-MCNC: 8.1 MG/DL (ref 8.7–10.5)
CALCIUM SERPL-MCNC: 8.1 MG/DL (ref 8.7–10.5)
CALCIUM SERPL-MCNC: 8.2 MG/DL (ref 8.7–10.5)
CALCIUM SERPL-MCNC: 8.4 MG/DL (ref 8.7–10.5)
CALCIUM SERPL-MCNC: 8.4 MG/DL (ref 8.7–10.5)
CALCIUM SERPL-MCNC: 8.5 MG/DL (ref 8.7–10.5)
CALCIUM SERPL-MCNC: 8.6 MG/DL (ref 8.7–10.5)
CALCIUM SERPL-MCNC: 8.9 MG/DL (ref 8.7–10.5)
CALCIUM SERPL-MCNC: 8.9 MG/DL (ref 8.7–10.5)
CALCIUM SERPL-MCNC: 9 MG/DL (ref 8.7–10.5)
CALCIUM SERPL-MCNC: 9.1 MG/DL (ref 8.7–10.5)
CANNABINOIDS UR QL SCN: NORMAL
CATH EF QUANTITATIVE: 60 %
CHLORIDE SERPL-SCNC: 100 MMOL/L (ref 95–110)
CHLORIDE SERPL-SCNC: 101 MMOL/L (ref 95–110)
CHLORIDE SERPL-SCNC: 102 MMOL/L (ref 95–110)
CHLORIDE SERPL-SCNC: 103 MMOL/L (ref 95–110)
CHLORIDE SERPL-SCNC: 93 MMOL/L (ref 95–110)
CHLORIDE SERPL-SCNC: 95 MMOL/L (ref 95–110)
CHLORIDE SERPL-SCNC: 96 MMOL/L (ref 95–110)
CHLORIDE SERPL-SCNC: 97 MMOL/L (ref 95–110)
CHLORIDE SERPL-SCNC: 98 MMOL/L (ref 95–110)
CHLORIDE SERPL-SCNC: 99 MMOL/L (ref 95–110)
CHOLEST SERPL-MCNC: 111 MG/DL (ref 120–199)
CHOLEST SERPL-MCNC: 139 MG/DL (ref 120–199)
CHOLEST/HDLC SERPL: 4.2 {RATIO} (ref 2–5)
CHOLEST/HDLC SERPL: 4.3 {RATIO} (ref 2–5)
CK MB SERPL-MCNC: 1.6 NG/ML (ref 0.1–6.5)
CK MB SERPL-MCNC: 28.9 NG/ML (ref 0.1–6.5)
CK MB SERPL-MCNC: 46.3 NG/ML (ref 0.1–6.5)
CK SERPL-CCNC: 26 U/L (ref 20–180)
CK SERPL-CCNC: 62 U/L (ref 20–180)
CLARITY UR: CLEAR
CO2 SERPL-SCNC: 19 MMOL/L (ref 23–29)
CO2 SERPL-SCNC: 20 MMOL/L (ref 23–29)
CO2 SERPL-SCNC: 22 MMOL/L (ref 23–29)
CO2 SERPL-SCNC: 23 MMOL/L (ref 23–29)
CO2 SERPL-SCNC: 24 MMOL/L (ref 23–29)
CO2 SERPL-SCNC: 25 MMOL/L (ref 23–29)
CO2 SERPL-SCNC: 26 MMOL/L (ref 23–29)
CO2 SERPL-SCNC: 26 MMOL/L (ref 23–29)
CO2 SERPL-SCNC: 27 MMOL/L (ref 23–29)
CO2 SERPL-SCNC: 27 MMOL/L (ref 23–29)
CODING SYSTEM: NORMAL
COLOR FLD: YELLOW
COLOR UR: YELLOW
CREAT SERPL-MCNC: 5.4 MG/DL (ref 0.5–1.4)
CREAT SERPL-MCNC: 5.5 MG/DL (ref 0.5–1.4)
CREAT SERPL-MCNC: 5.6 MG/DL (ref 0.5–1.4)
CREAT SERPL-MCNC: 5.6 MG/DL (ref 0.5–1.4)
CREAT SERPL-MCNC: 6.1 MG/DL (ref 0.5–1.4)
CREAT SERPL-MCNC: 6.2 MG/DL (ref 0.5–1.4)
CREAT SERPL-MCNC: 6.3 MG/DL (ref 0.5–1.4)
CREAT SERPL-MCNC: 6.5 MG/DL (ref 0.5–1.4)
CREAT SERPL-MCNC: 6.6 MG/DL (ref 0.5–1.4)
CREAT SERPL-MCNC: 6.6 MG/DL (ref 0.5–1.4)
CREAT SERPL-MCNC: 6.8 MG/DL (ref 0.5–1.4)
CREAT SERPL-MCNC: 7 MG/DL (ref 0.5–1.4)
CREAT SERPL-MCNC: 7 MG/DL (ref 0.5–1.4)
CREAT SERPL-MCNC: 7.1 MG/DL (ref 0.5–1.4)
CREAT SERPL-MCNC: 7.2 MG/DL (ref 0.5–1.4)
CREAT SERPL-MCNC: 7.3 MG/DL (ref 0.5–1.4)
CREAT SERPL-MCNC: 7.3 MG/DL (ref 0.5–1.4)
CREAT SERPL-MCNC: 7.4 MG/DL (ref 0.5–1.4)
CREAT SERPL-MCNC: 7.7 MG/DL (ref 0.5–1.4)
CREAT SERPL-MCNC: 7.7 MG/DL (ref 0.5–1.4)
CREAT SERPL-MCNC: 7.9 MG/DL (ref 0.5–1.4)
CREAT SERPL-MCNC: 7.9 MG/DL (ref 0.5–1.4)
CREAT SERPL-MCNC: 8.1 MG/DL (ref 0.5–1.4)
CREAT SERPL-MCNC: 8.5 MG/DL (ref 0.5–1.4)
CREAT SERPL-MCNC: 8.8 MG/DL (ref 0.5–1.4)
CREAT SERPL-MCNC: 8.9 MG/DL (ref 0.5–1.4)
CREAT SERPL-MCNC: 9.9 MG/DL (ref 0.5–1.4)
CREAT UR-MCNC: 60 MG/DL (ref 15–325)
CV ECHO LV RWT: 0.38 CM
CV ECHO LV RWT: 0.47 CM
CV ECHO LV RWT: 0.83 CM
DIFFERENTIAL METHOD: ABNORMAL
DISPENSE STATUS: NORMAL
DOP CALC AO PEAK VEL: 2.4 M/S
DOP CALC AO PEAK VEL: 2.76 M/S
DOP CALC AO VTI: 44.92 CM
DOP CALC AO VTI: 53.54 CM
DOP CALC LVOT AREA: 3.2 CM2
DOP CALC LVOT AREA: 3.2 CM2
DOP CALC LVOT DIAMETER: 2.02 CM
DOP CALC LVOT DIAMETER: 2.03 CM
DOP CALC LVOT PEAK VEL: 102.06 M/S
DOP CALC LVOT PEAK VEL: 152.95 M/S
DOP CALC LVOT STROKE VOLUME: 72.36 CM3
DOP CALC LVOT STROKE VOLUME: 94.49 CM3
DOP CALCLVOT PEAK VEL VTI: 22.37 CM
DOP CALCLVOT PEAK VEL VTI: 29.5 CM
E WAVE DECELERATION TIME: 132.6 MSEC
E WAVE DECELERATION TIME: 166.3 MSEC
E/A RATIO: 0.91
E/A RATIO: 1.09
E/E' RATIO: 18.29 M/S
E/E' RATIO: 20.27 M/S
ECHO LV POSTERIOR WALL: 0.93 CM (ref 0.6–1.1)
ECHO LV POSTERIOR WALL: 1.07 CM (ref 0.6–1.1)
ECHO LV POSTERIOR WALL: 1.43 CM (ref 0.6–1.1)
EOSINOPHIL # BLD AUTO: 0.1 K/UL (ref 0–0.5)
EOSINOPHIL NFR BLD: 1 % (ref 0–8)
EOSINOPHIL NFR BLD: 1.1 % (ref 0–8)
EOSINOPHIL NFR BLD: 1.2 % (ref 0–8)
EOSINOPHIL NFR BLD: 1.2 % (ref 0–8)
EOSINOPHIL NFR BLD: 1.3 % (ref 0–8)
EOSINOPHIL NFR BLD: 1.3 % (ref 0–8)
EOSINOPHIL NFR BLD: 1.4 % (ref 0–8)
EOSINOPHIL NFR BLD: 1.6 % (ref 0–8)
EOSINOPHIL NFR BLD: 1.6 % (ref 0–8)
EOSINOPHIL NFR BLD: 1.8 % (ref 0–8)
EOSINOPHIL NFR BLD: 1.9 % (ref 0–8)
EOSINOPHIL NFR BLD: 2.1 % (ref 0–8)
EOSINOPHIL NFR BLD: 2.1 % (ref 0–8)
EOSINOPHIL NFR BLD: 2.2 % (ref 0–8)
EOSINOPHIL NFR BLD: 2.3 % (ref 0–8)
EOSINOPHIL NFR BLD: 2.4 % (ref 0–8)
EOSINOPHIL NFR BLD: 2.6 % (ref 0–8)
EOSINOPHIL NFR BLD: 2.7 % (ref 0–8)
EOSINOPHIL NFR BLD: 2.8 % (ref 0–8)
EOSINOPHIL NFR BLD: 3.4 % (ref 0–8)
ERYTHROCYTE [DISTWIDTH] IN BLOOD BY AUTOMATED COUNT: 13.5 % (ref 11.5–14.5)
ERYTHROCYTE [DISTWIDTH] IN BLOOD BY AUTOMATED COUNT: 14.2 % (ref 11.5–14.5)
ERYTHROCYTE [DISTWIDTH] IN BLOOD BY AUTOMATED COUNT: 14.3 % (ref 11.5–14.5)
ERYTHROCYTE [DISTWIDTH] IN BLOOD BY AUTOMATED COUNT: 14.4 % (ref 11.5–14.5)
ERYTHROCYTE [DISTWIDTH] IN BLOOD BY AUTOMATED COUNT: 14.6 % (ref 11.5–14.5)
ERYTHROCYTE [DISTWIDTH] IN BLOOD BY AUTOMATED COUNT: 14.7 % (ref 11.5–14.5)
ERYTHROCYTE [DISTWIDTH] IN BLOOD BY AUTOMATED COUNT: 14.8 % (ref 11.5–14.5)
ERYTHROCYTE [DISTWIDTH] IN BLOOD BY AUTOMATED COUNT: 14.8 % (ref 11.5–14.5)
ERYTHROCYTE [DISTWIDTH] IN BLOOD BY AUTOMATED COUNT: 15 % (ref 11.5–14.5)
ERYTHROCYTE [DISTWIDTH] IN BLOOD BY AUTOMATED COUNT: 15.1 % (ref 11.5–14.5)
ERYTHROCYTE [DISTWIDTH] IN BLOOD BY AUTOMATED COUNT: 15.2 % (ref 11.5–14.5)
ERYTHROCYTE [DISTWIDTH] IN BLOOD BY AUTOMATED COUNT: 15.4 % (ref 11.5–14.5)
ERYTHROCYTE [DISTWIDTH] IN BLOOD BY AUTOMATED COUNT: 15.4 % (ref 11.5–14.5)
ERYTHROCYTE [DISTWIDTH] IN BLOOD BY AUTOMATED COUNT: 15.5 % (ref 11.5–14.5)
ERYTHROCYTE [DISTWIDTH] IN BLOOD BY AUTOMATED COUNT: 15.6 % (ref 11.5–14.5)
ERYTHROCYTE [DISTWIDTH] IN BLOOD BY AUTOMATED COUNT: 15.6 % (ref 11.5–14.5)
ERYTHROCYTE [DISTWIDTH] IN BLOOD BY AUTOMATED COUNT: 15.7 % (ref 11.5–14.5)
ERYTHROCYTE [DISTWIDTH] IN BLOOD BY AUTOMATED COUNT: 15.7 % (ref 11.5–14.5)
ERYTHROCYTE [DISTWIDTH] IN BLOOD BY AUTOMATED COUNT: 15.8 % (ref 11.5–14.5)
ERYTHROCYTE [DISTWIDTH] IN BLOOD BY AUTOMATED COUNT: 15.8 % (ref 11.5–14.5)
ERYTHROCYTE [DISTWIDTH] IN BLOOD BY AUTOMATED COUNT: 15.9 % (ref 11.5–14.5)
ERYTHROCYTE [DISTWIDTH] IN BLOOD BY AUTOMATED COUNT: 16 % (ref 11.5–14.5)
ERYTHROCYTE [DISTWIDTH] IN BLOOD BY AUTOMATED COUNT: 16.1 % (ref 11.5–14.5)
ERYTHROCYTE [DISTWIDTH] IN BLOOD BY AUTOMATED COUNT: 16.1 % (ref 11.5–14.5)
ERYTHROCYTE [DISTWIDTH] IN BLOOD BY AUTOMATED COUNT: 16.2 % (ref 11.5–14.5)
EST. GFR  (AFRICAN AMERICAN): 4.6 ML/MIN/1.73 M^2
EST. GFR  (AFRICAN AMERICAN): 5.3 ML/MIN/1.73 M^2
EST. GFR  (AFRICAN AMERICAN): 5.4 ML/MIN/1.73 M^2
EST. GFR  (AFRICAN AMERICAN): 5.6 ML/MIN/1.73 M^2
EST. GFR  (AFRICAN AMERICAN): 5.9 ML/MIN/1.73 M^2
EST. GFR  (AFRICAN AMERICAN): 6.2 ML/MIN/1.73 M^2
EST. GFR  (AFRICAN AMERICAN): 6.2 ML/MIN/1.73 M^2
EST. GFR  (AFRICAN AMERICAN): 6.3 ML/MIN/1.73 M^2
EST. GFR  (AFRICAN AMERICAN): 6.3 ML/MIN/1.73 M^2
EST. GFR  (AFRICAN AMERICAN): 6.6 ML/MIN/1.73 M^2
EST. GFR  (AFRICAN AMERICAN): 6.7 ML/MIN/1.73 M^2
EST. GFR  (AFRICAN AMERICAN): 6.8 ML/MIN/1.73 M^2
EST. GFR  (AFRICAN AMERICAN): 6.8 ML/MIN/1.73 M^2
EST. GFR  (AFRICAN AMERICAN): 6.9 ML/MIN/1.73 M^2
EST. GFR  (AFRICAN AMERICAN): 7.1 ML/MIN/1.73 M^2
EST. GFR  (AFRICAN AMERICAN): 7.1 ML/MIN/1.73 M^2
EST. GFR  (AFRICAN AMERICAN): 7.4 ML/MIN/1.73 M^2
EST. GFR  (AFRICAN AMERICAN): 7.6 ML/MIN/1.73 M^2
EST. GFR  (AFRICAN AMERICAN): 7.6 ML/MIN/1.73 M^2
EST. GFR  (AFRICAN AMERICAN): 7.7 ML/MIN/1.73 M^2
EST. GFR  (AFRICAN AMERICAN): 8 ML/MIN/1.73 M^2
EST. GFR  (AFRICAN AMERICAN): 8.2 ML/MIN/1.73 M^2
EST. GFR  (AFRICAN AMERICAN): 8.3 ML/MIN/1.73 M^2
EST. GFR  (AFRICAN AMERICAN): 9.3 ML/MIN/1.73 M^2
EST. GFR  (AFRICAN AMERICAN): 9.3 ML/MIN/1.73 M^2
EST. GFR  (AFRICAN AMERICAN): 9.5 ML/MIN/1.73 M^2
EST. GFR  (AFRICAN AMERICAN): 9.7 ML/MIN/1.73 M^2
EST. GFR  (NON AFRICAN AMERICAN): 4 ML/MIN/1.73 M^2
EST. GFR  (NON AFRICAN AMERICAN): 4.6 ML/MIN/1.73 M^2
EST. GFR  (NON AFRICAN AMERICAN): 4.7 ML/MIN/1.73 M^2
EST. GFR  (NON AFRICAN AMERICAN): 4.8 ML/MIN/1.73 M^2
EST. GFR  (NON AFRICAN AMERICAN): 5.1 ML/MIN/1.73 M^2
EST. GFR  (NON AFRICAN AMERICAN): 5.3 ML/MIN/1.73 M^2
EST. GFR  (NON AFRICAN AMERICAN): 5.3 ML/MIN/1.73 M^2
EST. GFR  (NON AFRICAN AMERICAN): 5.5 ML/MIN/1.73 M^2
EST. GFR  (NON AFRICAN AMERICAN): 5.5 ML/MIN/1.73 M^2
EST. GFR  (NON AFRICAN AMERICAN): 5.7 ML/MIN/1.73 M^2
EST. GFR  (NON AFRICAN AMERICAN): 5.8 ML/MIN/1.73 M^2
EST. GFR  (NON AFRICAN AMERICAN): 5.9 ML/MIN/1.73 M^2
EST. GFR  (NON AFRICAN AMERICAN): 5.9 ML/MIN/1.73 M^2
EST. GFR  (NON AFRICAN AMERICAN): 6 ML/MIN/1.73 M^2
EST. GFR  (NON AFRICAN AMERICAN): 6.1 ML/MIN/1.73 M^2
EST. GFR  (NON AFRICAN AMERICAN): 6.1 ML/MIN/1.73 M^2
EST. GFR  (NON AFRICAN AMERICAN): 6.4 ML/MIN/1.73 M^2
EST. GFR  (NON AFRICAN AMERICAN): 6.6 ML/MIN/1.73 M^2
EST. GFR  (NON AFRICAN AMERICAN): 6.6 ML/MIN/1.73 M^2
EST. GFR  (NON AFRICAN AMERICAN): 6.7 ML/MIN/1.73 M^2
EST. GFR  (NON AFRICAN AMERICAN): 7 ML/MIN/1.73 M^2
EST. GFR  (NON AFRICAN AMERICAN): 7.1 ML/MIN/1.73 M^2
EST. GFR  (NON AFRICAN AMERICAN): 7.2 ML/MIN/1.73 M^2
EST. GFR  (NON AFRICAN AMERICAN): 8.1 ML/MIN/1.73 M^2
EST. GFR  (NON AFRICAN AMERICAN): 8.1 ML/MIN/1.73 M^2
EST. GFR  (NON AFRICAN AMERICAN): 8.2 ML/MIN/1.73 M^2
EST. GFR  (NON AFRICAN AMERICAN): 8.4 ML/MIN/1.73 M^2
ESTIMATED AVG GLUCOSE: NORMAL MG/DL (ref 68–131)
FRACTIONAL SHORTENING: 26 % (ref 28–44)
FRACTIONAL SHORTENING: 26 % (ref 28–44)
FRACTIONAL SHORTENING: 31 % (ref 28–44)
GLUCOSE SERPL-MCNC: 102 MG/DL (ref 70–110)
GLUCOSE SERPL-MCNC: 104 MG/DL (ref 70–110)
GLUCOSE SERPL-MCNC: 106 MG/DL (ref 70–110)
GLUCOSE SERPL-MCNC: 110 MG/DL (ref 70–110)
GLUCOSE SERPL-MCNC: 110 MG/DL (ref 70–110)
GLUCOSE SERPL-MCNC: 111 MG/DL (ref 70–110)
GLUCOSE SERPL-MCNC: 112 MG/DL (ref 70–110)
GLUCOSE SERPL-MCNC: 115 MG/DL (ref 70–110)
GLUCOSE SERPL-MCNC: 116 MG/DL (ref 70–110)
GLUCOSE SERPL-MCNC: 116 MG/DL (ref 70–110)
GLUCOSE SERPL-MCNC: 121 MG/DL (ref 70–110)
GLUCOSE SERPL-MCNC: 139 MG/DL (ref 70–110)
GLUCOSE SERPL-MCNC: 81 MG/DL (ref 70–110)
GLUCOSE SERPL-MCNC: 85 MG/DL (ref 70–110)
GLUCOSE SERPL-MCNC: 87 MG/DL (ref 70–110)
GLUCOSE SERPL-MCNC: 88 MG/DL (ref 70–110)
GLUCOSE SERPL-MCNC: 89 MG/DL (ref 70–110)
GLUCOSE SERPL-MCNC: 91 MG/DL (ref 70–110)
GLUCOSE SERPL-MCNC: 91 MG/DL (ref 70–110)
GLUCOSE SERPL-MCNC: 93 MG/DL (ref 70–110)
GLUCOSE SERPL-MCNC: 94 MG/DL (ref 70–110)
GLUCOSE SERPL-MCNC: 96 MG/DL (ref 70–110)
GLUCOSE SERPL-MCNC: 97 MG/DL (ref 70–110)
GLUCOSE SERPL-MCNC: 99 MG/DL (ref 70–110)
GLUCOSE UR QL STRIP: NEGATIVE
GRAM STN SPEC: NORMAL
GRAM STN SPEC: NORMAL
HBA1C MFR BLD HPLC: NORMAL % (ref 4.5–6.2)
HBV CORE AB SERPL QL IA: NEGATIVE
HBV CORE AB SERPL QL IA: NEGATIVE
HBV SURFACE AB SER QL: NON REACTIVE
HBV SURFACE AG SERPL QL IA: NEGATIVE
HCT VFR BLD AUTO: 20.6 % (ref 37–48.5)
HCT VFR BLD AUTO: 21.5 % (ref 37–48.5)
HCT VFR BLD AUTO: 21.9 % (ref 37–48.5)
HCT VFR BLD AUTO: 23 % (ref 37–48.5)
HCT VFR BLD AUTO: 23.1 % (ref 37–48.5)
HCT VFR BLD AUTO: 23.8 % (ref 37–48.5)
HCT VFR BLD AUTO: 24.1 % (ref 37–48.5)
HCT VFR BLD AUTO: 26.4 % (ref 37–48.5)
HCT VFR BLD AUTO: 27.1 % (ref 37–48.5)
HCT VFR BLD AUTO: 27.1 % (ref 37–48.5)
HCT VFR BLD AUTO: 27.2 % (ref 37–48.5)
HCT VFR BLD AUTO: 27.7 % (ref 37–48.5)
HCT VFR BLD AUTO: 27.7 % (ref 37–48.5)
HCT VFR BLD AUTO: 28.2 % (ref 37–48.5)
HCT VFR BLD AUTO: 28.6 % (ref 37–48.5)
HCT VFR BLD AUTO: 29.1 % (ref 37–48.5)
HCT VFR BLD AUTO: 29.2 % (ref 37–48.5)
HCT VFR BLD AUTO: 29.4 % (ref 37–48.5)
HCT VFR BLD AUTO: 29.5 % (ref 37–48.5)
HCT VFR BLD AUTO: 29.6 % (ref 37–48.5)
HCT VFR BLD AUTO: 30.3 % (ref 37–48.5)
HCT VFR BLD AUTO: 32.5 % (ref 37–48.5)
HCT VFR BLD AUTO: 32.6 % (ref 37–48.5)
HCT VFR BLD AUTO: 32.8 % (ref 37–48.5)
HCT VFR BLD AUTO: 32.9 % (ref 37–48.5)
HCT VFR BLD AUTO: 33.1 % (ref 37–48.5)
HCT VFR BLD AUTO: 33.6 % (ref 37–48.5)
HCT VFR BLD AUTO: 35.8 % (ref 37–48.5)
HCT VFR BLD AUTO: 36.2 % (ref 37–48.5)
HCT VFR BLD AUTO: 36.9 % (ref 37–48.5)
HCT VFR BLD AUTO: 37 % (ref 37–48.5)
HCT VFR BLD AUTO: 42.2 % (ref 37–48.5)
HDLC SERPL-MCNC: 26 MG/DL (ref 40–75)
HDLC SERPL-MCNC: 33 MG/DL (ref 40–75)
HDLC SERPL: 23.4 % (ref 20–50)
HDLC SERPL: 23.7 % (ref 20–50)
HGB BLD-MCNC: 10.6 G/DL (ref 12–16)
HGB BLD-MCNC: 10.7 G/DL (ref 12–16)
HGB BLD-MCNC: 10.7 G/DL (ref 12–16)
HGB BLD-MCNC: 11.1 G/DL (ref 12–16)
HGB BLD-MCNC: 11.4 G/DL (ref 12–16)
HGB BLD-MCNC: 11.8 G/DL (ref 12–16)
HGB BLD-MCNC: 11.9 G/DL (ref 12–16)
HGB BLD-MCNC: 12.1 G/DL (ref 12–16)
HGB BLD-MCNC: 13.5 G/DL (ref 12–16)
HGB BLD-MCNC: 6.7 G/DL (ref 12–16)
HGB BLD-MCNC: 7.1 G/DL (ref 12–16)
HGB BLD-MCNC: 7.1 G/DL (ref 12–16)
HGB BLD-MCNC: 7.3 G/DL (ref 12–16)
HGB BLD-MCNC: 7.4 G/DL (ref 12–16)
HGB BLD-MCNC: 7.8 G/DL (ref 12–16)
HGB BLD-MCNC: 8.1 G/DL (ref 12–16)
HGB BLD-MCNC: 8.4 G/DL (ref 12–16)
HGB BLD-MCNC: 8.6 G/DL (ref 12–16)
HGB BLD-MCNC: 9.2 G/DL (ref 12–16)
HGB BLD-MCNC: 9.4 G/DL (ref 12–16)
HGB BLD-MCNC: 9.5 G/DL (ref 12–16)
HGB BLD-MCNC: 9.5 G/DL (ref 12–16)
HGB BLD-MCNC: 9.6 G/DL (ref 12–16)
HGB BLD-MCNC: 9.7 G/DL (ref 12–16)
HGB BLD-MCNC: 9.9 G/DL (ref 12–16)
HGB BLD-MCNC: 9.9 G/DL (ref 12–16)
HGB UR QL STRIP: ABNORMAL
HYALINE CASTS #/AREA URNS LPF: 13 /LPF
IMM GRANULOCYTES # BLD AUTO: 0.01 K/UL (ref 0–0.04)
IMM GRANULOCYTES # BLD AUTO: 0.01 K/UL (ref 0–0.04)
IMM GRANULOCYTES # BLD AUTO: 0.02 K/UL (ref 0–0.04)
IMM GRANULOCYTES # BLD AUTO: 0.02 K/UL (ref 0–0.04)
IMM GRANULOCYTES # BLD AUTO: 0.03 K/UL (ref 0–0.04)
IMM GRANULOCYTES # BLD AUTO: 0.04 K/UL (ref 0–0.04)
IMM GRANULOCYTES # BLD AUTO: 0.04 K/UL (ref 0–0.04)
IMM GRANULOCYTES # BLD AUTO: 0.05 K/UL (ref 0–0.04)
IMM GRANULOCYTES # BLD AUTO: 0.06 K/UL (ref 0–0.04)
IMM GRANULOCYTES # BLD AUTO: 0.06 K/UL (ref 0–0.04)
IMM GRANULOCYTES NFR BLD AUTO: 0.2 % (ref 0–0.5)
IMM GRANULOCYTES NFR BLD AUTO: 0.2 % (ref 0–0.5)
IMM GRANULOCYTES NFR BLD AUTO: 0.4 % (ref 0–0.5)
IMM GRANULOCYTES NFR BLD AUTO: 0.5 % (ref 0–0.5)
IMM GRANULOCYTES NFR BLD AUTO: 0.6 % (ref 0–0.5)
IMM GRANULOCYTES NFR BLD AUTO: 0.7 % (ref 0–0.5)
IMM GRANULOCYTES NFR BLD AUTO: 0.8 % (ref 0–0.5)
IMM GRANULOCYTES NFR BLD AUTO: 0.9 % (ref 0–0.5)
IMM GRANULOCYTES NFR BLD AUTO: 1.1 % (ref 0–0.5)
IMM GRANULOCYTES NFR BLD AUTO: 1.2 % (ref 0–0.5)
IMM GRANULOCYTES NFR BLD AUTO: 1.3 % (ref 0–0.5)
INR PPP: 1
INR PPP: 1.1
INTERVENTRICULAR SEPTUM: 0.93 CM (ref 0.6–1.1)
INTERVENTRICULAR SEPTUM: 1.07 CM (ref 0.6–1.1)
INTERVENTRICULAR SEPTUM: 1.48 CM (ref 0.6–1.1)
IVRT: 78.73 MSEC
KETONES UR QL STRIP: NEGATIVE
LABCORP MISC TEST CODE: 1453
LABCORP MISC TEST NAME: NORMAL
LABCORP MISCELLANEOUS TEST: NORMAL
LDH FLD L TO P-CCNC: 58 U/L
LDH SERPL L TO P-CCNC: 148 U/L (ref 110–260)
LDLC SERPL CALC-MCNC: 56.8 MG/DL (ref 63–159)
LDLC SERPL CALC-MCNC: 86.4 MG/DL (ref 63–159)
LEFT ATRIUM SIZE: 4.41 CM
LEFT ATRIUM SIZE: 4.7 CM
LEFT INTERNAL DIMENSION IN SYSTOLE: 2.56 CM (ref 2.1–4)
LEFT INTERNAL DIMENSION IN SYSTOLE: 3.16 CM (ref 2.1–4)
LEFT INTERNAL DIMENSION IN SYSTOLE: 3.65 CM (ref 2.1–4)
LEFT VENTRICLE DIASTOLIC VOLUME INDEX: 34.76 ML/M2
LEFT VENTRICLE DIASTOLIC VOLUME INDEX: 44.39 ML/M2
LEFT VENTRICLE DIASTOLIC VOLUME INDEX: 77.37 ML/M2
LEFT VENTRICLE DIASTOLIC VOLUME: 124.95 ML
LEFT VENTRICLE DIASTOLIC VOLUME: 58 ML
LEFT VENTRICLE DIASTOLIC VOLUME: 73 ML
LEFT VENTRICLE MASS INDEX: 104 G/M2
LEFT VENTRICLE MASS INDEX: 112 G/M2
LEFT VENTRICLE MASS INDEX: 98 G/M2
LEFT VENTRICLE SYSTOLIC VOLUME INDEX: 15.9 ML/M2
LEFT VENTRICLE SYSTOLIC VOLUME INDEX: 26.6 ML/M2
LEFT VENTRICLE SYSTOLIC VOLUME INDEX: 41.4 ML/M2
LEFT VENTRICLE SYSTOLIC VOLUME: 26.6 ML
LEFT VENTRICLE SYSTOLIC VOLUME: 43.7 ML
LEFT VENTRICLE SYSTOLIC VOLUME: 66.84 ML
LEFT VENTRICULAR INTERNAL DIMENSION IN DIASTOLE: 3.45 CM (ref 3.5–6)
LEFT VENTRICULAR INTERNAL DIMENSION IN DIASTOLE: 4.6 CM (ref 3.5–6)
LEFT VENTRICULAR INTERNAL DIMENSION IN DIASTOLE: 4.93 CM (ref 3.5–6)
LEFT VENTRICULAR MASS: 161.36 G
LEFT VENTRICULAR MASS: 174.36 G
LEFT VENTRICULAR MASS: 180.47 G
LEUKOCYTE ESTERASE UR QL STRIP: NEGATIVE
LIPASE SERPL-CCNC: 37 U/L (ref 4–60)
LV LATERAL E/E' RATIO: 14.22 M/S
LV LATERAL E/E' RATIO: 16.89 M/S
LV SEPTAL E/E' RATIO: 25.33 M/S
LV SEPTAL E/E' RATIO: 25.6 M/S
LYMPHOCYTES # BLD AUTO: 0.5 K/UL (ref 1–4.8)
LYMPHOCYTES # BLD AUTO: 0.6 K/UL (ref 1–4.8)
LYMPHOCYTES # BLD AUTO: 0.6 K/UL (ref 1–4.8)
LYMPHOCYTES # BLD AUTO: 0.7 K/UL (ref 1–4.8)
LYMPHOCYTES # BLD AUTO: 0.7 K/UL (ref 1–4.8)
LYMPHOCYTES # BLD AUTO: 0.8 K/UL (ref 1–4.8)
LYMPHOCYTES # BLD AUTO: 0.9 K/UL (ref 1–4.8)
LYMPHOCYTES # BLD AUTO: 1 K/UL (ref 1–4.8)
LYMPHOCYTES # BLD AUTO: 1.1 K/UL (ref 1–4.8)
LYMPHOCYTES NFR BLD: 10.8 % (ref 18–48)
LYMPHOCYTES NFR BLD: 11 % (ref 18–48)
LYMPHOCYTES NFR BLD: 11.7 % (ref 18–48)
LYMPHOCYTES NFR BLD: 12.1 % (ref 18–48)
LYMPHOCYTES NFR BLD: 14.6 % (ref 18–48)
LYMPHOCYTES NFR BLD: 14.7 % (ref 18–48)
LYMPHOCYTES NFR BLD: 15 % (ref 18–48)
LYMPHOCYTES NFR BLD: 15 % (ref 18–48)
LYMPHOCYTES NFR BLD: 15.7 % (ref 18–48)
LYMPHOCYTES NFR BLD: 15.7 % (ref 18–48)
LYMPHOCYTES NFR BLD: 18.2 % (ref 18–48)
LYMPHOCYTES NFR BLD: 19.3 % (ref 18–48)
LYMPHOCYTES NFR BLD: 19.4 % (ref 18–48)
LYMPHOCYTES NFR BLD: 19.4 % (ref 18–48)
LYMPHOCYTES NFR BLD: 19.7 % (ref 18–48)
LYMPHOCYTES NFR BLD: 20.3 % (ref 18–48)
LYMPHOCYTES NFR BLD: 20.9 % (ref 18–48)
LYMPHOCYTES NFR BLD: 21 % (ref 18–48)
LYMPHOCYTES NFR BLD: 21.5 % (ref 18–48)
LYMPHOCYTES NFR BLD: 22.1 % (ref 18–48)
LYMPHOCYTES NFR BLD: 22.7 % (ref 18–48)
LYMPHOCYTES NFR BLD: 22.7 % (ref 18–48)
LYMPHOCYTES NFR BLD: 23.2 % (ref 18–48)
LYMPHOCYTES NFR BLD: 23.4 % (ref 18–48)
LYMPHOCYTES NFR BLD: 23.7 % (ref 18–48)
LYMPHOCYTES NFR BLD: 8.7 % (ref 18–48)
LYMPHOCYTES NFR FLD MANUAL: 28 %
MAGNESIUM SERPL-MCNC: 2.2 MG/DL (ref 1.6–2.6)
MAGNESIUM SERPL-MCNC: 2.2 MG/DL (ref 1.6–2.6)
MAGNESIUM SERPL-MCNC: 2.4 MG/DL (ref 1.6–2.6)
MAGNESIUM SERPL-MCNC: 2.5 MG/DL (ref 1.6–2.6)
MAGNESIUM SERPL-MCNC: 2.5 MG/DL (ref 1.6–2.6)
MAGNESIUM SERPL-MCNC: 2.6 MG/DL (ref 1.6–2.6)
MCH RBC QN AUTO: 29.9 PG (ref 27–31)
MCH RBC QN AUTO: 30.2 PG (ref 27–31)
MCH RBC QN AUTO: 30.4 PG (ref 27–31)
MCH RBC QN AUTO: 30.4 PG (ref 27–31)
MCH RBC QN AUTO: 30.5 PG (ref 27–31)
MCH RBC QN AUTO: 30.7 PG (ref 27–31)
MCH RBC QN AUTO: 30.7 PG (ref 27–31)
MCH RBC QN AUTO: 30.8 PG (ref 27–31)
MCH RBC QN AUTO: 30.8 PG (ref 27–31)
MCH RBC QN AUTO: 30.9 PG (ref 27–31)
MCH RBC QN AUTO: 30.9 PG (ref 27–31)
MCH RBC QN AUTO: 31 PG (ref 27–31)
MCH RBC QN AUTO: 31.1 PG (ref 27–31)
MCH RBC QN AUTO: 31.2 PG (ref 27–31)
MCH RBC QN AUTO: 31.3 PG (ref 27–31)
MCH RBC QN AUTO: 31.4 PG (ref 27–31)
MCH RBC QN AUTO: 31.6 PG (ref 27–31)
MCH RBC QN AUTO: 31.8 PG (ref 27–31)
MCH RBC QN AUTO: 31.8 PG (ref 27–31)
MCH RBC QN AUTO: 31.9 PG (ref 27–31)
MCH RBC QN AUTO: 32 PG (ref 27–31)
MCH RBC QN AUTO: 32.1 PG (ref 27–31)
MCH RBC QN AUTO: 32.2 PG (ref 27–31)
MCHC RBC AUTO-ENTMCNC: 31 G/DL (ref 32–36)
MCHC RBC AUTO-ENTMCNC: 31 G/DL (ref 32–36)
MCHC RBC AUTO-ENTMCNC: 31.5 G/DL (ref 32–36)
MCHC RBC AUTO-ENTMCNC: 31.6 G/DL (ref 32–36)
MCHC RBC AUTO-ENTMCNC: 31.7 G/DL (ref 32–36)
MCHC RBC AUTO-ENTMCNC: 31.8 G/DL (ref 32–36)
MCHC RBC AUTO-ENTMCNC: 32 G/DL (ref 32–36)
MCHC RBC AUTO-ENTMCNC: 32 G/DL (ref 32–36)
MCHC RBC AUTO-ENTMCNC: 32.1 G/DL (ref 32–36)
MCHC RBC AUTO-ENTMCNC: 32.1 G/DL (ref 32–36)
MCHC RBC AUTO-ENTMCNC: 32.2 G/DL (ref 32–36)
MCHC RBC AUTO-ENTMCNC: 32.2 G/DL (ref 32–36)
MCHC RBC AUTO-ENTMCNC: 32.3 G/DL (ref 32–36)
MCHC RBC AUTO-ENTMCNC: 32.5 G/DL (ref 32–36)
MCHC RBC AUTO-ENTMCNC: 32.5 G/DL (ref 32–36)
MCHC RBC AUTO-ENTMCNC: 32.6 G/DL (ref 32–36)
MCHC RBC AUTO-ENTMCNC: 32.6 G/DL (ref 32–36)
MCHC RBC AUTO-ENTMCNC: 32.7 G/DL (ref 32–36)
MCHC RBC AUTO-ENTMCNC: 32.7 G/DL (ref 32–36)
MCHC RBC AUTO-ENTMCNC: 32.8 G/DL (ref 32–36)
MCHC RBC AUTO-ENTMCNC: 32.8 G/DL (ref 32–36)
MCHC RBC AUTO-ENTMCNC: 32.9 G/DL (ref 32–36)
MCHC RBC AUTO-ENTMCNC: 33 G/DL (ref 32–36)
MCHC RBC AUTO-ENTMCNC: 33.2 G/DL (ref 32–36)
MCHC RBC AUTO-ENTMCNC: 33.2 G/DL (ref 32–36)
MCHC RBC AUTO-ENTMCNC: 33.5 G/DL (ref 32–36)
MCHC RBC AUTO-ENTMCNC: 33.6 G/DL (ref 32–36)
MCHC RBC AUTO-ENTMCNC: 33.6 G/DL (ref 32–36)
MCV RBC AUTO: 100 FL (ref 82–98)
MCV RBC AUTO: 101 FL (ref 82–98)
MCV RBC AUTO: 101 FL (ref 82–98)
MCV RBC AUTO: 102 FL (ref 82–98)
MCV RBC AUTO: 91 FL (ref 82–98)
MCV RBC AUTO: 92 FL (ref 82–98)
MCV RBC AUTO: 93 FL (ref 82–98)
MCV RBC AUTO: 94 FL (ref 82–98)
MCV RBC AUTO: 94 FL (ref 82–98)
MCV RBC AUTO: 95 FL (ref 82–98)
MCV RBC AUTO: 96 FL (ref 82–98)
MCV RBC AUTO: 97 FL (ref 82–98)
MCV RBC AUTO: 98 FL (ref 82–98)
MCV RBC AUTO: 99 FL (ref 82–98)
MESOTHL CELL NFR FLD MANUAL: 39 %
MICROSCOPIC COMMENT: ABNORMAL
MONOCYTES # BLD AUTO: 0.3 K/UL (ref 0.3–1)
MONOCYTES # BLD AUTO: 0.4 K/UL (ref 0.3–1)
MONOCYTES # BLD AUTO: 0.5 K/UL (ref 0.3–1)
MONOCYTES # BLD AUTO: 0.6 K/UL (ref 0.3–1)
MONOCYTES NFR BLD: 10 % (ref 4–15)
MONOCYTES NFR BLD: 10.7 % (ref 4–15)
MONOCYTES NFR BLD: 11.9 % (ref 4–15)
MONOCYTES NFR BLD: 11.9 % (ref 4–15)
MONOCYTES NFR BLD: 4.9 % (ref 4–15)
MONOCYTES NFR BLD: 5.4 % (ref 4–15)
MONOCYTES NFR BLD: 6.4 % (ref 4–15)
MONOCYTES NFR BLD: 6.6 % (ref 4–15)
MONOCYTES NFR BLD: 6.9 % (ref 4–15)
MONOCYTES NFR BLD: 7.1 % (ref 4–15)
MONOCYTES NFR BLD: 7.4 % (ref 4–15)
MONOCYTES NFR BLD: 7.4 % (ref 4–15)
MONOCYTES NFR BLD: 7.6 % (ref 4–15)
MONOCYTES NFR BLD: 7.7 % (ref 4–15)
MONOCYTES NFR BLD: 7.9 % (ref 4–15)
MONOCYTES NFR BLD: 8 % (ref 4–15)
MONOCYTES NFR BLD: 8.1 % (ref 4–15)
MONOCYTES NFR BLD: 8.1 % (ref 4–15)
MONOCYTES NFR BLD: 8.6 % (ref 4–15)
MONOCYTES NFR BLD: 8.7 % (ref 4–15)
MONOCYTES NFR BLD: 9.2 % (ref 4–15)
MONOCYTES NFR BLD: 9.2 % (ref 4–15)
MONOCYTES NFR BLD: 9.6 % (ref 4–15)
MONOCYTES NFR BLD: 9.7 % (ref 4–15)
MONOCYTES NFR BLD: 9.9 % (ref 4–15)
MONOCYTES NFR BLD: 9.9 % (ref 4–15)
MONOS+MACROS NFR FLD MANUAL: 7 %
MV PEAK A VEL: 1.39 M/S
MV PEAK A VEL: 1.41 M/S
MV PEAK E VEL: 1.28 M/S
MV PEAK E VEL: 1.52 M/S
NEUTROPHILS # BLD AUTO: 2.5 K/UL (ref 1.8–7.7)
NEUTROPHILS # BLD AUTO: 2.5 K/UL (ref 1.8–7.7)
NEUTROPHILS # BLD AUTO: 2.6 K/UL (ref 1.8–7.7)
NEUTROPHILS # BLD AUTO: 2.8 K/UL (ref 1.8–7.7)
NEUTROPHILS # BLD AUTO: 2.9 K/UL (ref 1.8–7.7)
NEUTROPHILS # BLD AUTO: 3.1 K/UL (ref 1.8–7.7)
NEUTROPHILS # BLD AUTO: 3.2 K/UL (ref 1.8–7.7)
NEUTROPHILS # BLD AUTO: 3.3 K/UL (ref 1.8–7.7)
NEUTROPHILS # BLD AUTO: 3.5 K/UL (ref 1.8–7.7)
NEUTROPHILS # BLD AUTO: 3.5 K/UL (ref 1.8–7.7)
NEUTROPHILS # BLD AUTO: 3.8 K/UL (ref 1.8–7.7)
NEUTROPHILS # BLD AUTO: 4.4 K/UL (ref 1.8–7.7)
NEUTROPHILS # BLD AUTO: 4.7 K/UL (ref 1.8–7.7)
NEUTROPHILS # BLD AUTO: 4.8 K/UL (ref 1.8–7.7)
NEUTROPHILS # BLD AUTO: 5.4 K/UL (ref 1.8–7.7)
NEUTROPHILS # BLD AUTO: 6 K/UL (ref 1.8–7.7)
NEUTROPHILS # BLD AUTO: 8.4 K/UL (ref 1.8–7.7)
NEUTROPHILS NFR BLD: 62.1 % (ref 38–73)
NEUTROPHILS NFR BLD: 62.1 % (ref 38–73)
NEUTROPHILS NFR BLD: 62.6 % (ref 38–73)
NEUTROPHILS NFR BLD: 62.7 % (ref 38–73)
NEUTROPHILS NFR BLD: 62.8 % (ref 38–73)
NEUTROPHILS NFR BLD: 66.2 % (ref 38–73)
NEUTROPHILS NFR BLD: 66.3 % (ref 38–73)
NEUTROPHILS NFR BLD: 66.8 % (ref 38–73)
NEUTROPHILS NFR BLD: 67 % (ref 38–73)
NEUTROPHILS NFR BLD: 67.2 % (ref 38–73)
NEUTROPHILS NFR BLD: 67.6 % (ref 38–73)
NEUTROPHILS NFR BLD: 68.3 % (ref 38–73)
NEUTROPHILS NFR BLD: 68.7 % (ref 38–73)
NEUTROPHILS NFR BLD: 70.9 % (ref 38–73)
NEUTROPHILS NFR BLD: 71.1 % (ref 38–73)
NEUTROPHILS NFR BLD: 73.5 % (ref 38–73)
NEUTROPHILS NFR BLD: 74 % (ref 38–73)
NEUTROPHILS NFR BLD: 74 % (ref 38–73)
NEUTROPHILS NFR BLD: 74.7 % (ref 38–73)
NEUTROPHILS NFR BLD: 74.7 % (ref 38–73)
NEUTROPHILS NFR BLD: 74.9 % (ref 38–73)
NEUTROPHILS NFR BLD: 78.9 % (ref 38–73)
NEUTROPHILS NFR BLD: 80 % (ref 38–73)
NEUTROPHILS NFR BLD: 81.4 % (ref 38–73)
NEUTROPHILS NFR BLD: 82.2 % (ref 38–73)
NEUTROPHILS NFR BLD: 82.2 % (ref 38–73)
NEUTROPHILS NFR FLD MANUAL: 26 %
NITRITE UR QL STRIP: NEGATIVE
NONHDLC SERPL-MCNC: 106 MG/DL
NONHDLC SERPL-MCNC: 85 MG/DL
NRBC BLD-RTO: 0 /100 WBC
NUM UNITS TRANS PACKED RBC: NORMAL
OPIATES UR QL SCN: NORMAL
PCP UR QL SCN>25 NG/ML: NEGATIVE
PH UR STRIP: >8 [PH] (ref 5–8)
PHOSPHATE SERPL-MCNC: 5.3 MG/DL (ref 2.7–4.5)
PHOSPHATE SERPL-MCNC: 5.4 MG/DL (ref 2.7–4.5)
PHOSPHATE SERPL-MCNC: 6.3 MG/DL (ref 2.7–4.5)
PHOSPHATE SERPL-MCNC: 7.8 MG/DL (ref 2.7–4.5)
PISA TR MAX VEL: 2.33 M/S
PISA TR MAX VEL: 3.34 M/S
PLATELET # BLD AUTO: 108 K/UL (ref 150–350)
PLATELET # BLD AUTO: 111 K/UL (ref 150–350)
PLATELET # BLD AUTO: 112 K/UL (ref 150–350)
PLATELET # BLD AUTO: 112 K/UL (ref 150–350)
PLATELET # BLD AUTO: 113 K/UL (ref 150–350)
PLATELET # BLD AUTO: 116 K/UL (ref 150–350)
PLATELET # BLD AUTO: 121 K/UL (ref 150–350)
PLATELET # BLD AUTO: 130 K/UL (ref 150–350)
PLATELET # BLD AUTO: 134 K/UL (ref 150–350)
PLATELET # BLD AUTO: 136 K/UL (ref 150–350)
PLATELET # BLD AUTO: 137 K/UL (ref 150–350)
PLATELET # BLD AUTO: 138 K/UL (ref 150–350)
PLATELET # BLD AUTO: 138 K/UL (ref 150–350)
PLATELET # BLD AUTO: 140 K/UL (ref 150–350)
PLATELET # BLD AUTO: 142 K/UL (ref 150–350)
PLATELET # BLD AUTO: 144 K/UL (ref 150–350)
PLATELET # BLD AUTO: 149 K/UL (ref 150–350)
PLATELET # BLD AUTO: 150 K/UL (ref 150–350)
PLATELET # BLD AUTO: 151 K/UL (ref 150–350)
PLATELET # BLD AUTO: 158 K/UL (ref 150–350)
PLATELET # BLD AUTO: 158 K/UL (ref 150–350)
PLATELET # BLD AUTO: 159 K/UL (ref 150–350)
PLATELET # BLD AUTO: 170 K/UL (ref 150–350)
PLATELET # BLD AUTO: 170 K/UL (ref 150–350)
PLATELET # BLD AUTO: 176 K/UL (ref 150–350)
PLATELET # BLD AUTO: 178 K/UL (ref 150–350)
PLATELET # BLD AUTO: 181 K/UL (ref 150–350)
PLATELET # BLD AUTO: 186 K/UL (ref 150–350)
PLATELET # BLD AUTO: 192 K/UL (ref 150–350)
PLATELET # BLD AUTO: 194 K/UL (ref 150–350)
PLATELET # BLD AUTO: 254 K/UL (ref 150–350)
PLATELET BLD QL SMEAR: NORMAL
PLATELET BLD QL SMEAR: NORMAL
PMV BLD AUTO: 10 FL (ref 9.2–12.9)
PMV BLD AUTO: 10.1 FL (ref 9.2–12.9)
PMV BLD AUTO: 10.1 FL (ref 9.2–12.9)
PMV BLD AUTO: 10.2 FL (ref 9.2–12.9)
PMV BLD AUTO: 10.3 FL (ref 9.2–12.9)
PMV BLD AUTO: 10.4 FL (ref 9.2–12.9)
PMV BLD AUTO: 10.4 FL (ref 9.2–12.9)
PMV BLD AUTO: 8.9 FL (ref 9.2–12.9)
PMV BLD AUTO: 9 FL (ref 9.2–12.9)
PMV BLD AUTO: 9.1 FL (ref 9.2–12.9)
PMV BLD AUTO: 9.1 FL (ref 9.2–12.9)
PMV BLD AUTO: 9.3 FL (ref 9.2–12.9)
PMV BLD AUTO: 9.4 FL (ref 9.2–12.9)
PMV BLD AUTO: 9.6 FL (ref 9.2–12.9)
PMV BLD AUTO: 9.7 FL (ref 9.2–12.9)
PMV BLD AUTO: 9.8 FL (ref 9.2–12.9)
PMV BLD AUTO: 9.9 FL (ref 9.2–12.9)
POTASSIUM SERPL-SCNC: 4.1 MMOL/L (ref 3.5–5.1)
POTASSIUM SERPL-SCNC: 4.1 MMOL/L (ref 3.5–5.1)
POTASSIUM SERPL-SCNC: 4.3 MMOL/L (ref 3.5–5.1)
POTASSIUM SERPL-SCNC: 4.3 MMOL/L (ref 3.5–5.1)
POTASSIUM SERPL-SCNC: 4.4 MMOL/L (ref 3.5–5.1)
POTASSIUM SERPL-SCNC: 4.4 MMOL/L (ref 3.5–5.1)
POTASSIUM SERPL-SCNC: 4.5 MMOL/L (ref 3.5–5.1)
POTASSIUM SERPL-SCNC: 4.6 MMOL/L (ref 3.5–5.1)
POTASSIUM SERPL-SCNC: 4.7 MMOL/L (ref 3.5–5.1)
POTASSIUM SERPL-SCNC: 4.8 MMOL/L (ref 3.5–5.1)
POTASSIUM SERPL-SCNC: 4.9 MMOL/L (ref 3.5–5.1)
POTASSIUM SERPL-SCNC: 5 MMOL/L (ref 3.5–5.1)
POTASSIUM SERPL-SCNC: 5.2 MMOL/L (ref 3.5–5.1)
POTASSIUM SERPL-SCNC: 5.4 MMOL/L (ref 3.5–5.1)
POTASSIUM SERPL-SCNC: 5.5 MMOL/L (ref 3.5–5.1)
POTASSIUM SERPL-SCNC: 5.9 MMOL/L (ref 3.5–5.1)
POTASSIUM SERPL-SCNC: 6 MMOL/L (ref 3.5–5.1)
PROT FLD-MCNC: <3 G/DL
PROT SERPL-MCNC: 4.5 G/DL (ref 6–8.4)
PROT SERPL-MCNC: 4.7 G/DL (ref 6–8.4)
PROT SERPL-MCNC: 4.8 G/DL (ref 6–8.4)
PROT SERPL-MCNC: 4.9 G/DL (ref 6–8.4)
PROT SERPL-MCNC: 5 G/DL (ref 6–8.4)
PROT SERPL-MCNC: 5.2 G/DL (ref 6–8.4)
PROT SERPL-MCNC: 5.3 G/DL (ref 6–8.4)
PROT SERPL-MCNC: 5.3 G/DL (ref 6–8.4)
PROT SERPL-MCNC: 5.5 G/DL (ref 6–8.4)
PROT SERPL-MCNC: 5.5 G/DL (ref 6–8.4)
PROT SERPL-MCNC: 5.9 G/DL (ref 6–8.4)
PROT UR QL STRIP: ABNORMAL
PROTHROMBIN TIME: 12.8 SEC (ref 10.6–14.8)
PROTHROMBIN TIME: 12.9 SEC (ref 10.6–14.8)
PROTHROMBIN TIME: 13 SEC (ref 10.6–14.8)
PROTHROMBIN TIME: 13.5 SEC (ref 10.6–14.8)
PROTHROMBIN TIME: 13.6 SEC (ref 10.6–14.8)
PROTHROMBIN TIME: 13.6 SEC (ref 10.6–14.8)
PROTHROMBIN TIME: 13.7 SEC (ref 10.6–14.8)
PV PEAK VELOCITY: 133.22 CM/S
PV PEAK VELOCITY: 206.96 CM/S
RA PRESSURE: 15 MMHG
RA PRESSURE: 8 MMHG
RBC # BLD AUTO: 2.16 M/UL (ref 4–5.4)
RBC # BLD AUTO: 2.17 M/UL (ref 4–5.4)
RBC # BLD AUTO: 2.32 M/UL (ref 4–5.4)
RBC # BLD AUTO: 2.38 M/UL (ref 4–5.4)
RBC # BLD AUTO: 2.44 M/UL (ref 4–5.4)
RBC # BLD AUTO: 2.64 M/UL (ref 4–5.4)
RBC # BLD AUTO: 2.69 M/UL (ref 4–5.4)
RBC # BLD AUTO: 2.69 M/UL (ref 4–5.4)
RBC # BLD AUTO: 2.7 M/UL (ref 4–5.4)
RBC # BLD AUTO: 2.76 M/UL (ref 4–5.4)
RBC # BLD AUTO: 2.93 M/UL (ref 4–5.4)
RBC # BLD AUTO: 2.93 M/UL (ref 4–5.4)
RBC # BLD AUTO: 2.95 M/UL (ref 4–5.4)
RBC # BLD AUTO: 2.97 M/UL (ref 4–5.4)
RBC # BLD AUTO: 2.98 M/UL (ref 4–5.4)
RBC # BLD AUTO: 2.98 M/UL (ref 4–5.4)
RBC # BLD AUTO: 3.1 M/UL (ref 4–5.4)
RBC # BLD AUTO: 3.16 M/UL (ref 4–5.4)
RBC # BLD AUTO: 3.16 M/UL (ref 4–5.4)
RBC # BLD AUTO: 3.19 M/UL (ref 4–5.4)
RBC # BLD AUTO: 3.39 M/UL (ref 4–5.4)
RBC # BLD AUTO: 3.42 M/UL (ref 4–5.4)
RBC # BLD AUTO: 3.49 M/UL (ref 4–5.4)
RBC # BLD AUTO: 3.53 M/UL (ref 4–5.4)
RBC # BLD AUTO: 3.54 M/UL (ref 4–5.4)
RBC # BLD AUTO: 3.54 M/UL (ref 4–5.4)
RBC # BLD AUTO: 3.55 M/UL (ref 4–5.4)
RBC # BLD AUTO: 3.7 M/UL (ref 4–5.4)
RBC # BLD AUTO: 3.86 M/UL (ref 4–5.4)
RBC # BLD AUTO: 3.97 M/UL (ref 4–5.4)
RBC # BLD AUTO: 4.39 M/UL (ref 4–5.4)
RBC #/AREA URNS HPF: 9 /HPF (ref 0–4)
RIGHT VENTRICULAR END-DIASTOLIC DIMENSION: 261 CM
SARS-COV-2 RDRP RESP QL NAA+PROBE: NEGATIVE
SODIUM SERPL-SCNC: 132 MMOL/L (ref 136–145)
SODIUM SERPL-SCNC: 133 MMOL/L (ref 136–145)
SODIUM SERPL-SCNC: 133 MMOL/L (ref 136–145)
SODIUM SERPL-SCNC: 134 MMOL/L (ref 136–145)
SODIUM SERPL-SCNC: 135 MMOL/L (ref 136–145)
SODIUM SERPL-SCNC: 136 MMOL/L (ref 136–145)
SODIUM SERPL-SCNC: 137 MMOL/L (ref 136–145)
SODIUM SERPL-SCNC: 139 MMOL/L (ref 136–145)
SP GR UR STRIP: 1.01 (ref 1–1.03)
SPECIMEN SOURCE: NORMAL
SPECIMEN SOURCE: NORMAL
SQUAMOUS #/AREA URNS HPF: 7 /HPF
T4 FREE SERPL-MCNC: 0.79 NG/DL (ref 0.71–1.51)
TDI LATERAL: 0.09 M/S
TDI LATERAL: 0.09 M/S
TDI SEPTAL: 0.05 M/S
TDI SEPTAL: 0.06 M/S
TDI: 0.07 M/S
TDI: 0.08 M/S
TOXICOLOGY INFORMATION: NORMAL
TR MAX PG: 22 MMHG
TR MAX PG: 45 MMHG
TRIGL SERPL-MCNC: 141 MG/DL (ref 30–150)
TRIGL SERPL-MCNC: 98 MG/DL (ref 30–150)
TROPONIN I SERPL DL<=0.01 NG/ML-MCNC: 0.04 NG/ML
TROPONIN I SERPL DL<=0.01 NG/ML-MCNC: 0.08 NG/ML
TROPONIN I SERPL DL<=0.01 NG/ML-MCNC: 0.08 NG/ML
TROPONIN I SERPL DL<=0.01 NG/ML-MCNC: 0.11 NG/ML
TROPONIN I SERPL DL<=0.01 NG/ML-MCNC: 0.11 NG/ML
TROPONIN I SERPL DL<=0.01 NG/ML-MCNC: 0.12 NG/ML
TROPONIN I SERPL DL<=0.01 NG/ML-MCNC: 0.13 NG/ML
TROPONIN I SERPL DL<=0.01 NG/ML-MCNC: 0.14 NG/ML
TROPONIN I SERPL DL<=0.01 NG/ML-MCNC: 0.14 NG/ML
TROPONIN I SERPL DL<=0.01 NG/ML-MCNC: 0.15 NG/ML
TROPONIN I SERPL DL<=0.01 NG/ML-MCNC: 0.16 NG/ML
TROPONIN I SERPL DL<=0.01 NG/ML-MCNC: 0.18 NG/ML
TROPONIN I SERPL DL<=0.01 NG/ML-MCNC: 0.2 NG/ML
TROPONIN I SERPL DL<=0.01 NG/ML-MCNC: 0.77 NG/ML
TROPONIN I SERPL DL<=0.01 NG/ML-MCNC: 14.19 NG/ML
TROPONIN I SERPL DL<=0.01 NG/ML-MCNC: 18.88 NG/ML
TROPONIN I SERPL DL<=0.01 NG/ML-MCNC: 20.12 NG/ML
TROPONIN I SERPL DL<=0.01 NG/ML-MCNC: 22.73 NG/ML
TSH SERPL DL<=0.005 MIU/L-ACNC: 5.82 UIU/ML (ref 0.34–5.6)
TV REST PULMONARY ARTERY PRESSURE: 30 MMHG
TV REST PULMONARY ARTERY PRESSURE: 60 MMHG
URN SPEC COLLECT METH UR: ABNORMAL
UROBILINOGEN UR STRIP-ACNC: NEGATIVE EU/DL
WBC # BLD AUTO: 10.17 K/UL (ref 3.9–12.7)
WBC # BLD AUTO: 3.65 K/UL (ref 3.9–12.7)
WBC # BLD AUTO: 3.69 K/UL (ref 3.9–12.7)
WBC # BLD AUTO: 3.79 K/UL (ref 3.9–12.7)
WBC # BLD AUTO: 3.9 K/UL (ref 3.9–12.7)
WBC # BLD AUTO: 3.91 K/UL (ref 3.9–12.7)
WBC # BLD AUTO: 3.92 K/UL (ref 3.9–12.7)
WBC # BLD AUTO: 3.92 K/UL (ref 3.9–12.7)
WBC # BLD AUTO: 4 K/UL (ref 3.9–12.7)
WBC # BLD AUTO: 4.05 K/UL (ref 3.9–12.7)
WBC # BLD AUTO: 4.13 K/UL (ref 3.9–12.7)
WBC # BLD AUTO: 4.15 K/UL (ref 3.9–12.7)
WBC # BLD AUTO: 4.16 K/UL (ref 3.9–12.7)
WBC # BLD AUTO: 4.28 K/UL (ref 3.9–12.7)
WBC # BLD AUTO: 4.33 K/UL (ref 3.9–12.7)
WBC # BLD AUTO: 4.38 K/UL (ref 3.9–12.7)
WBC # BLD AUTO: 4.48 K/UL (ref 3.9–12.7)
WBC # BLD AUTO: 4.53 K/UL (ref 3.9–12.7)
WBC # BLD AUTO: 4.53 K/UL (ref 3.9–12.7)
WBC # BLD AUTO: 4.66 K/UL (ref 3.9–12.7)
WBC # BLD AUTO: 4.73 K/UL (ref 3.9–12.7)
WBC # BLD AUTO: 4.73 K/UL (ref 3.9–12.7)
WBC # BLD AUTO: 4.85 K/UL (ref 3.9–12.7)
WBC # BLD AUTO: 4.96 K/UL (ref 3.9–12.7)
WBC # BLD AUTO: 5.33 K/UL (ref 3.9–12.7)
WBC # BLD AUTO: 5.75 K/UL (ref 3.9–12.7)
WBC # BLD AUTO: 5.89 K/UL (ref 3.9–12.7)
WBC # BLD AUTO: 6.02 K/UL (ref 3.9–12.7)
WBC # BLD AUTO: 6.3 K/UL (ref 3.9–12.7)
WBC # BLD AUTO: 6.3 K/UL (ref 3.9–12.7)
WBC # BLD AUTO: 6.45 K/UL (ref 3.9–12.7)
WBC # BLD AUTO: 6.78 K/UL (ref 3.9–12.7)
WBC # BLD AUTO: 7.23 K/UL (ref 3.9–12.7)
WBC # FLD: 179 /CU MM
WBC #/AREA URNS HPF: 0 /HPF (ref 0–5)

## 2020-01-01 PROCEDURE — 36415 COLL VENOUS BLD VENIPUNCTURE: CPT

## 2020-01-01 PROCEDURE — 63600175 PHARM REV CODE 636 W HCPCS: Performed by: INTERNAL MEDICINE

## 2020-01-01 PROCEDURE — 99223 PR INITIAL HOSPITAL CARE,LEVL III: ICD-10-PCS | Mod: ,,, | Performed by: PHYSICIAN ASSISTANT

## 2020-01-01 PROCEDURE — C9113 INJ PANTOPRAZOLE SODIUM, VIA: HCPCS | Performed by: EMERGENCY MEDICINE

## 2020-01-01 PROCEDURE — 83735 ASSAY OF MAGNESIUM: CPT

## 2020-01-01 PROCEDURE — 85025 COMPLETE CBC W/AUTO DIFF WBC: CPT

## 2020-01-01 PROCEDURE — 99495 TCM SERVICES (MODERATE COMPLEXITY): ICD-10-PCS | Mod: S$PBB,,, | Performed by: FAMILY MEDICINE

## 2020-01-01 PROCEDURE — 25000003 PHARM REV CODE 250: Performed by: NURSE PRACTITIONER

## 2020-01-01 PROCEDURE — 83036 HEMOGLOBIN GLYCOSYLATED A1C: CPT

## 2020-01-01 PROCEDURE — 25000003 PHARM REV CODE 250

## 2020-01-01 PROCEDURE — 93010 EKG 12-LEAD: ICD-10-PCS | Mod: 76,,, | Performed by: INTERNAL MEDICINE

## 2020-01-01 PROCEDURE — 82042 OTHER SOURCE ALBUMIN QUAN EA: CPT

## 2020-01-01 PROCEDURE — 84484 ASSAY OF TROPONIN QUANT: CPT | Mod: 91

## 2020-01-01 PROCEDURE — 80053 COMPREHEN METABOLIC PANEL: CPT

## 2020-01-01 PROCEDURE — G0378 HOSPITAL OBSERVATION PER HR: HCPCS | Mod: CS

## 2020-01-01 PROCEDURE — 25000003 PHARM REV CODE 250: Performed by: PHYSICIAN ASSISTANT

## 2020-01-01 PROCEDURE — 63600175 PHARM REV CODE 636 W HCPCS

## 2020-01-01 PROCEDURE — 25000003 PHARM REV CODE 250: Performed by: INTERNAL MEDICINE

## 2020-01-01 PROCEDURE — 93010 EKG 12-LEAD: ICD-10-PCS | Mod: ,,, | Performed by: INTERNAL MEDICINE

## 2020-01-01 PROCEDURE — 25000242 PHARM REV CODE 250 ALT 637 W/ HCPCS: Performed by: STUDENT IN AN ORGANIZED HEALTH CARE EDUCATION/TRAINING PROGRAM

## 2020-01-01 PROCEDURE — 21400001 HC TELEMETRY ROOM

## 2020-01-01 PROCEDURE — 99285 EMERGENCY DEPT VISIT HI MDM: CPT | Mod: 25

## 2020-01-01 PROCEDURE — 94761 N-INVAS EAR/PLS OXIMETRY MLT: CPT

## 2020-01-01 PROCEDURE — 93005 ELECTROCARDIOGRAM TRACING: CPT | Performed by: INTERNAL MEDICINE

## 2020-01-01 PROCEDURE — 84484 ASSAY OF TROPONIN QUANT: CPT

## 2020-01-01 PROCEDURE — 85730 THROMBOPLASTIN TIME PARTIAL: CPT

## 2020-01-01 PROCEDURE — C1760 CLOSURE DEV, VASC: HCPCS | Performed by: SPECIALIST

## 2020-01-01 PROCEDURE — 99900035 HC TECH TIME PER 15 MIN (STAT)

## 2020-01-01 PROCEDURE — 27201423 OPTIME MED/SURG SUP & DEVICES STERILE SUPPLY: Performed by: SPECIALIST

## 2020-01-01 PROCEDURE — 84100 ASSAY OF PHOSPHORUS: CPT

## 2020-01-01 PROCEDURE — 97165 OT EVAL LOW COMPLEX 30 MIN: CPT

## 2020-01-01 PROCEDURE — 99999 PR PBB SHADOW E&M-EST. PATIENT-LVL III: ICD-10-PCS | Mod: PBBFAC,,, | Performed by: THORACIC SURGERY (CARDIOTHORACIC VASCULAR SURGERY)

## 2020-01-01 PROCEDURE — P9016 RBC LEUKOCYTES REDUCED: HCPCS

## 2020-01-01 PROCEDURE — 85027 COMPLETE CBC AUTOMATED: CPT

## 2020-01-01 PROCEDURE — 96375 TX/PRO/DX INJ NEW DRUG ADDON: CPT

## 2020-01-01 PROCEDURE — 85014 HEMATOCRIT: CPT

## 2020-01-01 PROCEDURE — 80048 BASIC METABOLIC PNL TOTAL CA: CPT

## 2020-01-01 PROCEDURE — G0378 HOSPITAL OBSERVATION PER HR: HCPCS

## 2020-01-01 PROCEDURE — 86706 HEP B SURFACE ANTIBODY: CPT

## 2020-01-01 PROCEDURE — 99215 OFFICE O/P EST HI 40 MIN: CPT | Mod: ,,, | Performed by: INTERNAL MEDICINE

## 2020-01-01 PROCEDURE — 85610 PROTHROMBIN TIME: CPT

## 2020-01-01 PROCEDURE — 82553 CREATINE MB FRACTION: CPT

## 2020-01-01 PROCEDURE — 20000000 HC ICU ROOM

## 2020-01-01 PROCEDURE — 49083 ABD PARACENTESIS W/IMAGING: CPT

## 2020-01-01 PROCEDURE — 25000242 PHARM REV CODE 250 ALT 637 W/ HCPCS: Performed by: THORACIC SURGERY (CARDIOTHORACIC VASCULAR SURGERY)

## 2020-01-01 PROCEDURE — 89051 BODY FLUID CELL COUNT: CPT

## 2020-01-01 PROCEDURE — 25000242 PHARM REV CODE 250 ALT 637 W/ HCPCS: Performed by: INTERNAL MEDICINE

## 2020-01-01 PROCEDURE — 36430 TRANSFUSION BLD/BLD COMPNT: CPT

## 2020-01-01 PROCEDURE — 25000242 PHARM REV CODE 250 ALT 637 W/ HCPCS: Performed by: EMERGENCY MEDICINE

## 2020-01-01 PROCEDURE — 85730 THROMBOPLASTIN TIME PARTIAL: CPT | Mod: 91

## 2020-01-01 PROCEDURE — 85025 COMPLETE CBC W/AUTO DIFF WBC: CPT | Mod: 91

## 2020-01-01 PROCEDURE — 99213 OFFICE O/P EST LOW 20 MIN: CPT | Mod: PBBFAC | Performed by: THORACIC SURGERY (CARDIOTHORACIC VASCULAR SURGERY)

## 2020-01-01 PROCEDURE — 90935 HEMODIALYSIS ONE EVALUATION: CPT

## 2020-01-01 PROCEDURE — 25000003 PHARM REV CODE 250: Performed by: SPECIALIST

## 2020-01-01 PROCEDURE — 99215 OFFICE O/P EST HI 40 MIN: CPT | Performed by: FAMILY MEDICINE

## 2020-01-01 PROCEDURE — 25000003 PHARM REV CODE 250: Performed by: EMERGENCY MEDICINE

## 2020-01-01 PROCEDURE — 27000221 HC OXYGEN, UP TO 24 HOURS

## 2020-01-01 PROCEDURE — 93010 ELECTROCARDIOGRAM REPORT: CPT | Mod: 76,,, | Performed by: INTERNAL MEDICINE

## 2020-01-01 PROCEDURE — 86704 HEP B CORE ANTIBODY TOTAL: CPT

## 2020-01-01 PROCEDURE — 83615 LACTATE (LD) (LDH) ENZYME: CPT

## 2020-01-01 PROCEDURE — 93010 ELECTROCARDIOGRAM REPORT: CPT | Mod: ,,, | Performed by: INTERNAL MEDICINE

## 2020-01-01 PROCEDURE — 99291 CRITICAL CARE FIRST HOUR: CPT

## 2020-01-01 PROCEDURE — 86850 RBC ANTIBODY SCREEN: CPT

## 2020-01-01 PROCEDURE — 99231 SBSQ HOSP IP/OBS SF/LOW 25: CPT | Mod: ,,, | Performed by: THORACIC SURGERY (CARDIOTHORACIC VASCULAR SURGERY)

## 2020-01-01 PROCEDURE — 63600175 PHARM REV CODE 636 W HCPCS: Performed by: NURSE PRACTITIONER

## 2020-01-01 PROCEDURE — 30000890 LABCORP MISCELLANEOUS TEST

## 2020-01-01 PROCEDURE — 97161 PT EVAL LOW COMPLEX 20 MIN: CPT

## 2020-01-01 PROCEDURE — 27000342 US GUIDED PARACENTESIS INC IMAGING

## 2020-01-01 PROCEDURE — 99231 PR SUBSEQUENT HOSPITAL CARE,LEVL I: ICD-10-PCS | Mod: ,,, | Performed by: THORACIC SURGERY (CARDIOTHORACIC VASCULAR SURGERY)

## 2020-01-01 PROCEDURE — 36592 COLLECT BLOOD FROM PICC: CPT

## 2020-01-01 PROCEDURE — 25000242 PHARM REV CODE 250 ALT 637 W/ HCPCS: Performed by: NURSE PRACTITIONER

## 2020-01-01 PROCEDURE — 87075 CULTR BACTERIA EXCEPT BLOOD: CPT

## 2020-01-01 PROCEDURE — 99212 PR OFFICE/OUTPT VISIT, EST, LEVL II, 10-19 MIN: ICD-10-PCS | Mod: ,,, | Performed by: THORACIC SURGERY (CARDIOTHORACIC VASCULAR SURGERY)

## 2020-01-01 PROCEDURE — 88305 TISSUE EXAM BY PATHOLOGIST: CPT | Mod: TC

## 2020-01-01 PROCEDURE — 99203 PR OFFICE/OUTPT VISIT, NEW, LEVL III, 30-44 MIN: ICD-10-PCS | Mod: ,,, | Performed by: THORACIC SURGERY (CARDIOTHORACIC VASCULAR SURGERY)

## 2020-01-01 PROCEDURE — 93005 ELECTROCARDIOGRAM TRACING: CPT

## 2020-01-01 PROCEDURE — 84439 ASSAY OF FREE THYROXINE: CPT

## 2020-01-01 PROCEDURE — 99214 PR OFFICE/OUTPT VISIT, EST, LEVL IV, 30-39 MIN: ICD-10-PCS | Mod: ,,, | Performed by: INTERNAL MEDICINE

## 2020-01-01 PROCEDURE — 81001 URINALYSIS AUTO W/SCOPE: CPT | Mod: 59

## 2020-01-01 PROCEDURE — 92950 HEART/LUNG RESUSCITATION CPR: CPT

## 2020-01-01 PROCEDURE — 83615 LACTATE (LD) (LDH) ENZYME: CPT | Mod: 91

## 2020-01-01 PROCEDURE — 86920 COMPATIBILITY TEST SPIN: CPT

## 2020-01-01 PROCEDURE — 63600175 PHARM REV CODE 636 W HCPCS: Performed by: EMERGENCY MEDICINE

## 2020-01-01 PROCEDURE — 93459 L HRT ART/GRFT ANGIO: CPT

## 2020-01-01 PROCEDURE — C1725 CATH, TRANSLUMIN NON-LASER: HCPCS | Performed by: SPECIALIST

## 2020-01-01 PROCEDURE — 80307 DRUG TEST PRSMV CHEM ANLYZR: CPT

## 2020-01-01 PROCEDURE — 96374 THER/PROPH/DIAG INJ IV PUSH: CPT

## 2020-01-01 PROCEDURE — 80061 LIPID PANEL: CPT

## 2020-01-01 PROCEDURE — 82550 ASSAY OF CK (CPK): CPT

## 2020-01-01 PROCEDURE — 83880 ASSAY OF NATRIURETIC PEPTIDE: CPT

## 2020-01-01 PROCEDURE — 80069 RENAL FUNCTION PANEL: CPT

## 2020-01-01 PROCEDURE — 85610 PROTHROMBIN TIME: CPT | Mod: 91

## 2020-01-01 PROCEDURE — U0002 COVID-19 LAB TEST NON-CDC: HCPCS

## 2020-01-01 PROCEDURE — 99153 MOD SED SAME PHYS/QHP EA: CPT | Performed by: SPECIALIST

## 2020-01-01 PROCEDURE — 99232 SBSQ HOSP IP/OBS MODERATE 35: CPT | Mod: ,,, | Performed by: PHYSICIAN ASSISTANT

## 2020-01-01 PROCEDURE — 99212 OFFICE O/P EST SF 10 MIN: CPT | Mod: ,,, | Performed by: THORACIC SURGERY (CARDIOTHORACIC VASCULAR SURGERY)

## 2020-01-01 PROCEDURE — 96372 THER/PROPH/DIAG INJ SC/IM: CPT | Mod: 59

## 2020-01-01 PROCEDURE — 87205 SMEAR GRAM STAIN: CPT

## 2020-01-01 PROCEDURE — 99223 1ST HOSP IP/OBS HIGH 75: CPT | Mod: ,,, | Performed by: PHYSICIAN ASSISTANT

## 2020-01-01 PROCEDURE — 99999 PR PBB SHADOW E&M-EST. PATIENT-LVL III: CPT | Mod: PBBFAC,,, | Performed by: THORACIC SURGERY (CARDIOTHORACIC VASCULAR SURGERY)

## 2020-01-01 PROCEDURE — 99214 OFFICE O/P EST MOD 30 MIN: CPT | Mod: ,,, | Performed by: INTERNAL MEDICINE

## 2020-01-01 PROCEDURE — 31500 INSERT EMERGENCY AIRWAY: CPT

## 2020-01-01 PROCEDURE — 96376 TX/PRO/DX INJ SAME DRUG ADON: CPT | Mod: 59

## 2020-01-01 PROCEDURE — 25500020 PHARM REV CODE 255: Performed by: SPECIALIST

## 2020-01-01 PROCEDURE — 99495 TRANSJ CARE MGMT MOD F2F 14D: CPT | Mod: S$PBB,,, | Performed by: FAMILY MEDICINE

## 2020-01-01 PROCEDURE — 85018 HEMOGLOBIN: CPT

## 2020-01-01 PROCEDURE — 99204 PR OFFICE/OUTPT VISIT, NEW, LEVL IV, 45-59 MIN: ICD-10-PCS | Mod: S$PBB,,, | Performed by: THORACIC SURGERY (CARDIOTHORACIC VASCULAR SURGERY)

## 2020-01-01 PROCEDURE — 87340 HEPATITIS B SURFACE AG IA: CPT

## 2020-01-01 PROCEDURE — 63600175 PHARM REV CODE 636 W HCPCS: Performed by: SPECIALIST

## 2020-01-01 PROCEDURE — 99204 OFFICE O/P NEW MOD 45 MIN: CPT | Mod: S$PBB,,, | Performed by: THORACIC SURGERY (CARDIOTHORACIC VASCULAR SURGERY)

## 2020-01-01 PROCEDURE — 99215 PR OFFICE/OUTPT VISIT, EST, LEVL V, 40-54 MIN: ICD-10-PCS | Mod: ,,, | Performed by: INTERNAL MEDICINE

## 2020-01-01 PROCEDURE — 83690 ASSAY OF LIPASE: CPT

## 2020-01-01 PROCEDURE — 99233 PR SUBSEQUENT HOSPITAL CARE,LEVL III: ICD-10-PCS | Mod: ,,, | Performed by: NURSE PRACTITIONER

## 2020-01-01 PROCEDURE — 93306 TTE W/DOPPLER COMPLETE: CPT

## 2020-01-01 PROCEDURE — 93308 TTE F-UP OR LMTD: CPT

## 2020-01-01 PROCEDURE — 96365 THER/PROPH/DIAG IV INF INIT: CPT | Mod: 59

## 2020-01-01 PROCEDURE — 84443 ASSAY THYROID STIM HORMONE: CPT

## 2020-01-01 PROCEDURE — 99232 PR SUBSEQUENT HOSPITAL CARE,LEVL II: ICD-10-PCS | Mod: ,,, | Performed by: PHYSICIAN ASSISTANT

## 2020-01-01 PROCEDURE — 99233 SBSQ HOSP IP/OBS HIGH 50: CPT | Mod: ,,, | Performed by: NURSE PRACTITIONER

## 2020-01-01 PROCEDURE — 99203 OFFICE O/P NEW LOW 30 MIN: CPT | Mod: ,,, | Performed by: THORACIC SURGERY (CARDIOTHORACIC VASCULAR SURGERY)

## 2020-01-01 PROCEDURE — 82962 GLUCOSE BLOOD TEST: CPT

## 2020-01-01 PROCEDURE — 25000242 PHARM REV CODE 250 ALT 637 W/ HCPCS

## 2020-01-01 PROCEDURE — 99152 MOD SED SAME PHYS/QHP 5/>YRS: CPT | Performed by: SPECIALIST

## 2020-01-01 PROCEDURE — 99213 PR OFFICE/OUTPT VISIT, EST, LEVL III, 20-29 MIN: ICD-10-PCS | Mod: S$PBB,,, | Performed by: FAMILY MEDICINE

## 2020-01-01 PROCEDURE — 84157 ASSAY OF PROTEIN OTHER: CPT

## 2020-01-01 PROCEDURE — 99213 OFFICE O/P EST LOW 20 MIN: CPT | Mod: S$PBB,,, | Performed by: FAMILY MEDICINE

## 2020-01-01 PROCEDURE — 82248 BILIRUBIN DIRECT: CPT

## 2020-01-01 DEVICE — DEVICE CLOSURE  ANGIO-SEAL 6FR 610130: Type: IMPLANTABLE DEVICE | Site: GROIN | Status: FUNCTIONAL

## 2020-01-01 RX ORDER — SODIUM,POTASSIUM PHOSPHATES 280-250MG
2 POWDER IN PACKET (EA) ORAL
Status: DISCONTINUED | OUTPATIENT
Start: 2020-01-01 | End: 2020-01-01 | Stop reason: HOSPADM

## 2020-01-01 RX ORDER — METOPROLOL SUCCINATE 25 MG/1
50 TABLET, EXTENDED RELEASE ORAL NIGHTLY
Status: DISCONTINUED | OUTPATIENT
Start: 2020-01-01 | End: 2020-01-01

## 2020-01-01 RX ORDER — METOPROLOL SUCCINATE 50 MG/1
50 TABLET, EXTENDED RELEASE ORAL NIGHTLY
Status: DISCONTINUED | OUTPATIENT
Start: 2020-01-01 | End: 2020-01-01 | Stop reason: HOSPADM

## 2020-01-01 RX ORDER — NITROGLYCERIN 0.4 MG/1
0.4 TABLET SUBLINGUAL EVERY 5 MIN PRN
Status: DISCONTINUED | OUTPATIENT
Start: 2020-01-01 | End: 2020-01-01 | Stop reason: HOSPADM

## 2020-01-01 RX ORDER — HEPARIN SODIUM 10000 [USP'U]/100ML
17 INJECTION, SOLUTION INTRAVENOUS
Status: COMPLETED | OUTPATIENT
Start: 2020-01-01 | End: 2020-01-01

## 2020-01-01 RX ORDER — SODIUM CHLORIDE 9 MG/ML
INJECTION, SOLUTION INTRAVENOUS ONCE
Status: DISCONTINUED | OUTPATIENT
Start: 2020-01-01 | End: 2020-01-01 | Stop reason: HOSPADM

## 2020-01-01 RX ORDER — ONDANSETRON 2 MG/ML
4 INJECTION INTRAMUSCULAR; INTRAVENOUS EVERY 8 HOURS PRN
Status: DISCONTINUED | OUTPATIENT
Start: 2020-01-01 | End: 2020-01-01 | Stop reason: HOSPADM

## 2020-01-01 RX ORDER — LISINOPRIL 2.5 MG/1
2.5 TABLET ORAL DAILY
Status: DISCONTINUED | OUTPATIENT
Start: 2020-01-01 | End: 2020-01-01

## 2020-01-01 RX ORDER — ASPIRIN 81 MG/1
81 TABLET ORAL DAILY
Status: DISCONTINUED | OUTPATIENT
Start: 2020-01-01 | End: 2020-01-01 | Stop reason: HOSPADM

## 2020-01-01 RX ORDER — SODIUM CHLORIDE 9 MG/ML
INJECTION, SOLUTION INTRAVENOUS
Status: CANCELLED | OUTPATIENT
Start: 2020-01-01

## 2020-01-01 RX ORDER — AMOXICILLIN 250 MG
1 CAPSULE ORAL 2 TIMES DAILY
Status: DISCONTINUED | OUTPATIENT
Start: 2020-01-01 | End: 2020-01-01 | Stop reason: HOSPADM

## 2020-01-01 RX ORDER — ROSUVASTATIN CALCIUM 10 MG/1
10 TABLET, COATED ORAL NIGHTLY
Status: DISCONTINUED | OUTPATIENT
Start: 2020-01-01 | End: 2020-10-14 | Stop reason: HOSPADM

## 2020-01-01 RX ORDER — EPINEPHRINE 0.1 MG/ML
INJECTION INTRAVENOUS
Status: DISCONTINUED
Start: 2020-01-01 | End: 2020-10-14 | Stop reason: HOSPADM

## 2020-01-01 RX ORDER — NITROGLYCERIN 0.4 MG/1
0.4 TABLET SUBLINGUAL
Status: COMPLETED | OUTPATIENT
Start: 2020-01-01 | End: 2020-01-01

## 2020-01-01 RX ORDER — ISOSORBIDE MONONITRATE 30 MG/1
30 TABLET, EXTENDED RELEASE ORAL 2 TIMES DAILY
Status: DISCONTINUED | OUTPATIENT
Start: 2020-01-01 | End: 2020-01-01

## 2020-01-01 RX ORDER — MORPHINE SULFATE 4 MG/ML
4 INJECTION, SOLUTION INTRAMUSCULAR; INTRAVENOUS
Status: COMPLETED | OUTPATIENT
Start: 2020-01-01 | End: 2020-01-01

## 2020-01-01 RX ORDER — HEPARIN SODIUM,PORCINE/D5W 25000/250
12 INTRAVENOUS SOLUTION INTRAVENOUS CONTINUOUS
Status: DISCONTINUED | OUTPATIENT
Start: 2020-01-01 | End: 2020-01-01

## 2020-01-01 RX ORDER — SODIUM CHLORIDE 9 MG/ML
INJECTION, SOLUTION INTRAVENOUS
Status: DISCONTINUED | OUTPATIENT
Start: 2020-01-01 | End: 2020-01-01 | Stop reason: HOSPADM

## 2020-01-01 RX ORDER — PANTOPRAZOLE SODIUM 40 MG/1
40 TABLET, DELAYED RELEASE ORAL 2 TIMES DAILY
Status: DISCONTINUED | OUTPATIENT
Start: 2020-01-01 | End: 2020-01-01 | Stop reason: HOSPADM

## 2020-01-01 RX ORDER — PANTOPRAZOLE SODIUM 40 MG/1
40 TABLET, DELAYED RELEASE ORAL NIGHTLY
Status: DISCONTINUED | OUTPATIENT
Start: 2020-01-01 | End: 2020-01-01

## 2020-01-01 RX ORDER — HYDROCODONE BITARTRATE AND ACETAMINOPHEN 500; 5 MG/1; MG/1
TABLET ORAL
Status: DISCONTINUED | OUTPATIENT
Start: 2020-01-01 | End: 2020-01-01

## 2020-01-01 RX ORDER — ENOXAPARIN SODIUM 100 MG/ML
1 INJECTION SUBCUTANEOUS
Status: DISCONTINUED | OUTPATIENT
Start: 2020-01-01 | End: 2020-01-01 | Stop reason: HOSPADM

## 2020-01-01 RX ORDER — ROSUVASTATIN CALCIUM 10 MG/1
10 TABLET, COATED ORAL DAILY
Qty: 90 TABLET | Refills: 3 | Status: SHIPPED | OUTPATIENT
Start: 2020-01-01 | End: 2021-09-08

## 2020-01-01 RX ORDER — LISINOPRIL 20 MG/1
20 TABLET ORAL NIGHTLY
Status: DISCONTINUED | OUTPATIENT
Start: 2020-01-01 | End: 2020-01-01

## 2020-01-01 RX ORDER — TRAZODONE HYDROCHLORIDE 50 MG/1
50 TABLET ORAL NIGHTLY
Status: DISCONTINUED | OUTPATIENT
Start: 2020-01-01 | End: 2020-01-01 | Stop reason: HOSPADM

## 2020-01-01 RX ORDER — SODIUM CHLORIDE 9 MG/ML
INJECTION, SOLUTION INTRAVENOUS ONCE
Status: DISCONTINUED | OUTPATIENT
Start: 2020-01-01 | End: 2020-10-14 | Stop reason: HOSPADM

## 2020-01-01 RX ORDER — HYDROCODONE BITARTRATE AND ACETAMINOPHEN 10; 325 MG/1; MG/1
1 TABLET ORAL EVERY 8 HOURS PRN
Qty: 60 TABLET | Refills: 0 | Status: SHIPPED | OUTPATIENT
Start: 2020-01-01 | End: 2020-01-01 | Stop reason: SDUPTHER

## 2020-01-01 RX ORDER — LIDOCAINE HYDROCHLORIDE 10 MG/ML
INJECTION, SOLUTION EPIDURAL; INFILTRATION; INTRACAUDAL; PERINEURAL
Status: DISCONTINUED | OUTPATIENT
Start: 2020-01-01 | End: 2020-01-01 | Stop reason: HOSPADM

## 2020-01-01 RX ORDER — TALC
6 POWDER (GRAM) TOPICAL NIGHTLY PRN
Status: DISCONTINUED | OUTPATIENT
Start: 2020-01-01 | End: 2020-01-01 | Stop reason: HOSPADM

## 2020-01-01 RX ORDER — PANTOPRAZOLE SODIUM 40 MG/10ML
40 INJECTION, POWDER, LYOPHILIZED, FOR SOLUTION INTRAVENOUS
Status: COMPLETED | OUTPATIENT
Start: 2020-01-01 | End: 2020-01-01

## 2020-01-01 RX ORDER — HYDROMORPHONE HYDROCHLORIDE 1 MG/ML
0.5 INJECTION, SOLUTION INTRAMUSCULAR; INTRAVENOUS; SUBCUTANEOUS ONCE
Status: DISCONTINUED | OUTPATIENT
Start: 2020-01-01 | End: 2020-10-14 | Stop reason: HOSPADM

## 2020-01-01 RX ORDER — POLYETHYLENE GLYCOL 3350 17 G/17G
17 POWDER, FOR SOLUTION ORAL DAILY
COMMUNITY

## 2020-01-01 RX ORDER — CALCIUM CHLORIDE INJECTION 100 MG/ML
INJECTION, SOLUTION INTRAVENOUS CODE/TRAUMA/SEDATION MEDICATION
Status: COMPLETED | OUTPATIENT
Start: 2020-01-01 | End: 2020-01-01

## 2020-01-01 RX ORDER — SODIUM CHLORIDE 0.9 % (FLUSH) 0.9 %
10 SYRINGE (ML) INJECTION
Status: DISCONTINUED | OUTPATIENT
Start: 2020-01-01 | End: 2020-01-01 | Stop reason: HOSPADM

## 2020-01-01 RX ORDER — MORPHINE SULFATE 2 MG/ML
2 INJECTION, SOLUTION INTRAMUSCULAR; INTRAVENOUS ONCE
Status: COMPLETED | OUTPATIENT
Start: 2020-01-01 | End: 2020-01-01

## 2020-01-01 RX ORDER — LISINOPRIL 20 MG/1
20 TABLET ORAL NIGHTLY
Status: DISCONTINUED | OUTPATIENT
Start: 2020-01-01 | End: 2020-01-01 | Stop reason: HOSPADM

## 2020-01-01 RX ORDER — NITROGLYCERIN 40 MG/1
1 PATCH TRANSDERMAL DAILY PRN
Status: DISCONTINUED | OUTPATIENT
Start: 2020-01-01 | End: 2020-01-01

## 2020-01-01 RX ORDER — HYDROCODONE BITARTRATE AND ACETAMINOPHEN 10; 325 MG/1; MG/1
1 TABLET ORAL EVERY 8 HOURS PRN
Qty: 60 TABLET | Refills: 0 | Status: CANCELLED | OUTPATIENT
Start: 2020-01-01

## 2020-01-01 RX ORDER — ACETAMINOPHEN 325 MG/1
650 TABLET ORAL EVERY 4 HOURS PRN
Status: DISCONTINUED | OUTPATIENT
Start: 2020-01-01 | End: 2020-01-01 | Stop reason: HOSPADM

## 2020-01-01 RX ORDER — ASPIRIN 325 MG
325 TABLET ORAL
Status: DISCONTINUED | OUTPATIENT
Start: 2020-01-01 | End: 2020-01-01

## 2020-01-01 RX ORDER — NITROGLYCERIN 20 MG/100ML
10 INJECTION INTRAVENOUS CONTINUOUS
Status: DISCONTINUED | OUTPATIENT
Start: 2020-01-01 | End: 2020-01-01

## 2020-01-01 RX ORDER — ONDANSETRON 4 MG/1
4 TABLET, ORALLY DISINTEGRATING ORAL EVERY 8 HOURS PRN
Status: DISCONTINUED | OUTPATIENT
Start: 2020-01-01 | End: 2020-01-01 | Stop reason: HOSPADM

## 2020-01-01 RX ORDER — LACTULOSE 10 G/15ML
15 SOLUTION ORAL DAILY
Status: DISCONTINUED | OUTPATIENT
Start: 2020-01-01 | End: 2020-01-01 | Stop reason: HOSPADM

## 2020-01-01 RX ORDER — MIDAZOLAM HYDROCHLORIDE 1 MG/ML
INJECTION INTRAMUSCULAR; INTRAVENOUS
Status: DISCONTINUED | OUTPATIENT
Start: 2020-01-01 | End: 2020-01-01 | Stop reason: HOSPADM

## 2020-01-01 RX ORDER — ROSUVASTATIN CALCIUM 10 MG/1
10 TABLET, COATED ORAL DAILY
Status: DISCONTINUED | OUTPATIENT
Start: 2020-01-01 | End: 2020-01-01 | Stop reason: HOSPADM

## 2020-01-01 RX ORDER — HEPARIN SODIUM 5000 [USP'U]/ML
5000 INJECTION, SOLUTION INTRAVENOUS; SUBCUTANEOUS EVERY 8 HOURS
Status: DISCONTINUED | OUTPATIENT
Start: 2020-01-01 | End: 2020-01-01 | Stop reason: HOSPADM

## 2020-01-01 RX ORDER — CALCIUM CARBONATE 200(500)MG
1000 TABLET,CHEWABLE ORAL
Status: DISCONTINUED | OUTPATIENT
Start: 2020-01-01 | End: 2020-01-01 | Stop reason: HOSPADM

## 2020-01-01 RX ORDER — NITROGLYCERIN 0.4 MG/1
TABLET SUBLINGUAL
Status: COMPLETED
Start: 2020-01-01 | End: 2020-01-01

## 2020-01-01 RX ORDER — HEPARIN SODIUM 1000 [USP'U]/ML
5000 INJECTION, SOLUTION INTRAVENOUS; SUBCUTANEOUS
Status: DISCONTINUED | OUTPATIENT
Start: 2020-01-01 | End: 2020-01-01 | Stop reason: HOSPADM

## 2020-01-01 RX ORDER — SODIUM CHLORIDE 9 MG/ML
INJECTION, SOLUTION INTRAVENOUS ONCE
Status: DISCONTINUED | OUTPATIENT
Start: 2020-01-01 | End: 2020-01-01

## 2020-01-01 RX ORDER — LISINOPRIL 5 MG/1
5 TABLET ORAL NIGHTLY
Status: DISCONTINUED | OUTPATIENT
Start: 2020-01-01 | End: 2020-01-01 | Stop reason: HOSPADM

## 2020-01-01 RX ORDER — FENTANYL CITRATE 50 UG/ML
INJECTION, SOLUTION INTRAMUSCULAR; INTRAVENOUS
Status: DISCONTINUED | OUTPATIENT
Start: 2020-01-01 | End: 2020-01-01 | Stop reason: HOSPADM

## 2020-01-01 RX ORDER — AMIODARONE HYDROCHLORIDE 150 MG/3ML
INJECTION, SOLUTION INTRAVENOUS CODE/TRAUMA/SEDATION MEDICATION
Status: COMPLETED | OUTPATIENT
Start: 2020-01-01 | End: 2020-01-01

## 2020-01-01 RX ORDER — CALCIUM ACETATE 667 MG/1
667 CAPSULE ORAL
Status: DISCONTINUED | OUTPATIENT
Start: 2020-01-01 | End: 2020-01-01

## 2020-01-01 RX ORDER — ASPIRIN 81 MG/1
81 TABLET ORAL DAILY
Status: DISCONTINUED | OUTPATIENT
Start: 2020-01-01 | End: 2020-01-01

## 2020-01-01 RX ORDER — METOPROLOL TARTRATE 25 MG/1
12.5 TABLET ORAL 2 TIMES DAILY
Status: DISCONTINUED | OUTPATIENT
Start: 2020-01-01 | End: 2020-10-14 | Stop reason: HOSPADM

## 2020-01-01 RX ORDER — NITROGLYCERIN 20 MG/100ML
5 INJECTION INTRAVENOUS CONTINUOUS
Status: DISCONTINUED | OUTPATIENT
Start: 2020-01-01 | End: 2020-10-14 | Stop reason: HOSPADM

## 2020-01-01 RX ORDER — NITROGLYCERIN 0.4 MG/1
0.4 TABLET SUBLINGUAL EVERY 5 MIN PRN
Qty: 30 TABLET | Refills: 0 | Status: SHIPPED | OUTPATIENT
Start: 2020-01-01 | End: 2020-01-01 | Stop reason: SDUPTHER

## 2020-01-01 RX ORDER — LACTULOSE 10 G/15ML
30 SOLUTION ORAL DAILY
Status: DISCONTINUED | OUTPATIENT
Start: 2020-01-01 | End: 2020-01-01 | Stop reason: HOSPADM

## 2020-01-01 RX ORDER — SODIUM CHLORIDE 9 MG/ML
INJECTION, SOLUTION INTRAVENOUS ONCE
Status: CANCELLED | OUTPATIENT
Start: 2020-01-01 | End: 2020-01-01

## 2020-01-01 RX ORDER — CALCIUM ACETATE 667 MG/1
667 CAPSULE ORAL
COMMUNITY

## 2020-01-01 RX ORDER — ISOSORBIDE MONONITRATE 30 MG/1
30 TABLET, EXTENDED RELEASE ORAL 2 TIMES DAILY
Qty: 180 TABLET | Refills: 0 | Status: SHIPPED | OUTPATIENT
Start: 2020-01-01 | End: 2020-12-26

## 2020-01-01 RX ORDER — FUROSEMIDE 10 MG/ML
20 INJECTION INTRAMUSCULAR; INTRAVENOUS
Status: COMPLETED | OUTPATIENT
Start: 2020-01-01 | End: 2020-01-01

## 2020-01-01 RX ORDER — NITROGLYCERIN 0.4 MG/1
0.4 TABLET SUBLINGUAL EVERY 5 MIN PRN
Status: COMPLETED | OUTPATIENT
Start: 2020-01-01 | End: 2020-01-01

## 2020-01-01 RX ORDER — ONDANSETRON 2 MG/ML
4 INJECTION INTRAMUSCULAR; INTRAVENOUS EVERY 6 HOURS PRN
Status: DISCONTINUED | OUTPATIENT
Start: 2020-01-01 | End: 2020-01-01 | Stop reason: HOSPADM

## 2020-01-01 RX ORDER — PANTOPRAZOLE SODIUM 40 MG/1
40 TABLET, DELAYED RELEASE ORAL NIGHTLY
Status: DISCONTINUED | OUTPATIENT
Start: 2020-01-01 | End: 2020-01-01 | Stop reason: HOSPADM

## 2020-01-01 RX ORDER — POLYETHYLENE GLYCOL 3350 17 G/17G
17 POWDER, FOR SOLUTION ORAL DAILY
Status: DISCONTINUED | OUTPATIENT
Start: 2020-01-01 | End: 2020-01-01 | Stop reason: HOSPADM

## 2020-01-01 RX ORDER — ISOSORBIDE MONONITRATE 30 MG/1
30 TABLET, EXTENDED RELEASE ORAL DAILY
Status: DISCONTINUED | OUTPATIENT
Start: 2020-01-01 | End: 2020-01-01

## 2020-01-01 RX ORDER — ASPIRIN 81 MG/1
81 TABLET ORAL DAILY
Status: DISCONTINUED | OUTPATIENT
Start: 2020-01-01 | End: 2020-10-14 | Stop reason: HOSPADM

## 2020-01-01 RX ORDER — LACTULOSE 10 G/15ML
20 SOLUTION ORAL ONCE
Status: COMPLETED | OUTPATIENT
Start: 2020-01-01 | End: 2020-01-01

## 2020-01-01 RX ORDER — ISOSORBIDE MONONITRATE 30 MG/1
30 TABLET, EXTENDED RELEASE ORAL DAILY
Status: DISCONTINUED | OUTPATIENT
Start: 2020-01-01 | End: 2020-01-01 | Stop reason: HOSPADM

## 2020-01-01 RX ORDER — HYDROCODONE BITARTRATE AND ACETAMINOPHEN 10; 325 MG/1; MG/1
1 TABLET ORAL ONCE
Status: COMPLETED | OUTPATIENT
Start: 2020-01-01 | End: 2020-01-01

## 2020-01-01 RX ORDER — NITROGLYCERIN 0.4 MG/1
0.4 TABLET SUBLINGUAL EVERY 5 MIN PRN
Status: DISCONTINUED | OUTPATIENT
Start: 2020-01-01 | End: 2020-10-14 | Stop reason: HOSPADM

## 2020-01-01 RX ORDER — LIDOCAINE HYDROCHLORIDE 20 MG/ML
INJECTION INTRAVENOUS CODE/TRAUMA/SEDATION MEDICATION
Status: COMPLETED | OUTPATIENT
Start: 2020-01-01 | End: 2020-01-01

## 2020-01-01 RX ORDER — POTASSIUM CHLORIDE 20 MEQ/15ML
40 SOLUTION ORAL
Status: DISCONTINUED | OUTPATIENT
Start: 2020-01-01 | End: 2020-01-01 | Stop reason: HOSPADM

## 2020-01-01 RX ORDER — SODIUM CHLORIDE 9 MG/ML
INJECTION, SOLUTION INTRAVENOUS
Status: DISCONTINUED | OUTPATIENT
Start: 2020-01-01 | End: 2020-10-14 | Stop reason: HOSPADM

## 2020-01-01 RX ORDER — LANOLIN ALCOHOL/MO/W.PET/CERES
800 CREAM (GRAM) TOPICAL
Status: DISCONTINUED | OUTPATIENT
Start: 2020-01-01 | End: 2020-01-01 | Stop reason: HOSPADM

## 2020-01-01 RX ORDER — SODIUM CHLORIDE 9 MG/ML
INJECTION, SOLUTION INTRAVENOUS
Status: DISCONTINUED | OUTPATIENT
Start: 2020-01-01 | End: 2020-01-01

## 2020-01-01 RX ORDER — ISOSORBIDE MONONITRATE 30 MG/1
30 TABLET, EXTENDED RELEASE ORAL DAILY
Qty: 30 TABLET | Refills: 11 | Status: ON HOLD | OUTPATIENT
Start: 2020-01-01 | End: 2020-01-01 | Stop reason: SDUPTHER

## 2020-01-01 RX ORDER — PANTOPRAZOLE SODIUM 40 MG/1
40 TABLET, DELAYED RELEASE ORAL 2 TIMES DAILY
Status: DISCONTINUED | OUTPATIENT
Start: 2020-01-01 | End: 2020-10-14 | Stop reason: HOSPADM

## 2020-01-01 RX ORDER — MUPIROCIN 20 MG/G
OINTMENT TOPICAL 2 TIMES DAILY
Status: DISCONTINUED | OUTPATIENT
Start: 2020-01-01 | End: 2020-01-01

## 2020-01-01 RX ORDER — MUPIROCIN 20 MG/G
OINTMENT TOPICAL 2 TIMES DAILY
Status: DISCONTINUED | OUTPATIENT
Start: 2020-01-01 | End: 2020-10-14 | Stop reason: HOSPADM

## 2020-01-01 RX ORDER — IBUPROFEN 200 MG
16 TABLET ORAL
Status: DISCONTINUED | OUTPATIENT
Start: 2020-01-01 | End: 2020-01-01 | Stop reason: HOSPADM

## 2020-01-01 RX ORDER — HYDROMORPHONE HYDROCHLORIDE 1 MG/ML
1 INJECTION, SOLUTION INTRAMUSCULAR; INTRAVENOUS; SUBCUTANEOUS EVERY 4 HOURS PRN
Status: DISCONTINUED | OUTPATIENT
Start: 2020-01-01 | End: 2020-01-01 | Stop reason: HOSPADM

## 2020-01-01 RX ORDER — HYDROCODONE BITARTRATE AND ACETAMINOPHEN 10; 325 MG/1; MG/1
1 TABLET ORAL EVERY 8 HOURS PRN
Status: DISCONTINUED | OUTPATIENT
Start: 2020-01-01 | End: 2020-01-01 | Stop reason: HOSPADM

## 2020-01-01 RX ORDER — HYDROCODONE BITARTRATE AND ACETAMINOPHEN 10; 325 MG/1; MG/1
1 TABLET ORAL EVERY 6 HOURS PRN
Status: DISCONTINUED | OUTPATIENT
Start: 2020-01-01 | End: 2020-01-01 | Stop reason: HOSPADM

## 2020-01-01 RX ORDER — NAPROXEN SODIUM 220 MG/1
81 TABLET, FILM COATED ORAL DAILY
Status: DISCONTINUED | OUTPATIENT
Start: 2020-01-01 | End: 2020-01-01 | Stop reason: HOSPADM

## 2020-01-01 RX ORDER — ALBUMIN HUMAN 250 G/1000ML
50 SOLUTION INTRAVENOUS ONCE
Status: DISCONTINUED | OUTPATIENT
Start: 2020-01-01 | End: 2020-10-14 | Stop reason: HOSPADM

## 2020-01-01 RX ORDER — ALPRAZOLAM 0.5 MG/1
0.5 TABLET ORAL ONCE
Status: DISCONTINUED | OUTPATIENT
Start: 2020-01-01 | End: 2020-10-14 | Stop reason: HOSPADM

## 2020-01-01 RX ORDER — DICYCLOMINE HYDROCHLORIDE 10 MG/1
20 CAPSULE ORAL 4 TIMES DAILY PRN
Status: DISCONTINUED | OUTPATIENT
Start: 2020-01-01 | End: 2020-01-01 | Stop reason: HOSPADM

## 2020-01-01 RX ORDER — CHLORHEXIDINE GLUCONATE ORAL RINSE 1.2 MG/ML
15 SOLUTION DENTAL 2 TIMES DAILY
Status: DISPENSED | OUTPATIENT
Start: 2020-01-01 | End: 2020-01-01

## 2020-01-01 RX ORDER — CHLORHEXIDINE GLUCONATE ORAL RINSE 1.2 MG/ML
15 SOLUTION DENTAL 2 TIMES DAILY
Status: DISCONTINUED | OUTPATIENT
Start: 2020-01-01 | End: 2020-01-01 | Stop reason: HOSPADM

## 2020-01-01 RX ORDER — SODIUM CHLORIDE 0.9 % (FLUSH) 0.9 %
10 SYRINGE (ML) INJECTION EVERY 6 HOURS PRN
Status: DISCONTINUED | OUTPATIENT
Start: 2020-01-01 | End: 2020-01-01 | Stop reason: HOSPADM

## 2020-01-01 RX ORDER — LACTULOSE 10 G/15ML
20 SOLUTION ORAL; RECTAL DAILY PRN
COMMUNITY
Start: 2020-01-01

## 2020-01-01 RX ORDER — MORPHINE SULFATE 2 MG/ML
3 INJECTION, SOLUTION INTRAMUSCULAR; INTRAVENOUS EVERY 6 HOURS PRN
Status: DISCONTINUED | OUTPATIENT
Start: 2020-01-01 | End: 2020-01-01 | Stop reason: HOSPADM

## 2020-01-01 RX ORDER — NAPROXEN SODIUM 220 MG/1
244 TABLET, FILM COATED ORAL
Status: COMPLETED | OUTPATIENT
Start: 2020-01-01 | End: 2020-01-01

## 2020-01-01 RX ORDER — HYDROCODONE BITARTRATE AND ACETAMINOPHEN 10; 325 MG/1; MG/1
1 TABLET ORAL EVERY 6 HOURS PRN
Status: DISCONTINUED | OUTPATIENT
Start: 2020-01-01 | End: 2020-01-01

## 2020-01-01 RX ORDER — CALCIUM ACETATE 667 MG/1
667 CAPSULE ORAL
Status: DISCONTINUED | OUTPATIENT
Start: 2020-01-01 | End: 2020-01-01 | Stop reason: HOSPADM

## 2020-01-01 RX ORDER — MUPIROCIN 20 MG/G
OINTMENT TOPICAL 2 TIMES DAILY
Status: DISPENSED | OUTPATIENT
Start: 2020-01-01 | End: 2020-01-01

## 2020-01-01 RX ORDER — LORAZEPAM 0.5 MG/1
0.5 TABLET ORAL ONCE AS NEEDED
Status: COMPLETED | OUTPATIENT
Start: 2020-01-01 | End: 2020-01-01

## 2020-01-01 RX ORDER — ONDANSETRON 2 MG/ML
4 INJECTION INTRAMUSCULAR; INTRAVENOUS EVERY 6 HOURS PRN
Status: DISCONTINUED | OUTPATIENT
Start: 2020-01-01 | End: 2020-10-14 | Stop reason: HOSPADM

## 2020-01-01 RX ORDER — SODIUM CHLORIDE 0.9 % (FLUSH) 0.9 %
10 SYRINGE (ML) INJECTION
Status: DISCONTINUED | OUTPATIENT
Start: 2020-01-01 | End: 2020-01-01

## 2020-01-01 RX ORDER — IBUPROFEN 200 MG
24 TABLET ORAL
Status: DISCONTINUED | OUTPATIENT
Start: 2020-01-01 | End: 2020-01-01 | Stop reason: HOSPADM

## 2020-01-01 RX ORDER — CALCIUM ACETATE 667 MG/1
667 CAPSULE ORAL
Status: DISCONTINUED | OUTPATIENT
Start: 2020-01-01 | End: 2020-10-14 | Stop reason: HOSPADM

## 2020-01-01 RX ORDER — ENOXAPARIN SODIUM 100 MG/ML
30 INJECTION SUBCUTANEOUS EVERY 24 HOURS
Status: DISCONTINUED | OUTPATIENT
Start: 2020-01-01 | End: 2020-10-14 | Stop reason: HOSPADM

## 2020-01-01 RX ORDER — ONDANSETRON 4 MG/1
8 TABLET, ORALLY DISINTEGRATING ORAL EVERY 12 HOURS PRN
Status: DISCONTINUED | OUTPATIENT
Start: 2020-01-01 | End: 2020-01-01 | Stop reason: HOSPADM

## 2020-01-01 RX ORDER — EPINEPHRINE 0.1 MG/ML
INJECTION INTRAVENOUS CODE/TRAUMA/SEDATION MEDICATION
Status: COMPLETED | OUTPATIENT
Start: 2020-01-01 | End: 2020-01-01

## 2020-01-01 RX ORDER — POLYETHYLENE GLYCOL 3350 17 G/17G
17 POWDER, FOR SOLUTION ORAL DAILY PRN
Status: DISCONTINUED | OUTPATIENT
Start: 2020-01-01 | End: 2020-01-01 | Stop reason: HOSPADM

## 2020-01-01 RX ORDER — LISINOPRIL 5 MG/1
20 TABLET ORAL NIGHTLY
Status: DISCONTINUED | OUTPATIENT
Start: 2020-01-01 | End: 2020-01-01

## 2020-01-01 RX ORDER — CALCIUM CARBONATE 200(500)MG
500 TABLET,CHEWABLE ORAL
Status: DISCONTINUED | OUTPATIENT
Start: 2020-01-01 | End: 2020-01-01 | Stop reason: HOSPADM

## 2020-01-01 RX ORDER — HYDROMORPHONE HYDROCHLORIDE 1 MG/ML
INJECTION, SOLUTION INTRAMUSCULAR; INTRAVENOUS; SUBCUTANEOUS
Status: COMPLETED
Start: 2020-01-01 | End: 2020-01-01

## 2020-01-01 RX ORDER — ASPIRIN 325 MG
325 TABLET ORAL
Status: COMPLETED | OUTPATIENT
Start: 2020-01-01 | End: 2020-01-01

## 2020-01-01 RX ORDER — NOREPINEPHRINE BITARTRATE 1 MG/ML
INJECTION, SOLUTION INTRAVENOUS
Status: DISCONTINUED
Start: 2020-01-01 | End: 2020-10-14 | Stop reason: HOSPADM

## 2020-01-01 RX ORDER — NITROGLYCERIN 0.4 MG/1
0.4 TABLET SUBLINGUAL EVERY 5 MIN PRN
Qty: 90 TABLET | Refills: 3 | Status: SHIPPED | OUTPATIENT
Start: 2020-01-01 | End: 2021-09-27

## 2020-01-01 RX ORDER — NAPROXEN SODIUM 220 MG/1
324 TABLET, FILM COATED ORAL ONCE
Status: COMPLETED | OUTPATIENT
Start: 2020-01-01 | End: 2020-01-01

## 2020-01-01 RX ORDER — NITROGLYCERIN 0.4 MG/1
0.4 TABLET SUBLINGUAL EVERY 5 MIN PRN
Qty: 21 TABLET | Refills: 0 | Status: SHIPPED | OUTPATIENT
Start: 2020-01-01

## 2020-01-01 RX ORDER — HYDROCODONE BITARTRATE AND ACETAMINOPHEN 10; 325 MG/1; MG/1
1 TABLET ORAL EVERY 8 HOURS PRN
Qty: 60 TABLET | Refills: 0 | Status: SHIPPED | OUTPATIENT
Start: 2020-01-01

## 2020-01-01 RX ORDER — HYDROCODONE BITARTRATE AND ACETAMINOPHEN 10; 325 MG/1; MG/1
1 TABLET ORAL EVERY 8 HOURS PRN
Qty: 60 TABLET | Refills: 0 | OUTPATIENT
Start: 2020-01-01

## 2020-01-01 RX ORDER — GLUCAGON 1 MG
1 KIT INJECTION
Status: DISCONTINUED | OUTPATIENT
Start: 2020-01-01 | End: 2020-01-01 | Stop reason: HOSPADM

## 2020-01-01 RX ORDER — TRAZODONE HYDROCHLORIDE 50 MG/1
50 TABLET ORAL NIGHTLY
Status: DISCONTINUED | OUTPATIENT
Start: 2020-01-01 | End: 2020-01-01

## 2020-01-01 RX ORDER — HEPARIN SODIUM 5000 [USP'U]/ML
5000 INJECTION, SOLUTION INTRAVENOUS; SUBCUTANEOUS
Status: DISCONTINUED | OUTPATIENT
Start: 2020-01-01 | End: 2020-10-14 | Stop reason: HOSPADM

## 2020-01-01 RX ORDER — LACTULOSE 10 G/15ML
15 SOLUTION ORAL ONCE
Status: COMPLETED | OUTPATIENT
Start: 2020-01-01 | End: 2020-01-01

## 2020-01-01 RX ORDER — SODIUM CHLORIDE 9 MG/ML
INJECTION, SOLUTION INTRAVENOUS CONTINUOUS
Status: DISCONTINUED | OUTPATIENT
Start: 2020-01-01 | End: 2020-10-14 | Stop reason: HOSPADM

## 2020-01-01 RX ORDER — SODIUM BICARBONATE 1 MEQ/ML
SYRINGE (ML) INTRAVENOUS CODE/TRAUMA/SEDATION MEDICATION
Status: COMPLETED | OUTPATIENT
Start: 2020-01-01 | End: 2020-01-01

## 2020-01-01 RX ORDER — SODIUM ZIRCONIUM CYCLOSILICATE 5 G/5G
5 POWDER, FOR SUSPENSION ORAL
COMMUNITY
Start: 2020-01-01

## 2020-01-01 RX ORDER — HYDROCODONE BITARTRATE AND ACETAMINOPHEN 10; 325 MG/1; MG/1
1 TABLET ORAL EVERY 8 HOURS PRN
Status: DISCONTINUED | OUTPATIENT
Start: 2020-01-01 | End: 2020-10-14 | Stop reason: HOSPADM

## 2020-01-01 RX ORDER — AMLODIPINE BESYLATE 5 MG/1
5 TABLET ORAL NIGHTLY
Status: DISCONTINUED | OUTPATIENT
Start: 2020-01-01 | End: 2020-01-01 | Stop reason: HOSPADM

## 2020-01-01 RX ORDER — POLYETHYLENE GLYCOL 3350 17 G/17G
17 POWDER, FOR SOLUTION ORAL DAILY PRN
Qty: 30 EACH | Refills: 0 | Status: SHIPPED | OUTPATIENT
Start: 2020-01-01 | End: 2020-01-01

## 2020-01-01 RX ORDER — ENOXAPARIN SODIUM 100 MG/ML
1 INJECTION SUBCUTANEOUS
Status: COMPLETED | OUTPATIENT
Start: 2020-01-01 | End: 2020-01-01

## 2020-01-01 RX ORDER — CYCLOBENZAPRINE HCL 5 MG
10 TABLET ORAL 3 TIMES DAILY PRN
Status: DISCONTINUED | OUTPATIENT
Start: 2020-01-01 | End: 2020-01-01 | Stop reason: HOSPADM

## 2020-01-01 RX ORDER — HYDROCODONE BITARTRATE AND ACETAMINOPHEN 500; 5 MG/1; MG/1
TABLET ORAL
Status: DISCONTINUED | OUTPATIENT
Start: 2020-01-01 | End: 2020-01-01 | Stop reason: HOSPADM

## 2020-01-01 RX ORDER — DEXTROSE 50 % IN WATER (D50W) INTRAVENOUS SYRINGE
25
Status: COMPLETED | OUTPATIENT
Start: 2020-01-01 | End: 2020-01-01

## 2020-01-01 RX ORDER — HYDROMORPHONE HYDROCHLORIDE 1 MG/ML
0.5 INJECTION, SOLUTION INTRAMUSCULAR; INTRAVENOUS; SUBCUTANEOUS EVERY 6 HOURS PRN
Status: DISCONTINUED | OUTPATIENT
Start: 2020-01-01 | End: 2020-10-14 | Stop reason: HOSPADM

## 2020-01-01 RX ORDER — METOPROLOL SUCCINATE 25 MG/1
50 TABLET, EXTENDED RELEASE ORAL NIGHTLY
Status: DISCONTINUED | OUTPATIENT
Start: 2020-01-01 | End: 2020-01-01 | Stop reason: HOSPADM

## 2020-01-01 RX ORDER — NITROGLYCERIN 0.4 MG/1
0.4 TABLET SUBLINGUAL EVERY 5 MIN PRN
Qty: 30 TABLET | Refills: 0 | Status: ON HOLD | OUTPATIENT
Start: 2020-01-01 | End: 2020-01-01 | Stop reason: SDUPTHER

## 2020-01-01 RX ORDER — ISOSORBIDE MONONITRATE 60 MG/1
60 TABLET, EXTENDED RELEASE ORAL DAILY
Status: DISCONTINUED | OUTPATIENT
Start: 2020-01-01 | End: 2020-01-01 | Stop reason: HOSPADM

## 2020-01-01 RX ORDER — PROMETHAZINE HYDROCHLORIDE 25 MG/1
25 TABLET ORAL EVERY 6 HOURS PRN
Status: DISCONTINUED | OUTPATIENT
Start: 2020-01-01 | End: 2020-01-01 | Stop reason: HOSPADM

## 2020-01-01 RX ADMIN — HEPARIN SODIUM AND DEXTROSE 17 UNITS/KG/HR: 10000; 5 INJECTION INTRAVENOUS at 06:10

## 2020-01-01 RX ADMIN — MUPIROCIN: 20 OINTMENT TOPICAL at 09:09

## 2020-01-01 RX ADMIN — EPOETIN ALFA-EPBX 9700 UNITS: 10000 INJECTION, SOLUTION INTRAVENOUS; SUBCUTANEOUS at 03:01

## 2020-01-01 RX ADMIN — PANTOPRAZOLE SODIUM 40 MG: 40 TABLET, DELAYED RELEASE ORAL at 06:09

## 2020-01-01 RX ADMIN — CALCIUM ACETATE 667 MG: 667 CAPSULE ORAL at 04:09

## 2020-01-01 RX ADMIN — NITROGLYCERIN 0.4 MG: 0.4 TABLET SUBLINGUAL at 04:09

## 2020-01-01 RX ADMIN — SODIUM ZIRCONIUM CYCLOSILICATE 10 G: 10 POWDER, FOR SUSPENSION ORAL at 08:10

## 2020-01-01 RX ADMIN — PANTOPRAZOLE SODIUM 40 MG: 40 TABLET, DELAYED RELEASE ORAL at 09:10

## 2020-01-01 RX ADMIN — NITROGLYCERIN 0.4 MG: 0.4 TABLET SUBLINGUAL at 04:10

## 2020-01-01 RX ADMIN — EPINEPHRINE 1 MG: 0.1 INJECTION, SOLUTION ENDOTRACHEAL; INTRACARDIAC; INTRAVENOUS at 08:10

## 2020-01-01 RX ADMIN — NITROGLYCERIN 0.4 MG: 0.4 TABLET SUBLINGUAL at 12:10

## 2020-01-01 RX ADMIN — NITROGLYCERIN 0.4 MG: 0.4 TABLET SUBLINGUAL at 07:10

## 2020-01-01 RX ADMIN — TRAZODONE HYDROCHLORIDE 50 MG: 50 TABLET ORAL at 09:01

## 2020-01-01 RX ADMIN — NITROGLYCERIN 0.5 INCH: 20 OINTMENT TOPICAL at 12:09

## 2020-01-01 RX ADMIN — TRAZODONE HYDROCHLORIDE 50 MG: 50 TABLET ORAL at 09:09

## 2020-01-01 RX ADMIN — MELATONIN 6 MG: at 08:09

## 2020-01-01 RX ADMIN — MORPHINE SULFATE 3 MG: 2 INJECTION, SOLUTION INTRAMUSCULAR; INTRAVENOUS at 03:10

## 2020-01-01 RX ADMIN — LIDOCAINE HYDROCHLORIDE 100 MG: 20 INJECTION, SOLUTION INTRAVENOUS at 08:10

## 2020-01-01 RX ADMIN — HYDROCODONE BITARTRATE AND ACETAMINOPHEN 1 TABLET: 10; 325 TABLET ORAL at 03:09

## 2020-01-01 RX ADMIN — METOPROLOL SUCCINATE 50 MG: 50 TABLET, FILM COATED, EXTENDED RELEASE ORAL at 09:10

## 2020-01-01 RX ADMIN — ROSUVASTATIN CALCIUM 10 MG: 10 TABLET, FILM COATED ORAL at 09:10

## 2020-01-01 RX ADMIN — ASPIRIN 81 MG: 81 TABLET, COATED ORAL at 08:01

## 2020-01-01 RX ADMIN — CALCIUM CARBONATE (ANTACID) CHEW TAB 500 MG 500 MG: 500 CHEW TAB at 08:09

## 2020-01-01 RX ADMIN — LISINOPRIL 20 MG: 20 TABLET ORAL at 08:01

## 2020-01-01 RX ADMIN — HYDROMORPHONE HYDROCHLORIDE 1 MG: 1 INJECTION, SOLUTION INTRAMUSCULAR; INTRAVENOUS; SUBCUTANEOUS at 04:09

## 2020-01-01 RX ADMIN — LISINOPRIL 20 MG: 20 TABLET ORAL at 09:09

## 2020-01-01 RX ADMIN — ENOXAPARIN SODIUM 60 MG: 60 INJECTION SUBCUTANEOUS at 04:09

## 2020-01-01 RX ADMIN — NITROGLYCERIN 0.4 MG: 0.4 TABLET SUBLINGUAL at 11:10

## 2020-01-01 RX ADMIN — AMIODARONE HYDROCHLORIDE 300 MG: 50 INJECTION, SOLUTION INTRAVENOUS at 08:10

## 2020-01-01 RX ADMIN — CHLORHEXIDINE GLUCONATE 15 ML: 1.2 RINSE ORAL at 08:01

## 2020-01-01 RX ADMIN — ENOXAPARIN SODIUM 60 MG: 100 INJECTION SUBCUTANEOUS at 05:09

## 2020-01-01 RX ADMIN — METOPROLOL SUCCINATE 50 MG: 50 TABLET, FILM COATED, EXTENDED RELEASE ORAL at 09:09

## 2020-01-01 RX ADMIN — NITROGLYCERIN 0.4 MG: 0.4 TABLET SUBLINGUAL at 08:09

## 2020-01-01 RX ADMIN — ISOSORBIDE MONONITRATE 30 MG: 30 TABLET, EXTENDED RELEASE ORAL at 09:09

## 2020-01-01 RX ADMIN — HYDROCODONE BITARTRATE AND ACETAMINOPHEN 1 TABLET: 10; 325 TABLET ORAL at 08:01

## 2020-01-01 RX ADMIN — FUROSEMIDE 20 MG: 10 INJECTION, SOLUTION INTRAMUSCULAR; INTRAVENOUS at 10:01

## 2020-01-01 RX ADMIN — ONDANSETRON 4 MG: 2 INJECTION INTRAMUSCULAR; INTRAVENOUS at 09:10

## 2020-01-01 RX ADMIN — CALCIUM CARBONATE (ANTACID) CHEW TAB 500 MG 1000 MG: 500 CHEW TAB at 11:10

## 2020-01-01 RX ADMIN — HYDROCODONE BITARTRATE AND ACETAMINOPHEN 1 TABLET: 10; 325 TABLET ORAL at 07:01

## 2020-01-01 RX ADMIN — PANTOPRAZOLE SODIUM 40 MG: 40 TABLET, DELAYED RELEASE ORAL at 08:01

## 2020-01-01 RX ADMIN — HYDROCODONE BITARTRATE AND ACETAMINOPHEN 1 TABLET: 10; 325 TABLET ORAL at 12:10

## 2020-01-01 RX ADMIN — CALCIUM ACETATE 667 MG: 667 CAPSULE ORAL at 08:09

## 2020-01-01 RX ADMIN — HYDROMORPHONE HYDROCHLORIDE 1 MG: 1 INJECTION, SOLUTION INTRAMUSCULAR; INTRAVENOUS; SUBCUTANEOUS at 07:09

## 2020-01-01 RX ADMIN — LISINOPRIL 5 MG: 5 TABLET ORAL at 08:09

## 2020-01-01 RX ADMIN — HYDROCODONE BITARTRATE AND ACETAMINOPHEN 1 TABLET: 10; 325 TABLET ORAL at 12:01

## 2020-01-01 RX ADMIN — HYDROCODONE BITARTRATE AND ACETAMINOPHEN 1 TABLET: 10; 325 TABLET ORAL at 08:09

## 2020-01-01 RX ADMIN — CALCIUM ACETATE 667 MG: 667 CAPSULE ORAL at 12:09

## 2020-01-01 RX ADMIN — HYDROMORPHONE HYDROCHLORIDE 1 MG: 1 INJECTION, SOLUTION INTRAMUSCULAR; INTRAVENOUS; SUBCUTANEOUS at 02:09

## 2020-01-01 RX ADMIN — CALCIUM CARBONATE (ANTACID) CHEW TAB 500 MG 1000 MG: 500 CHEW TAB at 10:10

## 2020-01-01 RX ADMIN — AMLODIPINE BESYLATE 5 MG: 5 TABLET ORAL at 02:01

## 2020-01-01 RX ADMIN — HYDROCODONE BITARTRATE AND ACETAMINOPHEN 1 TABLET: 10; 325 TABLET ORAL at 09:10

## 2020-01-01 RX ADMIN — HYDROMORPHONE HYDROCHLORIDE 1 MG: 1 INJECTION, SOLUTION INTRAMUSCULAR; INTRAVENOUS; SUBCUTANEOUS at 11:09

## 2020-01-01 RX ADMIN — CHLORHEXIDINE GLUCONATE 15 ML: 1.2 RINSE ORAL at 09:09

## 2020-01-01 RX ADMIN — HYDROMORPHONE HYDROCHLORIDE: 1 INJECTION, SOLUTION INTRAMUSCULAR; INTRAVENOUS; SUBCUTANEOUS at 03:10

## 2020-01-01 RX ADMIN — AMLODIPINE BESYLATE 5 MG: 5 TABLET ORAL at 09:01

## 2020-01-01 RX ADMIN — METOPROLOL SUCCINATE 50 MG: 50 TABLET, FILM COATED, EXTENDED RELEASE ORAL at 08:10

## 2020-01-01 RX ADMIN — AMIODARONE HYDROCHLORIDE 150 MG: 50 INJECTION, SOLUTION INTRAVENOUS at 08:10

## 2020-01-01 RX ADMIN — SENNOSIDES AND DOCUSATE SODIUM 1 TABLET: 8.6; 5 TABLET ORAL at 09:01

## 2020-01-01 RX ADMIN — NITROGLYCERIN 0.4 MG: 0.4 TABLET SUBLINGUAL at 05:10

## 2020-01-01 RX ADMIN — DEXTROSE MONOHYDRATE 25 G: 500 INJECTION PARENTERAL at 03:10

## 2020-01-01 RX ADMIN — CEFTRIAXONE 1 G: 1 INJECTION, SOLUTION INTRAVENOUS at 02:01

## 2020-01-01 RX ADMIN — PANTOPRAZOLE SODIUM 40 MG: 40 TABLET, DELAYED RELEASE ORAL at 08:10

## 2020-01-01 RX ADMIN — LACTULOSE 15 G: 20 SOLUTION ORAL at 03:09

## 2020-01-01 RX ADMIN — CALCIUM CARBONATE (ANTACID) CHEW TAB 500 MG 1000 MG: 500 CHEW TAB at 05:10

## 2020-01-01 RX ADMIN — LISINOPRIL 20 MG: 20 TABLET ORAL at 08:09

## 2020-01-01 RX ADMIN — METOPROLOL SUCCINATE 50 MG: 50 TABLET, FILM COATED, EXTENDED RELEASE ORAL at 08:01

## 2020-01-01 RX ADMIN — HYDROMORPHONE HYDROCHLORIDE 1 MG: 1 INJECTION, SOLUTION INTRAMUSCULAR; INTRAVENOUS; SUBCUTANEOUS at 09:09

## 2020-01-01 RX ADMIN — CALCIUM CARBONATE (ANTACID) CHEW TAB 500 MG 500 MG: 500 CHEW TAB at 05:09

## 2020-01-01 RX ADMIN — PANTOPRAZOLE SODIUM 40 MG: 40 INJECTION, POWDER, FOR SOLUTION INTRAVENOUS at 04:09

## 2020-01-01 RX ADMIN — ASPIRIN 81 MG: 81 TABLET, DELAYED RELEASE ORAL at 09:09

## 2020-01-01 RX ADMIN — ENOXAPARIN SODIUM 30 MG: 30 INJECTION SUBCUTANEOUS at 04:10

## 2020-01-01 RX ADMIN — MUPIROCIN: 20 OINTMENT TOPICAL at 12:09

## 2020-01-01 RX ADMIN — CALCIUM CARBONATE (ANTACID) CHEW TAB 500 MG 1000 MG: 500 CHEW TAB at 07:10

## 2020-01-01 RX ADMIN — NITROGLYCERIN 10 MCG/MIN: 20 INJECTION INTRAVENOUS at 02:10

## 2020-01-01 RX ADMIN — HYDROMORPHONE HYDROCHLORIDE 1 MG: 1 INJECTION, SOLUTION INTRAMUSCULAR; INTRAVENOUS; SUBCUTANEOUS at 12:09

## 2020-01-01 RX ADMIN — NITROGLYCERIN 0.5 INCH: 20 OINTMENT TOPICAL at 04:09

## 2020-01-01 RX ADMIN — NITROGLYCERIN 0.4 MG: 0.4 TABLET SUBLINGUAL at 06:10

## 2020-01-01 RX ADMIN — METOPROLOL SUCCINATE 50 MG: 25 TABLET, EXTENDED RELEASE ORAL at 08:09

## 2020-01-01 RX ADMIN — PANTOPRAZOLE SODIUM 40 MG: 40 TABLET, DELAYED RELEASE ORAL at 05:09

## 2020-01-01 RX ADMIN — ONDANSETRON 4 MG: 2 INJECTION INTRAMUSCULAR; INTRAVENOUS at 04:10

## 2020-01-01 RX ADMIN — HYDROCODONE BITARTRATE AND ACETAMINOPHEN 1 TABLET: 10; 325 TABLET ORAL at 07:10

## 2020-01-01 RX ADMIN — ONDANSETRON HYDROCHLORIDE 4 MG: 2 SOLUTION INTRAMUSCULAR; INTRAVENOUS at 02:09

## 2020-01-01 RX ADMIN — ROSUVASTATIN CALCIUM 10 MG: 10 TABLET, FILM COATED ORAL at 08:10

## 2020-01-01 RX ADMIN — HYDROCODONE BITARTRATE AND ACETAMINOPHEN 1 TABLET: 10; 325 TABLET ORAL at 05:10

## 2020-01-01 RX ADMIN — CYCLOBENZAPRINE HYDROCHLORIDE 10 MG: 5 TABLET, FILM COATED ORAL at 09:10

## 2020-01-01 RX ADMIN — ONDANSETRON 4 MG: 4 TABLET, ORALLY DISINTEGRATING ORAL at 06:09

## 2020-01-01 RX ADMIN — ASPIRIN 81 MG 243 MG: 81 TABLET ORAL at 04:09

## 2020-01-01 RX ADMIN — METOPROLOL SUCCINATE 50 MG: 25 TABLET, FILM COATED, EXTENDED RELEASE ORAL at 02:01

## 2020-01-01 RX ADMIN — TRAZODONE HYDROCHLORIDE 50 MG: 50 TABLET ORAL at 08:01

## 2020-01-01 RX ADMIN — LACTULOSE 15 G: 20 SOLUTION ORAL at 04:09

## 2020-01-01 RX ADMIN — NITROGLYCERIN 0.5 INCH: 20 OINTMENT TOPICAL at 05:09

## 2020-01-01 RX ADMIN — MELATONIN 6 MG: at 10:09

## 2020-01-01 RX ADMIN — METOPROLOL TARTRATE 12.5 MG: 25 TABLET, FILM COATED ORAL at 12:10

## 2020-01-01 RX ADMIN — ASPIRIN 81 MG: 81 TABLET, DELAYED RELEASE ORAL at 03:09

## 2020-01-01 RX ADMIN — MELATONIN 6 MG: at 09:09

## 2020-01-01 RX ADMIN — LACTULOSE 20 G: 20 SOLUTION ORAL at 02:10

## 2020-01-01 RX ADMIN — HYDROCODONE BITARTRATE AND ACETAMINOPHEN 1 TABLET: 10; 325 TABLET ORAL at 09:09

## 2020-01-01 RX ADMIN — PANTOPRAZOLE SODIUM 40 MG: 40 TABLET, DELAYED RELEASE ORAL at 08:09

## 2020-01-01 RX ADMIN — ROSUVASTATIN 10 MG: 10 TABLET, FILM COATED ORAL at 09:09

## 2020-01-01 RX ADMIN — HYDROCODONE BITARTRATE AND ACETAMINOPHEN 1 TABLET: 10; 325 TABLET ORAL at 08:10

## 2020-01-01 RX ADMIN — CYCLOBENZAPRINE HYDROCHLORIDE 10 MG: 5 TABLET, FILM COATED ORAL at 11:10

## 2020-01-01 RX ADMIN — SODIUM BICARBONATE 50 MEQ: 84 INJECTION, SOLUTION INTRAVENOUS at 08:10

## 2020-01-01 RX ADMIN — ROSUVASTATIN 10 MG: 10 TABLET, FILM COATED ORAL at 08:09

## 2020-01-01 RX ADMIN — ONDANSETRON HYDROCHLORIDE 4 MG: 2 SOLUTION INTRAMUSCULAR; INTRAVENOUS at 06:09

## 2020-01-01 RX ADMIN — METOPROLOL SUCCINATE 50 MG: 25 TABLET, EXTENDED RELEASE ORAL at 09:09

## 2020-01-01 RX ADMIN — MORPHINE SULFATE 3 MG: 2 INJECTION, SOLUTION INTRAMUSCULAR; INTRAVENOUS at 10:10

## 2020-01-01 RX ADMIN — HYDROCODONE BITARTRATE AND ACETAMINOPHEN 1 TABLET: 10; 325 TABLET ORAL at 03:10

## 2020-01-01 RX ADMIN — CALCIUM CARBONATE (ANTACID) CHEW TAB 500 MG 1000 MG: 500 CHEW TAB at 12:10

## 2020-01-01 RX ADMIN — PANTOPRAZOLE SODIUM 40 MG: 40 TABLET, DELAYED RELEASE ORAL at 05:10

## 2020-01-01 RX ADMIN — CALCIUM GLUCONATE 1000 MG: 94 INJECTION, SOLUTION INTRAVENOUS at 09:10

## 2020-01-01 RX ADMIN — NITROGLYCERIN 10 MCG/MIN: 20 INJECTION INTRAVENOUS at 03:10

## 2020-01-01 RX ADMIN — TRAZODONE HYDROCHLORIDE 50 MG: 50 TABLET ORAL at 02:01

## 2020-01-01 RX ADMIN — HEPARIN SODIUM AND DEXTROSE 12 UNITS/KG/HR: 10000; 5 INJECTION INTRAVENOUS at 03:10

## 2020-01-01 RX ADMIN — ISOSORBIDE MONONITRATE 30 MG: 30 TABLET, EXTENDED RELEASE ORAL at 11:10

## 2020-01-01 RX ADMIN — ASPIRIN 81 MG: 81 TABLET, DELAYED RELEASE ORAL at 09:01

## 2020-01-01 RX ADMIN — HYDROMORPHONE HYDROCHLORIDE 1 MG: 1 INJECTION, SOLUTION INTRAMUSCULAR; INTRAVENOUS; SUBCUTANEOUS at 10:09

## 2020-01-01 RX ADMIN — ASPIRIN 81 MG: 81 TABLET, DELAYED RELEASE ORAL at 10:01

## 2020-01-01 RX ADMIN — HYDROCODONE BITARTRATE AND ACETAMINOPHEN 1 TABLET: 10; 325 TABLET ORAL at 06:09

## 2020-01-01 RX ADMIN — ISOSORBIDE MONONITRATE 30 MG: 30 TABLET, EXTENDED RELEASE ORAL at 03:09

## 2020-01-01 RX ADMIN — CALCIUM GLUCONATE 1000 MG: 94 INJECTION, SOLUTION INTRAVENOUS at 03:10

## 2020-01-01 RX ADMIN — FUROSEMIDE 20 MG: 10 INJECTION, SOLUTION INTRAMUSCULAR; INTRAVENOUS at 12:01

## 2020-01-01 RX ADMIN — CHLORHEXIDINE GLUCONATE 15 ML: 1.2 RINSE ORAL at 09:01

## 2020-01-01 RX ADMIN — ASPIRIN 325 MG ORAL TABLET 325 MG: 325 PILL ORAL at 02:09

## 2020-01-01 RX ADMIN — ISOSORBIDE MONONITRATE 60 MG: 60 TABLET, EXTENDED RELEASE ORAL at 08:09

## 2020-01-01 RX ADMIN — CALCIUM CARBONATE (ANTACID) CHEW TAB 500 MG 1000 MG: 500 CHEW TAB at 08:10

## 2020-01-01 RX ADMIN — SENNOSIDES AND DOCUSATE SODIUM 1 TABLET: 8.6; 5 TABLET ORAL at 10:01

## 2020-01-01 RX ADMIN — ENOXAPARIN SODIUM 60 MG: 100 INJECTION SUBCUTANEOUS at 04:09

## 2020-01-01 RX ADMIN — CALCIUM ACETATE 667 MG: 667 CAPSULE ORAL at 05:09

## 2020-01-01 RX ADMIN — NITROGLYCERIN: 0.4 TABLET SUBLINGUAL at 03:10

## 2020-01-01 RX ADMIN — HYDROCODONE BITARTRATE AND ACETAMINOPHEN 1 TABLET: 10; 325 TABLET ORAL at 11:01

## 2020-01-01 RX ADMIN — LORAZEPAM 0.5 MG: 0.5 TABLET ORAL at 09:10

## 2020-01-01 RX ADMIN — NITROGLYCERIN 0.4 MG: 0.4 TABLET SUBLINGUAL at 03:10

## 2020-01-01 RX ADMIN — ASPIRIN 324 MG: 81 TABLET, CHEWABLE ORAL at 04:10

## 2020-01-01 RX ADMIN — ONDANSETRON 4 MG: 2 INJECTION INTRAMUSCULAR; INTRAVENOUS at 07:10

## 2020-01-01 RX ADMIN — ONDANSETRON 4 MG: 4 TABLET, ORALLY DISINTEGRATING ORAL at 07:09

## 2020-01-01 RX ADMIN — HYDROCODONE BITARTRATE AND ACETAMINOPHEN 1 TABLET: 10; 325 TABLET ORAL at 01:09

## 2020-01-01 RX ADMIN — NITROGLYCERIN 0.4 MG: 0.4 TABLET SUBLINGUAL at 05:09

## 2020-01-01 RX ADMIN — MORPHINE SULFATE 4 MG: 4 INJECTION INTRAVENOUS at 04:09

## 2020-01-01 RX ADMIN — NITROGLYCERIN 0.4 MG: 0.4 TABLET SUBLINGUAL at 09:10

## 2020-01-01 RX ADMIN — METOPROLOL SUCCINATE 50 MG: 50 TABLET, FILM COATED, EXTENDED RELEASE ORAL at 08:09

## 2020-01-01 RX ADMIN — HYDROCODONE BITARTRATE AND ACETAMINOPHEN 1 TABLET: 10; 325 TABLET ORAL at 09:01

## 2020-01-01 RX ADMIN — ISOSORBIDE MONONITRATE 60 MG: 60 TABLET, EXTENDED RELEASE ORAL at 09:09

## 2020-01-01 RX ADMIN — MORPHINE SULFATE 2 MG: 2 INJECTION, SOLUTION INTRAMUSCULAR; INTRAVENOUS at 02:10

## 2020-01-01 RX ADMIN — METOPROLOL SUCCINATE 50 MG: 25 TABLET, FILM COATED, EXTENDED RELEASE ORAL at 09:01

## 2020-01-01 RX ADMIN — CALCIUM ACETATE 667 MG: 667 CAPSULE ORAL at 05:10

## 2020-01-01 RX ADMIN — HYDROMORPHONE HYDROCHLORIDE 1 MG: 1 INJECTION, SOLUTION INTRAMUSCULAR; INTRAVENOUS; SUBCUTANEOUS at 05:09

## 2020-01-01 RX ADMIN — CYCLOBENZAPRINE HYDROCHLORIDE 10 MG: 5 TABLET, FILM COATED ORAL at 08:10

## 2020-01-01 RX ADMIN — ASPIRIN 81 MG: 81 TABLET, COATED ORAL at 09:01

## 2020-01-01 RX ADMIN — POLYETHYLENE GLYCOL 3350 17 G: 17 POWDER, FOR SOLUTION ORAL at 12:01

## 2020-01-01 RX ADMIN — HYDROCODONE BITARTRATE AND ACETAMINOPHEN 1 TABLET: 10; 325 TABLET ORAL at 11:10

## 2020-01-01 RX ADMIN — HUMAN INSULIN 10 UNITS: 100 INJECTION, SOLUTION SUBCUTANEOUS at 03:10

## 2020-01-01 RX ADMIN — CALCIUM ACETATE 667 MG: 667 CAPSULE ORAL at 09:10

## 2020-01-01 RX ADMIN — PANTOPRAZOLE SODIUM 40 MG: 40 TABLET, DELAYED RELEASE ORAL at 09:01

## 2020-01-01 RX ADMIN — ONDANSETRON 4 MG: 2 INJECTION INTRAMUSCULAR; INTRAVENOUS at 05:10

## 2020-01-01 RX ADMIN — POLYETHYLENE GLYCOL 3350 17 G: 17 POWDER, FOR SOLUTION ORAL at 10:01

## 2020-01-01 RX ADMIN — CYCLOBENZAPRINE HYDROCHLORIDE 10 MG: 5 TABLET, FILM COATED ORAL at 05:10

## 2020-01-01 RX ADMIN — CALCIUM CHLORIDE 1 G: 100 INJECTION, SOLUTION INTRAVENOUS at 08:10

## 2020-01-01 RX ADMIN — ISOSORBIDE MONONITRATE 30 MG: 30 TABLET, EXTENDED RELEASE ORAL at 09:10

## 2020-01-01 RX ADMIN — ASPIRIN 81 MG: 81 TABLET, COATED ORAL at 04:01

## 2020-01-01 RX ADMIN — MUPIROCIN: 20 OINTMENT TOPICAL at 09:10

## 2020-01-01 RX ADMIN — ASPIRIN 81 MG: 81 TABLET, DELAYED RELEASE ORAL at 02:09

## 2020-01-01 RX ADMIN — NITROGLYCERIN 0.4 MG: 0.4 TABLET SUBLINGUAL at 03:09

## 2020-01-01 RX ADMIN — CYCLOBENZAPRINE HYDROCHLORIDE 10 MG: 5 TABLET, FILM COATED ORAL at 04:10

## 2020-01-01 RX ADMIN — PANTOPRAZOLE SODIUM 40 MG: 40 TABLET, DELAYED RELEASE ORAL at 04:09

## 2020-01-01 RX ADMIN — CHLORHEXIDINE GLUCONATE 15 ML: 1.2 RINSE ORAL at 08:09

## 2020-01-01 RX ADMIN — ONDANSETRON 4 MG: 2 INJECTION INTRAMUSCULAR; INTRAVENOUS at 03:10

## 2020-01-01 RX ADMIN — PANTOPRAZOLE SODIUM 40 MG: 40 TABLET, DELAYED RELEASE ORAL at 09:09

## 2020-01-01 RX ADMIN — HYDROMORPHONE HYDROCHLORIDE 1 MG: 1 INJECTION, SOLUTION INTRAMUSCULAR; INTRAVENOUS; SUBCUTANEOUS at 08:09

## 2020-01-01 RX ADMIN — HYDROCODONE BITARTRATE AND ACETAMINOPHEN 1 TABLET: 10; 325 TABLET ORAL at 02:09

## 2020-01-01 RX ADMIN — ASPIRIN 81 MG: 81 TABLET, DELAYED RELEASE ORAL at 09:10

## 2020-01-01 RX ADMIN — CALCIUM ACETATE 667 MG: 667 CAPSULE ORAL at 04:10

## 2020-01-01 RX ADMIN — MUPIROCIN: 20 OINTMENT TOPICAL at 08:09

## 2020-01-01 RX ADMIN — HYDROMORPHONE HYDROCHLORIDE 0.5 MG: 1 INJECTION, SOLUTION INTRAMUSCULAR; INTRAVENOUS; SUBCUTANEOUS at 04:10

## 2020-01-01 RX ADMIN — NITROGLYCERIN 0.5 INCH: 20 OINTMENT TOPICAL at 06:09

## 2020-01-01 RX ADMIN — HYDROMORPHONE HYDROCHLORIDE 1 MG: 1 INJECTION, SOLUTION INTRAMUSCULAR; INTRAVENOUS; SUBCUTANEOUS at 03:09

## 2020-01-01 RX ADMIN — HEPARIN SODIUM AND DEXTROSE 12 UNITS/KG/HR: 10000; 5 INJECTION INTRAVENOUS at 11:10

## 2020-01-01 RX ADMIN — NITROGLYCERIN 0.4 MG: 0.4 TABLET SUBLINGUAL at 09:09

## 2020-01-01 RX ADMIN — MUPIROCIN: 20 OINTMENT TOPICAL at 11:09

## 2020-01-01 RX ADMIN — PANTOPRAZOLE SODIUM 40 MG: 40 TABLET, DELAYED RELEASE ORAL at 02:01

## 2020-01-01 RX ADMIN — CALCIUM CARBONATE (ANTACID) CHEW TAB 500 MG 500 MG: 500 CHEW TAB at 12:09

## 2020-01-01 RX ADMIN — HYDROCODONE BITARTRATE AND ACETAMINOPHEN 1 TABLET: 10; 325 TABLET ORAL at 05:09

## 2020-01-01 RX ADMIN — CALCIUM ACETATE 667 MG: 667 CAPSULE ORAL at 12:10

## 2020-01-01 RX ADMIN — LISINOPRIL 5 MG: 5 TABLET ORAL at 09:09

## 2020-01-01 RX ADMIN — NITROGLYCERIN 0.4 MG: 0.4 TABLET SUBLINGUAL at 12:09

## 2020-01-01 RX ADMIN — POLYETHYLENE GLYCOL 3350 17 G: 17 POWDER, FOR SOLUTION ORAL at 09:01

## 2020-01-01 RX ADMIN — ASPIRIN 81 MG: 81 TABLET, DELAYED RELEASE ORAL at 08:09

## 2020-01-01 RX ADMIN — HYDROCODONE BITARTRATE AND ACETAMINOPHEN 1 TABLET: 10; 325 TABLET ORAL at 12:09

## 2020-01-01 RX ADMIN — ONDANSETRON 8 MG: 4 TABLET, ORALLY DISINTEGRATING ORAL at 07:01

## 2020-01-08 PROBLEM — I73.9 PERIPHERAL VASCULAR DISEASE: Status: ACTIVE | Noted: 2020-01-01

## 2020-01-08 NOTE — HPI
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  She reports retaining asciatic fluid quickly lately about 3 L weekly with generalized abdominal discomfort and edema extending down to the lower extremities.  Patient's report insomnia due to distention causing pain on her ribcage and loss of appetite  She denies fever, any localized tenderness in the abdomen, GI bleed, chest pain shortness of breath, cough, dizziness or changes in her mental status.  She still urinates, but her blood pressure dropped during hemodialysis, off Lasix currently.  Denies constipation.  Of note she has been getting theraputic paracentesis  , last admission 7 L of fluid removed.   Also reports to scheduled for angiogram next week with her prior cardiologist.  Patient denies drinking alcohol or smoking, no recent use of OP antibiotics .    In ED pt vitals are stable, breathing on room air, CT abdomen pelvis showed morphologic features of advanced cirrhosis and findings consistent with portal hypertension (splenomegaly and ascites-large volume of simple appearing ascites,anasarca.  CXR no interval change   Patient received 20 mg IV Lasix, no leukocytosis with a left shift+, previous paracentesis also aseptic, hemoglobin 9.5 baseline macrocytic anemia, thrombocytopenia baseline, BUN creatinine 62 / 7.7, , trop 0.041, TSH 5.8.    ROS:  10 point review of systems were found to be negative expect for that mentioned already in HPI

## 2020-01-08 NOTE — SUBJECTIVE & OBJECTIVE
Past Medical History:   Diagnosis Date    Anemia of chronic renal failure, stage 5 8/19/2015    Ascites     Chronic hepatitis C 8/19/2015    Colitis     ESRD 2/2 MPGN on HD since 12/16/13 8/19/2015    Gastritis     Hypertension 8/19/2015    Kidney transplant candidate 8/19/2015    Renal dialysis status     M, W, F    Secondary hyperparathyroidism of renal origin 8/19/2015       Past Surgical History:   Procedure Laterality Date    AV FISTULA PLACEMENT  2013    SALPINGOOPHORECTOMY Right 1997    TOTAL ABDOMINAL HYSTERECTOMY  1999       Review of patient's allergies indicates:   Allergen Reactions    Quinolones Itching    Levaquin [levofloxacin] Itching    Ciprofloxacin Itching    Codeine Nausea Only       No current facility-administered medications on file prior to encounter.      Current Outpatient Medications on File Prior to Encounter   Medication Sig    amLODIPine (NORVASC) 10 MG tablet Take 0.5 tablets (5 mg total) by mouth nightly.    aspirin (ECOTRIN) 81 MG EC tablet Take 81 mg by mouth once daily.    FA-VIT B COMP&C-SELENIUM-ZINC 3-70-15 MG-MCG-MG ORAL TAB Take 1 tablet by mouth once daily.    HYDROcodone-acetaminophen (NORCO)  mg per tablet Take 1 tablet by mouth every 8 (eight) hours as needed for Pain.    lisinopril (PRINIVIL,ZESTRIL) 20 MG tablet Take 1 tablet by mouth nightly.     metoprolol succinate (TOPROL-XL) 50 MG 24 hr tablet Take 50 mg by mouth nightly.     ondansetron (ZOFRAN-ODT) 4 MG TbDL Take 2 tablets (8 mg total) by mouth every hour as needed. nausea    pantoprazole (PROTONIX) 40 MG tablet Take 40 mg by mouth every evening.     traZODone (DESYREL) 50 MG tablet Take 1 tablet (50 mg total) by mouth every evening.    dicyclomine (BENTYL) 20 mg tablet Take 20 mg by mouth every 6 (six) hours as needed.    promethazine (PHENERGAN) 25 MG tablet Take 1 tablet (25 mg total) by mouth every 6 (six) hours as needed for Nausea.    ursodiol (ACTIGALL) 250 mg Tab Take 1  tablet (250 mg total) by mouth once daily.     Family History     Problem Relation (Age of Onset)    Kidney disease Mother    Psoriasis Brother    Stroke Father        Tobacco Use    Smoking status: Current Some Day Smoker     Packs/day: 0.75     Years: 30.00     Pack years: 22.50    Smokeless tobacco: Never Used    Tobacco comment: pt reports actively trying to quit but struggling stoped 6 days ago.  Counseling and resources given.   Substance and Sexual Activity    Alcohol use: No     Alcohol/week: 0.0 standard drinks    Drug use: Yes     Types: Marijuana     Comment: nightly to help sleep    Sexual activity: Not Currently     Partners: Male     Birth control/protection: Surgical     Review of Systems  Objective:     Vital Signs (Most Recent):  Temp: 98.3 °F (36.8 °C) (01/07/20 1714)  Pulse: 82 (01/07/20 2036)  Resp: 19 (01/07/20 2036)  BP: 129/82 (01/07/20 2036)  SpO2: 100 % (01/07/20 2036) Vital Signs (24h Range):  Temp:  [98.3 °F (36.8 °C)] 98.3 °F (36.8 °C)  Pulse:  [82-85] 82  Resp:  [19-20] 19  SpO2:  [100 %] 100 %  BP: (129-144)/(65-82) 129/82     Weight: 59 kg (130 lb)  Body mass index is 22.31 kg/m².    Physical Exam   Constitutional: She is oriented to person, place, and time. She appears distressed (Moderate due to abdominal distention).   Malnourished, pale, chronic ill-looking, depressed   HENT:   Head: Atraumatic.   Eyes: Pupils are equal, round, and reactive to light.   Neck: Neck supple. No JVD present.   Cardiovascular: Normal rate and regular rhythm.   Murmur (systolic) heard.  Pulmonary/Chest: Breath sounds normal. She has no wheezes. She has no rales.   Abdominal: Bowel sounds are normal. She exhibits distension (grossly distended and tensed, no erythema). There is tenderness.   Caput medusa+   Musculoskeletal: She exhibits no edema.   Bilateral lower extremity edema extending to groin L>R  Left arm AV fidtula with good thrill   Neurological: She is alert and oriented to person, place,  and time.   Skin: Skin is warm and dry. Rash (Bilateral lower extremity, chronic appearing2/2 hep c) noted. She is not diaphoretic.   Psychiatric: She has a normal mood and affect.         CRANIAL NERVES     CN III, IV, VI   Pupils are equal, round, and reactive to light.       Significant Labs: All pertinent labs within the past 24 hours have been reviewed.    Significant Imaging: I have reviewed all pertinent imaging results/findings within the past 24 hours.

## 2020-01-08 NOTE — PLAN OF CARE
Problem: Wound  Goal: Optimal Wound Healing  Outcome: Ongoing, Progressing     Problem: Fall Injury Risk  Goal: Absence of Fall and Fall-Related Injury  Outcome: Ongoing, Progressing     Problem: Adult Inpatient Plan of Care  Goal: Plan of Care Review  Outcome: Ongoing, Progressing  Goal: Patient-Specific Goal (Individualization)  Outcome: Ongoing, Progressing  Goal: Absence of Hospital-Acquired Illness or Injury  Outcome: Ongoing, Progressing  Goal: Optimal Comfort and Wellbeing  Outcome: Ongoing, Progressing  Goal: Readiness for Transition of Care  Outcome: Ongoing, Progressing  Goal: Rounds/Family Conference  Outcome: Ongoing, Progressing     Problem: Fluid Volume Excess  Goal: Fluid Balance  Outcome: Ongoing, Progressing     Problem: Device-Related Complication Risk (Hemodialysis)  Goal: Safe, Effective Therapy Delivery  Outcome: Ongoing, Progressing     Problem: Hemodynamic Instability (Hemodialysis)  Goal: Vital Signs Remain in Desired Range  Outcome: Ongoing, Progressing     Problem: Infection (Hemodialysis)  Goal: Absence of Infection Signs/Symptoms  Outcome: Ongoing, Progressing

## 2020-01-08 NOTE — ASSESSMENT & PLAN NOTE
secondary to decompensated liver cirrhosis secondary to hep C. No signs of encephalopathy and low suspicion for SBP  For therapeutic paracenthesis this morning by interventional radiaology  1L fluid restriction   salt restrictive diet

## 2020-01-08 NOTE — ASSESSMENT & PLAN NOTE
Due to decompensated liver cirrhosis secondary to hep C  No signs of encephalopathy, continue the home medication    will give 1 dose of ceftriaxone until rule out SBP v/o left shift  IR consult for therapeutic tap  Patient need albumin prior to tap  Continue with salt restriction 1.5 g per day and fluid restriction 1L  Daily wt, strict I and o  Monitor electrolytes  GI consult

## 2020-01-08 NOTE — CONSULTS
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 She reports retaining asciatic fluid quickly lately about 3 L weekly with generalized abdominal discomfort and edema extending down to the lower extremities. Patient's report insomnia due to distention causing pain on her ribcage and loss of appetite. She denies fever, any localized tenderness in the abdomen, GI bleed, chest pain shortness of breath, cough, dizziness or changes in her mental status. She still urinates, but her blood pressure dropped during hemodialysis, off Lasix currently. Denies constipation. Of note she has been getting theraputic paracentesis , last admission 7 L of fluid removed. Also reports to scheduled for angiogram next week with her prior cardiologist. Patient denies drinking alcohol or smoking, no recent use of OP antibiotics. CT abdomen pelvis showed morphologic features of advanced cirrhosis and findings consistent with portal hypertension (splenomegaly and ascites-large volume of simple appearing ascites, anasarca. CXR no interval change. Patient received 20 mg IV Lasix in ER. We are consulted for dialysis.    Plan of Care:    Assessment:  ESRD  Cirrhosis is refractory ascites requiring schedule paracentesis  Total body volume overload  Hyperkalemia  SHPT  Anemia of CKD    Plan:    1. ESRD- Her last HD was Sat. She missed Monday. Ordered HD today and  then will resume Friday on MF schedule.     2. Volume/Blood Pressure- She will get a para today with albumin. Ordered 2-3L UF as well. BP is stable on current home regimen.    3. Electrolytes/Acid Base- She will start outpatient lokelma. Ordered 2K bath. Advise renal diet.    4. Bone Mineral Metabolism- Ca, phos noted. Advise renal diet only.    5. Anemia of CKD- Hb is below goal. Ordered MERON 150 units/kg with HD.     6. Access-  AVF- no issues    7. Medications- Dose for dialysis    Thank you for allowing us to participate in this patient's care. We will continue to follow.    Vital Signs:  Temp Readings from Last 3 Encounters:   01/08/20 98.3 °F (36.8 °C) (Oral)   12/23/19 98 °F (36.7 °C) (Oral)   12/19/19 98.3 °F (36.8 °C)       Pulse Readings from Last 3 Encounters:   01/08/20 80   12/23/19 91   12/19/19 81       BP Readings from Last 3 Encounters:   01/08/20 128/80   12/23/19 (!) 161/81   12/19/19 134/79       Weight:  Wt Readings from Last 3 Encounters:   01/08/20 64.7 kg (142 lb 10.2 oz)   12/23/19 58.9 kg (129 lb 13.6 oz)   12/19/19 57.2 kg (126 lb)       Past Medical & Surgical History:  Past Medical History:   Diagnosis Date    Anemia of chronic renal failure, stage 5 8/19/2015    Ascites     Chronic hepatitis C 8/19/2015    Colitis     ESRD 2/2 MPGN on HD since 12/16/13 8/19/2015    Gastritis     Hypertension 8/19/2015    Kidney transplant candidate 8/19/2015    Renal dialysis status     M, W, F    Secondary hyperparathyroidism of renal origin 8/19/2015       Past Surgical History:   Procedure Laterality Date    AV FISTULA PLACEMENT  2013    SALPINGOOPHORECTOMY Right 1997    TOTAL ABDOMINAL HYSTERECTOMY  1999       Past Social History:  Social History     Socioeconomic History    Marital status:      Spouse name: Not on file    Number of children: 2    Years of education: Not on file    Highest education level: Not on file   Occupational History    Occupation: baker    Social  Needs    Financial resource strain: Not on file    Food insecurity:     Worry: Not on file     Inability: Not on file    Transportation needs:     Medical: Not on file     Non-medical: Not on file   Tobacco Use    Smoking status: Current Some Day Smoker     Packs/day: 0.75     Years: 30.00     Pack years: 22.50    Smokeless tobacco: Never Used    Tobacco comment: pt reports actively trying to quit but struggling stoped 6 days ago.  Counseling and resources given.   Substance and Sexual Activity    Alcohol use: No     Alcohol/week: 0.0 standard drinks    Drug use: Yes     Types: Marijuana     Comment: nightly to help sleep    Sexual activity: Not Currently     Partners: Male     Birth control/protection: Surgical   Lifestyle    Physical activity:     Days per week: Not on file     Minutes per session: Not on file    Stress: To some extent   Relationships    Social connections:     Talks on phone: Not on file     Gets together: Not on file     Attends Roman Catholic service: Not on file     Active member of club or organization: Not on file     Attends meetings of clubs or organizations: Not on file     Relationship status: Not on file   Other Topics Concern    Are you pregnant or think you may be? Not Asked    Breast-feeding Not Asked   Social History Narrative    Not on file       Medications:  Scheduled Meds:   amLODIPine  5 mg Oral Nightly    aspirin  81 mg Oral Daily    heparin (porcine)  5,000 Units Subcutaneous Q8H    metoprolol succinate  50 mg Oral Nightly    pantoprazole  40 mg Oral QHS    polyethylene glycol  17 g Oral Daily    senna-docusate 8.6-50 mg  1 tablet Oral BID    traZODone  50 mg Oral QHS     Continuous Infusions:  PRN Meds:.dextrose 50%, dextrose 50%, glucagon (human recombinant), glucose, glucose, HYDROcodone-acetaminophen, magnesium oxide, magnesium oxide, melatonin, ondansetron, potassium chloride 10%, potassium chloride 10%, potassium, sodium phosphates, potassium, sodium  phosphates, potassium, sodium phosphates, promethazine, sodium chloride 0.9%  No current facility-administered medications on file prior to encounter.      Current Outpatient Medications on File Prior to Encounter   Medication Sig Dispense Refill    amLODIPine (NORVASC) 10 MG tablet Take 0.5 tablets (5 mg total) by mouth nightly. 30 tablet 2    aspirin (ECOTRIN) 81 MG EC tablet Take 81 mg by mouth once daily.      FA-VIT B COMP&C-SELENIUM-ZINC 3-70-15 MG-MCG-MG ORAL TAB Take 1 tablet by mouth once daily.      HYDROcodone-acetaminophen (NORCO)  mg per tablet Take 1 tablet by mouth every 8 (eight) hours as needed for Pain. 60 tablet 0    lisinopril (PRINIVIL,ZESTRIL) 20 MG tablet Take 1 tablet by mouth nightly.   3    metoprolol succinate (TOPROL-XL) 50 MG 24 hr tablet Take 50 mg by mouth nightly.       ondansetron (ZOFRAN-ODT) 4 MG TbDL Take 2 tablets (8 mg total) by mouth every hour as needed. nausea 20 tablet 0    pantoprazole (PROTONIX) 40 MG tablet Take 40 mg by mouth every evening.       traZODone (DESYREL) 50 MG tablet Take 1 tablet (50 mg total) by mouth every evening. 30 tablet 11    dicyclomine (BENTYL) 20 mg tablet Take 20 mg by mouth every 6 (six) hours as needed.      promethazine (PHENERGAN) 25 MG tablet Take 1 tablet (25 mg total) by mouth every 6 (six) hours as needed for Nausea. 20 tablet 0    ursodiol (ACTIGALL) 250 mg Tab Take 1 tablet (250 mg total) by mouth once daily. 90 tablet 0       Allergies:  Quinolones; Levaquin [levofloxacin]; Ciprofloxacin; and Codeine    Past Family History:  Reviewed; refer to Hospitalist Admission Note    Review of Systems:  Review of Systems - All 14 systems reviewed and negative, except as noted in HPI    Physical Exam:  General Appearance:    NAD, AAO x 3, cooperative, appears stated age   Head:    Normocephalic, atraumatic   Eyes:    PER, EOMI, and conjunctiva/sclera clear bilaterally       Throat:   Moist mucus membranes   Lungs:     Clear to  auscultation bilaterally, no wheezes, crackles, rales    or rhonchi, symmetric air movement, respirations unlabored   Chest wall:    No tenderness or deformity   Heart:    Regular rate and rhythm, S1 and S2 normal, HAYLEY, no rub or   gallop   Abdomen:     + Ascites   Extremities:   + Thigh edeme   MSK:   Muscle wasting   Skin:   Chronic rash   Neurologic/Psychiatric:   CNII-XII intact, normal strength and sensation       throughout, no asterixis; normal affect, memory, judgement     and insight      Results:  Lab Results   Component Value Date     01/08/2020     01/08/2020    K 4.7 01/08/2020    K 4.7 01/08/2020     01/08/2020     01/08/2020    CO2 20 (L) 01/08/2020    CO2 20 (L) 01/08/2020    BUN 63 (H) 01/08/2020    BUN 63 (H) 01/08/2020    CREATININE 7.9 (H) 01/08/2020    CREATININE 7.9 (H) 01/08/2020    CALCIUM 8.0 (L) 01/08/2020    CALCIUM 8.0 (L) 01/08/2020    ANIONGAP 14 01/08/2020    ANIONGAP 14 01/08/2020    ESTGFRAFRICA 6.2 (A) 01/08/2020    ESTGFRAFRICA 6.2 (A) 01/08/2020    EGFRNONAA 5.3 (A) 01/08/2020    EGFRNONAA 5.3 (A) 01/08/2020       Lab Results   Component Value Date    CALCIUM 8.0 (L) 01/08/2020    CALCIUM 8.0 (L) 01/08/2020    PHOS 6.3 (H) 01/08/2020       Recent Labs   Lab 01/08/20  0429   WBC 4.73  4.73   RBC 2.69*  2.69*   HGB 8.4*  8.4*   HCT 27.1*  27.1*     170   *  101*   MCH 31.2*  31.2*   MCHC 31.0*  31.0*       I have personally reviewed pertinent radiological imaging and reports.    I have spent > 70 minutes providing care for this patient for the above diagnoses. These services have included chart/data/imaging review, evaluation, exam, formulation of plan, , note preparation, and discussions with staff involved in this patient's care.    Amy Parrish MD MPH  [facility]  [address] [place] [address]  [phone] (p)  [phone] (f)

## 2020-01-08 NOTE — PT/OT/SLP EVAL
"Physical Therapy Evaluation    Patient Name:  Aurelia Saucedo   MRN:  [id number]    Recommendations:     Discharge Recommendations:  home health PT   Discharge Equipment Recommendations: (TBD)   Barriers to discharge: None    Assessment:     Aurelia Saucedo is a 54 y.o. female admitted with a medical diagnosis of other ascites. Pt has ESRD and received dialysis on Monday/Friday. Pt also receives frequent therapeutic paracenteses.  She lives at home with her  and was I with functional mobility and ambulation but requires "some help," when fluid builds up and her body "feel heavy."  She presents with the following impairments/functional limitations:    weakness, impaired functional mobility, decreased endurance, decreased balance.  Pt appear frustrated about her medical status and  inability to obtain services she feel she needs.      Rehab Prognosis: Good; patient would benefit from acute skilled PT services to address these deficits and reach maximum level of function.    Recent Surgery: * No surgery found *      Plan:     During this hospitalization, patient to be seen 6 x/week to address the identified rehab impairments via gait training, therapeutic activities, therapeutic exercises and progress toward the following goals:    · Plan of Care Expires:  02/08/20    Subjective     Chief Complaint:abdominal swelling  Patient/Family Comments/goals: home with   Pain/Comfort:  ·      Patients cultural, spiritual, Confucianist conflicts given the current situation:      Living Environment:  Prior to admission, patients level of function was Independent most of the time, but sometimes needs Minimal assistamnce   Equipment used at home: none.  DME owned (not currently used): none.  Upon discharge, patient will have assistance from her .    Objective:     Communicated with nurse prior to session.  Patient found supine with bed alarm  upon PT entry to room.    General Precautions: Standard, fall "   Orthopedic Precautions:    Braces:       Exams:  · Cognitive Exam:  Patient is oriented to Person, Place, Time and Situation  · RLE ROM: WFL  · RLE Strength: WFL  · LLE ROM: WFL  · LLE Strength: WFL    Functional Mobility:  · Bed Mobility:     · Supine to Sit: stand by assistance  · Transfers:     · Sit to Stand:  stand by assistance with no AD  · Gait: 12 ft CGA no AD  · Balance: standing balance good/fair +      Therapeutic Activities and Exercises:   Pt required much encouragement to participate in PT eval as pt appeared depressed. PT t/f'ed OOB with SBA with guarded movements.  Pt ambulated short distance in the room w/o AD.  Did not ambulate farther 2/2 SOB and fatigue and deconditioning.    AM-PAC 6 CLICK MOBILITY  Total Score:20     Patient left supine with call button in reach and bed alarm on.    GOALS:   Multidisciplinary Problems     Physical Therapy Goals        Problem: Physical Therapy Goal    Goal Priority Disciplines Outcome Goal Variances Interventions   Physical Therapy Goal     PT, PT/OT      Description:  Goals to be met by: D/C     Patient will increase functional independence with mobility by performin. Supine to sit with modified [place]  2. Sit to stand transfer with Modified [place]  3. Gait  x 100 feet with  SBA                      History:     Past Medical History:   Diagnosis Date    Anemia of chronic renal failure, stage 5 2015    Ascites     Chronic hepatitis C 2015    Colitis     ESRD 2/2 MPGN on HD since 13    Gastritis     Hypertension 2015    Kidney transplant candidate 2015    Renal dialysis status     M, W, F    Secondary hyperparathyroidism of renal origin 2015       Past Surgical History:   Procedure Laterality Date    AV FISTULA PLACEMENT      SALPINGOOPHORECTOMY Right 1997    TOTAL ABDOMINAL HYSTERECTOMY         Time Tracking:     PT Received On: 20  PT Start Time: 930     PT Stop Time:  0942  PT Total Time (min): 12 min     Billable Minutes: Evaluation 12 minutes      Alison Mejia, PT  01/08/2020

## 2020-01-08 NOTE — SUBJECTIVE & OBJECTIVE
Past Medical History:   Diagnosis Date    Anemia of chronic renal failure, stage 5 8/19/2015    Ascites     Chronic hepatitis C 8/19/2015    Colitis     ESRD 2/2 MPGN on HD since 12/16/13 8/19/2015    Gastritis     Hypertension 8/19/2015    Kidney transplant candidate 8/19/2015    Renal dialysis status     M, W, F    Secondary hyperparathyroidism of renal origin 8/19/2015       Past Surgical History:   Procedure Laterality Date    AV FISTULA PLACEMENT  2013    SALPINGOOPHORECTOMY Right 1997    TOTAL ABDOMINAL HYSTERECTOMY  1999       Review of patient's allergies indicates:   Allergen Reactions    Quinolones Itching    Levaquin [levofloxacin] Itching    Ciprofloxacin Itching    Codeine Nausea Only     Family History     Problem Relation (Age of Onset)    Kidney disease Mother    Psoriasis Brother    Stroke Father        Tobacco Use    Smoking status: Current Some Day Smoker     Packs/day: 0.75     Years: 30.00     Pack years: 22.50    Smokeless tobacco: Never Used    Tobacco comment: pt reports actively trying to quit but struggling stoped 6 days ago.  Counseling and resources given.   Substance and Sexual Activity    Alcohol use: No     Alcohol/week: 0.0 standard drinks    Drug use: Yes     Types: Marijuana     Comment: nightly to help sleep    Sexual activity: Not Currently     Partners: Male     Birth control/protection: Surgical     Review of Systems   Constitutional: Positive for fatigue. Negative for chills and fever.   HENT: Negative for hearing loss and trouble swallowing.    Eyes: Positive for visual disturbance.        Wears glasses   Respiratory: Positive for shortness of breath. Negative for wheezing.    Cardiovascular: Negative.    Gastrointestinal: Positive for abdominal distention.        End stage liver disease, cirhosis, chronic hepatitis C, anemia, esophageal varices   Genitourinary:        End stage renal disease, on dialysis   Musculoskeletal:        ROM normal   Skin:  Negative for rash and wound.   Allergic/Immunologic: Positive for immunocompromised state.   Neurological: Positive for weakness.   Hematological:        Anemic   Psychiatric/Behavioral:        Depressed     Objective:     Vital Signs (Most Recent):  Temp: 98 °F (36.7 °C) (01/08/20 1700)  Pulse: 81 (01/08/20 1700)  Resp: 16 (01/08/20 1700)  BP: (!) 102/50 (01/08/20 1700)  SpO2: 100 % (01/08/20 1215) Vital Signs (24h Range):  Temp:  [98 °F (36.7 °C)-98.3 °F (36.8 °C)] 98 °F (36.7 °C)  Pulse:  [67-88] 81  Resp:  [16-26] 16  SpO2:  [94 %-100 %] 100 %  BP: ()/(41-89) 102/50     Weight: 64.7 kg (142 lb 10.2 oz) (01/08/20 0119)  Body mass index is 24.48 kg/m².      Intake/Output Summary (Last 24 hours) at 1/8/2020 1737  Last data filed at 1/8/2020 1700  Gross per 24 hour   Intake 960 ml   Output 2500 ml   Net -1540 ml       Lines/Drains/Airways     Drain                 Hemodialysis AV Fistula Left upper arm -- days          Peripheral Intravenous Line                 Peripheral IV - Single Lumen 01/07/20 2030 20 G Right Antecubital less than 1 day                Physical Exam   Constitutional: She is oriented to person, place, and time. She appears well-developed and well-nourished.   HENT:   Head: Normocephalic and atraumatic.   Eyes: Pupils are equal, round, and reactive to light. EOM are normal.   Neck: Normal range of motion. Neck supple.   Cardiovascular: Regular rhythm.   Pulmonary/Chest: Effort normal. No respiratory distress. She has no wheezes.   Abdominal: Soft. Bowel sounds are normal. She exhibits distension. There is no rebound and no guarding.   Chronic ascites, 7 liters of fluid removed   Musculoskeletal: Normal range of motion. She exhibits no deformity.   Neurological: She is alert and oriented to person, place, and time.   Skin: Skin is warm and dry. No rash noted.   Psychiatric:   depressed       Significant Labs:  CBC:   Recent Labs   Lab 01/07/20  1727 01/08/20  0429   WBC 3.90 4.73  4.73    HGB 9.5* 8.4*  8.4*   HCT 29.6* 27.1*  27.1*   * 170  170     CMP:   Recent Labs   Lab 01/07/20  1727 01/08/20  0429    116*  116*   CALCIUM 8.0* 8.0*  8.0*   ALBUMIN 2.4*  --    PROT 5.5*  --     137  137   K 4.8 4.7  4.7   CO2 20* 20*  20*    103  103   BUN 62* 63*  63*   CREATININE 7.7* 7.9*  7.9*   ALKPHOS 85  --    ALT 15  --    AST 21  --    BILITOT 0.9  --      Peritoneal Fluid Cultures: No results for input(s): AEROBICCULTU, LABGRAM in the last 48 hours.    Significant Imaging: A total of 7 L of Straw-colored ascitic fluid were obtained without difficulty.   Imaging results within the past 24 hours have been reviewed.

## 2020-01-08 NOTE — SUBJECTIVE & OBJECTIVE
Interval History: she feels very uncomfortable from her abdominal distension. Awaiting paracenthesis today    Review of Systems   Constitutional: Negative for chills and fever.   HENT: Negative for congestion.    Respiratory: Negative for shortness of breath and wheezing.    Cardiovascular: Negative for chest pain and palpitations.   Gastrointestinal: Positive for abdominal distention and abdominal pain. Negative for constipation, diarrhea, nausea and vomiting.   Genitourinary: Negative for dysuria, flank pain, urgency and vaginal discharge.   Musculoskeletal: Negative for back pain and myalgias.     Objective:     Vital Signs (Most Recent):  Temp: 98.3 °F (36.8 °C) (01/08/20 0730)  Pulse: 80 (01/08/20 0730)  Resp: 18 (01/08/20 0730)  BP: 128/80 (01/08/20 0730)  SpO2: (!) 94 % (01/08/20 0730) Vital Signs (24h Range):  Temp:  [98 °F (36.7 °C)-98.3 °F (36.8 °C)] 98.3 °F (36.8 °C)  Pulse:  [77-85] 80  Resp:  [17-26] 18  SpO2:  [94 %-100 %] 94 %  BP: (128-144)/(65-89) 128/80     Weight: 64.7 kg (142 lb 10.2 oz)  Body mass index is 24.48 kg/m².    Intake/Output Summary (Last 24 hours) at 1/8/2020 1126  Last data filed at 1/8/2020 0800  Gross per 24 hour   Intake 220 ml   Output --   Net 220 ml      Physical Exam   Constitutional: She is oriented to person, place, and time. No distress.   Eyes: Pupils are equal, round, and reactive to light. No scleral icterus.   Cardiovascular: Normal rate and regular rhythm.   No murmur heard.  2+ bilateral pitting edema   Pulmonary/Chest: Breath sounds normal. She has no wheezes. She has no rales.   Abdominal: Soft. She exhibits distension and ascites. There is generalized tenderness.   Neurological: She is alert and oriented to person, place, and time.   Skin: Skin is warm. Capillary refill takes less than 2 seconds. No rash noted.   Psychiatric: She has a normal mood and affect.   Nursing note and vitals reviewed.      Significant Labs:   BMP:   Recent Labs   Lab 01/08/20  0429   GLU  116*  116*     137   K 4.7  4.7     103   CO2 20*  20*   BUN 63*  63*   CREATININE 7.9*  7.9*   CALCIUM 8.0*  8.0*   MG 2.6  2.6     CBC:   Recent Labs   Lab 01/07/20  1727 01/08/20  0429   WBC 3.90 4.73  4.73   HGB 9.5* 8.4*  8.4*   HCT 29.6* 27.1*  27.1*   * 170  170     Magnesium:   Recent Labs   Lab 01/07/20  1727 01/08/20  0429   MG 2.6 2.6  2.6       Significant Imaging: I have reviewed all pertinent imaging results/findings within the past 24 hours.

## 2020-01-08 NOTE — PLAN OF CARE
Tolerated procedure well  7L light yellow fluid drained and sent for testing.  Large bandaid to site-[facility].  Discharged to room per patient request.  Does not want dialysis until she is able to eat.

## 2020-01-08 NOTE — HPI
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  She reports retaining asciatic fluid quickly lately about 3 L weekly with generalized abdominal discomfort and edema extending down to the lower extremities.  Patient's report insomnia due to distention causing pain on her ribcage and loss of appetite  She denies fever, any localized tenderness in the abdomen, GI bleed, chest pain shortness of breath, cough, dizziness or changes in her mental status.  She still urinates, but her blood pressure dropped during hemodialysis, off Lasix currently.  Denies constipation.  Of note she has been getting theraputic paracentesis  , last admission 7 L of fluid removed.   Also reports to scheduled for angiogram next week with her prior cardiologist.  Patient denies drinking alcohol or smoking, no recent use of OP antibiotics .     In ED pt vitals are stable, breathing on room air, CT abdomen pelvis showed morphologic features of advanced cirrhosis and findings consistent with portal hypertension (splenomegaly and ascites-large volume of simple appearing ascites,anasarca.  CXR no interval change   Patient received 20 mg IV Lasix, no leukocytosis with a left shift+, previous paracentesis also aseptic, hemoglobin 9.5 baseline macrocytic anemia, thrombocytopenia baseline, BUN creatinine 62 / 7.7, , trop 0.041, TSH 5.8.    Complicated case with multiple comorbid conditions.  Patient has ends stage liver disease, cirrhosis, end state renal disease on dialysis.  Has been evaluated for liver transplant but has been told she is not a candidate because of peripheral vascular problems.  She has massive ascites, 7 liters of  fluid removed, presently undergoing dialysis.  Prognosis is poor. Patient is depressed due to medical condition and quality of life. Getting supportive care.  Will need frequent paracentesis.  The question is whether she is a candidate for a peritoneal ostomy. Usually, this is reserved for patients with a malignancy. The vascular problem is in both legs due to chronic smoking.  Has been counseled regarding this on several occasions.

## 2020-01-08 NOTE — NURSING
"Patient returned to her room. Stated she feel fine. Denies Albumin administration due to "not indicated" per dialysis nurse.   "

## 2020-01-08 NOTE — CONSULTS
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  She reports retaining asciatic fluid quickly lately about 3 L weekly with generalized abdominal discomfort and edema extending down to the lower extremities.  Patient's report insomnia due to distention causing pain on her ribcage and loss of appetite  She denies fever, any localized tenderness in the abdomen, GI bleed, chest pain shortness of breath, cough, dizziness or changes in her mental status.  She still urinates, but her blood pressure dropped during hemodialysis, off Lasix currently.  Denies constipation.  Of note she has been getting theraputic paracentesis  , last admission 7 L of fluid removed.   Also reports to scheduled for angiogram next week with her prior cardiologist.  Patient denies drinking alcohol or smoking, no recent use of OP antibiotics .     In ED pt vitals are stable, breathing on room air, CT abdomen pelvis showed morphologic features of advanced cirrhosis and findings consistent with portal hypertension (splenomegaly and ascites-large volume of simple appearing ascites,anasarca.  CXR no interval change   Patient received 20 mg IV Lasix, no leukocytosis with a left shift+, previous paracentesis also aseptic, hemoglobin 9.5 baseline  macrocytic anemia, thrombocytopenia baseline, BUN creatinine 62 / 7.7, , trop 0.041, TSH 5.8.    Complicated case with multiple comorbid conditions.  Patient has ends stage liver disease, cirrhosis, end state renal disease on dialysis.  Has been evaluated for liver transplant but has been told she is not a candidate because of peripheral vascular problems.  She has massive ascites, 7 liters of fluid removed, presently undergoing dialysis.  Prognosis is poor. Patient is depressed due to medical condition and quality of life. Getting supportive care.  Will need frequent paracentesis.  The question is whether she is a candidate for a peritoneal ostomy. Usually, this is reserved for patients with a malignancy. The vascular problem is in both legs due to chronic smoking.  Has been counseled regarding this on several occasions.    Past Medical History:   Diagnosis Date    Anemia of chronic renal failure, stage 5 8/19/2015    Ascites     Chronic hepatitis C 8/19/2015    Colitis     ESRD 2/2 MPGN on HD since 12/16/13 8/19/2015    Gastritis     Hypertension 8/19/2015    Kidney transplant candidate 8/19/2015    Renal dialysis status     M, W, F    Secondary hyperparathyroidism of renal origin 8/19/2015       Past Surgical History:   Procedure Laterality Date    AV FISTULA PLACEMENT  2013    SALPINGOOPHORECTOMY Right 1997    TOTAL ABDOMINAL HYSTERECTOMY  1999       Review of patient's allergies indicates:   Allergen Reactions    Quinolones Itching    Levaquin [levofloxacin] Itching    Ciprofloxacin Itching    Codeine Nausea Only     Family History     Problem Relation (Age of Onset)    Kidney disease Mother    Psoriasis Brother    Stroke Father        Tobacco Use    Smoking status: Current Some Day Smoker     Packs/day: 0.75     Years: 30.00     Pack years: 22.50    Smokeless tobacco: Never Used    Tobacco comment: pt reports actively trying to quit but struggling stoped 6 days ago.  Counseling and  resources given.   Substance and Sexual Activity    Alcohol use: No     Alcohol/week: 0.0 standard drinks    Drug use: Yes     Types: Marijuana     Comment: nightly to help sleep    Sexual activity: Not Currently     Partners: Male     Birth control/protection: Surgical     Review of Systems   Constitutional: Positive for fatigue. Negative for chills and fever.   HENT: Negative for hearing loss and trouble swallowing.    Eyes: Positive for visual disturbance.        Wears glasses   Respiratory: Positive for shortness of breath. Negative for wheezing.    Cardiovascular: Negative.    Gastrointestinal: Positive for abdominal distention.        End stage liver disease, cirhosis, chronic hepatitis C, anemia, esophageal varices   Genitourinary:        End stage renal disease, on dialysis   Musculoskeletal:        ROM normal   Skin: Negative for rash and wound.   Allergic/Immunologic: Positive for immunocompromised state.   Neurological: Positive for weakness.   Hematological:        Anemic   Psychiatric/Behavioral:        Depressed     Objective:     Vital Signs (Most Recent):  Temp: 98 °F (36.7 °C) (01/08/20 1700)  Pulse: 81 (01/08/20 1700)  Resp: 16 (01/08/20 1700)  BP: (!) 102/50 (01/08/20 1700)  SpO2: 100 % (01/08/20 1215) Vital Signs (24h Range):  Temp:  [98 °F (36.7 °C)-98.3 °F (36.8 °C)] 98 °F (36.7 °C)  Pulse:  [67-88] 81  Resp:  [16-26] 16  SpO2:  [94 %-100 %] 100 %  BP: ()/(41-89) 102/50     Weight: 64.7 kg (142 lb 10.2 oz) (01/08/20 0119)  Body mass index is 24.48 kg/m².      Intake/Output Summary (Last 24 hours) at 1/8/2020 1737  Last data filed at 1/8/2020 1700  Gross per 24 hour   Intake 960 ml   Output 2500 ml   Net -1540 ml       Lines/Drains/Airways     Drain                 Hemodialysis AV Fistula Left upper arm -- days          Peripheral Intravenous Line                 Peripheral IV - Single Lumen 01/07/20 2030 20 G Right Antecubital less than 1 day                Physical Exam    Constitutional: She is oriented to person, place, and time. She appears well-developed and well-nourished.   HENT:   Head: Normocephalic and atraumatic.   Eyes: Pupils are equal, round, and reactive to light. EOM are normal.   Neck: Normal range of motion. Neck supple.   Cardiovascular: Regular rhythm.   Pulmonary/Chest: Effort normal. No respiratory distress. She has no wheezes.   Abdominal: Soft. Bowel sounds are normal. She exhibits distension. There is no rebound and no guarding.   Chronic ascites, 7 liters of fluid removed   Musculoskeletal: Normal range of motion. She exhibits no deformity.   Neurological: She is alert and oriented to person, place, and time.   Skin: Skin is warm and dry. No rash noted.   Psychiatric:   depressed       Significant Labs:  CBC:   Recent Labs   Lab 01/07/20  1727 01/08/20  0429   WBC 3.90 4.73  4.73   HGB 9.5* 8.4*  8.4*   HCT 29.6* 27.1*  27.1*   * 170  170     CMP:   Recent Labs   Lab 01/07/20  1727 01/08/20  0429    116*  116*   CALCIUM 8.0* 8.0*  8.0*   ALBUMIN 2.4*  --    PROT 5.5*  --     137  137   K 4.8 4.7  4.7   CO2 20* 20*  20*    103  103   BUN 62* 63*  63*   CREATININE 7.7* 7.9*  7.9*   ALKPHOS 85  --    ALT 15  --    AST 21  --    BILITOT 0.9  --      Peritoneal Fluid Cultures: No results for input(s): AEROBICCULTU, LABGRAM in the last 48 hours.    Significant Imaging: A total of 7 L of Straw-colored ascitic fluid were obtained without difficulty.   Imaging results within the past 24 hours have been reviewed.    Assessment/Plan:     * Other ascites  paracenthesis    Peripheral vascular disease  No treatment. Due to chronic smoking.  Has been counseled on smoking cessation. Cannot have liver transplant due to this problem.    Hypervolemia  paracenthesis    Chronic hepatitis C with cirrhosis  PPI  Zofran    Anemia of chronic renal failure, stage 5  Monitor H & H    Chronic hepatitis C  No therapy     ESRD 2/2 MPGN on HD since  12/16/13   dialysis    Essential hypertension  medication      Thank you for your consult. I will follow-up with patient. Please contact us if you have any additional questions.    Margarita Saul MD  Gastroenterology  [facility]

## 2020-01-08 NOTE — PROGRESS NOTES
[facility] Medicine  Progress Note    Patient Name: Aurelia Saucedo  MRN: [id number]  Patient Class: OP- Observation   Admission Date: 1/7/2020  Length of Stay: 0 days  Attending Physician: Yessenia Barrera MD  Primary Care Provider: Dustin Ireland MD        Subjective:     Principal Problem:Other ascites        HPI:  54-year-old  female with past medical history of decompensated liver cirrhosis due to hep C, ESRD due to MPGN on hemodialysis twice a week(Monday /Friday), CAD status post stent presented to ED with worsening ascites and lower extremity edema since last discharge 2 weeks ago.She missed her hemodialysis last Friday,  Had last hemodialysis on Saturday .  She reports retaining asciatic fluid quickly lately about 3 L weekly with generalized abdominal discomfort and edema extending down to the lower extremities.  Patient's report insomnia due to distention causing pain on her ribcage and loss of appetite  She denies fever, any localized tenderness in the abdomen, GI bleed, chest pain shortness of breath, cough, dizziness or changes in her mental status.  She still urinates, but her blood pressure dropped during hemodialysis, off Lasix currently.  Denies constipation.  Of note she has been getting theraputic paracentesis  , last admission 7 L of fluid removed.   Also reports to scheduled for angiogram next week with her prior cardiologist.  Patient denies drinking alcohol or smoking, no recent use of OP antibiotics .    In ED pt vitals are stable, breathing on room air, CT abdomen pelvis showed morphologic features of advanced cirrhosis and findings consistent with portal hypertension (splenomegaly and ascites-large volume of simple appearing ascites,anasarca.  CXR no interval change   Patient received 20 mg IV Lasix, no leukocytosis with a left shift+, previous paracentesis also aseptic, hemoglobin 9.5 baseline macrocytic anemia, thrombocytopenia baseline, BUN  creatinine 62 / 7.7, , trop 0.041, TSH 5.8.    ROS:  10 point review of systems were found to be negative expect for that mentioned already in HPI     Overview/Hospital Course:  No notes on file    Interval History: she feels very uncomfortable from her abdominal distension. Awaiting paracenthesis today    Review of Systems   Constitutional: Negative for chills and fever.   HENT: Negative for congestion.    Respiratory: Negative for shortness of breath and wheezing.    Cardiovascular: Negative for chest pain and palpitations.   Gastrointestinal: Positive for abdominal distention and abdominal pain. Negative for constipation, diarrhea, nausea and vomiting.   Genitourinary: Negative for dysuria, flank pain, urgency and vaginal discharge.   Musculoskeletal: Negative for back pain and myalgias.     Objective:     Vital Signs (Most Recent):  Temp: 98.3 °F (36.8 °C) (01/08/20 0730)  Pulse: 80 (01/08/20 0730)  Resp: 18 (01/08/20 0730)  BP: 128/80 (01/08/20 0730)  SpO2: (!) 94 % (01/08/20 0730) Vital Signs (24h Range):  Temp:  [98 °F (36.7 °C)-98.3 °F (36.8 °C)] 98.3 °F (36.8 °C)  Pulse:  [77-85] 80  Resp:  [17-26] 18  SpO2:  [94 %-100 %] 94 %  BP: (128-144)/(65-89) 128/80     Weight: 64.7 kg (142 lb 10.2 oz)  Body mass index is 24.48 kg/m².    Intake/Output Summary (Last 24 hours) at 1/8/2020 1126  Last data filed at 1/8/2020 0800  Gross per 24 hour   Intake 220 ml   Output --   Net 220 ml      Physical Exam   Constitutional: She is oriented to person, place, and time. No distress.   Eyes: Pupils are equal, round, and reactive to light. No scleral icterus.   Cardiovascular: Normal rate and regular rhythm.   No murmur heard.  2+ bilateral pitting edema   Pulmonary/Chest: Breath sounds normal. She has no wheezes. She has no rales.   Abdominal: Soft. She exhibits distension and ascites. There is generalized tenderness.   Neurological: She is alert and oriented to person, place, and time.   Skin: Skin is warm. Capillary  refill takes less than 2 seconds. No rash noted.   Psychiatric: She has a normal mood and affect.   Nursing note and vitals reviewed.      Significant Labs:   BMP:   Recent Labs   Lab 01/08/20  0429   *  116*     137   K 4.7  4.7     103   CO2 20*  20*   BUN 63*  63*   CREATININE 7.9*  7.9*   CALCIUM 8.0*  8.0*   MG 2.6  2.6     CBC:   Recent Labs   Lab 01/07/20  1727 01/08/20  0429   WBC 3.90 4.73  4.73   HGB 9.5* 8.4*  8.4*   HCT 29.6* 27.1*  27.1*   * 170  170     Magnesium:   Recent Labs   Lab 01/07/20  1727 01/08/20  0429   MG 2.6 2.6  2.6       Significant Imaging: I have reviewed all pertinent imaging results/findings within the past 24 hours.      Assessment/Plan:      * Other ascites  secondary to decompensated liver cirrhosis secondary to hep C. No signs of encephalopathy and low suspicion for SBP  For therapeutic paracenthesis this morning by interventional radiaology  1L fluid restriction   salt restrictive diet      ESRD 2/2 MPGN on HD since 12/16/13   Scheduled for hemodialysis after paracenthesis today  Continue with phosphate binders.         Anemia of chronic renal failure, stage 5  Stable  MERON with hemodialysis      Chronic hepatitis C with cirrhosis  Coagulation profile not abnormal    thrombocytopenia stable,   asa is ordered form home meds  Hypoalbuminemia is secondary to this  Anemia secondary to this      Essential hypertension  Chronic medical condition.  Continuing home medication.  Monitoring      Chronic hepatitis C        Hypervolemia          VTE Risk Mitigation (From admission, onward)         Ordered     heparin (porcine) injection 5,000 Units  Every 8 hours      01/07/20 2358     IP VTE HIGH RISK PATIENT  Once      01/07/20 2358                      Yessenia Barrera MD  Department of Hospital Medicine   [facility]

## 2020-01-08 NOTE — PLAN OF CARE
01/08/20 1509   Discharge Assessment   Assessment Type Discharge Planning Assessment   CM attempted to perform discharge planning assessment, patient is in HD. CM will follow.

## 2020-01-08 NOTE — PT/OT/SLP PROGRESS
Occupational Therapy      Patient Name:  Aurelia Saucedo   MRN:  [id number]    Patient not seen today secondary to Other (Comment)(Pt refused in AM, and reports will be getting a paracentesis and dialysis today in PM). Will follow-up next service date.    Nnkea Delvalle OT  1/8/2020

## 2020-01-08 NOTE — H&P
[facility] Medicine  History & Physical    Patient Name: Aurelia Saucedo  MRN: [id number]  Admission Date: 1/7/2020  Attending Physician: Smitha Yadav MD  Primary Care Provider: Dustin Ireland MD         Patient information was obtained from patient and ER records.     Subjective:     Principal Problem:Other ascites    Chief Complaint:   Chief Complaint   Patient presents with    ABD DISTENTION     X 2 WEEKS,     WT GAIN        HPI: 54-year-old  female with past medical history of decompensated liver cirrhosis due to hep C, ESRD due to MPGN on hemodialysis twice a week(Monday /Friday), CAD status post stent presented to ED with worsening ascites and lower extremity edema since last discharge 2 weeks ago.She missed her hemodialysis last Friday,  Had last hemodialysis on Saturday .  She reports retaining asciatic fluid quickly lately about 3 L weekly with generalized abdominal discomfort and edema extending down to the lower extremities.  Patient's report insomnia due to distention causing pain on her ribcage and loss of appetite  She denies fever, any localized tenderness in the abdomen, GI bleed, chest pain shortness of breath, cough, dizziness or changes in her mental status.  She still urinates, but her blood pressure dropped during hemodialysis, off Lasix currently.  Denies constipation.  Of note she has been getting theraputic paracentesis  , last admission 7 L of fluid removed.   Also reports to scheduled for angiogram next week with her prior cardiologist.  Patient denies drinking alcohol or smoking, no recent use of OP antibiotics .    In ED pt vitals are stable, breathing on room air, CT abdomen pelvis showed morphologic features of advanced cirrhosis and findings consistent with portal hypertension (splenomegaly and ascites-large volume of simple appearing ascites,anasarca.  CXR no interval change   Patient received 20 mg IV Lasix, no leukocytosis with a left  shift+, previous paracentesis also aseptic, hemoglobin 9.5 baseline macrocytic anemia, thrombocytopenia baseline, BUN creatinine 62 / 7.7, , trop 0.041, TSH 5.8.    ROS:  10 point review of systems were found to be negative expect for that mentioned already in HPI     Past Medical History:   Diagnosis Date    Anemia of chronic renal failure, stage 5 8/19/2015    Ascites     Chronic hepatitis C 8/19/2015    Colitis     ESRD 2/2 MPGN on HD since 12/16/13 8/19/2015    Gastritis     Hypertension 8/19/2015    Kidney transplant candidate 8/19/2015    Renal dialysis status     M, W, F    Secondary hyperparathyroidism of renal origin 8/19/2015       Past Surgical History:   Procedure Laterality Date    AV FISTULA PLACEMENT  2013    SALPINGOOPHORECTOMY Right 1997    TOTAL ABDOMINAL HYSTERECTOMY  1999       Review of patient's allergies indicates:   Allergen Reactions    Quinolones Itching    Levaquin [levofloxacin] Itching    Ciprofloxacin Itching    Codeine Nausea Only       No current facility-administered medications on file prior to encounter.      Current Outpatient Medications on File Prior to Encounter   Medication Sig    amLODIPine (NORVASC) 10 MG tablet Take 0.5 tablets (5 mg total) by mouth nightly.    aspirin (ECOTRIN) 81 MG EC tablet Take 81 mg by mouth once daily.    FA-VIT B COMP&C-SELENIUM-ZINC 3-70-15 MG-MCG-MG ORAL TAB Take 1 tablet by mouth once daily.    HYDROcodone-acetaminophen (NORCO)  mg per tablet Take 1 tablet by mouth every 8 (eight) hours as needed for Pain.    lisinopril (PRINIVIL,ZESTRIL) 20 MG tablet Take 1 tablet by mouth nightly.     metoprolol succinate (TOPROL-XL) 50 MG 24 hr tablet Take 50 mg by mouth nightly.     ondansetron (ZOFRAN-ODT) 4 MG TbDL Take 2 tablets (8 mg total) by mouth every hour as needed. nausea    pantoprazole (PROTONIX) 40 MG tablet Take 40 mg by mouth every evening.     traZODone (DESYREL) 50 MG tablet Take 1 tablet (50 mg total)  by mouth every evening.    dicyclomine (BENTYL) 20 mg tablet Take 20 mg by mouth every 6 (six) hours as needed.    promethazine (PHENERGAN) 25 MG tablet Take 1 tablet (25 mg total) by mouth every 6 (six) hours as needed for Nausea.    ursodiol (ACTIGALL) 250 mg Tab Take 1 tablet (250 mg total) by mouth once daily.     Family History     Problem Relation (Age of Onset)    Kidney disease Mother    Psoriasis Brother    Stroke Father        Tobacco Use    Smoking status: Current Some Day Smoker     Packs/day: 0.75     Years: 30.00     Pack years: 22.50    Smokeless tobacco: Never Used    Tobacco comment: pt reports actively trying to quit but struggling stoped 6 days ago.  Counseling and resources given.   Substance and Sexual Activity    Alcohol use: No     Alcohol/week: 0.0 standard drinks    Drug use: Yes     Types: Marijuana     Comment: nightly to help sleep    Sexual activity: Not Currently     Partners: Male     Birth control/protection: Surgical     Review of Systems  Objective:     Vital Signs (Most Recent):  Temp: 98.3 °F (36.8 °C) (01/07/20 1714)  Pulse: 82 (01/07/20 2036)  Resp: 19 (01/07/20 2036)  BP: 129/82 (01/07/20 2036)  SpO2: 100 % (01/07/20 2036) Vital Signs (24h Range):  Temp:  [98.3 °F (36.8 °C)] 98.3 °F (36.8 °C)  Pulse:  [82-85] 82  Resp:  [19-20] 19  SpO2:  [100 %] 100 %  BP: (129-144)/(65-82) 129/82     Weight: 59 kg (130 lb)  Body mass index is 22.31 kg/m².    Physical Exam   Constitutional: She is oriented to person, place, and time. She appears distressed (Moderate due to abdominal distention).   Malnourished, pale, chronic ill-looking, depressed   HENT:   Head: Atraumatic.   Eyes: Pupils are equal, round, and reactive to light.   Neck: Neck supple. No JVD present.   Cardiovascular: Normal rate and regular rhythm.   Murmur (systolic) heard.  Pulmonary/Chest: Breath sounds normal. She has no wheezes. She has no rales.   Abdominal: Bowel sounds are normal. She exhibits distension  (grossly distended and tensed, no erythema). There is tenderness.   Caput medusa+   Musculoskeletal: She exhibits no edema.   Bilateral lower extremity edema extending to groin L>R  Left arm AV fidtula with good thrill   Neurological: She is alert and oriented to person, place, and time.   Skin: Skin is warm and dry. Rash (Bilateral lower extremity, chronic appearing2/2 hep c) noted. She is not diaphoretic.   Psychiatric: She has a normal mood and affect.         CRANIAL NERVES     CN III, IV, VI   Pupils are equal, round, and reactive to light.       Significant Labs: All pertinent labs within the past 24 hours have been reviewed.    Significant Imaging: I have reviewed all pertinent imaging results/findings within the past 24 hours.    Assessment/Plan:     * Other ascites  Due to decompensated liver cirrhosis secondary to hep C  No signs of encephalopathy, continue the home medication    will give 1 dose of ceftriaxone until rule out SBP v/o left shift  IR consult for therapeutic tap  Patient need albumin prior to tap  Continue with salt restriction 1.5 g per day and fluid restriction 1L  Daily wt, strict I and o  Monitor electrolytes  GI consult      Chronic hepatitis C with cirrhosis  Coagulation profile not abnormal    thrombocytopenia stable,   asa is ordered form home meds  Hypoalbuminemia is secondary to this  Anemia secondary to this      Anemia of chronic renal failure, stage 5  Stable      ESRD 2/2 MPGN on HD since 12/16/13   Will consult Nephrology for hemodialysis  Continue with home medication      Essential hypertension  Chronic medical condition.  Continuing home medication.  Monitoring        VTE Risk Mitigation (From admission, onward)         Ordered     heparin (porcine) injection 5,000 Units  Every 8 hours      01/07/20 8248     IP VTE HIGH RISK PATIENT  Once      01/07/20 0951                   Smitha Yadav MD  Department of Hospital Medicine   [facility]

## 2020-01-08 NOTE — ASSESSMENT & PLAN NOTE
Coagulation profile not abnormal    thrombocytopenia stable,   asa is ordered form home meds  Hypoalbuminemia is secondary to this  Anemia secondary to this

## 2020-01-09 PROBLEM — R18.8 OTHER ASCITES: Status: RESOLVED | Noted: 2019-01-01 | Resolved: 2020-01-01

## 2020-01-09 NOTE — PT/OT/SLP EVAL
Occupational Therapy   Evaluation and Discharge Note    Name: Aurelia Saucedo  MRN: [id number]  Admitting Diagnosis:  Other ascites      Recommendations:     Discharge Recommendations: home  Discharge Equipment Recommendations:  none  Barriers to discharge:  None    Assessment:     Aurelia Saucedo is a 54 y.o. female with a medical diagnosis of Other ascites. At this time, patient is functioning at their prior level of function and does not require further acute OT services.     Plan:     During this hospitalization, patient does not require further acute OT services.  Please re-consult if situation changes.    · Plan of Care Reviewed with: patient    Subjective     Patient/Family Comments/goals: home    Occupational Profile:  Living Environment: one story house with no steps to enter; step in/walk in shower, 4 steps inside the home to her bedroom with R hand rail  Previous level of function: independent, housekeeping with difficulty when she is feeling well and not in active ascites  Roles and Routines: mother, wife, son has Aspbergers and is unable to assist at the home  Equipment Used at home:  none  Assistance upon Discharge: pt is independent with basic self care     Pain/Comfort:  · Pain Rating 1: 7/10  · Location - Orientation 1: upper  · Location 1: abdomen  · Pain Addressed 1: Other (see comments)(MD was in the room and was aware )  · Pain Rating Post-Intervention 1: 7/10  · Location - Side 2: Left  · Location 2: ear  · Pain Rating Post-Intervention 2: 3/10    Patients cultural, spiritual, Yarsanism conflicts given the current situation: no    Objective:     Communicated with: Nursing prior to session and MD entered the room druing OT session .  Patient found HOB elevated with peripheral IV upon OT entry to room.    General Precautions: Standard,     Orthopedic Precautions:N/A   Braces: N/A     Occupational Performance:    Bed Mobility:    · Patient completed Rolling/Turning to Left with   independence  · Patient completed Rolling/Turning to Right with independence  · Patient completed Scooting/Bridging with independence  · Patient completed Supine to Sit with independence  · Patient completed Sit to Supine with independence    Functional Mobility/Transfers:  · Patient completed Sit <> Stand Transfer with independence  with  no assistive device   · Patient completed Bed <> Chair Transfer using Step Transfer technique with independence with no assistive device  · Patient completed Toilet Transfer Step Transfer technique with independence with  no AD  · Functional Mobility: independent with room ambulation     Activities of Daily Living:  · Feeding:  independence    · Grooming: independence    · Upper Body Dressing: independence    · Lower Body Dressing: independence    · Toileting: independence      Cognitive/Visual Perceptual:  Cognitive/Psychosocial Skills:     -       Oriented to: Person, Place, Time and Situation   -       Follows Commands/attention:Follows multistep  commands  -       Communication: clear/fluent  -       Memory: No Deficits noted  -       Safety awareness/insight to disability: intact   -       Mood/Affect/Coping skills/emotional control: Appropriate to situation, Cooperative, Pleasant and frustrated with her family   Visual/Perceptual:      -glasses       Physical Exam:  Balance: -       intact dynamic sitting and standing balance  Postural examination/scapula alignment:    -       No postural abnormalities identified  Dominant hand: -       right   Upper Extremity Range of Motion:     -       Right Upper Extremity: WNL  -       Left Upper Extremity: WNL; dialysis shunt in L UE   Upper Extremity Strength:    -       Right Upper Extremity: WNL  -       Left Upper Extremity: WNL   Strength:    -       Right Upper Extremity: WFL  -       Left Upper Extremity: WFL  Fine Motor Coordination:    -       Intact  Gross motor coordination:   WFL    AMPAC 6 Click ADL:  AMPAC Total  Score: 24    Treatment & Education:Education:   Role of OT; education on problem solving with IADL's at home as pt states that her  and son do not provide assist for house keeping; OT recommends HEP for UE strengthening to maintain her strength on her good days for shoulder flexion with can goods     Patient left HOB elevated with all lines intact and call button in reach    GOALS:   Multidisciplinary Problems     Occupational Therapy Goals     Not on file                History:     Past Medical History:   Diagnosis Date    Anemia of chronic renal failure, stage 5 8/19/2015    Ascites     Chronic hepatitis C 8/19/2015    Colitis     ESRD 2/2 MPGN on HD since 12/16/13 8/19/2015    Gastritis     Hypertension 8/19/2015    Kidney transplant candidate 8/19/2015    Renal dialysis status     M, W, F    Secondary hyperparathyroidism of renal origin 8/19/2015       Past Surgical History:   Procedure Laterality Date    AV FISTULA PLACEMENT  2013    SALPINGOOPHORECTOMY Right 1997    TOTAL ABDOMINAL HYSTERECTOMY  1999       Time Tracking:     OT Date of Treatment: 01/09/20  OT Start Time: 1022  OT Stop Time: 1037  OT Total Time (min): 15 min    Billable Minutes:Evaluation 5  Self Care/Home Management 10    Jasmyn Leslie OT  1/9/20204:08 PM

## 2020-01-09 NOTE — PROGRESS NOTES
01/09/20 1202   Final Note   Assessment Type Final Discharge Note   Anticipated Discharge Disposition Home-Health       [facility] ([phone]).  CM will follow up

## 2020-01-09 NOTE — PROGRESS NOTES
01/09/20 0818   HEART Message   Medicare Outpatient and Observation Notification regarding financial responsibility Given to patient/caregiver;Explained to patient/caregiver;Signed/date by patient/caregiver   Date HEART was signed 01/09/20   Time HEART was signed 0810   Introduced self to patient and asked if ok to take a few min to discuss Medicare form, and ok with patient.  Explained that pt in observation status and Medicare wants to inform them of HEART form, pt verbalized an understanding to form, form signed and scanned into CM notes.

## 2020-01-09 NOTE — PROGRESS NOTES
01/09/20 1000   Discharge Assessment   Assessment Type Discharge Planning Assessment   Confirmed/corrected address and phone number on facesheet? Yes   Assessment information obtained from? Patient   Communicated expected length of stay with patient/caregiver no   Prior to hospitilization cognitive status: Alert/Oriented   Prior to hospitalization functional status: Independent   Current cognitive status: Alert/Oriented   Current Functional Status: Independent   Lives With child(tegan), adult;spouse  (adult son has Aspergers Syn.)   Able to Return to Prior Arrangements yes   Is patient able to care for self after discharge? Yes   Who are your caregiver(s) and their phone number(s)?  Jose Saucedo [phone]   Patient's perception of discharge disposition home or selfcare;home health   Readmission Within the Last 30 Days previous discharge plan unsuccessful   If yes, most recent facility name: [facility]   Patient currently being followed by outpatient case management? No   Patient currently receives any other outside agency services? No   Equipment Currently Used at Home none   Do you have any problems affording any of your prescribed medications? TBD   Is the patient taking medications as prescribed? yes   Does the patient have transportation home? Yes   Transportation Anticipated car, drives self;family or friend will provide   Dialysis Name and Scheduled days [facility] monday and fridays   Does the patient receive services at the [facility]? No   Discharge Plan A Home with family   Discharge Plan B Home Health   DME Needed Upon Discharge  none   Patient/Family in Agreement with Plan yes   Readmission Questionnaire   At the time of your discharge, did someone talk to you about what your health problems were? Yes   At the time of discharge, did someone talk to you about what to watch out for regarding worsening of your health problem? Yes   At the time of discharge, did someone talk to  "you about what to do if you experienced worsening of your health problem? Yes   At the time of discharge, did someone talk to you about which medication to take when you left the hospital and which ones to stop taking? Yes   At the time of discharge, did someone talk to you about when and where to follow up with a doctor after you left the hospital? Yes   What do you believe caused you to be sick enough to be re-admitted? "I need to be scheduled every two weeks to be drained instead of waitng for me to get bad."   How often do you need to have someone help you when you read instructions, pamphlets, or other written material from your doctor or pharmacy? Rarely   Do you have problems taking your medications as prescribed? No   Do you have any problems affording any of  your prescribed medications? To be determined   Do you have problems obtaining/receiving your medications? No   Does the patient have transportation to healthcare appointments? Yes   Living Arrangements house   Does the patient have family/friends to help with healtcare needs after discharge? yes   Does your caregiver provide all the help you need? Yes   Are you currently feeling confused? No   Are you currently having problems thinking? No   Are you currently having memory problems? No   Have you felt down, depressed, or hopeless? 1  ("i'm not gonna end it all but sometimes my situation gets to me.")   Have you felt little interest or pleasure in doing things? 1   In the last 7 days, my sleep quality was: fair   Introduced self to patient asked if ok to take a few min of their time for short interview for D/C planning and ok with patient  . Spoke with patient about Freedom of Choice form, explained to patient and family that thy have the right to choose any agency, and a list of [place] home health  agencies was provided to patient  to review, she verbalized an understanding, pt signed form and scanned into CM notes.    "

## 2020-01-09 NOTE — PROGRESS NOTES
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 She reports retaining asciatic fluid quickly lately about 3 L weekly with generalized abdominal discomfort and edema extending down to the lower extremities. Patient's report insomnia due to distention causing pain on her ribcage and loss of appetite. She denies fever, any localized tenderness in the abdomen, GI bleed, chest pain shortness of breath, cough, dizziness or changes in her mental status. She still urinates, but her blood pressure dropped during hemodialysis, off Lasix currently. Denies constipation. Of note she has been getting theraputic paracentesis , last admission 7 L of fluid removed. Also reports to scheduled for angiogram next week with her prior cardiologist. Patient denies drinking alcohol or smoking, no recent use of OP antibiotics. CT abdomen pelvis showed morphologic features of advanced cirrhosis and findings consistent with portal hypertension (splenomegaly and ascites-large volume of simple appearing ascites, anasarca. CXR no interval change. Patient received 20 mg IV Lasix in ER. We are consulted for dialysis.    · Interval History/Subjective:    - got off 7L via para and 2L UF with HD  - no cp, dyspnea, abd pain; abd distention much improved, edema improved as well    · Review of Systems: All 14 systems reviewed and negative, except as noted in  HPI    Plan of Care:    Assessment:  ESRD  Cirrhosis is refractory ascites requiring schedule paracentesis  Total body volume overload  Hyperkalemia  SHPT  Anemia of CKD     Plan:     1. ESRD- Resume HD on MF schedule.      2. Volume/Blood Pressure- She is s/p LVP and UF with HD yest. BP is stable on current home regimen. VS is much improved.     3. Electrolytes/Acid Base- She will start outpatient lokelma. K is at goal. Continue 2K bath. Continue renal diet.     4. Bone Mineral Metabolism- Ca, phos are acceptable.     5. Anemia of CKD- Hb is stable. Continue MERON 150 units/kg with HD.      6. Access-  AVF- no issues     7. Medications- Dose for dialysis    Thank you for allowing us to participate in this patient's care. We will continue to follow.    Medications:  Scheduled Meds:   amLODIPine  5 mg Oral Nightly    aspirin  81 mg Oral Daily    heparin (porcine)  5,000 Units Subcutaneous Q8H    metoprolol succinate  50 mg Oral Nightly    pantoprazole  40 mg Oral QHS    polyethylene glycol  17 g Oral Daily    senna-docusate 8.6-50 mg  1 tablet Oral BID    traZODone  50 mg Oral QHS     Continuous Infusions:  PRN Meds:.dextrose 50%, dextrose 50%, glucagon (human recombinant), glucose, glucose, HYDROcodone-acetaminophen, magnesium oxide, magnesium oxide, melatonin, ondansetron, potassium chloride 10%, potassium chloride 10%, potassium, sodium phosphates, potassium, sodium phosphates, potassium, sodium phosphates, promethazine, sodium chloride 0.9%  No current facility-administered medications on file prior to encounter.      Current Outpatient Medications on File Prior to Encounter   Medication Sig Dispense Refill    amLODIPine (NORVASC) 10 MG tablet Take 0.5 tablets (5 mg total) by mouth nightly. 30 tablet 2    aspirin (ECOTRIN) 81 MG EC tablet Take 81 mg by mouth once daily.      FA-VIT B COMP&C-SELENIUM-ZINC 3-70-15 MG-MCG-MG ORAL TAB Take 1 tablet by mouth once daily.      HYDROcodone-acetaminophen (NORCO)   mg per tablet Take 1 tablet by mouth every 8 (eight) hours as needed for Pain. 60 tablet 0    lisinopril (PRINIVIL,ZESTRIL) 20 MG tablet Take 1 tablet by mouth nightly.   3    metoprolol succinate (TOPROL-XL) 50 MG 24 hr tablet Take 50 mg by mouth nightly.       ondansetron (ZOFRAN-ODT) 4 MG TbDL Take 2 tablets (8 mg total) by mouth every hour as needed. nausea 20 tablet 0    pantoprazole (PROTONIX) 40 MG tablet Take 40 mg by mouth every evening.       traZODone (DESYREL) 50 MG tablet Take 1 tablet (50 mg total) by mouth every evening. 30 tablet 11    dicyclomine (BENTYL) 20 mg tablet Take 20 mg by mouth every 6 (six) hours as needed.      promethazine (PHENERGAN) 25 MG tablet Take 1 tablet (25 mg total) by mouth every 6 (six) hours as needed for Nausea. 20 tablet 0    ursodiol (ACTIGALL) 250 mg Tab Take 1 tablet (250 mg total) by mouth once daily. 90 tablet 0       Allergies:  Quinolones; Levaquin [levofloxacin]; Ciprofloxacin; and Codeine    Vital Signs:  Temp Readings from Last 3 Encounters:   01/09/20 99 °F (37.2 °C) (Oral)   12/23/19 98 °F (36.7 °C) (Oral)   12/19/19 98.3 °F (36.8 °C)       Pulse Readings from Last 3 Encounters:   01/09/20 78   12/23/19 91   12/19/19 81       BP Readings from Last 3 Encounters:   01/09/20 (!) 95/58   12/23/19 (!) 161/81   12/19/19 134/79       Weight:  Wt Readings from Last 3 Encounters:   01/08/20 64.7 kg (142 lb 10.2 oz)   12/23/19 58.9 kg (129 lb 13.6 oz)   12/19/19 57.2 kg (126 lb)       Physical Exam:  Constitutional: nad, aao x 3  Heart: rrr, no m/r/g, wwp, edema improved  Lungs: ctab, no w/r/r/c, no lb  Abdomen: s/nt/nd, +BS    Results:  Lab Results   Component Value Date     01/09/2020     01/09/2020    K 4.6 01/09/2020    K 4.6 01/09/2020    CL 99 01/09/2020    CL 99 01/09/2020    CO2 24 01/09/2020    CO2 24 01/09/2020    BUN 37 (H) 01/09/2020    BUN 37 (H) 01/09/2020    CREATININE 5.6 (H) 01/09/2020    CREATININE 5.6 (H) 01/09/2020     CALCIUM 7.8 (L) 01/09/2020    CALCIUM 7.8 (L) 01/09/2020    ANIONGAP 13 01/09/2020    ANIONGAP 13 01/09/2020    ESTGFRAFRICA 9.3 (A) 01/09/2020    ESTGFRAFRICA 9.3 (A) 01/09/2020    EGFRNONAA 8.1 (A) 01/09/2020    EGFRNONAA 8.1 (A) 01/09/2020       Lab Results   Component Value Date    CALCIUM 7.8 (L) 01/09/2020    CALCIUM 7.8 (L) 01/09/2020    PHOS 5.3 (H) 01/09/2020       Recent Labs   Lab 01/09/20  0558   WBC 4.53  4.53   RBC 2.64*  2.64*   HGB 8.4*  8.4*   HCT 26.4*  26.4*   *  138*   *  100*   MCH 31.8*  31.8*   MCHC 31.8*  31.8*       I have personally reviewed pertinent radiological imaging and reports.    I have spent > 35 minutes providing care for this patient for the above diagnoses. These services have included chart/data/imaging review, evaluation, exam, formulation of plan, , note preparation, and discussions with staff involved in this patient's care.    Amy Parrish MD MPH  [facility] [address]  [place] [address]  [phone] (p)  [phone] (f)

## 2020-01-09 NOTE — HOSPITAL COURSE
She had an IR guided paracentesis done yesterday with removal of 7 L of straw-colored fluid.  She also had dialysis done with 2 L of ultrafiltrate removed.  She is much improved after her procedure, distention has resolved, she denies lightheadedness, chest pain, nausea vomiting.  I have given her an outpatient order for paracentesis in 2 weeks.  She will resume her dialysis per  schedule.

## 2020-01-09 NOTE — DISCHARGE SUMMARY
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  She reports retaining asciatic fluid quickly lately about 3 L weekly with generalized abdominal discomfort and edema extending down to the lower extremities.  Patient's report insomnia due to distention causing pain on her ribcage and loss of appetite  She denies fever, any localized tenderness in the abdomen, GI bleed, chest pain shortness of breath, cough, dizziness or changes in her mental status.  She still urinates, but her blood pressure dropped during hemodialysis, off Lasix currently.  Denies constipation.  Of note she has been getting theraputic paracentesis  , last admission 7 L of fluid removed.   Also reports to scheduled for angiogram next week with her prior cardiologist.  Patient denies drinking alcohol or smoking, no recent use of OP antibiotics .    In ED pt vitals are stable, breathing on room air, CT abdomen pelvis showed morphologic features of advanced cirrhosis and findings consistent with portal hypertension (splenomegaly and ascites-large volume of simple appearing ascites,anasarca.  CXR no interval change   Patient received 20 mg IV Lasix, no leukocytosis with a left shift+, previous paracentesis also aseptic, hemoglobin 9.5 baseline macrocytic anemia, thrombocytopenia  baseline, BUN creatinine 62 / 7.7, , trop 0.041, TSH 5.8.    ROS:  10 point review of systems were found to be negative expect for that mentioned already in HPI     * No surgery found *      Hospital Course:   She had an IR guided paracentesis done yesterday with removal of 7 L of straw-colored fluid.  She also had dialysis done with 2 L of ultrafiltrate removed.  She is much improved after her procedure, distention has resolved, she denies lightheadedness, chest pain, nausea vomiting.  I have given her an outpatient order for paracentesis in 2 weeks.  She will resume her dialysis per  schedule.     Consults:   Consults (From admission, onward)        Status Ordering Provider     Inpatient consult to Gastroenterology  Once     Provider:  Margarita Saul MD    Acknowledged SMITHA MACHUCA     Inpatient consult to Hospitalist  Once     Provider:  Smitha Machuca MD    Acknowledged SMITHA MACHUCA     Inpatient consult to Nephrology  Once     Provider:  Amy Parrish MD    Completed SMITHA MACHUCA          No new Assessment & Plan notes have been filed under this hospital service since the last note was generated.  Service: Hospital Medicine    Final Active Diagnoses:    Diagnosis Date Noted POA    ESRD 2/2 MPGN on HD since 12/16/13  [N18.6] 08/19/2015 Yes     Chronic    Anemia of chronic renal failure, stage 5 [N18.5, D63.1] 08/19/2015 Yes     Chronic    Chronic hepatitis C with cirrhosis [B18.2, K74.60] 12/15/2015 Yes    Essential hypertension [I10] 08/19/2015 Yes     Chronic    Chronic hepatitis C [B18.2] 08/19/2015 Yes     Chronic    Peripheral vascular disease [I73.9] 01/08/2020 Yes    Hypervolemia [E87.70] 10/17/2019 Yes      Problems Resolved During this Admission:    Diagnosis Date Noted Date Resolved POA    PRINCIPAL PROBLEM:  Other ascites [R18.8] 12/19/2019 01/09/2020 Yes       Discharged Condition: stable    Disposition: Home or Self Care    Follow Up:  Follow-up  Information     Margarita Saul MD.    Specialties:  Hepatology, Gastroenterology  Contact information:  [address] [facility]  [address]  [facility] [address]  [phone]                 Patient Instructions:      CT Paracentesis   Standing Status: Future Standing Exp. Date: 01/09/21     Order Specific Question Answer Comments   Is the patient pregnant? No    May the Radiologist modify the order per protocol to meet the clinical needs of the patient? Yes      Diet renal     Activity as tolerated       Significant Diagnostic Studies: Labs:   BMP:   Recent Labs   Lab 01/07/20 1727 01/08/20 0429 01/09/20  0558    116*  116* 93  93    137  137 136  136   K 4.8 4.7  4.7 4.6  4.6    103  103 99  99   CO2 20* 20*  20* 24  24   BUN 62* 63*  63* 37*  37*   CREATININE 7.7* 7.9*  7.9* 5.6*  5.6*   CALCIUM 8.0* 8.0*  8.0* 7.8*  7.8*   MG 2.6 2.6  2.6 2.4  2.4    and CBC   Recent Labs   Lab 01/07/20 1727 01/08/20 0429 01/09/20  0558   WBC 3.90 4.73  4.73 4.53  4.53   HGB 9.5* 8.4*  8.4* 8.4*  8.4*   HCT 29.6* 27.1*  27.1* 26.4*  26.4*   * 170  170 138*  138*       Pending Diagnostic Studies:     Procedure Component Value Units Date/Time    Albumin, Peritoneal, Pleural Fluid or GEORGE Drainage, In-House Ascites [[id number]] Collected:  01/08/20 1153    Order Status:  Sent Lab Status:  No result     Specimen:  Ascites     Cytology Specimen-Medical Cytology (Fluid/Wash/Brush) [[id number]] Collected:  01/08/20 1153    Order Status:  Sent Lab Status:  No result     Specimen:  Ascites     Hepatitis B core antibody, total [[id number]] Collected:  01/08/20 1530    Order Status:  Sent Lab Status:  In process Updated:  01/08/20 1541    Specimen:  Blood     Narrative:       Collection has been rescheduled by AS3 at 01/08/2020 15:25 Reason:   patient not in room  Collection has been rescheduled by AS3 at 01/08/2020 15:31 Reason:   done     Hepatitis B surface antibody [[id number]] Collected:   01/08/20 1530    Order Status:  Sent Lab Status:  In process Updated:  01/08/20 1541    Specimen:  Blood     Narrative:       Collection has been rescheduled by AS3 at 01/08/2020 15:25 Reason:   patient not in room  Collection has been rescheduled by AS3 at 01/08/2020 15:31 Reason:   done     Hepatitis B surface antigen [[id number]] Collected:  01/08/20 1530    Order Status:  Sent Lab Status:  In process Updated:  01/08/20 1541    Specimen:  Blood     Narrative:       Collection has been rescheduled by AS3 at 01/08/2020 15:25 Reason:   patient not in room  Collection has been rescheduled by AS3 at 01/08/2020 15:31 Reason:   done     LDH, Peritoneal, Pleural Fluid or GEORGE Drainage, In-House Ascites [[id number]] Collected:  01/08/20 1153    Order Status:  Sent Lab Status:  No result     Specimen:  Ascites     Protein, Peritoneal, Pleural Fluid or GEORGE Drainage, In-House Ascites [[id number]] Collected:  01/08/20 1153    Order Status:  Sent Lab Status:  No result     Specimen:  Ascites          Medications:  Reconciled Home Medications:      Medication List      CONTINUE taking these medications    amLODIPine 10 MG tablet  Commonly known as:  NORVASC  Take 0.5 tablets (5 mg total) by mouth nightly.     aspirin 81 MG EC tablet  Commonly known as:  ECOTRIN  Take 81 mg by mouth once daily.     DIALYVITE 3000 3-70-15 mg-mcg-mg Tab  Generic drug:  folic acid-B [id number]  Take 1 tablet by mouth once daily.     dicyclomine 20 mg tablet  Commonly known as:  BENTYL  Take 20 mg by mouth every 6 (six) hours as needed.     HYDROcodone-acetaminophen  mg per tablet  Commonly known as:  NORCO  Take 1 tablet by mouth every 8 (eight) hours as needed for Pain.     lisinopril 20 MG tablet  Commonly known as:  PRINIVIL,ZESTRIL  Take 1 tablet by mouth nightly.     metoprolol succinate 50 MG 24 hr tablet  Commonly known as:  TOPROL-XL  Take 50 mg by mouth nightly.     ondansetron 4 MG Tbdl  Commonly known as:  ZOFRAN-ODT  Take 2  tablets (8 mg total) by mouth every hour as needed. nausea     pantoprazole 40 MG tablet  Commonly known as:  PROTONIX  Take 40 mg by mouth every evening.     promethazine 25 MG tablet  Commonly known as:  PHENERGAN  Take 1 tablet (25 mg total) by mouth every 6 (six) hours as needed for Nausea.     traZODone 50 MG tablet  Commonly known as:  DESYREL  Take 1 tablet (50 mg total) by mouth every evening.     ursodiol 250 mg Tab  Commonly known as:  ACTIGALL  Take 1 tablet (250 mg total) by mouth once daily.            Indwelling Lines/Drains at time of discharge:   Lines/Drains/Airways     Drain                 Hemodialysis AV Fistula Left upper arm -- days          Pressure Ulcer                 Pressure Injury 11/19/19 1107 Coccyx #1 Stage 2 51 days                Time spent on the discharge of patient: 29 minutes  Patient was seen and examined on the date of discharge and determined to be suitable for discharge.         Yessenia Barrera MD  Department of Hospital Medicine  [facility]

## 2020-01-09 NOTE — ASSESSMENT & PLAN NOTE
No treatment. Due to chronic smoking.  Has been counseled on smoking cessation. Cannot have liver transplant due to this problem.

## 2020-01-10 NOTE — DISCHARGE INSTRUCTIONS
 Nothing to eat or drink after midnight the night before your procedure.   Do not take any medications the morning of your procedure.   Bring all your medications with you in the original pill bottles from pharmacy.   If you take blood thinners, ask your doctor if you should stop taking them.   Do not take metformin 24 hours prior to your procedure.   Do your Hibiclens wash the night before and morning of your procedure.   If you use a CPAP or BiPAP at home, please bring it with you the day of your procedure.   Make arrangements for someone you know to drive you home after your procedure. Taxi and Uber are not acceptable.     Monday 01/13/2020 0630AM

## 2020-01-10 NOTE — PROGRESS NOTES
[facility]  Gastroenterology  Progress Note    Patient Name: Aurelia Saucedo  MRN: [id number]  Admission Date: 1/7/2020  Hospital Length of Stay: 0 days  Code Status: Prior   Attending Provider: No att. 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  She reports retaining asciatic fluid quickly lately about 3 L weekly with generalized abdominal discomfort and edema extending down to the lower extremities.  Patient's report insomnia due to distention causing pain on her ribcage and loss of appetite  She denies fever, any localized tenderness in the abdomen, GI bleed, chest pain shortness of breath, cough, dizziness or changes in her mental status.  She still urinates, but her blood pressure dropped during hemodialysis, off Lasix currently.  Denies constipation.  Of note she has been getting theraputic paracentesis  , last admission 7 L of fluid removed.   Also reports to scheduled for angiogram next week with her prior cardiologist.  Patient denies drinking alcohol or smoking, no recent use of OP antibiotics .     In ED pt vitals are stable, breathing on room air, CT abdomen pelvis showed morphologic features of advanced cirrhosis and findings consistent with portal hypertension (splenomegaly and ascites-large volume of simple appearing ascites,anasarca.  CXR no interval change   Patient received 20 mg IV Lasix, no leukocytosis with a left shift+, previous paracentesis also aseptic, hemoglobin 9.5 baseline macrocytic anemia, thrombocytopenia  baseline, BUN creatinine 62 / 7.7, , trop 0.041, TSH 5.8.     Complicated case with multiple comorbid conditions.  Patient has ends stage liver disease, cirrhosis, end state renal disease on dialysis.  Has been evaluated for liver transplant but has been told she is not a candidate because of peripheral vascular problems.  She has massive ascites, 7 liters of fluid removed, presently undergoing dialysis.  Prognosis is poor. Patient is depressed due to medical condition and quality of life. Getting supportive care.  Will need frequent paracentesis.  The question is whether she is a candidate for a peritoneal ostomy. Usually, this is reserved for patients with a malignancy. The vascular problem is in both legs due to chronic smoking.  Has been counseled regarding this on several occasions.    01/09/20:  Breathing better, had paracentesis done, 7 liters removed, will be referred to [facility] for possible combined liver and kidney transplant.  Spent considerable time trying to make arrangements.  Patient has lost weight since paracentesis.Needs to be treated for hepatitis C.    Review of Systems   Constitutional: Positive for fatigue. Negative for chills and fever.   HENT: Negative for hearing loss and trouble swallowing.    Eyes: Positive for visual disturbance.        Wears glasses   Respiratory: Positive for shortness of breath. Negative for wheezing.    Cardiovascular: Negative.    Gastrointestinal: Positive for abdominal distention.        End stage liver disease, cirhosis, chronic hepatitis C, anemia, esophageal varices   Genitourinary:        End stage renal disease, on dialysis   Musculoskeletal:        ROM normal   Skin: Negative for rash and wound.   Allergic/Immunologic: Positive for immunocompromised state.   Neurological: Positive for weakness.   Hematological:        Anemic   Psychiatric/Behavioral:        Depressed      Objective:        Vital Signs (Most Recent):  Temp: 99 °F (37.2°C)  (01/09/20)  Pulse: 78 (01/089/20)  Resp: 18 (01/09/20)  BP: (!) 95/58 (01/09/20)  SpO2: 93% (01/09/20) Vital Signs (24h Range):  Temp:  [98 °F (36.7 °C)-98.3 °F (36.8 °C)] 98 °F (36.7 °C)  Pulse:  [67-88] 81  Resp:  [16-26] 16  SpO2:  [94 %-100 %] 100 %  BP: ()/(41-89) 102/50      Physical Exam   Constitutional: She is oriented to person, place, and time. She appears well-developed and well-nourished.   HENT:   Head: Normocephalic and atraumatic.   Eyes: Pupils are equal, round, and reactive to light. EOM are normal.   Neck: Normal range of motion. Neck supple.   Cardiovascular: Regular rhythm.   Pulmonary/Chest: Effort normal. No respiratory distress. She has no wheezes.   Abdominal: Soft. Bowel sounds are normal. She exhibits distension. There is no rebound and no guarding.   Chronic ascites, 7 liters of fluid removed   Musculoskeletal: Normal range of motion. She exhibits no deformity.   Neurological: She is alert and oriented to person, place, and time.   Skin: Skin is warm and dry. No rash noted.   Psychiatric:   depressed   Significant Diagnostic Studies: Labs:   BMP:         Recent Labs   Lab 01/07/20 1727 01/08/20 0429 01/09/20  0558    116*  116* 93  93    137  137 136  136   K 4.8 4.7  4.7 4.6  4.6    103  103 99  99   CO2 20* 20*  20* 24  24   BUN 62* 63*  63* 37*  37*   CREATININE 7.7* 7.9*  7.9* 5.6*  5.6*   CALCIUM 8.0* 8.0*  8.0* 7.8*  7.8*   MG 2.6 2.6  2.6 2.4  2.4    and CBC         Recent Labs   Lab 01/07/20 1727 01/08/20  0429 01/09/20  0558   WBC 3.90 4.73  4.73 4.53  4.53   HGB 9.5* 8.4*  8.4* 8.4*  8.4*   HCT 29.6* 27.1*  27.1* 26.4*  26.4*   * 170  170 138*  138*             Assessment/Plan:     Other ascites  paracenthesis     Peripheral vascular disease  No treatment. Due to chronic smoking.  Has been counseled on smoking cessation. Cannot have liver transplant due to this problem.     Hypervolemia  paracenthesis     Chronic  hepatitis C with cirrhosis  PPI  Zofran     Anemia of chronic renal failure, stage 5  Monitor H & H     Chronic hepatitis C  No therapy      ESRD 2/2 MPGN on HD since 12/16/13   dialysis     Essential hypertension  medication     Thank you for your consult. I will follow-up with patient. Please contact us if you have any additional questions.    Margarita Saul MD  Gastroenterology  [facility]

## 2020-01-13 PROBLEM — I25.10 CORONARY ARTERY DISEASE INVOLVING NATIVE CORONARY ARTERY OF NATIVE HEART WITHOUT ANGINA PECTORIS: Status: ACTIVE | Noted: 2020-01-01

## 2020-01-13 NOTE — Clinical Note
90 ml injected throughout the case. 40 mL total wasted during the case. 130 mL total used in the case.

## 2020-01-13 NOTE — PLAN OF CARE
This note also relates to the following rows which could not be included:  SpO2 - Cannot attach notes to unvalidated device data  Pulse - Cannot attach notes to unvalidated device data  Resp - Cannot attach notes to unvalidated device data       01/13/20 1505   PRE-TX-O2   O2 Device (Oxygen Therapy) nasal cannula   $ Is the patient on Low Flow Oxygen? Yes   Flow (L/min) 2   Pulse Oximetry Type Continuous   $ Pulse Oximetry - Multiple Charge Pulse Oximetry - Multiple

## 2020-01-13 NOTE — PROGRESS NOTES
Total UF: 2500 mL  Net UF: 2000 mL       01/13/20 1644   Vital Signs   Temp 97.6 °F (36.4 °C)   Temp src Oral   Pulse 83   Heart Rate Source Monitor   Resp (!) 33   SpO2 100 %   Pulse Oximetry Type Continuous   Oximetry Probe Site Assessed;Intact   Flow (L/min) 2   O2 Device (Oxygen Therapy) nasal cannula   BP (!) 140/71   BP Location Right arm   BP Method Automatic   Patient Position Lying   Post-Hemodialysis Assessment   Rinseback Volume (mL) 250 mL   Blood Volume Processed (Liters) 36.2 L   Dialyzer Clearance Lightly streaked   Duration of Treatment (minutes) 120 minutes   Hemodialysis Intake (mL) 500 mL   Total UF (mL) 2500 mL   Net Fluid Removal 2000   Patient Response to Treatment Fran well   Post-Treatment Weight 59 kg (130 lb 1.1 oz)   Treatment Weight Change -4.9   Arterial bleeding stop time (min) 5 min   Venous bleeding stop time (min) 5 min   Post-Hemodialysis Comments Tx complete. Pt stable.

## 2020-01-13 NOTE — Clinical Note
Catheter is repositioned to the ostium   left main. Angiography performed of the left coronary arteries in multiple views. Angiography performed via power injection with 8 mL contrast at 4 mL/sPower injection PSI was 450..

## 2020-01-13 NOTE — PLAN OF CARE
1230 up to floor via stretcher report and handoff given to larry rn right groin insertion site cdi with dsg no co pain at present no acute distress noted

## 2020-01-13 NOTE — Clinical Note
Catheter is removed from the and is repositioned to the SUBCLAVIN. Angiography performed of the LIMA. Angiography performed via power injection with 8 mL contrast at 4 mL/sPower injection PSI was 500.. Unable to engage and suboptimal result.JR Pressley

## 2020-01-13 NOTE — CONSULTS
This patient has severe coronary artery disease.  She has had persistent shortness of breath on and off for the last few weeks.  She was brought in for angiogram today and was found have multivessel coronary artery disease.  She was referred for consideration of coronary artery bypass grafting.  She has end-stage renal disease and presently is undergoing hemodialysis via an access in the left upper extremity.  She appears to be in no distress.  Her medicines noted and are part of the epic record.  Her problem list was reviewed.  She has chronic hypertension.  She  has no other pertinent family social history.  She is not a smoker.  On exam vital signs are stable.  She presently is undergoing hemodialysis.  Her pupils are equal and round reactive to light.  Nose and throat are clear.  Neck is supple.  Chest has equal breath sounds.  Her abdomen is somewhat distended.  Perfusion to the legs and feet is satisfactory.  The angiograms were reviewed and she has multivessel coronary artery disease.  She has end-stage renal disease and presently undergoes hemodialysis.  I spoke to the patient about consideration for coronary artery bypass grafting.  This would be done under high risk.  She has heavy calcific change to the mitral valve annulus and an echocardiogram has been pending.  Based on the results of this final recommendations will be made.  She seems to be understanding and if surgery is necessary will consider it.

## 2020-01-13 NOTE — Clinical Note
Catheter is repositioned to the ostium   right coronary artery. Angiography performed of the right coronary arteries in multiple views. Angiography performed via power injection with 6 mL contrast at 3 mL/sPower injection PSI was 300..

## 2020-01-13 NOTE — CONSULTS
Nephrology Consult Note        Patient Name: Aurelia Saucedo  MRN: [id number]    Patient Class: OP- Observation   Admission Date: 2020  Length of Stay: 0 days  Date of Service: 2020    Attending Physician: Yahir Starkey MD  Primary Care Provider: Dustin Ireland MD    Reason for Consult: esrd/anemia/htn/shpt/chest pain    SUBJECTIVE:     HPI: 54F with ESRD on HD MF, cirhhosis is admitted with for angiogram to evaluate chest pain. Angiogram suggest diffuse disease and need for CABG. Seen and examined on HD machine today, tolerating well, no complains.    Past Medical History:   Diagnosis Date    Anemia of chronic renal failure, stage 5 2015    Ascites     Bleeding duodenal ulcer     CAD (coronary artery disease)     Chest pain     Chronic hepatitis C 2015    Colitis     Elevated troponin     ESRD 2/2 MPGN on HD since 13    Gastritis     Hypertension 2015    Mitral valve regurgitation     Renal dialysis status     M, W, F    Secondary hyperparathyroidism of renal origin 2015     Past Surgical History:   Procedure Laterality Date    AV FISTULA PLACEMENT      COLONOSCOPY      CORONARY ANGIOPLASTY WITH STENT PLACEMENT      SALPINGOOPHORECTOMY Right     TOTAL ABDOMINAL HYSTERECTOMY       Family History   Problem Relation Age of Onset    Kidney disease Mother     Stroke Father     Psoriasis Brother     Breast cancer Neg Hx     Cancer Neg Hx     Colon cancer Neg Hx     Ovarian cancer Neg Hx      Social History     Tobacco Use    Smoking status: Former Smoker     Packs/day: 0.75     Years: 30.00     Pack years: 22.50     Types: Cigarettes     Last attempt to quit: 2019     Years since quittin.1    Smokeless tobacco: Never Used   Substance Use Topics    Alcohol use: No     Alcohol/week: 0.0 standard drinks    Drug use: Not Currently     Types: Marijuana       Review of patient's allergies indicates:   Allergen Reactions     Ciprofloxacin Itching    Levaquin [levofloxacin] Itching    Quinolones Itching    Codeine Nausea Only       Outpatient meds:  No current facility-administered medications on file prior to encounter.      Current Outpatient Medications on File Prior to Encounter   Medication Sig Dispense Refill    FA-VIT B COMP&C-SELENIUM-ZINC 3-70-15 MG-MCG-MG ORAL TAB Take 1 tablet by mouth once daily.      lisinopril (PRINIVIL,ZESTRIL) 20 MG tablet Take 1 tablet by mouth nightly.   3    metoprolol succinate (TOPROL-XL) 50 MG 24 hr tablet Take 50 mg by mouth nightly.       ondansetron (ZOFRAN-ODT) 4 MG TbDL Take 2 tablets (8 mg total) by mouth every hour as needed. nausea 20 tablet 0    pantoprazole (PROTONIX) 40 MG tablet Take 40 mg by mouth every evening.       traZODone (DESYREL) 50 MG tablet Take 1 tablet (50 mg total) by mouth every evening. 30 tablet 11    dicyclomine (BENTYL) 20 mg tablet Take 20 mg by mouth every 6 (six) hours as needed.          Scheduled meds:   sodium chloride 0.9%   Intravenous Once    chlorhexidine  15 mL Mouth/Throat BID    mupirocin   Nasal BID       Infusions:      PRN meds:  sodium chloride 0.9%, HYDROcodone-acetaminophen    Review of Systems:  Review of Systems   Constitutional: Negative for chills, fever, malaise/fatigue and weight loss.   HENT: Negative for hearing loss and nosebleeds.    Eyes: Negative for blurred vision, double vision and photophobia.   Respiratory: Negative for cough, shortness of breath and wheezing.    Cardiovascular: Negative for chest pain, palpitations and leg swelling.   Gastrointestinal: Negative for abdominal pain, constipation, diarrhea, heartburn, nausea and vomiting.   Genitourinary: Negative for dysuria, frequency and urgency.   Musculoskeletal: Negative for falls, joint pain and myalgias.   Skin: Negative for itching and rash.   Neurological: Negative for dizziness, speech change, focal weakness, loss of consciousness and headaches.    Endo/Heme/Allergies: Does not bruise/bleed easily.   Psychiatric/Behavioral: Negative for depression and substance abuse. The patient is not nervous/anxious.        OBJECTIVE:     Vital Signs and IO (Last 24H):  Temp:  [97.3 °F (36.3 °C)-98.3 °F (36.8 °C)]   Pulse:  [84-91]   Resp:  [16-20]   BP: (107-120)/(62-69)   SpO2:  [99 %-100 %]   No intake/output data recorded.    Wt Readings from Last 5 Encounters:   01/13/20 58.1 kg (128 lb)   01/10/20 52.3 kg (115 lb 4.8 oz)   01/08/20 64.7 kg (142 lb 10.2 oz)   12/23/19 58.9 kg (129 lb 13.6 oz)   12/19/19 57.2 kg (126 lb)         Physical Exam:  Physical Exam   Constitutional: She is oriented to person, place, and time. She appears well-developed and well-nourished.   HENT:   Head: Normocephalic and atraumatic.   Mouth/Throat: Oropharynx is clear and moist.   Eyes: Pupils are equal, round, and reactive to light. EOM are normal. No scleral icterus.   Neck: Neck supple.   Cardiovascular: Normal rate and regular rhythm.   Pulmonary/Chest: Effort normal. No stridor. No respiratory distress.   Abdominal: Soft. She exhibits no distension.   Musculoskeletal: Normal range of motion. She exhibits no edema or deformity.   Neurological: She is alert and oriented to person, place, and time. No cranial nerve deficit.   Skin: Skin is warm and dry. No rash noted. She is not diaphoretic. No erythema.   Psychiatric: She has a normal mood and affect. Her behavior is normal.       Body mass index is 21.97 kg/m².    Laboratory:  Recent Labs   Lab 01/07/20  1727 01/08/20  0429 01/09/20  0558    137  137 136  136   K 4.8 4.7  4.7 4.6  4.6    103  103 99  99   CO2 20* 20*  20* 24  24   BUN 62* 63*  63* 37*  37*   CREATININE 7.7* 7.9*  7.9* 5.6*  5.6*   ESTGFRAFRICA 6.3* 6.2*  6.2* 9.3*  9.3*   EGFRNONAA 5.5* 5.3*  5.3* 8.1*  8.1*    116*  116* 93  93       Recent Labs   Lab 01/07/20  1727 01/08/20  0429 01/09/20  0558   CALCIUM 8.0* 8.0*  8.0* 7.8*   7.8*   ALBUMIN 2.4*  --   --    PHOS  --  6.3* 5.3*   MG 2.6 2.6  2.6 2.4  2.4             No results for input(s): POCTGLUCOSE in the last 168 hours.    Recent Labs   Lab 11/19/19  0447 01/08/20  0429   Hemoglobin A1C SEE COMMENT SEE COMMENT       Recent Labs   Lab 01/07/20  1727 01/08/20  0429 01/09/20  0558   WBC 3.90 4.73  4.73 4.53  4.53   HGB 9.5* 8.4*  8.4* 8.4*  8.4*   HCT 29.6* 27.1*  27.1* 26.4*  26.4*   * 170  170 138*  138*   * 101*  101* 100*  100*   MCHC 32.1 31.0*  31.0* 31.8*  31.8*   MONO 6.4  0.3 7.4  7.4  0.4  0.4 11.9  11.9  0.5  0.5       Recent Labs   Lab 01/07/20  1727   BILITOT 0.9   BILIDIR <0.1*   PROT 5.5*   ALBUMIN 2.4*   ALKPHOS 85   ALT 15   AST 21       Recent Labs   Lab 11/19/19  0316 12/23/19  0344 01/07/20  2353   Color, UA Yellow Yellow Yellow   Appearance, UA Hazy A Clear Clear   pH, UA >8.0 A >8.0 A >8.0 A   Specific [place], UA 1.010 1.015 1.015   Protein, UA 2+ A 2+ A 2+ A   Glucose, UA Negative Negative Negative   Ketones, UA Negative Negative Negative   Urobilinogen, UA Negative Negative Negative   Bilirubin (UA) Negative Negative Negative   Occult Blood UA 2+ A 2+ A 1+ A   Nitrite, UA Negative Negative Negative   RBC, UA 30 H 30 H 9 H   WBC, UA >100 H 1 0   Bacteria Moderate A Negative Negative   Hyaline Casts, UA 1 3 A 13 A             Microbiology Results (last 7 days)     ** No results found for the last 168 hours. **          ASSESSMENT/PLAN:     Active Hospital Problems    Diagnosis  POA    *Coronary artery disease involving native coronary artery of native heart without angina pectoris [I25.10]  Yes      Resolved Hospital Problems   No resolved problems to display.       ESRD on HD MF via AVF  Continue current dialysis prescription.  Next HD today and then PRN.  Renal diet - low K, low phos.  No IVs or BP checks on access and/or non-dominant arm.      Anemia of CKD  Hgb and HCT are acceptable. Monitor.  Will provide MERON and/or IV  iron PRN.    MBD / Secondary HPT  Ca, phos, PTH and vitamin D levels are acceptable.   Phos binders, vitamin D analogues and calcimimetics as needed.    HTN  BP seem controlled.   Tolerate asymptomatic HTN up to -160.  Continue home meds.  Low sodium diet.    CAD, CP, SOB  Needs CABG. Ok to go to OR for CABG when felt feasible. Rest of plan per cards and Surgery.    Thank you for allowing us to participate in the care of your patient!   We will follow the patient and provide recommendations as needed.    Luciano Campos MD    [facility]  [address] [place] [address]  [place] [address]    [phone] - tel  [phone] - fax    1/13/2020 1:09 PM

## 2020-01-14 NOTE — PLAN OF CARE
01/14/20 1357   Discharge Reassessment   Assessment Type Discharge Planning Reassessment   Anticipated Discharge Disposition Home-Health   Patient choice form signed by patient/caregiver List with quality metrics by geographic area provided  (Pt to resume care with [facility].)   Pt signed Patient Choice Form to resume [facility] when she is discharged. Scanned Form into [employer] Manager.

## 2020-01-14 NOTE — PLAN OF CARE
Assessment done. Pt could need to have CABG but pt could go home before if Surgeon agrees. Now waiting to see if Dr needs any tests before pt goes home. Pt has [facility] so will need to have Home Health resumed.      01/14/20 0858   Discharge Assessment   Assessment Type Discharge Planning Assessment   Confirmed/corrected address and phone number on facesheet? Yes   Assessment information obtained from? Patient   Communicated expected length of stay with patient/caregiver no   Prior to hospitilization cognitive status: Alert/Oriented   Prior to hospitalization functional status: Independent   Current cognitive status: Alert/Oriented   Current Functional Status: Needs Assistance   Lives With spouse;child(tegan), adult  (Disabled grown son )   Able to Return to Prior Arrangements yes   Is patient able to care for self after discharge? Yes   Who are your caregiver(s) and their phone number(s)? Jose Saucedo  [phone]   Patient's perception of discharge disposition other (comments)  (Unknown at this time. Could be having CABG. Needs liver and kidney disease.)   Readmission Within the Last 30 Days no previous admission in last 30 days   Patient currently being followed by outpatient case management? No   Patient currently receives any other outside agency services? No   Part D Coverage BCBS   Do you have any problems affording any of your prescribed medications? No   Is the patient taking medications as prescribed? yes   Does the patient have transportation home? Yes   Transportation Anticipated family or friend will provide   Does the patient receive services at the [facility]? No   Discharge Plan A Home with family   DME Needed Upon Discharge  none   Patient/Family in Agreement with Plan yes

## 2020-01-14 NOTE — PROGRESS NOTES
Nephrology Consult Progress Note        Patient Name: Aurelia Saucedo  MRN: [id number]    Patient Class: OP- Observation   Admission Date: 2020  Length of Stay: 0 days  Date of Service: 2020    Attending Physician: Yahir Starkey MD  Primary Care Provider: Dustin Ireland MD    Reason for Consult: esrd/anemia/htn/shpt/chest pain    SUBJECTIVE:     HPI: 54F with ESRD on HD MF, cirhhosis is admitted with for angiogram to evaluate chest pain. Angiogram suggest diffuse disease and need for CABG. Seen and examined on HD machine today, tolerating well, no complains.     VSS, no new complains. HD PRN, based on final plans for CABG. Will most likely need blood transfusion before.    Past Medical History:   Diagnosis Date    Anemia of chronic renal failure, stage 5 2015    Ascites     Bleeding duodenal ulcer     CAD (coronary artery disease)     Chest pain     Chronic hepatitis C 2015    Colitis     Elevated troponin     ESRD 2/2 MPGN on HD since 13    Gastritis     Hypertension 2015    Mitral valve regurgitation     Renal dialysis status     M, W, F    Secondary hyperparathyroidism of renal origin 2015     Past Surgical History:   Procedure Laterality Date    AV FISTULA PLACEMENT      COLONOSCOPY      CORONARY ANGIOPLASTY WITH STENT PLACEMENT      SALPINGOOPHORECTOMY Right     TOTAL ABDOMINAL HYSTERECTOMY       Family History   Problem Relation Age of Onset    Kidney disease Mother     Stroke Father     Psoriasis Brother     Breast cancer Neg Hx     Cancer Neg Hx     Colon cancer Neg Hx     Ovarian cancer Neg Hx      Social History     Tobacco Use    Smoking status: Former Smoker     Packs/day: 0.75     Years: 30.00     Pack years: 22.50     Types: Cigarettes     Last attempt to quit: 2019     Years since quittin.1    Smokeless tobacco: Never Used   Substance Use Topics    Alcohol use: No     Alcohol/week: 0.0 standard drinks     Drug use: Not Currently     Types: Marijuana       Review of patient's allergies indicates:   Allergen Reactions    Ciprofloxacin Itching    Levaquin [levofloxacin] Itching    Quinolones Itching    Codeine Nausea Only       Outpatient meds:  No current facility-administered medications on file prior to encounter.      Current Outpatient Medications on File Prior to Encounter   Medication Sig Dispense Refill    FA-VIT B COMP&C-SELENIUM-ZINC 3-70-15 MG-MCG-MG ORAL TAB Take 1 tablet by mouth once daily.      lisinopril (PRINIVIL,ZESTRIL) 20 MG tablet Take 1 tablet by mouth nightly.   3    metoprolol succinate (TOPROL-XL) 50 MG 24 hr tablet Take 50 mg by mouth nightly.       ondansetron (ZOFRAN-ODT) 4 MG TbDL Take 2 tablets (8 mg total) by mouth every hour as needed. nausea 20 tablet 0    pantoprazole (PROTONIX) 40 MG tablet Take 40 mg by mouth every evening.       traZODone (DESYREL) 50 MG tablet Take 1 tablet (50 mg total) by mouth every evening. 30 tablet 11    dicyclomine (BENTYL) 20 mg tablet Take 20 mg by mouth every 6 (six) hours as needed.          Scheduled meds:   aspirin  81 mg Oral Daily    chlorhexidine  15 mL Mouth/Throat BID    lisinopril  20 mg Oral Nightly    metoprolol succinate  50 mg Oral Nightly    pantoprazole  40 mg Oral QHS    traZODone  50 mg Oral QHS       Infusions:      PRN meds:  dicyclomine, HYDROcodone-acetaminophen, HYDROcodone-acetaminophen, ondansetron    Review of Systems:  ROS    OBJECTIVE:     Vital Signs and IO (Last 24H):  Temp:  [97.3 °F (36.3 °C)-98.5 °F (36.9 °C)]   Pulse:  [78-97]   Resp:  [15-56]   BP: ()/(53-73)   SpO2:  [77 %-100 %]   I/O last 3 completed shifts:  In: 980 [P.O.:480; Other:500]  Out: 2500 [Other:2500]    Wt Readings from Last 5 Encounters:   01/13/20 58.1 kg (128 lb)   01/10/20 52.3 kg (115 lb 4.8 oz)   01/08/20 64.7 kg (142 lb 10.2 oz)   12/23/19 58.9 kg (129 lb 13.6 oz)   12/19/19 57.2 kg (126 lb)         Physical Exam:  Physical  Exam   Constitutional: She is oriented to person, place, and time. She appears well-developed and well-nourished.   HENT:   Head: Normocephalic and atraumatic.   Mouth/Throat: Oropharynx is clear and moist.   Eyes: Pupils are equal, round, and reactive to light. EOM are normal. No scleral icterus.   Neck: Neck supple.   Cardiovascular: Normal rate and regular rhythm.   Pulmonary/Chest: Effort normal. No stridor. No respiratory distress.   Abdominal: Soft. She exhibits no distension.   Musculoskeletal: Normal range of motion. She exhibits no edema or deformity.   Neurological: She is alert and oriented to person, place, and time. No cranial nerve deficit.   Skin: Skin is warm and dry. No rash noted. She is not diaphoretic. No erythema.   Psychiatric: She has a normal mood and affect. Her behavior is normal.       Body mass index is 21.97 kg/m².    Laboratory:  Recent Labs   Lab 01/08/20 0429 01/09/20  0558 01/13/20  1443     137 136  136 136   K 4.7  4.7 4.6  4.6 4.5     103 99  99 103   CO2 20*  20* 24  24 22*   BUN 63*  63* 37*  37* 45*   CREATININE 7.9*  7.9* 5.6*  5.6* 7.3*   ESTGFRAFRICA 6.2*  6.2* 9.3*  9.3* 6.8*   EGFRNONAA 5.3*  5.3* 8.1*  8.1* 5.9*   *  116* 93  93 106       Recent Labs   Lab 01/07/20  1727 01/08/20 0429 01/09/20  0558 01/13/20  1443   CALCIUM 8.0* 8.0*  8.0* 7.8*  7.8* 7.8*   ALBUMIN 2.4*  --   --  1.9*   PHOS  --  6.3* 5.3*  --    MG 2.6 2.6  2.6 2.4  2.4  --              No results for input(s): POCTGLUCOSE in the last 168 hours.    Recent Labs   Lab 11/19/19  0447 01/08/20  0429   Hemoglobin A1C SEE COMMENT SEE COMMENT       Recent Labs   Lab 01/08/20  0429 01/09/20  0558 01/13/20  1443   WBC 4.73  4.73 4.53  4.53 4.96   HGB 8.4*  8.4* 8.4*  8.4* 7.4*   HCT 27.1*  27.1* 26.4*  26.4* 23.1*     170 138*  138* 142*   *  101* 100*  100* 100*   MCHC 31.0*  31.0* 31.8*  31.8* 32.0   MONO 7.4  7.4  0.4  0.4 11.9  11.9   0.5  0.5  --        Recent Labs   Lab 01/07/20  1727 01/13/20  1443   BILITOT 0.9 0.5   BILIDIR <0.1*  --    PROT 5.5* 4.5*   ALBUMIN 2.4* 1.9*   ALKPHOS 85 58   ALT 15 16   AST 21 23       Recent Labs   Lab 11/19/19  0316 12/23/19  0344 01/07/20  2353   Color, UA Yellow Yellow Yellow   Appearance, UA Hazy A Clear Clear   pH, UA >8.0 A >8.0 A >8.0 A   Specific [place], UA 1.010 1.015 1.015   Protein, UA 2+ A 2+ A 2+ A   Glucose, UA Negative Negative Negative   Ketones, UA Negative Negative Negative   Urobilinogen, UA Negative Negative Negative   Bilirubin (UA) Negative Negative Negative   Occult Blood UA 2+ A 2+ A 1+ A   Nitrite, UA Negative Negative Negative   RBC, UA 30 H 30 H 9 H   WBC, UA >100 H 1 0   Bacteria Moderate A Negative Negative   Hyaline Casts, UA 1 3 A 13 A             Microbiology Results (last 7 days)     ** No results found for the last 168 hours. **          ASSESSMENT/PLAN:     Active Hospital Problems    Diagnosis  POA    *Coronary artery disease involving native coronary artery of native heart without angina pectoris [I25.10]  Yes      Resolved Hospital Problems   No resolved problems to display.       ESRD on HD MF via AVF  Continue current dialysis prescription.  Next HD HD PRN, based on final plans for CABG.   Renal diet - low K, low phos.  No IVs or BP checks on access and/or non-dominant arm.    Anemia of CKD  Hgb and HCT are acceptable. Monitor.  Will provide MERON and/or IV iron PRN.  Will most likely need blood transfusion with HD before CABG.  Will start EPO today.    MBD / Secondary HPT  Ca, phos, PTH and vitamin D levels are acceptable.   Phos binders, vitamin D analogues and calcimimetics as needed.    HTN  BP seem controlled.   Tolerate asymptomatic HTN up to -160.  Continue home meds.  Low sodium diet.    CAD, CP, SOB  Awaiting final plans for CABG.    Thank you for allowing us to participate in the care of your patient!   We will follow the patient and provide  recommendations as needed.    Luciano Campos MD    [facility]  [address] [place] [address]  [place] [address]    [phone] - tel  [phone] - fax    1/14/2020 1:09 PM

## 2020-01-14 NOTE — PLAN OF CARE
01/14/20 1240   HEART Message   Medicare Outpatient and Observation Notification regarding financial responsibility Given to patient/caregiver;Explained to patient/caregiver;Signed/date by patient/caregiver   Date HEART was signed 01/14/20   Time HEART was signed 0910

## 2020-01-14 NOTE — PLAN OF CARE
Problem: Wound  Goal: Optimal Wound Healing  Outcome: Ongoing, Progressing     Problem: Adult Inpatient Plan of Care  Goal: Plan of Care Review  Outcome: Ongoing, Progressing  Goal: Patient-Specific Goal (Individualization)  Outcome: Ongoing, Progressing  Goal: Absence of Hospital-Acquired Illness or Injury  Outcome: Ongoing, Progressing  Goal: Optimal Comfort and Wellbeing  Outcome: Ongoing, Progressing  Goal: Readiness for Transition of Care  Outcome: Ongoing, Progressing  Goal: Rounds/Family Conference  Outcome: Ongoing, Progressing     Problem: Fall Injury Risk  Goal: Absence of Fall and Fall-Related Injury  Outcome: Ongoing, Progressing     Problem: Device-Related Complication Risk (Hemodialysis)  Goal: Safe, Effective Therapy Delivery  Outcome: Ongoing, Progressing     Problem: Hemodynamic Instability (Hemodialysis)  Goal: Vital Signs Remain in Desired Range  Outcome: Ongoing, Progressing     Problem: Infection (Hemodialysis)  Goal: Absence of Infection Signs/Symptoms  Outcome: Ongoing, Progressing

## 2020-01-14 NOTE — PROGRESS NOTES
[facility]    Cardiology Progress Note    Subjective:  Seen and examined this AM following [facility] yesterday demonstrating severe mutli-vessel disease. Underwent dialysis last night. CTS consulted, ECHO to be reviewed to make determination for surgery risk. Patient denies any active cardiac complaints at this time. VSS.       Objective:  Vital Signs (Most Recent)  Temp: 99.2 °F (37.3 °C) (01/14/20 0701)  Pulse: 93 (01/14/20 0754)  Resp: 16 (01/14/20 0754)  BP: (!) 106/56 (01/14/20 0331)  SpO2: 96 % (01/14/20 0754)    Vital Signs Range (Last 24H):  Temp:  [97.5 °F (36.4 °C)-99.2 °F (37.3 °C)]   Pulse:  [78-97]   Resp:  [15-56]   BP: ()/(53-73)   SpO2:  [77 %-100 %]     I & O (Last 24H):    Intake/Output Summary (Last 24 hours) at 1/14/2020 1024  Last data filed at 1/14/2020 0901  Gross per 24 hour   Intake 1220 ml   Output 2500 ml   Net -1280 ml       Current Diet:     Current Diet Order   Procedures    Diet Cardiac        Allergies:  Review of patient's allergies indicates:   Allergen Reactions    Ciprofloxacin Itching    Levaquin [levofloxacin] Itching    Quinolones Itching    Codeine Nausea Only       Meds:  Scheduled Meds:   aspirin  81 mg Oral Daily    chlorhexidine  15 mL Mouth/Throat BID    epoetin keke-ebpx (RETACRIT) injection  10,000 Units Subcutaneous Every Mon, Wed, Fri    lisinopril  20 mg Oral Nightly    metoprolol succinate  50 mg Oral Nightly    pantoprazole  40 mg Oral QHS    traZODone  50 mg Oral QHS     Continuous Infusions:  PRN Meds:dicyclomine, HYDROcodone-acetaminophen, HYDROcodone-acetaminophen, ondansetron    Lab Results :  Recent Results (from the past 24 hour(s))   Comprehensive metabolic panel    Collection Time: 01/13/20  2:43 PM   Result Value Ref Range    Sodium 136 136 - 145 mmol/L    Potassium 4.5 3.5 - 5.1 mmol/L    Chloride 103 95 - 110 mmol/L    CO2 22 (L) 23 - 29 mmol/L    Glucose 106 70 - 110 mg/dL    BUN, Bld 45 (H) 6 - 20 mg/dL    Creatinine 7.3 (H)  "0.5 - 1.4 mg/dL    Calcium 7.8 (L) 8.7 - 10.5 mg/dL    Total Protein 4.5 (L) 6.0 - 8.4 g/dL    Albumin 1.9 (L) 3.5 - 5.2 g/dL    Total Bilirubin 0.5 0.1 - 1.0 mg/dL    Alkaline Phosphatase 58 55 - 135 U/L    AST 23 10 - 40 U/L    ALT 16 10 - 44 U/L    Anion Gap 11 8 - 16 mmol/L    eGFR if African American 6.8 (A) >60 mL/min/1.73 m^2    eGFR if non African American 5.9 (A) >60 mL/min/1.73 m^2   CBC Without Differential    Collection Time: 01/13/20  2:43 PM   Result Value Ref Range    WBC 4.96 3.90 - 12.70 K/uL    RBC 2.32 (L) 4.00 - 5.40 M/uL    Hemoglobin 7.4 (L) 12.0 - 16.0 g/dL    Hematocrit 23.1 (L) 37.0 - 48.5 %    Mean Corpuscular Volume 100 (H) 82 - 98 fL    Mean Corpuscular Hemoglobin 31.9 (H) 27.0 - 31.0 pg    Mean Corpuscular Hemoglobin Conc 32.0 32.0 - 36.0 g/dL    RDW 16.1 (H) 11.5 - 14.5 %    Platelets 142 (L) 150 - 350 K/uL    MPV 9.7 9.2 - 12.9 fL   Troponin I    Collection Time: 01/13/20  2:43 PM   Result Value Ref Range    Troponin I 0.081 (HH) <=0.040 ng/mL        Physical Exam:  Objective:  General Appearance:  Comfortable.    Vital signs: (most recent): Blood pressure (!) 106/56, pulse 93, temperature 99.2 °F (37.3 °C), resp. rate 16, height 5' 4" (1.626 m), weight 58.1 kg (128 lb), last menstrual period 01/07/1999, SpO2 96 %, not currently breastfeeding.  Vital signs are normal.    HEENT: Normal HEENT exam.    Lungs:  Normal effort and normal respiratory rate.  She is not in respiratory distress.  There are rales.  No wheezes.    Heart: Normal rate.  Regular rhythm.  No murmur.   Abdomen: Abdomen is soft.  Bowel sounds are normal.   There is no abdominal tenderness.     Extremities: Normal range of motion.  There is no dependent edema.    Neurological: Patient is alert and oriented to person, place and time.        Current Consults:  IP CONSULT TO CARDIOTHORACIC SURGERY    Assessment/Plan:  Assessment:   Severe multi-vessel CAD  ESRD  HTN  HLD    Plan:   CTS to review ECHO and make determination " on surgery risk. Will follow along and plan admission status vs discharge based off their recs. Continue current cardiac medications at this time.       Alex Casas PA-C  01/14/2020

## 2020-01-14 NOTE — PROGRESS NOTES
Cardiac Rehab     Aurelia Saucedo   [id number]   1/14/2020         Cardiac Rehab Phase Taught: Phase 1    Teaching Method: Verbal, Written    Handouts: Pre-Op CABG Booklet    Educational Videos: None    Understanding:  Learning indicated by feedback, Needs further review and Verbalize understanding    Comments: pt in bed, discussed general education on pre and postop cabg. Questions answered. Pt states she is to be evaluated for kidney/liver transplant so she is anxious to do heart surgery. Pt states she is worried about postop care at home as she does not have a lot of help. Spoke with bedside RN    Total Time Spent:30mins            Amada Ceballos RN

## 2020-01-14 NOTE — CARE UPDATE
01/13/20 2135   Patient Assessment/Suction   Level of Consciousness (AVPU) alert   Respiratory Effort Unlabored   Expansion/Accessory Muscles/Retractions no use of accessory muscles   All Lung Fields Breath Sounds clear;coarse   Rhythm/Pattern, Respiratory unlabored   Cough Frequency no cough   PRE-TX-O2   O2 Device (Oxygen Therapy) room air   SpO2 97 %   Pulse Oximetry Type Continuous   $ Pulse Oximetry - Multiple Charge Pulse Oximetry - Multiple   Pulse 85   Resp 15   Positioning   Head of Bed (HOB) HOB elevated

## 2020-01-15 NOTE — NURSING
Dr Yeboah saw pt and discussed with pt plan to discharge home and return at another date for  Surgery after pericentesis completed.  Dr Starkey's nurse Charlotte and Cici informed of plan to discharge home orders received from Dr Starkey to discharge home and follow up with Dr Yeboah

## 2020-01-15 NOTE — UM SECONDARY REVIEW
NOT MEETING OBSERVATION NOR INPATIENT LEVEL OF CARE, REQUEST TO EHR FOR LEVEL OF CARE RECOMMENDATION. Nicole Paris RN

## 2020-01-15 NOTE — PROGRESS NOTES
"[facility]    Cardiology Progress Note    Subjective:  Patient seen and examined this am. Awaiting CTS recs, they would like to review echo prior to giving recs. Pt feeling overall well, reports "aches all over". Sinus rhythm on telemetry. She states she feels like she is developing ascites. She typically receives monthly paracentesis. No labs for review this am. VSS.       Objective:  Vital Signs (Most Recent)  Temp: 98.8 °F (37.1 °C) (01/15/20 0330)  Pulse: 83 (01/15/20 0330)  Resp: 17 (01/15/20 0330)  BP: 125/70 (01/15/20 0330)  SpO2: 95 % (01/15/20 0330)    Vital Signs Range (Last 24H):  Temp:  [98.2 °F (36.8 °C)-98.8 °F (37.1 °C)]   Pulse:  [83-92]   Resp:  [15-21]   BP: (116-131)/(65-80)   SpO2:  [95 %-100 %]     I & O (Last 24H):    Intake/Output Summary (Last 24 hours) at 1/15/2020 0837  Last data filed at 1/14/2020 1936  Gross per 24 hour   Intake 770 ml   Output --   Net 770 ml       Current Diet:     Current Diet Order   Procedures    Diet Cardiac        Allergies:  Review of patient's allergies indicates:   Allergen Reactions    Ciprofloxacin Itching    Levaquin [levofloxacin] Itching    Quinolones Itching    Codeine Nausea Only       Meds:  Scheduled Meds:   aspirin  81 mg Oral Daily    chlorhexidine  15 mL Mouth/Throat BID    epoetin keke-ebpx (RETACRIT) injection  10,000 Units Subcutaneous Every Mon, Wed, Fri    lisinopril  20 mg Oral Nightly    metoprolol succinate  50 mg Oral Nightly    pantoprazole  40 mg Oral QHS    traZODone  50 mg Oral QHS     Continuous Infusions:  PRN Meds:dicyclomine, HYDROcodone-acetaminophen, HYDROcodone-acetaminophen, ondansetron    Lab Results :  No results found for this or any previous visit (from the past 24 hour(s)).    Diagnostic Results:      Recent Cardiac Rhythm   (if applicable)      Physical Exam:  Objective:  General Appearance:  Comfortable and well-appearing.    Vital signs: (most recent): Blood pressure 125/70, pulse 83, temperature " "98.8 °F (37.1 °C), temperature source Oral, resp. rate 17, height 5' 4" (1.626 m), weight 57.8 kg (127 lb 6.8 oz), last menstrual period 01/07/1999, SpO2 95 %, not currently breastfeeding.  Vital signs are normal.    Lungs:  Normal effort and normal respiratory rate.  Breath sounds clear to auscultation.    Heart: Normal rate.  Regular rhythm.  Positive for murmur.    Extremities: Normal range of motion.  There is no dependent edema.    Neurological: Patient is alert and oriented to person, place and time.    Skin:  Warm and dry.  No rash.       Current Consults:  IP CONSULT TO CARDIOTHORACIC SURGERY    Assessment/Plan:  Assessment:   Severe multi-vessel CAD  ESRD  Liver cirrhosis  Positive Hep C  HTN  HLD    Plan:   Echo today  Check CBC and CMP  Consult hospital medicine for med management with complicated medical history  "

## 2020-01-15 NOTE — PROGRESS NOTES
I was told by the Victorina Agustin RN that patient can be discharged. Cardiothoracic surgery planning to do surgery electively as outpatient probably after paracentesis. This was discussed by the cardiologist and he was in agreement. At the time of discharge patient was in stable condition and in agreement with the plan.

## 2020-01-15 NOTE — PLAN OF CARE
Problem: Oral Intake Inadequate  Goal: Improved Oral Intake  Outcome: Ongoing, Progressing  Intervention: Promote and Optimize Oral Intake  Flowsheets (Taken 1/15/2020 1225)  Oral Nutrition Promotion: calorie dense liquids provided   Added Low phosphorus diet restrictions due to elevated phosphorus and hx of ESRD. Did not add full Renal restrictions due to K+ WNL and patient with poor PO intake.  Recommend adding a daily renal MVI.  Recommend that a medical diagnosis of malnutrition be added to patient's problem list if physician agrees with dietitian's Nutrition Focused Physical Exam (NFPE) findings that support medical diagnosis

## 2020-01-15 NOTE — PROGRESS NOTES
"[facility]  Adult Nutrition   Progress Note (Initial Assessment)     SUMMARY     Recommendations/Interventions:  1. Added Low phosphorus diet restrictions due to elevated phosphorus and hx of ESRD. Did not add full Renal restrictions due to K+ WNL and patient with poor PO intake.  2. Recommend adding a daily renal MVI.  3. Recommend that a medical diagnosis of malnutrition be added to patient's problem list if physician agrees with dietitian's Nutrition Focused Physical Exam (NFPE) findings that support medical diagnosis  · Severe Protein Energy Malnutrition RT decreased intake due to ascites causing "feeling of fullness" per patient statement and insufficient energy intake to meet estimated needs  AEB severe subcutaneous fat loss of orbitals, triceps and thoracic and lumbar region and severe muscle mass depletion of temples, clavicle region, scapular region, interosseous muscle and lower extremities.  Goals:   1. Pt to meet at least 75% of estimated needs via PO intake of meals and supplements.   2. Renal MVI to be added and patient to receive supplement daily.  3. Malnutrition to be added to patients problem list if MD agrees with RD findings.  Nutrition Goal Status: new  Communication of RD Recs: discussed on rounds    Dietitian Rounds Brief:  · Seen 2' ICU status. Patient is well known to me. Poor PO intake due to re-occurent ascites causing decrease in intake. Recommended a diagnosis of severe malnutrition at the patients last visit. Not added to patients problem list.  Possible CABG. Patient does not meet estimated energy needs.Drinks Ensure Max Mocha once a day. Eats very little of meals. Will discuss with the kitchen if we can  this type of ensure in order to aid in PO intake. Denies N/V. Loose stools. Will continue to monitor plan of care and intake.     Reason for Assessment    Reason For Assessment: (ICU status )  Diagnosis: cardiac disease  Relevant Medical History: Anemia of CKD " "stage 5, CAD, hepatitis C, colitis, ESRD-HD 2x/week, parocentesis once a month due to reoccurent ascites, Mitral valve regurgication  Interdisciplinary Rounds: attended    Nutrition Risk Screen    Nutrition Risk Screen: no indicators present     MST Score: 0  Have you recently lost weight without trying?: No  Weight loss score: 0  Have you been eating poorly because of a decreased appetite?: No  Appetite score: 0     Nutrition/Diet History    Patient Reported Diet/Restrictions/Preferences: renal  Food Allergies: NKFA  Factors Affecting Nutritional Intake: None identified at this time    Anthropometrics    Temp: 99.7 °F (37.6 °C)  Height Method: Stated  Height: 5' 4" (162.6 cm)  Height (inches): 64 in  Weight Method: Bed Scale  Weight: 57.8 kg (127 lb 6.8 oz)  Weight (lb): 127.43 lb  Ideal Body Weight (IBW), Female: 120 lb  BMI (Calculated): 21.9  BMI Grade: 18.5-24.9 - normal     Weight History:  Wt Readings from Last 10 Encounters:   01/15/20 57.8 kg (127 lb 6.8 oz)   01/10/20 52.3 kg (115 lb 4.8 oz)   01/08/20 64.7 kg (142 lb 10.2 oz)   12/23/19 58.9 kg (129 lb 13.6 oz)   12/19/19 57.2 kg (126 lb)   12/19/19 57.2 kg (126 lb)   12/06/19 49.2 kg (108 lb 6.4 oz)   12/03/19 59 kg (130 lb 1.1 oz)   11/22/19 52.9 kg (116 lb 10 oz)   10/22/19 50.8 kg (112 lb)     Lab/Procedures/Meds: Pertinent Labs Reviewed  Clinical Chemistry:  Recent Labs   Lab 01/09/20  0558 01/15/20  0858     136 134*   K 4.6  4.6 4.7   CL 99  99 100   CO2 24  24 22*   GLU 93  93 121*   BUN 37*  37* 44*   CREATININE 5.6*  5.6* 6.8*   CALCIUM 7.8*  7.8* 7.9*   PROT  --  5.0*   ALBUMIN  --  2.2*   BILITOT  --  0.7   ALKPHOS  --  72   AST  --  24   ALT  --  17   ANIONGAP 13  13 12   ESTGFRAFRICA 9.3*  9.3* 7.4*   EGFRNONAA 8.1*  8.1* 6.4*   MG 2.4  2.4  --    PHOS 5.3*  --     < > = values in this interval not displayed.     CBC:   Recent Labs   Lab 01/15/20  0858   WBC 7.23   RBC 2.44*   HGB 7.8*   HCT 23.8*   *   MCV 98   MCH " 32.0*   MCHC 32.8     Cardiac Profile:  Recent Labs   Lab 01/13/20  1443   TROPONINI 0.081*     Medications: Pertinent Medications reviewed  Scheduled Meds:   aspirin  81 mg Oral Daily    chlorhexidine  15 mL Mouth/Throat BID    epoetin keke-ebpx (RETACRIT) injection  10,000 Units Subcutaneous Every Mon, Wed, Fri    lisinopril  20 mg Oral Nightly    metoprolol succinate  50 mg Oral Nightly    pantoprazole  40 mg Oral QHS    polyethylene glycol  17 g Oral Daily    traZODone  50 mg Oral QHS     Continuous Infusions:  PRN Meds:.dicyclomine, HYDROcodone-acetaminophen, HYDROcodone-acetaminophen, ondansetron    Estimated/Assessed Needs    Weight Used For Calorie Calculations: 57.8 kg (127 lb 6.8 oz)  Energy Calorie Requirements (kcal): 0045-8676 kcals/day (25-30 kcals/kg)  Energy Need Method: Kcal/kg  Protein Requirements: 46-57 g/day (0.8-1.0 g/kg)  Weight Used For Protein Calculations: 57.8 kg (127 lb 6.8 oz)  Fluid Requirements (mL): per MD     Nutrition Prescription Ordered    Current Diet Order: Cardiac Diet     Evaluation of Received Nutrient/Fluid Intake    Energy Calories Required: not meeting needs  Protein Required: not meeting needs  Fluid Required: meeting needs  Tolerance: tolerating  % Intake of Estimated Energy Needs: 25 - 50 %  % Meal Intake: 25 - 50 %    Intake/Output Summary (Last 24 hours) at 1/15/2020 1215  Last data filed at 1/14/2020 1936  Gross per 24 hour   Intake 530 ml   Output --   Net 530 ml      Nutrition Risk    Level of Risk/Frequency of Follow-up: moderate     Monitor and Evaluation    Food and Nutrient Intake: energy intake, food and beverage intake  Food and Nutrient Adminstration: diet order  Physical Activity and Function: nutrition-related ADLs and IADLs  Anthropometric Measurements: weight, weight change  Biochemical Data, Medical Tests and Procedures: electrolyte and renal panel, lipid profile, gastrointestinal profile, glucose/endocrine profile, inflammatory  profile  Nutrition-Focused Physical Findings: overall appearance     Malnutrition Assessment  Clinical Characteristic:    Malnutrition in the context of chronic illness  Energy Intake:    <50% of estimated energy requirement for > 1 month  Interpretation of Weight Loss:    Not accurate due to hx of ascites with paracentesis completed every month.  Physical Findings   Body Fat Depletion:   o severe depletion of orbitals, triceps and thoracic and lumbar region    Muscle Mass Depletion:   o severe depletion of temples, clavicle region, scapular region, interosseous muscle and lower extremities    Fluid Accumulation:    o N/A   Reduced  Strength:   o N/A  Nutrition Follow-Up    RD Follow-up?: Yes  Alva Osborne RD 01/15/2020 12:25 PM

## 2020-01-15 NOTE — PROGRESS NOTES
Nephrology Consult Progress Note        Patient Name: Aurelia Saucedo  MRN: [id number]    Patient Class: OP- Observation   Admission Date: 1/13/2020  Length of Stay: 0 days  Date of Service: 1/15/2020    Attending Physician: Yahir Starkey MD  Primary Care Provider: Dustin Ireland MD    Reason for Consult: esrd/anemia/htn/shpt/chest pain    SUBJECTIVE:     HPI: 54F with ESRD on HD MF, cirhhosis is admitted with for angiogram to evaluate chest pain. Angiogram suggest diffuse disease and need for CABG. Seen and examined on HD machine today, tolerating well, no complains.    1/14 VSS, no new complains. HD PRN, based on final plans for CABG. Will most likely need blood transfusion before.  1/15 VSS, no new complains. HD PRN or Friday, based on final plans for CABG. Will most likely need blood transfusion before trip to OR.    Past Medical History:   Diagnosis Date    Anemia of chronic renal failure, stage 5 8/19/2015    Ascites     Bleeding duodenal ulcer     CAD (coronary artery disease)     Chest pain     Chronic hepatitis C 8/19/2015    Colitis     Elevated troponin     ESRD 2/2 MPGN on HD since 12/16/13 8/19/2015    Gastritis     Hypertension 8/19/2015    Mitral valve regurgitation     Renal dialysis status     M, W, F    Secondary hyperparathyroidism of renal origin 8/19/2015     Past Surgical History:   Procedure Laterality Date    ANGIOGRAM, CORONARY, WITH LEFT HEART CATHETERIZATION Left 1/13/2020    Procedure: ANGIOGRAM,CORONARY,WITH LEFT HEART CATHETERIZATION;  Surgeon: Yahir Starkey MD;  Location: [facility] CATH/EP LAB;  Service: Cardiology;  Laterality: Left;    AV FISTULA PLACEMENT  2013    COLONOSCOPY      CORONARY ANGIOPLASTY WITH STENT PLACEMENT      SALPINGOOPHORECTOMY Right 1997    TOTAL ABDOMINAL HYSTERECTOMY  1999     Family History   Problem Relation Age of Onset    Kidney disease Mother     Stroke Father     Psoriasis Brother     Breast cancer Neg Hx     Cancer Neg Hx      Colon cancer Neg Hx     Ovarian cancer Neg Hx      Social History     Tobacco Use    Smoking status: Former Smoker     Packs/day: 0.75     Years: 30.00     Pack years: 22.50     Types: Cigarettes     Last attempt to quit: 2019     Years since quittin.1    Smokeless tobacco: Never Used   Substance Use Topics    Alcohol use: No     Alcohol/week: 0.0 standard drinks    Drug use: Not Currently     Types: Marijuana       Review of patient's allergies indicates:   Allergen Reactions    Ciprofloxacin Itching    Levaquin [levofloxacin] Itching    Quinolones Itching    Codeine Nausea Only       Outpatient meds:  No current facility-administered medications on file prior to encounter.      Current Outpatient Medications on File Prior to Encounter   Medication Sig Dispense Refill    FA-VIT B COMP&C-SELENIUM-ZINC 3-70-15 MG-MCG-MG ORAL TAB Take 1 tablet by mouth once daily.      lisinopril (PRINIVIL,ZESTRIL) 20 MG tablet Take 1 tablet by mouth nightly.   3    metoprolol succinate (TOPROL-XL) 50 MG 24 hr tablet Take 50 mg by mouth nightly.       ondansetron (ZOFRAN-ODT) 4 MG TbDL Take 2 tablets (8 mg total) by mouth every hour as needed. nausea 20 tablet 0    pantoprazole (PROTONIX) 40 MG tablet Take 40 mg by mouth every evening.       traZODone (DESYREL) 50 MG tablet Take 1 tablet (50 mg total) by mouth every evening. 30 tablet 11    dicyclomine (BENTYL) 20 mg tablet Take 20 mg by mouth every 6 (six) hours as needed.          Scheduled meds:   aspirin  81 mg Oral Daily    chlorhexidine  15 mL Mouth/Throat BID    epoetin keke-ebpx (RETACRIT) injection  10,000 Units Subcutaneous Every Mon, Wed, Fri    lisinopril  20 mg Oral Nightly    metoprolol succinate  50 mg Oral Nightly    pantoprazole  40 mg Oral QHS    polyethylene glycol  17 g Oral Daily    traZODone  50 mg Oral QHS       Infusions:      PRN meds:  dicyclomine, HYDROcodone-acetaminophen, HYDROcodone-acetaminophen, ondansetron    Review of  Systems:  ROS    OBJECTIVE:     Vital Signs and IO (Last 24H):  Temp:  [98.2 °F (36.8 °C)-99.7 °F (37.6 °C)]   Pulse:  [83-97]   Resp:  [15-30]   BP: (112-125)/(65-72)   SpO2:  [93 %-99 %]   I/O last 3 completed shifts:  In: 770 [P.O.:770]  Out: -     Wt Readings from Last 5 Encounters:   01/15/20 57.8 kg (127 lb 6.8 oz)   01/10/20 52.3 kg (115 lb 4.8 oz)   01/08/20 64.7 kg (142 lb 10.2 oz)   12/23/19 58.9 kg (129 lb 13.6 oz)   12/19/19 57.2 kg (126 lb)         Physical Exam:  Physical Exam   Constitutional: She is oriented to person, place, and time. She appears well-developed and well-nourished.   HENT:   Head: Normocephalic and atraumatic.   Mouth/Throat: Oropharynx is clear and moist.   Eyes: Pupils are equal, round, and reactive to light. EOM are normal. No scleral icterus.   Neck: Neck supple.   Cardiovascular: Normal rate and regular rhythm.   Pulmonary/Chest: Effort normal. No stridor. No respiratory distress.   Abdominal: Soft. She exhibits no distension.   Musculoskeletal: Normal range of motion. She exhibits no edema or deformity.   Neurological: She is alert and oriented to person, place, and time. No cranial nerve deficit.   Skin: Skin is warm and dry. No rash noted. She is not diaphoretic. No erythema.   Psychiatric: She has a normal mood and affect. Her behavior is normal.       Body mass index is 21.87 kg/m².    Laboratory:  Recent Labs   Lab 01/09/20  0558 01/13/20  1443 01/15/20  0858     136 136 134*   K 4.6  4.6 4.5 4.7   CL 99  99 103 100   CO2 24  24 22* 22*   BUN 37*  37* 45* 44*   CREATININE 5.6*  5.6* 7.3* 6.8*   ESTGFRAFRICA 9.3*  9.3* 6.8* 7.4*   EGFRNONAA 8.1*  8.1* 5.9* 6.4*   GLU 93  93 106 121*       Recent Labs   Lab 01/09/20  0558 01/13/20  1443 01/15/20  0858   CALCIUM 7.8*  7.8* 7.8* 7.9*   ALBUMIN  --  1.9* 2.2*   PHOS 5.3*  --   --    MG 2.4  2.4  --   --              No results for input(s): POCTGLUCOSE in the last 168 hours.    Recent Labs   Lab 11/19/19  0447  01/08/20  0429   Hemoglobin A1C SEE COMMENT SEE COMMENT       Recent Labs   Lab 01/09/20  0558 01/13/20  1443 01/15/20  0858   WBC 4.53  4.53 4.96 7.23   HGB 8.4*  8.4* 7.4* 7.8*   HCT 26.4*  26.4* 23.1* 23.8*   *  138* 142* 142*   *  100* 100* 98   MCHC 31.8*  31.8* 32.0 32.8   MONO 11.9  11.9  0.5  0.5  --  7.1  0.5       Recent Labs   Lab 01/13/20  1443 01/15/20  0858   BILITOT 0.5 0.7   PROT 4.5* 5.0*   ALBUMIN 1.9* 2.2*   ALKPHOS 58 72   ALT 16 17   AST 23 24       Recent Labs   Lab 11/19/19  0316 12/23/19  0344 01/07/20  2353   Color, UA Yellow Yellow Yellow   Appearance, UA Hazy A Clear Clear   pH, UA >8.0 A >8.0 A >8.0 A   Specific [place], UA 1.010 1.015 1.015   Protein, UA 2+ A 2+ A 2+ A   Glucose, UA Negative Negative Negative   Ketones, UA Negative Negative Negative   Urobilinogen, UA Negative Negative Negative   Bilirubin (UA) Negative Negative Negative   Occult Blood UA 2+ A 2+ A 1+ A   Nitrite, UA Negative Negative Negative   RBC, UA 30 H 30 H 9 H   WBC, UA >100 H 1 0   Bacteria Moderate A Negative Negative   Hyaline Casts, UA 1 3 A 13 A             Microbiology Results (last 7 days)     ** No results found for the last 168 hours. **          ASSESSMENT/PLAN:     Active Hospital Problems    Diagnosis  POA    *Coronary artery disease involving native coronary artery of native heart without angina pectoris [I25.10]  Yes      Resolved Hospital Problems   No resolved problems to display.       ESRD on HD MF via AVF  Continue current dialysis prescription.  Next HD HD PRN, based on final plans for CABG.   Renal diet - low K, low phos.  No IVs or BP checks on access and/or non-dominant arm.    Anemia of CKD  Hgb and HCT are acceptable. Monitor.  Will provide MERON and/or IV iron PRN.  Will most likely need blood transfusion with HD before CABG.    MBD / Secondary HPT  Ca, phos, PTH and vitamin D levels are acceptable.   Phos binders, vitamin D analogues and calcimimetics as  needed.    HTN  BP seem controlled.   Tolerate asymptomatic HTN up to -160.  Continue home meds.  Low sodium diet.    CAD, CP, SOB  Awaiting final plans for CABG.    Thank you for allowing us to participate in the care of your patient!   We will follow the patient and provide recommendations as needed.    Luciano Campos MD    [facility]  [address] [place] [address]  [place] [address]    [phone] - tel  [phone] - fax    1/15/2020 1:09 PM

## 2020-01-15 NOTE — CONSULTS
[facility] Medicine Consult Note   Patient Name: Aurelia Saucedo  MRN: [id number]  Patient Class: OP- Observation   Admission Date: 1/13/2020  6:22 AM  Length of Stay: 0   Attending Physician: Yahir Starkey MD  Consulting Physician: Jake Ingram MD  Primary Care Provider: Dustin Ireland MD  Face-to-Face encounter date: 01/15/2020  Code Status: Full code  Chief Complaint:       Patient information was obtained from patient, past medical records and ER records.   HISTORY OF PRESENT ILLNESS:   Aurelia Saucedo is a 54 y.o.  female who  has a past medical history of Anemia of chronic renal failure, stage 5 (8/19/2015), Ascites, Bleeding duodenal ulcer, CAD (coronary artery disease), Chest pain, Chronic hepatitis C (8/19/2015), Colitis, Elevated troponin, ESRD 2/2 MPGN on HD since 12/16/13  (8/19/2015), Gastritis, Hypertension (8/19/2015), Mitral valve regurgitation, Renal dialysis status, and Secondary hyperparathyroidism of renal origin (8/19/2015).. The patient presented to [facility] on 1/13/2020 for elective coronary angiography.    Her coronary angiography showed severe multivessel disease with total occlusion of RCA and high grade disease in the proximal circumflex and diffuse disease in the LAD with normal LV. CTS was consulted and planning to do CABG. Echo pending. Hospitalist service was consulted for medical management.    During my evaluation, patient was asymptomatic. She has mild ascites and she got drained last Tuesday. On average she gains 3-4L every week and I have seen her before for the same for respiratory distress due to massive ascites. Otherwise she reports some congestion but no cough, shortness of breath, chest pain, abdominal pain, joint pain or back pain during my evaluation.       REVIEW OF SYSTEMS:   10 Point Review of System was performed and was found to be negative except for that mentioned already in the HPI above.     PAST MEDICAL  HISTORY:     Past Medical History:   Diagnosis Date    Anemia of chronic renal failure, stage 5 2015    Ascites     Bleeding duodenal ulcer     CAD (coronary artery disease)     Chest pain     Chronic hepatitis C 2015    Colitis     Elevated troponin     ESRD 2/2 MPGN on HD since 13    Gastritis     Hypertension 2015    Mitral valve regurgitation     Renal dialysis status     M, W, F    Secondary hyperparathyroidism of renal origin 2015       PAST SURGICAL HISTORY:     Past Surgical History:   Procedure Laterality Date    ANGIOGRAM, CORONARY, WITH LEFT HEART CATHETERIZATION Left 2020    Procedure: ANGIOGRAM,CORONARY,WITH LEFT HEART CATHETERIZATION;  Surgeon: Yahir Starkey MD;  Location: [facility]ATH[facility]EP LAB;  Service: Cardiology;  Laterality: Left;    AV FISTULA PLACEMENT      COLONOSCOPY      CORONARY ANGIOPLASTY WITH STENT PLACEMENT      SALPINGOOPHORECTOMY Right     TOTAL ABDOMINAL HYSTERECTOMY         ALLERGIES:   Ciprofloxacin; Levaquin [levofloxacin]; Quinolones; and Codeine    FAMILY HISTORY:     Family History   Problem Relation Age of Onset    Kidney disease Mother     Stroke Father     Psoriasis Brother     Breast cancer Neg Hx     Cancer Neg Hx     Colon cancer Neg Hx     Ovarian cancer Neg Hx        SOCIAL HISTORY:     Social History     Tobacco Use    Smoking status: Former Smoker     Packs/day: 0.75     Years: 30.00     Pack years: 22.50     Types: Cigarettes     Last attempt to quit: 2019     Years since quittin.1    Smokeless tobacco: Never Used   Substance Use Topics    Alcohol use: No     Alcohol/week: 0.0 standard drinks        Social History     Substance and Sexual Activity   Sexual Activity Not on file        HOME MEDICATIONS:     Prior to Admission medications    Medication Sig Start Date End Date Taking? Authorizing Provider   aspirin (ECOTRIN) 81 MG EC tablet Take 81 mg by mouth once daily.   Yes  "Historical Provider, MD   FA-VIT B COMP&C-SELENIUM-ZINC 3-70-15 MG-MCG-MG ORAL TAB Take 1 tablet by mouth once daily.   Yes Historical Provider, MD   lisinopril (PRINIVIL,ZESTRIL) 20 MG tablet Take 1 tablet by mouth nightly.  9/9/18  Yes Historical Provider, MD   metoprolol succinate (TOPROL-XL) 50 MG 24 hr tablet Take 50 mg by mouth nightly.    Yes Historical Provider, MD   ondansetron (ZOFRAN-ODT) 4 MG TbDL Take 2 tablets (8 mg total) by mouth every hour as needed. nausea 10/1/18  Yes Dustin Ireland MD   pantoprazole (PROTONIX) 40 MG tablet Take 40 mg by mouth every evening.    Yes Historical Provider, MD   traZODone (DESYREL) 50 MG tablet Take 1 tablet (50 mg total) by mouth every evening. 10/22/19 10/21/20 Yes Dustin Ireland MD   dicyclomine (BENTYL) 20 mg tablet Take 20 mg by mouth every 6 (six) hours as needed.     Historical Provider, MD   HYDROcodone-acetaminophen (NORCO)  mg per tablet Take 1 tablet by mouth every 8 (eight) hours as needed for Pain. 1/13/20   Dustin Ireland MD         PHYSICAL EXAM:   /66   Pulse 93   Temp 99.7 °F (37.6 °C) (Oral)   Resp 17   Ht 5' 4" (1.626 m)   Wt 57.8 kg (127 lb 6.8 oz)   LMP 01/07/1999 (Exact Date)   SpO2 (!) 93%   Breastfeeding? No   BMI 21.87 kg/m²   Vitals Reviewed  General appearance: Weak appearing female in no apparent distress.  Skin: No Rash.   Neuro: Motor and sensory exams grossly intact. Good tone. Power in all 4 extremities 5/5.   HENT: Atraumatic head. Moist mucous membranes of oral cavity.  Eyes: Normal extraocular movements.   Neck: Supple. No evidence of lymphadenopathy. No thyroidomegaly.  Lungs: Clear to auscultation bilaterally. No wheezing present.   Heart: Regular rate and rhythm. S1 and S2 present with no murmurs/gallop/rub. No pedal edema. No JVD present.   Abdomen: Soft, but mildly distended. No rebound tenderness/guarding. No masses or organomegaly. Bowel sounds are normal. Bladder is not palpable.   Extremities: No " cyanosis, clubbing, or edema.  Psych/mental status: Alert and oriented. Cooperative. Responds appropriately to questions.   EMERGENCY DEPARTMENT LABS AND IMAGING:     Labs Reviewed   COMPREHENSIVE METABOLIC PANEL - Abnormal; Notable for the following components:       Result Value    CO2 22 (*)     BUN, Bld 45 (*)     Creatinine 7.3 (*)     Calcium 7.8 (*)     Total Protein 4.5 (*)     Albumin 1.9 (*)     eGFR if  6.8 (*)     eGFR if non  5.9 (*)     All other components within normal limits    Narrative:     Collection has been rescheduled by [facility] at 01/13/2020 12:57 Reason:   nurse in dialysis will take care of the blood   CBC WITHOUT DIFFERENTIAL - Abnormal; Notable for the following components:    RBC 2.32 (*)     Hemoglobin 7.4 (*)     Hematocrit 23.1 (*)     Mean Corpuscular Volume 100 (*)     Mean Corpuscular Hemoglobin 31.9 (*)     RDW 16.1 (*)     Platelets 142 (*)     All other components within normal limits    Narrative:     Collection has been rescheduled by [facility] at 01/13/2020 12:57 Reason:   nurse in dialysis will take care of the blood   TROPONIN I - Abnormal; Notable for the following components:    Troponin I 0.081 (*)     All other components within normal limits    Narrative:        Troponin critical result(s) called and verbal readback obtained   from Naga Disla RN/ICU2  by JB8 01/13/2020 15:23  Collection has been rescheduled by [facility] at 01/13/2020 14:55 Reason:   per nurse Naga   CBC W/ AUTO DIFFERENTIAL - Abnormal; Notable for the following components:    RBC 2.44 (*)     Hemoglobin 7.8 (*)     Hematocrit 23.8 (*)     Mean Corpuscular Hemoglobin 32.0 (*)     RDW 15.9 (*)     Platelets 142 (*)     Immature Granulocytes 0.6 (*)     Lymph # 0.6 (*)     Gran% 82.2 (*)     Lymph% 8.7 (*)     All other components within normal limits   COMPREHENSIVE METABOLIC PANEL - Abnormal; Notable for the following components:    Sodium 134 (*)     CO2 22 (*)     Glucose 121 (*)      BUN, Bld 44 (*)     Creatinine 6.8 (*)     Calcium 7.9 (*)     Total Protein 5.0 (*)     Albumin 2.2 (*)     eGFR if  7.4 (*)     eGFR if non  6.4 (*)     All other components within normal limits       No orders to display       ASSESSMENT & PLAN:   Aurelia Saucedo is a 54 y.o. female admitted for      1. Coronary artery disease: management as per cardiology and CTS.     2. ESRD: Nephrology following    3. Decompensated Cirrhosis due to hepatitis C: with complications including ascites. Monitor. Not taking lactulose but having regular bowel movements. She does not want to be on Lactulose. I will start her on daily Miralax so that she doesn't become constipated.     4. Small ascites that will likely need to be drained next week preferably before surgery. IF surgery scheduled for this week then we can hold the paracentesis.     5. Hepatitis C: Not treated and needs to be seen outpatient.    6. Hypertension: Lisinopril, amlodipine and metoprolol    7. GERD: Protonix    8. Anemia likely renal related: Transfuse at the time of surgery with dialysis. Nephrology following.   _________________________________________________________________________________    INPATIENT LIST OF MEDICATIONS     Current Facility-Administered Medications:     aspirin EC tablet 81 mg, 81 mg, Oral, Daily, Alex Casas PA-C, 81 mg at 01/15/20 0940    chlorhexidine 0.12 % solution 15 mL, 15 mL, Mouth/Throat, BID, Dennise Linares PharmD, 15 mL at 01/15/20 0940    dicyclomine capsule 20 mg, 20 mg, Oral, QID PRN, Alex Casas PA-C    epoetin keke-epbx injection 10,000 Units, 10,000 Units, Subcutaneous, Every Mon, Wed, Fri, Luciano Camops MD    HYDROcodone-acetaminophen  mg per tablet 1 tablet, 1 tablet, Oral, Q8H PRN, Alex Casas PA-C    HYDROcodone-acetaminophen  mg per tablet 1 tablet, 1 tablet, Oral, Q6H PRN, Yahir Starkey MD, 1 tablet at 01/14/20 1928     lisinopril tablet 20 mg, 20 mg, Oral, Nightly, SYBIL Brooks-C, 20 mg at 01/13/20 2016    metoprolol succinate (TOPROL-XL) 24 hr tablet 50 mg, 50 mg, Oral, Nightly, SYBIL Brooks-C, 50 mg at 01/14/20 2053    ondansetron disintegrating tablet 8 mg, 8 mg, Oral, Q12H PRN, SYBIL Brooks-C, 8 mg at 01/14/20 1928    pantoprazole EC tablet 40 mg, 40 mg, Oral, QHS, Alex Casas PA-C, 40 mg at 01/14/20 2052    traZODone tablet 50 mg, 50 mg, Oral, QHS, SYBIL Brooks-C, 50 mg at 01/14/20 2053      Scheduled Meds:   aspirin  81 mg Oral Daily    chlorhexidine  15 mL Mouth/Throat BID    epoetin keke-ebpx (RETACRIT) injection  10,000 Units Subcutaneous Every Mon, Wed, Fri    lisinopril  20 mg Oral Nightly    metoprolol succinate  50 mg Oral Nightly    pantoprazole  40 mg Oral QHS    traZODone  50 mg Oral QHS     Continuous Infusions:  PRN Meds:.dicyclomine, HYDROcodone-acetaminophen, HYDROcodone-acetaminophen, ondansetron      Jake Ingram  [facility] Hospitalist  01/15/2020

## 2020-01-15 NOTE — CARE UPDATE
01/14/20 1644   Patient Assessment/Suction   Level of Consciousness (AVPU) alert   Respiratory Effort Unlabored   Expansion/Accessory Muscles/Retractions no retractions   All Lung Fields Breath Sounds clear   Rhythm/Pattern, Respiratory unlabored   Cough Frequency no cough   PRE-TX-O2   O2 Device (Oxygen Therapy) room air   SpO2 96 %   Pulse Oximetry Type Continuous   $ Pulse Oximetry - Multiple Charge Pulse Oximetry - Multiple   Pulse 85   Resp 15   Positioning   Head of Bed (HOB) HOB at 20-30 degrees   Respiratory Evaluation   $ Care Plan Tech Time 15 min

## 2020-01-20 NOTE — TELEPHONE ENCOUNTER
----- Message from Sravanthi Hunt sent at 1/20/2020  2:14 PM CST -----  Contact: Patient  Type: Needs Medical Advice    Who Called:  patiebt  Symptoms (please be specific):    How long has patient had these symptoms:    Pharmacy name and phone #:    Best Call Back Number: [phone]  Additional Information: requesting a call back from yonny Randolph phone call drop

## 2020-01-21 NOTE — TELEPHONE ENCOUNTER
----- Message from Giana Pickard sent at 1/21/2020  8:50 AM CST -----  Contact: self  FYI for Dr Saul.  She has an appt at the [facility] from 3/16 thru 3/20.  She doesn't need us to send anything.  They have what they need for now.  If they need anything else, she will call us.  Also, she is having Coronary Bypass surgery on 1/28/20.

## 2020-01-22 NOTE — PROGRESS NOTES
SUBJECTIVE:    Patient ID: Aurelia Saucedo is a 54 y.o. female.    Chief Complaint: Hypertension and Hospital Follow Up (CAD and CHF)  53 yo female complex patient is here today to follow up from her most recent heart cath. Pt was found to have multivessel disease and is scheduled for CABG on the 28 Jan2020.    Cardiac cath 1/13/2020  Severe multi-vessel disease with total occlusion of the right coronary artery with left-to-right collaterals and says high-grade disease in the proximal circumflex and diffuse disease in the LAD with high-grade diagonal stenosis as described above  Normal left ventricular systolic function with an ejection fraction of greater than 60%  Normally appearing left internal mammary artery    PMHX:  Chronic Hepatitis C Anny Type 1B: Not currently being treated. Treatment has been delayed, she is schedualed to go to  [facility] to be evaluated at [facility] for liver and renal transplant.  Anemia of chronic renal failure:  Secondary Hyperparathyroidism:  ESRD: on dialysis since 2013 current regimen of dialysis is Tue/Sat .  Cryoglobulinemia:  HTN; She has HTN that has been well controlled on [id number], Lisinopril 20, Metoprolol XL 50.  GERD: On Pantoprazole 40mg  CAD: Stent placed March 2013     Specialists:  Gastro: Dr Saul  Nephrologist: Dr Ramos  Cardiologist: Pt has appointment with Dr Gomez  Cardiothoracic Surgeon: Dr Yeboah.  HPI      Past Medical History:   Diagnosis Date    Anemia of chronic renal failure, stage 5 8/19/2015    Ascites     Bleeding duodenal ulcer     CAD (coronary artery disease)     CAD (coronary artery disease)     Chest pain     CHF (congestive heart failure)     Chronic hepatitis C 8/19/2015    Colitis     Elevated troponin     ESRD 2/2 MPGN on HD since 12/16/13 8/19/2015    Gastritis     Hypertension 8/19/2015    Mitral valve regurgitation     Renal dialysis status     M, W, F    Secondary hyperparathyroidism of renal origin  2015     Social History     Socioeconomic History    Marital status:      Spouse name: Not on file    Number of children: 2    Years of education: Not on file    Highest education level: Not on file   Occupational History    Occupation: baker    Social Needs    Financial resource strain: Not on file    Food insecurity:     Worry: Not on file     Inability: Not on file    Transportation needs:     Medical: Not on file     Non-medical: Not on file   Tobacco Use    Smoking status: Former Smoker     Packs/day: 0.75     Years: 30.00     Pack years: 22.50     Types: Cigarettes     Last attempt to quit: 2019     Years since quittin.1    Smokeless tobacco: Never Used   Substance and Sexual Activity    Alcohol use: No     Alcohol/week: 0.0 standard drinks    Drug use: Not Currently     Types: Marijuana    Sexual activity: Not on file   Lifestyle    Physical activity:     Days per week: Not on file     Minutes per session: Not on file    Stress: To some extent   Relationships    Social connections:     Talks on phone: Not on file     Gets together: Not on file     Attends Buddhism service: Not on file     Active member of club or organization: Not on file     Attends meetings of clubs or organizations: Not on file     Relationship status: Not on file   Other Topics Concern    Are you pregnant or think you may be? Not Asked    Breast-feeding Not Asked   Social History Narrative    Not on file     Past Surgical History:   Procedure Laterality Date    ANGIOGRAM, CORONARY, WITH LEFT HEART CATHETERIZATION Left 2020    Procedure: ANGIOGRAM,CORONARY,WITH LEFT HEART CATHETERIZATION;  Surgeon: Yahir Starkey MD;  Location: [facility] CATH/EP LAB;  Service: Cardiology;  Laterality: Left;    AV FISTULA PLACEMENT      COLONOSCOPY      CORONARY ANGIOPLASTY WITH STENT PLACEMENT      SALPINGOOPHORECTOMY Right     TOTAL ABDOMINAL HYSTERECTOMY       Family History   Problem Relation Age  of Onset    Kidney disease Mother     Stroke Father     Psoriasis Brother     Breast cancer Neg Hx     Cancer Neg Hx     Colon cancer Neg Hx     Ovarian cancer Neg Hx      Current Outpatient Medications   Medication Sig Dispense Refill    aspirin (ECOTRIN) 81 MG EC tablet Take 81 mg by mouth once daily.      dicyclomine (BENTYL) 20 mg tablet Take 20 mg by mouth every 6 (six) hours as needed.       FA-VIT B COMP&C-SELENIUM-ZINC 3-70-15 MG-MCG-MG ORAL TAB Take 1 tablet by mouth once daily.      [START ON 2/22/2020] HYDROcodone-acetaminophen (NORCO)  mg per tablet Take 1 tablet by mouth every 8 (eight) hours as needed for Pain. 60 tablet 0    HYDROcodone-acetaminophen (NORCO)  mg per tablet Take 1 tablet by mouth every 8 (eight) hours as needed for Pain. 60 tablet 0    [START ON 3/22/2020] HYDROcodone-acetaminophen (NORCO)  mg per tablet Take 1 tablet by mouth every 8 (eight) hours as needed for Pain. 60 tablet 0    lisinopril (PRINIVIL,ZESTRIL) 20 MG tablet Take 1 tablet by mouth nightly.   3    LOKELMA 5 gram PwPk       metoprolol succinate (TOPROL-XL) 50 MG 24 hr tablet Take 50 mg by mouth nightly.       ondansetron (ZOFRAN-ODT) 4 MG TbDL Take 2 tablets (8 mg total) by mouth every hour as needed. nausea 20 tablet 0    pantoprazole (PROTONIX) 40 MG tablet Take 40 mg by mouth every evening.       traZODone (DESYREL) 50 MG tablet Take 1 tablet (50 mg total) by mouth every evening. 30 tablet 11     No current facility-administered medications for this visit.      Review of patient's allergies indicates:   Allergen Reactions    Ciprofloxacin Itching    Levaquin [levofloxacin] Itching    Quinolones Itching    Codeine Nausea Only       Review of Systems   Constitutional: Negative for activity change, appetite change, diaphoresis, fatigue, fever and unexpected weight change.   HENT: Negative for congestion, rhinorrhea and sinus pain.    Respiratory: Negative for cough, chest  "tightness, shortness of breath and wheezing.    Gastrointestinal: Positive for abdominal distention (mild in comparison to previous visits.) and abdominal pain. Negative for anal bleeding, blood in stool, constipation and diarrhea.   Musculoskeletal: Positive for back pain (chronic).          Blood pressure 118/74, pulse 96, temperature 97.9 °F (36.6 °C), temperature source Oral, resp. rate 18, height 5' 4" (1.626 m), weight 56.7 kg (125 lb), last menstrual period 01/07/1999, SpO2 96 %. Body mass index is 21.46 kg/m².   Objective:      Physical Exam   Constitutional: She is oriented to person, place, and time. She appears well-developed and well-nourished.   HENT:   Head: Normocephalic and atraumatic.   Nose: Nose normal.   Mouth/Throat: No oropharyngeal exudate.   Eyes: Pupils are equal, round, and reactive to light. Conjunctivae and EOM are normal. No scleral icterus.   Cardiovascular: Normal rate, regular rhythm and normal heart sounds.   No murmur heard.  Pulmonary/Chest: Effort normal and breath sounds normal. No respiratory distress. She has no wheezes.   Abdominal: Soft. Bowel sounds are normal. She exhibits no distension.   Musculoskeletal:        Lumbar back: She exhibits tenderness. She exhibits normal range of motion, no swelling, no edema and no deformity.   Pt with bilateral lower back pain, negative SLR,   Neurological: She is alert and oriented to person, place, and time.   Skin: She is not diaphoretic.           Assessment:       1. Cirrhosis of liver with ascites, unspecified hepatic cirrhosis type         Plan:           Cirrhosis of liver with ascites, unspecified hepatic cirrhosis type  -     HYDROcodone-acetaminophen (NORCO)  mg per tablet; Take 1 tablet by mouth every 8 (eight) hours as needed for Pain.  Dispense: 60 tablet; Refill: 0  -     HYDROcodone-acetaminophen (NORCO)  mg per tablet; Take 1 tablet by mouth every 8 (eight) hours as needed for Pain.  Dispense: 60 tablet; " Refill: 0  -     HYDROcodone-acetaminophen (NORCO)  mg per tablet; Take 1 tablet by mouth every 8 (eight) hours as needed for Pain.  Dispense: 60 tablet; Refill: 0    the ascites becomes very swollen and painful in her abdomen and lower back.

## 2020-01-24 NOTE — TELEPHONE ENCOUNTER
Informed pt of date change for surgery from 1/28 to 2/6. Pt to arrange paracentesis to be done in the interim.

## 2020-01-27 NOTE — PLAN OF CARE
Tolerated well.  VSS.  6.8 L clear yellow fluid drained. No testing ordered.  Large bandaid to site- RLQ.  Verbal and written discharge instructions given.  Patient verbalized understanding.  Discharged to home with  via private vehicle.

## 2020-02-03 NOTE — TELEPHONE ENCOUNTER
Pt states she needs orders for paracentesis, they need to be standing orders for 6 months how often she is to be tested and dx codes hepatitis C and cirrhosis of the liver. To [facility]. Dr Yeboah decided not to do the surgery and she not has to find someone else.

## 2020-02-04 NOTE — TELEPHONE ENCOUNTER
Please call the patient back and ask her again how often she is getting the paracentesis, It is more often than every 6 months.

## 2020-02-07 NOTE — PLAN OF CARE
Pt provided with verbal and written discharge instructions, verbalized understanding. Pt wheeled to lobby where released to son. No distress noted.

## 2020-02-07 NOTE — PLAN OF CARE
Pt tolerated procedure well. Pt voices no complaints. 4.9 liters of peritoneal fluid obtain. VSS. Site with drainage or bleeding, no hematoma. Large bandaid applied to site.

## 2020-02-13 NOTE — TELEPHONE ENCOUNTER
Spoke with pt , scheduled her for consult with Dr. Hdz per Dr Starkey . Pt to bring C and Echo disk to appt. Pt verbalized understanding.Appt letter mailed

## 2020-02-21 NOTE — PLAN OF CARE
Paracentesis complete.  7 liters of dark yellow fluid collected. Pt deisy well. Band aid to right abd, no bleeding noted.  Skin w/d/p.  Pt given d/c instructions, verbalized understanding.  Pt out of dept via w/c.

## 2020-03-02 NOTE — ED PROVIDER NOTES
Encounter Date: 1/7/2020       History     Chief Complaint   Patient presents with    ABD DISTENTION     X 2 WEEKS,     WT GAIN     This is a 54-year-old female who presents complaining of progressively worsening abdominal pain and distention. Patient has a past medical history of decompensated liver cirrhosis end-stage renal disease on hemodialysis twice a week Monday and Friday and coronary artery disease.  She states she has been having worsening ascites and edema since her last discharge 2 weeks ago.  She missed hemodialysis last Friday.  She has been requiring frequent paracenteses for abdominal ascites.  She denies fever.  She has had nausea without vomiting.  She denies any chest pain but has had some shortness of breath which she feels is secondary to the size of her abdomen.  She denies any other problems or complaints        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 since 12/16/13 8/19/2015    Gastritis     Hypertension 8/19/2015    Mitral valve regurgitation     Renal dialysis status     M, W, F    Secondary hyperparathyroidism of renal origin 8/19/2015     Past Surgical History:   Procedure Laterality Date    ANGIOGRAM, CORONARY, WITH LEFT HEART CATHETERIZATION Left 1/13/2020    Procedure: ANGIOGRAM,CORONARY,WITH LEFT HEART CATHETERIZATION;  Surgeon: Yahir Starkey MD;  Location: [facility] CATH/EP LAB;  Service: Cardiology;  Laterality: Left;    AV FISTULA PLACEMENT  2013    COLONOSCOPY      CORONARY ANGIOPLASTY WITH  STENT PLACEMENT      SALPINGOOPHORECTOMY Right 1997    TOTAL ABDOMINAL HYSTERECTOMY       Family History   Problem Relation Age of Onset    Kidney disease Mother     Stroke Father     Psoriasis Brother     Breast cancer Neg Hx     Cancer Neg Hx     Colon cancer Neg Hx     Ovarian cancer Neg Hx      Social History     Tobacco Use    Smoking status: Former Smoker     Packs/day: 0.75     Years: 30.00     Pack years: 22.50     Types: Cigarettes     Last attempt to quit: 2019     Years since quittin.2    Smokeless tobacco: Never Used   Substance Use Topics    Alcohol use: No     Alcohol/week: 0.0 standard drinks    Drug use: Not Currently     Types: Marijuana     Review of Systems   Constitutional: Positive for appetite change and fatigue. Negative for activity change, chills and fever.   HENT: Negative.  Negative for congestion, dental problem, ear pain, rhinorrhea, sinus pressure, sinus pain, sore throat and trouble swallowing.    Eyes: Negative.  Negative for photophobia, pain, redness and visual disturbance.   Respiratory: Positive for shortness of breath. Negative for cough, chest tightness and wheezing.    Cardiovascular: Negative.  Negative for chest pain, palpitations and leg swelling.   Gastrointestinal: Positive for abdominal distention and abdominal pain. Negative for anal bleeding, blood in stool, constipation, diarrhea, nausea and vomiting.   Endocrine: Negative.    Genitourinary: Negative.  Negative for decreased urine volume, difficulty urinating, dysuria, flank pain, frequency, hematuria, pelvic pain, urgency and vaginal bleeding.   Musculoskeletal: Negative.  Negative for arthralgias, back pain, gait problem, joint swelling, myalgias, neck pain and neck stiffness.   Skin: Negative.  Negative for color change, pallor and rash.   Neurological: Negative.  Negative for dizziness, tremors, seizures, syncope, facial asymmetry, speech difficulty, weakness, light-headedness, numbness and  headaches.   Hematological: Negative.  Does not bruise/bleed easily.   Psychiatric/Behavioral: Negative.  Negative for confusion.   All other systems reviewed and are negative.      Physical Exam     Initial Vitals [01/07/20 1714]   BP Pulse Resp Temp SpO2   (!) 144/65 85 20 98.3 °F (36.8 °C) 100 %      MAP       --         Physical Exam    Nursing note and vitals reviewed.  Constitutional: She is active and cooperative.  Non-toxic appearance. She does not have a sickly appearance. She does not appear ill. No distress.   HENT:   Head: Normocephalic and atraumatic.   Right Ear: Tympanic membrane normal.   Left Ear: Tympanic membrane normal.   Nose: Nose normal.   Mouth/Throat: Uvula is midline, oropharynx is clear and moist and mucous membranes are normal. No oral lesions. No uvula swelling. No oropharyngeal exudate, posterior oropharyngeal edema or posterior oropharyngeal erythema.   Eyes: Conjunctivae, EOM and lids are normal. Pupils are equal, round, and reactive to light. No scleral icterus.   Neck: Trachea normal, normal range of motion, full passive range of motion without pain and phonation normal. Neck supple. No thyroid mass present. No stridor present. No spinous process tenderness and no muscular tenderness present. No edema, no erythema and normal range of motion present. No neck rigidity. No JVD present.   Cardiovascular: Normal rate, regular rhythm, normal heart sounds, intact distal pulses and normal pulses. Exam reveals no gallop and no friction rub.    No murmur heard.  Pulmonary/Chest: Effort normal and breath sounds normal. No accessory muscle usage. No tachypnea. No respiratory distress. She has no wheezes. She has no rhonchi. She has no rales.   Abdominal: Soft. Normal appearance and bowel sounds are normal. She exhibits distension and ascites. She exhibits no pulsatile midline mass and no mass. There is generalized tenderness. There is no rigidity, no rebound, no guarding, no CVA tenderness, no  tenderness at McBurney's point and negative Ervin's sign. No hernia.   Musculoskeletal: Normal range of motion. She exhibits no edema or tenderness.   Pulses are 2+ throughout, cap refill is less than 2 sec throughout, extremities are nontender throughout with full range of motion. There is no spinal tenderness to palpation.   Neurological: She is alert and oriented to person, place, and time. She has normal strength. She displays normal reflexes. No cranial nerve deficit or sensory deficit.   No focal deficits.   Skin: Skin is warm, dry and intact. Capillary refill takes less than 2 seconds. No ecchymosis, no petechiae and no rash noted. No erythema. No pallor.   Psychiatric: She has a normal mood and affect. Her speech is normal and behavior is normal. Judgment and thought content normal. Cognition and memory are normal.         ED Course   Procedures  Labs Reviewed   CBC W/ AUTO DIFFERENTIAL - Abnormal; Notable for the following components:       Result Value    RBC 2.95 (*)     Hemoglobin 9.5 (*)     Hematocrit 29.6 (*)     Mean Corpuscular Volume 100 (*)     Mean Corpuscular Hemoglobin 32.2 (*)     RDW 16.1 (*)     Platelets 149 (*)     Lymph # 0.5 (*)     Gran% 78.9 (*)     Lymph% 12.1 (*)     All other components within normal limits    Narrative:     For upper or mid abdominal pain.   COMPREHENSIVE METABOLIC PANEL - Abnormal; Notable for the following components:    CO2 20 (*)     BUN, Bld 62 (*)     Creatinine 7.7 (*)     Calcium 8.0 (*)     Total Protein 5.5 (*)     Albumin 2.4 (*)     eGFR if  6.3 (*)     eGFR if non  5.5 (*)     All other components within normal limits    Narrative:     For upper or mid abdominal pain.   URINALYSIS, REFLEX TO URINE CULTURE - Abnormal; Notable for the following components:    pH, UA >8.0 (*)     Protein, UA 2+ (*)     Occult Blood UA 1+ (*)     All other components within normal limits    Narrative:     In and Out Cath as needed it  patient unable to void  Preferred Collection Type->Urine, Clean Catch  Specimen Source->Urine   BILIRUBIN, DIRECT - Abnormal; Notable for the following components:    Bilirubin, Direct <0.1 (*)     All other components within normal limits    Narrative:     For upper or mid abdominal pain.   TROPONIN I - Abnormal; Notable for the following components:    Troponin I 0.041 (*)     All other components within normal limits   TSH - Abnormal; Notable for the following components:    TSH 5.820 (*)     All other components within normal limits   B-TYPE NATRIURETIC PEPTIDE - Abnormal; Notable for the following components:     (*)     All other components within normal limits   URINALYSIS MICROSCOPIC - Abnormal; Notable for the following components:    RBC, UA 9 (*)     Hyaline Casts, UA 13 (*)     All other components within normal limits    Narrative:     In and Out Cath as needed it patient unable to void  Preferred Collection Type->Urine, Clean Catch  Specimen Source->Urine   PROTIME-INR    Narrative:     For upper or mid abdominal pain.   APTT    Narrative:     For upper or mid abdominal pain.   LIPASE    Narrative:     For upper or mid abdominal pain.   TROPONIN I   MAGNESIUM   TSH   DRUG SCREEN PANEL, URINE EMERGENCY   CK   CK-MB   MAGNESIUM   CK-MB   CK   DRUG SCREEN PANEL, URINE EMERGENCY   B-TYPE NATRIURETIC PEPTIDE   T4, FREE   ALBUMIN, PERITONEAL, PLEURAL FLUID OR EGORGE DRAINAGE, IN-HOUSE   PROTEIN, PERITONEAL, PLEURAL FLUID OR GEORGE DRAINAGE, IN-HOUSE   LDH, PERITONEAL, PLEURAL FLUID OR GEORGE DRAINAGE, IN-HOUSE        ECG Results          EKG 12-lead (Final result)  Result time 01/12/20 09:55:20    Final result by Interface, Lab In [facility] (01/12/20 09:55:20)                 Narrative:    Test Reason : R53.81,    Vent. Rate : 079 BPM     Atrial Rate : 079 BPM     P-R Int : 158 ms          QRS Dur : 072 ms      QT Int : 418 ms       P-R-T Axes : 071 045 -22 degrees     QTc Int : 479 ms    Normal sinus  rhythm  Possible Left atrial enlargement  Cannot rule out Anterior infarct ,age undetermined  ST and T wave abnormality, consider inferior ischemia  Abnormal ECG  When compared with ECG of 22-DEC-2019 17:25,  T wave inversion now evident in Inferior leads  Nonspecific T wave abnormality now evident in Anterior leads  Confirmed by Arslan TURPIN, Asher ([id number]) on 1/12/2020 9:55:09 AM    Referred By: [email or web address]   SELF           Confirmed By:Asher Mcdaniels MD                            Imaging Results          CT Renal Stone Study ABD Pelvis WO (Final result)  Result time 01/07/20 19:31:24    Final result by Benny Barboza MD (01/07/20 19:31:24)                 Impression:      1.  Morphologic features of advanced cirrhosis and findings consistent with portal hypertension (splenomegaly and ascites).    2.  Large volume of simple appearing ascites.    3.  Marked splenomegaly.    4.  Diffuse whole body wall edema (anasarca).    5.  Additional, and incidental findings as noted above.      Electronically signed by: Benny Barboza MD  Date:    01/07/2020  Time:    19:31             Narrative:      CMS MANDATED QUALITY DATA - CT RADIATION - 436    All CT scans at this facility utilize dose modulation, iterative reconstruction, and/or weight based dosing when appropriate to reduce radiation dose to as low as reasonably achievable.    CLINICAL HISTORY:  ([id number])55 y/o  (1965) F    Abdominal pain, unspecified;abdom;    TECHNIQUE:  (A#[id number], exam time 1/7/2020 19:21)    CT RENAL STONE STUDY ABD PELVIS WO [id number]    Axial CT images of the abdomen and pelvis were obtained from the dome of the diaphragm to the proximal thighs.    COMPARISON:  Most recent CTA from 04/15/2019    FINDINGS:  Lower Thorax:    The heart is mildly enlarged in size with dense annular mitral valve calcifications.  There is scattered coronary arterial calcifications.  The lung bases show discoid airspace opacities bilaterally suggestive of a  combination of atelectasis and scarring.    CT Abdomen:    Liver: There morphologic features of cirrhosis with a nodular hepatic contour and volume redistribution) atrophy of the right lobe and hypertrophy of the left lobe).    Gallbladder: The gallbladder appears to be collapsed with small calcifications/stones in the gallbladder fossa.    Biliary Tree: No intra or extrahepatic ductal dilation.    Spleen: Splenomegaly suggesting portal hypertension measuring 18 x 7 cm    Pancreas: The pancreas is normal.    Adrenal Glands: Normal    Kidneys: The kidneys are small with renal cortical thinning.  No hydronephrosis or hydroureter.  Calcification along the inferior pole the right kidney suggestive of either a vascular calcification or tiny nonobstructing stone..    Vasculature: Normal.    Lymph nodes: No abdominal lymphadenopathy is seen.    Intraperitoneal structures: Large volume of abdominopelvic ascites.    Bowel: Moderate volume of stool and gas with a nonobstructive bowel gas pattern.    Abdominal wall: Diffuse whole body wall edema (anasarca).    Musculoskeletal: There is degenerative disc disease and facet arthropathy in the lumbar spine with no aggressive appearing lytic or blastic lesions    CT Pelvis:    Bladder: Normal.    Reproductive Organs: The uterus is not visualized.    Pelvic Lymph nodes: No pelvic lymphadenopathy or pelvic mass is identified.                               X-Ray Chest AP Portable (Final result)  Result time 01/07/20 18:53:25   Procedure changed from X-Ray Chest 1 View     Final result by Benny Barboza MD (01/07/20 18:53:25)                 Impression:      Slight interval improvement to the focal airspace opacity at the right lung base, suggestive of a combination of atelectasis, scarring and possibly infection/pneumonia given the clinical history.      Electronically signed by: Benny Barboza MD  Date:    01/07/2020  Time:    18:53             Narrative:    CLINICAL  HISTORY:  ([id number])53 y/o  (1965) F    Pain; Other malaise    TECHNIQUE:  (A#[id number], exam time 1/7/2020 18:50)    XR CHEST AP PORTABLE [id number]    COMPARISON:  Radiograph from 12/22/2019    FINDINGS:  Mild interval improvement to the focal airspace opacity at the right lung base.  There are calcified right hilar/mediastinal lymph nodes suggesting prior granulomatous disease.  Costophrenic angles are seen without effusion. No pneumothorax is identified. The heart is normal in size. Atheromatous calcifications are seen at the aortic arch. Osseous structures appear unchanged. The visualized upper abdomen is unremarkable.                                 Medical Decision Making:   Clinical Tests:   Lab Tests: Reviewed  Radiological Study: Reviewed  ED Management:  Patient is hemodynamically stable.  She says her blood pressure has been running low for a while now and she is non ill appearing.  I have discussed the case with the hospitalist physician Dr. Gale who has evaluated the patient for admission an assumption of care                                 Clinical Impression:       ICD-10-CM ICD-9-CM   1. Other ascites R18.8 789.59   2. Malaise R53.81 780.79   3. Anasarca R60.1 782.3   4. Chest pain R07.9 786.50   5. Chronic hepatitis C with cirrhosis B18.2 070.54    K74.60 571.5             ED Disposition Condition    Admit                           Estephania Prajapati MD  03/02/20 0351

## 2020-03-06 NOTE — TELEPHONE ENCOUNTER
Please review the order for this patient. I am unable to find the previous DX code used last time. So please add the code. Thank you!!

## 2020-03-06 NOTE — PLAN OF CARE
Pt tolerated procedure well. Denies pain. Resp even and unlabored. 7liters of yellowish peritoneal fluid obtained. VSS. Large bandaid applied. No active bleeding or hematoma noted. Pt provided with verbal and discharge instructions, verbalized understanding. Pt wheeled to lobby where son is present.

## 2020-03-12 NOTE — PROGRESS NOTES
Subjective:      Patient ID: Aurelia Saucedo is a 54 y.o. female.    Chief Complaint: No chief complaint on file.      HPI:  Aurelia Saucedo is a 54 y.o. female who presents for surgical evaluation of CAD. Medical conditions include Hep C, cirrhosis, PCI mid LAD in march 2014, CKD on HD since 2013, HTN, GERD, CHF. Supposed to go to [facility] to be evaluated at [facility] for liver and renal transplant. Reports was originally scheduled for next week but she had to postpone the visit. Requires paracentesis every 2 weeks where 7 liters is usually removed. States that after her LHC she saw Dr. Velasco in [place] but was turned down and is not sure why. She called her cardiologist Dr. Starkey who referred her to Dr. Hdz. Patient states she previously smoked 2/3 of a pack per day but has recently stopped buying cigarettes and just 'bums a couple off other people.' Presented to clinic today in a wheelchair but states she does not use any assitive walking device at home. However, she does state that she does not walk very much, just a little around the house. Reports her breathing is worse when she is retaining a lot of fluid. Has tried hepatitis treatment 2 different times. Reports she was going to resume medication again but was sent the wrong prescriptions and has not followed up.       Family and social history reviewed    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 since 12/16/13 8/19/2015    Gastritis     Hypertension 8/19/2015     Mitral valve regurgitation     Renal dialysis status     M, W, F    Secondary hyperparathyroidism of renal origin 2015     Past Surgical History:   Procedure Laterality Date    ANGIOGRAM, CORONARY, WITH LEFT HEART CATHETERIZATION Left 2020    Procedure: ANGIOGRAM,CORONARY,WITH LEFT HEART CATHETERIZATION;  Surgeon: Yahir Starkey MD;  Location: [facility] CATH/EP LAB;  Service: Cardiology;  Laterality: Left;    AV FISTULA PLACEMENT      COLONOSCOPY      CORONARY ANGIOPLASTY WITH STENT PLACEMENT      SALPINGOOPHORECTOMY Right     TOTAL ABDOMINAL HYSTERECTOMY       Family History     Problem Relation (Age of Onset)    Kidney disease Mother    Psoriasis Brother    Stroke Father        Social History     Socioeconomic History    Marital status:      Spouse name: Not on file    Number of children: 2    Years of education: Not on file    Highest education level: Not on file   Occupational History    Occupation: baker    Social Needs    Financial resource strain: Not on file    Food insecurity:     Worry: Not on file     Inability: Not on file    Transportation needs:     Medical: Not on file     Non-medical: Not on file   Tobacco Use    Smoking status: Former Smoker     Packs/day: 0.75     Years: 30.00     Pack years: 22.50     Types: Cigarettes     Last attempt to quit: 2019     Years since quittin.2    Smokeless tobacco: Never Used   Substance and Sexual Activity    Alcohol use: No     Alcohol/week: 0.0 standard drinks    Drug use: Not Currently     Types: Marijuana    Sexual activity: Not on file   Lifestyle    Physical activity:     Days per week: Not on file     Minutes per session: Not on file    Stress: To some extent   Relationships    Social connections:     Talks on phone: Not on file     Gets together: Not on file     Attends Denominational service: Not on file     Active member of club or organization: Not on file     Attends meetings of clubs or organizations:  Not on file     Relationship status: Not on file   Other Topics Concern    Are you pregnant or think you may be? Not Asked    Breast-feeding Not Asked   Social History Narrative    Not on file       Current medications Reviewed    Review of Systems   Constitutional: Positive for activity change and fatigue.   HENT: Negative for nosebleeds.    Eyes: Negative for visual disturbance.   Respiratory: Positive for shortness of breath.    Cardiovascular: Positive for leg swelling. Negative for chest pain and palpitations.   Gastrointestinal: Positive for abdominal distention.   Musculoskeletal: Negative for gait problem.   Skin: Negative for wound.   Neurological: Negative for dizziness.   Hematological: Does not bruise/bleed easily.   Psychiatric/Behavioral: Negative for sleep disturbance.     Objective:   Physical Exam   Constitutional: She is oriented to person, place, and time. No distress.   Appears unkempt    HENT:   Head: Normocephalic and atraumatic.   Eyes: Pupils are equal, round, and reactive to light. EOM are normal.   Neck: Normal range of motion.   Cardiovascular: Normal rate and normal pulses.   Pulmonary/Chest: Effort normal. No respiratory distress.   Abdominal: Soft. She exhibits distension.   Musculoskeletal: She exhibits no edema.   Neurological: She is alert and oriented to person, place, and time.   Skin: Skin is intact. Capillary refill takes less than 2 seconds.   Significant discoloration / PVD changes to b/l lower extremities    Psychiatric: She has a normal mood and affect. Her speech is normal and behavior is normal. Judgment and thought content normal.   Vitals reviewed.      Diagnostic Results: reviewed   TTE 1/15/2020  · Concentric left ventricular hypertrophy.  · Low normal left ventricular systolic function. The estimated ejection fraction is 45%.  · Indeterminate left ventricular diastolic function.  · Moderate left atrial enlargement.  · Mild aortic valve stenosis.  · Aortic valve area  is 2.10 cm2; peak velocity is 2.40 m/s; mean gradient is 13 mmHg.  · Mild mitral regurgitation.  · Mild tricuspid regurgitation.  · Intermediate central venous pressure (8 mmHg).  · The estimated PA systolic pressure is 30 mmHg.  · Small pericardial effusion.    [facility] 1/13/2020  · Severe multi-vessel disease with total occlusion of the right coronary artery with left-to-right collaterals and says high-grade disease in the proximal circumflex and diffuse disease in the LAD with high-grade diagonal stenosis as described above  · Normal left ventricular systolic function with an ejection fraction of greater than 60%  · Normally appearing left internal mammary artery  Assessment:   CAD  Plan:       CTS Attending Note:    I have personally taken the history and examined this patient and agree with the OSBALDO's note as stated above. Very pleasant 54-year-old woman who underwent coronary angiography.  This study apparently demonstrated multi-vessel disease.  While I did have the report to review, I did not have a copy of the images.  Her situation is complicated by renal failure and end-stage liver disease.  She has massive ascites for which she undergoes paracentesis every 2 weeks.  Her child's Pederson score is 9, putting her at the border between child's B and child's C classification.  Given her underlying liver disease, I do not believe she would survive general anesthesia. Recommend medical management for her coronary disease.  She reports that she has been turned down and for transplant at both [facility] and at [facility].  She intense to visit the [facility] in [place] for a 3rd opinion.

## 2020-03-12 NOTE — LETTER
[facility] - Cardiovascular Surg  [address]  [facility] [address]  Phone: [phone] March 12, 2020      Yahir Starkey MD  [address]  [address]  [facility] [place] [address]    Patient: Aurelia Saucedo   MR Number: [id number]   YOB: 1965   Date of Visit: 3/12/2020     Dear Dr. Starkey,     I had the pleasure of seeing your patient Ms. Aurelia Saucedo in clinic today.  As you know, she is a very pleasant 54-year-old woman who underwent coronary angiography.  This study demonstrated multivessel disease.  She is asymptomatic for this, other than perhaps some dyspnea on exertion which may be explained by her comorbid conditions.  Her comorbid conditions are not insignificant.  She is on chronic dialysis, and has end-stage liver disease.  She has massive ascites for which she receives paracentesis every two weeks.  Typically, roughly 7 L are withdrawn. 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      Given her degree of hepatic dysfunction, I do not believe she would survive general anesthesia. She has been evaluated for liver transplant both at [facility] and at [facility], and has been turned down at both sites.  She is planning a visit to the [facility] in [place] to be evaluated there.  In the interim, I would recommend medical management for her given the likelihood of anesthesia induced fulminant hepatic failure and death.  Thank you for sending this pleasant woman to me.  It was a pleasure to meet her.  I certainly wish there had been something I could do for her.    Sincerely,    Anshul Hdz MD  Chief, Division of Thoracic & Cardiovascular Surgery  [facility]    PEP/afw

## 2020-03-20 NOTE — PLAN OF CARE
Procedure completed.  Pt deisy well. 6.6 liters of clear yellow fluid drained.  Band aid to right mid abd.  No drainage noted.  Pt given d/c instructions, verbalized understanding.  A,a,ox 3. Skin w/d/p. Pt out of dept via w/c.

## 2020-04-03 NOTE — PLAN OF CARE
Procedure completed.  5.6 liters of clear yellow fluid collected.  Pt deisy well.  Band aid to right mid abd.  Pt given d/c instructions verbalized understanding, skin w/d/p.  Pt amb out of dept with steady gait.

## 2020-04-17 NOTE — PLAN OF CARE
Pt aao x4 no acute distress noted abdominal band aide cdi, abdomen soft and non distended , discharge instruction verbal and written given,verbalized understanding discharged to home via wheel chair with .

## 2020-05-01 NOTE — TELEPHONE ENCOUNTER
The radiology department called requesting another standing order for the Paracentesis. I have pended the order. Please review before signing.    Thank you!

## 2020-05-01 NOTE — TELEPHONE ENCOUNTER
Let radiology know the order has been sent they can call me on my cell if the order is wrong and needs to be changed.

## 2020-05-01 NOTE — PLAN OF CARE
6.8 liters of clear yellow drainage drained no testing ordered on fluid discharge instructions given to pt verbal and written verbalized understanding no co pain no acute distress noted discharged to home with son via wheelchair

## 2020-05-15 NOTE — PLAN OF CARE
Discharge instructions given to pt verbalized understanding no co pain no acute distress noted to home via wheelchair with son

## 2020-05-19 NOTE — TELEPHONE ENCOUNTER
This prescription printed by mistake. I have re pended the order to see if it can be sent electronically instead of the patient having to come into the office to  a paper copy.

## 2020-05-29 NOTE — NURSING
Tolerated well.  VSS.  7.5L clear yellow fluid drained. No testing ordered.  Verbal and written discharge instructiions given and understanding verbalized.  Discharged to home with son via private vehicle.

## 2020-05-29 NOTE — NURSING
Follow up called to Aurelia Saucedo regarding their procedure performed at [facility] on 5/15/2020  Post-Procedure screening of Covid-19 symptoms are as follows:    1. Do you have symptoms?  No  2. If yes what are your symptoms?  None  3. Have you tested positive for COVID-19 since your procedure?   NO      Please continue Post-Procedure plan and follow up as outlined in your discharge instructions from your Provider. Please contact their office with any questions or concerns for further instructions.

## 2020-06-12 NOTE — NURSING
8.5 liters of clear yellow drainage obtained from paracentesis . No labs ordered on fluid .dsg to abd cdi with gauze and large band aide. Discharge instructions verbal and written given verbalized understanding. Discharged to home via wheel chair with son via private vehicle.

## 2020-06-19 NOTE — TELEPHONE ENCOUNTER
Patient is requesting her routine refill of her Denver's. I have pended the order. Please review before sending to her local pharmacy.

## 2020-06-26 NOTE — PLAN OF CARE
6.3 liters of straw colored drainage obtained from paracentesis ,no labs ordered on fluid, discharge instructions given to pt verbalized understanding large band aide to right abdominal paracentesis site cdi no co pain no acute distress noted to home via wheel chair with family.

## 2020-06-30 NOTE — TELEPHONE ENCOUNTER
The following medication needs a prior authorization:     Medication Name: trazodone    Dosage: 50 mg    Frequency: nightly     Directions for use: one tab every evening     Diagnosis: insomnia G47.09    Is the request for a reauthorization?  No     Is the patient currently stable on therapy? Yes     Please list all therapeutic alternatives previously used with start/end dates and outcome:  restoril 15 mg 10/10/2018 discontinued by Dr Ireland patient was receiving this before she came to us.

## 2020-07-10 NOTE — PLAN OF CARE
8.4 liters of clear yellow drainage obtained from paracentesis , no labs orders on fluid ,discharge instructions given verbalized understanding  Discharged to home via wheel chair with son no acute distress noted

## 2020-07-24 NOTE — PLAN OF CARE
Ultrasound guided paracentesis performed by Dr. Santos; 8 liters of yellow peritoneal fluid removed.  IV d/c'd, with tip intact.No immediate complications. Site covered with bandaid. Pt discharged to home with son.

## 2020-08-06 NOTE — TELEPHONE ENCOUNTER
Call Mrs Moses and ask her if she needs me to place a referral to a new Gastro doctor, I do not seen any notes from GI, I have placed the order for paracentesis, but she will likely need to get back in with GI at some point.

## 2020-08-21 NOTE — PLAN OF CARE
Procedure completed.  8.5 liters of dark yellow fluid collected.  Pt deisy well.  Band aid to right abd. No bleeding noted.  Pt assisted to w/c.  D/c to care of family member.

## 2020-09-02 PROBLEM — Z66 DNR (DO NOT RESUSCITATE): Status: ACTIVE | Noted: 2020-01-01

## 2020-09-02 PROBLEM — Z99.2 HEMODIALYSIS STATUS: Status: ACTIVE | Noted: 2020-01-01

## 2020-09-02 PROBLEM — I21.4 NSTEMI (NON-ST ELEVATED MYOCARDIAL INFARCTION): Status: ACTIVE | Noted: 2020-01-01

## 2020-09-02 PROBLEM — Z98.890 HISTORY OF ABDOMINAL PARACENTESIS: Status: ACTIVE | Noted: 2020-01-01

## 2020-09-02 NOTE — H&P
"[facility] Medicine History & Physical Examination   Patient Name: Aurelia Saucedo  MRN: [id number]  Patient Class: IP- Inpatient   Admission Date: 9/2/2020  2:14 PM  Length of Stay: 0  Attending Physician: Roque Moore MD  Primary Care Provider: Dustin Ireland MD  Face-to-Face encounter date: 09/02/2020  Code Status: DNR  Chief Complaint: Chest Pain (Chronic ABD ascites, SOB )        Patient information was obtained from patient, past medical records and ER records.   Case personally discussed with Dr. Zee and Dr. Yoon Cano    HISTORY OF PRESENT ILLNESS:   Aurelia Saucedo is a 55 y.o. white female who  has a past medical history of Anemia of chronic renal failure, stage 5 (8/19/2015), Ascites, Bleeding duodenal ulcer, CAD (coronary artery disease), CAD (coronary artery disease), Chest pain, CHF (congestive heart failure), Chronic hepatitis C (8/19/2015), Colitis, Elevated troponin, ESRD 2/2 MPGN on HD since 12/16/13  (8/19/2015), Gastritis, Hypertension (8/19/2015), Mitral valve regurgitation, Renal dialysis status, and Secondary hyperparathyroidism of renal origin (8/19/2015).. The patient presented to [facility] on 9/2/2020 with a primary complaint of Chest Pain (Chronic ABD ascites, SOB )    Patient presented to the ER with complaint of chest pain which was worse yesterday, but currently she is "feeling miserable" with her abdominal distension and needs paracentesis.  Patient states she gets therapeutic paracentesis every 2 weeks and is due on Friday but she cannot wait as the pain is so severe.  Patient reports fullness in the epigastrium, unable to eat due to the fullness and pain.  Patient reports that she has known coronary artery disease and she required CABG and was referred to [facility] but due to her liver and kidney conditions, she was told she is not a candidate for CABG.  Patient is aware that on this hospital admission, her troponins are elevated.  " Patient reports she is in a tough place when it comes to heart, liver and kidney.  Patient also reported that she had hemodialysis yesterday but that does not help with her ascites or lower extremity edema.    Upon arrival to the ER, patient blood pressure was 133/67, pulse 96, temp of 98.1°, respiration 18, O2 sat 100% on room air  CBC shows H&H 9.5/29.2 with platelet 150, no bandemia  PT INR 13.6/1.1  CMP shows sodium 135, BUN/creatinine 42/6.5 with GFR 6.7, total protein 5.2, albumin 2.4, AST 52, ALT 21  BNP 3213, troponin 20.120  COVID rapid negative  X-ray chest reviewed  EKG shows diffuse ST depression    Dr. Mcdaniels was consulted through the ER, recommended admission, full-dose Lovenox for now, NPO after midnight for possible angiogram tomorrow    Discussed with patient that Cardiology has been consulted, plan for angiogram tomorrow, also I will consult intervention radiologist for therapeutic paracentesis.  I will also consult Dr. Ramos for hemodialysis tomorrow.  Patient verbalized understanding  Patient was informed that I will admit her to ICU for close monitoring    PAST MEDICAL HISTORY:     Past Medical History:   Diagnosis Date    Anemia of chronic renal failure, stage 5 8/19/2015    Ascites     Bleeding duodenal ulcer     CAD (coronary artery disease)     CAD (coronary artery disease)     Chest pain     CHF (congestive heart failure)     Chronic hepatitis C 8/19/2015    Colitis     Elevated troponin     ESRD 2/2 MPGN on HD since 12/16/13 8/19/2015    Gastritis     Hypertension 8/19/2015    Mitral valve regurgitation     Renal dialysis status     M, W, F    Secondary hyperparathyroidism of renal origin 8/19/2015       PAST SURGICAL HISTORY:     Past Surgical History:   Procedure Laterality Date    ANGIOGRAM, CORONARY, WITH LEFT HEART CATHETERIZATION Left 1/13/2020    Procedure: ANGIOGRAM,CORONARY,WITH LEFT HEART CATHETERIZATION;  Surgeon: Yahir Starkey MD;  Location[facility] CATH/EP  LAB;  Service: Cardiology;  Laterality: Left;    AV FISTULA PLACEMENT      COLONOSCOPY      CORONARY ANGIOPLASTY WITH STENT PLACEMENT      SALPINGOOPHORECTOMY Right     TOTAL ABDOMINAL HYSTERECTOMY         ALLERGIES:   Ciprofloxacin, Levaquin [levofloxacin], Quinolones, and Codeine    FAMILY HISTORY:     Family History   Problem Relation Age of Onset    Kidney disease Mother     Stroke Father     Psoriasis Brother     Breast cancer Neg Hx     Cancer Neg Hx     Colon cancer Neg Hx     Ovarian cancer Neg Hx        SOCIAL HISTORY:     Social History     Tobacco Use    Smoking status: Current Every Day Smoker     Packs/day: 0.75     Years: 30.00     Pack years: 22.50     Types: Cigarettes     Last attempt to quit: 2019     Years since quittin.7    Smokeless tobacco: Never Used   Substance Use Topics    Alcohol use: No     Alcohol/week: 0.0 standard drinks        Social History     Substance and Sexual Activity   Sexual Activity Not on file        HOME MEDICATIONS:     Prior to Admission medications    Medication Sig Start Date End Date Taking? Authorizing Provider   aspirin (ECOTRIN) 81 MG EC tablet Take 81 mg by mouth once daily.   Yes Historical Provider, MD   calcium acetate,phosphat bind, (PHOSLO) 667 mg capsule Take 667 mg by mouth 3 (three) times daily with meals.   Yes Historical Provider, MD   FA-VIT B COMP&C-SELENIUM-ZINC 3-70-15 MG-MCG-MG ORAL TAB Take 1 tablet by mouth once daily.   Yes Historical Provider, MD   HYDROcodone-acetaminophen (NORCO)  mg per tablet Take 1 tablet by mouth every 8 (eight) hours as needed for Pain. 20  Yes Dustin Ireland MD   lisinopril (PRINIVIL,ZESTRIL) 20 MG tablet Take 1 tablet by mouth nightly.  18  Yes Historical Provider, MD JACOBO 5 gram PwPk Take 5 g by mouth. Five times a week; on  Non dialysis days 20  Yes Historical Provider, MD   metoprolol succinate (TOPROL-XL) 50 MG 24 hr tablet Take 50 mg by mouth nightly.     "Yes Historical Provider, MD   pantoprazole (PROTONIX) 40 MG tablet Take 40 mg by mouth 2 (two) times daily.    Yes Historical Provider, MD   traZODone (DESYREL) 50 MG tablet Take 1 tablet (50 mg total) by mouth every evening. 10/22/19 10/21/20 Yes Dustin Ireland MD   dicyclomine (BENTYL) 20 mg tablet Take 20 mg by mouth every 6 (six) hours as needed.   9/2/20  Historical Provider, MD   ondansetron (ZOFRAN-ODT) 4 MG TbDL Take 2 tablets (8 mg total) by mouth every hour as needed. nausea 10/1/18 9/2/20  Dustin Ireland MD       REVIEW OF SYSTEMS:   10 Point Review of System was performed and was found to be negative except for that mentioned already in the HPI above.     PHYSICAL EXAM:   /84   Pulse 94   Temp 98.8 °F (37.1 °C) (Oral)   Resp (!) 22   Ht 5' 5" (1.651 m)   Wt 61 kg (134 lb 7.7 oz)   LMP 01/07/1999 (Exact Date)   SpO2 97%   BMI 22.38 kg/m²     Vitals Reviewed  General appearance: Well-developed, well-nourished female , uncomfortable with abdominal distension.  HENT: Atraumatic head. Moist mucous membranes of oral cavity.  Eyes: Normal extraocular movements.   Neck: Supple.   Lungs: Clear to auscultation bilaterally. No wheezing present.   Heart: Regular rate and rhythm. S1 and S2 present with 2/6 systolic murmur .  Bilateral pedal and ankle edema.   Abdomen:  Distended, tender. Bowel sounds are normal.   Extremities:  Hyperpigmentation of peripheral vascular disease.  AV fistula left upper extremity  Skin: No Rash.   Neuro: Motor and sensory exams grossly intact. Good tone. Muscle strength 5/5 in all 4 extremities  Psych/mental status: Appropriate mood and affect. Responds appropriately to questions.     EMERGENCY DEPARTMENT LABS AND IMAGING:     Labs Reviewed   CBC W/ AUTO DIFFERENTIAL - Abnormal; Notable for the following components:       Result Value    RBC 2.98 (*)     Hemoglobin 9.5 (*)     Hematocrit 29.2 (*)     Mean Corpuscular Hemoglobin 31.9 (*)     Lymph # 0.9 (*)     Gran% 73.5 " (*)     Lymph% 14.6 (*)     All other components within normal limits   COMPREHENSIVE METABOLIC PANEL - Abnormal; Notable for the following components:    Sodium 135 (*)     BUN, Bld 42 (*)     Creatinine 6.5 (*)     Calcium 8.6 (*)     Total Protein 5.2 (*)     Albumin 2.4 (*)     AST 52 (*)     eGFR if  7.7 (*)     eGFR if non  6.7 (*)     All other components within normal limits   B-TYPE NATRIURETIC PEPTIDE - Abnormal; Notable for the following components:    BNP 3,213 (*)     All other components within normal limits   TROPONIN I - Abnormal; Notable for the following components:    Troponin I 20.120 (*)     All other components within normal limits    Narrative:     Trop critical result(s) repeated. Called and verbal readback obtained   from Jackie Burger RN/ED by [facility] 09/02/2020 16:09   B-TYPE NATRIURETIC PEPTIDE - Abnormal; Notable for the following components:    BNP 3,213 (*)     All other components within normal limits   TROPONIN I - Abnormal; Notable for the following components:    Troponin I 22.728 (*)     All other components within normal limits    Narrative:      trop critical result(s) repeated. Called and verbal readback   obtained from larry lynne rn er  by [facility] 09/02/2020 18:27   PROTIME-INR   MAGNESIUM   SARS-COV-2 RNA AMPLIFICATION, QUAL   CK-MB   TROPONIN I       X-Ray Chest AP Portable   Final Result          ASSESSMENT & PLAN:   Aurelia Saucedo is a 55 y.o. female admitted for    Active Hospital Problems    Diagnosis    *NSTEMI (non-ST elevated myocardial infarction)    History of abdominal paracentesis Q 2 wks     DNR (do not resuscitate)     Symptomatic ascites with hyervolemia    Chronic hepatitis C with cirrhosis    ESRD 2/2 MPGN on HD since 12/16/13      Plan:  Admit to ICU as inpatient  Cardiology Dr. Mcdaniels consulted, aware  Cardiac monitoring  EKG p.r.n. chest pain  Nitroglycerin p.r.n. for chest pain  Trend troponins Q 4 hour x3, initial  troponin 20.120--> 22.728  Received therapeutic dose Lovenox 1 mg/kg in the ER, ordered as q.day due to her renal functions  NPO after midnight for angiogram tomorrow Dr. Mcdaniels  Also consulted intervention radiology for therapeutic paracentesis tomorrow.  I personally discussed it with Dr. Skye Cano, informed ultrasound-guided paracentesis can be done even with therapeutic dose Lovenox if needed  ?  Spironolactone for ascites  Consulted nephrology Dr. Ramos for hemodialysis need    DVT Prophylaxis:  On therapeutic dose Lovenox for NSTEMI and will be advised to be as mobile as possible and sit in a chair as tolerated.   ________________________________________________________________________________    Discharge Planning and Disposition: No mobility needs. Good social support system. Patient will be discharged in 2-3 days    Face-to-Face encounter date: 09/02/2020  Encounter included review of the medical records, interviewing and examining the patient face-to-face, discussion with family and other health care providers including emergency medicine physician, admission orders, interpreting lab/test results and formulating a plan of care.     Medical Decision Making during this encounter was  [_] Low Complexity  [_] Moderate Complexity  [X] High Complexity    Critical care was time spent personally by me on the following activities: development of treatment plan with patient or surrogate and bedside caregivers, discussions with consultants, evaluation of patient's response to treatment, examination of patient, ordering and performing treatments and interventions, ordering and review of laboratory studies, ordering and review of radiographic studies, pulse oximetry, re-evaluation of patient's condition. This critical care time did not overlap with that of any other provider or involve time for any procedures.  Time spent 63 mins      _________________________________________________________________________________    INPATIENT LIST OF MEDICATIONS     Current Facility-Administered Medications:     acetaminophen tablet 650 mg, 650 mg, Oral, Q4H PRN, Roque Moore MD    [START ON 9/3/2020] aspirin EC tablet 81 mg, 81 mg, Oral, Daily, Roque Moore MD    [START ON 9/3/2020] calcium acetate(phosphat bind) capsule 667 mg, 667 mg, Oral, TID WM, Roque Moore MD    [START ON 9/3/2020] enoxaparin injection 60 mg, 1 mg/kg, Subcutaneous, Q24H, Roque Moore MD    HYDROcodone-acetaminophen  mg per tablet 1 tablet, 1 tablet, Oral, Q8H PRN, Roque Moore MD    HYDROmorphone injection 1 mg, 1 mg, Intravenous, Q4H PRN, Roque Moore MD    lisinopriL tablet 20 mg, 20 mg, Oral, Nightly, Roque Moore MD    melatonin tablet 6 mg, 6 mg, Oral, Nightly PRN, Roque Moore MD    metoprolol succinate (TOPROL-XL) 24 hr tablet 50 mg, 50 mg, Oral, Nightly, Roque Moore MD    nitroGLYCERIN SL tablet 0.4 mg, 0.4 mg, Sublingual, Q5 Min PRN, Roque Moore MD    ondansetron disintegrating tablet 4 mg, 4 mg, Oral, Q8H PRN, Roque Moore MD    pantoprazole EC tablet 40 mg, 40 mg, Oral, BID, Roque Moore MD    polyethylene glycol packet 17 g, 17 g, Oral, Daily PRN, Roque Moore MD    [START ON 9/3/2020] rosuvastatin tablet 10 mg, 10 mg, Oral, Daily, Roque Moore MD    sodium chloride 0.9% flush 10 mL, 10 mL, Intravenous, PRN, Roque Moore MD    [START ON 9/3/2020] sodium zirconium cyclosilicate packet 5 g, 5 g, Oral, Every other day, Roque Moore MD    traZODone tablet 50 mg, 50 mg, Oral, QHS, Roque Moore MD    Current Outpatient Medications:     aspirin (ECOTRIN) 81 MG EC tablet, Take 81 mg by mouth once daily., Disp: , Rfl:     calcium acetate,phosphat bind, (PHOSLO) 667 mg capsule, Take 667 mg by mouth 3 (three) times daily with meals., Disp: , Rfl:     FA-VIT B COMP&C-SELENIUM-ZINC 3-70-15 MG-MCG-MG ORAL TAB, Take 1 tablet by mouth once daily., Disp: , Rfl:      HYDROcodone-acetaminophen (NORCO)  mg per tablet, Take 1 tablet by mouth every 8 (eight) hours as needed for Pain., Disp: 60 tablet, Rfl: 0    lisinopril (PRINIVIL,ZESTRIL) 20 MG tablet, Take 1 tablet by mouth nightly. , Disp: , Rfl: 3    LOKELMA 5 gram PwPk, Take 5 g by mouth. Five times a week; on  Non dialysis days, Disp: , Rfl:     metoprolol succinate (TOPROL-XL) 50 MG 24 hr tablet, Take 50 mg by mouth nightly. , Disp: , Rfl:     pantoprazole (PROTONIX) 40 MG tablet, Take 40 mg by mouth 2 (two) times daily. , Disp: , Rfl:     traZODone (DESYREL) 50 MG tablet, Take 1 tablet (50 mg total) by mouth every evening., Disp: 30 tablet, Rfl: 11      Scheduled Meds:   [START ON 9/3/2020] aspirin  81 mg Oral Daily    [START ON 9/3/2020] calcium acetate(phosphat bind)  667 mg Oral TID WM    [START ON 9/3/2020] enoxparin  1 mg/kg Subcutaneous Q24H    lisinopriL  20 mg Oral Nightly    metoprolol succinate  50 mg Oral Nightly    pantoprazole  40 mg Oral BID    [START ON 9/3/2020] rosuvastatin  10 mg Oral Daily    [START ON 9/3/2020] sodium zirconium cyclosilicate  5 g Oral Every other day    traZODone  50 mg Oral QHS     Continuous Infusions:  PRN Meds:.acetaminophen, HYDROcodone-acetaminophen, HYDROmorphone, melatonin, nitroGLYCERIN, ondansetron, polyethylene glycol, sodium chloride 0.9%      Roque Oscar  [facility] Hospitalist  09/02/2020

## 2020-09-02 NOTE — ED NOTES
"Pt is requesting " I want cary to come and do my iv" reports small vein, hx: multiple sticks, pt does not want me to attempt iv insertion   "

## 2020-09-02 NOTE — ED PROVIDER NOTES
Encounter Date: 9/2/2020       History     Chief Complaint   Patient presents with    Chest Pain     Chronic ABD ascites, SOB      55-year-old female with end-stage renal disease presented emergency department with anasarca and worsening edema all over the body with abdominal distension and complains of upper abdominal pain.  Patient said for the past 2 days she was having chest pain as well however does not have any chest pain now patient does have epigastric discomfort and fullness.  Patient states this is secondary to her fluid buildup and whenever she has fluid buildup like this she gets abdominal pain and sometimes chest pain and sometimes shortness of breath.  Patient had dialysis yesterday.  Denies fever or chills or nausea vomiting or dysuria or hematuria.  Patient states she still makes some urine.        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 since 12/16/13 8/19/2015    Gastritis     Hypertension 8/19/2015    Mitral valve regurgitation     Renal dialysis status     M, W, F    Secondary hyperparathyroidism of renal origin 8/19/2015     Past Surgical History:   Procedure Laterality Date    ANGIOGRAM, CORONARY, WITH LEFT HEART CATHETERIZATION Left 1/13/2020    Procedure: ANGIOGRAM,CORONARY,WITH LEFT HEART CATHETERIZATION;  Surgeon: Yahir Starkey MD;  Location: [facility] CATH/EP LAB;  Service: Cardiology;  Laterality: Left;    AV FISTULA PLACEMENT  2013    COLONOSCOPY      CORONARY ANGIOPLASTY WITH STENT PLACEMENT       SALPINGOOPHORECTOMY Right 1997    TOTAL ABDOMINAL HYSTERECTOMY       Family History   Problem Relation Age of Onset    Kidney disease Mother     Stroke Father     Psoriasis Brother     Breast cancer Neg Hx     Cancer Neg Hx     Colon cancer Neg Hx     Ovarian cancer Neg Hx      Social History     Tobacco Use    Smoking status: Former Smoker     Packs/day: 0.75     Years: 30.00     Pack years: 22.50     Types: Cigarettes     Quit date: 2019     Years since quittin.7    Smokeless tobacco: Never Used   Substance Use Topics    Alcohol use: No     Alcohol/week: 0.0 standard drinks    Drug use: Not Currently     Types: Marijuana     Review of Systems   Constitutional: Negative.    HENT: Negative.    Eyes: Negative.    Respiratory: Positive for chest tightness and shortness of breath.    Cardiovascular: Positive for leg swelling. Negative for chest pain.   Gastrointestinal: Positive for abdominal distention and abdominal pain.   Endocrine: Negative.    Genitourinary: Negative.    Musculoskeletal: Negative.    Skin: Negative.    Allergic/Immunologic: Negative.    Neurological: Negative.    Hematological: Negative.    Psychiatric/Behavioral: Negative.    All other systems reviewed and are negative.      Physical Exam     Initial Vitals [20 1415]   BP Pulse Resp Temp SpO2   133/67 96 18 98.1 °F (36.7 °C) 100 %      MAP       --         Physical Exam    Nursing note and vitals reviewed.  Constitutional: She appears well-developed and well-nourished.   HENT:   Head: Normocephalic and atraumatic.   Nose: Nose normal.   Mouth/Throat: Oropharynx is clear and moist.   Eyes: Conjunctivae and EOM are normal. Pupils are equal, round, and reactive to light.   Neck: Normal range of motion. Neck supple. No thyromegaly present. No tracheal deviation present. No JVD present.   Cardiovascular: Normal rate, regular rhythm, normal heart sounds and intact distal pulses.   No murmur heard.  Pulmonary/Chest: Breath  sounds normal. No stridor. No respiratory distress. She has no wheezes. She has no rales.   Abdominal: Soft. Bowel sounds are normal. She exhibits distension.   Mild epigastric tenderness   Musculoskeletal: Normal range of motion. Edema present. No tenderness.   Neurological: She is alert and oriented to person, place, and time. She has normal strength. No cranial nerve deficit. GCS score is 15. GCS eye subscore is 4. GCS verbal subscore is 5. GCS motor subscore is 6.   Skin: Skin is warm. Capillary refill takes less than 2 seconds.   Psychiatric: She has a normal mood and affect. Thought content normal.         ED Course   Critical Care    Date/Time: 9/2/2020 4:22 PM  Performed by: Nathanael Zee MD  Authorized by: Nathanael Zee MD   Direct patient critical care time: 20 minutes  Additional history critical care time: 5 minutes  Documentation critical care time: 5 minutes  Total critical care time (exclusive of procedural time) : 30 minutes        Labs Reviewed   CBC W/ AUTO DIFFERENTIAL - Abnormal; Notable for the following components:       Result Value    RBC 2.98 (*)     Hemoglobin 9.5 (*)     Hematocrit 29.2 (*)     Mean Corpuscular Hemoglobin 31.9 (*)     Lymph # 0.9 (*)     Gran% 73.5 (*)     Lymph% 14.6 (*)     All other components within normal limits   COMPREHENSIVE METABOLIC PANEL - Abnormal; Notable for the following components:    Sodium 135 (*)     BUN, Bld 42 (*)     Creatinine 6.5 (*)     Calcium 8.6 (*)     Total Protein 5.2 (*)     Albumin 2.4 (*)     AST 52 (*)     eGFR if  7.7 (*)     eGFR if non  6.7 (*)     All other components within normal limits   B-TYPE NATRIURETIC PEPTIDE - Abnormal; Notable for the following components:    BNP 3,213 (*)     All other components within normal limits   TROPONIN I - Abnormal; Notable for the following components:    Troponin I 20.120 (*)     All other components within normal limits    Narrative:     Trop critical result(s)  repeated. Called and verbal readback obtained   from Jackie Burger RN/ED by MINERVA 09/02/2020 16:09   B-TYPE NATRIURETIC PEPTIDE - Abnormal; Notable for the following components:    BNP 3,213 (*)     All other components within normal limits   PROTIME-INR   MAGNESIUM   TROPONIN I   SARS-COV-2 RNA AMPLIFICATION, QUAL     EKG Readings: (Independently Interpreted)   Initial Reading: No STEMI. Rhythm: Normal Sinus Rhythm. Ectopy: No Ectopy. Conduction: Normal. T Waves Flipped: V2, V3, V4, V5, II, III and AVF.     ECG Results          EKG 12-lead (In process)  Result time 09/02/20 15:19:52    In process by Interface, Lab In [facility] (09/02/20 15:19:52)                 Narrative:    Test Reason : R07.9,    Vent. Rate : 090 BPM     Atrial Rate : 090 BPM     P-R Int : 134 ms          QRS Dur : 080 ms      QT Int : 378 ms       P-R-T Axes : 047 010 170 degrees     QTc Int : 462 ms    Normal sinus rhythm  Marked ST abnormality, possible lateral subendocardial injury  Abnormal ECG  When compared with ECG of 02-SEP-2020 14:20,  No significant change was found    Referred By: [email or web address]   SELF           Confirmed By:                             Imaging Results          X-Ray Chest AP Portable (Final result)  Result time 09/02/20 15:04:11    Final result by Harley Jimenez MD (09/02/20 15:04:11)                 Narrative:    XR CHEST AP PORTABLE    CLINICAL HISTORY:  55 years Female chest pain    COMPARISON: January 7, 2020    FINDINGS: Cardiac silhouette size is stable from prior.  Atherosclerotic calcification of the aorta. No confluent airspace  disease. Minimal linear atelectasis or scarring within the right  midlung. Minor costophrenic angle blunting bilaterally could reflect  trace pleural fluid or chronic pleural thickening. No pneumothorax. No  acute osseous abnormality. Upper abdomen is unremarkable.    IMPRESSION:    Minimal pleural fluid versus chronic pleural thickening.    No acute pulmonary process.    Aortic  atherosclerosis.    Electronically Signed by Harley SEO on 9/2/2020 3:09 PM                            X-Rays:   Independently Interpreted Readings:   Other Readings:  Chest x-ray shows evidence of pleural effusion    Medical Decision Making:   Differential Diagnosis:   55-year-old female presented emergency department with chest pain for the past 2-3 days however had severe chest pain yesterday.  Currently does not have any chest pain.  Complains of epigastric pain and abdominal fullness and edema in the legs and patient said she would need paracentesis when this happens and had history of liver disease and kidney disease and on dialysis.  Patient had dialysis yesterday.  Screening cardiac workup shows EKG abnormality.  EKG was sent to Dr. Mcdaniels who reviewed the EKG and advised to medical management at this time.  Nitroglycerin and aspirin given.  Patient's pain in the abdomen treated.  Hospital Medicine consulted for evaluation for admission.  Lovenox given for treatment for NSTEMI as troponin elevated and troponin of 20.  Patient likely had heart attack yesterday.  Dr. Mcdaniels said he would do angiogram in the morning and for patient to be kept NPO after midnight.  Hospital Medicine to evaluate the patient for admission and further management.  Patient also would need paracentesis.  Clinical Tests:   Lab Tests: Reviewed  Radiological Study: Reviewed  Medical Tests: Reviewed                                 Clinical Impression:       ICD-10-CM ICD-9-CM   1. NSTEMI (non-ST elevated myocardial infarction)  I21.4 410.70   2. Chest pain  R07.9 786.50   3. Anasarca  R60.1 782.3   4. Pain of upper abdomen  R10.10 789.09             ED Disposition Condition    Admit                           Nathanael Zee MD  09/02/20 [id number]

## 2020-09-02 NOTE — ED NOTES
Requesting pain meds, reports abd distension is causing her pain in her abd, informed md will be made aware

## 2020-09-02 NOTE — ED NOTES
"Charge nurse Robert at bedside for u/s guided iv, pt stated " i'll give you one shot!" pt requesting cary foote was contacted and he is all over the hospital, pt agrees to allow Alexander to attempt iv insertion   "

## 2020-09-02 NOTE — ED TRIAGE NOTES
"Present to the ER with c/o" I have chest pain, I have shortness of breath and I need to be drained" " but I can't wait until Friday until my normal paracentesis is" reports abd distension, fluid overload, Hx: cirrhosis of live and " end stage of renal disease" c/o pain " pretty much everything from the knee to the shoulder" rates pain 14/10, dialysis Tuesday and Saturday   "

## 2020-09-03 NOTE — PROGRESS NOTES
This patient has severe coronary artery disease.  She re-presented with myocardial infarction.  She also has hepatitis C and liver cirrhosis.  She has been turned down the past for liver transplant.  I have seen her approximately 6 months ago for coronary artery disease.  She was treated medically at that time.  I have reviewed her angiograms.  She appears technically to be a surgical candidate.  Surgery could be offered at high risk.  She seems to be understanding.  I will obtain an ultrasound of her saphenous veins to see if she has suitable conduit for surgical bypass.  She seems to be understanding of the potential risk and complications.

## 2020-09-03 NOTE — HPI
"Aurelia Saucedo is a 55 y.o. white female who  has a past medical history of Anemia of chronic renal failure, stage 5 (8/19/2015), Ascites, Bleeding duodenal ulcer, CAD (coronary artery disease), CAD (coronary artery disease), Chest pain, CHF (congestive heart failure), Chronic hepatitis C (8/19/2015), Colitis, Elevated troponin, ESRD 2/2 MPGN on HD since 12/16/13  (8/19/2015), Gastritis, Hypertension (8/19/2015), Mitral valve regurgitation, Renal dialysis status, and Secondary hyperparathyroidism of renal origin (8/19/2015).. The patient presented to [facility] on 9/2/2020 with a primary complaint of Chest Pain (Chronic ABD ascites, SOB )     Patient presented to the ER with complaint of chest pain which was worse yesterday, but currently she is "feeling miserable" with her abdominal distension and needs paracentesis.  Patient states she gets therapeutic paracentesis every 2 weeks and is due on Friday but she cannot wait as the pain is so severe.  Patient reports fullness in the epigastrium, unable to eat due to the fullness and pain.  Patient reports that she has known coronary artery disease and she required CABG and was referred to [facility] but due to her liver and kidney conditions, she was told she is not a candidate for CABG.  Patient is aware that on this hospital admission, her troponins are elevated.  Patient reports she is in a tough place when it comes to heart, liver and kidney.  Patient also reported that she had hemodialysis yesterday but that does not help with her ascites or lower extremity edema.  "

## 2020-09-03 NOTE — CONSULTS
" INPATIENT NEPHROLOGY CONSULT   [facility] NEPHROLOGY    Aurelia Saucedo  09/03/2020    Reason for consultation:    esrd    Chief Complaint:   Chief Complaint   Patient presents with    Chest Pain     Chronic ABD ascites, SOB           History of Present Illness:    Per H and P    Aurelia Saucedo is a 55 y.o. white female who  has a past medical history of Anemia of chronic renal failure, stage 5 (8/19/2015), Ascites, Bleeding duodenal ulcer, CAD (coronary artery disease), CAD (coronary artery disease), Chest pain, CHF (congestive heart failure), Chronic hepatitis C (8/19/2015), Colitis, Elevated troponin, ESRD 2/2 MPGN on HD since 12/16/13  (8/19/2015), Gastritis, Hypertension (8/19/2015), Mitral valve regurgitation, Renal dialysis status, and Secondary hyperparathyroidism of renal origin (8/19/2015).. The patient presented to [facility] on 9/2/2020 with a primary complaint of Chest Pain (Chronic ABD ascites, SOB )     Patient presented to the ER with complaint of chest pain which was worse yesterday, but currently she is "feeling miserable" with her abdominal distension and needs paracentesis.  Patient states she gets therapeutic paracentesis every 2 weeks and is due on Friday but she cannot wait as the pain is so severe.  Patient reports fullness in the epigastrium, unable to eat due to the fullness and pain.  Patient reports that she has known coronary artery disease and she required CABG and was [facility] but due to her liver and kidney conditions, she was told she is not a candidate for CABG.  Patient is aware that on this hospital admission, her troponins are elevated.  Patient reports she is in a tough place when it comes to heart, liver and kidney.  Patient also reported that she had hemodialysis yesterday but that does not help with her ascites or lower extremity edema.        Plan of Care:       Assessment:    esrd  --continue dialysis per routine  (Tuesday and Saturday " hd)  --fluid restrict  --renal dose medication per routine  --continue outpt medication  --continue binders with meals    Anemia  --sue with hd    Myocardial infarction  --inoperable CAD.    --free of pain now  --cardiology consulted    Hyperphosphatemia  --at goal for ESRD patients    Ascites  --as per primary  --had incidents of hyperkalemia in the past so haven't used aldactone       Thank you for allowing us to participate in this patient's care. We will continue to follow.    Vital Signs:  Temp Readings from Last 3 Encounters:   09/02/20 98.3 °F (36.8 °C) (Oral)   03/12/20 97.4 °F (36.3 °C)   01/22/20 97.9 °F (36.6 °C) (Oral)       Pulse Readings from Last 3 Encounters:   09/03/20 77   08/21/20 75   08/07/20 93       BP Readings from Last 3 Encounters:   09/03/20 (!) 85/53   08/21/20 (!) 156/99   08/07/20 133/80       Weight:  Wt Readings from Last 3 Encounters:   09/02/20 61 kg (134 lb 7.7 oz)   03/12/20 59.6 kg (131 lb 6.3 oz)   01/22/20 56.7 kg (125 lb)       Past Medical & Surgical History:  Past Medical History:   Diagnosis Date    Anemia of chronic renal failure, stage 5 8/19/2015    Ascites     Bleeding duodenal ulcer     CAD (coronary artery disease)     CAD (coronary artery disease)     Chest pain     CHF (congestive heart failure)     Chronic hepatitis C 8/19/2015    Colitis     Elevated troponin     ESRD 2/2 MPGN on HD since 12/16/13 8/19/2015    Gastritis     Hypertension 8/19/2015    Mitral valve regurgitation     Renal dialysis status     M, W, F    Secondary hyperparathyroidism of renal origin 8/19/2015       Past Surgical History:   Procedure Laterality Date    ANGIOGRAM, CORONARY, WITH LEFT HEART CATHETERIZATION Left 1/13/2020    Procedure: ANGIOGRAM,CORONARY,WITH LEFT HEART CATHETERIZATION;  Surgeon: Yahir Starkey MD;  Location: [facility] CATH/EP LAB;  Service: Cardiology;  Laterality: Left;    AV FISTULA PLACEMENT  2013    COLONOSCOPY      CORONARY ANGIOPLASTY WITH STENT  PLACEMENT      SALPINGOOPHORECTOMY Right 1997    TOTAL ABDOMINAL HYSTERECTOMY         Past Social History:  Social History     Socioeconomic History    Marital status:      Spouse name: Not on file    Number of children: 2    Years of education: Not on file    Highest education level: Not on file   Occupational History    Occupation: baker    Social Needs    Financial resource strain: Not on file    Food insecurity     Worry: Not on file     Inability: Not on file    Transportation needs     Medical: Not on file     Non-medical: Not on file   Tobacco Use    Smoking status: Current Every Day Smoker     Packs/day: 0.75     Years: 30.00     Pack years: 22.50     Types: Cigarettes     Last attempt to quit: 2019     Years since quittin.7    Smokeless tobacco: Never Used   Substance and Sexual Activity    Alcohol use: No     Alcohol/week: 0.0 standard drinks    Drug use: Yes     Types: Marijuana     Comment: occasionally    Sexual activity: Not on file   Lifestyle    Physical activity     Days per week: Not on file     Minutes per session: Not on file    Stress: To some extent   Relationships    Social connections     Talks on phone: Not on file     Gets together: Not on file     Attends Yarsani service: Not on file     Active member of club or organization: Not on file     Attends meetings of clubs or organizations: Not on file     Relationship status: Not on file   Other Topics Concern    Are you pregnant or think you may be? Not Asked    Breast-feeding Not Asked   Social History Narrative    Not on file       Medications:  No current facility-administered medications on file prior to encounter.      Current Outpatient Medications on File Prior to Encounter   Medication Sig Dispense Refill    aspirin (ECOTRIN) 81 MG EC tablet Take 81 mg by mouth once daily.      calcium acetate,phosphat bind, (PHOSLO) 667 mg capsule Take 667 mg by mouth 3 (three) times daily with meals.       "FA-VIT B COMP&C-SELENIUM-ZINC 3-70-15 MG-MCG-MG ORAL TAB Take 1 tablet by mouth once daily.      HYDROcodone-acetaminophen (NORCO)  mg per tablet Take 1 tablet by mouth every 8 (eight) hours as needed for Pain. 60 tablet 0    lisinopril (PRINIVIL,ZESTRIL) 20 MG tablet Take 1 tablet by mouth nightly.   3    LOKELMA 5 gram PwPk Take 5 g by mouth. Five times a week; on  Non dialysis days      metoprolol succinate (TOPROL-XL) 50 MG 24 hr tablet Take 50 mg by mouth nightly.       pantoprazole (PROTONIX) 40 MG tablet Take 40 mg by mouth 2 (two) times daily.       traZODone (DESYREL) 50 MG tablet Take 1 tablet (50 mg total) by mouth every evening. 30 tablet 11     Scheduled Meds:   aspirin  81 mg Oral Daily    calcium acetate(phosphat bind)  667 mg Oral TID WM    chlorhexidine  15 mL Mouth/Throat BID    enoxparin  1 mg/kg Subcutaneous Q24H    lisinopriL  20 mg Oral Nightly    metoprolol succinate  50 mg Oral Nightly    mupirocin   Nasal BID    pantoprazole  40 mg Oral BID    rosuvastatin  10 mg Oral Daily    sodium zirconium cyclosilicate  5 g Oral Every other day    traZODone  50 mg Oral QHS     Continuous Infusions:  PRN Meds:.acetaminophen, HYDROcodone-acetaminophen, HYDROmorphone, melatonin, nitroGLYCERIN, ondansetron, polyethylene glycol, sodium chloride 0.9%    Allergies:  Ciprofloxacin, Levaquin [levofloxacin], Quinolones, and Codeine    Past Family History:  Reviewed; refer to Hospitalist Admission Note    Review of Systems:  Review of Systems - All 14 systems reviewed and negative, except as noted in HPI    Physical Exam:    BP (!) 85/53   Pulse 77   Temp 98.3 °F (36.8 °C) (Oral)   Resp (!) 24   Ht 5' 5" (1.651 m)   Wt 61 kg (134 lb 7.7 oz)   LMP 01/07/1999 (Exact Date)   SpO2 (!) 93%   BMI 22.38 kg/m²     General Appearance:    Alert, cooperative, no distress, appears stated age   Head:    Normocephalic, without obvious abnormality, atraumatic   Eyes:    PER, conjunctiva/corneas " clear, EOM's intact in both eyes        Throat:   Lips, mucosa, and tongue normal; teeth and gums normal   Back:     Symmetric, no curvature, ROM normal, no CVA tenderness   Lungs:     Clear to auscultation bilaterally, respirations unlabored   Chest wall:    No tenderness or deformity   Heart:    Regular rate and rhythm, S1 and S2 normal, no murmur, rub   or gallop   Abdomen:     Distended    Extremities:   Extremities normal, atraumatic, no cyanosis or edema   Pulses:   2+ and symmetric all extremities   MSK:   No joint or muscle swelling, tenderness or deformity   Skin:   Skin color, texture, turgor normal, no rashes or lesions   Neurologic:   CNII-XII intact, normal strength and sensation       Throughout.  No flap     Results:  Lab Results   Component Value Date     09/03/2020    K 4.9 09/03/2020     09/03/2020    CO2 24 09/03/2020    BUN 46 (H) 09/03/2020    CREATININE 7.0 (H) 09/03/2020    CALCIUM 8.9 09/03/2020    ANIONGAP 12 09/03/2020    ESTGFRAFRICA 7.1 (A) 09/03/2020    EGFRNONAA 6.1 (A) 09/03/2020       Lab Results   Component Value Date    CALCIUM 8.9 09/03/2020    PHOS 5.3 (H) 01/09/2020       Recent Labs   Lab 09/03/20  0647   WBC 4.13   RBC 2.70*   HGB 8.4*   HCT 26.4*   *   MCV 98   MCH 31.1*   MCHC 31.8*          I have personally reviewed pertinent radiological imaging and reports.

## 2020-09-03 NOTE — PLAN OF CARE
This note also relates to the following rows which could not be included:  SpO2 - Cannot attach notes to unvalidated device data  Pulse - Cannot attach notes to unvalidated device data  BP - Cannot attach notes to unvalidated device data       09/03/20 0800   PRE-TX-O2   O2 Device (Oxygen Therapy) room air   Pulse Oximetry Type Continuous   $ Pulse Oximetry - Multiple Charge Pulse Oximetry - Multiple   Resp 16   Respiratory Evaluation   $ Care Plan Tech Time 15 min

## 2020-09-03 NOTE — PLAN OF CARE
7.4 liters of clear yellow drainage from paracentesis per dr benson, gauze and paper tape dsg cdi no acute distress noted no labs ordered on fluid

## 2020-09-03 NOTE — HOSPITAL COURSE
09/03  Assumed care. Chart reviewed. Labs reviewed: mild normocytic anemia; normal electroytes with end stage renal function. Troponin elevated. CXR: NAPD. EKG: sinus with lateral ST depression an Q in III. No further chest pain. Has discomfort from ascites. Is scheduled for paracentesis in AM. Discussed with Cardiology: hold [facility] today    09/04  Consultants' attendance noted and appreciated. Had venous mapping, carotid u/s and ECHO today. No further CP since admission. Labs reviewed: stable anemia of renal disease; electrolytes normal with end-stage renal function. Telemetry reviewed: sinus    09/05  Discussed with Cardiology: may need valve in addition to CABG. Labs reviewed:H/H has fallen, no leukocytosis Discussed with Nephrology: transfuse 2 units pRBC at dialysis today. Telemetry reviewed: sinus. No further CP since admission. No SOB/orthopnea/PND    09/06  Discussed with Cardiology: awaiting decision by CT surgeon. No chest pain since admission. Labs reviewed: H/H improved after 2 units pRBC yesterday; mild hyponatremia with end-stage renal function. Telemetry reviewed: sinus    09/07  Labs reviewed stable CBC; minimal hyponatremia with otherwise normal electrolytes and end-stage renal function. No new complaints outside of waiting on decision by Cardiology and CT Surgeon. No further chest discomfort since admission    09/08 - Assumed care of patient today. Here for NSTEMI. Has multiple comorbidities most notably ESRD on hemodialysis and end-stage liver disease getting periodic paracentesis.  Seen by cardiology and cardiothoracic surgery.  She is on medical management currently.  Discussed case with Dr. Yeboah from cardiothoracic surgery who relayed plans for outpatient follow-up for possible CABG in near future.  Discussed plan with the patient and answered all questions to her satisfaction.  Deemed medically stable for discharge to home.    Instructions provided to follow up with primary care physician as  outpatient. Patient verbalized understanding and is aware to contact primary care physician or return to ED if new or worsening symptoms.    Physical exam on the day of discharge:  General: Patient resting comfortably in no acute distress.  Lungs: CTA. Good air entry.  Cor: Regular rate and rhythm. No murmurs. No pedal edema.  Abd: Soft. Nontender. Non-distended.  Neuro: A&O x3. Moving all 4 extremities equally  Ext: No clubbing. No cyanosis.

## 2020-09-03 NOTE — CONSULTS
[facility]   Cardiology Note    Consult Requested By: primary  Reason for Consult: NSTEMI    SUBJECTIVE:     History of Present Illness: 55 year old female w/ complicated PMHx including severe CAD not amendable to PCI, ESRD, Cirrhosis of the liver due to chronic Hep C who presented to the ED w/ severe chest pain. On admit, troponin peaked to 22 and is now trending down. EKG shows ST segment sagging/depression. Vital signs are currently stable. She is now feeling overall well. Abdomen is distended and she often requires frequent paracentesis to control ascites, last was 13 days ago.     Review of patient's allergies indicates:   Allergen Reactions    Ciprofloxacin Itching    Levaquin [levofloxacin] Itching    Quinolones Itching    Codeine Nausea Only       Past Medical History:   Diagnosis Date    Anemia of chronic renal failure, stage 5 8/19/2015    Ascites     Bleeding duodenal ulcer     CAD (coronary artery disease)     CAD (coronary artery disease)     Chest pain     CHF (congestive heart failure)     Chronic hepatitis C 8/19/2015    Colitis     Elevated troponin     ESRD 2/2 MPGN on HD since 12/16/13 8/19/2015    Gastritis     Hypertension 8/19/2015    Mitral valve regurgitation     Renal dialysis status     M, W, F    Secondary hyperparathyroidism of renal origin 8/19/2015     Past Surgical History:   Procedure Laterality Date    ANGIOGRAM, CORONARY, WITH LEFT HEART CATHETERIZATION Left 1/13/2020    Procedure: ANGIOGRAM,CORONARY,WITH LEFT HEART CATHETERIZATION;  Surgeon: Yahir Starkey MD;  Location: [facility] CATH/EP LAB;  Service: Cardiology;  Laterality: Left;    AV FISTULA PLACEMENT  2013    COLONOSCOPY      CORONARY ANGIOPLASTY WITH STENT PLACEMENT      SALPINGOOPHORECTOMY Right 1997    TOTAL ABDOMINAL HYSTERECTOMY  1999     Family History   Problem Relation Age of Onset    Kidney disease Mother     Stroke Father     Psoriasis Brother     Breast cancer Neg Hx      Cancer Neg Hx     Colon cancer Neg Hx     Ovarian cancer Neg Hx      Social History     Tobacco Use    Smoking status: Current Every Day Smoker     Packs/day: 0.75     Years: 30.00     Pack years: 22.50     Types: Cigarettes     Last attempt to quit: 2019     Years since quittin.7    Smokeless tobacco: Never Used   Substance Use Topics    Alcohol use: No     Alcohol/week: 0.0 standard drinks    Drug use: Yes     Types: Marijuana     Comment: occasionally       Review of Systems:  Review of Systems   Respiratory: Positive for shortness of breath.    Cardiovascular: Positive for chest pain.   Gastrointestinal: Positive for abdominal pain.   All other systems reviewed and are negative.      OBJECTIVE:     Vital Signs (Most Recent)  Temp: 98.1 °F (36.7 °C) (20 07)  Pulse: 81 (20 08)  Resp: (!) 34 (20 08)  BP: (!) 100/55 (20 0800)  SpO2: (!) 94 % (20)    Vital Signs Range (Last 24H):  Temp:  [98.1 °F (36.7 °C)-98.8 °F (37.1 °C)]   Pulse:  [75-99]   Resp:  [13-34]   BP: ()/(50-93)   SpO2:  [93 %-100 %]     I & O (Last 24H):  No intake or output data in the 24 hours ending 20    Current Diet:     Current Diet Order   Procedures    Diet Cardiac     Renal cardiac diet        Allergies:  Review of patient's allergies indicates:   Allergen Reactions    Ciprofloxacin Itching    Levaquin [levofloxacin] Itching    Quinolones Itching    Codeine Nausea Only       Meds:  Scheduled Meds:   aspirin  81 mg Oral Daily    calcium acetate(phosphat bind)  667 mg Oral TID WM    chlorhexidine  15 mL Mouth/Throat BID    enoxparin  1 mg/kg Subcutaneous Q24H    lisinopriL  20 mg Oral Nightly    metoprolol succinate  50 mg Oral Nightly    mupirocin   Nasal BID    pantoprazole  40 mg Oral BID    rosuvastatin  10 mg Oral Daily    sodium zirconium cyclosilicate  5 g Oral Every other day    traZODone  50 mg Oral QHS     Continuous Infusions:  PRN  Meds:acetaminophen, HYDROcodone-acetaminophen, HYDROmorphone, melatonin, nitroGLYCERIN, ondansetron, polyethylene glycol, sodium chloride 0.9%    Oxygen/Ventilator Data (Last 24H):  (if applicable)        Hemodynamic Parameters (Last 24H):   (if applicable)        Laboratory and Radiology Data:  Recent Results (from the past 24 hour(s))   CBC auto differential    Collection Time: 09/02/20  2:50 PM   Result Value Ref Range    WBC 6.02 3.90 - 12.70 K/uL    RBC 2.98 (L) 4.00 - 5.40 M/uL    Hemoglobin 9.5 (L) 12.0 - 16.0 g/dL    Hematocrit 29.2 (L) 37.0 - 48.5 %    Mean Corpuscular Volume 98 82 - 98 fL    Mean Corpuscular Hemoglobin 31.9 (H) 27.0 - 31.0 pg    Mean Corpuscular Hemoglobin Conc 32.5 32.0 - 36.0 g/dL    RDW 14.2 11.5 - 14.5 %    Platelets 150 150 - 350 K/uL    MPV 9.9 9.2 - 12.9 fL    Immature Granulocytes 0.5 0.0 - 0.5 %    Gran # (ANC) 4.4 1.8 - 7.7 K/uL    Immature Grans (Abs) 0.03 0.00 - 0.04 K/uL    Lymph # 0.9 (L) 1.0 - 4.8 K/uL    Mono # 0.6 0.3 - 1.0 K/uL    Eos # 0.1 0.0 - 0.5 K/uL    Baso # 0.03 0.00 - 0.20 K/uL    nRBC 0 0 /100 WBC    Gran% 73.5 (H) 38.0 - 73.0 %    Lymph% 14.6 (L) 18.0 - 48.0 %    Mono% 9.6 4.0 - 15.0 %    Eosinophil% 1.3 0.0 - 8.0 %    Basophil% 0.5 0.0 - 1.9 %    Differential Method Automated    Comprehensive metabolic panel    Collection Time: 09/02/20  2:50 PM   Result Value Ref Range    Sodium 135 (L) 136 - 145 mmol/L    Potassium 4.3 3.5 - 5.1 mmol/L    Chloride 100 95 - 110 mmol/L    CO2 24 23 - 29 mmol/L    Glucose 110 70 - 110 mg/dL    BUN, Bld 42 (H) 6 - 20 mg/dL    Creatinine 6.5 (H) 0.5 - 1.4 mg/dL    Calcium 8.6 (L) 8.7 - 10.5 mg/dL    Total Protein 5.2 (L) 6.0 - 8.4 g/dL    Albumin 2.4 (L) 3.5 - 5.2 g/dL    Total Bilirubin 0.6 0.1 - 1.0 mg/dL    Alkaline Phosphatase 72 55 - 135 U/L    AST 52 (H) 10 - 40 U/L    ALT 21 10 - 44 U/L    Anion Gap 11 8 - 16 mmol/L    eGFR if African American 7.7 (A) >60 mL/min/1.73 m^2    eGFR if non  6.7 (A) >60  mL/min/1.73 m^2   Brain natriuretic peptide    Collection Time: 09/02/20  2:50 PM   Result Value Ref Range    BNP 3,213 (H) 0 - 99 pg/mL   Troponin I    Collection Time: 09/02/20  2:50 PM   Result Value Ref Range    Troponin I 20.120 (HH) <=0.040 ng/mL   Protime-INR    Collection Time: 09/02/20  2:50 PM   Result Value Ref Range    PT 13.6 10.6 - 14.8 sec    INR 1.1    B-Type natriuretic peptide (BNP)    Collection Time: 09/02/20  2:50 PM   Result Value Ref Range    BNP 3,213 (H) 0 - 99 pg/mL   Magnesium    Collection Time: 09/02/20  2:50 PM   Result Value Ref Range    Magnesium 2.4 1.6 - 2.6 mg/dL   COVID-19 Rapid Screening    Collection Time: 09/02/20  4:52 PM   Result Value Ref Range    SARS-CoV-2 RNA, Amplification, Qual Negative Negative   Troponin I #2    Collection Time: 09/02/20  5:31 PM   Result Value Ref Range    Troponin I 22.728 (HH) <=0.040 ng/mL   CK-MB Specify Time    Collection Time: 09/02/20  5:31 PM   Result Value Ref Range    CPK MB 46.3 (H) 0.1 - 6.5 ng/mL   CK-MB Specify Time    Collection Time: 09/02/20 11:56 PM   Result Value Ref Range    CPK MB 28.9 (H) 0.1 - 6.5 ng/mL   Troponin I    Collection Time: 09/02/20 11:57 PM   Result Value Ref Range    Troponin I 18.881 (HH) <=0.040 ng/mL   Basic metabolic panel    Collection Time: 09/03/20  6:45 AM   Result Value Ref Range    Sodium 139 136 - 145 mmol/L    Potassium 4.9 3.5 - 5.1 mmol/L    Chloride 103 95 - 110 mmol/L    CO2 24 23 - 29 mmol/L    Glucose 89 70 - 110 mg/dL    BUN, Bld 46 (H) 6 - 20 mg/dL    Creatinine 7.0 (H) 0.5 - 1.4 mg/dL    Calcium 8.9 8.7 - 10.5 mg/dL    Anion Gap 12 8 - 16 mmol/L    eGFR if African American 7.1 (A) >60 mL/min/1.73 m^2    eGFR if non  6.1 (A) >60 mL/min/1.73 m^2   Lipid panel    Collection Time: 09/03/20  6:45 AM   Result Value Ref Range    Cholesterol 139 120 - 199 mg/dL    Triglycerides 98 30 - 150 mg/dL    HDL 33 (L) 40 - 75 mg/dL    LDL Cholesterol 86.4 63.0 - 159.0 mg/dL    Hdl/Cholesterol  Ratio 23.7 20.0 - 50.0 %    Total Cholesterol/HDL Ratio 4.2 2.0 - 5.0    Non-HDL Cholesterol 106 mg/dL   Magnesium    Collection Time: 09/03/20  6:45 AM   Result Value Ref Range    Magnesium 2.5 1.6 - 2.6 mg/dL   Troponin I    Collection Time: 09/03/20  6:45 AM   Result Value Ref Range    Troponin I 14.193 (HH) <=0.040 ng/mL   CBC auto differential    Collection Time: 09/03/20  6:47 AM   Result Value Ref Range    WBC 4.13 3.90 - 12.70 K/uL    RBC 2.70 (L) 4.00 - 5.40 M/uL    Hemoglobin 8.4 (L) 12.0 - 16.0 g/dL    Hematocrit 26.4 (L) 37.0 - 48.5 %    Mean Corpuscular Volume 98 82 - 98 fL    Mean Corpuscular Hemoglobin 31.1 (H) 27.0 - 31.0 pg    Mean Corpuscular Hemoglobin Conc 31.8 (L) 32.0 - 36.0 g/dL    RDW 14.3 11.5 - 14.5 %    Platelets 134 (L) 150 - 350 K/uL    MPV 9.8 9.2 - 12.9 fL    Immature Granulocytes 0.2 0.0 - 0.5 %    Gran # (ANC) 2.6 1.8 - 7.7 K/uL    Immature Grans (Abs) 0.01 0.00 - 0.04 K/uL    Lymph # 1.0 1.0 - 4.8 K/uL    Mono # 0.4 0.3 - 1.0 K/uL    Eos # 0.1 0.0 - 0.5 K/uL    Baso # 0.03 0.00 - 0.20 K/uL    nRBC 0 0 /100 WBC    Gran% 62.8 38.0 - 73.0 %    Lymph% 23.7 18.0 - 48.0 %    Mono% 9.2 4.0 - 15.0 %    Eosinophil% 3.4 0.0 - 8.0 %    Basophil% 0.7 0.0 - 1.9 %    Differential Method Automated      Imaging Results          X-Ray Chest AP Portable (Final result)  Result time 09/02/20 15:04:11    Final result by Harley Jimenez MD (09/02/20 15:04:11)                 Narrative:    XR CHEST AP PORTABLE    CLINICAL HISTORY:  55 years Female chest pain    COMPARISON: January 7, 2020    FINDINGS: Cardiac silhouette size is stable from prior.  Atherosclerotic calcification of the aorta. No confluent airspace  disease. Minimal linear atelectasis or scarring within the right  midlung. Minor costophrenic angle blunting bilaterally could reflect  trace pleural fluid or chronic pleural thickening. No pneumothorax. No  acute osseous abnormality. Upper abdomen is unremarkable.    IMPRESSION:    Minimal  pleural fluid versus chronic pleural thickening.    No acute pulmonary process.    Aortic atherosclerosis.    Electronically Signed by Harley SEO on 9/2/2020 3:09 PM                              12-lead EKG interpretation:  (if applicable)      Current Cardiac Rhythm:   (if applicable)    Physical Exam:   Physical Exam   Constitutional: She is oriented to person, place, and time and well-developed, well-nourished, and in no distress.   HENT:   Head: Normocephalic and atraumatic.   Cardiovascular: Normal rate and regular rhythm.   Murmur heard.  Pulmonary/Chest: Effort normal and breath sounds normal.   Abdominal: She exhibits distension.   Musculoskeletal: Normal range of motion.   Neurological: She is alert and oriented to person, place, and time.   Skin: Skin is warm and dry. No rash noted.   Nursing note and vitals reviewed.      ASSESSMENT/PLAN:   Assessment:     NSTEMI  Severe CAD, last LHC was January  Cardiomyopathy  Liver cirrhosis  Hep C  ESRD    Plan:     Discussed case w/ Dr. Gomez. No plans for LHC as we know her coronary anatomy from January. Apparently following this admission she was seen by CTS at [facility] and was apparently deemed too high risk for surgery.   We will consult Dr. Yeboah.

## 2020-09-03 NOTE — CONSULTS
"CVT SURGERY CONSULT NOTE    Patient Info:   Aurelia Saucedo                                                        55 y.o.                          1965    Date of Consult: 9/3/2020    Reason for Consult:NSTEMI, multivessel CAD    Referring Physician: Idania Vines    Subjective:    HPI:  55 year old female with hx of Colitis, bleeding duodenal ulcer, CHF, Chronic hepatitis C with ascites and therapeutic paracentesis every 2 weeks, ESRD due to MPGN on HD since 2013, HTN, with known CAD who was previously seen for CABG by Dr Yeboah and [facility] and deemed too high risk who presented to ED with complaints of chest pain and abdominal pain with distention. ED eval revealed elevated troponin and diffuse ST depression on EKG consistent with NSTEMI as well as significant ascites.  Pt seen this am and states she was seen in January 2020 by Dr Yeboah. She reports no current chest pain. She states she is aware that it is likely "no one will do surgery on me" and I understand that.     Past Medical History:   Diagnosis Date    Anemia of chronic renal failure, stage 5 8/19/2015    Ascites     Bleeding duodenal ulcer     CAD (coronary artery disease)     CAD (coronary artery disease)     Chest pain     CHF (congestive heart failure)     Chronic hepatitis C 8/19/2015    Colitis     Elevated troponin     ESRD 2/2 MPGN on HD since 12/16/13 8/19/2015    Gastritis     Hypertension 8/19/2015    Mitral valve regurgitation     Renal dialysis status     M, W, F    Secondary hyperparathyroidism of renal origin 8/19/2015       Past Surgical History:   Procedure Laterality Date    ANGIOGRAM, CORONARY, WITH LEFT HEART CATHETERIZATION Left 1/13/2020    Procedure: ANGIOGRAM,CORONARY,WITH LEFT HEART CATHETERIZATION;  Surgeon: Yahir Starkey MD;  Location: [facility] CATH/EP LAB;  Service: Cardiology;  Laterality: Left;    AV FISTULA PLACEMENT  2013    COLONOSCOPY      CORONARY ANGIOPLASTY WITH STENT " PLACEMENT      SALPINGOOPHORECTOMY Right     TOTAL ABDOMINAL HYSTERECTOMY         Social History     Tobacco Use    Smoking status: Current Every Day Smoker     Packs/day: 0.75     Years: 30.00     Pack years: 22.50     Types: Cigarettes     Last attempt to quit: 2019     Years since quittin.7    Smokeless tobacco: Never Used   Substance Use Topics    Alcohol use: No     Alcohol/week: 0.0 standard drinks       Social History     Substance and Sexual Activity   Drug Use Yes    Types: Marijuana    Comment: occasionally       Family History   Problem Relation Age of Onset    Kidney disease Mother     Stroke Father     Psoriasis Brother     Breast cancer Neg Hx     Cancer Neg Hx     Colon cancer Neg Hx     Ovarian cancer Neg Hx        Review of patient's allergies indicates:   Allergen Reactions    Ciprofloxacin Itching    Levaquin [levofloxacin] Itching    Quinolones Itching    Codeine Nausea Only       Prior to Admission medications    Medication Sig Start Date End Date Taking? Authorizing Provider   aspirin (ECOTRIN) 81 MG EC tablet Take 81 mg by mouth once daily.   Yes Historical Provider, MD   calcium acetate,phosphat bind, (PHOSLO) 667 mg capsule Take 667 mg by mouth 3 (three) times daily with meals.   Yes Historical Provider, MD   FA-VIT B COMP&C-SELENIUM-ZINC 3-70-15 MG-MCG-MG ORAL TAB Take 1 tablet by mouth once daily.   Yes Historical Provider, MD   HYDROcodone-acetaminophen (NORCO)  mg per tablet Take 1 tablet by mouth every 8 (eight) hours as needed for Pain. 20  Yes Dustin Ireland MD   lisinopril (PRINIVIL,ZESTRIL) 20 MG tablet Take 1 tablet by mouth nightly.  18  Yes Historical Provider, MD   LOAPOLONIA 5 gram PwPk Take 5 g by mouth. Five times a week; on  Non dialysis days 20  Yes Historical Provider, MD   metoprolol succinate (TOPROL-XL) 50 MG 24 hr tablet Take 50 mg by mouth nightly.    Yes Historical Provider, MD   pantoprazole (PROTONIX) 40 MG tablet  "Take 40 mg by mouth 2 (two) times daily.    Yes Historical Provider, MD   traZODone (DESYREL) 50 MG tablet Take 1 tablet (50 mg total) by mouth every evening. 10/22/19 10/21/20 Yes Dustin Ireland MD       Review of Systems   Constitutional: Negative for fever.   HENT: Negative for congestion.    Eyes: Negative for blurred vision.   Respiratory: Positive for shortness of breath. Negative for wheezing.    Cardiovascular: Positive for chest pain and leg swelling. Negative for orthopnea.   Gastrointestinal: Negative for nausea and vomiting.   Genitourinary: Negative for dysuria.   Skin: Negative for rash.   Neurological: Negative for weakness.   Psychiatric/Behavioral: The patient is not nervous/anxious.        Objective:    Physical Exam   Constitutional: She is oriented to person, place, and time. She appears well-developed and well-nourished. No distress.   HENT:   Head: Normocephalic and atraumatic.   Eyes: EOM are normal.   Neck: Neck supple.   Cardiovascular: Normal rate and regular rhythm.   Murmur heard.  Pulmonary/Chest: Effort normal and breath sounds normal. No respiratory distress. She has no wheezes. She has no rales.   Abdominal: Soft. Bowel sounds are normal. She exhibits distension. There is no abdominal tenderness.   Musculoskeletal:         General: Edema present.      Comments: Chronic LE changes in discoloration, dry scaly skin   Neurological: She is alert and oriented to person, place, and time.   Skin: Skin is warm and dry.   Nursing note and vitals reviewed.    BP (!) 127/91   Pulse 84   Temp 98.1 °F (36.7 °C)   Resp 20   Ht 5' 5" (1.651 m)   Wt 61 kg (134 lb 7.7 oz)   LMP 01/07/1999 (Exact Date)   SpO2 99%   BMI 22.38 kg/m²   INPATIENT MEDS:      aspirin  81 mg Oral Daily    calcium acetate(phosphat bind)  667 mg Oral TID WM    chlorhexidine  15 mL Mouth/Throat BID    enoxparin  1 mg/kg Subcutaneous Q24H    lisinopriL  20 mg Oral Nightly    metoprolol succinate  50 mg Oral Nightly "    mupirocin   Nasal BID    pantoprazole  40 mg Oral BID    rosuvastatin  10 mg Oral Daily    sodium zirconium cyclosilicate  5 g Oral Every other day    traZODone  50 mg Oral QHS     acetaminophen, HYDROcodone-acetaminophen, HYDROmorphone, melatonin, nitroGLYCERIN, ondansetron, polyethylene glycol, sodium chloride 0.9%     RECENT O2 THERAPY : RA    I/O last 24 hours  Intake/Output - Last 3 Shifts     None        Recent cardiac rhythm: SR  Recent Pain Assessment: 2    CBC:  Recent Labs   Lab 09/02/20  1450 09/03/20  0647   WBC 6.02 4.13   RBC 2.98* 2.70*   HGB 9.5* 8.4*    134*   MCV 98 98   MCH 31.9* 31.1*   MCHC 32.5 31.8*     BMP:  Recent Labs   Lab 09/02/20  1450 09/03/20  0645   CO2 24 24   BUN 42* 46*   CREATININE 6.5* 7.0*   CALCIUM 8.6* 8.9     CARDIAC ENZYMES:  Recent Labs   Lab 09/02/20  1731 09/02/20  2357 09/03/20  0645   TROPONINI 22.728* 18.881* 14.193*     BNP:  Recent Labs   Lab 09/02/20  1450   BNP 3,213*  3,213*     PT/INR:  INR   Date Value Ref Range Status   09/02/2020 1.1  Final     Comment:     Coumadin Therapy:  INR: 2.0-3.0 conventional anticoagulation  INR: 2.5-3.5 intensive anticoagulation     07/24/2020 1.0  Final     Comment:     Coumadin Therapy:  INR: 2.0-3.0 conventional anticoagulation  INR: 2.5-3.5 intensive anticoagulation     06/12/2020 1.0  Final     Comment:     Coumadin Therapy:  INR: 2.0-3.0 conventional anticoagulation  INR: 2.5-3.5 intensive anticoagulation     07/10/2019 1.0     05/14/2019 1.0     10/09/2016 1.0 0.8 - 1.2 Final     Comment:     Coumadin Therapy:  2.0 - 3.0 for INR for all indicators except mechanical heart valves  and antiphospholipid syndromes which should use 2.5 - 3.5.       ECG Results          EKG 12-lead (In process)  Result time 09/03/20 09:09:11    In process by Interface, Lab In [facility] (09/03/20 09:09:11)                 Narrative:    Test Reason : R07.9,    Vent. Rate : 090 BPM     Atrial Rate : 090 BPM     P-R Int : 134 ms           QRS Dur : 080 ms      QT Int : 378 ms       P-R-T Axes : 047 010 170 degrees     QTc Int : 462 ms    Normal sinus rhythm  Marked ST abnormality, possible lateral subendocardial injury  Abnormal ECG  When compared with ECG of 10-MARTÍN-2020 10:13,  ST now depressed in Lateral leads  T wave inversion now evident in Lateral leads    Referred By: [email or web address]   SELF           Confirmed By:                   In process by Interface, Lab In [facility] (09/02/20 15:19:52)                 Narrative:    Test Reason : R07.9,    Vent. Rate : 090 BPM     Atrial Rate : 090 BPM     P-R Int : 134 ms          QRS Dur : 080 ms      QT Int : 378 ms       P-R-T Axes : 047 010 170 degrees     QTc Int : 462 ms    Normal sinus rhythm  Marked ST abnormality, possible lateral subendocardial injury  Abnormal ECG  When compared with ECG of 02-SEP-2020 14:20,  No significant change was found    Referred By: [email or web address]   SELF           Confirmed By:                               CURRENT CONSULTS  Consults (From admission, onward)        Status Ordering Provider     Inpatient consult to Cardiology  Once     Provider:  Yahir Starkey MD    Completed ROQUE ECHEVARRIA     Inpatient consult to Cardiology  Once     Provider:  Asher Mcdaniels MD    Completed ROQUE ECHEVARRIA     Inpatient consult to Cardiothoracic Surgery  Once     Provider:  Jose Yeboah MD    Ordered BETTINA JASON     Inpatient consult to Hospitalist  Once     Provider:  Roque Echevarria MD    Acknowledged ROQUE ECHEVARRIA     Inpatient consult to Nephrology  Once     Provider:  Mele Ramos MD    Acknowledged ROQUE ECHEVARRIA            Assessment/Plan:    NSTEMI  Multivessel CAD  ICMP  -Peak troponin 22  -Known severe CAD with eval at [facility] and deemed too high risk for surgery  -Echo 1/20 EF 45%, elevated PA pressure 30mmHG, small pericardial effusion, repeat echo  -Discussed consideration of high risk CABG with pt, Dr Yeboah to review films and further discuss with pt    ESRD on  HD  -Nephrology following and managing HD    Chronic Hep C with cirrhosis  Symptomatic ascites  -Therapeutic paracentesis every 2 weeks  -Paracentesis performed today with removal of 7.4L    Anemia  Thrombocytopenia  -Multifactorial  -Monitor H/H    HTN  -Controlled    Signed:  Jyoti Luevano PA-C  Cardiovascular Thoracic Surgery  9/3/2020  10:45 AM

## 2020-09-03 NOTE — SUBJECTIVE & OBJECTIVE
Interval History: ESRD, ESLD severe CAD    Review of Systems   Constitutional: Negative.    Eyes: Negative.    Respiratory: Positive for stridor.    Cardiovascular: Negative.    Gastrointestinal: Positive for abdominal distention and abdominal pain.   Endocrine: Negative.    Genitourinary: Negative.    Musculoskeletal: Negative.    Skin: Negative.    Allergic/Immunologic: Negative.    Neurological: Negative.    Hematological: Negative.    All other systems reviewed and are negative.    Objective:     Vital Signs (Most Recent):  Temp: 98.1 °F (36.7 °C) (09/03/20 0701)  Pulse: 81 (09/03/20 0801)  Resp: (!) 34 (09/03/20 0801)  BP: (!) 100/55 (09/03/20 0800)  SpO2: (!) 94 % (09/03/20 0801) Vital Signs (24h Range):  Temp:  [98.1 °F (36.7 °C)-98.8 °F (37.1 °C)] 98.1 °F (36.7 °C)  Pulse:  [75-99] 81  Resp:  [13-34] 34  SpO2:  [93 %-100 %] 94 %  BP: ()/(50-93) 100/55     Weight: 61 kg (134 lb 7.7 oz)  Body mass index is 22.38 kg/m².  No intake or output data in the 24 hours ending 09/03/20 0854   Physical Exam  Vitals signs and nursing note reviewed.   Constitutional:       Appearance: She is well-developed.   HENT:      Head: Normocephalic and atraumatic.      Right Ear: External ear normal.      Left Ear: External ear normal.      Nose: Nose normal.   Eyes:      Conjunctiva/sclera: Conjunctivae normal.      Pupils: Pupils are equal, round, and reactive to light.   Neck:      Musculoskeletal: Normal range of motion and neck supple.   Cardiovascular:      Rate and Rhythm: Normal rate and regular rhythm.      Heart sounds: Normal heart sounds.   Pulmonary:      Effort: Pulmonary effort is normal.      Breath sounds: Normal breath sounds.      Comments: Decreased entry without adventitious sounds  Abdominal:      General: Bowel sounds are normal.      Palpations: Abdomen is soft.      Comments: Ascites   Musculoskeletal: Normal range of motion.   Skin:     General: Skin is warm and dry.      Capillary Refill: Capillary  refill takes less than 2 seconds.   Neurological:      Mental Status: She is alert and oriented to person, place, and time.   Psychiatric:         Behavior: Behavior normal.         Thought Content: Thought content normal.         Judgment: Judgment normal.         Significant Labs:   BMP:   Recent Labs   Lab 09/03/20  0645   GLU 89      K 4.9      CO2 24   BUN 46*   CREATININE 7.0*   CALCIUM 8.9   MG 2.5     CBC:   Recent Labs   Lab 09/02/20  1450 09/03/20  0647   WBC 6.02 4.13   HGB 9.5* 8.4*   HCT 29.2* 26.4*    134*       Significant Imaging: I have reviewed and interpreted all pertinent imaging results/findings within the past 24 hours.

## 2020-09-03 NOTE — PLAN OF CARE
Dc home with rollator. Cm to follow until discharge from hospital.     09/03/20 1226   Discharge Assessment   Assessment Type Discharge Planning Assessment   Confirmed/corrected address and phone number on facesheet? Yes   Assessment information obtained from? Patient   Expected Length of Stay (days) 3   Communicated expected length of stay with patient/caregiver yes   Prior to hospitilization cognitive status: Alert/Oriented   Prior to hospitalization functional status: Independent  (But needs a walker)   Current cognitive status: Alert/Oriented   Current Functional Status: Needs Assistance   Lives With spouse   Able to Return to Prior Arrangements yes   Is patient able to care for self after discharge? Yes   Who are your caregiver(s) and their phone number(s)? Jose Saucedo    [phone]   Patient's perception of discharge disposition home health   Readmission Within the Last 30 Days no previous admission in last 30 days   Patient currently being followed by outpatient case management? No   Patient currently receives any other outside agency services? No   Part D Coverage [employer]BS   Do you have any problems affording any of your prescribed medications? No   Is the patient taking medications as prescribed? yes   Does the patient have transportation home? Yes   Transportation Anticipated family or friend will provide   Dialysis Name and Scheduled days N/A   Does the patient receive services at the [facility]? No   Discharge Plan A Home with family   Discharge Plan B Home   DME Needed Upon Discharge  rollator   Patient/Family in Agreement with Plan no

## 2020-09-03 NOTE — CONSULTS
Full consult to follow.   Pt with esrd/ESLD and cirrhosis and severe 3 vessel CAD not amendable to PCI. Sent to [facility] and too high risk for CABG. On med management  Not with worsening ascites and trop 20 c/w nstemi.     Med management  No [facility] today-feed pt  Will review cath with dr gresham for possible high risk CABG but her renetta-op risk is very high   Will follow

## 2020-09-03 NOTE — PROGRESS NOTES
"[facility]  Progress Note    Patient Name: Aurelia Saucedo  MRN: [id number]  Patient Class: IP- Inpatient   Admission Date: 9/2/2020  Length of Stay: 1 days  Attending Physician: Anthony Maya MD  Primary Care Provider: Dustin Ireland MD        Subjective:     Principal Problem:NSTEMI (non-ST elevated myocardial infarction)        HPI:  Aurelia Saucedo is a 55 y.o. white female who  has a past medical history of Anemia of chronic renal failure, stage 5 (8/19/2015), Ascites, Bleeding duodenal ulcer, CAD (coronary artery disease), CAD (coronary artery disease), Chest pain, CHF (congestive heart failure), Chronic hepatitis C (8/19/2015), Colitis, Elevated troponin, ESRD 2/2 MPGN on HD since 12/16/13  (8/19/2015), Gastritis, Hypertension (8/19/2015), Mitral valve regurgitation, Renal dialysis status, and Secondary hyperparathyroidism of renal origin (8/19/2015).. The patient presented to [facility] on 9/2/2020 with a primary complaint of Chest Pain (Chronic ABD ascites, SOB )     Patient presented to the ER with complaint of chest pain which was worse yesterday, but currently she is "feeling miserable" with her abdominal distension and needs paracentesis.  Patient states she gets therapeutic paracentesis every 2 weeks and is due on Friday but she cannot wait as the pain is so severe.  Patient reports fullness in the epigastrium, unable to eat due to the fullness and pain.  Patient reports that she has known coronary artery disease and she required CABG and was referred to [facility] but due to her liver and kidney conditions, she was told she is not a candidate for CABG.  Patient is aware that on this hospital admission, her troponins are elevated.  Patient reports she is in a tough place when it comes to heart, liver and kidney.  Patient also reported that she had hemodialysis yesterday but that does not help with her ascites or lower extremity " edema.    Overview/Hospital Course:  09/03  Assumed care. Chart reviewed. Labs reviewed: mild normocytic anemia; normal electroytes with end stage renal function. Troponin elevated. CXR: NAPD. EKG: sinus with lateral ST depression an Q in III. No further chest pain. Has discomfort from ascites. Is scheduled for paracentesis in AM. Discussed with Cardiology: hold [facility] today    Interval History: ESRD, ESLD severe CAD    Review of Systems   Constitutional: Negative.    Eyes: Negative.    Respiratory: Positive for stridor.    Cardiovascular: Negative.    Gastrointestinal: Positive for abdominal distention and abdominal pain.   Endocrine: Negative.    Genitourinary: Negative.    Musculoskeletal: Negative.    Skin: Negative.    Allergic/Immunologic: Negative.    Neurological: Negative.    Hematological: Negative.    All other systems reviewed and are negative.    Objective:     Vital Signs (Most Recent):  Temp: 98.1 °F (36.7 °C) (09/03/20 0701)  Pulse: 81 (09/03/20 0801)  Resp: (!) 34 (09/03/20 0801)  BP: (!) 100/55 (09/03/20 0800)  SpO2: (!) 94 % (09/03/20 0801) Vital Signs (24h Range):  Temp:  [98.1 °F (36.7 °C)-98.8 °F (37.1 °C)] 98.1 °F (36.7 °C)  Pulse:  [75-99] 81  Resp:  [13-34] 34  SpO2:  [93 %-100 %] 94 %  BP: ()/(50-93) 100/55     Weight: 61 kg (134 lb 7.7 oz)  Body mass index is 22.38 kg/m².  No intake or output data in the 24 hours ending 09/03/20 0854   Physical Exam  Vitals signs and nursing note reviewed.   Constitutional:       Appearance: She is well-developed.   HENT:      Head: Normocephalic and atraumatic.      Right Ear: External ear normal.      Left Ear: External ear normal.      Nose: Nose normal.   Eyes:      Conjunctiva/sclera: Conjunctivae normal.      Pupils: Pupils are equal, round, and reactive to light.   Neck:      Musculoskeletal: Normal range of motion and neck supple.   Cardiovascular:      Rate and Rhythm: Normal rate and regular rhythm.      Heart sounds: Normal heart sounds.    Pulmonary:      Effort: Pulmonary effort is normal.      Breath sounds: Normal breath sounds.      Comments: Decreased entry without adventitious sounds  Abdominal:      General: Bowel sounds are normal.      Palpations: Abdomen is soft.      Comments: Ascites   Musculoskeletal: Normal range of motion.   Skin:     General: Skin is warm and dry.      Capillary Refill: Capillary refill takes less than 2 seconds.   Neurological:      Mental Status: She is alert and oriented to person, place, and time.   Psychiatric:         Behavior: Behavior normal.         Thought Content: Thought content normal.         Judgment: Judgment normal.         Significant Labs:   BMP:   Recent Labs   Lab 09/03/20  0645   GLU 89      K 4.9      CO2 24   BUN 46*   CREATININE 7.0*   CALCIUM 8.9   MG 2.5     CBC:   Recent Labs   Lab 09/02/20  1450 09/03/20  0647   WBC 6.02 4.13   HGB 9.5* 8.4*   HCT 29.2* 26.4*    134*       Significant Imaging: I have reviewed and interpreted all pertinent imaging results/findings within the past 24 hours.      Assessment/Plan:      Hemodialysis status        DNR (do not resuscitate)         Symptomatic ascites with hyervolemia  Paracentesis       Chronic hepatitis C with cirrhosis  Chronic        ESRD 2/2 MPGN on HD since 12/16/13   dialysis          VTE Risk Mitigation (From admission, onward)         Ordered     enoxaparin injection 60 mg  Every 24 hours (non-standard times)      09/02/20 1750     IP VTE HIGH RISK PATIENT  Once      09/02/20 1750     Place sequential compression device  Until discontinued      09/02/20 1750                Discharge Planning   ADRIA:      Code Status: DNR   Is the patient medically ready for discharge?: No    Reason for patient still in hospital (select all that apply): Treatment                     Anthony Maya MD  Department of Hospital Medicine   [facility]

## 2020-09-03 NOTE — PLAN OF CARE
Report called to nazia garcia verbalized understanding pt up to floor via wheel chair no acute distress noted vs stable right abdominal gauze and paper tape dsg cdi

## 2020-09-04 NOTE — PROGRESS NOTES
[facility]    Cardiology Progress Note    Subjective:  Patient seen and examined this am. S/p paracentesis yesterday. She states her abdomen feels better, less distended. Continues to report some intermittent chest pain. Will add imdur as long as BP tolerates.   Awaiting CTS recommendations.       Objective:  Vital Signs (Most Recent)  Temp: 97.3 °F (36.3 °C) (09/04/20 0301)  Pulse: 89 (09/04/20 0800)  Resp: (!) 24 (09/04/20 0804)  BP: 119/82 (09/04/20 0800)  SpO2: 95 % (09/04/20 0800)    Vital Signs Range (Last 24H):  Temp:  [97.3 °F (36.3 °C)-98.8 °F (37.1 °C)]   Pulse:  []   Resp:  [0-39]   BP: (101-149)/()   SpO2:  [94 %-100 %]     I & O (Last 24H):    Intake/Output Summary (Last 24 hours) at 9/4/2020 0907  Last data filed at 9/3/2020 1701  Gross per 24 hour   Intake 360 ml   Output --   Net 360 ml       Current Diet:     Current Diet Order   Procedures    Diet Cardiac     Renal cardiac diet        Allergies:  Review of patient's allergies indicates:   Allergen Reactions    Ciprofloxacin Itching    Levaquin [levofloxacin] Itching    Quinolones Itching    Codeine Nausea Only       Meds:  Scheduled Meds:   aspirin  81 mg Oral Daily    calcium acetate(phosphat bind)  667 mg Oral TID WM    chlorhexidine  15 mL Mouth/Throat BID    enoxparin  1 mg/kg Subcutaneous Q24H    lisinopriL  20 mg Oral Nightly    metoprolol succinate  50 mg Oral Nightly    mupirocin   Nasal BID    nitroGLYCERIN 2% TD oint  0.5 inch Topical (Top) Q6H    pantoprazole  40 mg Oral BID    rosuvastatin  10 mg Oral Daily    sodium zirconium cyclosilicate  5 g Oral Every other day    traZODone  50 mg Oral QHS     Continuous Infusions:  PRN Meds:acetaminophen, HYDROcodone-acetaminophen, HYDROmorphone, melatonin, nitroGLYCERIN, ondansetron, polyethylene glycol, sodium chloride 0.9%    Lab Results :  Recent Results (from the past 24 hour(s))   CBC Without Differential    Collection Time: 09/04/20  3:36 AM   Result  Value Ref Range    WBC 5.89 3.90 - 12.70 K/uL    RBC 2.76 (L) 4.00 - 5.40 M/uL    Hemoglobin 8.6 (L) 12.0 - 16.0 g/dL    Hematocrit 27.2 (L) 37.0 - 48.5 %    Mean Corpuscular Volume 99 (H) 82 - 98 fL    Mean Corpuscular Hemoglobin 31.2 (H) 27.0 - 31.0 pg    Mean Corpuscular Hemoglobin Conc 31.6 (L) 32.0 - 36.0 g/dL    RDW 14.3 11.5 - 14.5 %    Platelets 130 (L) 150 - 350 K/uL    MPV 9.9 9.2 - 12.9 fL   Basic metabolic panel    Collection Time: 09/04/20  3:37 AM   Result Value Ref Range    Sodium 136 136 - 145 mmol/L    Potassium 4.6 3.5 - 5.1 mmol/L    Chloride 101 95 - 110 mmol/L    CO2 23 23 - 29 mmol/L    Glucose 102 70 - 110 mg/dL    BUN, Bld 49 (H) 6 - 20 mg/dL    Creatinine 7.4 (H) 0.5 - 1.4 mg/dL    Calcium 8.6 (L) 8.7 - 10.5 mg/dL    Anion Gap 12 8 - 16 mmol/L    eGFR if African American 6.6 (A) >60 mL/min/1.73 m^2    eGFR if non African American 5.7 (A) >60 mL/min/1.73 m^2       Diagnostic Results:  Imaging Results          X-Ray Chest AP Portable (Final result)  Result time 09/02/20 15:04:11    Final result by Harley Jimenez MD (09/02/20 15:04:11)                 Narrative:    XR CHEST AP PORTABLE    CLINICAL HISTORY:  55 years Female chest pain    COMPARISON: January 7, 2020    FINDINGS: Cardiac silhouette size is stable from prior.  Atherosclerotic calcification of the aorta. No confluent airspace  disease. Minimal linear atelectasis or scarring within the right  midlung. Minor costophrenic angle blunting bilaterally could reflect  trace pleural fluid or chronic pleural thickening. No pneumothorax. No  acute osseous abnormality. Upper abdomen is unremarkable.    IMPRESSION:    Minimal pleural fluid versus chronic pleural thickening.    No acute pulmonary process.    Aortic atherosclerosis.    Electronically Signed by Harley SEO on 9/2/2020 3:09 PM                              Recent Cardiac Rhythm   (if applicable)      Physical Exam:  Objective:  General Appearance:  Comfortable and  "well-appearing.    Vital signs: (most recent): Blood pressure 119/82, pulse 89, temperature 97.3 °F (36.3 °C), resp. rate (!) 24, height 5' 5" (1.651 m), weight 61 kg (134 lb 7.7 oz), last menstrual period 01/07/1999, SpO2 95 %.  Vital signs are normal.    Lungs:  Normal effort and normal respiratory rate.    Heart: Normal rate.  Regular rhythm.  Positive for murmur.    Abdomen: Abdomen is distended.    Extremities: There is no dependent edema.    Neurological: Patient is alert and oriented to person, place and time.    Skin:  Warm and dry.  No rash.       Current Consults:  IP CONSULT TO HOSPITAL MEDICINE  IP CONSULT TO CARDIOLOGY  IP CONSULT TO CARDIOLOGY  IP CONSULT TO NEPHROLOGY  IP CONSULT TO CARDIOTHORACIC SURGERY    Assessment/Plan:  Assessment:   NSTEMI  Severe CAD, last [facility] was January  Cardiomyopathy  Liver cirrhosis  Hep C  ESRD    Plan:   Imdur 30 mg   Awaiting CTS recommendations  "

## 2020-09-04 NOTE — PROGRESS NOTES
"[facility] Medicine  Progress Note    Patient Name: Aurelia Saucedo  MRN: [id number]  Patient Class: IP- Inpatient   Admission Date: 9/2/2020  Length of Stay: 2 days  Attending Physician: Anthony Maya MD  Primary Care Provider: Dustin Ireland MD        Subjective:     Principal Problem:NSTEMI (non-ST elevated myocardial infarction)        HPI:  Aurelia Saucedo is a 55 y.o. white female who  has a past medical history of Anemia of chronic renal failure, stage 5 (8/19/2015), Ascites, Bleeding duodenal ulcer, CAD (coronary artery disease), CAD (coronary artery disease), Chest pain, CHF (congestive heart failure), Chronic hepatitis C (8/19/2015), Colitis, Elevated troponin, ESRD 2/2 MPGN on HD since 12/16/13  (8/19/2015), Gastritis, Hypertension (8/19/2015), Mitral valve regurgitation, Renal dialysis status, and Secondary hyperparathyroidism of renal origin (8/19/2015).. The patient presented to [facility] on 9/2/2020 with a primary complaint of Chest Pain (Chronic ABD ascites, SOB )     Patient presented to the ER with complaint of chest pain which was worse yesterday, but currently she is "feeling miserable" with her abdominal distension and needs paracentesis.  Patient states she gets therapeutic paracentesis every 2 weeks and is due on Friday but she cannot wait as the pain is so severe.  Patient reports fullness in the epigastrium, unable to eat due to the fullness and pain.  Patient reports that she has known coronary artery disease and she required CABG and was referred to [facility] but due to her liver and kidney conditions, she was told she is not a candidate for CABG.  Patient is aware that on this hospital admission, her troponins are elevated.  Patient reports she is in a tough place when it comes to heart, liver and kidney.  Patient also reported that she had hemodialysis yesterday but that does not help with her ascites or lower extremity " edema.    Overview/Hospital Course:  09/03  Assumed care. Chart reviewed. Labs reviewed: mild normocytic anemia; normal electroytes with end stage renal function. Troponin elevated. CXR: NAPD. EKG: sinus with lateral ST depression an Q in III. No further chest pain. Has discomfort from ascites. Is scheduled for paracentesis in AM. Discussed with Cardiology: hold [facility] today    09/04  Consultants' attendance noted and appreciated. Had venous mapping, carotid u/s and ECHO today. No further CP since admission. Labs reviewed: stable anemia of renal disease; electrolytes normal with end-stage renal function. Telemetry reviewed: sinus    Interval History: CAD    Review of Systems   Constitutional: Negative.    HENT: Negative.    Eyes: Negative.    Respiratory: Negative.    Cardiovascular: Negative.    Gastrointestinal: Negative.    Endocrine: Negative.    Genitourinary: Negative.    Musculoskeletal: Negative.    Skin: Negative.    Allergic/Immunologic: Negative.    Neurological: Negative.    Hematological: Negative.    All other systems reviewed and are negative.    Objective:     Vital Signs (Most Recent):  Temp: 98 °F (36.7 °C) (09/04/20 1101)  Pulse: 85 (09/04/20 1101)  Resp: (!) 22 (09/04/20 1104)  BP: 117/70 (09/04/20 1101)  SpO2: 97 % (09/04/20 1101) Vital Signs (24h Range):  Temp:  [97.3 °F (36.3 °C)-98.8 °F (37.1 °C)] 98 °F (36.7 °C)  Pulse:  [] 85  Resp:  [0-39] 22  SpO2:  [94 %-97 %] 97 %  BP: (101-149)/() 117/70     Weight: 61 kg (134 lb 7.7 oz)  Body mass index is 22.38 kg/m².    Intake/Output Summary (Last 24 hours) at 9/4/2020 1446  Last data filed at 9/4/2020 0804  Gross per 24 hour   Intake 240 ml   Output --   Net 240 ml      Physical Exam    Significant Labs:   BMP:   Recent Labs   Lab 09/03/20  0645 09/04/20  0337   GLU 89 102    136   K 4.9 4.6    101   CO2 24 23   BUN 46* 49*   CREATININE 7.0* 7.4*   CALCIUM 8.9 8.6*   MG 2.5  --      CBC:   Recent Labs   Lab 09/02/20  1452  09/03/20  0647 09/04/20  0336   WBC 6.02 4.13 5.89   HGB 9.5* 8.4* 8.6*   HCT 29.2* 26.4* 27.2*    134* 130*       Significant Imaging: I have reviewed and interpreted all pertinent imaging results/findings within the past 24 hours.      Assessment/Plan:      Coronary artery disease  For possible CABG      Hemodialysis status        DNR (do not resuscitate)         Symptomatic ascites with hyervolemia  Paracentesis       Chronic hepatitis C with cirrhosis  Chronic        ESRD 2/2 MPGN on HD since 12/16/13   dialysis          VTE Risk Mitigation (From admission, onward)         Ordered     enoxaparin injection 60 mg  Every 24 hours (non-standard times)      09/02/20 1750     IP VTE HIGH RISK PATIENT  Once      09/02/20 1750     Place sequential compression device  Until discontinued      09/02/20 1750                Discharge Planning   ADRIA:      Code Status: DNR   Is the patient medically ready for discharge?: No    Reason for patient still in hospital (select all that apply): Treatment  Discharge Plan A: Home with family                  Anthony Maya MD  Department of Hospital Medicine   [facility]

## 2020-09-04 NOTE — RESPIRATORY THERAPY
09/03/20 1921   PRE-TX-O2   O2 Device (Oxygen Therapy) room air   SpO2 95 %   Pulse Oximetry Type Continuous   $ Pulse Oximetry - Multiple Charge Pulse Oximetry - Multiple   Pulse 93   Resp 17   Respiratory Evaluation   $ Care Plan Tech Time 15 min

## 2020-09-04 NOTE — PROGRESS NOTES
" INPATIENT NEPHROLOGY CONSULT   [facility] NEPHROLOGY    Aurelia Saucedo  09/04/2020    Reason for consultation:    esrd    Chief Complaint:   Chief Complaint   Patient presents with    Chest Pain     Chronic ABD ascites, SOB           History of Present Illness:    Per H and P    Aurelia Saucedo is a 55 y.o. white female who  has a past medical history of Anemia of chronic renal failure, stage 5 (8/19/2015), Ascites, Bleeding duodenal ulcer, CAD (coronary artery disease), CAD (coronary artery disease), Chest pain, CHF (congestive heart failure), Chronic hepatitis C (8/19/2015), Colitis, Elevated troponin, ESRD 2/2 MPGN on HD since 12/16/13  (8/19/2015), Gastritis, Hypertension (8/19/2015), Mitral valve regurgitation, Renal dialysis status, and Secondary hyperparathyroidism of renal origin (8/19/2015).. The patient presented to [facility] on 9/2/2020 with a primary complaint of Chest Pain (Chronic ABD ascites, SOB )     Patient presented to the ER with complaint of chest pain which was worse yesterday, but currently she is "feeling miserable" with her abdominal distension and needs paracentesis.  Patient states she gets therapeutic paracentesis every 2 weeks and is due on Friday but she cannot wait as the pain is so severe.  Patient reports fullness in the epigastrium, unable to eat due to the fullness and pain.  Patient reports that she has known coronary artery disease and she required CABG and [facility] but due to her liver and kidney conditions, she was told she is not a candidate for CABG.  Patient is aware that on this hospital admission, her troponins are elevated.  Patient reports she is in a tough place when it comes to heart, liver and kidney.  Patient also reported that she had hemodialysis yesterday but that does not help with her ascites or lower extremity edema.        Plan of Care:       Assessment:    esrd  --continue dialysis per routine  (Tuesday and Saturday " hd)  --fluid restrict  --renal dose medication per routine  --continue outpt medication  --continue binders with meals    Anemia  --sue with hd    Myocardial infarction  --inoperable CAD.    --free of pain now  --cardiology consulted    Hyperphosphatemia  --at goal for ESRD patients    Ascites  --as per primary  --had incidents of hyperkalemia in the past so haven't used aldactone       Thank you for allowing us to participate in this patient's care. We will continue to follow.    Vital Signs:  Temp Readings from Last 3 Encounters:   09/04/20 98 °F (36.7 °C)   03/12/20 97.4 °F (36.3 °C)   01/22/20 97.9 °F (36.6 °C) (Oral)       Pulse Readings from Last 3 Encounters:   09/04/20 85   08/21/20 75   08/07/20 93       BP Readings from Last 3 Encounters:   09/04/20 117/70   08/21/20 (!) 156/99   08/07/20 133/80       Weight:  Wt Readings from Last 3 Encounters:   09/02/20 61 kg (134 lb 7.7 oz)   09/04/20 60.8 kg (134 lb)   03/12/20 59.6 kg (131 lb 6.3 oz)       Past Medical & Surgical History:  Past Medical History:   Diagnosis Date    Anemia of chronic renal failure, stage 5 8/19/2015    Ascites     Bleeding duodenal ulcer     CAD (coronary artery disease)     CAD (coronary artery disease)     Chest pain     CHF (congestive heart failure)     Chronic hepatitis C 8/19/2015    Colitis     Elevated troponin     ESRD 2/2 MPGN on HD since 12/16/13 8/19/2015    Gastritis     Hypertension 8/19/2015    Mitral valve regurgitation     Renal dialysis status     M, W, F    Secondary hyperparathyroidism of renal origin 8/19/2015       Past Surgical History:   Procedure Laterality Date    ANGIOGRAM, CORONARY, WITH LEFT HEART CATHETERIZATION Left 1/13/2020    Procedure: ANGIOGRAM,CORONARY,WITH LEFT HEART CATHETERIZATION;  Surgeon: Yahir Starkey MD;  Location: [facility] CATH/EP LAB;  Service: Cardiology;  Laterality: Left;    AV FISTULA PLACEMENT  2013    COLONOSCOPY      CORONARY ANGIOPLASTY WITH STENT PLACEMENT       SALPINGOOPHORECTOMY Right 1997    TOTAL ABDOMINAL HYSTERECTOMY         Past Social History:  Social History     Socioeconomic History    Marital status:      Spouse name: Not on file    Number of children: 2    Years of education: Not on file    Highest education level: Not on file   Occupational History    Occupation: baker    Social Needs    Financial resource strain: Not on file    Food insecurity     Worry: Not on file     Inability: Not on file    Transportation needs     Medical: Not on file     Non-medical: Not on file   Tobacco Use    Smoking status: Current Every Day Smoker     Packs/day: 0.75     Years: 30.00     Pack years: 22.50     Types: Cigarettes     Last attempt to quit: 2019     Years since quittin.7    Smokeless tobacco: Never Used   Substance and Sexual Activity    Alcohol use: No     Alcohol/week: 0.0 standard drinks    Drug use: Yes     Types: Marijuana     Comment: occasionally    Sexual activity: Not on file   Lifestyle    Physical activity     Days per week: Not on file     Minutes per session: Not on file    Stress: To some extent   Relationships    Social connections     Talks on phone: Not on file     Gets together: Not on file     Attends Anabaptist service: Not on file     Active member of club or organization: Not on file     Attends meetings of clubs or organizations: Not on file     Relationship status: Not on file   Other Topics Concern    Are you pregnant or think you may be? Not Asked    Breast-feeding Not Asked   Social History Narrative    Not on file       Medications:  No current facility-administered medications on file prior to encounter.      Current Outpatient Medications on File Prior to Encounter   Medication Sig Dispense Refill    aspirin (ECOTRIN) 81 MG EC tablet Take 81 mg by mouth once daily.      calcium acetate,phosphat bind, (PHOSLO) 667 mg capsule Take 667 mg by mouth 3 (three) times daily with meals.      FA-VIT B  "COMP&C-SELENIUM-ZINC 3-70-15 MG-MCG-MG ORAL TAB Take 1 tablet by mouth once daily.      HYDROcodone-acetaminophen (NORCO)  mg per tablet Take 1 tablet by mouth every 8 (eight) hours as needed for Pain. 60 tablet 0    lisinopril (PRINIVIL,ZESTRIL) 20 MG tablet Take 1 tablet by mouth nightly.   3    LOKELMA 5 gram PwPk Take 5 g by mouth. Five times a week; on  Non dialysis days      metoprolol succinate (TOPROL-XL) 50 MG 24 hr tablet Take 50 mg by mouth nightly.       pantoprazole (PROTONIX) 40 MG tablet Take 40 mg by mouth 2 (two) times daily.       traZODone (DESYREL) 50 MG tablet Take 1 tablet (50 mg total) by mouth every evening. 30 tablet 11     Scheduled Meds:   aspirin  81 mg Oral Daily    calcium acetate(phosphat bind)  667 mg Oral TID WM    chlorhexidine  15 mL Mouth/Throat BID    enoxparin  1 mg/kg Subcutaneous Q24H    isosorbide mononitrate  30 mg Oral Daily    lisinopriL  20 mg Oral Nightly    metoprolol succinate  50 mg Oral Nightly    mupirocin   Nasal BID    nitroGLYCERIN 2% TD oint  0.5 inch Topical (Top) Q6H    pantoprazole  40 mg Oral BID    rosuvastatin  10 mg Oral Daily    sodium zirconium cyclosilicate  5 g Oral Every other day    traZODone  50 mg Oral QHS     Continuous Infusions:  PRN Meds:.acetaminophen, HYDROcodone-acetaminophen, HYDROmorphone, melatonin, nitroGLYCERIN, ondansetron, polyethylene glycol, sodium chloride 0.9%    Allergies:  Ciprofloxacin, Levaquin [levofloxacin], Quinolones, and Codeine    Past Family History:  Reviewed; refer to Hospitalist Admission Note    Review of Systems:  Review of Systems - All 14 systems reviewed and negative, except as noted in HPI    Physical Exam:    /70   Pulse 85   Temp 98 °F (36.7 °C)   Resp (!) 22   Ht 5' 5" (1.651 m)   Wt 61 kg (134 lb 7.7 oz)   LMP 01/07/1999 (Exact Date)   SpO2 97%   BMI 22.38 kg/m²     General Appearance:    Alert, cooperative, no distress, appears stated age   Head:    Normocephalic, " without obvious abnormality, atraumatic   Eyes:    PER, conjunctiva/corneas clear, EOM's intact in both eyes        Throat:   Lips, mucosa, and tongue normal; teeth and gums normal   Back:     Symmetric, no curvature, ROM normal, no CVA tenderness   Lungs:     Clear to auscultation bilaterally, respirations unlabored   Chest wall:    No tenderness or deformity   Heart:    Regular rate and rhythm, S1 and S2 normal, no murmur, rub   or gallop   Abdomen:     Distended    Extremities:   Extremities normal, atraumatic, no cyanosis or edema   Pulses:   2+ and symmetric all extremities   MSK:   No joint or muscle swelling, tenderness or deformity   Skin:   Skin color, texture, turgor normal, no rashes or lesions   Neurologic:   CNII-XII intact, normal strength and sensation       Throughout.  No flap     Results:  Lab Results   Component Value Date     09/04/2020    K 4.6 09/04/2020     09/04/2020    CO2 23 09/04/2020    BUN 49 (H) 09/04/2020    CREATININE 7.4 (H) 09/04/2020    CALCIUM 8.6 (L) 09/04/2020    ANIONGAP 12 09/04/2020    ESTGFRAFRICA 6.6 (A) 09/04/2020    EGFRNONAA 5.7 (A) 09/04/2020       Lab Results   Component Value Date    CALCIUM 8.6 (L) 09/04/2020    PHOS 5.3 (H) 01/09/2020       Recent Labs   Lab 09/04/20  0336   WBC 5.89   RBC 2.76*   HGB 8.6*   HCT 27.2*   *   MCV 99*   MCH 31.2*   MCHC 31.6*          I have personally reviewed pertinent radiological imaging and reports.

## 2020-09-04 NOTE — SUBJECTIVE & OBJECTIVE
Interval History: CAD    Review of Systems   Constitutional: Negative.    HENT: Negative.    Eyes: Negative.    Respiratory: Negative.    Cardiovascular: Negative.    Gastrointestinal: Negative.    Endocrine: Negative.    Genitourinary: Negative.    Musculoskeletal: Negative.    Skin: Negative.    Allergic/Immunologic: Negative.    Neurological: Negative.    Hematological: Negative.    All other systems reviewed and are negative.    Objective:     Vital Signs (Most Recent):  Temp: 98 °F (36.7 °C) (09/04/20 1101)  Pulse: 85 (09/04/20 1101)  Resp: (!) 22 (09/04/20 1104)  BP: 117/70 (09/04/20 1101)  SpO2: 97 % (09/04/20 1101) Vital Signs (24h Range):  Temp:  [97.3 °F (36.3 °C)-98.8 °F (37.1 °C)] 98 °F (36.7 °C)  Pulse:  [] 85  Resp:  [0-39] 22  SpO2:  [94 %-97 %] 97 %  BP: (101-149)/() 117/70     Weight: 61 kg (134 lb 7.7 oz)  Body mass index is 22.38 kg/m².    Intake/Output Summary (Last 24 hours) at 9/4/2020 1446  Last data filed at 9/4/2020 0804  Gross per 24 hour   Intake 240 ml   Output --   Net 240 ml      Physical Exam    Significant Labs:   BMP:   Recent Labs   Lab 09/03/20  0645 09/04/20  0337   GLU 89 102    136   K 4.9 4.6    101   CO2 24 23   BUN 46* 49*   CREATININE 7.0* 7.4*   CALCIUM 8.9 8.6*   MG 2.5  --      CBC:   Recent Labs   Lab 09/02/20  1450 09/03/20  0647 09/04/20  0336   WBC 6.02 4.13 5.89   HGB 9.5* 8.4* 8.6*   HCT 29.2* 26.4* 27.2*    134* 130*       Significant Imaging: I have reviewed and interpreted all pertinent imaging results/findings within the past 24 hours.

## 2020-09-04 NOTE — PROGRESS NOTES
"CVT SURGERY PROGRESS NOTE  Aurelia Saucedo  55 y.o.  1965    Patient's Chief Complaint:  NSTEMI (non-ST elevated myocardial infarction)    Subjective:  Symptoms:  Stable.  No chest pain or weakness.    Diet:  Adequate intake.  No nausea or vomiting.    Activity level: Normal.    Pain:  She reports no pain.            HPI:  55 year old female with hx of Colitis, bleeding duodenal ulcer, CHF, Chronic hepatitis C with ascites and therapeutic paracentesis every 2 weeks, ESRD due to MPGN on HD since 2013, HTN, with known CAD who was previously seen for CABG by Dr Yeboah and main campus [facility] and deemed too high risk who presented to ED with complaints of chest pain and abdominal pain with distention. ED eval revealed elevated troponin and diffuse ST depression on EKG consistent with NSTEMI as well as significant ascites.    Last 24 hour Interval:                                                         Objective:  General Appearance:  In no acute distress and comfortable.    Vital signs: (most recent): Blood pressure 119/82, pulse 89, temperature 97.3 °F (36.3 °C), resp. rate (!) 24, height 5' 5" (1.651 m), weight 61 kg (134 lb 7.7 oz), last menstrual period 01/07/1999, SpO2 95 %.    Output: Producing urine.    Lungs:  Normal effort and normal respiratory rate.  Breath sounds clear to auscultation.  She is not in respiratory distress.    Heart: Normal rate.  Regular rhythm.  Positive for murmur.    Chest: Symmetric chest wall expansion.   Abdomen: Abdomen is soft and non-distended.  Bowel sounds are normal.   There is no abdominal tenderness.     Extremities: (Edema, chronic LE discoloration  )  Pulses: Distal pulses are intact.    Neurological: Patient is alert and oriented to person, place and time.    Skin:  Warm and dry.      Recent Vitals:  Vitals:    09/04/20 0300 09/04/20 0301 09/04/20 0800 09/04/20 0804   BP: 112/66  119/82    Pulse: 89  89    Resp: 12  (!) 25 (!) 24   Temp:  97.3 °F (36.3 °C)   "   [facility]:       SpO2: (!) 94%  95%    Weight:       Height:           INPATIENT MEDS     aspirin  81 mg Oral Daily    calcium acetate(phosphat bind)  667 mg Oral TID WM    chlorhexidine  15 mL Mouth/Throat BID    enoxparin  1 mg/kg Subcutaneous Q24H    lisinopriL  20 mg Oral Nightly    metoprolol succinate  50 mg Oral Nightly    mupirocin   Nasal BID    nitroGLYCERIN 2% TD oint  0.5 inch Topical (Top) Q6H    pantoprazole  40 mg Oral BID    rosuvastatin  10 mg Oral Daily    sodium zirconium cyclosilicate  5 g Oral Every other day    traZODone  50 mg Oral QHS     acetaminophen, HYDROcodone-acetaminophen, HYDROmorphone, melatonin, nitroGLYCERIN, ondansetron, polyethylene glycol, sodium chloride 0.9%  HEMODYNAMICS      Recent O2 Therapy/Vent Settings: RA    I/O last 24 hrs:  Intake/Output - Last 3 Shifts       09/02 0700 - 09/03 0659 09/03 0700 - 09/04 0659 09/04 0700 - 09/05 0659    P.O.  360     Total Intake(mL/kg)  360 (5.9)     Net  +360                Recent Cardiac Rhythm: SR    Recent Pain Assessment: 0    CBC  Recent Labs   Lab 09/02/20  1450 09/03/20  0647 09/04/20  0336   WBC 6.02 4.13 5.89   RBC 2.98* 2.70* 2.76*   HGB 9.5* 8.4* 8.6*    134* 130*   MCV 98 98 99*   MCH 31.9* 31.1* 31.2*   MCHC 32.5 31.8* 31.6*     BMP  Recent Labs   Lab 09/02/20  1450 09/03/20  0645 09/04/20  0337   CO2 24 24 23   BUN 42* 46* 49*   CREATININE 6.5* 7.0* 7.4*   CALCIUM 8.6* 8.9 8.6*     CARDIAC ENZYMES  Recent Labs   Lab 09/02/20  1731 09/02/20  2357 09/03/20  0645   TROPONINI 22.728* 18.881* 14.193*     BNP  Recent Labs   Lab 09/02/20  1450   BNP 3,213*  3,213*     PT/INR  INR   Date Value Ref Range Status   09/02/2020 1.1  Final     Comment:     Coumadin Therapy:  INR: 2.0-3.0 conventional anticoagulation  INR: 2.5-3.5 intensive anticoagulation     07/24/2020 1.0  Final     Comment:     Coumadin Therapy:  INR: 2.0-3.0 conventional anticoagulation  INR: 2.5-3.5 intensive anticoagulation     06/12/2020 1.0   Final     Comment:     Coumadin Therapy:  INR: 2.0-3.0 conventional anticoagulation  INR: 2.5-3.5 intensive anticoagulation     07/10/2019 1.0     05/14/2019 1.0     10/09/2016 1.0 0.8 - 1.2 Final     Comment:     Coumadin Therapy:  2.0 - 3.0 for INR for all indicators except mechanical heart valves  and antiphospholipid syndromes which should use 2.5 - 3.5.       ECG Results          EKG 12-lead (In process)  Result time 09/03/20 09:09:11    In process by Interface, Lab In [facility] (09/03/20 09:09:11)                 Narrative:    Test Reason : R07.9,    Vent. Rate : 090 BPM     Atrial Rate : 090 BPM     P-R Int : 134 ms          QRS Dur : 080 ms      QT Int : 378 ms       P-R-T Axes : 047 010 170 degrees     QTc Int : 462 ms    Normal sinus rhythm  Marked ST abnormality, possible lateral subendocardial injury  Abnormal ECG  When compared with ECG of 10-MARTÍN-2020 10:13,  ST now depressed in Lateral leads  T wave inversion now evident in Lateral leads    Referred By: [email or web address]   SELF           Confirmed By:                   In process by Interface, Lab In [facility] (09/02/20 15:19:52)                 Narrative:    Test Reason : R07.9,    Vent. Rate : 090 BPM     Atrial Rate : 090 BPM     P-R Int : 134 ms          QRS Dur : 080 ms      QT Int : 378 ms       P-R-T Axes : 047 010 170 degrees     QTc Int : 462 ms    Normal sinus rhythm  Marked ST abnormality, possible lateral subendocardial injury  Abnormal ECG  When compared with ECG of 02-SEP-2020 14:20,  No significant change was found    Referred By: [email or web address]   SELF           Confirmed By:                               CURRENT CONSULTS:  IP CONSULT TO HOSPITAL MEDICINE  IP CONSULT TO CARDIOLOGY  IP CONSULT TO CARDIOLOGY  IP CONSULT TO NEPHROLOGY  IP CONSULT TO CARDIOTHORACIC SURGERY    ASSESSMENT/PLAN:  NSTEMI  Multivessel CAD  ICMP  -Considering high risk CABG, further discuss once all preliminary test obtained  -Carotid US without hemodynamically significant  stenosis  -Echo pending  -Vein mapping performed  -Cardiology following  -Continue ASA, BB, statin    ESRD on HD  -Nephrology following and managing HD     Chronic Hep C with cirrhosis  Symptomatic ascites  -Therapeutic paracentesis every 2 weeks  -Paracentesis performed today with removal of 7.4L     Anemia  Thrombocytopenia  -Multifactorial  -Monitor H/H--low but stable  -Plts trending down,--monitor     HTN  -Controlled    Case and plan of care discussed with MD  GI Prophylaxis:PPI:proton pump inhibitor per orders  DVT Prophylaxis:  Anticoagulants   Medication Route Frequency    enoxaparin injection 60 mg Subcutaneous Q24H       Jyoti Luevano PA-C  Cardiovascular Thoracic Surgery  9/4/2020  8:18 AM

## 2020-09-05 NOTE — PLAN OF CARE
Tolerated dialysis today without difficulty received 2 units PRBCS and deisy well Appetite good  Offered pain med today pt refussed states didn't need

## 2020-09-05 NOTE — CONSULTS
"[facility]  Adult Nutrition   Consult Note (Initial Assessment)     SUMMARY     Recommendations/Interventions:    Recommendation/Intervention: 1. Continue current diet as tolerated, encourage intake. 2. Recommend adding a daily renal MVI to aid in wound healing. 3.  to assist in meal choices daily.  Goals: 1. Patient to meet at least 75% of estimated needs via PO intake of meals.  2. Renal MVI added.  Nutrition Goal Status: new    Dietitian Rounds Brief:  · Seen 2' consult/LOS. Patient admitted with NSTEMI. Possible CABG. This was a consideration last admit-however patient went home without procedure. Patient has a hx of poor PO intake, severe malnutrition, and multiple disease states. Patient does not like any of the supplements we provide. We tried to geting a supplement that she does like outside of the hospital, however patient was discharged last admit before we could get it. From previous experience-she will not drink any protein supplements we have available. Recommend renal MVI to provide wound healing. With severe malnutrition and multiple preexisting conditions, patient is at high risk for surgery. Will continue to monitor plan of care.  Reason for Assessment  Reason For Assessment: consult  Diagnosis: cardiac disease  Relevant Medical History: CHF, COPD, ESRD on PD, HTN, NSTEMI, mitral valve regurgitation, CAD    Nutrition Risk Screen  Nutrition Risk Screen: no indicators present    Nutrition/Diet History  Food Allergies: NKFA  Factors Affecting Nutritional Intake: decreased appetite    Anthropometrics  Temp: 97.9 °F (36.6 °C)  Height Method: Stated  Height: 5' 5" (165.1 cm)  Height (inches): 65 in  Weight Method: Stated  Weight: 61 kg (134 lb 7.7 oz)  Weight (lb): 134.48 lb  Ideal Body Weight (IBW), Female: 125 lb  % Ideal Body Weight, Female (lb): 107.58 %  BMI (Calculated): 22.4  BMI Grade: 18.5-24.9 - normal     Weight History:  Wt Readings from Last 10 Encounters:   09/02/20 61 " kg (134 lb 7.7 oz)   09/04/20 60.8 kg (134 lb)   03/12/20 59.6 kg (131 lb 6.3 oz)   01/22/20 56.7 kg (125 lb)   01/15/20 57.8 kg (127 lb 6.8 oz)   01/10/20 52.3 kg (115 lb 4.8 oz)   01/08/20 64.7 kg (142 lb 10.2 oz)   12/23/19 58.9 kg (129 lb 13.6 oz)   12/19/19 57.2 kg (126 lb)   12/19/19 57.2 kg (126 lb)     Lab/Procedures/Meds: Pertinent Labs Reviewed  Clinical Chemistry:  Recent Labs   Lab 09/02/20  1450 09/03/20  0645 09/05/20  0400   * 139 134*   K 4.3 4.9 5.0    103 99   CO2 24 24 23    89 99   BUN 42* 46* 56*   CREATININE 6.5* 7.0* 8.5*   CALCIUM 8.6* 8.9 8.4*   PROT 5.2*  --   --    ALBUMIN 2.4*  --   --    BILITOT 0.6  --   --    ALKPHOS 72  --   --    AST 52*  --   --    ALT 21  --   --    ANIONGAP 11 12 12   ESTGFRAFRICA 7.7* 7.1* 5.6*   EGFRNONAA 6.7* 6.1* 4.8*   MG 2.4 2.5  --     < > = values in this interval not displayed.     CBC:   Recent Labs   Lab 09/05/20  0400   WBC 3.91   RBC 2.16*   HGB 7.1*   HCT 21.5*   *   *   MCH 31.9*   MCHC 32.1     Lipid Panel:  Recent Labs   Lab 09/03/20  0645   CHOL 139   HDL 33*   LDLCALC 86.4   TRIG 98   CHOLHDL 23.7     Cardiac Profile:  Recent Labs   Lab 09/02/20  1450 09/02/20  1731 09/02/20  2356 09/02/20  2357 09/03/20  0645   BNP 3,213*  3,213*  --   --   --   --    CPKMB  --  46.3* 28.9*  --   --    TROPONINI 20.120* 22.728*  --  18.881* 14.193*     Medications: Pertinent Medications reviewed  Scheduled Meds:   sodium chloride 0.9%   Intravenous Once    aspirin  81 mg Oral Daily    calcium acetate(phosphat bind)  667 mg Oral TID WM    chlorhexidine  15 mL Mouth/Throat BID    enoxparin  1 mg/kg Subcutaneous Q24H    isosorbide mononitrate  30 mg Oral Daily    lisinopriL  20 mg Oral Nightly    metoprolol succinate  50 mg Oral Nightly    mupirocin   Nasal BID    nitroGLYCERIN 2% TD oint  0.5 inch Topical (Top) Q6H    pantoprazole  40 mg Oral BID    rosuvastatin  10 mg Oral Daily    sodium zirconium cyclosilicate  5  g Oral Every other day    traZODone  50 mg Oral QHS     Continuous Infusions:  PRN Meds:.sodium chloride 0.9%, acetaminophen, heparin (porcine), HYDROcodone-acetaminophen, HYDROmorphone, melatonin, nitroGLYCERIN, ondansetron, polyethylene glycol, sodium chloride 0.9%    Antibiotics (From admission, onward)    Start     Stop Route Frequency Ordered    09/03/20 0930  mupirocin 2 % ointment  (MRSA Decolonization Orders [facility])      09/08 0859 Nasl 2 times daily 09/03/20 0819        Estimated/Assessed Needs    Weight Used For Calorie Calculations: 60.8 kg (134 lb 0.6 oz)  Energy Calorie Requirements (kcal): 9530-4172 kcals/day (20-25 kcals/kg)  Energy Need Method: Kcal/kg  Protein Requirements:  g/day (1.3-1.7 g/kg 2' PD)  Weight Used For Protein Calculations: 60.8 kg (134 lb 0.6 oz)  Fluid Requirements (mL): UOP + 1000mL or per MD     Nutrition Prescription Ordered    Current Diet Order: Cardiac/Renal Diet    Evaluation of Received Nutrient/Fluid Intake    Energy Calories Required: not meeting needs  Protein Required: not meeting needs  Fluid Required: meeting needs  Tolerance: tolerating  % Intake of Estimated Energy Needs: 0 - 25 %  % Meal Intake: 0 - 25 %    Intake/Output Summary (Last 24 hours) at 9/5/2020 0645  Last data filed at 9/5/2020 0510  Gross per 24 hour   Intake 440 ml   Output --   Net 440 ml      Nutrition Risk    Level of Risk/Frequency of Follow-up: high   Monitor and Evaluation    Food and Nutrient Intake: energy intake, food and beverage intake  Food and Nutrient Adminstration: diet order  Physical Activity and Function: nutrition-related ADLs and IADLs, factors affecting access to physical activity  Anthropometric Measurements: weight, weight change, body mass index  Biochemical Data, Medical Tests and Procedures: gastrointestinal profile, glucose/endocrine profile, inflammatory profile, lipid profile, electrolyte and renal panel  Nutrition-Focused Physical Findings: overall appearance      Nutrition Follow-Up    RD Follow-up?: Yes  Alva Osborne, ELLEN 09/05/2020 10:10 AM

## 2020-09-05 NOTE — PROGRESS NOTES
" INPATIENT NEPHROLOGY CONSULT   [facility] NEPHROLOGY    Aurelia Saucedo  09/05/2020    Reason for consultation:    esrd    Chief Complaint:   Chief Complaint   Patient presents with    Chest Pain     Chronic ABD ascites, SOB           History of Present Illness:    Per H and P    Aurelia Saucedo is a 55 y.o. white female who  has a past medical history of Anemia of chronic renal failure, stage 5 (8/19/2015), Ascites, Bleeding duodenal ulcer, CAD (coronary artery disease), CAD (coronary artery disease), Chest pain, CHF (congestive heart failure), Chronic hepatitis C (8/19/2015), Colitis, Elevated troponin, ESRD 2/2 MPGN on HD since 12/16/13  (8/19/2015), Gastritis, Hypertension (8/19/2015), Mitral valve regurgitation, Renal dialysis status, and Secondary hyperparathyroidism of renal origin (8/19/2015).. The patient presented to [facility] on 9/2/2020 with a primary complaint of Chest Pain (Chronic ABD ascites, SOB )     Patient presented to the ER with complaint of chest pain which was worse yesterday, but currently she is "feeling miserable" with her abdominal distension and needs paracentesis.  Patient states she gets therapeutic paracentesis every 2 weeks and is due on Friday but she cannot wait as the pain is so severe.  Patient reports fullness in the epigastrium, unable to eat due to the fullness and pain.  Patient reports that she has known coronary artery disease and she required CABG and was referred to [facility] but due to her liver and kidney conditions, she was told she is not a candidate for CABG.  Patient is aware that on this hospital admission, her troponins are elevated.  Patient reports she is in a tough place when it comes to heart, liver and kidney.  Patient also reported that she had hemodialysis yesterday but that does not help with her ascites or lower extremity edema.    9/5  Seen on dialysis.  No nausea, chest pain, sob, fever, urinary or bowel complaint, new " neurologic symptoms, new joint pain,      Plan of Care:       Assessment:    esrd  --continue dialysis per routine  (Tuesday and Saturday hd)  --fluid restrict  --renal dose medication per routine  --continue outpt medication  --continue binders with meals    Anemia  --sue with hd  --transfuse 2 units pRBC    Myocardial infarction      --free of pain now  --cardiology consulted    Hyperphosphatemia  --at goal for ESRD patients    Ascites  --as per primary  --had incidents of hyperkalemia in the past so haven't used aldactone       Thank you for allowing us to participate in this patient's care. We will continue to follow.    Vital Signs:  Temp Readings from Last 3 Encounters:   09/05/20 (P) 97.7 °F (36.5 °C) ((P) Oral)   03/12/20 97.4 °F (36.3 °C)   01/22/20 97.9 °F (36.6 °C) (Oral)       Pulse Readings from Last 3 Encounters:   09/05/20 72   08/21/20 75   08/07/20 93       BP Readings from Last 3 Encounters:   09/05/20 (!) (P) 94/54   08/21/20 (!) 156/99   08/07/20 133/80       Weight:  Wt Readings from Last 3 Encounters:   09/02/20 61 kg (134 lb 7.7 oz)   09/04/20 60.8 kg (134 lb)   03/12/20 59.6 kg (131 lb 6.3 oz)       Past Medical & Surgical History:  Past Medical History:   Diagnosis Date    Anemia of chronic renal failure, stage 5 8/19/2015    Ascites     Bleeding duodenal ulcer     CAD (coronary artery disease)     CAD (coronary artery disease)     Chest pain     CHF (congestive heart failure)     Chronic hepatitis C 8/19/2015    Colitis     Elevated troponin     ESRD 2/2 MPGN on HD since 12/16/13 8/19/2015    Gastritis     Hypertension 8/19/2015    Mitral valve regurgitation     Renal dialysis status     M, W, F    Secondary hyperparathyroidism of renal origin 8/19/2015       Past Surgical History:   Procedure Laterality Date    ANGIOGRAM, CORONARY, WITH LEFT HEART CATHETERIZATION Left 1/13/2020    Procedure: ANGIOGRAM,CORONARY,WITH LEFT HEART CATHETERIZATION;  Surgeon: Yahir Starkey MD;   Location: [facility] CATH/EP LAB;  Service: Cardiology;  Laterality: Left;    AV FISTULA PLACEMENT      COLONOSCOPY      CORONARY ANGIOPLASTY WITH STENT PLACEMENT      SALPINGOOPHORECTOMY Right 1997    TOTAL ABDOMINAL HYSTERECTOMY         Past Social History:  Social History     Socioeconomic History    Marital status:      Spouse name: Not on file    Number of children: 2    Years of education: Not on file    Highest education level: Not on file   Occupational History    Occupation: baker    Social Needs    Financial resource strain: Not on file    Food insecurity     Worry: Not on file     Inability: Not on file    Transportation needs     Medical: Not on file     Non-medical: Not on file   Tobacco Use    Smoking status: Current Every Day Smoker     Packs/day: 0.75     Years: 30.00     Pack years: 22.50     Types: Cigarettes     Last attempt to quit: 2019     Years since quittin.7    Smokeless tobacco: Never Used   Substance and Sexual Activity    Alcohol use: No     Alcohol/week: 0.0 standard drinks    Drug use: Yes     Types: Marijuana     Comment: occasionally    Sexual activity: Not on file   Lifestyle    Physical activity     Days per week: Not on file     Minutes per session: Not on file    Stress: To some extent   Relationships    Social connections     Talks on phone: Not on file     Gets together: Not on file     Attends Restorationist service: Not on file     Active member of club or organization: Not on file     Attends meetings of clubs or organizations: Not on file     Relationship status: Not on file   Other Topics Concern    Are you pregnant or think you may be? Not Asked    Breast-feeding Not Asked   Social History Narrative    Not on file       Medications:  No current facility-administered medications on file prior to encounter.      Current Outpatient Medications on File Prior to Encounter   Medication Sig Dispense Refill    aspirin (ECOTRIN) 81 MG EC tablet  Take 81 mg by mouth once daily.      calcium acetate,phosphat bind, (PHOSLO) 667 mg capsule Take 667 mg by mouth 3 (three) times daily with meals.      FA-VIT B COMP&C-SELENIUM-ZINC 3-70-15 MG-MCG-MG ORAL TAB Take 1 tablet by mouth once daily.      HYDROcodone-acetaminophen (NORCO)  mg per tablet Take 1 tablet by mouth every 8 (eight) hours as needed for Pain. 60 tablet 0    lisinopril (PRINIVIL,ZESTRIL) 20 MG tablet Take 1 tablet by mouth nightly.   3    LOKELMA 5 gram PwPk Take 5 g by mouth. Five times a week; on  Non dialysis days      metoprolol succinate (TOPROL-XL) 50 MG 24 hr tablet Take 50 mg by mouth nightly.       pantoprazole (PROTONIX) 40 MG tablet Take 40 mg by mouth 2 (two) times daily.       traZODone (DESYREL) 50 MG tablet Take 1 tablet (50 mg total) by mouth every evening. 30 tablet 11     Scheduled Meds:   sodium chloride 0.9%   Intravenous Once    aspirin  81 mg Oral Daily    calcium acetate(phosphat bind)  667 mg Oral TID WM    chlorhexidine  15 mL Mouth/Throat BID    enoxparin  1 mg/kg Subcutaneous Q24H    isosorbide mononitrate  30 mg Oral Daily    lisinopriL  20 mg Oral Nightly    metoprolol succinate  50 mg Oral Nightly    mupirocin   Nasal BID    nitroGLYCERIN 2% TD oint  0.5 inch Topical (Top) Q6H    pantoprazole  40 mg Oral BID    rosuvastatin  10 mg Oral Daily    sodium zirconium cyclosilicate  5 g Oral Every other day    traZODone  50 mg Oral QHS     Continuous Infusions:  PRN Meds:.sodium chloride, sodium chloride 0.9%, acetaminophen, heparin (porcine), HYDROcodone-acetaminophen, HYDROmorphone, melatonin, nitroGLYCERIN, ondansetron, polyethylene glycol, sodium chloride 0.9%    Allergies:  Ciprofloxacin, Levaquin [levofloxacin], Quinolones, and Codeine    Past Family History:  Reviewed; refer to Hospitalist Admission Note    Review of Systems:  Review of Systems - All 14 systems reviewed and negative, except as noted in HPI    Physical Exam:    BP (!) (P)  "94/54   Pulse 72   Temp (P) 97.7 °F (36.5 °C) (Oral)   Resp 11   Ht 5' 5" (1.651 m)   Wt 61 kg (134 lb 7.7 oz)   LMP 01/07/1999 (Exact Date)   SpO2 100%   BMI 22.38 kg/m²     General Appearance:    Alert, cooperative, no distress, appears stated age   Head:    Normocephalic, without obvious abnormality, atraumatic   Eyes:    PER, conjunctiva/corneas clear, EOM's intact in both eyes        Throat:   Lips, mucosa, and tongue normal; teeth and gums normal   Back:     Symmetric, no curvature, ROM normal, no CVA tenderness   Lungs:     Clear to auscultation bilaterally, respirations unlabored   Chest wall:    No tenderness or deformity   Heart:    Regular rate and rhythm, S1 and S2 normal, no murmur, rub   or gallop   Abdomen:     Distended    Extremities:   Extremities normal, atraumatic, no cyanosis or edema   Pulses:   2+ and symmetric all extremities   MSK:   No joint or muscle swelling, tenderness or deformity   Skin:   Skin color, texture, turgor normal, no rashes or lesions   Neurologic:   CNII-XII intact, normal strength and sensation       Throughout.  No flap     Results:  Lab Results   Component Value Date     (L) 09/05/2020    K 5.0 09/05/2020    CL 99 09/05/2020    CO2 23 09/05/2020    BUN 56 (H) 09/05/2020    CREATININE 8.5 (H) 09/05/2020    CALCIUM 8.4 (L) 09/05/2020    ANIONGAP 12 09/05/2020    ESTGFRAFRICA 5.6 (A) 09/05/2020    EGFRNONAA 4.8 (A) 09/05/2020       Lab Results   Component Value Date    CALCIUM 8.4 (L) 09/05/2020    PHOS 5.3 (H) 01/09/2020       Recent Labs   Lab 09/05/20  0400   WBC 3.91   RBC 2.16*   HGB 7.1*   HCT 21.5*   *   *   MCH 31.9*   MCHC 32.1          I have personally reviewed pertinent radiological imaging and reports.       "

## 2020-09-05 NOTE — PROGRESS NOTES
Results of ultrasound saphenous vein mapping are as below:    Ultrasound vein mapping     CLINICAL DATA: Preop, coronary artery bypass     FINDINGS: Sonographic assessment for vein mapping purposes was  performed. Examination is somewhat limited by lower extremity edema.     On the right, the greater saphenous vein measures 5 mm in transverse  diameter just beyond its confluence with the common femoral vein, 2.4  cm deep to the skin surface. It narrows to approximately 2 mm in the  proximal thigh, 1.5 cm deep to the skin surface. In the mid thigh, it  measures approximately 1 mm in diameter, 1.6 cm deep to the skin  surface. At the knee, it measures 1 mm in diameter, 1.3 cm deep to the  skin surface. In the upper calf, it measures less than 1 mm in  diameter. In the lower calf, it is not well visualized.     On the left, the saphenous vein measures 2 mm in diameter at its  confluence with the common femoral vein, 1.5 cm deep to the skin  surface. In the upper thigh, it measures 2 mm, 1.2 cm deep to the skin  surface. At the mid thigh, it measures 2 mm, 1.3 cm deep to the skin  surface. At the knee, it measures 1 mm, 1.3 cm deep to the skin  surface. In the upper calf, it measures 1 mm, 3 mm deep to the skin  surface. In the mid calf, it is threadlike in appearance, measuring  less than 1 mm. At the ankle, it measures approximately 1 mm.     IMPRESSION:  1. Bilateral saphenous vein mapping as above.   Electronically Signed by Feliciano Ernst M.D. on 9/3/2020 6:55 PM      Impression:  Not very certain that saphenous vein is of appropriate diameter to provide quality vein graft material.    PLAN:  Will discuss with Dr. Yeboah.

## 2020-09-05 NOTE — SUBJECTIVE & OBJECTIVE
Interval History: severe CAD    Review of Systems   Constitutional: Positive for fatigue.   HENT: Negative.    Eyes: Negative.    Respiratory: Negative.    Cardiovascular: Negative.    Gastrointestinal: Negative.    Endocrine: Negative.    Genitourinary: Negative.    Musculoskeletal: Negative.    Skin: Negative.    Allergic/Immunologic: Negative.    Neurological: Negative.    Hematological: Negative.    All other systems reviewed and are negative.    Objective:     Vital Signs (Most Recent):  Temp: 97.6 °F (36.4 °C) (09/05/20 0701)  Pulse: 72 (09/05/20 0802)  Resp: 11 (09/05/20 0802)  BP: 100/67 (09/05/20 0802)  SpO2: 100 % (09/05/20 0802) Vital Signs (24h Range):  Temp:  [97.6 °F (36.4 °C)-98 °F (36.7 °C)] 97.6 °F (36.4 °C)  Pulse:  [72-88] 72  Resp:  [10-44] 11  SpO2:  [86 %-100 %] 100 %  BP: ()/(54-76) 100/67     Weight: 61 kg (134 lb 7.7 oz)  Body mass index is 22.38 kg/m².    Intake/Output Summary (Last 24 hours) at 9/5/2020 1020  Last data filed at 9/5/2020 0510  Gross per 24 hour   Intake 320 ml   Output --   Net 320 ml      Physical Exam  Vitals signs and nursing note reviewed.   Constitutional:       Appearance: She is well-developed.   HENT:      Head: Normocephalic and atraumatic.      Right Ear: External ear normal.      Left Ear: External ear normal.      Nose: Nose normal.   Eyes:      Conjunctiva/sclera: Conjunctivae normal.      Pupils: Pupils are equal, round, and reactive to light.   Neck:      Musculoskeletal: Normal range of motion and neck supple.   Cardiovascular:      Rate and Rhythm: Normal rate and regular rhythm.      Heart sounds: Murmur present.   Pulmonary:      Effort: Pulmonary effort is normal.      Breath sounds: Normal breath sounds.      Comments: Decreased entry bases without adveentitious  Abdominal:      General: Bowel sounds are normal.      Palpations: Abdomen is soft.   Musculoskeletal: Normal range of motion.   Skin:     General: Skin is warm and dry.      Capillary  Refill: Capillary refill takes less than 2 seconds.   Neurological:      Mental Status: She is alert and oriented to person, place, and time.   Psychiatric:         Behavior: Behavior normal.         Thought Content: Thought content normal.         Judgment: Judgment normal.         Significant Labs:   BMP:   Recent Labs   Lab 09/05/20  0400   GLU 99   *   K 5.0   CL 99   CO2 23   BUN 56*   CREATININE 8.5*   CALCIUM 8.4*     CBC:   Recent Labs   Lab 09/04/20  0336 09/05/20  0400   WBC 5.89 3.91   HGB 8.6* 7.1*   HCT 27.2* 21.5*   * 108*       Significant Imaging: I have reviewed and interpreted all pertinent imaging results/findings within the past 24 hours.

## 2020-09-05 NOTE — PROGRESS NOTES
"[facility]  Progress Note    Patient Name: Aurelia Saucedo  MRN: [id number]  Patient Class: IP- Inpatient   Admission Date: 9/2/2020  Length of Stay: 3 days  Attending Physician: Anthony Maya MD  Primary Care Provider: Dustin Ireland MD        Subjective:     Principal Problem:NSTEMI (non-ST elevated myocardial infarction)        HPI:  Aurelia Saucedo is a 55 y.o. white female who  has a past medical history of Anemia of chronic renal failure, stage 5 (8/19/2015), Ascites, Bleeding duodenal ulcer, CAD (coronary artery disease), CAD (coronary artery disease), Chest pain, CHF (congestive heart failure), Chronic hepatitis C (8/19/2015), Colitis, Elevated troponin, ESRD 2/2 MPGN on HD since 12/16/13  (8/19/2015), Gastritis, Hypertension (8/19/2015), Mitral valve regurgitation, Renal dialysis status, and Secondary hyperparathyroidism of renal origin (8/19/2015).. The patient presented to [facility] on 9/2/2020 with a primary complaint of Chest Pain (Chronic ABD ascites, SOB )     Patient presented to the ER with complaint of chest pain which was worse yesterday, but currently she is "feeling miserable" with her abdominal distension and needs paracentesis.  Patient states she gets therapeutic paracentesis every 2 weeks and is due on Friday but she cannot wait as the pain is so severe.  Patient reports fullness in the epigastrium, unable to eat due to the fullness and pain.  Patient reports that she has known coronary artery disease and she required CABG and was referred to [facility] but due to her liver and kidney conditions, she was told she is not a candidate for CABG.  Patient is aware that on this hospital admission, her troponins are elevated.  Patient reports she is in a tough place when it comes to heart, liver and kidney.  Patient also reported that she had hemodialysis yesterday but that does not help with her ascites or lower extremity " edema.    Overview/Hospital Course:  09/03  Assumed care. Chart reviewed. Labs reviewed: mild normocytic anemia; normal electroytes with end stage renal function. Troponin elevated. CXR: NAPD. EKG: sinus with lateral ST depression an Q in III. No further chest pain. Has discomfort from ascites. Is scheduled for paracentesis in AM. Discussed with Cardiology: hold [facility] today    09/04  Consultants' attendance noted and appreciated. Had venous mapping, carotid u/s and ECHO today. No further CP since admission. Labs reviewed: stable anemia of renal disease; electrolytes normal with end-stage renal function. Telemetry reviewed: sinus    09/05  Discussed with Cardiology: may need valve in addition to CABG. Labs reviewed:H/H has fallen, no leukocytosis Discussed with Nephrology: transfuse 2 units pRBC at dialysis today. Telemetry reviewed: sinus. No further CP since admission. No SOB/orthopnea/PND    Interval History: severe CAD    Review of Systems   Constitutional: Positive for fatigue.   HENT: Negative.    Eyes: Negative.    Respiratory: Negative.    Cardiovascular: Negative.    Gastrointestinal: Negative.    Endocrine: Negative.    Genitourinary: Negative.    Musculoskeletal: Negative.    Skin: Negative.    Allergic/Immunologic: Negative.    Neurological: Negative.    Hematological: Negative.    All other systems reviewed and are negative.    Objective:     Vital Signs (Most Recent):  Temp: 97.6 °F (36.4 °C) (09/05/20 0701)  Pulse: 72 (09/05/20 0802)  Resp: 11 (09/05/20 0802)  BP: 100/67 (09/05/20 0802)  SpO2: 100 % (09/05/20 0802) Vital Signs (24h Range):  Temp:  [97.6 °F (36.4 °C)-98 °F (36.7 °C)] 97.6 °F (36.4 °C)  Pulse:  [72-88] 72  Resp:  [10-44] 11  SpO2:  [86 %-100 %] 100 %  BP: ()/(54-76) 100/67     Weight: 61 kg (134 lb 7.7 oz)  Body mass index is 22.38 kg/m².    Intake/Output Summary (Last 24 hours) at 9/5/2020 1020  Last data filed at 9/5/2020 0510  Gross per 24 hour   Intake 320 ml   Output --   Net  320 ml      Physical Exam  Vitals signs and nursing note reviewed.   Constitutional:       Appearance: She is well-developed.   HENT:      Head: Normocephalic and atraumatic.      Right Ear: External ear normal.      Left Ear: External ear normal.      Nose: Nose normal.   Eyes:      Conjunctiva/sclera: Conjunctivae normal.      Pupils: Pupils are equal, round, and reactive to light.   Neck:      Musculoskeletal: Normal range of motion and neck supple.   Cardiovascular:      Rate and Rhythm: Normal rate and regular rhythm.      Heart sounds: Murmur present.   Pulmonary:      Effort: Pulmonary effort is normal.      Breath sounds: Normal breath sounds.      Comments: Decreased entry bases without adveentitious  Abdominal:      General: Bowel sounds are normal.      Palpations: Abdomen is soft.   Musculoskeletal: Normal range of motion.   Skin:     General: Skin is warm and dry.      Capillary Refill: Capillary refill takes less than 2 seconds.   Neurological:      Mental Status: She is alert and oriented to person, place, and time.   Psychiatric:         Behavior: Behavior normal.         Thought Content: Thought content normal.         Judgment: Judgment normal.         Significant Labs:   BMP:   Recent Labs   Lab 09/05/20  0400   GLU 99   *   K 5.0   CL 99   CO2 23   BUN 56*   CREATININE 8.5*   CALCIUM 8.4*     CBC:   Recent Labs   Lab 09/04/20  0336 09/05/20  0400   WBC 5.89 3.91   HGB 8.6* 7.1*   HCT 27.2* 21.5*   * 108*       Significant Imaging: I have reviewed and interpreted all pertinent imaging results/findings within the past 24 hours.      Assessment/Plan:      * NSTEMI (non-ST elevated myocardial infarction)  For possible CABG; transfuse 2 units pRBC at dialysis today      Coronary artery disease  For possible CABG      Hemodialysis status        DNR (do not resuscitate)        History of abdominal paracentesis Q 2 wks          Symptomatic ascites with hyervolemia  Paracentesis       Chronic  hepatitis C with cirrhosis  Chronic        ESRD 2/2 MPGN on HD since 12/16/13   dialysis          VTE Risk Mitigation (From admission, onward)         Ordered     heparin (porcine) injection 5,000 Units  As needed (PRN)      09/04/20 1933     enoxaparin injection 60 mg  Every 24 hours (non-standard times)      09/02/20 1750     IP VTE HIGH RISK PATIENT  Once      09/02/20 1750     Place sequential compression device  Until discontinued      09/02/20 1750                Discharge Planning   ADRIA:      Code Status: DNR   Is the patient medically ready for discharge?: No    Reason for patient still in hospital (select all that apply): Patient trending condition, Laboratory test, Treatment and Consult recommendations  Discharge Plan A: Home with family                  Anthony Maya MD  Department of Hospital Medicine   [facility]

## 2020-09-05 NOTE — PROGRESS NOTES
[facility]    Cardiology Progress Note    Subjective:  Patient is generally status clear very mild chest pain.  Awaiting dialysis today the heart rate blood pressure under control      Objective:  Vital Signs (Most Recent)  Temp: 97.6 °F (36.4 °C) (09/05/20 0701)  Pulse: 72 (09/05/20 0802)  Resp: 11 (09/05/20 0802)  BP: 100/67 (09/05/20 0802)  SpO2: 100 % (09/05/20 0802)    Vital Signs Range (Last 24H):  Temp:  [97.6 °F (36.4 °C)-98 °F (36.7 °C)]   Pulse:  [72-88]   Resp:  [10-44]   BP: ()/(54-76)   SpO2:  [86 %-100 %]     I & O (Last 24H):    Intake/Output Summary (Last 24 hours) at 9/5/2020 0916  Last data filed at 9/5/2020 0510  Gross per 24 hour   Intake 320 ml   Output --   Net 320 ml       Current Diet:     Current Diet Order   Procedures    Diet Cardiac     Renal cardiac diet        Allergies:  Review of patient's allergies indicates:   Allergen Reactions    Ciprofloxacin Itching    Levaquin [levofloxacin] Itching    Quinolones Itching    Codeine Nausea Only       Meds:  Scheduled Meds:   sodium chloride 0.9%   Intravenous Once    aspirin  81 mg Oral Daily    calcium acetate(phosphat bind)  667 mg Oral TID WM    chlorhexidine  15 mL Mouth/Throat BID    enoxparin  1 mg/kg Subcutaneous Q24H    isosorbide mononitrate  30 mg Oral Daily    lisinopriL  20 mg Oral Nightly    metoprolol succinate  50 mg Oral Nightly    mupirocin   Nasal BID    nitroGLYCERIN 2% TD oint  0.5 inch Topical (Top) Q6H    pantoprazole  40 mg Oral BID    rosuvastatin  10 mg Oral Daily    sodium zirconium cyclosilicate  5 g Oral Every other day    traZODone  50 mg Oral QHS     Continuous Infusions:  PRN Meds:sodium chloride 0.9%, acetaminophen, heparin (porcine), HYDROcodone-acetaminophen, HYDROmorphone, melatonin, nitroGLYCERIN, ondansetron, polyethylene glycol, sodium chloride 0.9%    Lab Results :  Recent Results (from the past 24 hour(s))   Echo Color Flow Doppler? Yes    Collection Time: 09/04/20   9:29 AM   Result Value Ref Range    BSA 1.67 m2    TDI SEPTAL 0.06 m/s    LV LATERAL E/E' RATIO 16.89 m/s    LV SEPTAL E/E' RATIO 25.33 m/s    Right Atrial Pressure (from IVC) 15 mmHg    AORTIC VALVE CUSP SEPERATION 1.50 cm    TDI LATERAL 0.09 m/s    PV PEAK VELOCITY 206.96 cm/s    LVIDD 4.60 3.5 - 6.0 cm    IVS 1.07 0.6 - 1.1 cm    PW 1.07 0.6 - 1.1 cm    Ao root annulus 3.23 cm    LVIDS 3.16 2.1 - 4.0 cm    FS 31 28 - 44 %    LV mass 174.36 g    LA size 4.70 cm    RVDD 261.00 cm    Left Ventricle Relative Wall Thickness 0.47 cm    AV mean gradient 18 mmHg    AV valve area 1.35 cm2    AV Velocity Ratio 36.98     AV index (prosthetic) 0.42     E/A ratio 1.09     Mean e' 0.08 m/s    E wave decelartion time 166.30 msec    IVRT 78.73 msec    LVOT diameter 2.03 cm    LVOT area 3.2 cm2    LVOT peak david 102.06 m/s    LVOT peak VTI 22.37 cm    Ao peak david 2.76 m/s    Ao VTI 53.54 cm    LVOT stroke volume 72.36 cm3    AV peak gradient 30 mmHg    TV rest pulmonary artery pressure 60 mmHg    E/E' ratio 20.27 m/s    MV Peak E David 1.52 m/s    TR Max David 3.34 m/s    MV Peak A David 1.39 m/s    LV Systolic Volume 26.60 mL    LV Systolic Volume Index 15.9 mL/m2    LV Diastolic Volume 58.00 mL    LV Diastolic Volume Index 34.76 mL/m2    LV Mass Index 104 g/m2    Triscuspid Valve Regurgitation Peak Gradient 45 mmHg   Basic metabolic panel    Collection Time: 09/05/20  4:00 AM   Result Value Ref Range    Sodium 134 (L) 136 - 145 mmol/L    Potassium 5.0 3.5 - 5.1 mmol/L    Chloride 99 95 - 110 mmol/L    CO2 23 23 - 29 mmol/L    Glucose 99 70 - 110 mg/dL    BUN, Bld 56 (H) 6 - 20 mg/dL    Creatinine 8.5 (H) 0.5 - 1.4 mg/dL    Calcium 8.4 (L) 8.7 - 10.5 mg/dL    Anion Gap 12 8 - 16 mmol/L    eGFR if African American 5.6 (A) >60 mL/min/1.73 m^2    eGFR if non  4.8 (A) >60 mL/min/1.73 m^2   CBC Without Differential    Collection Time: 09/05/20  4:00 AM   Result Value Ref Range    WBC 3.91 3.90 - 12.70 K/uL    RBC 2.16 (L)  "4.00 - 5.40 M/uL    Hemoglobin 7.1 (L) 12.0 - 16.0 g/dL    Hematocrit 21.5 (L) 37.0 - 48.5 %    Mean Corpuscular Volume 100 (H) 82 - 98 fL    Mean Corpuscular Hemoglobin 31.9 (H) 27.0 - 31.0 pg    Mean Corpuscular Hemoglobin Conc 32.1 32.0 - 36.0 g/dL    RDW 14.3 11.5 - 14.5 %    Platelets 108 (L) 150 - 350 K/uL    MPV 10.3 9.2 - 12.9 fL       Diagnostic Results:  Imaging Results          X-Ray Chest AP Portable (Final result)  Result time 09/02/20 15:04:11    Final result by Harley Jimenez MD (09/02/20 15:04:11)                 Narrative:    XR CHEST AP PORTABLE    CLINICAL HISTORY:  55 years Female chest pain    COMPARISON: January 7, 2020    FINDINGS: Cardiac silhouette size is stable from prior.  Atherosclerotic calcification of the aorta. No confluent airspace  disease. Minimal linear atelectasis or scarring within the right  midlung. Minor costophrenic angle blunting bilaterally could reflect  trace pleural fluid or chronic pleural thickening. No pneumothorax. No  acute osseous abnormality. Upper abdomen is unremarkable.    IMPRESSION:    Minimal pleural fluid versus chronic pleural thickening.    No acute pulmonary process.    Aortic atherosclerosis.    Electronically Signed by Harley SEO on 9/2/2020 3:09 PM                              Recent Cardiac Rhythm   (if applicable)      Physical Exam:  Objective:  General Appearance:  Comfortable.    Vital signs: (most recent): Blood pressure 100/67, pulse 72, temperature 97.6 °F (36.4 °C), temperature source Oral, resp. rate 11, height 5' 5" (1.651 m), weight 61 kg (134 lb 7.7 oz), last menstrual period 01/07/1999, SpO2 100 %.    Lungs:  Breath sounds clear to auscultation.    Heart: Normal rate.  Regular rhythm.  S1 normal and S2 normal.  Positive for murmur.    Abdomen: Abdomen is soft.  Bowel sounds are normal.   There is no abdominal tenderness.     Extremities: There is no local swelling.        Current Consults:  IP CONSULT TO HOSPITAL " MEDICINE  IP CONSULT TO CARDIOLOGY  IP CONSULT TO CARDIOLOGY  IP CONSULT TO NEPHROLOGY  IP CONSULT TO CARDIOTHORACIC SURGERY  IP CONSULT TO REGISTERED DIETITIAN/NUTRITIONIST    Assessment/Plan:  Assessment:   NSTEMI  Severe CAD, last [facility] was January  Cardiomyopathy  Liver cirrhosis  Hep C  ESRD  Valvular heart disease moderate to severe aortic stenosis, moderate mitral regurgitation  Pulmonary hypertension PA pressure 60  Plan:   For dialysis today, await full recommendations by surgery

## 2020-09-06 NOTE — NURSING TRANSFER
Nursing Transfer Note      9/6/2020     Transfer {to room 1223 from room 2202    Transfer via wheelchair     Transfer with tele monitor    Transported by Anshul Hurley RN    Medicines sent Yes    Chart send with patient: Yes    Notified:MD    Patient reassessed at:13:35    Upon arrival to floor:Patient was assisted to the bed.

## 2020-09-06 NOTE — PROGRESS NOTES
" INPATIENT NEPHROLOGY CONSULT   [facility] NEPHROLOGY    Aurelia Saucedo  09/06/2020    Reason for consultation:    esrd    Chief Complaint:   Chief Complaint   Patient presents with    Chest Pain     Chronic ABD ascites, SOB           History of Present Illness:    Per H and P    Aurelia Saucedo is a 55 y.o. white female who  has a past medical history of Anemia of chronic renal failure, stage 5 (8/19/2015), Ascites, Bleeding duodenal ulcer, CAD (coronary artery disease), CAD (coronary artery disease), Chest pain, CHF (congestive heart failure), Chronic hepatitis C (8/19/2015), Colitis, Elevated troponin, ESRD 2/2 MPGN on HD since 12/16/13  (8/19/2015), Gastritis, Hypertension (8/19/2015), Mitral valve regurgitation, Renal dialysis status, and Secondary hyperparathyroidism of renal origin (8/19/2015).. The patient presented to [facility] on 9/2/2020 with a primary complaint of Chest Pain (Chronic ABD ascites, SOB )     Patient presented to the ER with complaint of chest pain which was worse yesterday, but currently she is "feeling miserable" with her abdominal distension and needs paracentesis.  Patient states she gets therapeutic paracentesis every 2 weeks and is due on Friday but she cannot wait as the pain is so severe.  Patient reports fullness in the epigastrium, unable to eat due to the fullness and pain.  Patient reports that she has known coronary artery disease and she required CABG and was [facility] but due to her liver and kidney conditions, she was told she is not a candidate for CABG.  Patient is aware that on this hospital admission, her troponins are elevated.  Patient reports she is in a tough place when it comes to heart, liver and kidney.  Patient also reported that she had hemodialysis yesterday but that does not help with her ascites or lower extremity edema.    9/5  Seen on dialysis.  No nausea, chest pain, sob, fever, urinary or bowel complaint, new " neurologic symptoms, new joint pain,  9/6  No distress.  Intermittent hypotension    Plan of Care:       Assessment:    esrd  --continue dialysis per routine  (Tuesday and Saturday hd)  --fluid restrict  --renal dose medication per routine  --continue outpt medication  --continue binders with meals    Anemia  --sue with hd  --transfuse 2 units pRBC    Myocardial infarction      --free of pain now  --cardiology consulted    Hyperphosphatemia  --at goal for ESRD patients    Ascites  --as per primary  --had incidents of hyperkalemia in the past so haven't used aldactone       Thank you for allowing us to participate in this patient's care. We will continue to follow.    Vital Signs:  Temp Readings from Last 3 Encounters:   09/06/20 98.1 °F (36.7 °C) (Oral)   03/12/20 97.4 °F (36.3 °C)   01/22/20 97.9 °F (36.6 °C) (Oral)       Pulse Readings from Last 3 Encounters:   09/06/20 78   08/21/20 75   08/07/20 93       BP Readings from Last 3 Encounters:   09/06/20 108/68   08/21/20 (!) 156/99   08/07/20 133/80       Weight:  Wt Readings from Last 3 Encounters:   09/02/20 61 kg (134 lb 7.7 oz)   09/04/20 60.8 kg (134 lb)   03/12/20 59.6 kg (131 lb 6.3 oz)       Past Medical & Surgical History:  Past Medical History:   Diagnosis Date    Anemia of chronic renal failure, stage 5 8/19/2015    Ascites     Bleeding duodenal ulcer     CAD (coronary artery disease)     CAD (coronary artery disease)     Chest pain     CHF (congestive heart failure)     Chronic hepatitis C 8/19/2015    Colitis     Elevated troponin     ESRD 2/2 MPGN on HD since 12/16/13 8/19/2015    Gastritis     Hypertension 8/19/2015    Mitral valve regurgitation     Renal dialysis status     M, W, F    Secondary hyperparathyroidism of renal origin 8/19/2015       Past Surgical History:   Procedure Laterality Date    ANGIOGRAM, CORONARY, WITH LEFT HEART CATHETERIZATION Left 1/13/2020    Procedure: ANGIOGRAM,CORONARY,WITH LEFT HEART CATHETERIZATION;   Surgeon: Yahir Starkey MD;  Location: [facility] CATH/EP LAB;  Service: Cardiology;  Laterality: Left;    AV FISTULA PLACEMENT      COLONOSCOPY      CORONARY ANGIOPLASTY WITH STENT PLACEMENT      SALPINGOOPHORECTOMY Right 1997    TOTAL ABDOMINAL HYSTERECTOMY         Past Social History:  Social History     Socioeconomic History    Marital status:      Spouse name: Not on file    Number of children: 2    Years of education: Not on file    Highest education level: Not on file   Occupational History    Occupation: baker    Social Needs    Financial resource strain: Not on file    Food insecurity     Worry: Not on file     Inability: Not on file    Transportation needs     Medical: Not on file     Non-medical: Not on file   Tobacco Use    Smoking status: Current Every Day Smoker     Packs/day: 0.75     Years: 30.00     Pack years: 22.50     Types: Cigarettes     Last attempt to quit: 2019     Years since quittin.7    Smokeless tobacco: Never Used   Substance and Sexual Activity    Alcohol use: No     Alcohol/week: 0.0 standard drinks    Drug use: Yes     Types: Marijuana     Comment: occasionally    Sexual activity: Not on file   Lifestyle    Physical activity     Days per week: Not on file     Minutes per session: Not on file    Stress: To some extent   Relationships    Social connections     Talks on phone: Not on file     Gets together: Not on file     Attends Restorationism service: Not on file     Active member of club or organization: Not on file     Attends meetings of clubs or organizations: Not on file     Relationship status: Not on file   Other Topics Concern    Are you pregnant or think you may be? Not Asked    Breast-feeding Not Asked   Social History Narrative    Not on file       Medications:  No current facility-administered medications on file prior to encounter.      Current Outpatient Medications on File Prior to Encounter   Medication Sig Dispense Refill     aspirin (ECOTRIN) 81 MG EC tablet Take 81 mg by mouth once daily.      calcium acetate,phosphat bind, (PHOSLO) 667 mg capsule Take 667 mg by mouth 3 (three) times daily with meals.      FA-VIT B COMP&C-SELENIUM-ZINC 3-70-15 MG-MCG-MG ORAL TAB Take 1 tablet by mouth once daily.      HYDROcodone-acetaminophen (NORCO)  mg per tablet Take 1 tablet by mouth every 8 (eight) hours as needed for Pain. 60 tablet 0    lisinopril (PRINIVIL,ZESTRIL) 20 MG tablet Take 1 tablet by mouth nightly.   3    LOKELMA 5 gram PwPk Take 5 g by mouth. Five times a week; on  Non dialysis days      metoprolol succinate (TOPROL-XL) 50 MG 24 hr tablet Take 50 mg by mouth nightly.       pantoprazole (PROTONIX) 40 MG tablet Take 40 mg by mouth 2 (two) times daily.       traZODone (DESYREL) 50 MG tablet Take 1 tablet (50 mg total) by mouth every evening. 30 tablet 11     Scheduled Meds:   sodium chloride 0.9%   Intravenous Once    aspirin  81 mg Oral Daily    calcium acetate(phosphat bind)  667 mg Oral TID WM    chlorhexidine  15 mL Mouth/Throat BID    enoxparin  1 mg/kg Subcutaneous Q24H    isosorbide mononitrate  30 mg Oral Daily    lisinopriL  20 mg Oral Nightly    metoprolol succinate  50 mg Oral Nightly    mupirocin   Nasal BID    pantoprazole  40 mg Oral BID    rosuvastatin  10 mg Oral Daily    sodium zirconium cyclosilicate  5 g Oral Every other day    traZODone  50 mg Oral QHS     Continuous Infusions:  PRN Meds:.sodium chloride, sodium chloride 0.9%, acetaminophen, heparin (porcine), HYDROcodone-acetaminophen, HYDROmorphone, melatonin, nitroGLYCERIN, ondansetron, polyethylene glycol, sodium chloride 0.9%    Allergies:  Ciprofloxacin, Levaquin [levofloxacin], Quinolones, and Codeine    Past Family History:  Reviewed; refer to Hospitalist Admission Note    Review of Systems:  Review of Systems - All 14 systems reviewed and negative, except as noted in HPI    Physical Exam:    /68   Pulse 78   Temp 98.1 °F  "(36.7 °C) (Oral)   Resp 16   Ht 5' 5" (1.651 m)   Wt 61 kg (134 lb 7.7 oz)   LMP 01/07/1999 (Exact Date)   SpO2 99%   BMI 22.38 kg/m²     General Appearance:    Alert, cooperative, no distress, appears stated age   Head:    Normocephalic, without obvious abnormality, atraumatic   Eyes:    PER, conjunctiva/corneas clear, EOM's intact in both eyes        Throat:   Lips, mucosa, and tongue normal; teeth and gums normal   Back:     Symmetric, no curvature, ROM normal, no CVA tenderness   Lungs:     Clear to auscultation bilaterally, respirations unlabored   Chest wall:    No tenderness or deformity   Heart:    Regular rate and rhythm, S1 and S2 normal, no murmur, rub   or gallop   Abdomen:     Distended    Extremities:   Extremities normal, atraumatic, no cyanosis or edema   Pulses:   2+ and symmetric all extremities   MSK:   No joint or muscle swelling, tenderness or deformity   Skin:   Skin color, texture, turgor normal, no rashes or lesions   Neurologic:   CNII-XII intact, normal strength and sensation       Throughout.  No flap     Results:  Lab Results   Component Value Date     (L) 09/06/2020    K 4.7 09/06/2020     09/06/2020    CO2 23 09/06/2020    BUN 32 (H) 09/06/2020    CREATININE 5.4 (H) 09/06/2020    CALCIUM 8.2 (L) 09/06/2020    ANIONGAP 10 09/06/2020    ESTGFRAFRICA 9.7 (A) 09/06/2020    EGFRNONAA 8.4 (A) 09/06/2020       Lab Results   Component Value Date    CALCIUM 8.2 (L) 09/06/2020    PHOS 5.3 (H) 01/09/2020       Recent Labs   Lab 09/06/20  0400   WBC 4.05   RBC 3.16*   HGB 9.9*   HCT 29.5*   *   MCV 93   MCH 31.3*   MCHC 33.6          I have personally reviewed pertinent radiological imaging and reports.       "

## 2020-09-06 NOTE — PROGRESS NOTES
[facility]    Cardiology Progress Note    Subjective:  Patient seen and examined this am. No complaints. Denies CP. Sinus rhythm on telemetry. Had dialysis yesterday, received 2 units PRBCs. Reviewed labs this am, H/H stable.   Awaiting CTS recommendations.       Objective:  Vital Signs (Most Recent)  Temp: 98.1 °F (36.7 °C) (09/06/20 0730)  Pulse: 78 (09/06/20 0900)  Resp: 16 (09/06/20 0900)  BP: 108/68 (09/06/20 0900)  SpO2: 99 % (09/06/20 0900)    Vital Signs Range (Last 24H):  Temp:  [97 °F (36.1 °C)-98.1 °F (36.7 °C)]   Pulse:  [69-89]   Resp:  [14-24]   BP: ()/(51-72)   SpO2:  [94 %-100 %]     I & O (Last 24H):    Intake/Output Summary (Last 24 hours) at 9/6/2020 0959  Last data filed at 9/5/2020 1800  Gross per 24 hour   Intake 1700 ml   Output --   Net 1700 ml       Current Diet:     Current Diet Order   Procedures    Diet Cardiac     Renal cardiac diet        Allergies:  Review of patient's allergies indicates:   Allergen Reactions    Ciprofloxacin Itching    Levaquin [levofloxacin] Itching    Quinolones Itching    Codeine Nausea Only       Meds:  Scheduled Meds:   sodium chloride 0.9%   Intravenous Once    aspirin  81 mg Oral Daily    calcium acetate(phosphat bind)  667 mg Oral TID WM    chlorhexidine  15 mL Mouth/Throat BID    enoxparin  1 mg/kg Subcutaneous Q24H    isosorbide mononitrate  30 mg Oral Daily    lisinopriL  20 mg Oral Nightly    metoprolol succinate  50 mg Oral Nightly    mupirocin   Nasal BID    pantoprazole  40 mg Oral BID    rosuvastatin  10 mg Oral Daily    sodium zirconium cyclosilicate  5 g Oral Every other day    traZODone  50 mg Oral QHS     Continuous Infusions:  PRN Meds:sodium chloride, sodium chloride 0.9%, acetaminophen, heparin (porcine), HYDROcodone-acetaminophen, HYDROmorphone, melatonin, nitroGLYCERIN, ondansetron, polyethylene glycol, sodium chloride 0.9%    Lab Results :  Recent Results (from the past 24 hour(s))   Type & Screen     Collection Time: 09/05/20 10:23 AM   Result Value Ref Range    Group & Rh A POS     Indirect Promise NEG    Prepare RBC    Collection Time: 09/05/20 10:23 AM   Result Value Ref Range    UNIT NUMBER [id number]     Product Code S6180C15     DISPENSE STATUS TRANSFUSED     CODING SYSTEM VIGS014     Unit Blood Type Code 6200     Unit Blood Type A POS     Unit Expiration [id number]     UNIT NUMBER [id number]     Product Code [id number]     DISPENSE STATUS TRANSFUSED     CODING SYSTEM NOJC096     Unit Blood Type Code 6200     Unit Blood Type A POS     Unit Expiration [id number]    Basic metabolic panel    Collection Time: 09/06/20  4:00 AM   Result Value Ref Range    Sodium 134 (L) 136 - 145 mmol/L    Potassium 4.7 3.5 - 5.1 mmol/L    Chloride 101 95 - 110 mmol/L    CO2 23 23 - 29 mmol/L    Glucose 94 70 - 110 mg/dL    BUN, Bld 32 (H) 6 - 20 mg/dL    Creatinine 5.4 (H) 0.5 - 1.4 mg/dL    Calcium 8.2 (L) 8.7 - 10.5 mg/dL    Anion Gap 10 8 - 16 mmol/L    eGFR if African American 9.7 (A) >60 mL/min/1.73 m^2    eGFR if non  8.4 (A) >60 mL/min/1.73 m^2   CBC Without Differential    Collection Time: 09/06/20  4:00 AM   Result Value Ref Range    WBC 4.05 3.90 - 12.70 K/uL    RBC 3.16 (L) 4.00 - 5.40 M/uL    Hemoglobin 9.9 (L) 12.0 - 16.0 g/dL    Hematocrit 29.5 (L) 37.0 - 48.5 %    Mean Corpuscular Volume 93 82 - 98 fL    Mean Corpuscular Hemoglobin 31.3 (H) 27.0 - 31.0 pg    Mean Corpuscular Hemoglobin Conc 33.6 32.0 - 36.0 g/dL    RDW 15.2 (H) 11.5 - 14.5 %    Platelets 111 (L) 150 - 350 K/uL    MPV 10.3 9.2 - 12.9 fL       Diagnostic Results:  Imaging Results          X-Ray Chest AP Portable (Final result)  Result time 09/02/20 15:04:11    Final result by Harley Jimenez MD (09/02/20 15:04:11)                 Narrative:    XR CHEST AP PORTABLE    CLINICAL HISTORY:  55 years Female chest pain    COMPARISON: January 7, 2020    FINDINGS: Cardiac silhouette size is stable from prior.  Atherosclerotic  "calcification of the aorta. No confluent airspace  disease. Minimal linear atelectasis or scarring within the right  midlung. Minor costophrenic angle blunting bilaterally could reflect  trace pleural fluid or chronic pleural thickening. No pneumothorax. No  acute osseous abnormality. Upper abdomen is unremarkable.    IMPRESSION:    Minimal pleural fluid versus chronic pleural thickening.    No acute pulmonary process.    Aortic atherosclerosis.    Electronically Signed by Harley SEO on 9/2/2020 3:09 PM                              Recent Cardiac Rhythm   (if applicable)      Physical Exam:  Objective:  General Appearance:  Comfortable and well-appearing.    Vital signs: (most recent): Blood pressure 108/68, pulse 78, temperature 98.1 °F (36.7 °C), temperature source Oral, resp. rate 16, height 5' 5" (1.651 m), weight 61 kg (134 lb 7.7 oz), last menstrual period 01/07/1999, SpO2 99 %.  Vital signs are normal.    Lungs:  Normal effort and normal respiratory rate.  Breath sounds clear to auscultation.    Heart: Normal rate.  Regular rhythm.  Positive for murmur.    Abdomen: Abdomen is distended.    Extremities: There is no dependent edema.    Neurological: Patient is alert and oriented to person, place and time.    Skin:  Warm and dry.  No rash.       Current Consults:  IP CONSULT TO HOSPITAL MEDICINE  IP CONSULT TO CARDIOLOGY  IP CONSULT TO CARDIOLOGY  IP CONSULT TO NEPHROLOGY  IP CONSULT TO CARDIOTHORACIC SURGERY  IP CONSULT TO REGISTERED DIETITIAN/NUTRITIONIST    Assessment/Plan:  Assessment:   NSTEMI  Severe CAD, last [facility] was January  Cardiomyopathy  Liver cirrhosis  Hep C  ESRD  Valvular heart disease moderate to severe aortic stenosis, moderate mitral regurgitation  Pulmonary hypertension PA pressure 60    Plan:    Awaiting CTS recommendations  Continue ASA/statin/BB/Ace-i    "

## 2020-09-06 NOTE — SUBJECTIVE & OBJECTIVE
Interval History: stable    Review of Systems   Constitutional: Negative.    HENT: Negative.    Eyes: Negative.    Respiratory: Negative.    Cardiovascular: Negative.    Gastrointestinal: Negative.    Endocrine: Negative.    Genitourinary: Negative.    Musculoskeletal: Negative.    Skin: Negative.    Allergic/Immunologic: Negative.    Neurological: Negative.    Hematological: Negative.    All other systems reviewed and are negative.    Objective:     Vital Signs (Most Recent):  Temp: 98 °F (36.7 °C) (09/06/20 1130)  Pulse: 75 (09/06/20 1200)  Resp: 18 (09/06/20 1130)  BP: (!) 110/58 (09/06/20 1200)  SpO2: 98 % (09/06/20 1200) Vital Signs (24h Range):  Temp:  [98 °F (36.7 °C)-98.1 °F (36.7 °C)] 98 °F (36.7 °C)  Pulse:  [75-86] 75  Resp:  [14-24] 18  SpO2:  [94 %-100 %] 98 %  BP: ()/(51-72) 110/58     Weight: 61 kg (134 lb 7.7 oz)  Body mass index is 22.3 kg/m².    Intake/Output Summary (Last 24 hours) at 9/6/2020 1403  Last data filed at 9/5/2020 1800  Gross per 24 hour   Intake 100 ml   Output --   Net 100 ml      Physical Exam  Vitals signs and nursing note reviewed.   Constitutional:       Appearance: She is well-developed.      Comments: Chronic ill appearing   HENT:      Head: Normocephalic and atraumatic.      Right Ear: External ear normal.      Left Ear: External ear normal.      Nose: Nose normal.   Eyes:      Conjunctiva/sclera: Conjunctivae normal.      Pupils: Pupils are equal, round, and reactive to light.   Neck:      Musculoskeletal: Normal range of motion and neck supple.   Cardiovascular:      Rate and Rhythm: Normal rate and regular rhythm.      Heart sounds: Normal heart sounds.   Pulmonary:      Effort: Pulmonary effort is normal.      Breath sounds: Normal breath sounds.      Comments: Decreased entry without adventitious sounds  Abdominal:      General: Bowel sounds are normal.      Palpations: Abdomen is soft.   Musculoskeletal: Normal range of motion.   Skin:     General: Skin is warm  and dry.      Capillary Refill: Capillary refill takes less than 2 seconds.   Neurological:      Mental Status: She is alert and oriented to person, place, and time.   Psychiatric:         Behavior: Behavior normal.         Thought Content: Thought content normal.         Judgment: Judgment normal.         Significant Labs:   BMP:   Recent Labs   Lab 09/06/20  0400   GLU 94   *   K 4.7      CO2 23   BUN 32*   CREATININE 5.4*   CALCIUM 8.2*     CBC:   Recent Labs   Lab 09/05/20  0400 09/06/20  0400   WBC 3.91 4.05   HGB 7.1* 9.9*   HCT 21.5* 29.5*   * 111*       Significant Imaging: I have reviewed and interpreted all pertinent imaging results/findings within the past 24 hours.

## 2020-09-06 NOTE — PROGRESS NOTES
"[facility] Medicine  Progress Note    Patient Name: Aurelia Saucedo  MRN: [id number]  Patient Class: IP- Inpatient   Admission Date: 9/2/2020  Length of Stay: 4 days  Attending Physician: Anthony Maya MD  Primary Care Provider: Dustin Ireland MD        Subjective:     Principal Problem:NSTEMI (non-ST elevated myocardial infarction)        HPI:  Aurelia Saucedo is a 55 y.o. white female who  has a past medical history of Anemia of chronic renal failure, stage 5 (8/19/2015), Ascites, Bleeding duodenal ulcer, CAD (coronary artery disease), CAD (coronary artery disease), Chest pain, CHF (congestive heart failure), Chronic hepatitis C (8/19/2015), Colitis, Elevated troponin, ESRD 2/2 MPGN on HD since 12/16/13  (8/19/2015), Gastritis, Hypertension (8/19/2015), Mitral valve regurgitation, Renal dialysis status, and Secondary hyperparathyroidism of renal origin (8/19/2015).. The patient presented to [facility] on 9/2/2020 with a primary complaint of Chest Pain (Chronic ABD ascites, SOB )     Patient presented to the ER with complaint of chest pain which was worse yesterday, but currently she is "feeling miserable" with her abdominal distension and needs paracentesis.  Patient states she gets therapeutic paracentesis every 2 weeks and is due on Friday but she cannot wait as the pain is so severe.  Patient reports fullness in the epigastrium, unable to eat due to the fullness and pain.  Patient reports that she has known coronary artery disease and she required CABG and was referred to [facility] but due to her liver and kidney conditions, she was told she is not a candidate for CABG.  Patient is aware that on this hospital admission, her troponins are elevated.  Patient reports she is in a tough place when it comes to heart, liver and kidney.  Patient also reported that she had hemodialysis yesterday but that does not help with her ascites or lower extremity " edema.    Overview/Hospital Course:  09/03  Assumed care. Chart reviewed. Labs reviewed: mild normocytic anemia; normal electroytes with end stage renal function. Troponin elevated. CXR: NAPD. EKG: sinus with lateral ST depression an Q in III. No further chest pain. Has discomfort from ascites. Is scheduled for paracentesis in AM. Discussed with Cardiology: hold [facility] today    09/04  Consultants' attendance noted and appreciated. Had venous mapping, carotid u/s and ECHO today. No further CP since admission. Labs reviewed: stable anemia of renal disease; electrolytes normal with end-stage renal function. Telemetry reviewed: sinus    09/05  Discussed with Cardiology: may need valve in addition to CABG. Labs reviewed:H/H has fallen, no leukocytosis Discussed with Nephrology: transfuse 2 units pRBC at dialysis today. Telemetry reviewed: sinus. No further CP since admission. No SOB/orthopnea/PND    09/06  Discussed with Cardiology: awaiting decision by CT surgeon. No chest pain since admission. Labs reviewed: H/H improved after 2 units pRBC yesterday; mild hyponatremia with end-stage renal function. Telemetry reviewed: sinus    Interval History: stable    Review of Systems   Constitutional: Negative.    HENT: Negative.    Eyes: Negative.    Respiratory: Negative.    Cardiovascular: Negative.    Gastrointestinal: Negative.    Endocrine: Negative.    Genitourinary: Negative.    Musculoskeletal: Negative.    Skin: Negative.    Allergic/Immunologic: Negative.    Neurological: Negative.    Hematological: Negative.    All other systems reviewed and are negative.    Objective:     Vital Signs (Most Recent):  Temp: 98 °F (36.7 °C) (09/06/20 1130)  Pulse: 75 (09/06/20 1200)  Resp: 18 (09/06/20 1130)  BP: (!) 110/58 (09/06/20 1200)  SpO2: 98 % (09/06/20 1200) Vital Signs (24h Range):  Temp:  [98 °F (36.7 °C)-98.1 °F (36.7 °C)] 98 °F (36.7 °C)  Pulse:  [75-86] 75  Resp:  [14-24] 18  SpO2:  [94 %-100 %] 98 %  BP: ()/(51-72)  110/58     Weight: 61 kg (134 lb 7.7 oz)  Body mass index is 22.3 kg/m².    Intake/Output Summary (Last 24 hours) at 9/6/2020 1403  Last data filed at 9/5/2020 1800  Gross per 24 hour   Intake 100 ml   Output --   Net 100 ml      Physical Exam  Vitals signs and nursing note reviewed.   Constitutional:       Appearance: She is well-developed.      Comments: Chronic ill appearing   HENT:      Head: Normocephalic and atraumatic.      Right Ear: External ear normal.      Left Ear: External ear normal.      Nose: Nose normal.   Eyes:      Conjunctiva/sclera: Conjunctivae normal.      Pupils: Pupils are equal, round, and reactive to light.   Neck:      Musculoskeletal: Normal range of motion and neck supple.   Cardiovascular:      Rate and Rhythm: Normal rate and regular rhythm.      Heart sounds: Normal heart sounds.   Pulmonary:      Effort: Pulmonary effort is normal.      Breath sounds: Normal breath sounds.      Comments: Decreased entry without adventitious sounds  Abdominal:      General: Bowel sounds are normal.      Palpations: Abdomen is soft.   Musculoskeletal: Normal range of motion.   Skin:     General: Skin is warm and dry.      Capillary Refill: Capillary refill takes less than 2 seconds.   Neurological:      Mental Status: She is alert and oriented to person, place, and time.   Psychiatric:         Behavior: Behavior normal.         Thought Content: Thought content normal.         Judgment: Judgment normal.         Significant Labs:   BMP:   Recent Labs   Lab 09/06/20  0400   GLU 94   *   K 4.7      CO2 23   BUN 32*   CREATININE 5.4*   CALCIUM 8.2*     CBC:   Recent Labs   Lab 09/05/20  0400 09/06/20  0400   WBC 3.91 4.05   HGB 7.1* 9.9*   HCT 21.5* 29.5*   * 111*       Significant Imaging: I have reviewed and interpreted all pertinent imaging results/findings within the past 24 hours.      Assessment/Plan:      * NSTEMI (non-ST elevated myocardial infarction)  For possible CABG;  transfuse 2 units pRBC at dialysis today      Coronary artery disease  For possible CABG      Hemodialysis status        DNR (do not resuscitate)        History of abdominal paracentesis Q 2 wks          Symptomatic ascites with hyervolemia  Paracentesis       Chronic hepatitis C with cirrhosis  Chronic        ESRD 2/2 MPGN on HD since 12/16/13   dialysis          VTE Risk Mitigation (From admission, onward)         Ordered     heparin (porcine) injection 5,000 Units  As needed (PRN)      09/04/20 1933     enoxaparin injection 60 mg  Every 24 hours (non-standard times)      09/02/20 1750     IP VTE HIGH RISK PATIENT  Once      09/02/20 1750     Place sequential compression device  Until discontinued      09/02/20 1750                Discharge Planning   ADRIA:      Code Status: DNR   Is the patient medically ready for discharge?: No    Reason for patient still in hospital (select all that apply): Patient trending condition and Treatment  Discharge Plan A: Home with family                  Anthony Maya MD  Department of Hospital Medicine   [facility]

## 2020-09-07 NOTE — PROGRESS NOTES
" INPATIENT NEPHROLOGY CONSULT   [facility] NEPHROLOGY    Aurelia Saucedo  09/07/2020    Reason for consultation:    esrd    Chief Complaint:   Chief Complaint   Patient presents with    Chest Pain     Chronic ABD ascites, SOB           History of Present Illness:    Per H and P    Aurelia Saucedo is a 55 y.o. white female who  has a past medical history of Anemia of chronic renal failure, stage 5 (8/19/2015), Ascites, Bleeding duodenal ulcer, CAD (coronary artery disease), CAD (coronary artery disease), Chest pain, CHF (congestive heart failure), Chronic hepatitis C (8/19/2015), Colitis, Elevated troponin, ESRD 2/2 MPGN on HD since 12/16/13  (8/19/2015), Gastritis, Hypertension (8/19/2015), Mitral valve regurgitation, Renal dialysis status, and Secondary hyperparathyroidism of renal origin (8/19/2015).. The patient presented to [facility] on 9/2/2020 with a primary complaint of Chest Pain (Chronic ABD ascites, SOB )     Patient presented to the ER with complaint of chest pain which was worse yesterday, but currently she is "feeling miserable" with her abdominal distension and needs paracentesis.  Patient states she gets therapeutic paracentesis every 2 weeks and is due on Friday but she cannot wait as the pain is so severe.  Patient reports fullness in the epigastrium, unable to eat due to the fullness and pain.  Patient reports that she has known coronary artery disease and she required CABG and was [facility] but due to her liver and kidney conditions, she was told she is not a candidate for CABG.  Patient is aware that on this hospital admission, her troponins are elevated.  Patient reports she is in a tough place when it comes to heart, liver and kidney.  Patient also reported that she had hemodialysis yesterday but that does not help with her ascites or lower extremity edema.    9/5  Seen on dialysis.  No nausea, chest pain, sob, fever, urinary or bowel complaint, new " neurologic symptoms, new joint pain,  9/6  No distress.  Intermittent hypotension  9/7  No nausea, chest pain, sob, fever, urinary or bowel complaint, new neurologic symptoms, new joint pain,      Plan of Care:       Assessment:    esrd  --continue dialysis per routine  (Tuesday and Saturday hd)  --fluid restrict  --renal dose medication per routine  --continue outpt medication  --continue binders with meals    Anemia  --sue with hd  --transfused 2 units pRBC with last hd    Myocardial infarction      --free of pain now  --cardiology consulted    Hyperphosphatemia  --at goal for ESRD patients    Ascites  --as per primary  --had incidents of hyperkalemia in the past so haven't used aldactone       Thank you for allowing us to participate in this patient's care. We will continue to follow.    Vital Signs:  Temp Readings from Last 3 Encounters:   09/07/20 97.5 °F (36.4 °C) (Oral)   03/12/20 97.4 °F (36.3 °C)   01/22/20 97.9 °F (36.6 °C) (Oral)       Pulse Readings from Last 3 Encounters:   09/07/20 73   08/21/20 75   08/07/20 93       BP Readings from Last 3 Encounters:   09/07/20 128/75   08/21/20 (!) 156/99   08/07/20 133/80       Weight:  Wt Readings from Last 3 Encounters:   09/06/20 58.9 kg (129 lb 13.6 oz)   09/04/20 60.8 kg (134 lb)   03/12/20 59.6 kg (131 lb 6.3 oz)       Past Medical & Surgical History:  Past Medical History:   Diagnosis Date    Anemia of chronic renal failure, stage 5 8/19/2015    Ascites     Bleeding duodenal ulcer     CAD (coronary artery disease)     CAD (coronary artery disease)     Chest pain     CHF (congestive heart failure)     Chronic hepatitis C 8/19/2015    Colitis     Elevated troponin     ESRD 2/2 MPGN on HD since 12/16/13 8/19/2015    Gastritis     Hypertension 8/19/2015    Mitral valve regurgitation     Renal dialysis status     M, W, F    Secondary hyperparathyroidism of renal origin 8/19/2015       Past Surgical History:   Procedure Laterality Date     ANGIOGRAM, CORONARY, WITH LEFT HEART CATHETERIZATION Left 2020    Procedure: ANGIOGRAM,CORONARY,WITH LEFT HEART CATHETERIZATION;  Surgeon: Yahir Starkey MD;  Location: [facility] CATH/EP LAB;  Service: Cardiology;  Laterality: Left;    AV FISTULA PLACEMENT      COLONOSCOPY      CORONARY ANGIOPLASTY WITH STENT PLACEMENT      SALPINGOOPHORECTOMY Right     TOTAL ABDOMINAL HYSTERECTOMY         Past Social History:  Social History     Socioeconomic History    Marital status:      Spouse name: Not on file    Number of children: 2    Years of education: Not on file    Highest education level: Not on file   Occupational History    Occupation: baker    Social Needs    Financial resource strain: Not on file    Food insecurity     Worry: Not on file     Inability: Not on file    Transportation needs     Medical: Not on file     Non-medical: Not on file   Tobacco Use    Smoking status: Current Every Day Smoker     Packs/day: 0.75     Years: 30.00     Pack years: 22.50     Types: Cigarettes     Last attempt to quit: 2019     Years since quittin.7    Smokeless tobacco: Never Used   Substance and Sexual Activity    Alcohol use: No     Alcohol/week: 0.0 standard drinks    Drug use: Yes     Types: Marijuana     Comment: occasionally    Sexual activity: Not on file   Lifestyle    Physical activity     Days per week: Not on file     Minutes per session: Not on file    Stress: To some extent   Relationships    Social connections     Talks on phone: Not on file     Gets together: Not on file     Attends [employer] service: Not on file     Active member of club or organization: Not on file     Attends meetings of clubs or organizations: Not on file     Relationship status: Not on file   Other Topics Concern    Are you pregnant or think you may be? Not Asked    Breast-feeding Not Asked   Social History Narrative    Not on file       Medications:  No current facility-administered medications  on file prior to encounter.      Current Outpatient Medications on File Prior to Encounter   Medication Sig Dispense Refill    aspirin (ECOTRIN) 81 MG EC tablet Take 81 mg by mouth once daily.      calcium acetate,phosphat bind, (PHOSLO) 667 mg capsule Take 667 mg by mouth 3 (three) times daily with meals.      FA-VIT B COMP&C-SELENIUM-ZINC 3-70-15 MG-MCG-MG ORAL TAB Take 1 tablet by mouth once daily.      HYDROcodone-acetaminophen (NORCO)  mg per tablet Take 1 tablet by mouth every 8 (eight) hours as needed for Pain. 60 tablet 0    lisinopril (PRINIVIL,ZESTRIL) 20 MG tablet Take 1 tablet by mouth nightly.   3    LOKELMA 5 gram PwPk Take 5 g by mouth. Five times a week; on  Non dialysis days      metoprolol succinate (TOPROL-XL) 50 MG 24 hr tablet Take 50 mg by mouth nightly.       pantoprazole (PROTONIX) 40 MG tablet Take 40 mg by mouth 2 (two) times daily.       traZODone (DESYREL) 50 MG tablet Take 1 tablet (50 mg total) by mouth every evening. 30 tablet 11     Scheduled Meds:   sodium chloride 0.9%   Intravenous Once    aspirin  81 mg Oral Daily    calcium acetate(phosphat bind)  667 mg Oral TID WM    chlorhexidine  15 mL Mouth/Throat BID    enoxparin  1 mg/kg Subcutaneous Q24H    isosorbide mononitrate  30 mg Oral Daily    lactulose  15 g Oral Daily    lisinopriL  20 mg Oral Nightly    metoprolol succinate  50 mg Oral Nightly    mupirocin   Nasal BID    pantoprazole  40 mg Oral BID    rosuvastatin  10 mg Oral Daily    sodium zirconium cyclosilicate  5 g Oral Every other day    traZODone  50 mg Oral QHS     Continuous Infusions:  PRN Meds:.sodium chloride, sodium chloride 0.9%, acetaminophen, heparin (porcine), HYDROcodone-acetaminophen, HYDROmorphone, melatonin, nitroGLYCERIN, ondansetron, polyethylene glycol, sodium chloride 0.9%    Allergies:  Ciprofloxacin, Levaquin [levofloxacin], Quinolones, and Codeine    Past Family History:  Reviewed; refer to Hospitalist Admission  "Note    Review of Systems:  Review of Systems - All 14 systems reviewed and negative, except as noted in HPI    Physical Exam:    /75   Pulse 73   Temp 97.5 °F (36.4 °C) (Oral)   Resp 16   Ht 5' 5" (1.651 m)   Wt 58.9 kg (129 lb 13.6 oz)   LMP 01/07/1999 (Exact Date)   SpO2 100%   Breastfeeding No   BMI 21.61 kg/m²     General Appearance:    Alert, cooperative, no distress, appears stated age   Head:    Normocephalic, without obvious abnormality, atraumatic   Eyes:    PER, conjunctiva/corneas clear, EOM's intact in both eyes        Throat:   Lips, mucosa, and tongue normal; teeth and gums normal   Back:     Symmetric, no curvature, ROM normal, no CVA tenderness   Lungs:     Clear to auscultation bilaterally, respirations unlabored   Chest wall:    No tenderness or deformity   Heart:    Regular rate and rhythm, S1 and S2 normal, no murmur, rub   or gallop   Abdomen:     Distended    Extremities:   Extremities normal, atraumatic, no cyanosis or edema   Pulses:   2+ and symmetric all extremities   MSK:   No joint or muscle swelling, tenderness or deformity   Skin:   Skin color, texture, turgor normal, no rashes or lesions   Neurologic:   CNII-XII intact, normal strength and sensation       Throughout.  No flap     Results:  Lab Results   Component Value Date     (L) 09/07/2020    K 4.3 09/07/2020    CL 98 09/07/2020    CO2 22 (L) 09/07/2020    BUN 42 (H) 09/07/2020    CREATININE 6.2 (H) 09/07/2020    CALCIUM 8.6 (L) 09/07/2020    ANIONGAP 13 09/07/2020    ESTGFRAFRICA 8.2 (A) 09/07/2020    EGFRNONAA 7.1 (A) 09/07/2020       Lab Results   Component Value Date    CALCIUM 8.6 (L) 09/07/2020    PHOS 5.3 (H) 01/09/2020       Recent Labs   Lab 09/07/20  0515   WBC 3.65*   RBC 3.42*   HGB 10.7*   HCT 32.6*      MCV 95   MCH 31.3*   MCHC 32.8          I have personally reviewed pertinent radiological imaging and reports.       "

## 2020-09-07 NOTE — PROGRESS NOTES
[facility]    Cardiology Progress Note    Subjective:  Patient is lying in bed comfortably.  No shortness of breath.  She does state that she gets chest pain after eating      Objective:  Vital Signs (Most Recent)  Temp: 97.7 °F (36.5 °C) (09/07/20 0701)  Pulse: 74 (09/07/20 0701)  Resp: 18 (09/07/20 0701)  BP: 125/88 (09/07/20 0701)  SpO2: 96 % (09/07/20 0701)    Vital Signs Range (Last 24H):  Temp:  [97.6 °F (36.4 °C)-98.5 °F (36.9 °C)]   Pulse:  [72-85]   Resp:  [18-20]   BP: (100-137)/(53-88)   SpO2:  [96 %-100 %]     I & O (Last 24H):  No intake or output data in the 24 hours ending 09/07/20 0931    Current Diet:     Current Diet Order   Procedures    Diet Cardiac     Renal cardiac diet        Allergies:  Review of patient's allergies indicates:   Allergen Reactions    Ciprofloxacin Itching    Levaquin [levofloxacin] Itching    Quinolones Itching    Codeine Nausea Only       Meds:  Scheduled Meds:   sodium chloride 0.9%   Intravenous Once    aspirin  81 mg Oral Daily    calcium acetate(phosphat bind)  667 mg Oral TID WM    chlorhexidine  15 mL Mouth/Throat BID    enoxparin  1 mg/kg Subcutaneous Q24H    isosorbide mononitrate  30 mg Oral Daily    lisinopriL  20 mg Oral Nightly    metoprolol succinate  50 mg Oral Nightly    mupirocin   Nasal BID    pantoprazole  40 mg Oral BID    rosuvastatin  10 mg Oral Daily    sodium zirconium cyclosilicate  5 g Oral Every other day    traZODone  50 mg Oral QHS     Continuous Infusions:  PRN Meds:sodium chloride, sodium chloride 0.9%, acetaminophen, heparin (porcine), HYDROcodone-acetaminophen, HYDROmorphone, melatonin, nitroGLYCERIN, ondansetron, polyethylene glycol, sodium chloride 0.9%    Lab Results :  Recent Results (from the past 24 hour(s))   Basic metabolic panel    Collection Time: 09/07/20  5:15 AM   Result Value Ref Range    Sodium 133 (L) 136 - 145 mmol/L    Potassium 4.3 3.5 - 5.1 mmol/L    Chloride 98 95 - 110 mmol/L    CO2 22 (L) 23  - 29 mmol/L    Glucose 91 70 - 110 mg/dL    BUN, Bld 42 (H) 6 - 20 mg/dL    Creatinine 6.2 (H) 0.5 - 1.4 mg/dL    Calcium 8.6 (L) 8.7 - 10.5 mg/dL    Anion Gap 13 8 - 16 mmol/L    eGFR if African American 8.2 (A) >60 mL/min/1.73 m^2    eGFR if non  7.1 (A) >60 mL/min/1.73 m^2   CBC Without Differential    Collection Time: 09/07/20  5:15 AM   Result Value Ref Range    WBC 3.65 (L) 3.90 - 12.70 K/uL    RBC 3.42 (L) 4.00 - 5.40 M/uL    Hemoglobin 10.7 (L) 12.0 - 16.0 g/dL    Hematocrit 32.6 (L) 37.0 - 48.5 %    Mean Corpuscular Volume 95 82 - 98 fL    Mean Corpuscular Hemoglobin 31.3 (H) 27.0 - 31.0 pg    Mean Corpuscular Hemoglobin Conc 32.8 32.0 - 36.0 g/dL    RDW 14.7 (H) 11.5 - 14.5 %    Platelets 159 150 - 350 K/uL    MPV 9.3 9.2 - 12.9 fL   Platelet Review    Collection Time: 09/07/20  5:15 AM   Result Value Ref Range    Platelet Estimate Appears normal        Diagnostic Results:  Imaging Results          X-Ray Chest AP Portable (Final result)  Result time 09/02/20 15:04:11    Final result by Harley Jimenez MD (09/02/20 15:04:11)                 Narrative:    XR CHEST AP PORTABLE    CLINICAL HISTORY:  55 years Female chest pain    COMPARISON: January 7, 2020    FINDINGS: Cardiac silhouette size is stable from prior.  Atherosclerotic calcification of the aorta. No confluent airspace  disease. Minimal linear atelectasis or scarring within the right  midlung. Minor costophrenic angle blunting bilaterally could reflect  trace pleural fluid or chronic pleural thickening. No pneumothorax. No  acute osseous abnormality. Upper abdomen is unremarkable.    IMPRESSION:    Minimal pleural fluid versus chronic pleural thickening.    No acute pulmonary process.    Aortic atherosclerosis.    Electronically Signed by Harley SOE on 9/2/2020 3:09 PM                              Recent Cardiac Rhythm   (if applicable)      Physical Exam:  Objective:  General Appearance:  Comfortable.    Vital signs:  "(most recent): Blood pressure 125/88, pulse 74, temperature 97.7 °F (36.5 °C), temperature source Oral, resp. rate 18, height 5' 5" (1.651 m), weight 58.9 kg (129 lb 13.6 oz), last menstrual period 01/07/1999, SpO2 96 %, not currently breastfeeding.    Lungs:  Normal effort and normal respiratory rate.  Breath sounds clear to auscultation.    Heart: Normal rate.  Regular rhythm.  S1 normal and S2 normal.  Positive for murmur.    Abdomen: Abdomen is soft.  Bowel sounds are normal.         Current Consults:  IP CONSULT TO HOSPITAL MEDICINE  IP CONSULT TO CARDIOLOGY  IP CONSULT TO CARDIOLOGY  IP CONSULT TO NEPHROLOGY  IP CONSULT TO CARDIOTHORACIC SURGERY  IP CONSULT TO REGISTERED DIETITIAN/NUTRITIONIST    Assessment/Plan:  Assessment:   NSTEMI  Severe CAD, last [facility] was January  Cardiomyopathy  Liver cirrhosis  Hep C  ESRD  Valvular heart disease moderate to severe aortic stenosis, moderate mitral regurgitation  Pulmonary hypertension PA pressure 60  Plan:Continue medical rx ... Pt is very high risk for surgery   "

## 2020-09-07 NOTE — PLAN OF CARE
Problem: Fall Injury Risk  Goal: Absence of Fall and Fall-Related Injury  Outcome: Ongoing, Progressing     Problem: Adult Inpatient Plan of Care  Goal: Plan of Care Review  Outcome: Ongoing, Progressing  Goal: Patient-Specific Goal (Individualization)  Outcome: Ongoing, Progressing  Goal: Absence of Hospital-Acquired Illness or Injury  Outcome: Ongoing, Progressing  Goal: Optimal Comfort and Wellbeing  Outcome: Ongoing, Progressing  Goal: Readiness for Transition of Care  Outcome: Ongoing, Progressing  Goal: Rounds/Family Conference  Outcome: Ongoing, Progressing     Problem: Fluid Imbalance (Pneumonia)  Goal: Fluid Balance  Outcome: Ongoing, Progressing     Problem: Infection (Pneumonia)  Goal: Resolution of Infection Signs/Symptoms  Outcome: Ongoing, Progressing     Problem: Respiratory Compromise (Pneumonia)  Goal: Effective Oxygenation and Ventilation  Outcome: Ongoing, Progressing     Problem: Wound  Goal: Optimal Wound Healing  Outcome: Ongoing, Progressing     Problem: Device-Related Complication Risk (Hemodialysis)  Goal: Safe, Effective Therapy Delivery  Outcome: Ongoing, Progressing     Problem: Hemodynamic Instability (Hemodialysis)  Goal: Vital Signs Remain in Desired Range  Outcome: Ongoing, Progressing     Problem: Infection (Hemodialysis)  Goal: Absence of Infection Signs/Symptoms  Outcome: Ongoing, Progressing

## 2020-09-07 NOTE — PLAN OF CARE
Plan of care, medication regime, and discharge planning discussed with pt. Pt verbalized understanding.

## 2020-09-07 NOTE — PROGRESS NOTES
"[facility] Medicine  Progress Note    Patient Name: Aurelia Saucedo  MRN: [id number]  Patient Class: IP- Inpatient   Admission Date: 9/2/2020  Length of Stay: 5 days  Attending Physician: Anthony Maya MD  Primary Care Provider: Dustin Ireland MD        Subjective:     Principal Problem:NSTEMI (non-ST elevated myocardial infarction)        HPI:  Aurelia Saucedo is a 55 y.o. white female who  has a past medical history of Anemia of chronic renal failure, stage 5 (8/19/2015), Ascites, Bleeding duodenal ulcer, CAD (coronary artery disease), CAD (coronary artery disease), Chest pain, CHF (congestive heart failure), Chronic hepatitis C (8/19/2015), Colitis, Elevated troponin, ESRD 2/2 MPGN on HD since 12/16/13  (8/19/2015), Gastritis, Hypertension (8/19/2015), Mitral valve regurgitation, Renal dialysis status, and Secondary hyperparathyroidism of renal origin (8/19/2015).. The patient presented to [facility] on 9/2/2020 with a primary complaint of Chest Pain (Chronic ABD ascites, SOB )     Patient presented to the ER with complaint of chest pain which was worse yesterday, but currently she is "feeling miserable" with her abdominal distension and needs paracentesis.  Patient states she gets therapeutic paracentesis every 2 weeks and is due on Friday but she cannot wait as the pain is so severe.  Patient reports fullness in the epigastrium, unable to eat due to the fullness and pain.  Patient reports that she has known coronary artery disease and she required CABG and was referred to [facility] but due to her liver and kidney conditions, she was told she is not a candidate for CABG.  Patient is aware that on this hospital admission, her troponins are elevated.  Patient reports she is in a tough place when it comes to heart, liver and kidney.  Patient also reported that she had hemodialysis yesterday but that does not help with her ascites or lower extremity " edema.    Overview/Hospital Course:  09/03  Assumed care. Chart reviewed. Labs reviewed: mild normocytic anemia; normal electroytes with end stage renal function. Troponin elevated. CXR: NAPD. EKG: sinus with lateral ST depression an Q in III. No further chest pain. Has discomfort from ascites. Is scheduled for paracentesis in AM. Discussed with Cardiology: hold [facility] today    09/04  Consultants' attendance noted and appreciated. Had venous mapping, carotid u/s and ECHO today. No further CP since admission. Labs reviewed: stable anemia of renal disease; electrolytes normal with end-stage renal function. Telemetry reviewed: sinus    09/05  Discussed with Cardiology: may need valve in addition to CABG. Labs reviewed:H/H has fallen, no leukocytosis Discussed with Nephrology: transfuse 2 units pRBC at dialysis today. Telemetry reviewed: sinus. No further CP since admission. No SOB/orthopnea/PND    09/06  Discussed with Cardiology: awaiting decision by CT surgeon. No chest pain since admission. Labs reviewed: H/H improved after 2 units pRBC yesterday; mild hyponatremia with end-stage renal function. Telemetry reviewed: sinus    09/07  Labs reviewed stable CBC; minimal hyponatremia with otherwise normal electrolytes and end-stage renal function. No new complaints outside of waiting on decision by Cardiology and CT Surgeon. No further chest discomfort since admission    Interval History: waiting for decision for/against CABG    Review of Systems   Constitutional: Negative.    HENT: Negative.    Eyes: Negative.    Respiratory: Negative.    Cardiovascular: Negative.    Gastrointestinal: Negative.    Endocrine: Negative.    Genitourinary: Negative.    Musculoskeletal: Negative.    Skin: Negative.    Allergic/Immunologic: Negative.    Neurological: Negative.    Hematological: Negative.    All other systems reviewed and are negative.    Objective:     Vital Signs (Most Recent):  Temp: 97.7 °F (36.5 °C) (09/07/20 0701)  Pulse: 74  (09/07/20 0701)  Resp: 18 (09/07/20 0701)  BP: 125/88 (09/07/20 0701)  SpO2: 96 % (09/07/20 0701) Vital Signs (24h Range):  Temp:  [97.6 °F (36.4 °C)-98.5 °F (36.9 °C)] 97.7 °F (36.5 °C)  Pulse:  [72-85] 74  Resp:  [18-20] 18  SpO2:  [96 %-100 %] 96 %  BP: (100-137)/(53-88) 125/88     Weight: 58.9 kg (129 lb 13.6 oz)  Body mass index is 21.61 kg/m².  No intake or output data in the 24 hours ending 09/07/20 0935   Physical Exam  Vitals signs and nursing note reviewed.   Constitutional:       Appearance: She is well-developed.      Comments: Chronically ill appearing   HENT:      Head: Normocephalic and atraumatic.      Right Ear: External ear normal.      Left Ear: External ear normal.      Nose: Nose normal.   Eyes:      Conjunctiva/sclera: Conjunctivae normal.      Pupils: Pupils are equal, round, and reactive to light.   Neck:      Musculoskeletal: Normal range of motion and neck supple.   Cardiovascular:      Rate and Rhythm: Normal rate and regular rhythm.      Heart sounds: Normal heart sounds.   Pulmonary:      Effort: Pulmonary effort is normal.      Breath sounds: Normal breath sounds.   Abdominal:      General: Bowel sounds are normal.      Palpations: Abdomen is soft.   Musculoskeletal: Normal range of motion.   Skin:     General: Skin is warm and dry.      Capillary Refill: Capillary refill takes less than 2 seconds.   Neurological:      Mental Status: She is alert and oriented to person, place, and time.   Psychiatric:         Behavior: Behavior normal.         Thought Content: Thought content normal.         Judgment: Judgment normal.         Significant Labs:   BMP:   Recent Labs   Lab 09/07/20  0515   GLU 91   *   K 4.3   CL 98   CO2 22*   BUN 42*   CREATININE 6.2*   CALCIUM 8.6*     CBC:   Recent Labs   Lab 09/06/20  0400 09/07/20  0515   WBC 4.05 3.65*   HGB 9.9* 10.7*   HCT 29.5* 32.6*   * 159       Significant Imaging: I have reviewed and interpreted all pertinent imaging  results/findings within the past 24 hours.      Assessment/Plan:      * NSTEMI (non-ST elevated myocardial infarction)  For possible CABG; transfuse 2 units pRBC at dialysis today      Coronary artery disease  For possible CABG      Hemodialysis status        DNR (do not resuscitate)        History of abdominal paracentesis Q 2 wks          Symptomatic ascites with hyervolemia  Paracentesis       Chronic hepatitis C with cirrhosis  Chronic        ESRD 2/2 MPGN on HD since 12/16/13   dialysis          VTE Risk Mitigation (From admission, onward)         Ordered     heparin (porcine) injection 5,000 Units  As needed (PRN)      09/04/20 1933     enoxaparin injection 60 mg  Every 24 hours (non-standard times)      09/02/20 1750     IP VTE HIGH RISK PATIENT  Once      09/02/20 1750     Place sequential compression device  Until discontinued      09/02/20 1750                Discharge Planning   ADRIA:      Code Status: DNR   Is the patient medically ready for discharge?: No    Reason for patient still in hospital (select all that apply): Treatment  Discharge Plan A: Home with family                  Anthony Maya MD  Department of Hospital Medicine   [facility]

## 2020-09-07 NOTE — SUBJECTIVE & OBJECTIVE
Interval History: waiting for decision for/against CABG    Review of Systems   Constitutional: Negative.    HENT: Negative.    Eyes: Negative.    Respiratory: Negative.    Cardiovascular: Negative.    Gastrointestinal: Negative.    Endocrine: Negative.    Genitourinary: Negative.    Musculoskeletal: Negative.    Skin: Negative.    Allergic/Immunologic: Negative.    Neurological: Negative.    Hematological: Negative.    All other systems reviewed and are negative.    Objective:     Vital Signs (Most Recent):  Temp: 97.7 °F (36.5 °C) (09/07/20 0701)  Pulse: 74 (09/07/20 0701)  Resp: 18 (09/07/20 0701)  BP: 125/88 (09/07/20 0701)  SpO2: 96 % (09/07/20 0701) Vital Signs (24h Range):  Temp:  [97.6 °F (36.4 °C)-98.5 °F (36.9 °C)] 97.7 °F (36.5 °C)  Pulse:  [72-85] 74  Resp:  [18-20] 18  SpO2:  [96 %-100 %] 96 %  BP: (100-137)/(53-88) 125/88     Weight: 58.9 kg (129 lb 13.6 oz)  Body mass index is 21.61 kg/m².  No intake or output data in the 24 hours ending 09/07/20 0935   Physical Exam  Vitals signs and nursing note reviewed.   Constitutional:       Appearance: She is well-developed.      Comments: Chronically ill appearing   HENT:      Head: Normocephalic and atraumatic.      Right Ear: External ear normal.      Left Ear: External ear normal.      Nose: Nose normal.   Eyes:      Conjunctiva/sclera: Conjunctivae normal.      Pupils: Pupils are equal, round, and reactive to light.   Neck:      Musculoskeletal: Normal range of motion and neck supple.   Cardiovascular:      Rate and Rhythm: Normal rate and regular rhythm.      Heart sounds: Normal heart sounds.   Pulmonary:      Effort: Pulmonary effort is normal.      Breath sounds: Normal breath sounds.   Abdominal:      General: Bowel sounds are normal.      Palpations: Abdomen is soft.   Musculoskeletal: Normal range of motion.   Skin:     General: Skin is warm and dry.      Capillary Refill: Capillary refill takes less than 2 seconds.   Neurological:      Mental  Status: She is alert and oriented to person, place, and time.   Psychiatric:         Behavior: Behavior normal.         Thought Content: Thought content normal.         Judgment: Judgment normal.         Significant Labs:   BMP:   Recent Labs   Lab 09/07/20  0515   GLU 91   *   K 4.3   CL 98   CO2 22*   BUN 42*   CREATININE 6.2*   CALCIUM 8.6*     CBC:   Recent Labs   Lab 09/06/20  0400 09/07/20  0515   WBC 4.05 3.65*   HGB 9.9* 10.7*   HCT 29.5* 32.6*   * 159       Significant Imaging: I have reviewed and interpreted all pertinent imaging results/findings within the past 24 hours.

## 2020-09-08 NOTE — PLAN OF CARE
09/08/20 1621   Discharge Reassessment   Assessment Type Discharge Planning Reassessment   Anticipated Discharge Disposition Home-Health   DME Needed Upon Discharge  rollator   Post-Acute Status   Post-Acute Authorization Home Health;HME   HME Status Set-up Complete   Home Health Status Referrals Sent   SW dropped off rollator earlier this afternoon supplied by [facility]. KVNG sent referral via [employer] to [facility] and informed them of incoming fax. No further needs addressed at this time.

## 2020-09-08 NOTE — NURSING
Pt wheeled off unit to personal vehicle. IV and tele removed. Pt educated about CABG and  office calling and setting up plan of care.

## 2020-09-08 NOTE — DISCHARGE SUMMARY
"[facility] Medicine  Discharge Summary      Patient Name: Aurelia Saucedo  MRN: [id number]  Admission Date: 9/2/2020  Hospital Length of Stay: 6 days  Discharge Date and Time: 09/08/20 02:06 PM  Attending Physician: Anibal Kramer MD   Discharging Provider: Anibal Kramer MD  Primary Care Provider: Dustin Ireland MD      HPI:   Aurelia Saucedo is a 55 y.o. white female who  has a past medical history of Anemia of chronic renal failure, stage 5 (8/19/2015), Ascites, Bleeding duodenal ulcer, CAD (coronary artery disease), CAD (coronary artery disease), Chest pain, CHF (congestive heart failure), Chronic hepatitis C (8/19/2015), Colitis, Elevated troponin, ESRD 2/2 MPGN on HD since 12/16/13  (8/19/2015), Gastritis, Hypertension (8/19/2015), Mitral valve regurgitation, Renal dialysis status, and Secondary hyperparathyroidism of renal origin (8/19/2015).. The patient presented to [facility] on 9/2/2020 with a primary complaint of Chest Pain (Chronic ABD ascites, SOB )     Patient presented to the ER with complaint of chest pain which was worse yesterday, but currently she is "feeling miserable" with her abdominal distension and needs paracentesis.  Patient states she gets therapeutic paracentesis every 2 weeks and is due on Friday but she cannot wait as the pain is so severe.  Patient reports fullness in the epigastrium, unable to eat due to the fullness and pain.  Patient reports that she has known coronary artery disease and she required CABG and was referred to [facility] but due to her liver and kidney conditions, she was told she is not a candidate for CABG.  Patient is aware that on this hospital admission, her troponins are elevated.  Patient reports she is in a tough place when it comes to heart, liver and kidney.  Patient also reported that she had hemodialysis yesterday but that does not help with her ascites or lower extremity edema.    * No surgery found *  "     Hospital Course:   09/03  Assumed care. Chart reviewed. Labs reviewed: mild normocytic anemia; normal electroytes with end stage renal function. Troponin elevated. CXR: NAPD. EKG: sinus with lateral ST depression an Q in III. No further chest pain. Has discomfort from ascites. Is scheduled for paracentesis in AM. Discussed with Cardiology: hold [facility] today    09/04  Consultants' attendance noted and appreciated. Had venous mapping, carotid u/s and ECHO today. No further CP since admission. Labs reviewed: stable anemia of renal disease; electrolytes normal with end-stage renal function. Telemetry reviewed: sinus    09/05  Discussed with Cardiology: may need valve in addition to CABG. Labs reviewed:H/H has fallen, no leukocytosis Discussed with Nephrology: transfuse 2 units pRBC at dialysis today. Telemetry reviewed: sinus. No further CP since admission. No SOB/orthopnea/PND    09/06  Discussed with Cardiology: awaiting decision by CT surgeon. No chest pain since admission. Labs reviewed: H/H improved after 2 units pRBC yesterday; mild hyponatremia with end-stage renal function. Telemetry reviewed: sinus    09/07  Labs reviewed stable CBC; minimal hyponatremia with otherwise normal electrolytes and end-stage renal function. No new complaints outside of waiting on decision by Cardiology and CT Surgeon. No further chest discomfort since admission    09/08 - Assumed care of patient today. Here for NSTEMI. Has multiple comorbidities most notably ESRD on hemodialysis and end-stage liver disease getting periodic paracentesis.  Seen by cardiology and cardiothoracic surgery.  She is on medical management currently.  Discussed case with Dr. Yeboah from cardiothoracic surgery who relayed plans for outpatient follow-up for possible CABG in near future.  Discussed plan with the patient and answered all questions to her satisfaction.  Deemed medically stable for discharge to home.    Instructions provided to follow up with  primary care physician as outpatient. Patient verbalized understanding and is aware to contact primary care physician or return to ED if new or worsening symptoms.    Physical exam on the day of discharge:  General: Patient resting comfortably in no acute distress.  Lungs: CTA. Good air entry.  Cor: Regular rate and rhythm. No murmurs. No pedal edema.  Abd: Soft. Nontender. Non-distended.  Neuro: A&O x3. Moving all 4 extremities equally  Ext: No clubbing. No cyanosis.        Consults:   Consults (From admission, onward)        Status Ordering Provider     Inpatient consult to Cardiology  Once     Provider:  Yahir Starkey MD    Completed ROQUE ECHEVARRIA     Inpatient consult to Cardiology  Once     Provider:  Asher Mcdaniels MD    Completed ROQUE ECHEVARRIA     Inpatient consult to Cardiothoracic Surgery  Once     Provider:  Jose Yeboah MD    Completed BETTINA JASON     Inpatient consult to Hospitalist  Once     Provider:  Roque Echevarria MD    Acknowledged KEDAR CABEZAS     Inpatient consult to Nephrology  Once     Provider:  Mele Ramos MD    Acknowledged KEDAR CABEZAS     Inpatient consult to Registered Dietitian/Nutritionist  Once     Provider:  (Not yet assigned)    Completed KEDAR CABEZAS          No new Assessment & Plan notes have been filed under this hospital service since the last note was generated.  Service: Hospital Medicine    Final Active Diagnoses:    Diagnosis Date Noted POA    PRINCIPAL PROBLEM:  NSTEMI (non-ST elevated myocardial infarction) [I21.4] 09/02/2020 Yes    Coronary artery disease [I25.10]  Yes    History of abdominal paracentesis Q 2 wks  [Z98.890] 09/02/2020 Not Applicable    DNR (do not resuscitate) [Z66] 09/02/2020 Yes    Hemodialysis status [Z99.2] 09/02/2020 Not Applicable     Symptomatic ascites with hyervolemia [R18.8] 12/01/2019 Yes    Chronic hepatitis C with cirrhosis [B18.2, K74.60] 12/15/2015 Yes    ESRD 2/2 MPGN on HD since 12/16/13  [N18.6] 08/19/2015  "Yes     Chronic      Problems Resolved During this Admission:       Discharged Condition: stable    Disposition: Home-Health Care [facility]    Follow Up:  Follow-up Information     Dustin Ireland MD In 2 weeks.    Specialty: Family Medicine  Why: As needed - hospital discharge follow-up   Contact information:  [address]  [address]  [place] [address] [phone]             Jose Yeboah MD. Schedule an appointment as soon as possible for a visit in 1 week.    Specialties: Cardiothoracic Surgery, Vascular Surgery, Cardiovascular Disease, Cardiology  Why: Discuss bypass surgery (Dr. Yeboah's office to call you)  Contact information:  [address]  [address]  [place] [address]                 Patient Instructions:      WALKER FOR HOME USE     Order Specific Question Answer Comments   Type of Walker: Rollator    With wheels? Yes    Height: 5' 5" (1.651 m)    Weight: 61.3 kg (135 lb 2.3 oz)    Length of need (1-99 months): 99    Please check all that apply: Patient's condition impairs ambulation.    Please check all that apply: Patient is unable to safely ambulate without equipment.      Ambulatory referral/consult to Home Health   Standing Status: Future   Referral Priority: Routine Referral Type: Home Health   Referral Reason: Specialty Services Required   Requested Specialty: Home Health Services   Number of Visits Requested: 1     Diet Adult Regular     Order Specific Question Answer Comments   Na restriction, if any: 2gNa    Fluid restriction: Fluid - 1500mL      Diet renal     Notify your health care provider if you experience any of the following:  temperature >100.4     Notify your health care provider if you experience any of the following:  persistent nausea and vomiting or diarrhea     Notify your health care provider if you experience any of the following:  persistent dizziness, light-headedness, or visual disturbances     Notify your health care provider if you experience any of the following:  " difficulty breathing or increased cough     Notify your health care provider if you experience any of the following:   Order Comments: Worsening or recurrent symptoms     Activity as tolerated       Significant Diagnostic Studies: Labs:   CMP   Recent Labs   Lab 09/07/20  0515 09/08/20  0538   * 132*   K 4.3 4.7   CL 98 100   CO2 22* 20*   GLU 91 85   BUN 42* 54*   CREATININE 6.2* 7.1*   CALCIUM 8.6* 8.5*   ANIONGAP 13 12   ESTGFRAFRICA 8.2* 6.9*   EGFRNONAA 7.1* 6.0*   , CBC   Recent Labs   Lab 09/07/20  0515 09/08/20  0538   WBC 3.65* 4.16   HGB 10.7* 11.1*   HCT 32.6* 33.1*    112*    and Troponin   Recent Labs   Lab 09/02/20  1731 09/02/20  2357 09/03/20  0645   TROPONINI 22.728* 18.881* 14.193*       Pending Diagnostic Studies:     None         Medications:  Reconciled Home Medications:      Medication List      START taking these medications    isosorbide mononitrate 30 MG 24 hr tablet  Commonly known as: IMDUR  Take 1 tablet (30 mg total) by mouth once daily.     nitroGLYCERIN 0.4 MG SL tablet  Commonly known as: NITROSTAT  Place 1 tablet (0.4 mg total) under the tongue every 5 (five) minutes as needed for Chest pain.     polyethylene glycol 17 gram Pwpk  Commonly known as: GLYCOLAX  Take 17 g by mouth daily as needed (constipation).     rosuvastatin 10 MG tablet  Commonly known as: CRESTOR  Take 1 tablet (10 mg total) by mouth once daily.        CONTINUE taking these medications    aspirin 81 MG EC tablet  Commonly known as: ECOTRIN  Take 81 mg by mouth once daily.     calcium acetate(phosphat bind) 667 mg capsule  Commonly known as: PHOSLO  Take 667 mg by mouth 3 (three) times daily with meals.     DIALYVITE 3000 3-70-15 mg-mcg-mg Tab  Generic drug: folic acid-B [id number]  Take 1 tablet by mouth once daily.     HYDROcodone-acetaminophen  mg per tablet  Commonly known as: NORCO  Take 1 tablet by mouth every 8 (eight) hours as needed for Pain.     lisinopriL 20 MG tablet  Commonly  known as: PRINIVIL,ZESTRIL  Take 1 tablet by mouth nightly.     LOKELMA 5 gram packet  Generic drug: sodium zirconium cyclosilicate  Take 5 g by mouth. Five times a week; on  Non dialysis days     metoprolol succinate 50 MG 24 hr tablet  Commonly known as: TOPROL-XL  Take 50 mg by mouth nightly.     pantoprazole 40 MG tablet  Commonly known as: PROTONIX  Take 40 mg by mouth 2 (two) times daily.     traZODone 50 MG tablet  Commonly known as: DESYREL  Take 1 tablet (50 mg total) by mouth every evening.            Indwelling Lines/Drains at time of discharge:   Lines/Drains/Airways     Drain                 Hemodialysis AV Fistula Left upper arm -- days                Time spent on the discharge of patient: 40 minutes  Patient was seen and examined on the date of discharge and determined to be suitable for discharge.         Anibal Kramer MD  Department of Hospital Medicine  [facility]

## 2020-09-08 NOTE — PLAN OF CARE
09/08/20 1330   Discharge Reassessment   Assessment Type Discharge Planning Reassessment   Anticipated Discharge Disposition Home-Health   Discharge Plan A Home Health   Discharge Plan B Home   DME Needed Upon Discharge  rollator   Post-Acute Status   Post-Acute Authorization Home Health;HME   HME Status Awaiting Clarification Orders   Home Health Status Awaiting Orders for    [facility] following up with pt on d/c disposition this date. Pt is requesting a rollator and states she had [facility] in the past and would like to resume if possible. [facility] informed Dr. Kramer - awaiting orders.

## 2020-09-08 NOTE — PROGRESS NOTES
[facility]    Cardiology Progress Note    Subjective:  Patient seen and examined this AM. Complains of back discomfort but denies any over cardiac complaints. Awaiting CTS recs on possible CABG. VSS. HD today. Hemoglobin 11.1       Objective:  Vital Signs (Most Recent)  Temp: 98.4 °F (36.9 °C) (09/08/20 0827)  Pulse: 70 (09/08/20 0827)  Resp: 18 (09/08/20 0827)  BP: 125/74 (09/08/20 0827)  SpO2: 99 % (09/08/20 0827)    Vital Signs Range (Last 24H):  Temp:  [97.1 °F (36.2 °C)-98.6 °F (37 °C)]   Pulse:  [70-84]   Resp:  [16-20]   BP: (116-128)/(72-82)   SpO2:  [95 %-100 %]     I & O (Last 24H):    Intake/Output Summary (Last 24 hours) at 9/8/2020 0902  Last data filed at 9/7/2020 1500  Gross per 24 hour   Intake 390 ml   Output --   Net 390 ml       Current Diet:     Current Diet Order   Procedures    Diet Cardiac     Renal cardiac diet        Allergies:  Review of patient's allergies indicates:   Allergen Reactions    Ciprofloxacin Itching    Levaquin [levofloxacin] Itching    Quinolones Itching    Codeine Nausea Only       Meds:  Scheduled Meds:   sodium chloride 0.9%   Intravenous Once    aspirin  81 mg Oral Daily    calcium acetate(phosphat bind)  667 mg Oral TID WM    enoxparin  1 mg/kg Subcutaneous Q24H    isosorbide mononitrate  30 mg Oral Daily    lactulose  15 g Oral Daily    lisinopriL  20 mg Oral Nightly    metoprolol succinate  50 mg Oral Nightly    pantoprazole  40 mg Oral BID    rosuvastatin  10 mg Oral Daily    sodium zirconium cyclosilicate  5 g Oral Every other day    traZODone  50 mg Oral QHS     Continuous Infusions:  PRN Meds:sodium chloride, sodium chloride 0.9%, acetaminophen, heparin (porcine), HYDROcodone-acetaminophen, HYDROmorphone, melatonin, nitroGLYCERIN, ondansetron, polyethylene glycol, sodium chloride 0.9%    Lab Results :  Recent Results (from the past 24 hour(s))   Basic metabolic panel    Collection Time: 09/08/20  5:38 AM   Result Value Ref Range     Sodium 132 (L) 136 - 145 mmol/L    Potassium 4.7 3.5 - 5.1 mmol/L    Chloride 100 95 - 110 mmol/L    CO2 20 (L) 23 - 29 mmol/L    Glucose 85 70 - 110 mg/dL    BUN, Bld 54 (H) 6 - 20 mg/dL    Creatinine 7.1 (H) 0.5 - 1.4 mg/dL    Calcium 8.5 (L) 8.7 - 10.5 mg/dL    Anion Gap 12 8 - 16 mmol/L    eGFR if African American 6.9 (A) >60 mL/min/1.73 m^2    eGFR if non African American 6.0 (A) >60 mL/min/1.73 m^2   CBC Without Differential    Collection Time: 09/08/20  5:38 AM   Result Value Ref Range    WBC 4.16 3.90 - 12.70 K/uL    RBC 3.53 (L) 4.00 - 5.40 M/uL    Hemoglobin 11.1 (L) 12.0 - 16.0 g/dL    Hematocrit 33.1 (L) 37.0 - 48.5 %    Mean Corpuscular Volume 94 82 - 98 fL    Mean Corpuscular Hemoglobin 31.4 (H) 27.0 - 31.0 pg    Mean Corpuscular Hemoglobin Conc 33.5 32.0 - 36.0 g/dL    RDW 14.6 (H) 11.5 - 14.5 %    Platelets 112 (L) 150 - 350 K/uL    MPV 10.3 9.2 - 12.9 fL       Diagnostic Results:  Imaging Results          X-Ray Chest AP Portable (Final result)  Result time 09/02/20 15:04:11    Final result by Harley Jimenez MD (09/02/20 15:04:11)                 Narrative:    XR CHEST AP PORTABLE    CLINICAL HISTORY:  55 years Female chest pain    COMPARISON: January 7, 2020    FINDINGS: Cardiac silhouette size is stable from prior.  Atherosclerotic calcification of the aorta. No confluent airspace  disease. Minimal linear atelectasis or scarring within the right  midlung. Minor costophrenic angle blunting bilaterally could reflect  trace pleural fluid or chronic pleural thickening. No pneumothorax. No  acute osseous abnormality. Upper abdomen is unremarkable.    IMPRESSION:    Minimal pleural fluid versus chronic pleural thickening.    No acute pulmonary process.    Aortic atherosclerosis.    Electronically Signed by Harley SEO on 9/2/2020 3:09 PM                              Physical Exam:  Objective:  General Appearance:  Comfortable.    Vital signs: (most recent): Blood pressure 125/74, pulse 70,  "temperature 98.4 °F (36.9 °C), temperature source Oral, resp. rate 18, height 5' 5" (1.651 m), weight 61.3 kg (135 lb 2.3 oz), last menstrual period 01/07/1999, SpO2 99 %, not currently breastfeeding.    Lungs:  Normal effort and normal respiratory rate.  Breath sounds clear to auscultation.    Heart: Normal rate.  Regular rhythm.  S1 normal and S2 normal.  Positive for murmur.    Abdomen: Abdomen is soft.  Bowel sounds are normal.         Current Consults:  IP CONSULT TO HOSPITAL MEDICINE  IP CONSULT TO CARDIOLOGY  IP CONSULT TO CARDIOLOGY  IP CONSULT TO NEPHROLOGY  IP CONSULT TO CARDIOTHORACIC SURGERY  IP CONSULT TO REGISTERED DIETITIAN/NUTRITIONIST    Assessment/Plan:  Assessment:   NSTEMI  Severe CAD, last [facility] was January  Cardiomyopathy  Liver cirrhosis  Hep C  ESRD  Valvular heart disease moderate to severe aortic stenosis, moderate mitral regurgitation  Pulmonary hypertension PA pressure 60 mmHg    Plan:  Awaiting further recommendations from CTS.   HD today, evaluate tomorrow AM labs.     Alex Casas PA-C  09/08/2020  "

## 2020-09-08 NOTE — PLAN OF CARE
Important Message from Medicare was sign, explained and given to patient/caregiver on 09/08/2020 at 9:45am

## 2020-09-08 NOTE — PROGRESS NOTES
" INPATIENT NEPHROLOGY CONSULT   [facility] NEPHROLOGY    Aurelia Buckner Doyle  09/08/2020    Reason for consultation:    esrd    History of Present Illness:  55 y.o. white female who  has a past medical history of Anemia of chronic renal failure, stage 5 (8/19/2015), Ascites, Bleeding duodenal ulcer, CAD (coronary artery disease), CAD (coronary artery disease), Chest pain, CHF (congestive heart failure), Chronic hepatitis C (8/19/2015), Colitis, Elevated troponin, ESRD 2/2 MPGN on HD since 12/16/13  (8/19/2015), Gastritis, Hypertension (8/19/2015), Mitral valve regurgitation, Renal dialysis status, and Secondary hyperparathyroidism of renal origin (8/19/2015).. The patient presented to [facility] on 9/2/2020 with a primary complaint of Chest Pain (Chronic ABD ascites, SOB )     Patient presented to the ER with complaint of chest pain which was worse yesterday, but currently she is "feeling miserable" with her abdominal distension and needs paracentesis.  Patient states she gets therapeutic paracentesis every 2 weeks and is due on Friday but she cannot wait as the pain is so severe.  Patient reports fullness in the epigastrium, unable to eat due to the fullness and pain.  Patient reports that she has known coronary artery disease and she required CABG and was referred to [facility] but due to her liver and kidney conditions, she was told she is not a candidate for CABG.  Patient is aware that on this hospital admission, her troponins are elevated.  Patient reports she is in a tough place when it comes to heart, liver and kidney.  Patient also reported that she had hemodialysis yesterday but that does not help with her ascites or lower extremity edema.    9/5  Seen on dialysis.  No nausea, chest pain, sob, fever, urinary or bowel complaint, new neurologic symptoms, new joint pain,  9/6  No distress.  Intermittent hypotension  9/7  No nausea, chest pain, sob, fever, urinary or bowel complaint, new " neurologic symptoms, new joint pain,  9/8 VSS, no new complains. Seen and examined on HD today, tolerating well.    Plan of Care:    ESRD  --continue dialysis Tuesday and Saturday  --fluid restrict  --renal dose medication per routine  --continue outpt medication  --continue binders with meals    Anemia  --sue with hd  --transfused 2 units pRBC with last hd    Myocardial infarction, Inoperable CAD  --appreciate cards input.  --free of pain now    Hyperphosphatemia  --at goal for ESRD patients    Ascites  --as per primary  --had incidents of hyperkalemia in the past so haven't used aldactone    Thank you for allowing us to participate in this patient's care. We will continue to follow.    Vital Signs:  Temp Readings from Last 3 Encounters:   09/08/20 98.1 °F (36.7 °C) (Oral)   03/12/20 97.4 °F (36.3 °C)   01/22/20 97.9 °F (36.6 °C) (Oral)       Pulse Readings from Last 3 Encounters:   09/08/20 74   08/21/20 75   08/07/20 93       BP Readings from Last 3 Encounters:   09/08/20 125/79   08/21/20 (!) 156/99   08/07/20 133/80       Weight:  Wt Readings from Last 3 Encounters:   09/08/20 61.3 kg (135 lb 2.3 oz)   09/04/20 60.8 kg (134 lb)   03/12/20 59.6 kg (131 lb 6.3 oz)       Past Medical & Surgical History:  Past Medical History:   Diagnosis Date    Anemia of chronic renal failure, stage 5 8/19/2015    Ascites     Bleeding duodenal ulcer     CAD (coronary artery disease)     CAD (coronary artery disease)     Chest pain     CHF (congestive heart failure)     Chronic hepatitis C 8/19/2015    Colitis     Elevated troponin     ESRD 2/2 MPGN on HD since 12/16/13 8/19/2015    Gastritis     Hypertension 8/19/2015    Mitral valve regurgitation     Renal dialysis status     M, W, F    Secondary hyperparathyroidism of renal origin 8/19/2015       Past Surgical History:   Procedure Laterality Date    ANGIOGRAM, CORONARY, WITH LEFT HEART CATHETERIZATION Left 1/13/2020    Procedure: ANGIOGRAM,CORONARY,WITH LEFT  HEART CATHETERIZATION;  Surgeon: Yahir Starkey MD;  Location: [facility] CATH/EP LAB;  Service: Cardiology;  Laterality: Left;    AV FISTULA PLACEMENT      COLONOSCOPY      CORONARY ANGIOPLASTY WITH STENT PLACEMENT      SALPINGOOPHORECTOMY Right     TOTAL ABDOMINAL HYSTERECTOMY         Past Social History:  Social History     Socioeconomic History    Marital status:      Spouse name: Not on file    Number of children: 2    Years of education: Not on file    Highest education level: Not on file   Occupational History    Occupation: baker    Social Needs    Financial resource strain: Not on file    Food insecurity     Worry: Not on file     Inability: Not on file    Transportation needs     Medical: Not on file     Non-medical: Not on file   Tobacco Use    Smoking status: Current Every Day Smoker     Packs/day: 0.75     Years: 30.00     Pack years: 22.50     Types: Cigarettes     Last attempt to quit: 2019     Years since quittin.7    Smokeless tobacco: Never Used   Substance and Sexual Activity    Alcohol use: No     Alcohol/week: 0.0 standard drinks    Drug use: Yes     Types: Marijuana     Comment: occasionally    Sexual activity: Not on file   Lifestyle    Physical activity     Days per week: Not on file     Minutes per session: Not on file    Stress: To some extent   Relationships    Social connections     Talks on phone: Not on file     Gets together: Not on file     Attends [employer]ical service: Not on file     Active member of club or organization: Not on file     Attends meetings of clubs or organizations: Not on file     Relationship status: Not on file   Other Topics Concern    Are you pregnant or think you may be? Not Asked    Breast-feeding Not Asked   Social History Narrative    Not on file       Medications:  No current facility-administered medications on file prior to encounter.      Current Outpatient Medications on File Prior to Encounter   Medication Sig  Dispense Refill    aspirin (ECOTRIN) 81 MG EC tablet Take 81 mg by mouth once daily.      calcium acetate,phosphat bind, (PHOSLO) 667 mg capsule Take 667 mg by mouth 3 (three) times daily with meals.      FA-VIT B COMP&C-SELENIUM-ZINC 3-70-15 MG-MCG-MG ORAL TAB Take 1 tablet by mouth once daily.      HYDROcodone-acetaminophen (NORCO)  mg per tablet Take 1 tablet by mouth every 8 (eight) hours as needed for Pain. 60 tablet 0    lisinopril (PRINIVIL,ZESTRIL) 20 MG tablet Take 1 tablet by mouth nightly.   3    LOKELMA 5 gram PwPk Take 5 g by mouth. Five times a week; on  Non dialysis days      metoprolol succinate (TOPROL-XL) 50 MG 24 hr tablet Take 50 mg by mouth nightly.       pantoprazole (PROTONIX) 40 MG tablet Take 40 mg by mouth 2 (two) times daily.       traZODone (DESYREL) 50 MG tablet Take 1 tablet (50 mg total) by mouth every evening. 30 tablet 11     Scheduled Meds:   sodium chloride 0.9%   Intravenous Once    aspirin  81 mg Oral Daily    calcium acetate(phosphat bind)  667 mg Oral TID WM    chlorhexidine  15 mL Mouth/Throat BID    enoxparin  1 mg/kg Subcutaneous Q24H    isosorbide mononitrate  30 mg Oral Daily    lactulose  15 g Oral Daily    lisinopriL  20 mg Oral Nightly    metoprolol succinate  50 mg Oral Nightly    mupirocin   Nasal BID    pantoprazole  40 mg Oral BID    rosuvastatin  10 mg Oral Daily    sodium zirconium cyclosilicate  5 g Oral Every other day    traZODone  50 mg Oral QHS     Continuous Infusions:  PRN Meds:.sodium chloride, sodium chloride 0.9%, acetaminophen, heparin (porcine), HYDROcodone-acetaminophen, HYDROmorphone, melatonin, nitroGLYCERIN, ondansetron, polyethylene glycol, sodium chloride 0.9%    Allergies:  Ciprofloxacin, Levaquin [levofloxacin], Quinolones, and Codeine    Past Family History:  Reviewed; refer to Hospitalist Admission Note    Review of Systems:  Review of Systems - All 14 systems reviewed and negative, except as noted in  "HPI    Physical Exam:    /79 (BP Location: Right arm, Patient Position: Sitting)   Pulse 74   Temp 98.1 °F (36.7 °C) (Oral)   Resp 16   Ht 5' 5" (1.651 m)   Wt 61.3 kg (135 lb 2.3 oz)   LMP 01/07/1999 (Exact Date)   SpO2 99%   Breastfeeding No   BMI 22.49 kg/m²     General Appearance:    Alert, cooperative, no distress, appears stated age   Head:    Normocephalic, without obvious abnormality, atraumatic   Eyes:    PER, conjunctiva/corneas clear, EOM's intact in both eyes        Throat:   Lips, mucosa, and tongue normal; teeth and gums normal   Back:     Symmetric, no curvature, ROM normal, no CVA tenderness   Lungs:     Clear to auscultation bilaterally, respirations unlabored   Chest wall:    No tenderness or deformity   Heart:    Regular rate and rhythm, S1 and S2 normal, no murmur, rub   or gallop   Abdomen:     Distended    Extremities:   Extremities normal, atraumatic, no cyanosis or edema   Pulses:   2+ and symmetric all extremities   MSK:   No joint or muscle swelling, tenderness or deformity   Skin:   Skin color, texture, turgor normal, no rashes or lesions   Neurologic:   CNII-XII intact, normal strength and sensation       Throughout.  No flap     Results:  Lab Results   Component Value Date     (L) 09/08/2020    K 4.7 09/08/2020     09/08/2020    CO2 20 (L) 09/08/2020    BUN 54 (H) 09/08/2020    CREATININE 7.1 (H) 09/08/2020    CALCIUM 8.5 (L) 09/08/2020    ANIONGAP 12 09/08/2020    ESTGFRAFRICA 6.9 (A) 09/08/2020    EGFRNONAA 6.0 (A) 09/08/2020       Lab Results   Component Value Date    CALCIUM 8.5 (L) 09/08/2020    PHOS 5.3 (H) 01/09/2020       Recent Labs   Lab 09/08/20  0538   WBC 4.16   RBC 3.53*   HGB 11.1*   HCT 33.1*   *   MCV 94   MCH 31.4*   MCHC 33.5          I have personally reviewed pertinent radiological imaging and reports.       "

## 2020-09-09 NOTE — TELEPHONE ENCOUNTER
DC Date 9/8/20  Reached out to patient to schedule HFU.  HFU appointment scheduled 9/15/20.  C3 Notified.

## 2020-09-10 NOTE — PROGRESS NOTES
Discharge Information     Discharge Date:   9/8/20    Primary Discharge Diagnosis:  Chest pain      Discharge Summary:  Reviewed      Medication & Order Review     Were medication changes made or new medications added?   Yes    If so, has the patient filled the prescriptions?  Yes     Was Home Health ordered? Yes    If so, has Home Health contacted patient and/or initiated services?  Yes:  2 days a week    Name of Home Health Agency? [facility]    Durable Medical Equipment ordered?  No     If so, has the DME provider contacted patient and delivered equipment?  N/A    Follow Up               Any problems since discharge? No    How is the patient feeling since returning home? ok    Have you set up recommended follow up appointments?  (cardiology, surgery, etc.) Yes    Schedule Hospital Follow-up appointment within 7-14 days (preferably 7).      Notes:  9/15/20  11:00            Alyssa Dickinson

## 2020-09-10 NOTE — TELEPHONE ENCOUNTER
Attempted to reach patient to follow up on Hospital discharge and ask TCC questions but mailbox is not set up yet

## 2020-09-17 NOTE — PROGRESS NOTES
SUBJECTIVE:    Patient ID: Aurelia Saucedo is a 55 y.o. female.    Chief Complaint: Hospital Follow Up  53 yo female complex patient is here today to follow up from her most recent hospitalization for NSTEMI . Pt was found to have multivessel disease on previous admission and was scheduled for CABG that got postponed due to comorbid conditions. During this admission it was decided to postpone her cardiac intervention again.  She was discharged on  out patient medical management.     Cardiac cath 1/13/2020  Severe multi-vessel disease with total occlusion of the right coronary artery with left-to-right collaterals and says high-grade disease in the proximal circumflex and diffuse disease in the LAD with high-grade diagonal stenosis as described above  Normal left ventricular systolic function with an ejection fraction of greater than 60%  Normally appearing left internal mammary artery    Echo 09/04/2020  · Concentric left ventricular hypertrophy.  · The estimated PA systolic pressure is 60 mmHg.  · Normal left ventricular systolic function. The estimated ejection fraction is 60%.  · Grade II (moderate) left ventricular diastolic dysfunction consistent with pseudonormalization.  · Mild left atrial enlargement.  · Mild right atrial enlargement.  · Moderate-to-severe aortic valve stenosis.  · Aortic valve area is 1.35 cm2; peak velocity is 2.76 m/s; mean gradient is 18 mmHg.  · Mild aortic regurgitation.  · Moderate mitral regurgitation.  · Elevated central venous pressure (15 mmHg).  · Pulmonary hypertension present.     PMHX:  Chronic Hepatitis C Anny Type 1B: Not currently being treated. Treatment has been delayed, she is schedualed to go to  [facility] to be evaluated at [facility] for liver and renal transplant.  Anemia of chronic renal failure:  Secondary Hyperparathyroidism:  ESRD: on dialysis since 2013 current regimen of dialysis is Tue/Sat .  Cryoglobulinemia:  HTN; She has HTN that has been well  controlled on [id number], Lisinopril 20, Metoprolol XL 50.  GERD: On Pantoprazole 40mg  CAD: Stent placed March 2013, Isosorbide mononitrate 30mg, Crestor 10mg, Nitroglycerine 0.4mg.     Specialists:  Gastro:    Nephrologist: Dr Ramos  Cardiologist: Pt has appointment with Dr Gomez  Cardiothoracic Surgeon: Dr Yeboah.  HPI    Admit Date: 9/2/20   Discharge Date: 9/8/20  Discharge Facility: Hospital    Medication Reconciliation:  Medications changed/added/deleted. ADDED: Isosorbide mononitrate 30mg, Crestor 10mg, Nitroglycerine 0.4mg.    New Prescriptions filled after discharge: yes  Discharge summary reviewed:  yes  Pending test results at discharge reviewed:   no  Follow up appointments scheduled:  yes   with Cardiology   Follow up labs/tests ordered:   no  Home Health ordered on discharge:   yes  Home Health company name: [facility] 2 times a week  DME ordered at discharge:   yes  How patient is feeling since discharge from the hospital?   Improved           No results displayed because visit has over 200 results.      Lab Visit on 07/24/2020   Component Date Value Ref Range Status    WBC 07/24/2020 3.69* 3.90 - 12.70 K/uL Final    RBC 07/24/2020 3.39* 4.00 - 5.40 M/uL Final    Hemoglobin 07/24/2020 10.6* 12.0 - 16.0 g/dL Final    Hematocrit 07/24/2020 32.8* 37.0 - 48.5 % Final    Mean Corpuscular Volume 07/24/2020 97  82 - 98 fL Final    Mean Corpuscular Hemoglobin 07/24/2020 31.3* 27.0 - 31.0 pg Final    Mean Corpuscular Hemoglobin Conc 07/24/2020 32.3  32.0 - 36.0 g/dL Final    RDW 07/24/2020 15.7* 11.5 - 14.5 % Final    Platelets 07/24/2020 137* 150 - 350 K/uL Final    MPV 07/24/2020 9.8  9.2 - 12.9 fL Final    Immature Granulocytes 07/24/2020 1.1* 0.0 - 0.5 % Final    Gran # (ANC) 07/24/2020 2.5  1.8 - 7.7 K/uL Final    Immature Grans (Abs) 07/24/2020 0.04  0.00 - 0.04 K/uL Final    Lymph # 07/24/2020 0.8* 1.0 - 4.8 K/uL Final    Mono # 07/24/2020 0.3  0.3 - 1.0 K/uL Final     Eos # 07/24/2020 0.1  0.0 - 0.5 K/uL Final    Baso # 07/24/2020 0.01  0.00 - 0.20 K/uL Final    nRBC 07/24/2020 0  0 /100 WBC Final    Gran% 07/24/2020 66.3  38.0 - 73.0 % Final    Lymph% 07/24/2020 20.9  18.0 - 48.0 % Final    Mono% 07/24/2020 9.2  4.0 - 15.0 % Final    Eosinophil% 07/24/2020 2.2  0.0 - 8.0 % Final    Basophil% 07/24/2020 0.3  0.0 - 1.9 % Final    Differential Method 07/24/2020 Automated   Final    PT 07/24/2020 12.9  10.6 - 14.8 sec Final    INR 07/24/2020 1.0   Final    aPTT 07/24/2020 28.1  23.6 - 33.3 sec Final   Lab Visit on 06/12/2020   Component Date Value Ref Range Status    aPTT 06/12/2020 30.3  23.6 - 33.3 sec Final    PT 06/12/2020 13.0  10.6 - 14.8 sec Final    INR 06/12/2020 1.0   Final    WBC 06/12/2020 4.38  3.90 - 12.70 K/uL Final    RBC 06/12/2020 4.39  4.00 - 5.40 M/uL Final    Hemoglobin 06/12/2020 13.5  12.0 - 16.0 g/dL Final    Hematocrit 06/12/2020 42.2  37.0 - 48.5 % Final    Mean Corpuscular Volume 06/12/2020 96  82 - 98 fL Final    Mean Corpuscular Hemoglobin 06/12/2020 30.8  27.0 - 31.0 pg Final    Mean Corpuscular Hemoglobin Conc 06/12/2020 32.0  32.0 - 36.0 g/dL Final    RDW 06/12/2020 13.5  11.5 - 14.5 % Final    Platelets 06/12/2020 178  150 - 350 K/uL Final    MPV 06/12/2020 9.3  9.2 - 12.9 fL Final    Immature Granulocytes 06/12/2020 0.5  0.0 - 0.5 % Final    Gran # (ANC) 06/12/2020 2.9  1.8 - 7.7 K/uL Final    Immature Grans (Abs) 06/12/2020 0.02  0.00 - 0.04 K/uL Final    Lymph # 06/12/2020 0.9* 1.0 - 4.8 K/uL Final    Mono # 06/12/2020 0.4  0.3 - 1.0 K/uL Final    Eos # 06/12/2020 0.1  0.0 - 0.5 K/uL Final    Baso # 06/12/2020 0.03  0.00 - 0.20 K/uL Final    nRBC 06/12/2020 0  0 /100 WBC Final    Gran% 06/12/2020 67.0  38.0 - 73.0 % Final    Lymph% 06/12/2020 21.5  18.0 - 48.0 % Final    Mono% 06/12/2020 8.7  4.0 - 15.0 % Final    Eosinophil% 06/12/2020 1.6  0.0 - 8.0 % Final    Basophil% 06/12/2020 0.7  0.0 - 1.9 % Final     Differential Method 06/12/2020 Automated   Final   Lab Visit on 05/01/2020   Component Date Value Ref Range Status    WBC 05/01/2020 4.28  3.90 - 12.70 K/uL Final    RBC 05/01/2020 3.55* 4.00 - 5.40 M/uL Final    Hemoglobin 05/01/2020 11.4* 12.0 - 16.0 g/dL Final    Hematocrit 05/01/2020 36.2* 37.0 - 48.5 % Final    Mean Corpuscular Volume 05/01/2020 102* 82 - 98 fL Final    Mean Corpuscular Hemoglobin 05/01/2020 32.1* 27.0 - 31.0 pg Final    Mean Corpuscular Hemoglobin Conc 05/01/2020 31.5* 32.0 - 36.0 g/dL Final    RDW 05/01/2020 15.5* 11.5 - 14.5 % Final    Platelets 05/01/2020 194  150 - 350 K/uL Final    MPV 05/01/2020 8.9* 9.2 - 12.9 fL Final    Immature Granulocytes 05/01/2020 0.2  0.0 - 0.5 % Final    Gran # (ANC) 05/01/2020 2.9  1.8 - 7.7 K/uL Final    Immature Grans (Abs) 05/01/2020 0.01  0.00 - 0.04 K/uL Final    Lymph # 05/01/2020 0.8* 1.0 - 4.8 K/uL Final    Mono # 05/01/2020 0.4  0.3 - 1.0 K/uL Final    Eos # 05/01/2020 0.1  0.0 - 0.5 K/uL Final    Baso # 05/01/2020 0.02  0.00 - 0.20 K/uL Final    nRBC 05/01/2020 0  0 /100 WBC Final    Gran% 05/01/2020 68.7  38.0 - 73.0 % Final    Lymph% 05/01/2020 19.4  18.0 - 48.0 % Final    Mono% 05/01/2020 10.0  4.0 - 15.0 % Final    Eosinophil% 05/01/2020 1.2  0.0 - 8.0 % Final    Basophil% 05/01/2020 0.5  0.0 - 1.9 % Final    Differential Method 05/01/2020 Automated   Final    aPTT 05/01/2020 30.3  23.6 - 33.3 sec Final    PT 05/01/2020 12.9  10.6 - 14.8 sec Final    INR 05/01/2020 1.0   Final   Lab Visit on 03/20/2020   Component Date Value Ref Range Status    PT 03/20/2020 13.6  10.6 - 14.8 sec Final    INR 03/20/2020 1.1   Final    WBC 03/20/2020 6.78  3.90 - 12.70 K/uL Final    RBC 03/20/2020 3.70* 4.00 - 5.40 M/uL Final    Hemoglobin 03/20/2020 11.8* 12.0 - 16.0 g/dL Final    Hematocrit 03/20/2020 35.8* 37.0 - 48.5 % Final    Mean Corpuscular Volume 03/20/2020 97  82 - 98 fL Final    Mean Corpuscular Hemoglobin 03/20/2020  31.9* 27.0 - 31.0 pg Final    Mean Corpuscular Hemoglobin Conc 03/20/2020 33.0  32.0 - 36.0 g/dL Final    RDW 03/20/2020 14.4  11.5 - 14.5 % Final    Platelets 03/20/2020 151  150 - 350 K/uL Final    MPV 03/20/2020 9.8  9.2 - 12.9 fL Final    Immature Granulocytes 03/20/2020 0.4  0.0 - 0.5 % Final    Gran # (ANC) 03/20/2020 5.4  1.8 - 7.7 K/uL Final    Immature Grans (Abs) 03/20/2020 0.03  0.00 - 0.04 K/uL Final    Lymph # 03/20/2020 0.8* 1.0 - 4.8 K/uL Final    Mono # 03/20/2020 0.5  0.3 - 1.0 K/uL Final    Eos # 03/20/2020 0.1  0.0 - 0.5 K/uL Final    Baso # 03/20/2020 0.01  0.00 - 0.20 K/uL Final    nRBC 03/20/2020 0  0 /100 WBC Final    Gran% 03/20/2020 80.0* 38.0 - 73.0 % Final    Lymph% 03/20/2020 11.7* 18.0 - 48.0 % Final    Mono% 03/20/2020 6.6  4.0 - 15.0 % Final    Eosinophil% 03/20/2020 1.2  0.0 - 8.0 % Final    Basophil% 03/20/2020 0.1  0.0 - 1.9 % Final    Differential Method 03/20/2020 Automated   Final    aPTT 03/20/2020 28.8  23.6 - 33.3 sec Final       Past Medical History:   Diagnosis Date    Anemia of chronic renal failure, stage 5 8/19/2015    Ascites     Bleeding duodenal ulcer     CAD (coronary artery disease)     CAD (coronary artery disease)     Chest pain     CHF (congestive heart failure)     Chronic hepatitis C 8/19/2015    Colitis     Elevated troponin     ESRD 2/2 MPGN on HD since 12/16/13 8/19/2015    Gastritis     Hypertension 8/19/2015    Mitral valve regurgitation     Renal dialysis status     M, W, F    Secondary hyperparathyroidism of renal origin 8/19/2015     Past Surgical History:   Procedure Laterality Date    ANGIOGRAM, CORONARY, WITH LEFT HEART CATHETERIZATION Left 1/13/2020    Procedure: ANGIOGRAM,CORONARY,WITH LEFT HEART CATHETERIZATION;  Surgeon: Yahir Starkey MD;  [facility] CATH/EP LAB;  Service: Cardiology;  Laterality: Left;    AV FISTULA PLACEMENT  2013    COLONOSCOPY      CORONARY ANGIOPLASTY WITH STENT PLACEMENT       SALPINGOOPHORECTOMY Right 1997    TOTAL ABDOMINAL HYSTERECTOMY  1999     Family History   Problem Relation Age of Onset    Kidney disease Mother     Stroke Father     Psoriasis Brother     Breast cancer Neg Hx     Cancer Neg Hx     Colon cancer Neg Hx     Ovarian cancer Neg Hx        Marital Status:   Alcohol History:  reports no history of alcohol use.  Tobacco History:  reports that she has been smoking cigarettes. She has a 22.50 pack-year smoking history. She has never used smokeless tobacco.  Drug History:  reports current drug use. Drug: Marijuana.    Review of patient's allergies indicates:   Allergen Reactions    Ciprofloxacin Itching    Levaquin [levofloxacin] Itching    Quinolones Itching    Codeine Nausea Only       Current Outpatient Medications:     aspirin (ECOTRIN) 81 MG EC tablet, Take 81 mg by mouth once daily., Disp: , Rfl:     calcium acetate,phosphat bind, (PHOSLO) 667 mg capsule, Take 667 mg by mouth 3 (three) times daily with meals., Disp: , Rfl:     FA-VIT B COMP&C-SELENIUM-ZINC 3-70-15 MG-MCG-MG ORAL TAB, Take 1 tablet by mouth once daily., Disp: , Rfl:     HYDROcodone-acetaminophen (NORCO)  mg per tablet, Take 1 tablet by mouth every 8 (eight) hours as needed for Pain., Disp: 60 tablet, Rfl: 0    isosorbide mononitrate (IMDUR) 30 MG 24 hr tablet, Take 1 tablet (30 mg total) by mouth once daily., Disp: 30 tablet, Rfl: 11    lactulose (CHRONULAC) 10 gram/15 mL solution, TK 30 ML PO ONCE D, Disp: , Rfl:     lisinopril (PRINIVIL,ZESTRIL) 20 MG tablet, Take 1 tablet by mouth nightly. , Disp: , Rfl: 3    LOKELMA 5 gram PwPk, Take 5 g by mouth. Five times a week; on  Non dialysis days, Disp: , Rfl:     metoprolol succinate (TOPROL-XL) 50 MG 24 hr tablet, Take 50 mg by mouth nightly. , Disp: , Rfl:     nitroGLYCERIN (NITROSTAT) 0.4 MG SL tablet, Place 1 tablet (0.4 mg total) under the tongue every 5 (five) minutes as needed for Chest pain., Disp: 30 tablet, Rfl:  "0    pantoprazole (PROTONIX) 40 MG tablet, Take 40 mg by mouth 2 (two) times daily. , Disp: , Rfl:     polyethylene glycol (GLYCOLAX) 17 gram PwPk, Take 17 g by mouth daily as needed (constipation)., Disp: 30 each, Rfl: 0    rosuvastatin (CRESTOR) 10 MG tablet, Take 1 tablet (10 mg total) by mouth once daily., Disp: 90 tablet, Rfl: 3    traZODone (DESYREL) 50 MG tablet, Take 1 tablet (50 mg total) by mouth every evening., Disp: 30 tablet, Rfl: 11    Review of Systems   Constitutional: Negative for activity change, appetite change, diaphoresis, fatigue, fever and unexpected weight change.   HENT: Negative for congestion, sinus pressure, sinus pain, sneezing and sore throat.    Respiratory: Negative for cough, chest tightness, shortness of breath and wheezing.    Cardiovascular: Negative for chest pain and palpitations.   Gastrointestinal: Positive for abdominal distention (Pt is due for paracentesis tomorrow) and abdominal pain.        Objective:      Vitals:    09/17/20 0902   BP: 122/86   Pulse: 83   Resp: 18   Temp: 97 °F (36.1 °C)   SpO2: 96%   Weight: 66.4 kg (146 lb 4.8 oz)   Height: 5' 5" (1.651 m)     Physical Exam  Constitutional:       General: She is not in acute distress.     Appearance: Normal appearance. She is not ill-appearing or toxic-appearing.   HENT:      Head: Normocephalic and atraumatic.      Nose: Nose normal. No congestion or rhinorrhea.   Cardiovascular:      Rate and Rhythm: Normal rate and regular rhythm.      Heart sounds: Normal heart sounds. No murmur. No gallop.    Abdominal:      Comments: Tense Ascites    Skin:     General: Skin is warm and dry.   Neurological:      Mental Status: She is alert and oriented to person, place, and time.         Assessment:       1. Hospital discharge follow-up    2. Cirrhosis of liver with ascites, unspecified hepatic cirrhosis type    3. Coronary artery disease involving native heart, angina presence unspecified, unspecified vessel or lesion type  "        Plan:       Hospital discharge follow-up  Pt was previously interested in going to [facility] to get her care from [facility] and thus was never referred to another gastroenterologist. I will refer to the hepatology clcinic to possibly get treatment for her hepatitis and improve her chances for getting a CABG. She is due for Paracentesis. I have placed referral to CT surgeon for possible intervention.      Cirrhosis of liver with ascites, unspecified hepatic cirrhosis type  -     Ambulatory referral/consult to Hepatology; Future; Expected date: 09/24/2020  -     HYDROcodone-acetaminophen (NORCO)  mg per tablet; Take 1 tablet by mouth every 8 (eight) hours as needed for Pain.  Dispense: 60 tablet; Refill: 0    Coronary artery disease involving native heart, angina presence unspecified, unspecified vessel or lesion type  -     Ambulatory referral/consult to Vascular Surgery; Future; Expected date: 09/24/2020      No follow-ups on file.

## 2020-09-18 NOTE — PLAN OF CARE
8.7 liters of dianna drainage obtained fro paracentesis performed per dr yoly kumar to right abdominal insertion site cdi, no acute distress noted ,no labs ordered onfluid, discharge instructions given to pt ,verbalized understanding discharged to home with son via wheelchair

## 2020-09-25 PROBLEM — Z72.0 TOBACCO ABUSE: Chronic | Status: ACTIVE | Noted: 2020-01-01

## 2020-09-25 PROBLEM — R07.9 CHEST PAIN: Chronic | Status: ACTIVE | Noted: 2020-01-01

## 2020-09-25 PROBLEM — R18.8 ASCITES: Chronic | Status: ACTIVE | Noted: 2019-01-01

## 2020-09-25 PROBLEM — I38 VALVULAR HEART DISEASE: Chronic | Status: ACTIVE | Noted: 2020-01-01

## 2020-09-25 PROBLEM — R07.9 CHEST PAIN: Status: ACTIVE | Noted: 2020-01-01

## 2020-09-25 PROBLEM — I27.20 PULMONARY HYPERTENSION: Chronic | Status: ACTIVE | Noted: 2020-01-01

## 2020-09-25 NOTE — H&P
[facility]  History & Physical    Patient Name: Aurelia Saucedo  MRN: [id number]  Admission Date: 9/25/2020  Attending Physician: No att. providers found   Primary Care Provider: Dustin Ireland MD         Patient information was obtained from patient, past medical records and ER records.     Subjective:     Principal Problem:Chest pain    Chief Complaint:   Chief Complaint   Patient presents with    Chest Pain        HPI: Ms. Saucedo is a 55-year-old female who presented to the ED with chief complaint of chest pain.  Patient with recent [facility] admission 9/2 to 9/8 with non ST elevation MI in the setting of known multivessel coronary artery disease, left heart catheterization January 2020 with severe multivessel disease with total occlusion RCA with collaterals, diffuse LAD disease, high-grade stenosis proximal circ and diagonal, troponin peaked at 22, she was seen by cardiology and cardiothoracic surgery (Dr. Yeboah), medically managed with outpatient follow-up.  Of note patient was also seen at [facility] for consideration of CABG with valve replacement surgery however she was deemed too high risk due to her comorbidities.  Patient states since her discharge she has been having intermittent midsternal chest discomfort radiating up into her jaw, not related to activity (states she lives relatively sedentary lifestyle), worsening in the last today with increasing severity and no response to sublingual nitro by 3 prior to presentation which prompted ED visit.  States she has chronic shortness of breath without any recent change, constant abdominal pain due to the large volume ascites requiring paracentesis Q 2 weekly, for the last 18 months, states she is interested in getting catheter.  She states she was compliant with her medication therapy.  Continues to smoke quarter pack per day.  In the ED T 97.9°, , /85, saturating well on room air.  Labs with mild anemia,  BUN/creatinine 53/7.2, BNP 2 642, troponin 0.083, EKG sinus tach with T-wave inversions 1, aVL, V1/V2, she received aspirin 243 and sublingual nitro 0.4 mg.  Chest pain-free during my encounter.  Case discussed with ED provider.  Plan of care was discussed with patient and significant other present at bedside.  She is interested about optimizing her medications for her heart disease.    Past Medical History:   Diagnosis Date    Anemia of chronic renal failure, stage 5 8/19/2015    Ascites     Bleeding duodenal ulcer     CAD (coronary artery disease)     CAD (coronary artery disease)     Chest pain     CHF (congestive heart failure)     Chronic hepatitis C 8/19/2015    Colitis     Elevated troponin     ESRD 2/2 MPGN on HD since 12/16/13 8/19/2015    Gastritis     Hypertension 8/19/2015    Mitral valve regurgitation     Renal dialysis status     M, W, F    Secondary hyperparathyroidism of renal origin 8/19/2015       Past Surgical History:   Procedure Laterality Date    ANGIOGRAM, CORONARY, WITH LEFT HEART CATHETERIZATION Left 1/13/2020    Procedure: ANGIOGRAM,CORONARY,WITH LEFT HEART CATHETERIZATION;  Surgeon: Yahir Starkey MD;  Location: [facility] CATH/EP LAB;  Service: Cardiology;  Laterality: Left;    AV FISTULA PLACEMENT  2013    COLONOSCOPY      CORONARY ANGIOPLASTY WITH STENT PLACEMENT      SALPINGOOPHORECTOMY Right 1997    TOTAL ABDOMINAL HYSTERECTOMY  1999       Review of patient's allergies indicates:   Allergen Reactions    Ciprofloxacin Itching    Levaquin [levofloxacin] Itching    Quinolones Itching    Codeine Nausea Only       No current facility-administered medications on file prior to encounter.      Current Outpatient Medications on File Prior to Encounter   Medication Sig    aspirin (ECOTRIN) 81 MG EC tablet Take 81 mg by mouth once daily.    calcium acetate,phosphat bind, (PHOSLO) 667 mg capsule Take 667 mg by mouth 3 (three) times daily with meals.    FA-VIT B  COMP&C-SELENIUM-ZINC 3-70-15 MG-MCG-MG ORAL TAB Take 1 tablet by mouth once daily.    HYDROcodone-acetaminophen (NORCO)  mg per tablet Take 1 tablet by mouth every 8 (eight) hours as needed for Pain.    isosorbide mononitrate (IMDUR) 30 MG 24 hr tablet Take 1 tablet (30 mg total) by mouth once daily.    lactulose (CHRONULAC) 10 gram/15 mL solution TK 30 ML PO ONCE D    lisinopril (PRINIVIL,ZESTRIL) 20 MG tablet Take 1 tablet by mouth nightly.     LOKELMA 5 gram PwPk Take 5 g by mouth. Five times a week; on  Non dialysis days    metoprolol succinate (TOPROL-XL) 50 MG 24 hr tablet Take 50 mg by mouth nightly.     nitroGLYCERIN (NITROSTAT) 0.4 MG SL tablet Place 1 tablet (0.4 mg total) under the tongue every 5 (five) minutes as needed for Chest pain.    pantoprazole (PROTONIX) 40 MG tablet Take 40 mg by mouth 2 (two) times daily.     polyethylene glycol (GLYCOLAX) 17 gram PwPk Take 17 g by mouth daily as needed (constipation).    rosuvastatin (CRESTOR) 10 MG tablet Take 1 tablet (10 mg total) by mouth once daily.    traZODone (DESYREL) 50 MG tablet Take 1 tablet (50 mg total) by mouth every evening.     Family History     Problem Relation (Age of Onset)    Kidney disease Mother    Psoriasis Brother    Stroke Father        Tobacco Use    Smoking status: Current Every Day Smoker     Packs/day: 0.75     Years: 30.00     Pack years: 22.50     Types: Cigarettes     Last attempt to quit: 2019     Years since quittin.8    Smokeless tobacco: Never Used   Substance and Sexual Activity    Alcohol use: No     Alcohol/week: 0.0 standard drinks    Drug use: Yes     Types: Marijuana     Comment: occasionally    Sexual activity: Not on file     Review of Systems   Constitutional: Positive for fatigue. Negative for chills and fever.   HENT: Negative for trouble swallowing.    Respiratory: Positive for cough and shortness of breath.    Cardiovascular: Positive for chest pain and leg swelling (Chronic).    Gastrointestinal: Positive for abdominal pain (Chronic), constipation and nausea. Negative for blood in stool, diarrhea and vomiting.   Genitourinary:        Still produces urine, no recent change   Musculoskeletal: Positive for back pain and neck pain.   Skin: Positive for color change and rash.   Allergic/Immunologic: Negative for immunocompromised state.   Neurological: Negative for seizures, facial asymmetry and speech difficulty.   Psychiatric/Behavioral: Negative for confusion.     Objective:     Vital Signs (Most Recent):  Temp: 97.7 °F (36.5 °C) (09/25/20 0403)  Pulse: 90 (09/25/20 0500)  Resp: 14 (09/25/20 0500)  BP: 133/77 (09/25/20 0500)  SpO2: 98 % (09/25/20 0500) Vital Signs (24h Range):  Temp:  [97.7 °F (36.5 °C)] 97.7 °F (36.5 °C)  Pulse:  [] 90  Resp:  [14-24] 14  SpO2:  [98 %-100 %] 98 %  BP: (133-138)/(72-85) 133/77     Weight: 66.4 kg (146 lb 6.2 oz)  Body mass index is 25.13 kg/m².    Physical Exam  Vitals signs and nursing note reviewed.   Constitutional:       General: She is not in acute distress.     Appearance: Normal appearance.      Comments: Chronically ill-appearing, appears older than stated age, no acute distress   HENT:      Head: Normocephalic and atraumatic.      Comments: Wearing surgical mask     Mouth/Throat:      Mouth: Mucous membranes are moist.   Eyes:      General:         Right eye: No discharge.         Left eye: No discharge.      Conjunctiva/sclera: Conjunctivae normal.   Cardiovascular:      Rate and Rhythm: Regular rhythm. Tachycardia present.      Heart sounds: Murmur present.      Comments: No significant lower extremity edema, palpable peripheral pulses  Pulmonary:      Comments: Not on supplemental oxygen, bilateral inspiratory crackles at the bases  Abdominal:      Comments: Protuberant and distended however not tense, positive fluid thrill, bowel sounds present   Genitourinary:     Comments: No Pedraza catheter  Skin:     Comments: Hyperpigmentation  bilateral lower extremity consistent , no open areas/drainage   Neurological:      General: No focal deficit present.      Mental Status: She is alert and oriented to person, place, and time.      Comments: Speech intact.  Following commands.  Answering questions appropriately.  Moving all 4 extremities   Psychiatric:         Mood and Affect: Mood normal.             Significant Labs:   Bilirubin:   Recent Labs   Lab 09/02/20  1450 09/25/20  0419   BILITOT 0.6 0.7     Blood Culture: No results for input(s): LABBLOO in the last 48 hours.  BMP:   Recent Labs   Lab 09/25/20  0419   *      K 4.1      CO2 22*   BUN 53*   CREATININE 7.2*   CALCIUM 9.1     CBC:   Recent Labs   Lab 09/25/20 0419   WBC 10.17   HGB 11.9*   HCT 36.9*        CMP:   Recent Labs   Lab 09/25/20  0419      K 4.1      CO2 22*   *   BUN 53*   CREATININE 7.2*   CALCIUM 9.1   PROT 5.9*   ALBUMIN 2.7*   BILITOT 0.7   ALKPHOS 84   AST 28   ALT 22   ANIONGAP 14   EGFRNONAA 5.9*     Cardiac Markers:   Recent Labs   Lab 09/25/20 0419   BNP 2,642*     Lactic Acid: No results for input(s): LACTATE in the last 48 hours.  Lipid Panel: No results for input(s): CHOL, HDL, LDLCALC, TRIG, CHOLHDL in the last 48 hours.  Magnesium: No results for input(s): MG in the last 48 hours.  POCT Glucose: No results for input(s): POCTGLUCOSE in the last 48 hours.  Respiratory Culture: No results for input(s): GSRESP, RESPIRATORYC in the last 48 hours.  Troponin:   Recent Labs   Lab 09/25/20 0419   TROPONINI 0.083*     TSH: No results for input(s): TSH in the last 4320 hours.  Urine Culture: No results for input(s): LABURIN in the last 48 hours.  Urine Studies: No results for input(s): COLORU, APPEARANCEUA, PHUR, SPECGRAV, PROTEINUA, GLUCUA, KETONESU, BILIRUBINUA, OCCULTUA, NITRITE, UROBILINOGEN, LEUKOCYTESUR, RBCUA, WBCUA, BACTERIA, SQUAMEPITHEL, HYALINECASTS in the last 48 hours.    Invalid input(s): JESENIA  All pertinent  labs within the past 24 hours have been reviewed.    Significant Imaging: I have reviewed all pertinent imaging results/findings within the past 24 hours.     Echocardiogram  · Concentric left ventricular hypertrophy.  · The estimated PA systolic pressure is 60 mmHg.  · Normal left ventricular systolic function. The estimated ejection fraction is 60%.  · Grade II (moderate) left ventricular diastolic dysfunction consistent with pseudonormalization.  · Mild left atrial enlargement.  · Mild right atrial enlargement.  · Moderate-to-severe aortic valve stenosis.  · Aortic valve area is 1.35 cm2; peak velocity is 2.76 m/s; mean gradient is 18 mmHg.  · Mild aortic regurgitation.  · Moderate mitral regurgitation.  · Elevated central venous pressure (15 mmHg).  · Pulmonary hypertension present.       [facility] 1/2020  · Severe multi-vessel disease with total occlusion of the right coronary artery with left-to-right collaterals and says high-grade disease in the proximal circumflex and diffuse disease in the LAD with high-grade diagonal stenosis as described above  · Normal left ventricular systolic function with an ejection fraction of greater than 60%  · Normally appearing left internal mammary artery      Assessment/Plan:     * Chest pain with known history of multivessel coronary artery disease  Patient presenting with recurrent chest pain concerning for angina in setting of known multivessel coronary artery disease.  Left heart catheterization January 2020 with total occlusion RCA with collateral, diffuse LAD disease, high-grade disease proximal circ and diagonal.  Recent admission earlier this month with non ST elevation MI, manage medically.  Patient was seen by Dr. Yeboah as well as CTS team at [facility], she was deemed too high risk, will need concurrent valvular surgery  States she has been using nitroglycerin regularly  Troponin is much improved compared to previous, EKG with T-wave inversion, no ST elevation  Admit  observation, cardiac telemetry  Trending cardiac biomarkers  Continuing anti anginal including Imdur and beta-blocker, will defer to Cardiology for uptitration/adjustment  Consulting cardiology and cardiothoracic surgery      Coronary artery disease  Multivessel coronary artery disease.  Has had PCI in 2013 however most recent CT with multivessel disease requiring CABG.      Recurrent ascites in the setting of end-stage liver disease with portal hypertension  Known history of end-stage liver disease with chronic hepatitis C (untreated, large volume ascites requiring paracentesis Q 2 weekly), last on 09/18, 8.7 L drain.  Abdomen is distended however not tense.  Depending on clinical course may consider paracentesis while hospitalized.  She was not able to tolerate Aldactone in the past due to hyperkalemia.      Tobacco abuse  Current smoker, quantified quarter pack per day.      Pulmonary hypertension  PSP of 60      Valvular heart disease  Moderate to severe aortic stenosis, moderate mitral regurgitation      Anemia of chronic disease  During previous admission required transfusion, trending CBC      Chronic diastolic heart failure  Last echo with EF of 60% with grade 2 diastolic dysfunction with significant valvular abnormalities and pulmonary hypertension  Patient is end-stage liver and end-stage renal, dialysis dependent.      Cryoglobulinemia  Patient has rash to the bilateral lower extremity .  No current open areas or drainage    Hepatic cirrhosis with stigmata of disease: varices, ascites  End-stage liver disease.      Chronic hepatitis C  Complicated by end-stage liver disease, untreated      ESRD 2/2 MPGN on HD since 12/16/13   On dialysis Tuesday and Saturday via left upper extremity AVF, no missed sessions.  Renally dosing all medications and avoid nephrotoxin drugs  Consulting Nephrology for ongoing dialysis        VTE Risk Mitigation (From admission, onward)         Ordered     IP VTE HIGH RISK PATIENT   Once      09/25/20 0604     Place sequential compression device  Until discontinued      09/25/20 0604                   Alla Duenas MD  Department of Hospital Medicine   [facility]

## 2020-09-25 NOTE — CONSULTS
Nephrology Consult Note        Patient Name: Aurelia Saucedo  MRN: [id number]    Patient Class: OP- Observation   Admission Date: 9/25/2020  Length of Stay: 0 days  Date of Service: 9/25/2020    Attending Physician: Anibal Kramer MD  Primary Care Provider: Dustin Ireland MD    Reason for Consult: esrd/anemia/htn/shpt/chest pain    SUBJECTIVE:     HPI: 55F with many medical problems including ESRD on HD Tue-Sat, cirrhosis, inoperable CAD is admitted again for chest pain, responded to medical therapy. Plan for HD in AM if still hospitalized, per outpatient schedule.    Past Medical History:   Diagnosis Date    Anemia of chronic renal failure, stage 5 8/19/2015    Ascites     Bleeding duodenal ulcer     CAD (coronary artery disease)     CAD (coronary artery disease)     Chest pain     CHF (congestive heart failure)     Chronic hepatitis C 8/19/2015    Colitis     Elevated troponin     ESRD 2/2 MPGN on HD since 12/16/13 8/19/2015    Gastritis     Hypertension 8/19/2015    Mitral valve regurgitation     Renal dialysis status     M, W, F    Secondary hyperparathyroidism of renal origin 8/19/2015     Past Surgical History:   Procedure Laterality Date    ANGIOGRAM, CORONARY, WITH LEFT HEART CATHETERIZATION Left 1/13/2020    Procedure: ANGIOGRAM,CORONARY,WITH LEFT HEART CATHETERIZATION;  Surgeon: Yahir Starkey MD;  Location: [facility] CATH/EP LAB;  Service: Cardiology;  Laterality: Left;    AV FISTULA PLACEMENT  2013    COLONOSCOPY      CORONARY ANGIOPLASTY WITH STENT PLACEMENT      SALPINGOOPHORECTOMY Right 1997    TOTAL ABDOMINAL HYSTERECTOMY  1999     Family History   Problem Relation Age of Onset    Kidney disease Mother     Stroke Father     Psoriasis Brother     Breast cancer Neg Hx     Cancer Neg Hx     Colon cancer Neg Hx     Ovarian cancer Neg Hx      Social History     Tobacco Use    Smoking status: Current Every Day Smoker     Packs/day: 0.75     Years: 30.00     Pack years: 22.50      Types: Cigarettes     Last attempt to quit: 2019     Years since quittin.8    Smokeless tobacco: Never Used   Substance Use Topics    Alcohol use: No     Alcohol/week: 0.0 standard drinks    Drug use: Yes     Types: Marijuana     Comment: occasionally       Review of patient's allergies indicates:   Allergen Reactions    Ciprofloxacin Itching    Levaquin [levofloxacin] Itching    Quinolones Itching    Codeine Nausea Only       Outpatient meds:  No current facility-administered medications on file prior to encounter.      Current Outpatient Medications on File Prior to Encounter   Medication Sig Dispense Refill    aspirin (ECOTRIN) 81 MG EC tablet Take 81 mg by mouth once daily.      calcium acetate,phosphat bind, (PHOSLO) 667 mg capsule Take 667 mg by mouth 3 (three) times daily with meals.      FA-VIT B COMP&C-SELENIUM-ZINC 3-70-15 MG-MCG-MG ORAL TAB Take 1 tablet by mouth once daily.      HYDROcodone-acetaminophen (NORCO)  mg per tablet Take 1 tablet by mouth every 8 (eight) hours as needed for Pain. 60 tablet 0    isosorbide mononitrate (IMDUR) 30 MG 24 hr tablet Take 1 tablet (30 mg total) by mouth once daily. 30 tablet 11    lisinopril (PRINIVIL,ZESTRIL) 20 MG tablet Take 1 tablet by mouth nightly.   3    LOKELMA 5 gram PwPk Take 5 g by mouth. Five times a week; on  Non dialysis days      metoprolol succinate (TOPROL-XL) 50 MG 24 hr tablet Take 50 mg by mouth nightly.       nitroGLYCERIN (NITROSTAT) 0.4 MG SL tablet Place 1 tablet (0.4 mg total) under the tongue every 5 (five) minutes as needed for Chest pain. 30 tablet 0    pantoprazole (PROTONIX) 40 MG tablet Take 40 mg by mouth 2 (two) times daily.       polyethylene glycol (GLYCOLAX) 17 gram PwPk Take 17 g by mouth daily as needed (constipation). 30 each 0    rosuvastatin (CRESTOR) 10 MG tablet Take 1 tablet (10 mg total) by mouth once daily. 90 tablet 3    lactulose (CHRONULAC) 10 gram/15 mL solution TK 30 ML PO ONCE D       traZODone (DESYREL) 50 MG tablet Take 1 tablet (50 mg total) by mouth every evening. 30 tablet 11       Scheduled meds:   aspirin  81 mg Oral Daily    calcium carbonate  500 mg Oral TID WM    isosorbide mononitrate  60 mg Oral Daily    lactulose  30 g Oral Daily    lisinopriL  5 mg Oral Nightly    metoprolol succinate  50 mg Oral Nightly    pantoprazole  40 mg Oral BID    rosuvastatin  10 mg Oral Daily    sodium zirconium cyclosilicate  5 g Oral Once per day on Sun Mon Wed Fri    traZODone  50 mg Oral QHS       Infusions:      PRN meds:  HYDROcodone-acetaminophen, melatonin, nitroGLYCERIN, ondansetron, sodium chloride 0.9%    Review of Systems:  Review of Systems   Constitutional: Negative for chills, fever, malaise/fatigue and weight loss.   HENT: Negative for hearing loss and nosebleeds.    Eyes: Negative for blurred vision, double vision and photophobia.   Respiratory: Negative for cough, shortness of breath and wheezing.    Cardiovascular: Negative for chest pain, palpitations and leg swelling.   Gastrointestinal: Negative for abdominal pain, constipation, diarrhea, heartburn, nausea and vomiting.   Genitourinary: Negative for dysuria, frequency and urgency.   Musculoskeletal: Negative for falls, joint pain and myalgias.   Skin: Negative for itching and rash.   Neurological: Negative for dizziness, speech change, focal weakness, loss of consciousness and headaches.   Endo/Heme/Allergies: Does not bruise/bleed easily.   Psychiatric/Behavioral: Negative for depression and substance abuse. The patient is not nervous/anxious.        OBJECTIVE:     Vital Signs and IO (Last 24H):  Temp:  [97.7 °F (36.5 °C)-98.7 °F (37.1 °C)]   Pulse:  []   Resp:  [14-24]   BP: (117-138)/(72-86)   SpO2:  [97 %-100 %]   No intake/output data recorded.    Wt Readings from Last 5 Encounters:   09/25/20 62.2 kg (137 lb 2 oz)   09/17/20 66.4 kg (146 lb 4.8 oz)   09/08/20 61.3 kg (135 lb 2.3 oz)   09/04/20 60.8 kg (134  lb)   03/12/20 59.6 kg (131 lb 6.3 oz)         Physical Exam:  Physical Exam  Constitutional:       Appearance: She is well-developed. She is not diaphoretic.   HENT:      Head: Normocephalic and atraumatic.   Eyes:      General: No scleral icterus.     Pupils: Pupils are equal, round, and reactive to light.   Neck:      Musculoskeletal: Neck supple.   Cardiovascular:      Rate and Rhythm: Normal rate and regular rhythm.   Pulmonary:      Effort: Pulmonary effort is normal. No respiratory distress.      Breath sounds: No stridor.   Abdominal:      General: There is no distension.      Palpations: Abdomen is soft.   Musculoskeletal: Normal range of motion.         General: No deformity.   Skin:     General: Skin is warm and dry.      Findings: No erythema or rash.   Neurological:      Mental Status: She is alert and oriented to person, place, and time.      Cranial Nerves: No cranial nerve deficit.   Psychiatric:         Behavior: Behavior normal.         Body mass index is 23.54 kg/m².    Laboratory:  Recent Labs   Lab 09/25/20  0419      K 4.1      CO2 22*   BUN 53*   CREATININE 7.2*   ESTGFRAFRICA 6.8*   EGFRNONAA 5.9*   *       Recent Labs   Lab 09/25/20  0419   CALCIUM 9.1   ALBUMIN 2.7*             No results for input(s): POCTGLUCOSE in the last 168 hours.    Recent Labs   Lab 11/19/19  0447 01/08/20  0429   Hemoglobin A1C SEE COMMENT SEE COMMENT       Recent Labs   Lab 09/25/20  0419   WBC 10.17   HGB 11.9*   HCT 36.9*      MCV 96   MCHC 32.2   MONO 4.9  0.5       Recent Labs   Lab 09/25/20  0419   BILITOT 0.7   PROT 5.9*   ALBUMIN 2.7*   ALKPHOS 84   ALT 22   AST 28       Recent Labs   Lab 11/19/19  0316 12/23/19  0344 01/07/20  2353   Color, UA Yellow Yellow Yellow   Appearance, UA Hazy A Clear Clear   pH, UA >8.0 A >8.0 A >8.0 A   Specific [place], UA 1.010 1.015 1.015   Protein, UA 2+ A 2+ A 2+ A   Glucose, UA Negative Negative Negative   Ketones, UA Negative Negative Negative    Urobilinogen, UA Negative Negative Negative   Bilirubin (UA) Negative Negative Negative   Occult Blood UA 2+ A 2+ A 1+ A   Nitrite, UA Negative Negative Negative   RBC, UA 30 H 30 H 9 H   WBC, UA >100 H 1 0   Bacteria Moderate A Negative Negative   Hyaline Casts, UA 1 3 A 13 A             Microbiology Results (last 7 days)     ** No results found for the last 168 hours. 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       ESRD on HD Tue Sat via cath  Continue current dialysis prescription.  Next HD per schedule.  Renal diet - low K, low phos.  No IVs or BP checks on access and/or non-dominant arm.    Anemia of CKD  Hgb and HCT are acceptable. Monitor.  Will provide MERON and/or IV iron PRN.    MBD / Secondary HPT  Ca, phos, PTH and vitamin D levels are acceptable.   Phos binders, vitamin D analogues and calcimimetics as needed.    HTN  BP seem controlled.   Tolerate asymptomatic HTN up to -160.  Continue home meds.  Low sodium diet.    Chest pain  Management per medical team and cards.    Thank you for allowing us to participate in the care of your patient!   We will follow the patient and  provide recommendations as needed.    Luciano Campos MD    [facility]  [address] [place] [id number]    [phone] - tel  [phone] - fax    9/25/2020 12:57 PM

## 2020-09-25 NOTE — PLAN OF CARE
Medicare Outpatient Observation Notice was signed, explained and given to patient/caregiver on 09/25/2020 at 10:42am     answered all questions

## 2020-09-25 NOTE — PLAN OF CARE
09/25/20 0818   Patient Assessment/Suction   Level of Consciousness (AVPU) alert   PRE-TX-O2   O2 Device (Oxygen Therapy) room air   SpO2 97 %   Pulse Oximetry Type Continuous   $ Pulse Oximetry - Multiple Charge Pulse Oximetry - Multiple   Pulse 89   Resp 19   Respiratory Evaluation   $ Care Plan Tech Time 15 min

## 2020-09-25 NOTE — PLAN OF CARE
"Pt appeared in discomfort, this CM asked if she in pain and she said most of the time with this same CP that I have been having, and explained that "she has been denied a heart Transplant from [facility] and other facilities due to kidneys and liver," Calmly asked if she wanted me to come back for short assessment, she said it will prob be the same then, and had no prob doing it now.    Pharmacy used is [facility] in [facility] on [address]. Her PCP and Cards is Dr Grupo Yeboah.     09/25/20 1106   Discharge Assessment   Assessment Type Discharge Planning Assessment   Confirmed/corrected address and phone number on facesheet? Yes   Assessment information obtained from? Patient   Expected Length of Stay (days) 2   Communicated expected length of stay with patient/caregiver yes   Prior to hospitilization cognitive status: Alert/Oriented   Prior to hospitalization functional status: Independent;Assistive Equipment   Current cognitive status: Alert/Oriented   Current Functional Status: Independent;Assistive Equipment   Facility Arrived From: Home   Lives With spouse   Able to Return to Prior Arrangements yes   Is patient able to care for self after discharge? Yes   Who are your caregiver(s) and their phone number(s)? Spouse Mr Jose Saucedo at cell [phone]   Patient's perception of discharge disposition home or selfcare   Readmission Within the Last 30 Days previous discharge plan unsuccessful   If yes, most recent facility name: [facility] per pt, CP comes and goes, MD aware   Patient currently being followed by outpatient case management? Yes   If yes, name of outpatient case management following: other (comments)  (Count includes the [facility] CM/SW)   Patient currently receives any other outside agency services? No   Equipment Currently Used at Home rollator  (Built in Shower Chair)   Part D Coverage Medicare and BCBS   Do you have any problems affording any of your prescribed medications? No   Is the patient taking " medications as prescribed? yes   Does the patient have transportation home? Yes   Transportation Anticipated family or friend will provide   Dialysis Name and Scheduled days [facility] on [address] in [place] on Tu and Sat- No Thurs for 7 yrs.   Does the patient receive services at the [facility]? No   Discharge Plan A Home with family;Home Health  ([facility])   Discharge Plan B Home with family;Home Health   DME Needed Upon Discharge  none   Patient/Family in Agreement with Plan yes   Readmission Questionnaire   At the time of your discharge, did someone talk to you about what your health problems were? Yes   At the time of discharge, did someone talk to you about what to watch out for regarding worsening of your health problem? Yes   At the time of discharge, did someone talk to you about what to do if you experienced worsening of your health problem? Yes   At the time of discharge, did someone talk to you about which medication to take when you left the hospital and which ones to stop taking? Yes   At the time of discharge, did someone talk to you about when and where to follow up with a doctor after you left the hospital? Yes   What do you believe caused you to be sick enough to be re-admitted? CP again per pt   How often do you need to have someone help you when you read instructions, pamphlets, or other written material from your doctor or pharmacy? Never   Do you have problems taking your medications as prescribed? No   Do you have any problems affording any of  your prescribed medications? No   Do you have problems obtaining/receiving your medications? No   Does the patient have transportation to healthcare appointments? Yes   Living Arrangements house   Does the patient have family/friends to help with healtcare needs after discharge? yes   Does your caregiver provide all the help you need? Yes   Are you currently feeling confused? No   Are you currently having problems thinking? No   Are you  currently having memory problems? No

## 2020-09-25 NOTE — SUBJECTIVE & OBJECTIVE
Past Medical History:   Diagnosis Date    Anemia of chronic renal failure, stage 5 8/19/2015    Ascites     Bleeding duodenal ulcer     CAD (coronary artery disease)     CAD (coronary artery disease)     Chest pain     CHF (congestive heart failure)     Chronic hepatitis C 8/19/2015    Colitis     Elevated troponin     ESRD 2/2 MPGN on HD since 12/16/13 8/19/2015    Gastritis     Hypertension 8/19/2015    Mitral valve regurgitation     Renal dialysis status     M, W, F    Secondary hyperparathyroidism of renal origin 8/19/2015       Past Surgical History:   Procedure Laterality Date    ANGIOGRAM, CORONARY, WITH LEFT HEART CATHETERIZATION Left 1/13/2020    Procedure: ANGIOGRAM,CORONARY,WITH LEFT HEART CATHETERIZATION;  Surgeon: Yahir Starkey MD;  Location: [facility] CATH/EP LAB;  Service: Cardiology;  Laterality: Left;    AV FISTULA PLACEMENT  2013    COLONOSCOPY      CORONARY ANGIOPLASTY WITH STENT PLACEMENT      SALPINGOOPHORECTOMY Right 1997    TOTAL ABDOMINAL HYSTERECTOMY  1999       Review of patient's allergies indicates:   Allergen Reactions    Ciprofloxacin Itching    Levaquin [levofloxacin] Itching    Quinolones Itching    Codeine Nausea Only       No current facility-administered medications on file prior to encounter.      Current Outpatient Medications on File Prior to Encounter   Medication Sig    aspirin (ECOTRIN) 81 MG EC tablet Take 81 mg by mouth once daily.    calcium acetate,phosphat bind, (PHOSLO) 667 mg capsule Take 667 mg by mouth 3 (three) times daily with meals.    FA-VIT B COMP&C-SELENIUM-ZINC 3-70-15 MG-MCG-MG ORAL TAB Take 1 tablet by mouth once daily.    HYDROcodone-acetaminophen (NORCO)  mg per tablet Take 1 tablet by mouth every 8 (eight) hours as needed for Pain.    isosorbide mononitrate (IMDUR) 30 MG 24 hr tablet Take 1 tablet (30 mg total) by mouth once daily.    lactulose (CHRONULAC) 10 gram/15 mL solution TK 30 ML PO ONCE D    lisinopril  (PRINIVIL,ZESTRIL) 20 MG tablet Take 1 tablet by mouth nightly.     LOKELMA 5 gram PwPk Take 5 g by mouth. Five times a week; on  Non dialysis days    metoprolol succinate (TOPROL-XL) 50 MG 24 hr tablet Take 50 mg by mouth nightly.     nitroGLYCERIN (NITROSTAT) 0.4 MG SL tablet Place 1 tablet (0.4 mg total) under the tongue every 5 (five) minutes as needed for Chest pain.    pantoprazole (PROTONIX) 40 MG tablet Take 40 mg by mouth 2 (two) times daily.     polyethylene glycol (GLYCOLAX) 17 gram PwPk Take 17 g by mouth daily as needed (constipation).    rosuvastatin (CRESTOR) 10 MG tablet Take 1 tablet (10 mg total) by mouth once daily.    traZODone (DESYREL) 50 MG tablet Take 1 tablet (50 mg total) by mouth every evening.     Family History     Problem Relation (Age of Onset)    Kidney disease Mother    Psoriasis Brother    Stroke Father        Tobacco Use    Smoking status: Current Every Day Smoker     Packs/day: 0.75     Years: 30.00     Pack years: 22.50     Types: Cigarettes     Last attempt to quit: 2019     Years since quittin.8    Smokeless tobacco: Never Used   Substance and Sexual Activity    Alcohol use: No     Alcohol/week: 0.0 standard drinks    Drug use: Yes     Types: Marijuana     Comment: occasionally    Sexual activity: Not on file     Review of Systems   Constitutional: Positive for fatigue. Negative for chills and fever.   HENT: Negative for trouble swallowing.    Respiratory: Positive for cough and shortness of breath.    Cardiovascular: Positive for chest pain and leg swelling (Chronic).   Gastrointestinal: Positive for abdominal pain (Chronic), constipation and nausea. Negative for blood in stool, diarrhea and vomiting.   Genitourinary:        Still produces urine, no recent change   Musculoskeletal: Positive for back pain and neck pain.   Skin: Positive for color change and rash.   Allergic/Immunologic: Negative for immunocompromised state.   Neurological: Negative for  seizures, facial asymmetry and speech difficulty.   Psychiatric/Behavioral: Negative for confusion.     Objective:     Vital Signs (Most Recent):  Temp: 97.7 °F (36.5 °C) (09/25/20 0403)  Pulse: 90 (09/25/20 0500)  Resp: 14 (09/25/20 0500)  BP: 133/77 (09/25/20 0500)  SpO2: 98 % (09/25/20 0500) Vital Signs (24h Range):  Temp:  [97.7 °F (36.5 °C)] 97.7 °F (36.5 °C)  Pulse:  [] 90  Resp:  [14-24] 14  SpO2:  [98 %-100 %] 98 %  BP: (133-138)/(72-85) 133/77     Weight: 66.4 kg (146 lb 6.2 oz)  Body mass index is 25.13 kg/m².    Physical Exam  Vitals signs and nursing note reviewed.   Constitutional:       General: She is not in acute distress.     Appearance: Normal appearance.      Comments: Chronically ill-appearing, appears older than stated age, no acute distress   HENT:      Head: Normocephalic and atraumatic.      Comments: Wearing surgical mask     Mouth/Throat:      Mouth: Mucous membranes are moist.   Eyes:      General:         Right eye: No discharge.         Left eye: No discharge.      Conjunctiva/sclera: Conjunctivae normal.   Cardiovascular:      Rate and Rhythm: Regular rhythm. Tachycardia present.      Heart sounds: Murmur present.      Comments: No significant lower extremity edema, palpable peripheral pulses  Pulmonary:      Comments: Not on supplemental oxygen, bilateral inspiratory crackles at the bases  Abdominal:      Comments: Protuberant and distended however not tense, positive fluid thrill, bowel sounds present   Genitourinary:     Comments: No Pedraza catheter  Skin:     Comments: Hyperpigmentation bilateral lower extremity consistent , no open areas/drainage   Neurological:      General: No focal deficit present.      Mental Status: She is alert and oriented to person, place, and time.      Comments: Speech intact.  Following commands.  Answering questions appropriately.  Moving all 4 extremities   Psychiatric:         Mood and Affect: Mood normal.             Significant Labs:    Bilirubin:   Recent Labs   Lab 09/02/20  1450 09/25/20  0419   BILITOT 0.6 0.7     Blood Culture: No results for input(s): LABBLOO in the last 48 hours.  BMP:   Recent Labs   Lab 09/25/20  0419   *      K 4.1      CO2 22*   BUN 53*   CREATININE 7.2*   CALCIUM 9.1     CBC:   Recent Labs   Lab 09/25/20 0419   WBC 10.17   HGB 11.9*   HCT 36.9*        CMP:   Recent Labs   Lab 09/25/20  0419      K 4.1      CO2 22*   *   BUN 53*   CREATININE 7.2*   CALCIUM 9.1   PROT 5.9*   ALBUMIN 2.7*   BILITOT 0.7   ALKPHOS 84   AST 28   ALT 22   ANIONGAP 14   EGFRNONAA 5.9*     Cardiac Markers:   Recent Labs   Lab 09/25/20 0419   BNP 2,642*     Lactic Acid: No results for input(s): LACTATE in the last 48 hours.  Lipid Panel: No results for input(s): CHOL, HDL, LDLCALC, TRIG, CHOLHDL in the last 48 hours.  Magnesium: No results for input(s): MG in the last 48 hours.  POCT Glucose: No results for input(s): POCTGLUCOSE in the last 48 hours.  Respiratory Culture: No results for input(s): GSRESP, RESPIRATORYC in the last 48 hours.  Troponin:   Recent Labs   Lab 09/25/20 0419   TROPONINI 0.083*     TSH: No results for input(s): TSH in the last 4320 hours.  Urine Culture: No results for input(s): LABURIN in the last 48 hours.  Urine Studies: No results for input(s): COLORU, APPEARANCEUA, PHUR, SPECGRAV, PROTEINUA, GLUCUA, KETONESU, BILIRUBINUA, OCCULTUA, NITRITE, UROBILINOGEN, LEUKOCYTESUR, RBCUA, WBCUA, BACTERIA, SQUAMEPITHEL, HYALINECASTS in the last 48 hours.    Invalid input(s): WRIGHTSUR  All pertinent labs within the past 24 hours have been reviewed.    Significant Imaging: I have reviewed all pertinent imaging results/findings within the past 24 hours.     Echocardiogram  · Concentric left ventricular hypertrophy.  · The estimated PA systolic pressure is 60 mmHg.  · Normal left ventricular systolic function. The estimated ejection fraction is 60%.  · Grade II (moderate) left  ventricular diastolic dysfunction consistent with pseudonormalization.  · Mild left atrial enlargement.  · Mild right atrial enlargement.  · Moderate-to-severe aortic valve stenosis.  · Aortic valve area is 1.35 cm2; peak velocity is 2.76 m/s; mean gradient is 18 mmHg.  · Mild aortic regurgitation.  · Moderate mitral regurgitation.  · Elevated central venous pressure (15 mmHg).  · Pulmonary hypertension present.       [facility] 1/2020  · Severe multi-vessel disease with total occlusion of the right coronary artery with left-to-right collaterals and says high-grade disease in the proximal circumflex and diffuse disease in the LAD with high-grade diagonal stenosis as described above  · Normal left ventricular systolic function with an ejection fraction of greater than 60%  · Normally appearing left internal mammary artery

## 2020-09-25 NOTE — ASSESSMENT & PLAN NOTE
Patient presenting with recurrent chest pain concerning for angina in setting of known multivessel coronary artery disease.  Left heart catheterization January 2020 with total occlusion RCA with collateral, diffuse LAD disease, high-grade disease proximal circ and diagonal.  Recent admission earlier this month with non ST elevation MI, manage medically.  Patient was seen by Dr. Yeboah as well as CTS team at [facility], she was deemed too high risk, will need concurrent valvular surgery  States she has been using nitroglycerin regularly  Troponin is much improved compared to previous, EKG with T-wave inversion, no ST elevation  Admit observation, cardiac telemetry  Trending cardiac biomarkers  Continuing anti anginal including Imdur and beta-blocker, will defer to Cardiology for uptitration/adjustment  Consulting cardiology and cardiothoracic surgery

## 2020-09-25 NOTE — ASSESSMENT & PLAN NOTE
Last echo with EF of 60% with grade 2 diastolic dysfunction with significant valvular abnormalities and pulmonary hypertension  Patient is end-stage liver and end-stage renal, dialysis dependent.

## 2020-09-25 NOTE — ED PROVIDER NOTES
"Encounter Date: 9/25/2020       History     Chief Complaint   Patient presents with    Chest Pain     HPI     55-year-old female with past medical history of ESRD on HD, coronary artery disease, liver cirrhosis, presents with chest pain starting today.  Patient was recently admitted for an NSTEMI and found to have multi-vessel disease, however has been unable to get a CABG secondary to her multiple comorbidities.    The patient states she has been eating nitroglycerin tablets "like candy, which does relieve her pain briefly.  She takes a daily aspirin.      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 since 12/16/13 8/19/2015    Gastritis     Hypertension 8/19/2015    Mitral valve regurgitation     Renal dialysis status     M, W, F    Secondary hyperparathyroidism of renal origin 8/19/2015     Past Surgical History:   Procedure Laterality Date    ANGIOGRAM, CORONARY, WITH LEFT HEART CATHETERIZATION Left 1/13/2020    Procedure: ANGIOGRAM,CORONARY,WITH LEFT HEART CATHETERIZATION;  Surgeon: Yahir Starkey MD;  Location: [facility] CATH/EP LAB;  Service: Cardiology;  Laterality: Left;    AV FISTULA PLACEMENT  2013    COLONOSCOPY      CORONARY ANGIOPLASTY WITH STENT PLACEMENT      SALPINGOOPHORECTOMY Right 1997    TOTAL ABDOMINAL HYSTERECTOMY  1999     Family History   Problem Relation Age of Onset    Kidney disease Mother     Stroke Father     Psoriasis Brother     Breast cancer Neg Hx     Cancer Neg Hx     Colon cancer Neg Hx     " Ovarian cancer Neg Hx      Social History     Tobacco Use    Smoking status: Current Every Day Smoker     Packs/day: 0.75     Years: 30.00     Pack years: 22.50     Types: Cigarettes     Last attempt to quit: 2019     Years since quittin.8    Smokeless tobacco: Never Used   Substance Use Topics    Alcohol use: No     Alcohol/week: 0.0 standard drinks    Drug use: Yes     Types: Marijuana     Comment: occasionally     Review of Systems   Constitutional: Negative for diaphoresis and fever.   HENT: Negative for congestion and sore throat.    Respiratory: Positive for shortness of breath.    Cardiovascular: Positive for chest pain.   Gastrointestinal: Positive for abdominal distention. Negative for vomiting.   Musculoskeletal: Positive for back pain.   Skin: Negative for pallor.   Neurological: Negative for facial asymmetry and weakness.   Psychiatric/Behavioral: Negative for agitation and confusion.       Physical Exam     Initial Vitals   BP Pulse Resp Temp SpO2   20 0407 20 0403 20 0403 20 0403 20 0407   137/85 102 (!) 24 97.7 °F (36.5 °C) 98 %      MAP       --                Physical Exam    Nursing note and vitals reviewed.  Constitutional: She appears well-developed and well-nourished.   HENT:   Head: Normocephalic and atraumatic.   Mouth/Throat: Oropharynx is clear and moist.   Eyes: Pupils are equal, round, and reactive to light.   Neck: Normal range of motion. Neck supple.   Cardiovascular:   tachycardia   Pulmonary/Chest: No respiratory distress.   Abdominal: Soft. She exhibits distension (Ascites). There is no abdominal tenderness. There is no guarding.   Musculoskeletal: Normal range of motion. Edema (trace) present.   Neurological: She is alert and oriented to person, place, and time. GCS score is 15. GCS eye subscore is 4. GCS verbal subscore is 5. GCS motor subscore is 6.   Skin: Skin is warm and dry.   chronic venous stasis changes to LE bilaterally    Psychiatric: She has a normal mood and affect. Thought content normal.         ED Course   Procedures  Labs Reviewed   CBC W/ AUTO DIFFERENTIAL - Abnormal; Notable for the following components:       Result Value    RBC 3.86 (*)     Hemoglobin 11.9 (*)     Hematocrit 36.9 (*)     RDW 14.8 (*)     Immature Granulocytes 0.6 (*)     Gran # (ANC) 8.4 (*)     Immature Grans (Abs) 0.06 (*)     Gran% 82.2 (*)     Lymph% 10.8 (*)     All other components within normal limits   COMPREHENSIVE METABOLIC PANEL - Abnormal; Notable for the following components:    CO2 22 (*)     Glucose 111 (*)     BUN, Bld 53 (*)     Creatinine 7.2 (*)     Total Protein 5.9 (*)     Albumin 2.7 (*)     eGFR if  6.8 (*)     eGFR if non  5.9 (*)     All other components within normal limits   TROPONIN I - Abnormal; Notable for the following components:    Troponin I 0.083 (*)     All other components within normal limits    Narrative:      trop critical result(s) repeated. Called and verbal readback   obtained from wing teran rn er  by [facility] 09/25/2020 05:18   B-TYPE NATRIURETIC PEPTIDE - Abnormal; Notable for the following components:    BNP 2,642 (*)     All other components within normal limits   SARS-COV-2 RNA AMPLIFICATION, QUAL        ECG Results          EKG 12-lead (Final result)  Result time 09/27/20 21:13:07    Final result by Interface, Lab In [facility] (09/27/20 21:13:07)                 Narrative:    Test Reason : R07.9,    Vent. Rate : 102 BPM     Atrial Rate : 102 BPM     P-R Int : 132 ms          QRS Dur : 084 ms      QT Int : 340 ms       P-R-T Axes : 063 006 173 degrees     QTc Int : 443 ms    Sinus tachycardia  Possible Left atrial enlargement  ST and T wave abnormality, consider lateral ischemia  Abnormal ECG  When compared with ECG of 02-SEP-2020 14:22,  Nonspecific T wave abnormality has replaced inverted T waves in Anterior  leads  Confirmed by Enrique Mendiola MD ([id number]) on 9/27/2020 9:12:56  PM    Referred By: [email or web address]   SELF           Confirmed By:Enrique Mendiola MD                            Imaging Results          X-Ray Chest AP Portable (Final result)  Result time 09/25/20 04:20:48    Final result by Yumi Mcghee MD (09/25/20 04:20:48)                 Narrative:    PROCEDURE:   XR CHEST AP PORTABLE  dated  9/25/2020 4:11 AM    CLINICAL HISTORY:   Female 55 years of age.   Chest Pain    TECHNIQUE: AP view of the chest obtained portably at 4:12 AM.    PREVIOUS STUDIES:  September 2, 2020  . January 7, 2020.  FINDINGS:    Pulmonary interstitial edema has developed. (This was not present on  radiography September 2, 2020.) Blunting of the costophrenic angle  suggests small pleural effusions. However, the appearance is unchanged  compared with the prior studies.. Cardiac mediastinal contours are  normal. Surgical clips are in the soft tissue of the left upper  extremity.      IMPRESSION:    Pulmonary interstitial edema, not present on the prior study.    Electronically Signed by Yumi Mcghee on 9/25/2020 6:07 AM                               Medical Decision Making:   History:   Old Medical Records: I decided to obtain old medical records.  Old Records Summarized: records from previous admission(s).       <> Summary of Records: Recent admission for NSTEMI. Scheduled IR paracentesis with 8.7L drained earlier this month.  Initial Assessment:   In brief, 55 y.o. with coronary artery disease, cirrhosis, ESRD presents with chest pain.   Vitals are within normal limits  Physical exam reveals chronically ill-appearing woman, alert with normal work of breathing, distended abdomen with ascites, not tender.  Mild lower extremity edema.     Chest x-ray does not appear fluid overloaded  EKG without STEMI, chronic T-wave inversion to lead 1 and aVL    BNP 2600, down from 3200 last admission  Troponin positive at 0.083    Pain improved with nitro.  Aspirin given.    Plan to  admit to medicine for unstable angina  with known coronary artery disease      Kathryn Thorne MD  [facility] PGY-3  09/25/2020 5:44 AM                   Attending Attestation:   Physician Attestation Statement for Resident:  As the supervising MD   Physician Attestation Statement: I have personally seen and examined this patient.   I agree with the above history. -:   As the supervising MD I agree with the above PE.    As the supervising MD I agree with the above treatment, course, plan, and disposition.  I have reviewed and agree with the residents interpretation of the following: lab data, x-rays and EKG.  I have reviewed the following: old records at this facility, old EKGs, old x-rays and x-ray reports.            Attending ED Notes:   Pt presents with CP similar to previous episodes of angina. She has known CAD but is not amenable to intervention 2/2 her multiple comorbidities.   PE unremarkable.  Plan: Afebrile, VSS. Labs show WBC 10.2. BUN 53, Cr 7.2, similar to previous. Trop 0.083, improved from previous. BNP 2,642, similar to previous. CXR shows some mild edema compared to previous. EKG shows sinus tachycardia with TWI in I and aVL, similar to previous. No acute ST changes. CP relieved with NTG. Suspect angina related to her known CAD. She likely continues to be a poor candidate for surgical intervention, but she may need optimization of her medical management. Will admit for further management.                    Clinical Impression:       ICD-10-CM ICD-9-CM   1. Chest pain, unspecified type  R07.9 786.50   2. Chest pain  R07.9 786.50   3. ESRD 2/2 MPGN on HD since 12/16/13   N18.6 585.6   4. Coronary artery disease involving native heart, angina presence unspecified, unspecified vessel or lesion type  I25.10 414.01   5. Chronic hepatitis C with cirrhosis  B18.2 070.54    K74.60 571.5                          ED Disposition Condition    Observation                             Kathryn Thorne MD  Resident  09/25/20 [id number]        Abhijit Craft MD  09/30/20 [id number]

## 2020-09-25 NOTE — ASSESSMENT & PLAN NOTE
Known history of end-stage liver disease with chronic hepatitis C (untreated, large volume ascites requiring paracentesis Q 2 weekly), last on 09/18, 8.7 L drain.  Abdomen is distended however not tense.  Depending on clinical course may consider paracentesis while hospitalized.  She was not able to tolerate Aldactone in the past due to hyperkalemia.

## 2020-09-25 NOTE — CONSULTS
[facility]   Cardiology Note    Consult Requested By: Hospital Medicine  Reason for Consult: CP/anti-anginal control    SUBJECTIVE:     History of Present Illness:   Severe CAD, not amenable to PCI/CTS  Cardiomyopathy  Liver cirrhosis  Hep C  ESRD  Valvular heart disease moderate to severe aortic stenosis, moderate mitral regurgitation  Pulmonary hypertension PA pressure 60 mmHg    This is a 55 year old female with a PMHx as stated above who presented to the ED w/ severe chest pain onset yesterday while performing activity in her home. Associated nausea and SOB. On admission, troponin 0.083 which is significantly improved from prior admission where her peak troponin was 22. EKG ST  with known ST segment sagging/depression without new ischemic changes. BNP 2642 with CXR demonstrating mild interstitial edema. Hemoglobin 11.9. Abdomen is distended and she often requires frequent paracentesis to control ascites. On prior admission, the patient was evaluated by [facility] in addition to Dr. Yeboah and was deemed too high risk of candidate for CTS. [facility] 01/2020 demonstrated severe multi-vessel disease with total occlusion of the right coronary artery with left-to-right collaterals, high-grade disease in the proximal circumflex, and diffuse disease in the LAD with high-grade diagonal stenosis. She is currently on Imdur 30 mg and Toprol XL 50 mg for anginal control. BP borderline normotensive to hypotensive, VSS otherwise.     Review of patient's allergies indicates:   Allergen Reactions    Ciprofloxacin Itching    Levaquin [levofloxacin] Itching    Quinolones Itching    Codeine Nausea Only       Past Medical History:   Diagnosis Date    Anemia of chronic renal failure, stage 5 8/19/2015    Ascites     Bleeding duodenal ulcer     CAD (coronary artery disease)     CAD (coronary artery disease)     Chest pain     CHF (congestive heart failure)     Chronic hepatitis C 8/19/2015    Colitis      Elevated troponin     ESRD 2/2 MPGN on HD since 13    Gastritis     Hypertension 2015    Mitral valve regurgitation     Renal dialysis status     M, W, F    Secondary hyperparathyroidism of renal origin 2015     Past Surgical History:   Procedure Laterality Date    ANGIOGRAM, CORONARY, WITH LEFT HEART CATHETERIZATION Left 2020    Procedure: ANGIOGRAM,CORONARY,WITH LEFT HEART CATHETERIZATION;  Surgeon: Yahir Starkey MD;  Location[facility] CATH/EP LAB;  Service: Cardiology;  Laterality: Left;    AV FISTULA PLACEMENT      COLONOSCOPY      CORONARY ANGIOPLASTY WITH STENT PLACEMENT      SALPINGOOPHORECTOMY Right     TOTAL ABDOMINAL HYSTERECTOMY       Family History   Problem Relation Age of Onset    Kidney disease Mother     Stroke Father     Psoriasis Brother     Breast cancer Neg Hx     Cancer Neg Hx     Colon cancer Neg Hx     Ovarian cancer Neg Hx      Social History     Tobacco Use    Smoking status: Current Every Day Smoker     Packs/day: 0.75     Years: 30.00     Pack years: 22.50     Types: Cigarettes     Last attempt to quit: 2019     Years since quittin.8    Smokeless tobacco: Never Used   Substance Use Topics    Alcohol use: No     Alcohol/week: 0.0 standard drinks    Drug use: Yes     Types: Marijuana     Comment: occasionally       Review of Systems:  Review of Systems   Respiratory: Positive for shortness of breath.    Cardiovascular: Positive for chest pain.   All other systems reviewed and are negative.      OBJECTIVE:     Vital Signs (Most Recent)  Temp: 98.7 °F (37.1 °C) (20 0832)  Pulse: 89 (20 0832)  Resp: 19 (20 0832)  BP: 117/73 (20 0832)  SpO2: 97 % (20 0832)    Vital Signs Range (Last 24H):  Temp:  [97.7 °F (36.5 °C)-98.7 °F (37.1 °C)]   Pulse:  []   Resp:  [14-24]   BP: (117-138)/(72-86)   SpO2:  [97 %-100 %]     I & O (Last 24H):  No intake or output data in the 24 hours ending 20  0844    Current Diet:     Current Diet Order   Procedures    Diet renal        Allergies:  Review of patient's allergies indicates:   Allergen Reactions    Ciprofloxacin Itching    Levaquin [levofloxacin] Itching    Quinolones Itching    Codeine Nausea Only       Meds:  Scheduled Meds:   aspirin  81 mg Oral Daily    calcium acetate(phosphat bind)  667 mg Oral TID WM    isosorbide mononitrate  30 mg Oral Daily    lactulose  30 g Oral Daily    lisinopriL  20 mg Oral Nightly    metoprolol succinate  50 mg Oral Nightly    pantoprazole  40 mg Oral BID    rosuvastatin  10 mg Oral Daily    sodium zirconium cyclosilicate  5 g Oral Once per day on Sun Mon Wed Fri    traZODone  50 mg Oral QHS     Continuous Infusions:  PRN Meds:HYDROcodone-acetaminophen, melatonin, nitroGLYCERIN, ondansetron, sodium chloride 0.9%          Laboratory and Radiology Data:  Recent Results (from the past 24 hour(s))   CBC auto differential    Collection Time: 09/25/20  4:19 AM   Result Value Ref Range    WBC 10.17 3.90 - 12.70 K/uL    RBC 3.86 (L) 4.00 - 5.40 M/uL    Hemoglobin 11.9 (L) 12.0 - 16.0 g/dL    Hematocrit 36.9 (L) 37.0 - 48.5 %    Mean Corpuscular Volume 96 82 - 98 fL    Mean Corpuscular Hemoglobin 30.8 27.0 - 31.0 pg    Mean Corpuscular Hemoglobin Conc 32.2 32.0 - 36.0 g/dL    RDW 14.8 (H) 11.5 - 14.5 %    Platelets 186 150 - 350 K/uL    MPV 10.0 9.2 - 12.9 fL    Immature Granulocytes 0.6 (H) 0.0 - 0.5 %    Gran # (ANC) 8.4 (H) 1.8 - 7.7 K/uL    Immature Grans (Abs) 0.06 (H) 0.00 - 0.04 K/uL    Lymph # 1.1 1.0 - 4.8 K/uL    Mono # 0.5 0.3 - 1.0 K/uL    Eos # 0.1 0.0 - 0.5 K/uL    Baso # 0.04 0.00 - 0.20 K/uL    nRBC 0 0 /100 WBC    Gran% 82.2 (H) 38.0 - 73.0 %    Lymph% 10.8 (L) 18.0 - 48.0 %    Mono% 4.9 4.0 - 15.0 %    Eosinophil% 1.1 0.0 - 8.0 %    Basophil% 0.4 0.0 - 1.9 %    Differential Method Automated    Comprehensive metabolic panel    Collection Time: 09/25/20  4:19 AM   Result Value Ref Range    Sodium 136  136 - 145 mmol/L    Potassium 4.1 3.5 - 5.1 mmol/L    Chloride 100 95 - 110 mmol/L    CO2 22 (L) 23 - 29 mmol/L    Glucose 111 (H) 70 - 110 mg/dL    BUN, Bld 53 (H) 6 - 20 mg/dL    Creatinine 7.2 (H) 0.5 - 1.4 mg/dL    Calcium 9.1 8.7 - 10.5 mg/dL    Total Protein 5.9 (L) 6.0 - 8.4 g/dL    Albumin 2.7 (L) 3.5 - 5.2 g/dL    Total Bilirubin 0.7 0.1 - 1.0 mg/dL    Alkaline Phosphatase 84 55 - 135 U/L    AST 28 10 - 40 U/L    ALT 22 10 - 44 U/L    Anion Gap 14 8 - 16 mmol/L    eGFR if African American 6.8 (A) >60 mL/min/1.73 m^2    eGFR if non African American 5.9 (A) >60 mL/min/1.73 m^2   Troponin I #1    Collection Time: 09/25/20  4:19 AM   Result Value Ref Range    Troponin I 0.083 (HH) <=0.040 ng/mL   B-Type natriuretic peptide (BNP)    Collection Time: 09/25/20  4:19 AM   Result Value Ref Range    BNP 2,642 (H) 0 - 99 pg/mL   COVID-19 Rapid Screening    Collection Time: 09/25/20  5:48 AM   Result Value Ref Range    SARS-CoV-2 RNA, Amplification, Qual Negative Negative     Imaging Results          X-Ray Chest AP Portable (Final result)  Result time 09/25/20 04:20:48    Final result by Yumi Mcghee MD (09/25/20 04:20:48)                 Narrative:    PROCEDURE:   XR CHEST AP PORTABLE  dated  9/25/2020 4:11 AM    CLINICAL HISTORY:   Female 55 years of age.   Chest Pain    TECHNIQUE: AP view of the chest obtained portably at 4:12 AM.    PREVIOUS STUDIES:  September 2, 2020  . January 7, 2020.  FINDINGS:    Pulmonary interstitial edema has developed. (This was not present on  radiography September 2, 2020.) Blunting of the costophrenic angle  suggests small pleural effusions. However, the appearance is unchanged  compared with the prior studies.. Cardiac mediastinal contours are  normal. Surgical clips are in the soft tissue of the left upper  extremity.      IMPRESSION:    Pulmonary interstitial edema, not present on the prior study.    Electronically Signed by Yumi Mcghee on 9/25/2020 6:07 AM                               Physical Exam:   Physical Exam   Constitutional: She is oriented to person, place, and time and well-developed, well-nourished, and in no distress.   HENT:   Head: Normocephalic and atraumatic.   Cardiovascular: Normal rate and regular rhythm.   Murmur heard.  Pulmonary/Chest: Effort normal and breath sounds normal.   Abdominal: She exhibits distension.   Musculoskeletal: Normal range of motion.   Neurological: She is alert and oriented to person, place, and time.   Skin: Skin is warm and dry. No rash noted.   Nursing note and vitals reviewed.      ASSESSMENT/PLAN:   Assessment:   CP, elevated trop likely demand ischemia from ESRD  Severe CAD, not amenable to PCI/CTS  Cardiomyopathy  Liver cirrhosis  Hep C  ESRD  Valvular heart disease moderate to severe aortic stenosis, moderate mitral regurgitation  Pulmonary hypertension PA pressure 60 mmHg    Plan:   Increase Imdur to 60 mg daily and observe anginal symptoms this afternoon. In response, reduce Lisinopril dosing to 5 mg. Since current BP is borderline, will hold off on any further titration of her Toprol. Cannot add Ranexa due to ESRD.  Patient is not a PCI or CABG candidate, will continue to medically manage CAD.   SLNTG on board - will need to Rx on discharge for home.     Thank you for your consult, we will continue to follow along with this patient.    Alex Casas PA-C  09/25/2020

## 2020-09-25 NOTE — ED NOTES
Patient identifiers for Aurelia Saucedo checked and correct.  LOC:  Aurelia Saucedo is awake, alert, and aware of environment with an appropriate affect. She is oriented x 3 and speaking appropriately.  APPEARANCE:  She is in no acute distress. She is clean and well groomed, patient's clothing is properly fastened.  SKIN:  The skin is warm and dry. She has normal skin turgor and moist mucus membranes. Skin is intact; Fistula left arm.  MUSCULOSKELETAL:  She is moving all extremities well, no obvious deformities noted.   RESPIRATORY:  Airway is open and patent. Respirations are spontaneous and non-labored with normal effort and rate.  CARDIAC:  She has a normal rate and rhythm. No peripheral edema noted.  ABDOMEN:  Distention noted. Patient reports recurrent ascites due to liver cirrhosis.  NEUROLOGICAL: Facial expression is symmetrical. Hand grasps are equal bilaterally.   Allergies reported:    Review of patient's allergies indicates:   Allergen Reactions    Ciprofloxacin Itching    Levaquin [levofloxacin] Itching    Quinolones Itching    Codeine Nausea Only

## 2020-09-25 NOTE — CONSULTS
This is Dr. Yeboah dictating with date of service being 25th September 2020.  This patient is known to me secondary to recent hospital admissions for coronary artery disease.  She had a diagnosis of multivessel coronary artery disease and was referred for surgical bypass.  She was felt to be high risk.  She has chronic hepatitis C with cirrhosis.  She has ascites that is removed on a fairly regular basis due to gross abdominal swelling.  She has end-stage renal disease and undergoes dialysis regularly.  Medicines noted and are part of the epic record.  Her problem list was reviewed.  She is not a smoker.  On exam vital signs are stable.  Pupils are equal and round reactive to light.  Neck is supple.  Chest is equal breath sounds.  Heart is in a regular rate and rhythm.  Abdomen is somewhat distended but benign.  Femoral pulses are equal.  Perfusion to the legs and feet satisfactory.  She has a functioning access in the left upper extremity.  The recent studies were reviewed and she has regular angina secondary to coronary artery disease.  She has at least moderate aortic stenosis.  I discussed with the patient the possibility of coronary artery bypass grafting at high risk.  She seems to be understanding.  She will take this under advisement.

## 2020-09-25 NOTE — HPI
Ms. Saucedo is a 55-year-old female who presented to the ED with chief complaint of chest pain.  Patient with recent [facility] admission 9/2 to 9/8 with non ST elevation MI in the setting of known multivessel coronary artery disease, left heart catheterization January 2020 with severe multivessel disease with total occlusion RCA with collaterals, diffuse LAD disease, high-grade stenosis proximal circ and diagonal, troponin peaked at 22, she was seen by cardiology and cardiothoracic surgery (Dr. Yeboah), medically managed with outpatient follow-up.  Of note patient was also seen at [facility] for consideration of CABG with valve replacement surgery however she was deemed too high risk due to her comorbidities.  Patient states since her discharge she has been having intermittent midsternal chest discomfort radiating up into her jaw, not related to activity (states she lives relatively sedentary lifestyle), worsening in the last today with increasing severity and no response to sublingual nitro by 3 prior to presentation which prompted ED visit.  States she has chronic shortness of breath without any recent change, constant abdominal pain due to the large volume ascites requiring paracentesis Q 2 weekly, for the last 18 months, states she is interested in getting catheter.  She states she was compliant with her medication therapy.  Continues to smoke quarter pack per day.  In the ED T 97.9°, , /85, saturating well on room air.  Labs with mild anemia, BUN/creatinine 53/7.2, BNP 2 642, troponin 0.083, EKG sinus tach with T-wave inversions 1, aVL, V1/V2, she received aspirin 243 and sublingual nitro 0.4 mg.  Chest pain-free during my encounter.  Case discussed with ED provider.  Plan of care was discussed with patient and significant other present at bedside.  She is interested about optimizing her medications for her heart disease.

## 2020-09-25 NOTE — PROGRESS NOTES
Patient admitted earlier this morning by overnight provider.  Admission H&P reviewed.  Agree with assessment and plan.     Briefly patient is a 55-year-old  female with multiple medical comorbidities including but not limited to ESRD on hemodialysis, end-stage liver disease on periodical paracentesis and multi vessel CAD who is being considered for CABG along with aortic valve replacement for moderate aortic valve stenosis presents with chest pain admitted for unstable angina.  Chest pain improved.  Being seen by Cardiology as well as cardiothoracic surgery.  Noted increased dose of long-acting nitrate.  Not a candidate for ranolazine secondary to ESRD status.    Anibal Kramer MD  Internal Medicine

## 2020-09-25 NOTE — ASSESSMENT & PLAN NOTE
On dialysis Tuesday and Saturday via left upper extremity AVF, no missed sessions.  Renally dosing all medications and avoid nephrotoxin drugs  Consulting Nephrology for ongoing dialysis

## 2020-09-25 NOTE — ASSESSMENT & PLAN NOTE
Multivessel coronary artery disease.  Has had PCI in 2013 however most recent CT with multivessel disease requiring CABG.

## 2020-09-26 NOTE — PROGRESS NOTES
[facility]   Cardiology Note    SUBJECTIVE:     History of Present Illness: Seen and examined this AM.  Troponin escalated to 0.148 at peak yesterday, 0.109 this morning. Cardiothoracic surgery was consulted for repeat assessment of CABG and possible AVR. Patient remains at high risk for this procedure but will consider R/B/As. VSS, BP tolerating higher dose of Imdur. Hemoglobin 9.9. To undergo HD today.     Review of patient's allergies indicates:   Allergen Reactions    Ciprofloxacin Itching    Levaquin [levofloxacin] Itching    Quinolones Itching    Codeine Nausea Only       Past Medical History:   Diagnosis Date    Anemia of chronic renal failure, stage 5 8/19/2015    Ascites     Bleeding duodenal ulcer     CAD (coronary artery disease)     CAD (coronary artery disease)     Chest pain     CHF (congestive heart failure)     Chronic hepatitis C 8/19/2015    Colitis     Elevated troponin     ESRD 2/2 MPGN on HD since 12/16/13 8/19/2015    Gastritis     Hypertension 8/19/2015    Mitral valve regurgitation     Renal dialysis status     M, W, F    Secondary hyperparathyroidism of renal origin 8/19/2015     Past Surgical History:   Procedure Laterality Date    ANGIOGRAM, CORONARY, WITH LEFT HEART CATHETERIZATION Left 1/13/2020    Procedure: ANGIOGRAM,CORONARY,WITH LEFT HEART CATHETERIZATION;  Surgeon: Yahir Starkey MD;  Location: [facility]ATH[facility] LAB;  Service: Cardiology;  Laterality: Left;    AV FISTULA PLACEMENT  2013    COLONOSCOPY      CORONARY ANGIOPLASTY WITH STENT PLACEMENT      SALPINGOOPHORECTOMY Right 1997    TOTAL ABDOMINAL HYSTERECTOMY  1999     Family History   Problem Relation Age of Onset    Kidney disease Mother     Stroke Father     Psoriasis Brother     Breast cancer Neg Hx     Cancer Neg Hx     Colon cancer Neg Hx     Ovarian cancer Neg Hx      Social History     Tobacco Use    Smoking status: Current Every Day Smoker     Packs/day: 0.75     Years: 30.00      Pack years: 22.50     Types: Cigarettes     Last attempt to quit: 2019     Years since quittin.8    Smokeless tobacco: Never Used   Substance Use Topics    Alcohol use: No     Alcohol/week: 0.0 standard drinks    Drug use: Yes     Types: Marijuana     Comment: occasionally         OBJECTIVE:     Vital Signs (Most Recent)  Temp: 98 °F (36.7 °C) (20)  Pulse: 77 (20)  Resp: 19 (20)  BP: 131/80 (20)  SpO2: (!) 93 % (20)    Vital Signs Range (Last 24H):  Temp:  [97.7 °F (36.5 °C)-98.5 °F (36.9 °C)]   Pulse:  [77-89]   Resp:  [14-20]   BP: ()/(59-82)   SpO2:  [92 %-99 %]     I & O (Last 24H):    Intake/Output Summary (Last 24 hours) at 2020 09  Last data filed at 2020 2203  Gross per 24 hour   Intake 290 ml   Output 0 ml   Net 290 ml       Current Diet:     Current Diet Order   Procedures    Diet renal        Allergies:  Review of patient's allergies indicates:   Allergen Reactions    Ciprofloxacin Itching    Levaquin [levofloxacin] Itching    Quinolones Itching    Codeine Nausea Only       Meds:  Scheduled Meds:   aspirin  81 mg Oral Daily    calcium carbonate  500 mg Oral TID WM    isosorbide mononitrate  60 mg Oral Daily    lactulose  30 g Oral Daily    lisinopriL  5 mg Oral Nightly    metoprolol succinate  50 mg Oral Nightly    pantoprazole  40 mg Oral BID    rosuvastatin  10 mg Oral Daily    sodium zirconium cyclosilicate  5 g Oral Once per day on Sun     traZODone  50 mg Oral QHS     Continuous Infusions:  PRN Meds:HYDROcodone-acetaminophen, melatonin, ondansetron, sodium chloride 0.9%          Laboratory and Radiology Data:  Recent Results (from the past 24 hour(s))   Troponin I    Collection Time: 20 10:41 AM   Result Value Ref Range    Troponin I 0.117 (HH) <=0.040 ng/mL   Troponin I    Collection Time: 20  4:03 PM   Result Value Ref Range    Troponin I 0.148 (HH) <=0.040 ng/mL   Troponin I     Collection Time: 09/25/20  9:47 PM   Result Value Ref Range    Troponin I 0.128 (HH) <=0.040 ng/mL   Troponin I    Collection Time: 09/26/20  4:19 AM   Result Value Ref Range    Troponin I 0.109 (HH) <=0.040 ng/mL   Comprehensive metabolic panel    Collection Time: 09/26/20  4:19 AM   Result Value Ref Range    Sodium 136 136 - 145 mmol/L    Potassium 4.8 3.5 - 5.1 mmol/L    Chloride 103 95 - 110 mmol/L    CO2 22 (L) 23 - 29 mmol/L    Glucose 91 70 - 110 mg/dL    BUN, Bld 61 (H) 6 - 20 mg/dL    Creatinine 7.7 (H) 0.5 - 1.4 mg/dL    Calcium 8.6 (L) 8.7 - 10.5 mg/dL    Total Protein 4.9 (L) 6.0 - 8.4 g/dL    Albumin 2.2 (L) 3.5 - 5.2 g/dL    Total Bilirubin 0.8 0.1 - 1.0 mg/dL    Alkaline Phosphatase 63 55 - 135 U/L    AST 20 10 - 40 U/L    ALT 18 10 - 44 U/L    Anion Gap 11 8 - 16 mmol/L    eGFR if African American 6.3 (A) >60 mL/min/1.73 m^2    eGFR if non African American 5.5 (A) >60 mL/min/1.73 m^2   CBC auto differential    Collection Time: 09/26/20  4:19 AM   Result Value Ref Range    WBC 4.85 3.90 - 12.70 K/uL    RBC 3.16 (L) 4.00 - 5.40 M/uL    Hemoglobin 9.9 (L) 12.0 - 16.0 g/dL    Hematocrit 30.3 (L) 37.0 - 48.5 %    Mean Corpuscular Volume 96 82 - 98 fL    Mean Corpuscular Hemoglobin 31.3 (H) 27.0 - 31.0 pg    Mean Corpuscular Hemoglobin Conc 32.7 32.0 - 36.0 g/dL    RDW 14.8 (H) 11.5 - 14.5 %    Platelets 181 150 - 350 K/uL    MPV 9.3 9.2 - 12.9 fL    Immature Granulocytes 0.6 (H) 0.0 - 0.5 %    Gran # (ANC) 3.2 1.8 - 7.7 K/uL    Immature Grans (Abs) 0.03 0.00 - 0.04 K/uL    Lymph # 1.1 1.0 - 4.8 K/uL    Mono # 0.4 0.3 - 1.0 K/uL    Eos # 0.1 0.0 - 0.5 K/uL    Baso # 0.02 0.00 - 0.20 K/uL    nRBC 0 0 /100 WBC    Gran% 66.2 38.0 - 73.0 %    Lymph% 22.1 18.0 - 48.0 %    Mono% 8.0 4.0 - 15.0 %    Eosinophil% 2.7 0.0 - 8.0 %    Basophil% 0.4 0.0 - 1.9 %    Differential Method Automated      Imaging Results          X-Ray Chest AP Portable (Final result)  Result time 09/25/20 04:20:48    Final result by  Yumi Mcghee MD (09/25/20 04:20:48)                 Narrative:    PROCEDURE:   XR CHEST AP PORTABLE  dated  9/25/2020 4:11 AM    CLINICAL HISTORY:   Female 55 years of age.   Chest Pain    TECHNIQUE: AP view of the chest obtained portably at 4:12 AM.    PREVIOUS STUDIES:  September 2, 2020  . January 7, 2020.  FINDINGS:    Pulmonary interstitial edema has developed. (This was not present on  radiography September 2, 2020.) Blunting of the costophrenic angle  suggests small pleural effusions. However, the appearance is unchanged  compared with the prior studies.. Cardiac mediastinal contours are  normal. Surgical clips are in the soft tissue of the left upper  extremity.      IMPRESSION:    Pulmonary interstitial edema, not present on the prior study.    Electronically Signed by Yumi Mcghee on 9/25/2020 6:07 AM                              Physical Exam:   Physical Exam   Constitutional: She is oriented to person, place, and time and well-developed, well-nourished, and in no distress.   HENT:   Head: Normocephalic and atraumatic.   Cardiovascular: Normal rate and regular rhythm.   Murmur heard.  Pulmonary/Chest: Effort normal and breath sounds normal.   Abdominal: She exhibits distension.   Musculoskeletal: Normal range of motion.   Neurological: She is alert and oriented to person, place, and time.   Skin: Skin is warm and dry. No rash noted.   Nursing note and vitals reviewed.      ASSESSMENT/PLAN:   Assessment:   CP, elevated trop likely demand ischemia from ESRD  Severe CAD, not amenable to PCI, high risk for CTS  Cardiomyopathy  Liver cirrhosis  Hep C  ESRD  Valvular heart disease moderate to severe aortic stenosis, moderate mitral regurgitation  Pulmonary hypertension PA pressure 60 mmHg    Plan:   Continue monitoring anginal symptoms with Imdur and SLNTG.  Patient to further consider option of higher risk CABG/AVR.   No further cardiac recs at this time.     Alex Casas PA-C  09/26/2020

## 2020-09-26 NOTE — CONSULTS
Nephrology Consult Note        Patient Name: Aurelia Saucedo  MRN: [id number]    Patient Class: OP- Observation   Admission Date: 9/25/2020  Length of Stay: 0 days  Date of Service: 9/26/2020    Attending Physician: Anibal Kramer MD  Primary Care Provider: Dustin Ireland MD    Reason for Consult: esrd/anemia/htn/shpt/chest pain    SUBJECTIVE:     HPI: 55F with many medical problems including ESRD on HD Tue-Sat, cirrhosis, inoperable CAD is admitted again for chest pain, responded to medical therapy. Plan for HD in AM if still hospitalized, per outpatient schedule.    9/26 VSS, seen and examined on HD today, tolerating well. Next HD Tue or PRN.    Past Medical History:   Diagnosis Date    Anemia of chronic renal failure, stage 5 8/19/2015    Ascites     Bleeding duodenal ulcer     CAD (coronary artery disease)     CAD (coronary artery disease)     Chest pain     CHF (congestive heart failure)     Chronic hepatitis C 8/19/2015    Colitis     Elevated troponin     ESRD 2/2 MPGN on HD since 12/16/13 8/19/2015    Gastritis     Hypertension 8/19/2015    Mitral valve regurgitation     Renal dialysis status     M, W, F    Secondary hyperparathyroidism of renal origin 8/19/2015     Past Surgical History:   Procedure Laterality Date    ANGIOGRAM, CORONARY, WITH LEFT HEART CATHETERIZATION Left 1/13/2020    Procedure: ANGIOGRAM,CORONARY,WITH LEFT HEART CATHETERIZATION;  Surgeon: Yahir Starkey MD;  Location: [facility] CATH/EP LAB;  Service: Cardiology;  Laterality: Left;    AV FISTULA PLACEMENT  2013    COLONOSCOPY      CORONARY ANGIOPLASTY WITH STENT PLACEMENT      SALPINGOOPHORECTOMY Right 1997    TOTAL ABDOMINAL HYSTERECTOMY  1999     Family History   Problem Relation Age of Onset    Kidney disease Mother     Stroke Father     Psoriasis Brother     Breast cancer Neg Hx     Cancer Neg Hx     Colon cancer Neg Hx     Ovarian cancer Neg Hx      Social History     Tobacco Use    Smoking status:  Current Every Day Smoker     Packs/day: 0.75     Years: 30.00     Pack years: 22.50     Types: Cigarettes     Last attempt to quit: 2019     Years since quittin.8    Smokeless tobacco: Never Used   Substance Use Topics    Alcohol use: No     Alcohol/week: 0.0 standard drinks    Drug use: Yes     Types: Marijuana     Comment: occasionally       Review of patient's allergies indicates:   Allergen Reactions    Ciprofloxacin Itching    Levaquin [levofloxacin] Itching    Quinolones Itching    Codeine Nausea Only       Outpatient meds:  No current facility-administered medications on file prior to encounter.      Current Outpatient Medications on File Prior to Encounter   Medication Sig Dispense Refill    aspirin (ECOTRIN) 81 MG EC tablet Take 81 mg by mouth once daily.      calcium acetate,phosphat bind, (PHOSLO) 667 mg capsule Take 667 mg by mouth 3 (three) times daily with meals.      FA-VIT B COMP&C-SELENIUM-ZINC 3-70-15 MG-MCG-MG ORAL TAB Take 1 tablet by mouth once daily.      HYDROcodone-acetaminophen (NORCO)  mg per tablet Take 1 tablet by mouth every 8 (eight) hours as needed for Pain. 60 tablet 0    isosorbide mononitrate (IMDUR) 30 MG 24 hr tablet Take 1 tablet (30 mg total) by mouth once daily. 30 tablet 11    lisinopril (PRINIVIL,ZESTRIL) 20 MG tablet Take 1 tablet by mouth nightly.   3    LOKELMA 5 gram PwPk Take 5 g by mouth. Five times a week; on  Non dialysis days      metoprolol succinate (TOPROL-XL) 50 MG 24 hr tablet Take 50 mg by mouth nightly.       nitroGLYCERIN (NITROSTAT) 0.4 MG SL tablet Place 1 tablet (0.4 mg total) under the tongue every 5 (five) minutes as needed for Chest pain. 30 tablet 0    pantoprazole (PROTONIX) 40 MG tablet Take 40 mg by mouth 2 (two) times daily.       polyethylene glycol (GLYCOLAX) 17 gram PwPk Take 17 g by mouth daily as needed (constipation). 30 each 0    rosuvastatin (CRESTOR) 10 MG tablet Take 1 tablet (10 mg total) by mouth once  daily. 90 tablet 3    lactulose (CHRONULAC) 10 gram/15 mL solution TK 30 ML PO ONCE D      traZODone (DESYREL) 50 MG tablet Take 1 tablet (50 mg total) by mouth every evening. 30 tablet 11       Scheduled meds:   aspirin  81 mg Oral Daily    calcium carbonate  500 mg Oral TID WM    isosorbide mononitrate  60 mg Oral Daily    lactulose  30 g Oral Daily    lisinopriL  5 mg Oral Nightly    metoprolol succinate  50 mg Oral Nightly    pantoprazole  40 mg Oral BID    rosuvastatin  10 mg Oral Daily    sodium zirconium cyclosilicate  5 g Oral Once per day on Sun Mon Wed Fri    traZODone  50 mg Oral QHS       Infusions:      PRN meds:  HYDROcodone-acetaminophen, melatonin, ondansetron, sodium chloride 0.9%    Review of Systems:  ROS    OBJECTIVE:     Vital Signs and IO (Last 24H):  Temp:  [97.7 °F (36.5 °C)-98.5 °F (36.9 °C)]   Pulse:  [77-89]   Resp:  [14-20]   BP: ()/(59-82)   SpO2:  [92 %-99 %]   I/O last 3 completed shifts:  In: 290 [P.O.:290]  Out: 0     Wt Readings from Last 5 Encounters:   09/25/20 62.2 kg (137 lb 2 oz)   09/17/20 66.4 kg (146 lb 4.8 oz)   09/08/20 61.3 kg (135 lb 2.3 oz)   09/04/20 60.8 kg (134 lb)   03/12/20 59.6 kg (131 lb 6.3 oz)         Physical Exam:  Physical Exam  Constitutional:       Appearance: She is well-developed. She is not diaphoretic.   HENT:      Head: Normocephalic and atraumatic.   Eyes:      General: No scleral icterus.     Pupils: Pupils are equal, round, and reactive to light.   Neck:      Musculoskeletal: Neck supple.   Cardiovascular:      Rate and Rhythm: Normal rate and regular rhythm.   Pulmonary:      Effort: Pulmonary effort is normal. No respiratory distress.      Breath sounds: No stridor.   Abdominal:      General: There is no distension.      Palpations: Abdomen is soft.   Musculoskeletal: Normal range of motion.         General: No deformity.   Skin:     General: Skin is warm and dry.      Findings: No erythema or rash.   Neurological:      Mental  Status: She is alert and oriented to person, place, and time.      Cranial Nerves: No cranial nerve deficit.   Psychiatric:         Behavior: Behavior normal.         Body mass index is 23.54 kg/m².    Laboratory:  Recent Labs   Lab 09/25/20 0419 09/26/20 0419    136   K 4.1 4.8    103   CO2 22* 22*   BUN 53* 61*   CREATININE 7.2* 7.7*   ESTGFRAFRICA 6.8* 6.3*   EGFRNONAA 5.9* 5.5*   * 91       Recent Labs   Lab 09/25/20 0419 09/26/20 0419   CALCIUM 9.1 8.6*   ALBUMIN 2.7* 2.2*             No results for input(s): POCTGLUCOSE in the last 168 hours.    Recent Labs   Lab 11/19/19  0447 01/08/20  0429   Hemoglobin A1C SEE COMMENT SEE COMMENT       Recent Labs   Lab 09/25/20 0419 09/26/20 0419   WBC 10.17 4.85   HGB 11.9* 9.9*   HCT 36.9* 30.3*    181   MCV 96 96   MCHC 32.2 32.7   MONO 4.9  0.5 8.0  0.4       Recent Labs   Lab 09/25/20 0419 09/26/20 0419   BILITOT 0.7 0.8   PROT 5.9* 4.9*   ALBUMIN 2.7* 2.2*   ALKPHOS 84 63   ALT 22 18   AST 28 20       Recent Labs   Lab 11/19/19  0316 12/23/19  0344 01/07/20  2353   Color, UA Yellow Yellow Yellow   Appearance, UA Hazy A Clear Clear   pH, UA >8.0 A >8.0 A >8.0 A   Specific [place], UA 1.010 1.015 1.015   Protein, UA 2+ A 2+ A 2+ A   Glucose, UA Negative Negative Negative   Ketones, UA Negative Negative Negative   Urobilinogen, UA Negative Negative Negative   Bilirubin (UA) Negative Negative Negative   Occult Blood UA 2+ A 2+ A 1+ A   Nitrite, UA Negative Negative Negative   RBC, UA 30 H 30 H 9 H   WBC, UA >100 H 1 0   Bacteria Moderate A Negative Negative   Hyaline Casts, UA 1 3 A 13 A             Microbiology Results (last 7 days)     ** No results found for the last 168 hours. 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       ESRD on HD Tue Sat via cath  Continue current dialysis prescription.  Next HD per schedule.  Renal diet - low K, low phos.  No IVs or BP checks on access and/or non-dominant arm.    Anemia of CKD  Hgb and HCT are acceptable. Monitor.  Will provide MERON and/or IV iron PRN.    MBD / Secondary HPT  Ca, phos, PTH and vitamin D levels are acceptable.   Phos binders, vitamin D analogues and calcimimetics as needed.    HTN  BP seem controlled.   Tolerate asymptomatic HTN up to -160.  Continue home meds.  Low sodium diet.    Chest pain  Management per medical team and cards.    Thank you for allowing us to participate in the care of your patient!   We will follow the patient and provide recommendations as needed.    Luciano Campos MD    [facility]  [address] [place] [address]  [place] [address]    [phone] - tel  [phone] - fax    9/26/2020 12:57 PM

## 2020-09-26 NOTE — PROGRESS NOTES
[facility]  Progress Note    Patient name: Aurelia Saucedo  MRN: [id number]  Admit Date: 9/25/2020   LOS: 0 days     SUBJECTIVE:     Principal problem: Chest pain    Interval History:  No acute overnight events reported.  She has intermittent chest pain at rest.  Known multivessel CAD.  Being followed by cardiology and cardiothoracic surgery.      Scheduled Meds:   aspirin  81 mg Oral Daily    calcium carbonate  500 mg Oral TID WM    isosorbide mononitrate  60 mg Oral Daily    lactulose  30 g Oral Daily    lisinopriL  5 mg Oral Nightly    metoprolol succinate  50 mg Oral Nightly    pantoprazole  40 mg Oral BID    rosuvastatin  10 mg Oral Daily    sodium zirconium cyclosilicate  5 g Oral Once per day on Sun Mon Wed Fri    traZODone  50 mg Oral QHS     Continuous Infusions:  PRN Meds:HYDROcodone-acetaminophen, melatonin, ondansetron, sodium chloride 0.9%    Review of patient's allergies indicates:   Allergen Reactions    Ciprofloxacin Itching    Levaquin [levofloxacin] Itching    Quinolones Itching    Codeine Nausea Only       Review of Systems: As per interval history    OBJECTIVE:     Vital Signs (Most Recent)  Temp: 97.2 °F (36.2 °C) (09/26/20 1546)  Pulse: 77 (09/26/20 1546)  Resp: 16 (09/26/20 1546)  BP: 130/66 (09/26/20 1546)  SpO2: 100 % (09/26/20 1546)    Vital Signs Range (Last 24H):  Temp:  [97.2 °F (36.2 °C)-99.3 °F (37.4 °C)]   Pulse:  [77-87]   Resp:  [14-20]   BP: ()/(59-82)   SpO2:  [92 %-100 %]     I & O (Last 24H):    Intake/Output Summary (Last 24 hours) at 9/26/2020 1640  Last data filed at 9/25/2020 2203  Gross per 24 hour   Intake 170 ml   Output 0 ml   Net 170 ml       Physical Exam:  General: Patient resting comfortably in no acute distress. Appears chronically ill  Eyes: No conjunctival injection. No scleral icterus.  ENT: Hearing grossly intact. No discharge from ears. No nasal discharge.   Neck: Supple, trachea midline. No JVD  CVS: RRR.  No LE edema BL  Lungs:  No tachypnea or accessory muscle use.  Faint bibasilar crackles audible  Abdomen:  Soft, nontender. Distended.  No organomegaly  Neuro: AOx3. Moves all extremities. Follows commands. Responds appropriately   Skin:  No rash or erythema noted    Laboratory:  CBC:   Recent Labs   Lab 09/26/20 0419   WBC 4.85   RBC 3.16*   HGB 9.9*   HCT 30.3*      MCV 96   MCH 31.3*   MCHC 32.7     CMP:   Recent Labs   Lab 09/26/20 0419   GLU 91   CALCIUM 8.6*   ALBUMIN 2.2*   PROT 4.9*      K 4.8   CO2 22*      BUN 61*   CREATININE 7.7*   ALKPHOS 63   ALT 18   AST 20   BILITOT 0.8       Diagnostic Results:  Labs: Reviewed    ASSESSMENT/PLAN:         Active Hospital Problems    Diagnosis  POA    *Chest pain with known history of multivessel coronary artery disease [R07.9]  Yes     Chronic    Valvular heart disease [I38]  Yes     Chronic    Pulmonary hypertension [I27.20]  Yes     Chronic    Tobacco abuse [Z72.0]  Yes     Chronic    Coronary artery disease [I25.10]  Yes    Anemia of chronic disease [D63.8]  Yes    Chronic diastolic heart failure [I50.32]  Yes     Chronic    Recurrent ascites in the setting of end-stage liver disease with portal hypertension [R18.8]  Yes     Chronic    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         Plan:   55-year-old  female with multiple medical comorbidities including but not limited to ESRD on hemodialysis, end-stage liver disease on periodic paracentesis and multi vessel CAD who is being considered for CABG along with aortic valve replacement for moderate aortic valve stenosis presents with chest pain admitted for unstable angina.  Being seen by Cardiology as well as cardiothoracic surgery.   Noted increased dose of long-acting nitrate.   Offered CABG by CT surgery however at high risk. She would like to discuss with family members and decide. Knows to contact Dr. Yeboah's office   ESRD on HD; planned for today    Anticipate to discharge to home tmrw    VTE Risk Mitigation (From admission, onward)         Ordered     Place sequential compression device  Until discontinued      09/25/20 0604                  Department Hospital Medicine  [facility]  Anibal Kramer MD  Date of service: 09/26/2020

## 2020-09-26 NOTE — NURSING
Report called to KAREN Win. Pt to be transported on telemonitor with cellphone to room 2503 via wheelchair.

## 2020-09-27 NOTE — HOSPITAL COURSE
55-year-old  female with multiple medical comorbidities including severe CAD not amenable to percutaneous coronary intervention, end-stage renal disease on hemodialysis, end-stage liver disease secondary to cirrhosis on periodical paracentesis and moderate to severe aortic stenosis admitted after presenting with severe chest pain.  Seen at our facility recently for similar issues.    Symptoms likely secondary to known CAD.  Seen by both Cardiology as well as cardiothoracic surgery.  Home dose of long-acting nitrate has been increased.  She is not a candidate for ranolazine secondary to ESRD status.  She was offered CABG however at a higher risk secondary to her comorbidities.  States she would like to discuss with her family members prior to making a final decision.  Medically stable for discharge to home.  She is aware of contacting Dr. Yeboah's office once she makes a decision about her CABG.  In the interim refilled isosorbide mononitrate as well as sublingual nitroglycerin.    Instructions provided to follow up with primary care physician as outpatient. Patient verbalized understanding and is aware to contact primary care physician or return to ED if new or worsening symptoms.    Physical exam on the day of discharge:  General: Patient resting comfortably in no acute distress.  Lungs: CTA. Good air entry.  Cor: Regular rate and rhythm. No murmurs. No pedal edema.  Abd: Soft. Nontender. Distended.  Neuro: A&O x3. Moving all 4 extremities equally  Ext: No clubbing. No cyanosis.

## 2020-09-27 NOTE — PROGRESS NOTES
[facility]   Cardiology Note    SUBJECTIVE:     History of Present Illness: Seen and examined this AM. 1.1L removed from HD yesterday. CP during HD session. Reports CP improved otherwise with increase in Imdur. VSS although first BP reading this AM was mildly hypotensive. Held further discussion considering possibility of CABG/AVR and patient would like to speak with Dr. Yeboah today. She is leaning towards proceeding with surgery despite the increased risk.     Review of patient's allergies indicates:   Allergen Reactions    Ciprofloxacin Itching    Levaquin [levofloxacin] Itching    Quinolones Itching    Codeine Nausea Only       Past Medical History:   Diagnosis Date    Anemia of chronic renal failure, stage 5 8/19/2015    Ascites     Bleeding duodenal ulcer     CAD (coronary artery disease)     CAD (coronary artery disease)     Chest pain     CHF (congestive heart failure)     Chronic hepatitis C 8/19/2015    Colitis     Elevated troponin     ESRD 2/2 MPGN on HD since 12/16/13 8/19/2015    Gastritis     Hypertension 8/19/2015    Mitral valve regurgitation     Renal dialysis status     M, W, F    Secondary hyperparathyroidism of renal origin 8/19/2015     Past Surgical History:   Procedure Laterality Date    ANGIOGRAM, CORONARY, WITH LEFT HEART CATHETERIZATION Left 1/13/2020    Procedure: ANGIOGRAM,CORONARY,WITH LEFT HEART CATHETERIZATION;  Surgeon: Yahir Starkey MD;  Location: [facility] CATH/EP LAB;  Service: Cardiology;  Laterality: Left;    AV FISTULA PLACEMENT  2013    COLONOSCOPY      CORONARY ANGIOPLASTY WITH STENT PLACEMENT      SALPINGOOPHORECTOMY Right 1997    TOTAL ABDOMINAL HYSTERECTOMY  1999     Family History   Problem Relation Age of Onset    Kidney disease Mother     Stroke Father     Psoriasis Brother     Breast cancer Neg Hx     Cancer Neg Hx     Colon cancer Neg Hx     Ovarian cancer Neg Hx      Social History     Tobacco Use    Smoking status:  Current Every Day Smoker     Packs/day: 0.75     Years: 30.00     Pack years: 22.50     Types: Cigarettes     Last attempt to quit: 2019     Years since quittin.8    Smokeless tobacco: Never Used   Substance Use Topics    Alcohol use: No     Alcohol/week: 0.0 standard drinks    Drug use: Yes     Types: Marijuana     Comment: occasionally         OBJECTIVE:     Vital Signs (Most Recent)  Temp: 98.3 °F (36.8 °C) (20)  Pulse: 76 (20)  Resp: 18 (20 0810)  BP: (!) 90/55 (20)  SpO2: 96 % (20)    Vital Signs Range (Last 24H):  Temp:  [97.2 °F (36.2 °C)-99.3 °F (37.4 °C)]   Pulse:  [72-87]   Resp:  [16-18]   BP: ()/(54-96)   SpO2:  [95 %-100 %]     I & O (Last 24H):    Intake/Output Summary (Last 24 hours) at 2020 0854  Last data filed at 2020 2045  Gross per 24 hour   Intake 500 ml   Output 1667 ml   Net -1167 ml       Current Diet:     Current Diet Order   Procedures    Diet renal [facility]; Cardiac (Low Na/Chol)     Order Specific Question:   Indicate patient location for additional diet options:     Answer:   [facility]     Order Specific Question:   Additional Diet Options:     Answer:   Cardiac (Low Na/Chol)        Allergies:  Review of patient's allergies indicates:   Allergen Reactions    Ciprofloxacin Itching    Levaquin [levofloxacin] Itching    Quinolones Itching    Codeine Nausea Only       Meds:  Scheduled Meds:   sodium chloride 0.9%   Intravenous Once    aspirin  81 mg Oral Daily    calcium carbonate  500 mg Oral TID WM    isosorbide mononitrate  60 mg Oral Daily    lactulose  30 g Oral Daily    lisinopriL  5 mg Oral Nightly    metoprolol succinate  50 mg Oral Nightly    pantoprazole  40 mg Oral BID    rosuvastatin  10 mg Oral Daily    sodium zirconium cyclosilicate  5 g Oral Once per day on Sun     traZODone  50 mg Oral QHS     Continuous Infusions:  PRN Meds:sodium chloride 0.9%, HYDROcodone-acetaminophen, melatonin,  nitroGLYCERIN, ondansetron, sodium chloride 0.9%          Laboratory and Radiology Data:  Recent Results (from the past 24 hour(s))   Basic metabolic panel    Collection Time: 09/27/20  4:36 AM   Result Value Ref Range    Sodium 137 136 - 145 mmol/L    Potassium 4.1 3.5 - 5.1 mmol/L    Chloride 98 95 - 110 mmol/L    CO2 26 23 - 29 mmol/L    Glucose 96 70 - 110 mg/dL    BUN, Bld 39 (H) 6 - 20 mg/dL    Creatinine 5.5 (H) 0.5 - 1.4 mg/dL    Calcium 8.6 (L) 8.7 - 10.5 mg/dL    Anion Gap 13 8 - 16 mmol/L    eGFR if African American 9.5 (A) >60 mL/min/1.73 m^2    eGFR if non  8.2 (A) >60 mL/min/1.73 m^2   Magnesium    Collection Time: 09/27/20  4:36 AM   Result Value Ref Range    Magnesium 2.2 1.6 - 2.6 mg/dL     Imaging Results          X-Ray Chest AP Portable (Final result)  Result time 09/25/20 04:20:48    Final result by Yumi Mcghee MD (09/25/20 04:20:48)                 Narrative:    PROCEDURE:   XR CHEST AP PORTABLE  dated  9/25/2020 4:11 AM    CLINICAL HISTORY:   Female 55 years of age.   Chest Pain    TECHNIQUE: AP view of the chest obtained portably at 4:12 AM.    PREVIOUS STUDIES:  September 2, 2020  . January 7, 2020.  FINDINGS:    Pulmonary interstitial edema has developed. (This was not present on  radiography September 2, 2020.) Blunting of the costophrenic angle  suggests small pleural effusions. However, the appearance is unchanged  compared with the prior studies.. Cardiac mediastinal contours are  normal. Surgical clips are in the soft tissue of the left upper  extremity.      IMPRESSION:    Pulmonary interstitial edema, not present on the prior study.    Electronically Signed by Yumi Mcghee on 9/25/2020 6:07 AM                              Physical Exam:   Physical Exam   Constitutional: She is oriented to person, place, and time and well-developed, well-nourished, and in no distress.   HENT:   Head: Normocephalic and atraumatic.   Cardiovascular: Normal rate and regular rhythm.    Murmur heard.  Pulmonary/Chest: Effort normal and breath sounds normal.   Abdominal: She exhibits distension.   Musculoskeletal: Normal range of motion.   Neurological: She is alert and oriented to person, place, and time.   Skin: Skin is warm and dry. No rash noted.   Nursing note and vitals reviewed.      ASSESSMENT/PLAN:   Assessment:   CP, elevated trop likely demand ischemia from ESRD  Severe CAD, not amenable to PCI, high risk for CTS  Cardiomyopathy  Liver cirrhosis  Hep C  ESRD  Valvular heart disease moderate to severe aortic stenosis, moderate mitral regurgitation  Pulmonary hypertension PA pressure 60 mmHg    Plan:   No further cardiac recs at this time. OK for discharge from cardiac standpoint if patient does not opt for CABG at this time but patient will have discussion today with CTS. Follow up with our clinic in 2-3 weeks.     Alex Casas PA-C  09/27/2020

## 2020-09-27 NOTE — PLAN OF CARE
09/27/20 1233   Final Note   Assessment Type Final Discharge Note   Anticipated Discharge Disposition Home     Discharge orders reviewed.  Patient to discharge home this date with no needs.    Tatiana Sunshine LCSW  Case Management  Ext. [phone]

## 2020-09-27 NOTE — CONSULTS
Nephrology Consult Note        Patient Name: Aurelia Saucedo  MRN: [id number]    Patient Class: OP- Observation   Admission Date: 9/25/2020  Length of Stay: 0 days  Date of Service: 9/27/2020    Attending Physician: Anibal Kramer MD  Primary Care Provider: Dustin Ireland MD    Reason for Consult: esrd/anemia/htn/shpt/chest pain    SUBJECTIVE:     HPI: 55F with many medical problems including ESRD on HD Tue-Sat, cirrhosis, inoperable CAD is admitted again for chest pain, responded to medical therapy. Plan for HD in AM if still hospitalized, per outpatient schedule.    9/26 VSS, seen and examined on HD today, tolerating well. Next HD Tue or PRN.  9/27 VSS, refusing CABG at this time, want to talk more with sister, who is nurse anesthesist. Next HD Tue or PRN.    Past Medical History:   Diagnosis Date    Anemia of chronic renal failure, stage 5 8/19/2015    Ascites     Bleeding duodenal ulcer     CAD (coronary artery disease)     CAD (coronary artery disease)     Chest pain     CHF (congestive heart failure)     Chronic hepatitis C 8/19/2015    Colitis     Elevated troponin     ESRD 2/2 MPGN on HD since 12/16/13 8/19/2015    Gastritis     Hypertension 8/19/2015    Mitral valve regurgitation     Renal dialysis status     M, W, F    Secondary hyperparathyroidism of renal origin 8/19/2015     Past Surgical History:   Procedure Laterality Date    ANGIOGRAM, CORONARY, WITH LEFT HEART CATHETERIZATION Left 1/13/2020    Procedure: ANGIOGRAM,CORONARY,WITH LEFT HEART CATHETERIZATION;  Surgeon: Yahir Starkey MD;  Location: [facility] CATH/EP LAB;  Service: Cardiology;  Laterality: Left;    AV FISTULA PLACEMENT  2013    COLONOSCOPY      CORONARY ANGIOPLASTY WITH STENT PLACEMENT      SALPINGOOPHORECTOMY Right 1997    TOTAL ABDOMINAL HYSTERECTOMY  1999     Family History   Problem Relation Age of Onset    Kidney disease Mother     Stroke Father     Psoriasis Brother     Breast cancer Neg Hx     Cancer Neg  Hx     Colon cancer Neg Hx     Ovarian cancer Neg Hx      Social History     Tobacco Use    Smoking status: Current Every Day Smoker     Packs/day: 0.75     Years: 30.00     Pack years: 22.50     Types: Cigarettes     Last attempt to quit: 2019     Years since quittin.8    Smokeless tobacco: Never Used   Substance Use Topics    Alcohol use: No     Alcohol/week: 0.0 standard drinks    Drug use: Yes     Types: Marijuana     Comment: occasionally       Review of patient's allergies indicates:   Allergen Reactions    Ciprofloxacin Itching    Levaquin [levofloxacin] Itching    Quinolones Itching    Codeine Nausea Only       Outpatient meds:  No current facility-administered medications on file prior to encounter.      Current Outpatient Medications on File Prior to Encounter   Medication Sig Dispense Refill    aspirin (ECOTRIN) 81 MG EC tablet Take 81 mg by mouth once daily.      calcium acetate,phosphat bind, (PHOSLO) 667 mg capsule Take 667 mg by mouth 3 (three) times daily with meals.      FA-VIT B COMP&C-SELENIUM-ZINC 3-70-15 MG-MCG-MG ORAL TAB Take 1 tablet by mouth once daily.      HYDROcodone-acetaminophen (NORCO)  mg per tablet Take 1 tablet by mouth every 8 (eight) hours as needed for Pain. 60 tablet 0    isosorbide mononitrate (IMDUR) 30 MG 24 hr tablet Take 1 tablet (30 mg total) by mouth once daily. 30 tablet 11    lisinopril (PRINIVIL,ZESTRIL) 20 MG tablet Take 1 tablet by mouth nightly.   3    LOKELMA 5 gram PwPk Take 5 g by mouth. Five times a week; on  Non dialysis days      metoprolol succinate (TOPROL-XL) 50 MG 24 hr tablet Take 50 mg by mouth nightly.       nitroGLYCERIN (NITROSTAT) 0.4 MG SL tablet Place 1 tablet (0.4 mg total) under the tongue every 5 (five) minutes as needed for Chest pain. 30 tablet 0    pantoprazole (PROTONIX) 40 MG tablet Take 40 mg by mouth 2 (two) times daily.       polyethylene glycol (GLYCOLAX) 17 gram PwPk Take 17 g by mouth daily as needed  (constipation). 30 each 0    rosuvastatin (CRESTOR) 10 MG tablet Take 1 tablet (10 mg total) by mouth once daily. 90 tablet 3    lactulose (CHRONULAC) 10 gram/15 mL solution TK 30 ML PO ONCE D      traZODone (DESYREL) 50 MG tablet Take 1 tablet (50 mg total) by mouth every evening. 30 tablet 11       Scheduled meds:   sodium chloride 0.9%   Intravenous Once    aspirin  81 mg Oral Daily    calcium carbonate  500 mg Oral TID WM    isosorbide mononitrate  60 mg Oral Daily    lactulose  30 g Oral Daily    lisinopriL  5 mg Oral Nightly    metoprolol succinate  50 mg Oral Nightly    pantoprazole  40 mg Oral BID    rosuvastatin  10 mg Oral Daily    sodium zirconium cyclosilicate  5 g Oral Once per day on Sun Mon Wed Fri    traZODone  50 mg Oral QHS       Infusions:      PRN meds:  sodium chloride 0.9%, HYDROcodone-acetaminophen, melatonin, nitroGLYCERIN, ondansetron, sodium chloride 0.9%    Review of Systems:  ROS    OBJECTIVE:     Vital Signs and IO (Last 24H):  Temp:  [97.2 °F (36.2 °C)-99.3 °F (37.4 °C)]   Pulse:  [72-87]   Resp:  [16-18]   BP: ()/(54-96)   SpO2:  [95 %-100 %]   I/O last 3 completed shifts:  In: 670 [P.O.:170; Other:500]  Out: 1667 [Other:1667]    Wt Readings from Last 5 Encounters:   09/25/20 62.2 kg (137 lb 2 oz)   09/17/20 66.4 kg (146 lb 4.8 oz)   09/08/20 61.3 kg (135 lb 2.3 oz)   09/04/20 60.8 kg (134 lb)   03/12/20 59.6 kg (131 lb 6.3 oz)         Physical Exam:  Physical Exam  Constitutional:       Appearance: She is well-developed. She is not diaphoretic.   HENT:      Head: Normocephalic and atraumatic.   Eyes:      General: No scleral icterus.     Pupils: Pupils are equal, round, and reactive to light.   Neck:      Musculoskeletal: Neck supple.   Cardiovascular:      Rate and Rhythm: Normal rate and regular rhythm.   Pulmonary:      Effort: Pulmonary effort is normal. No respiratory distress.      Breath sounds: No stridor.   Abdominal:      General: There is no distension.       Palpations: Abdomen is soft.   Musculoskeletal: Normal range of motion.         General: No deformity.   Skin:     General: Skin is warm and dry.      Findings: No erythema or rash.   Neurological:      Mental Status: She is alert and oriented to person, place, and time.      Cranial Nerves: No cranial nerve deficit.   Psychiatric:         Behavior: Behavior normal.         Body mass index is 23.54 kg/m².    Laboratory:  Recent Labs   Lab 09/25/20 0419 09/26/20 0419 09/27/20 0436    136 137   K 4.1 4.8 4.1    103 98   CO2 22* 22* 26   BUN 53* 61* 39*   CREATININE 7.2* 7.7* 5.5*   ESTGFRAFRICA 6.8* 6.3* 9.5*   EGFRNONAA 5.9* 5.5* 8.2*   * 91 96       Recent Labs   Lab 09/25/20 0419 09/26/20 0419 09/27/20 0436   CALCIUM 9.1 8.6* 8.6*   ALBUMIN 2.7* 2.2*  --    MG  --   --  2.2             No results for input(s): POCTGLUCOSE in the last 168 hours.    Recent Labs   Lab 11/19/19 0447 01/08/20  0429   Hemoglobin A1C SEE COMMENT SEE COMMENT       Recent Labs   Lab 09/25/20 0419 09/26/20 0419   WBC 10.17 4.85   HGB 11.9* 9.9*   HCT 36.9* 30.3*    181   MCV 96 96   MCHC 32.2 32.7   MONO 4.9  0.5 8.0  0.4       Recent Labs   Lab 09/25/20 0419 09/26/20 0419   BILITOT 0.7 0.8   PROT 5.9* 4.9*   ALBUMIN 2.7* 2.2*   ALKPHOS 84 63   ALT 22 18   AST 28 20       Recent Labs   Lab 11/19/19  0316 12/23/19  0344 01/07/20  2353   Color, UA Yellow Yellow Yellow   Appearance, UA Hazy A Clear Clear   pH, UA >8.0 A >8.0 A >8.0 A   Specific [place], UA 1.010 1.015 1.015   Protein, UA 2+ A 2+ A 2+ A   Glucose, UA Negative Negative Negative   Ketones, UA Negative Negative Negative   Urobilinogen, UA Negative Negative Negative   Bilirubin (UA) Negative Negative Negative   Occult Blood UA 2+ A 2+ A 1+ A   Nitrite, UA Negative Negative Negative   RBC, UA 30 H 30 H 9 H   WBC, UA >100 H 1 0   Bacteria Moderate A Negative Negative   Hyaline Casts, UA 1 3 A 13 A             Microbiology Results (last 7  days)     ** No results found for the last 168 hours. 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       ESRD on HD Tue Sat via cath  Continue current dialysis prescription.  Next HD per schedule.  Renal diet - low K, low phos.  No IVs or BP checks on access and/or non-dominant arm.    Anemia of CKD  Hgb and HCT are acceptable. Monitor.  Will provide MERON and/or IV iron PRN.    MBD / Secondary HPT  Ca, phos, PTH and vitamin D levels are acceptable.   Phos binders, vitamin D analogues and calcimimetics as needed.    HTN  BP seem controlled.   Tolerate asymptomatic HTN up to -160.  Continue home meds.  Low sodium diet.    Chest pain, CABG offered by Dr. Yeboah but she is unsure.  Management per medical team and cards.    Thank you for allowing us to participate in the care of your patient!   We will follow the patient and provide recommendations as needed.    Luciano Campos MD    [facility]  [address] [place] [address]  [place] [address]    [phone] - tel  [phone] - fax    9/27/2020 12:57 PM

## 2020-09-27 NOTE — NURSING
HD complete. Total UF 1.1 liters. Pt c/o intermittent chest pain on dialysis. Reported to Dr. Campos, no new orders received. Report called to Stella Celeste RN.

## 2020-09-27 NOTE — DISCHARGE SUMMARY
[facility]  Discharge Summary      Patient Name: Aurelia Saucedo  MRN: [id number]  Admission Date: 9/25/2020  Hospital Length of Stay: 0 days  Discharge Date and Time: 9/27/2020 12:50 PM  Attending Physician: No att. providers found   Discharging Provider: Anibal Kramer MD  Primary Care Provider: Dustin Ireland MD      HPI:   Ms. Saucedo is a 55-year-old female who presented to the ED with chief complaint of chest pain.  Patient with recent [facility] admission 9/2 to 9/8 with non ST elevation MI in the setting of known multivessel coronary artery disease, left heart catheterization January 2020 with severe multivessel disease with total occlusion RCA with collaterals, diffuse LAD disease, high-grade stenosis proximal circ and diagonal, troponin peaked at 22, she was seen by cardiology and cardiothoracic surgery (Dr. Yebaoh), medically managed with outpatient follow-up.  Of note patient was also seen at [facility] for consideration of CABG with valve replacement surgery however she was deemed too high risk due to her comorbidities.  Patient states since her discharge she has been having intermittent midsternal chest discomfort radiating up into her jaw, not related to activity (states she lives relatively sedentary lifestyle), worsening in the last today with increasing severity and no response to sublingual nitro by 3 prior to presentation which prompted ED visit.  States she has chronic shortness of breath without any recent change, constant abdominal pain due to the large volume ascites requiring paracentesis Q 2 weekly, for the last 18 months, states she is interested in getting catheter.  She states she was compliant with her medication therapy.  Continues to smoke quarter pack per day.  In the ED T 97.9°, , /85, saturating well on room air.  Labs with mild anemia, BUN/creatinine 53/7.2, BNP 2 642, troponin 0.083, EKG sinus tach with T-wave inversions 1, aVL,  V1/V2, she received aspirin 243 and sublingual nitro 0.4 mg.  Chest pain-free during my encounter.  Case discussed with ED provider.  Plan of care was discussed with patient and significant other present at bedside.  She is interested about optimizing her medications for her heart disease.    * No surgery found *      Hospital Course:   55-year-old  female with multiple medical comorbidities including severe CAD not amenable to percutaneous coronary intervention, end-stage renal disease on hemodialysis, end-stage liver disease secondary to cirrhosis on periodical paracentesis and moderate to severe aortic stenosis admitted after presenting with severe chest pain.  Seen at our facility recently for similar issues.    Symptoms likely secondary to known CAD.  Seen by both Cardiology as well as cardiothoracic surgery.  Home dose of long-acting nitrate has been increased.  She is not a candidate for ranolazine secondary to ESRD status.  She was offered CABG however at a higher risk secondary to her comorbidities.  States she would like to discuss with her family members prior to making a final decision.  Medically stable for discharge to home.  She is aware of contacting Dr. Yeboah's office once she makes a decision about her CABG.  In the interim refilled isosorbide mononitrate as well as sublingual nitroglycerin.    Instructions provided to follow up with primary care physician as outpatient. Patient verbalized understanding and is aware to contact primary care physician or return to ED if new or worsening symptoms.    Physical exam on the day of discharge:  General: Patient resting comfortably in no acute distress.  Lungs: CTA. Good air entry.  Cor: Regular rate and rhythm. No murmurs. No pedal edema.  Abd: Soft. Nontender. Distended.  Neuro: A&O x3. Moving all 4 extremities equally  Ext: No clubbing. No cyanosis.        Consults:   Consults (From admission, onward)        Status Ordering Provider      Inpatient consult to Cardiology  Once     Provider:  Asher Mcdaniels MD    Completed [facility].     Inpatient consult to Cardiothoracic Surgery  Once     Provider:  Jose Yeboah MD    Completed [facility].     Inpatient consult to Nephrology  Once     Provider:  Mele Ramos MD    Completed [facility].          No new Assessment & Plan notes have been filed under this hospital service since the last note was generated.  Service: Hospital Medicine    Final Active Diagnoses:    Diagnosis Date Noted POA    PRINCIPAL PROBLEM:  Chest pain with known history of multivessel coronary artery disease [R07.9] 09/25/2020 Yes     Chronic    Valvular heart disease [I38] 09/25/2020 Yes     Chronic    Pulmonary hypertension [I27.20] 09/25/2020 Yes     Chronic    Tobacco abuse [Z72.0] 09/25/2020 Yes     Chronic    Coronary artery disease [I25.10]  Yes    Anemia of chronic disease [D63.8] 12/22/2019 Yes    Chronic diastolic heart failure [I50.32] 12/02/2019 Yes     Chronic    Recurrent ascites in the setting of end-stage liver disease with portal hypertension [R18.8] 12/01/2019 Yes     Chronic    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    Specialty: Family Medicine  Why: As needed - hospital discharge follow-up  Contact information:  [address]  [address]  [place] [address]  [phone]             Jose Yeboah MD. Call in 1 week.    Specialties: Cardiothoracic Surgery, Vascular Surgery, Cardiovascular Disease, Cardiology  Why: To discuss plans for bypass surgery  Contact  information:  [address]  [address]  [place] [address]                 Patient Instructions:      Diet Cardiac     Diet renal     Notify your health care provider if you experience any of the following:  temperature >100.4     Notify your health care provider if you experience any of the following:  persistent nausea and vomiting or diarrhea     Notify your health care provider if you experience any of the following:  persistent dizziness, light-headedness, or visual disturbances     Notify your health care provider if you experience any of the following:  severe uncontrolled pain     Notify your health care provider if you experience any of the following:  difficulty breathing or increased cough     Activity as tolerated       Significant Diagnostic Studies: Labs:   CMP   Recent Labs   Lab 09/26/20  0419 09/27/20  0436    137   K 4.8 4.1    98   CO2 22* 26   GLU 91 96   BUN 61* 39*   CREATININE 7.7* 5.5*   CALCIUM 8.6* 8.6*   PROT 4.9*  --    ALBUMIN 2.2*  --    BILITOT 0.8  --    ALKPHOS 63  --    AST 20  --    ALT 18  --    ANIONGAP 11 13   ESTGFRAFRICA 6.3* 9.5*   EGFRNONAA 5.5* 8.2*   , CBC   Recent Labs   Lab 09/26/20  0419   WBC 4.85   HGB 9.9*   HCT 30.3*       and Troponin   Recent Labs   Lab 09/25/20  1603 09/25/20  2147 09/26/20  0419   TROPONINI 0.148* 0.128* 0.109*       Pending Diagnostic Studies:     None         Medications:  Reconciled Home Medications:      Medication List      CHANGE how you take these medications    isosorbide mononitrate 30 MG 24 hr tablet  Commonly known as: IMDUR  Take 1 tablet (30 mg total) by mouth 2 (two) times a day.  What changed: when to take this        CONTINUE taking these medications    aspirin 81 MG EC tablet  Commonly known as: ECOTRIN  Take 81 mg by mouth once daily.     calcium acetate(phosphat bind) 667 mg capsule  Commonly known as: PHOSLO  Take 667 mg by mouth 3 (three) times daily with meals.     DIALYVITE 3000 3-70-15 mg-mcg-mg  Tab  Generic drug: folic acid-B [id number]-zinc  Take 1 tablet by mouth once daily.     HYDROcodone-acetaminophen  mg per tablet  Commonly known as: NORCO  Take 1 tablet by mouth every 8 (eight) hours as needed for Pain.     lactulose 10 gram/15 mL solution  Commonly known as: CHRONULAC  TK 30 ML PO ONCE D     lisinopriL 20 MG tablet  Commonly known as: PRINIVIL,ZESTRIL  Take 1 tablet by mouth nightly.     LOKELMA 5 gram packet  Generic drug: sodium zirconium cyclosilicate  Take 5 g by mouth. Five times a week; on  Non dialysis days     metoprolol succinate 50 MG 24 hr tablet  Commonly known as: TOPROL-XL  Take 50 mg by mouth nightly.     nitroGLYCERIN 0.4 MG SL tablet  Commonly known as: NITROSTAT  Place 1 tablet (0.4 mg total) under the tongue every 5 (five) minutes as needed for Chest pain.     pantoprazole 40 MG tablet  Commonly known as: PROTONIX  Take 40 mg by mouth 2 (two) times daily.     polyethylene glycol 17 gram Pwpk  Commonly known as: GLYCOLAX  Take 17 g by mouth daily as needed (constipation).     rosuvastatin 10 MG tablet  Commonly known as: CRESTOR  Take 1 tablet (10 mg total) by mouth once daily.     traZODone 50 MG tablet  Commonly known as: DESYREL  Take 1 tablet (50 mg total) by mouth every evening.            Indwelling Lines/Drains at time of discharge:   Lines/Drains/Airways     Drain                 Hemodialysis AV Fistula Left upper arm -- days                Time spent on the discharge of patient: 35 minutes  Patient was seen and examined on the date of discharge and determined to be suitable for discharge.         Anibal Kramer MD  Department of Hospital Medicine  [facility]

## 2020-09-27 NOTE — NURSING
PT educated on discharge orders and states understanding.  IV and tele removed.  To be driven home by family. Wheeled off unit with nursing staff

## 2020-09-28 NOTE — TELEPHONE ENCOUNTER
----- Message from Jennifer B. Scheuermann, PA sent at 9/28/2020  2:48 PM CDT -----  Contact: [phone]  I don't anticipate that curing her HCV would resolve her ascites so agree w/ MD visit first.  I will NOT be able to address her questions about pleurex catheter.    thanks  ----- Message -----  From: Tatiana Mazariegos  Sent: 9/28/2020   2:38 PM CDT  To: Madison Albert, RN, #    I agree . She was denied txp due to cardiac disease(prob no change).  Needs MD.  Charmaine's decision  ----- Message -----  From: Madison Albert RN  Sent: 9/28/2020  11:03 AM CDT  To: Tatiana Ireland has asked that patient be scheduled for a visit with PA Scheuermann.  I spoke with patient.  She reports being treated for hep c X 2 without success.  She states that she needs frequent piedad and wants a pleurx catheter placed to drain fluid.  I don't think she needs to see Charmaine 1st.  Please advise.  ----- Message -----  From: Isela Fonseca MA  Sent: 9/28/2020  10:30 AM CDT  To: Scheuermann Jennifer B Staff    Patient Requesting Referral    Referral to What Specialty:  Hepatology /appt with Jennifer Scheuermann requested     Provider asking for Referral:  Dr Dustin Ireland (pt's pcp)     Reason for Referral:  K74.60,R18.8 (ICD-10-CM) - Cirrhosis of liver with ascites, unspecified hepatic cirrhosis type    Communication Preference:   [phone]    Additional Information: No access to this providers schedule /there is a referral in epic

## 2020-09-28 NOTE — TELEPHONE ENCOUNTER
----- Message from Elliot Awad sent at 9/28/2020 10:28 AM CDT -----  Regarding: Needs to schedule surgery with Dr Yeboah  Type: Sooner appmnt / Needs Medical Advice    Who Called:  Ptnt  [phone]    Appmnt: Available in     Symptoms (please be specific):  Needs surgery.    How long has patient had these symptoms:  For a while now.    Additional Information:     Advised needs to speak with Dr Yeboah about scheduling her surgery, has several questions. Advised has ASCITES has fluid drained every two weeks at this point and is also a diallyls ptnt.    Please call.

## 2020-10-01 NOTE — ED PROVIDER NOTES
Encounter Date: 10/1/2020       History     Chief Complaint   Patient presents with    Chest Pain     for months    Shortness of Breath    abdominal swelling     This is a 55-year-old female with a past medical history of end-stage renal disease on dialysis Tuesday and Saturday, coronary artery disease with stents, chronic liver failure secondary to hepatitis-C, hypertension, who presents emergency department with chest pain shortness of breath.  She says that she spends chest pain since earlier today described as a burning in the center of her chest radiating to her left arm with some nausea and some diaphoresis.  She says Sterrett she has had increased abdominal distension and swelling in her stomach which is secondary to her known ascites.  She says she gets paracenteses every 2 weeks last 1 was 2 weeks ago she is due for another 1 tomorrow.  No fever reported no chills no vomiting any diarrhea.  No abdominal pain worse than usual.  She states she missed her dialysis on Tuesday but did go to dialysis on Saturday.        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 since 12/16/13 8/19/2015    Gastritis     Hypertension 8/19/2015    Mitral valve regurgitation     Renal dialysis status     M, W, F    Secondary hyperparathyroidism of renal origin 8/19/2015     Past Surgical History:   Procedure Laterality Date    ANGIOGRAM, CORONARY, WITH LEFT HEART CATHETERIZATION Left 1/13/2020    Procedure: ANGIOGRAM,CORONARY,WITH LEFT HEART  CATHETERIZATION;  Surgeon: Yahir Starkey MD;  Location: [facility] CATH/EP LAB;  Service: Cardiology;  Laterality: Left;    AV FISTULA PLACEMENT      COLONOSCOPY      CORONARY ANGIOPLASTY WITH STENT PLACEMENT      SALPINGOOPHORECTOMY Right     TOTAL ABDOMINAL HYSTERECTOMY       Family History   Problem Relation Age of Onset    Kidney disease Mother     Stroke Father     Psoriasis Brother     Breast cancer Neg Hx     Cancer Neg Hx     Colon cancer Neg Hx     Ovarian cancer Neg Hx      Social History     Tobacco Use    Smoking status: Current Every Day Smoker     Packs/day: 0.75     Years: 30.00     Pack years: 22.50     Types: Cigarettes     Last attempt to quit: 2019     Years since quittin.8    Smokeless tobacco: Never Used   Substance Use Topics    Alcohol use: No     Alcohol/week: 0.0 standard drinks    Drug use: Yes     Types: Marijuana     Comment: occasionally     Review of Systems   Constitutional: Positive for diaphoresis. Negative for chills and fever.   HENT: Negative for sore throat.    Respiratory: Positive for chest tightness and shortness of breath.    Cardiovascular: Positive for chest pain. Negative for palpitations and leg swelling.   Gastrointestinal: Positive for abdominal distention and nausea. Negative for vomiting.   Genitourinary: Negative for dysuria, flank pain and frequency.   Musculoskeletal: Negative for back pain.   Skin: Negative for rash.   Neurological: Negative for seizures, weakness and headaches.   Hematological: Does not bruise/bleed easily.   Psychiatric/Behavioral: Negative for confusion.   All other systems reviewed and are negative.      Physical Exam     Initial Vitals [10/01/20 1418]   BP Pulse Resp Temp SpO2   122/75 90 20 97.8 °F (36.6 °C) 100 %      MAP       --         Physical Exam    Nursing note and vitals reviewed.  Constitutional: She appears well-developed and well-nourished. No distress.   Appears chronically ill, appears older than  stated age.   HENT:   Head: Normocephalic and atraumatic.   Mouth/Throat: No oropharyngeal exudate.   Eyes: Conjunctivae and EOM are normal. Pupils are equal, round, and reactive to light.   Neck: Neck supple. No tracheal deviation present.   Cardiovascular: Normal rate, regular rhythm, normal heart sounds and intact distal pulses.   No murmur heard.  Pulmonary/Chest: Breath sounds normal. No stridor. No respiratory distress. She has no wheezes. She has no rhonchi. She has no rales.   Abdominal: Soft. She exhibits no distension. There is no abdominal tenderness.   Musculoskeletal: Normal range of motion. Edema (Minimal nonpitting bilateral pretibial regions.) present. No tenderness.      Comments: Left upper arm:  Dialysis fistula present in left upper arm with palpable thrill and bruit.   Neurological: She is alert and oriented to person, place, and time. She has normal strength. No cranial nerve deficit or sensory deficit. GCS score is 15. GCS eye subscore is 4. GCS verbal subscore is 5. GCS motor subscore is 6.   Skin: Skin is warm and dry. Capillary refill takes less than 2 seconds. Rash ( chronic venous stasis changes present bilateral lower extremities.) noted. No erythema. No pallor.   Psychiatric: She has a normal mood and affect.         ED Course   Procedures  Labs Reviewed   CBC W/ AUTO DIFFERENTIAL - Abnormal; Notable for the following components:       Result Value    RBC 2.97 (*)     Hemoglobin 9.4 (*)     Hematocrit 28.6 (*)     Mean Corpuscular Hemoglobin 31.6 (*)     RDW 15.1 (*)     Immature Granulocytes 0.8 (*)     Immature Grans (Abs) 0.05 (*)     Gran% 74.9 (*)     Lymph% 14.7 (*)     All other components within normal limits   COMPREHENSIVE METABOLIC PANEL - Abnormal; Notable for the following components:    Sodium 132 (*)     Potassium 5.5 (*)     Chloride 93 (*)     CO2 20 (*)     BUN, Bld 77 (*)     Creatinine 8.8 (*)     Total Protein 5.3 (*)     Albumin 2.5 (*)     Anion Gap 19 (*)      eGFR if  5.4 (*)     eGFR if non  4.7 (*)     All other components within normal limits   B-TYPE NATRIURETIC PEPTIDE - Abnormal; Notable for the following components:    BNP 2,099 (*)     All other components within normal limits   TROPONIN I - Abnormal; Notable for the following components:    Troponin I 0.137 (*)     All other components within normal limits    Narrative:     Troponin critical result(s) repeated. Called and verbal readback   obtained from [facility]  by [facility] 10/01/2020 16:25   CBC W/ AUTO DIFFERENTIAL - Abnormal; Notable for the following components:    RBC 2.98 (*)     Hemoglobin 9.2 (*)     Hematocrit 28.2 (*)     RDW 15.1 (*)     MPV 9.0 (*)     Immature Granulocytes 0.6 (*)     All other components within normal limits    Narrative:     (if patient is on warfarin prior to heparin therapy)   TROPONIN I - Abnormal; Notable for the following components:    Troponin I 0.140 (*)     All other components within normal limits    Narrative:     (if patient is on warfarin prior to heparin therapy)   trop critical result(s) repeated. Called and verbal readback   obtained from [facility]  by [facility] 10/01/2020 19:17   PROTIME-INR   SARS-COV-2 RNA AMPLIFICATION, QUAL   APTT    Narrative:     (if patient is on warfarin prior to heparin therapy)   PROTIME-INR    Narrative:     (if patient is on warfarin prior to heparin therapy)   APTT    Narrative:     (if patient is on warfarin prior to heparin therapy)        ECG Results          EKG 12-lead (In process)  Result time 10/01/20 15:00:36    In process by Interface, Lab In [facility] (10/01/20 15:00:36)                 Narrative:    Test Reason : R07.9,    Vent. Rate : 089 BPM     Atrial Rate : 089 BPM     P-R Int : 134 ms          QRS Dur : 096 ms      QT Int : 358 ms       P-R-T Axes : 068 064 179 degrees     QTc Int : 435 ms    Normal sinus rhythm  Low voltage QRS  ST and T wave abnormality, consider lateral  ischemia  Abnormal ECG  When compared with ECG of 25-SEP-2020 04:06,  Questionable change in The axis    Referred By: [email or web address]   SELF           Confirmed By:                             Imaging Results          X-Ray Chest AP Portable (Final result)  Result time 10/01/20 15:45:02    Final result by Guy Patton MD (10/01/20 15:45:02)                 Narrative:    REASON: chest pain Chest Pain?(for months); Shortness of Breath?;  abdominal swelling?    FINDINGS:    Portable chest radiograph with comparison chest x-ray September 25, 2020. The cardiomediastinal silhouette is within normal limits in  size.The pulmonary vascular structures are within normal limits. There  is redemonstration of mild prominence of the interstitium. No  confluent airspace opacities. Linear and hazy densities at the lung  bases likely reflect subsegmental atelectasis. No acute osseous  abnormality.    IMPRESSION:    1.  Redemonstration of mild prominence of interstitium may reflect  mild pulmonary edema in the proper clinical context.  2.  Bibasilar linear and hazy densities likely reflect subsegmental  atelectasis.    Electronically Signed by Guy Patton on 10/1/2020 3:50 PM                            Results for orders placed or performed during the hospital encounter of 10/01/20   CBC auto differential   Result Value Ref Range    WBC 6.45 3.90 - 12.70 K/uL    RBC 2.97 (L) 4.00 - 5.40 M/uL    Hemoglobin 9.4 (L) 12.0 - 16.0 g/dL    Hematocrit 28.6 (L) 37.0 - 48.5 %    Mean Corpuscular Volume 96 82 - 98 fL    Mean Corpuscular Hemoglobin 31.6 (H) 27.0 - 31.0 pg    Mean Corpuscular Hemoglobin Conc 32.9 32.0 - 36.0 g/dL    RDW 15.1 (H) 11.5 - 14.5 %    Platelets 192 150 - 350 K/uL    MPV 9.4 9.2 - 12.9 fL    Immature Granulocytes 0.8 (H) 0.0 - 0.5 %    Gran # (ANC) 4.8 1.8 - 7.7 K/uL    Immature Grans (Abs) 0.05 (H) 0.00 - 0.04 K/uL    Lymph # 1.0 1.0 - 4.8 K/uL    Mono # 0.5 0.3 - 1.0 K/uL    Eos # 0.1 0.0 - 0.5 K/uL    Baso # 0.04  0.00 - 0.20 K/uL    nRBC 0 0 /100 WBC    Gran% 74.9 (H) 38.0 - 73.0 %    Lymph% 14.7 (L) 18.0 - 48.0 %    Mono% 7.6 4.0 - 15.0 %    Eosinophil% 1.4 0.0 - 8.0 %    Basophil% 0.6 0.0 - 1.9 %    Differential Method Automated    Comprehensive metabolic panel   Result Value Ref Range    Sodium 132 (L) 136 - 145 mmol/L    Potassium 5.5 (H) 3.5 - 5.1 mmol/L    Chloride 93 (L) 95 - 110 mmol/L    CO2 20 (L) 23 - 29 mmol/L    Glucose 97 70 - 110 mg/dL    BUN, Bld 77 (H) 6 - 20 mg/dL    Creatinine 8.8 (H) 0.5 - 1.4 mg/dL    Calcium 8.9 8.7 - 10.5 mg/dL    Total Protein 5.3 (L) 6.0 - 8.4 g/dL    Albumin 2.5 (L) 3.5 - 5.2 g/dL    Total Bilirubin 0.7 0.1 - 1.0 mg/dL    Alkaline Phosphatase 67 55 - 135 U/L    AST 26 10 - 40 U/L    ALT 19 10 - 44 U/L    Anion Gap 19 (H) 8 - 16 mmol/L    eGFR if African American 5.4 (A) >60 mL/min/1.73 m^2    eGFR if non  4.7 (A) >60 mL/min/1.73 m^2   Brain natriuretic peptide   Result Value Ref Range    BNP 2,099 (H) 0 - 99 pg/mL   Troponin I   Result Value Ref Range    Troponin I 0.137 (HH) <=0.040 ng/mL   Protime-INR   Result Value Ref Range    PT 12.8 10.6 - 14.8 sec    INR 1.0    COVID-19 Rapid Screening   Result Value Ref Range    SARS-CoV-2 RNA, Amplification, Qual Negative Negative   APTT   Result Value Ref Range    aPTT 29.2 23.6 - 33.3 sec   Protime-INR   Result Value Ref Range    PT 13.5 10.6 - 14.8 sec    INR 1.1    CBC auto differential   Result Value Ref Range    WBC 5.33 3.90 - 12.70 K/uL    RBC 2.98 (L) 4.00 - 5.40 M/uL    Hemoglobin 9.2 (L) 12.0 - 16.0 g/dL    Hematocrit 28.2 (L) 37.0 - 48.5 %    Mean Corpuscular Volume 95 82 - 98 fL    Mean Corpuscular Hemoglobin 30.9 27.0 - 31.0 pg    Mean Corpuscular Hemoglobin Conc 32.6 32.0 - 36.0 g/dL    RDW 15.1 (H) 11.5 - 14.5 %    Platelets 254 150 - 350 K/uL    MPV 9.0 (L) 9.2 - 12.9 fL    Immature Granulocytes 0.6 (H) 0.0 - 0.5 %    Gran # (ANC) 3.8 1.8 - 7.7 K/uL    Immature Grans (Abs) 0.03 0.00 - 0.04 K/uL    Lymph #  1.1 1.0 - 4.8 K/uL    Mono # 0.4 0.3 - 1.0 K/uL    Eos # 0.1 0.0 - 0.5 K/uL    Baso # 0.04 0.00 - 0.20 K/uL    nRBC 0 0 /100 WBC    Gran% 70.9 38.0 - 73.0 %    Lymph% 19.7 18.0 - 48.0 %    Mono% 6.9 4.0 - 15.0 %    Eosinophil% 1.1 0.0 - 8.0 %    Basophil% 0.8 0.0 - 1.9 %    Differential Method Automated    Troponin I   Result Value Ref Range    Troponin I 0.140 (HH) <=0.040 ng/mL   APTT   Result Value Ref Range    aPTT 29.2 23.6 - 33.3 sec   APTT   Result Value Ref Range    aPTT 114.1 (H) 23.6 - 33.3 sec     X-Ray Chest AP Portable   Final Result      IR Paracentesis with Imaging    (Results Pending)          Medical Decision Making:   ED Management:  Patient presents emergency department with chest pain as described above in the setting of known triple-vessel disease.  She has a high risk bypass candidate.  Troponin is mildly elevated more than her baseline.  Because of her story and EKG which is abnormal but unchanged aspirin has been given.  She will be placed on a heparin drip and will be admitted to the hospitalist.  Cardiology will evaluate the patient they did recommend a CT surgery consultation as patient more than likely does need bypass but is a high risk surgical candidate.  Patient will be admitted to the hospital further care and evaluation or chest pain.  In addition she will have a paracentesis performed on inpatient basis.  I do not believe that she has any spontaneous bacterial peritonitis no fever no white count,  no peritonitis on exam.              Attending Attestation:         Attending Critical Care:   Critical Care Times:   Direct Patient Care (initial evaluation, reassessments, and time considering the case)................................................................15 minutes.   Additional History from reviewing old medical records or taking additional history from the family, EMS, PCP, etc.......................5 minutes.   Ordering, Reviewing, and Interpreting Diagnostic  Studies...............................................................................................................5 minutes.   Documentation..................................................................................................................................................................................5 minutes.   Consultation with other Physicians. .................................................................................................................................................5 minutes.   Consultation with the patient's family directly relating to the patient's condition, care, and DNR status (when patient unable)......5 minutes.   ==============================================================  · Total Critical Care Time - exclusive of procedural time: 40 minutes.  ==============================================================  Critical care was necessary to treat or prevent imminent or life-threatening deterioration of the following conditions: myocardial infarction.   The following critical care procedures were done by me (see procedure notes): blood draw for specimens and pulse oximetry.   Critical care was time spent personally by me on the following activities: obtaining history from patient or relative, examination of patient, review of old charts, ordering lab, x-rays, and/or EKG, development of treatment plan with patient or relative, ordering and performing treatments and interventions, discussions with primary provider, interpretation of cardiac measurements and re-evaluation of patient's conition.   Critical Care Condition: potentially life-threatening               ED Course as of Oct 01 2244   Thu Oct 01, 2020   1435 EKG 2:27 p.m. normal sinus rhythm rate of 80.  Low voltage QRS.  T-wave is inverted in leads 1 and aVL.  This is unchanged from prior EKG.  The anterior T-wave inversions which were present on prior EKG have now Flattening.  No STEMI.  Abnormal EKG but  actually improved from prior    [JR]   1627 EKG 4:14 p.m. normal sinus rhythm rate of 90.  T-wave inversion noted 1 and aVL.  Unchanged when compared to prior EKG.  Borderline low voltage.  No STEMI.    [JR]   1647 Cardiology PA for Dr. Quiles who recommended heparin drip.  Will order this with the patient and will admit the patient to the hospital.    [JR]   5149 Deepthi from Hospital Medicine will admit the patient.    [JR]      ED Course User Index  [JR] Rodriguez Mace DO            Clinical Impression:       ICD-10-CM ICD-9-CM   1. NSTEMI (non-ST elevated myocardial infarction)  I21.4 410.70   2. Chest pain  R07.9 786.50   3. ESRD (end stage renal disease) on dialysis  N18.6 585.6    Z99.2 V45.11   4. Chronic hepatitis C without hepatic coma  B18.2 070.54                          ED Disposition Condition    Observation                             Rodriguez Mace DO  10/01/20 2244       Rodriguez Mace,   10/01/20 2246

## 2020-10-02 NOTE — CONSULTS
[facility]   Cardiology Note    Consult Requested By: PRUDENCIO  Reason for Consult: CAD    SUBJECTIVE:     History of Present Illness:     Aurelia Saucedo is a 55 y.o. White female who  has a past medical history of Anemia of chronic renal failure, stage 5 (8/19/2015), Ascites, Bleeding duodenal ulcer, CAD (coronary artery disease), CAD (coronary artery disease), Chest pain, CHF (congestive heart failure), Chronic hepatitis C (8/19/2015), Colitis, Elevated troponin, ESRD 2/2 MPGN on HD since 12/16/13  (8/19/2015), Gastritis, Hypertension (8/19/2015), Mitral valve regurgitation, Renal dialysis status, and Secondary hyperparathyroidism of renal origin (8/19/2015)..  ESRD with dialysis every Tuesday and Saturday  ascites with paracentesis every 2 weeks  The patient presented to [facility] on 10/1/2020 with a primary complaint of Chest Pain (for months), Shortness of Breath, and abdominal swelling. She was due for a paracentesis, and she also missed her dialysis on Tuesday. Currently on heparin drip,  with no chest pains or other complaints this am but has been inactive in bed. Scheduled for paracentesis and HD today.   [facility] multivessel disease which Dr Yeboah was consulted on previous admit and surgery was discussed and patient was to follow up as outpatient to decide on proceeding with surgery after discussing further with her family. She is a high risk for surgery due to her multiple co morbidities.  9/2020 ECHO LVH, EF 60%, grade 2 DD, aortic stenosis valve area 1.35, mean gradient 18mmhg, mod mitral regurg      Review of patient's allergies indicates:   Allergen Reactions    Ciprofloxacin Itching    Levaquin [levofloxacin] Itching    Quinolones Itching    Codeine Nausea Only       Past Medical History:   Diagnosis Date    Anemia of chronic renal failure, stage 5 8/19/2015    Ascites     Bleeding duodenal ulcer     CAD (coronary artery disease)     CAD (coronary artery disease)      Chest pain     CHF (congestive heart failure)     Chronic hepatitis C 2015    Colitis     Elevated troponin     ESRD 2/2 MPGN on HD since 13    Gastritis     Hypertension 2015    Mitral valve regurgitation     Renal dialysis status     M, W, F    Secondary hyperparathyroidism of renal origin 2015     Past Surgical History:   Procedure Laterality Date    ANGIOGRAM, CORONARY, WITH LEFT HEART CATHETERIZATION Left 2020    Procedure: ANGIOGRAM,CORONARY,WITH LEFT HEART CATHETERIZATION;  Surgeon: Yahir Starkey MD;  Location: [facility] CATH/EP LAB;  Service: Cardiology;  Laterality: Left;    AV FISTULA PLACEMENT      COLONOSCOPY      CORONARY ANGIOPLASTY WITH STENT PLACEMENT      SALPINGOOPHORECTOMY Right     TOTAL ABDOMINAL HYSTERECTOMY       Family History   Problem Relation Age of Onset    Kidney disease Mother     Stroke Father     Psoriasis Brother     Breast cancer Neg Hx     Cancer Neg Hx     Colon cancer Neg Hx     Ovarian cancer Neg Hx      Social History     Tobacco Use    Smoking status: Current Every Day Smoker     Packs/day: 0.75     Years: 30.00     Pack years: 22.50     Types: Cigarettes     Last attempt to quit: 2019     Years since quittin.8    Smokeless tobacco: Never Used   Substance Use Topics    Alcohol use: No     Alcohol/week: 0.0 standard drinks    Drug use: Yes     Types: Marijuana     Comment: occasionally       Review of Systems:  Review of Systems   Constitutional: Negative for chills and fever.   Respiratory: Positive for shortness of breath.    Cardiovascular: Positive for chest pain and leg swelling.   Gastrointestinal: Positive for abdominal pain.   Neurological: Negative for dizziness.       OBJECTIVE:     Vital Signs (Most Recent)  Temp: 98 °F (36.7 °C) (10/02/20 0754)  Pulse: 74 (10/02/20 0754)  Resp: 19 (10/02/20 0754)  BP: 105/72 (10/02/20 0754)  SpO2: 99 % (10/02/20 0754)    Vital Signs Range (Last  24H):  Temp:  [97.7 °F (36.5 °C)-98 °F (36.7 °C)]   Pulse:  [74-94]   Resp:  [18-26]   BP: (103-163)/(64-98)   SpO2:  [98 %-100 %]     I & O (Last 24H):  No intake or output data in the 24 hours ending 10/02/20 0847    Current Diet:     Current Diet Order   Procedures    Diet renal        Allergies:  Review of patient's allergies indicates:   Allergen Reactions    Ciprofloxacin Itching    Levaquin [levofloxacin] Itching    Quinolones Itching    Codeine Nausea Only       Meds:  Scheduled Meds:   calcium acetate(phosphat bind)  667 mg Oral TID WM    calcium gluconate IVPB  1,000 mg Intravenous Once    isosorbide mononitrate  30 mg Oral BID    metoprolol succinate  50 mg Oral Nightly    pantoprazole  40 mg Oral BID    rosuvastatin  10 mg Oral Daily    sodium zirconium cyclosilicate  10 g Oral Daily     Continuous Infusions:   heparin (porcine) in D5W 12 Units/kg/hr (10/01/20 6719)     PRN Meds:acetaminophen, heparin (PORCINE), heparin (PORCINE), HYDROcodone-acetaminophen, nitroGLYCERIN, ondansetron, polyethylene glycol, sodium chloride 0.9%    Oxygen/Ventilator Data (Last 24H):  (if applicable)   Oxygen Concentration (%):  [99] 99    Hemodynamic Parameters (Last 24H):   (if applicable)        Laboratory and Radiology Data:  Recent Results (from the past 24 hour(s))   CBC auto differential    Collection Time: 10/01/20  3:25 PM   Result Value Ref Range    WBC 6.45 3.90 - 12.70 K/uL    RBC 2.97 (L) 4.00 - 5.40 M/uL    Hemoglobin 9.4 (L) 12.0 - 16.0 g/dL    Hematocrit 28.6 (L) 37.0 - 48.5 %    Mean Corpuscular Volume 96 82 - 98 fL    Mean Corpuscular Hemoglobin 31.6 (H) 27.0 - 31.0 pg    Mean Corpuscular Hemoglobin Conc 32.9 32.0 - 36.0 g/dL    RDW 15.1 (H) 11.5 - 14.5 %    Platelets 192 150 - 350 K/uL    MPV 9.4 9.2 - 12.9 fL    Immature Granulocytes 0.8 (H) 0.0 - 0.5 %    Gran # (ANC) 4.8 1.8 - 7.7 K/uL    Immature Grans (Abs) 0.05 (H) 0.00 - 0.04 K/uL    Lymph # 1.0 1.0 - 4.8 K/uL    Mono # 0.5 0.3 - 1.0  K/uL    Eos # 0.1 0.0 - 0.5 K/uL    Baso # 0.04 0.00 - 0.20 K/uL    nRBC 0 0 /100 WBC    Gran% 74.9 (H) 38.0 - 73.0 %    Lymph% 14.7 (L) 18.0 - 48.0 %    Mono% 7.6 4.0 - 15.0 %    Eosinophil% 1.4 0.0 - 8.0 %    Basophil% 0.6 0.0 - 1.9 %    Differential Method Automated    Comprehensive metabolic panel    Collection Time: 10/01/20  3:25 PM   Result Value Ref Range    Sodium 132 (L) 136 - 145 mmol/L    Potassium 5.5 (H) 3.5 - 5.1 mmol/L    Chloride 93 (L) 95 - 110 mmol/L    CO2 20 (L) 23 - 29 mmol/L    Glucose 97 70 - 110 mg/dL    BUN, Bld 77 (H) 6 - 20 mg/dL    Creatinine 8.8 (H) 0.5 - 1.4 mg/dL    Calcium 8.9 8.7 - 10.5 mg/dL    Total Protein 5.3 (L) 6.0 - 8.4 g/dL    Albumin 2.5 (L) 3.5 - 5.2 g/dL    Total Bilirubin 0.7 0.1 - 1.0 mg/dL    Alkaline Phosphatase 67 55 - 135 U/L    AST 26 10 - 40 U/L    ALT 19 10 - 44 U/L    Anion Gap 19 (H) 8 - 16 mmol/L    eGFR if African American 5.4 (A) >60 mL/min/1.73 m^2    eGFR if non  4.7 (A) >60 mL/min/1.73 m^2   Brain natriuretic peptide    Collection Time: 10/01/20  3:25 PM   Result Value Ref Range    BNP 2,099 (H) 0 - 99 pg/mL   Troponin I    Collection Time: 10/01/20  3:25 PM   Result Value Ref Range    Troponin I 0.137 (HH) <=0.040 ng/mL   Protime-INR    Collection Time: 10/01/20  3:25 PM   Result Value Ref Range    PT 12.8 10.6 - 14.8 sec    INR 1.0    COVID-19 Rapid Screening    Collection Time: 10/01/20  4:59 PM   Result Value Ref Range    SARS-CoV-2 RNA, Amplification, Qual Negative Negative   APTT    Collection Time: 10/01/20  6:40 PM   Result Value Ref Range    aPTT 29.2 23.6 - 33.3 sec   Protime-INR    Collection Time: 10/01/20  6:40 PM   Result Value Ref Range    PT 13.5 10.6 - 14.8 sec    INR 1.1    CBC auto differential    Collection Time: 10/01/20  6:40 PM   Result Value Ref Range    WBC 5.33 3.90 - 12.70 K/uL    RBC 2.98 (L) 4.00 - 5.40 M/uL    Hemoglobin 9.2 (L) 12.0 - 16.0 g/dL    Hematocrit 28.2 (L) 37.0 - 48.5 %    Mean Corpuscular Volume  95 82 - 98 fL    Mean Corpuscular Hemoglobin 30.9 27.0 - 31.0 pg    Mean Corpuscular Hemoglobin Conc 32.6 32.0 - 36.0 g/dL    RDW 15.1 (H) 11.5 - 14.5 %    Platelets 254 150 - 350 K/uL    MPV 9.0 (L) 9.2 - 12.9 fL    Immature Granulocytes 0.6 (H) 0.0 - 0.5 %    Gran # (ANC) 3.8 1.8 - 7.7 K/uL    Immature Grans (Abs) 0.03 0.00 - 0.04 K/uL    Lymph # 1.1 1.0 - 4.8 K/uL    Mono # 0.4 0.3 - 1.0 K/uL    Eos # 0.1 0.0 - 0.5 K/uL    Baso # 0.04 0.00 - 0.20 K/uL    nRBC 0 0 /100 WBC    Gran% 70.9 38.0 - 73.0 %    Lymph% 19.7 18.0 - 48.0 %    Mono% 6.9 4.0 - 15.0 %    Eosinophil% 1.1 0.0 - 8.0 %    Basophil% 0.8 0.0 - 1.9 %    Differential Method Automated    Troponin I    Collection Time: 10/01/20  6:40 PM   Result Value Ref Range    Troponin I 0.140 (HH) <=0.040 ng/mL   APTT    Collection Time: 10/01/20  6:40 PM   Result Value Ref Range    aPTT 29.2 23.6 - 33.3 sec   APTT    Collection Time: 10/01/20  8:54 PM   Result Value Ref Range    aPTT 114.1 (H) 23.6 - 33.3 sec   Troponin I    Collection Time: 10/02/20 12:33 AM   Result Value Ref Range    Troponin I 0.164 (HH) <=0.040 ng/mL   CBC auto differential    Collection Time: 10/02/20  6:25 AM   Result Value Ref Range    WBC 6.30 3.90 - 12.70 K/uL    RBC 2.93 (L) 4.00 - 5.40 M/uL    Hemoglobin 9.2 (L) 12.0 - 16.0 g/dL    Hematocrit 27.7 (L) 37.0 - 48.5 %    Mean Corpuscular Volume 95 82 - 98 fL    Mean Corpuscular Hemoglobin 31.4 (H) 27.0 - 31.0 pg    Mean Corpuscular Hemoglobin Conc 33.2 32.0 - 36.0 g/dL    RDW 15.1 (H) 11.5 - 14.5 %    Platelets 158 150 - 350 K/uL    MPV 10.2 9.2 - 12.9 fL    Immature Granulocytes 0.8 (H) 0.0 - 0.5 %    Gran # (ANC) 4.7 1.8 - 7.7 K/uL    Immature Grans (Abs) 0.05 (H) 0.00 - 0.04 K/uL    Lymph # 1.0 1.0 - 4.8 K/uL    Mono # 0.5 0.3 - 1.0 K/uL    Eos # 0.1 0.0 - 0.5 K/uL    Baso # 0.02 0.00 - 0.20 K/uL    nRBC 0 0 /100 WBC    Gran% 74.0 (H) 38.0 - 73.0 %    Lymph% 15.7 (L) 18.0 - 48.0 %    Mono% 8.1 4.0 - 15.0 %    Eosinophil% 1.1 0.0 - 8.0 %     Basophil% 0.3 0.0 - 1.9 %    Differential Method Automated    Troponin I    Collection Time: 10/02/20  6:25 AM   Result Value Ref Range    Troponin I 0.197 (HH) <=0.040 ng/mL   Comprehensive metabolic panel    Collection Time: 10/02/20  6:25 AM   Result Value Ref Range    Sodium 132 (L) 136 - 145 mmol/L    Potassium 5.9 (H) 3.5 - 5.1 mmol/L    Chloride 96 95 - 110 mmol/L    CO2 19 (L) 23 - 29 mmol/L    Glucose 88 70 - 110 mg/dL    BUN, Bld 81 (H) 6 - 20 mg/dL    Creatinine 8.9 (H) 0.5 - 1.4 mg/dL    Calcium 8.5 (L) 8.7 - 10.5 mg/dL    Total Protein 5.3 (L) 6.0 - 8.4 g/dL    Albumin 2.5 (L) 3.5 - 5.2 g/dL    Total Bilirubin 0.7 0.1 - 1.0 mg/dL    Alkaline Phosphatase 68 55 - 135 U/L    AST 23 10 - 40 U/L    ALT 20 10 - 44 U/L    Anion Gap 17 (H) 8 - 16 mmol/L    eGFR if African American 5.3 (A) >60 mL/min/1.73 m^2    eGFR if non  4.6 (A) >60 mL/min/1.73 m^2   Lipid panel    Collection Time: 10/02/20  6:25 AM   Result Value Ref Range    Cholesterol 111 (L) 120 - 199 mg/dL    Triglycerides 141 30 - 150 mg/dL    HDL 26 (L) 40 - 75 mg/dL    LDL Cholesterol 56.8 (L) 63.0 - 159.0 mg/dL    Hdl/Cholesterol Ratio 23.4 20.0 - 50.0 %    Total Cholesterol/HDL Ratio 4.3 2.0 - 5.0    Non-HDL Cholesterol 85 mg/dL   Magnesium    Collection Time: 10/02/20  6:25 AM   Result Value Ref Range    Magnesium 2.5 1.6 - 2.6 mg/dL   Phosphorus    Collection Time: 10/02/20  6:25 AM   Result Value Ref Range    Phosphorus 7.8 (H) 2.7 - 4.5 mg/dL   CBC auto differential    Collection Time: 10/02/20  6:25 AM   Result Value Ref Range    WBC 6.30 3.90 - 12.70 K/uL    RBC 2.93 (L) 4.00 - 5.40 M/uL    Hemoglobin 9.2 (L) 12.0 - 16.0 g/dL    Hematocrit 27.7 (L) 37.0 - 48.5 %    Mean Corpuscular Volume 95 82 - 98 fL    Mean Corpuscular Hemoglobin 31.4 (H) 27.0 - 31.0 pg    Mean Corpuscular Hemoglobin Conc 33.2 32.0 - 36.0 g/dL    RDW 15.1 (H) 11.5 - 14.5 %    Platelets 158 150 - 350 K/uL    MPV 10.2 9.2 - 12.9 fL    Immature  Granulocytes 0.8 (H) 0.0 - 0.5 %    Gran # (ANC) 4.7 1.8 - 7.7 K/uL    Immature Grans (Abs) 0.05 (H) 0.00 - 0.04 K/uL    Lymph # 1.0 1.0 - 4.8 K/uL    Mono # 0.5 0.3 - 1.0 K/uL    Eos # 0.1 0.0 - 0.5 K/uL    Baso # 0.02 0.00 - 0.20 K/uL    nRBC 0 0 /100 WBC    Gran% 74.0 (H) 38.0 - 73.0 %    Lymph% 15.7 (L) 18.0 - 48.0 %    Mono% 8.1 4.0 - 15.0 %    Eosinophil% 1.1 0.0 - 8.0 %    Basophil% 0.3 0.0 - 1.9 %    Differential Method Automated    APTT    Collection Time: 10/02/20  6:25 AM   Result Value Ref Range    aPTT 40.5 (H) 23.6 - 33.3 sec     Imaging Results          X-Ray Chest AP Portable (Final result)  Result time 10/01/20 15:45:02    Final result by Guy Patton MD (10/01/20 15:45:02)                 Narrative:    REASON: chest pain Chest Pain?(for months); Shortness of Breath?;  abdominal swelling?    FINDINGS:    Portable chest radiograph with comparison chest x-ray September 25, 2020. The cardiomediastinal silhouette is within normal limits in  size.The pulmonary vascular structures are within normal limits. There  is redemonstration of mild prominence of the interstitium. No  confluent airspace opacities. Linear and hazy densities at the lung  bases likely reflect subsegmental atelectasis. No acute osseous  abnormality.    IMPRESSION:    1.  Redemonstration of mild prominence of interstitium may reflect  mild pulmonary edema in the proper clinical context.  2.  Bibasilar linear and hazy densities likely reflect subsegmental  atelectasis.    Electronically Signed by Guy Patton on 10/1/2020 3:50 PM                              12-lead EKG interpretation:  (if applicable)      Current Cardiac Rhythm:   (if applicable)    Physical Exam:   Physical Exam   HENT:   Head: Normocephalic.   Cardiovascular: Normal rate and regular rhythm.   Murmur heard.  3/6 darling   Pulmonary/Chest: She has rales.   bases   Abdominal: She exhibits distension.   Musculoskeletal:         General: Edema present.      Comments:  Chronic skin changes LE       ASSESSMENT/PLAN:   Assessment: Plan:     CAD  ESRD on HD  ESLD ascites   Chronic DHF    Multivessel disease- was seen by Dr Yeboah on previous admission re CABG, has been re consulted on this admission as patient wants to proceed despite being high risk with multiple comorbid ites  On BB and nitrates- can not increase dose due to low BP  For HD today  For paracentesis today- ok to hold heparin for procedure  Plan pending [facility] consult

## 2020-10-02 NOTE — PROGRESS NOTES
Not a candidate for LHC with known 3 vessel CAD and multiple co-morbidities. High risk CABG being considered. Small increase trop and anemic. Keep H/H > 30. HD per renal. Awaiting CT surgery consult

## 2020-10-02 NOTE — NURSING
Called from dialysis patient with c/o chest pain. Pt moaning and clutching chest and states its radiating to her back and arm. /60, rapid response called, gave 1 Po nitro with slight ease of chest pain, total of nitro x3 with improvement of chest pain but pt states it is still there. Dr barron at bedside, orders to move patient to ICU. Moved to ICU, report given to Ruslan at bedside. Belongings returned to patient.

## 2020-10-02 NOTE — PLAN OF CARE
Came from dialysis with chest pain of 10. Nitro drip and heparin drip started. Morphine given for pain. After several hours she is now pain free. She seems to think that this pain could be related to her eating before dialysis.

## 2020-10-02 NOTE — CONSULTS
INPATIENT NEPHROLOGY CONSULT   [facility] NEPHROLOGY    Aurelia Saucedo  10/02/2020    Reason for consultation:  ESRD    Chief Complaint:   Chief Complaint   Patient presents with    Chest Pain     for months    Shortness of Breath    abdominal swelling        History of Present Illness:      Per H and P    Patient presents to the ED with the complaint of chest pain with associated SOB. She describes the pain as burning to mid chest with radiation to left arm. She reports associated nausea and diaphoresis.  Additionally the patient reports increased abdominal distention. She has a history of liver failure secondary to Hep C. She reports she gets a paracentesis every 2 weeks and she is due for her next paracentesis tomorrow. Last paracentesis was on 09/03/2020 and 7.4L was removed at that time. The patient states she believes her pain is secondary to the worsening ascites. She has known extensive Cardiac disease noted on heart cath earlier this year but she is too high risk for CABG. She has been managed medically. She denies fever, chills, vomiting dysuria, rectal bleeding, melena, numbness, tingling, or LOC    10/2  Seen on dialysis. .  Having chest pain.  Anxious.  No n,v.  Diaphoretic.  No n,v, diarrhea.  Abdominal distension        Plan of Care:       Assessment:    esrd  --seen on dialysis.  Rinse back due to angina.  Transfer to ICU.  Place on NTG drip and prn morphine    Hyperkalemia  --she got most of her treatment on a hypokalemic bath    Angina  --as above.   CT surgery consulted    Anemia  --erythropoiesis stimulating agent with renal replacement therapy    Mild acidosis  --35 bicarb bath    Hyponatremia  --ultrafilter 2-3 liters.  Use 140 sodium dialysate. Fluid restrict    Ascites  --paracentesis when stable from cardiac standpoint            Thank you for allowing us to participate in this patient's care. We will continue to follow.    Vital Signs:  Temp Readings from Last 3 Encounters:    10/02/20 97.9 °F (36.6 °C) (Oral)   09/27/20 97.8 °F (36.6 °C) (Oral)   09/17/20 97 °F (36.1 °C)       Pulse Readings from Last 3 Encounters:   10/02/20 82   09/27/20 81   09/18/20 74       BP Readings from Last 3 Encounters:   10/02/20 (!) 95/54   09/27/20 113/62   09/18/20 (!) 153/101       Weight:  Wt Readings from Last 3 Encounters:   10/02/20 64.5 kg (142 lb 3.2 oz)   09/25/20 62.2 kg (137 lb 2 oz)   09/17/20 66.4 kg (146 lb 4.8 oz)       Past Medical & Surgical History:  Past Medical History:   Diagnosis Date    Anemia of chronic renal failure, stage 5 8/19/2015    Ascites     Bleeding duodenal ulcer     CAD (coronary artery disease)     CAD (coronary artery disease)     Chest pain     CHF (congestive heart failure)     Chronic hepatitis C 8/19/2015    Colitis     Elevated troponin     ESRD 2/2 MPGN on HD since 12/16/13 8/19/2015    Gastritis     Hypertension 8/19/2015    Mitral valve regurgitation     Renal dialysis status     M, W, F    Secondary hyperparathyroidism of renal origin 8/19/2015       Past Surgical History:   Procedure Laterality Date    ANGIOGRAM, CORONARY, WITH LEFT HEART CATHETERIZATION Left 1/13/2020    Procedure: ANGIOGRAM,CORONARY,WITH LEFT HEART CATHETERIZATION;  Surgeon: Yahir Starkey MD;  Location: [facility] CATH[facility] LAB;  Service: Cardiology;  Laterality: Left;    AV FISTULA PLACEMENT  2013    COLONOSCOPY      CORONARY ANGIOPLASTY WITH STENT PLACEMENT      SALPINGOOPHORECTOMY Right 1997    TOTAL ABDOMINAL HYSTERECTOMY  1999       Past Social History:  Social History     Socioeconomic History    Marital status:      Spouse name: Not on file    Number of children: 2    Years of education: Not on file    Highest education level: Not on file   Occupational History    Occupation: baker    Social Needs    Financial resource strain: Not on file    Food insecurity     Worry: Not on file     Inability: Not on file    Transportation needs     Medical: Not on  file     Non-medical: Not on file   Tobacco Use    Smoking status: Current Every Day Smoker     Packs/day: 0.75     Years: 30.00     Pack years: 22.50     Types: Cigarettes     Last attempt to quit: 2019     Years since quittin.8    Smokeless tobacco: Never Used   Substance and Sexual Activity    Alcohol use: No     Alcohol/week: 0.0 standard drinks    Drug use: Yes     Types: Marijuana     Comment: occasionally    Sexual activity: Not on file   Lifestyle    Physical activity     Days per week: Not on file     Minutes per session: Not on file    Stress: To some extent   Relationships    Social connections     Talks on phone: Not on file     Gets together: Not on file     Attends [employer] service: Not on file     Active member of club or organization: Not on file     Attends meetings of clubs or organizations: Not on file     Relationship status: Not on file   Other Topics Concern    Are you pregnant or think you may be? Not Asked    Breast-feeding Not Asked   Social History Narrative    Not on file       Medications:  No current facility-administered medications on file prior to encounter.      Current Outpatient Medications on File Prior to Encounter   Medication Sig Dispense Refill    aspirin (ECOTRIN) 81 MG EC tablet Take 81 mg by mouth once daily.      calcium acetate,phosphat bind, (PHOSLO) 667 mg capsule Take 667 mg by mouth 3 (three) times daily with meals.      FA-VIT B COMP&C-SELENIUM-ZINC 3-70-15 MG-MCG-MG ORAL TAB Take 1 tablet by mouth once daily.      HYDROcodone-acetaminophen (NORCO)  mg per tablet Take 1 tablet by mouth every 8 (eight) hours as needed for Pain. 60 tablet 0    isosorbide mononitrate (IMDUR) 30 MG 24 hr tablet Take 1 tablet (30 mg total) by mouth 2 (two) times a day. 180 tablet 0    lisinopril (PRINIVIL,ZESTRIL) 20 MG tablet Take 1 tablet by mouth nightly.   3    LOKELMA 5 gram PwPk Take 5 g by mouth. Five times a week; on  Non dialysis days       "metoprolol succinate (TOPROL-XL) 50 MG 24 hr tablet Take 50 mg by mouth nightly.       nitroGLYCERIN (NITROSTAT) 0.4 MG SL tablet Place 1 tablet (0.4 mg total) under the tongue every 5 (five) minutes as needed for Chest pain. 90 tablet 3    pantoprazole (PROTONIX) 40 MG tablet Take 40 mg by mouth 2 (two) times daily.       polyethylene glycol (GLYCOLAX) 17 gram PwPk Take 17 g by mouth daily as needed (constipation). 30 each 0    rosuvastatin (CRESTOR) 10 MG tablet Take 1 tablet (10 mg total) by mouth once daily. 90 tablet 3    lactulose (CHRONULAC) 10 gram/15 mL solution TK 30 ML PO ONCE D       Scheduled Meds:   calcium acetate(phosphat bind)  667 mg Oral TID WM    calcium gluconate IVPB  1,000 mg Intravenous Once    metoprolol succinate  50 mg Oral Nightly    morphine  2 mg Intravenous Once    pantoprazole  40 mg Oral BID    rosuvastatin  10 mg Oral Daily    sodium zirconium cyclosilicate  10 g Oral Daily     Continuous Infusions:   heparin (porcine) in D5W      nitroGLYCERIN       PRN Meds:.acetaminophen, heparin (PORCINE), HYDROcodone-acetaminophen, nitroGLYCERIN, ondansetron, polyethylene glycol, sodium chloride 0.9%    Allergies:  Ciprofloxacin, Levaquin [levofloxacin], Quinolones, and Codeine    Past Family History:  Reviewed; refer to Hospitalist Admission Note    Review of Systems:  Review of Systems - All 14 systems reviewed and negative, except as noted in HPI    Physical Exam:    BP (!) 95/54   Pulse 82   Temp 97.9 °F (36.6 °C) (Oral)   Resp 18   Ht 5' 4" (1.626 m)   Wt 64.5 kg (142 lb 3.2 oz)   LMP 01/07/1999 (Exact Date)   SpO2 99%   Breastfeeding No   BMI 24.41 kg/m²     General Appearance:    Alert, cooperative, no distress, appears stated age   Head:    Normocephalic, without obvious abnormality, atraumatic   Eyes:    PER, conjunctiva/corneas clear, EOM's intact in both eyes        Throat:   Lips, mucosa, and tongue normal; teeth and gums normal   Back:     Symmetric, no " curvature, ROM normal, no CVA tenderness   Lungs:     Clear to auscultation bilaterally, respirations unlabored   Chest wall:    No tenderness or deformity   Heart:    Regular rate and rhythm, S1 and S2 normal, no murmur, rub   or gallop   Abdomen:     Soft, non-tender, bowel sounds active all four quadrants,     no masses, no organomegaly   Extremities:   Extremities normal, atraumatic, no cyanosis or edema   Pulses:   2+ and symmetric all extremities   MSK:   No joint or muscle swelling, tenderness or deformity   Skin:   Skin color, texture, turgor normal, no rashes or lesions   Neurologic:   CNII-XII intact, normal strength and sensation       Throughout.  No flap     Results:  Lab Results   Component Value Date     (L) 10/02/2020    K 5.9 (H) 10/02/2020    CL 96 10/02/2020    CO2 19 (L) 10/02/2020    BUN 81 (H) 10/02/2020    CREATININE 8.9 (H) 10/02/2020    CALCIUM 8.5 (L) 10/02/2020    ANIONGAP 17 (H) 10/02/2020    ESTGFRAFRICA 5.3 (A) 10/02/2020    EGFRNONAA 4.6 (A) 10/02/2020       Lab Results   Component Value Date    CALCIUM 8.5 (L) 10/02/2020    PHOS 7.8 (H) 10/02/2020       Recent Labs   Lab 10/02/20  0625   WBC 6.30  6.30   RBC 2.93*  2.93*   HGB 9.2*  9.2*   HCT 27.7*  27.7*     158   MCV 95  95   MCH 31.4*  31.4*   MCHC 33.2  33.2          I have personally reviewed pertinent radiological imaging and reports.    Patient care was time spent personally by me on the following activities:   · Obtaining a history  · Examination of patient.  · Providing medical care at the patients bedside.  · Developing a treatment plan with patient or surrogate and bedside caregivers  · Ordering and reviewing laboratory studies, radiographic studies, pulse oximetry.  · Ordering and performing treatments and interventions.  · Evaluation of patient's response to treatment.  · Discussions with consultants while on the unit and immediately available to the patient.  · Re-evaluation of the patient's  condition.  · Documentation in the medical record.     Mele Ramos MD  Nephrology  [facility] [place]  [phone]

## 2020-10-02 NOTE — H&P
[facility] Medicine History & Physical Examination   Patient Name: Aurelia Saucedo  MRN: [id number]  Patient Class: OP- Observation   Admission Date: 10/1/2020  2:21 PM  Length of Stay: 0  Attending Physician: Anthony Kendrick MD  Primary Care Provider: Dustin Ireland MD  Face-to-Face encounter date: 10/01/2020  Code Status: full code  Chief Complaint: Chest Pain (for months), Shortness of Breath, and abdominal swelling        Patient information was obtained from patient, past medical records and ER records.   HISTORY OF PRESENT ILLNESS:   Aurelia Saucedo is a 55 y.o. White female who  has a past medical history of Anemia of chronic renal failure, stage 5 (8/19/2015), Ascites, Bleeding duodenal ulcer, CAD (coronary artery disease), CAD (coronary artery disease), Chest pain, CHF (congestive heart failure), Chronic hepatitis C (8/19/2015), Colitis, Elevated troponin, ESRD 2/2 MPGN on HD since 12/16/13  (8/19/2015), Gastritis, Hypertension (8/19/2015), Mitral valve regurgitation, Renal dialysis status, and Secondary hyperparathyroidism of renal origin (8/19/2015).. The patient presented to [facility] on 10/1/2020 with a primary complaint of Chest Pain (for months), Shortness of Breath, and abdominal swelling  .       History was obtained from the patient, the family present at the bedside and ER physician Sign-out. Patient presents to the ED with the complaint of chest pain with associated SOB. She describes the pain as burning to mid chest with radiation to left arm. She reports associated nausea and diaphoresis.  Additionally the patient reports increased abdominal distention. She has a history of liver failure secondary to Hep C. She reports she gets a paracentesis every 2 weeks and she is due for her next paracentesis tomorrow. Last paracentesis was on 09/03/2020 and 7.4L was removed at that time. The patient states she believes her pain is secondary to the worsening  ascites. She has known extensive Cardiac disease noted on heart cath earlier this year but she is too high risk for CABG. She has been managed medically. She denies fever, chills, vomiting dysuria, rectal bleeding, melena, numbness, tingling, or LOC. She reports she missed her her previous session of HD.      In the emergency room, patient is afebrile. CBC with anemia that appears to be at baseline. CMP with potassium 5.5, sodium 132, bun 77, creat 8.8. BNP 2099 and troponin elevated 0137. I have reviewed the EKG and it shows NSR with ST/T wave abnormality in lateral leads. CXR with findings consistent with mild pulmonary edema.     Decision to admit was taken and patient was informed about the plan of care.   REVIEW OF SYSTEMS:   10 Point Review of System was performed and was found to be negative except for that mentioned already in the HPI above.     PAST MEDICAL HISTORY:     Past Medical History:   Diagnosis Date    Anemia of chronic renal failure, stage 5 8/19/2015    Ascites     Bleeding duodenal ulcer     CAD (coronary artery disease)     CAD (coronary artery disease)     Chest pain     CHF (congestive heart failure)     Chronic hepatitis C 8/19/2015    Colitis     Elevated troponin     ESRD 2/2 MPGN on HD since 12/16/13 8/19/2015    Gastritis     Hypertension 8/19/2015    Mitral valve regurgitation     Renal dialysis status     M, W, F    Secondary hyperparathyroidism of renal origin 8/19/2015       PAST SURGICAL HISTORY:     Past Surgical History:   Procedure Laterality Date    ANGIOGRAM, CORONARY, WITH LEFT HEART CATHETERIZATION Left 1/13/2020    Procedure: ANGIOGRAM,CORONARY,WITH LEFT HEART CATHETERIZATION;  Surgeon: Yahir Starkey MD;  Location: [facility] CATH/EP LAB;  Service: Cardiology;  Laterality: Left;    AV FISTULA PLACEMENT  2013    COLONOSCOPY      CORONARY ANGIOPLASTY WITH STENT PLACEMENT      SALPINGOOPHORECTOMY Right 1997    TOTAL ABDOMINAL HYSTERECTOMY  1999        ALLERGIES:   Ciprofloxacin, Levaquin [levofloxacin], Quinolones, and Codeine    FAMILY HISTORY:     Family History   Problem Relation Age of Onset    Kidney disease Mother     Stroke Father     Psoriasis Brother     Breast cancer Neg Hx     Cancer Neg Hx     Colon cancer Neg Hx     Ovarian cancer Neg Hx        SOCIAL HISTORY:     Social History     Tobacco Use    Smoking status: Current Every Day Smoker     Packs/day: 0.75     Years: 30.00     Pack years: 22.50     Types: Cigarettes     Last attempt to quit: 2019     Years since quittin.8    Smokeless tobacco: Never Used   Substance Use Topics    Alcohol use: No     Alcohol/week: 0.0 standard drinks        Social History     Substance and Sexual Activity   Sexual Activity Not on file        HOME MEDICATIONS:     Prior to Admission medications    Medication Sig Start Date End Date Taking? Authorizing Provider   aspirin (ECOTRIN) 81 MG EC tablet Take 81 mg by mouth once daily.   Yes Historical Provider   calcium acetate,phosphat bind, (PHOSLO) 667 mg capsule Take 667 mg by mouth 3 (three) times daily with meals.   Yes Historical Provider   FA-VIT B COMP&C-SELENIUM-ZINC 3-70-15 MG-MCG-MG ORAL TAB Take 1 tablet by mouth once daily.   Yes Historical Provider   HYDROcodone-acetaminophen (NORCO)  mg per tablet Take 1 tablet by mouth every 8 (eight) hours as needed for Pain. 20  Yes Dustin Ireland MD   isosorbide mononitrate (IMDUR) 30 MG 24 hr tablet Take 1 tablet (30 mg total) by mouth 2 (two) times a day. 20 Yes Anibal Kramer MD   lisinopril (PRINIVIL,ZESTRIL) 20 MG tablet Take 1 tablet by mouth nightly.  18  Yes Historical Provider   LOKELMA 5 gram PwPk Take 5 g by mouth. Five times a week; on  Non dialysis days 20  Yes Historical Provider   metoprolol succinate (TOPROL-XL) 50 MG 24 hr tablet Take 50 mg by mouth nightly.    Yes Historical Provider   nitroGLYCERIN (NITROSTAT) 0.4 MG SL tablet Place 1 tablet (0.4  "mg total) under the tongue every 5 (five) minutes as needed for Chest pain. 9/27/20 9/27/21 Yes Anibal Kramer MD   pantoprazole (PROTONIX) 40 MG tablet Take 40 mg by mouth 2 (two) times daily.    Yes Historical Provider   polyethylene glycol (GLYCOLAX) 17 gram PwPk Take 17 g by mouth daily as needed (constipation). 9/8/20 10/8/20 Yes Anibal Kramer MD   rosuvastatin (CRESTOR) 10 MG tablet Take 1 tablet (10 mg total) by mouth once daily. 9/8/20 9/8/21 Yes Anibal Kramer MD   lactulose (CHRONULAC) 10 gram/15 mL solution TK 30 ML PO ONCE D 8/11/20   Historical Provider   traZODone (DESYREL) 50 MG tablet Take 1 tablet (50 mg total) by mouth every evening. 10/22/19 10/1/20  Dustin Ireland MD         PHYSICAL EXAM:   /73   Pulse 84   Temp 97.8 °F (36.6 °C) (Oral)   Resp (!) 21   Ht 5' 4" (1.626 m)   Wt 63.5 kg (140 lb)   LMP 01/07/1999 (Exact Date)   SpO2 100%   Breastfeeding No   BMI 24.03 kg/m²   Vitals Reviewed  General appearance: Well-developed, cachectic, sickly appearing White female.  Skin: Red rash to BLE.   Neuro: AAOx3. Motor and sensory exams grossly intact. Good tone. Power in all 4 extremities 5/5.   HENT: Atraumatic head. Moist mucous membranes of oral cavity.  Eyes: Normal extraocular movements. PERRLA  Neck: Supple. No evidence of lymphadenopathy. No thyroidomegaly.  Lungs: Clear to auscultation bilaterally. No wheezing present.   Heart: Regular rate and rhythm. S1 and S2 present with no murmurs/gallop/rub. +1 pedal edema. No JVD present.   Abdomen: Soft, distended, non-tender. No rebound tenderness/guarding. No masses or organomegaly. Bowel sounds are normal. Bladder is not palpable.   Extremities: No cyanosis, clubbing. Dialysis fistula to LUE with +thrill/bruit  Psych/mental status:Anxious behavior. Cooperative. Responds appropriately to questions.   EMERGENCY DEPARTMENT LABS AND IMAGING:     Labs Reviewed   CBC W/ AUTO DIFFERENTIAL - Abnormal; Notable for the following " components:       Result Value    RBC 2.97 (*)     Hemoglobin 9.4 (*)     Hematocrit 28.6 (*)     Mean Corpuscular Hemoglobin 31.6 (*)     RDW 15.1 (*)     Immature Granulocytes 0.8 (*)     Immature Grans (Abs) 0.05 (*)     Gran% 74.9 (*)     Lymph% 14.7 (*)     All other components within normal limits   COMPREHENSIVE METABOLIC PANEL - Abnormal; Notable for the following components:    Sodium 132 (*)     Potassium 5.5 (*)     Chloride 93 (*)     CO2 20 (*)     BUN, Bld 77 (*)     Creatinine 8.8 (*)     Total Protein 5.3 (*)     Albumin 2.5 (*)     Anion Gap 19 (*)     eGFR if  5.4 (*)     eGFR if non  4.7 (*)     All other components within normal limits   B-TYPE NATRIURETIC PEPTIDE - Abnormal; Notable for the following components:    BNP 2,099 (*)     All other components within normal limits   TROPONIN I - Abnormal; Notable for the following components:    Troponin I 0.137 (*)     All other components within normal limits    Narrative:     Troponin critical result(s) repeated. Called and verbal readback   obtained from Aurelia Ruiz RN ER.  by CW1 10/01/2020 16:25   CBC W/ AUTO DIFFERENTIAL - Abnormal; Notable for the following components:    RBC 2.98 (*)     Hemoglobin 9.2 (*)     Hematocrit 28.2 (*)     RDW 15.1 (*)     MPV 9.0 (*)     Immature Granulocytes 0.6 (*)     All other components within normal limits    Narrative:     (if patient is on warfarin prior to heparin therapy)   TROPONIN I - Abnormal; Notable for the following components:    Troponin I 0.140 (*)     All other components within normal limits    Narrative:     (if patient is on warfarin prior to heparin therapy)   trop critical result(s) repeated. Called and verbal readback   obtained from morris barragan rn er  by HBS 10/01/2020 19:17   PROTIME-INR   SARS-COV-2 RNA AMPLIFICATION, QUAL   APTT    Narrative:     (if patient is on warfarin prior to heparin therapy)   PROTIME-INR    Narrative:     (if patient is on  warfarin prior to heparin therapy)   APTT    Narrative:     (if patient is on warfarin prior to heparin therapy)       X-Ray Chest AP Portable   Final Result      IR Paracentesis with Imaging    (Results Pending)       ASSESSMENT & PLAN:   Chest Pain/NSTEMI  CAD  ESRD on HD   Hyperkalemia  ESLD with ascites secondary to Hep C  Anemia of chronic disease  Chronic diastolic heart failure  Tobacco abuse    Admit to Telemetry  Patient with recurrent chest pain in the setting of known multivessel CAD. Left heart cath January 2020 with severe multivessel disease. She was seen by Dr. Yeboah on previous admission and offered CABG, but she is high risk secondary to comorbidities. She refused at that time as she wanted to discuss it with her family before making a final decision. She was discharged home with antianginal and BB.  Trend trop- chronically elevated; appears to be at baseline; known CAD  Continue Imdur/BB  Consult Cardiology/CT surgery  Heparin gtt started in the ED per cardiology recommendations  Patient is on HD Tuesday and Saturday. She missed Tuesday's session  Consult Nephrology; plans for HD today  H/H stable; no signs of active bleeding; follow h/h  Smoking cessation  Consult IR for paracentesis tomorrow; radiology dept notified patient is on heparin gtt      DVT Prophylaxis: will be placed on Heparin for DVT prophylaxis and will be advised to be as mobile as possible and sit in a chair as tolerated.   ________________________________________________________________________________    Discharge Planning and Disposition: No mobility needs. Ambulating well. Patient will be discharged in 1-2 days  Face-to-Face encounter date: 10/01/2020  Encounter included review of the medical records, interviewing and examining the patient face-to-face, discussion with family and other health care providers including emergency medicine physician, admission orders, interpreting lab/test results and formulating a plan of  care.   Medical Decision Making during this encounter was  [_] Low Complexity  [_] Moderate Complexity  [x] High Complexity  _________________________________________________________________________________    INPATIENT LIST OF MEDICATIONS     Current Facility-Administered Medications:     acetaminophen tablet 650 mg, 650 mg, Oral, Q4H PRN, Deepthi Hunter NP    [START ON 10/2/2020] aspirin EC tablet 81 mg, 81 mg, Oral, Daily, Deepthi Hunter NP    [START ON 10/2/2020] calcium acetate(phosphat bind) capsule 667 mg, 667 mg, Oral, TID WM, Deepthi Hunter NP    heparin 25,000 units in dextrose 5% (100 units/ml) IV bolus from bag - ADDITIONAL PRN BOLUS - 30 units/kg (max bolus 4000 units), 30 Units/kg (Adjusted), Intravenous, PRN, Rodriguez Reboul, DO    heparin 25,000 units in dextrose 5% (100 units/ml) IV bolus from bag - ADDITIONAL PRN BOLUS - 60 units/kg (max bolus 4000 units), 60 Units/kg (Adjusted), Intravenous, PRN, Rodriguez Reboul, DO    heparin 25,000 units in dextrose 5% 250 mL (100 units/mL) infusion LOW INTENSITY nomogram - [facility], 12 Units/kg/hr (Adjusted), Intravenous, Continuous, Rodriguez Reboul, DO    HYDROcodone-acetaminophen  mg per tablet 1 tablet, 1 tablet, Oral, Q8H PRN, Deepthi Hunter NP    isosorbide mononitrate 24 hr tablet 30 mg, 30 mg, Oral, BID, Deepthi Hunter NP    lisinopriL tablet 20 mg, 20 mg, Oral, Nightly, Deepthi Hunter NP    metoprolol succinate (TOPROL-XL) 24 hr tablet 50 mg, 50 mg, Oral, Nightly, Deepthi Hunter NP    nitroGLYCERIN SL tablet 0.4 mg, 0.4 mg, Sublingual, Q5 Min PRN, Deepthi Hunter NP, 0.4 mg at 10/01/20 1855    ondansetron injection 4 mg, 4 mg, Intravenous, Q6H PRN, Deepthi Hunter NP, 4 mg at 10/01/20 1932    pantoprazole EC tablet 40 mg, 40 mg, Oral, BID, Deepthi Hunter NP    polyethylene glycol packet 17 g, 17 g, Oral, Daily PRN, Deepthi Hunter NP    [START ON 10/2/2020] rosuvastatin tablet 10 mg, 10 mg, Oral,  Daily, Deepthi Hunter NP    sodium chloride 0.9% flush 10 mL, 10 mL, Intravenous, PRN, Deepthi Hunter NP    [START ON 10/2/2020] sodium zirconium cyclosilicate packet 5 g, 5 g, Oral, Every Mon, Wed, Fri, Sun, Deepthi Hunter NP    Current Outpatient Medications:     aspirin (ECOTRIN) 81 MG EC tablet, Take 81 mg by mouth once daily., Disp: , Rfl:     calcium acetate,phosphat bind, (PHOSLO) 667 mg capsule, Take 667 mg by mouth 3 (three) times daily with meals., Disp: , Rfl:     FA-VIT B COMP&C-SELENIUM-ZINC 3-70-15 MG-MCG-MG ORAL TAB, Take 1 tablet by mouth once daily., Disp: , Rfl:     HYDROcodone-acetaminophen (NORCO)  mg per tablet, Take 1 tablet by mouth every 8 (eight) hours as needed for Pain., Disp: 60 tablet, Rfl: 0    isosorbide mononitrate (IMDUR) 30 MG 24 hr tablet, Take 1 tablet (30 mg total) by mouth 2 (two) times a day., Disp: 180 tablet, Rfl: 0    lisinopril (PRINIVIL,ZESTRIL) 20 MG tablet, Take 1 tablet by mouth nightly. , Disp: , Rfl: 3    LOKELMA 5 gram PwPk, Take 5 g by mouth. Five times a week; on  Non dialysis days, Disp: , Rfl:     metoprolol succinate (TOPROL-XL) 50 MG 24 hr tablet, Take 50 mg by mouth nightly. , Disp: , Rfl:     nitroGLYCERIN (NITROSTAT) 0.4 MG SL tablet, Place 1 tablet (0.4 mg total) under the tongue every 5 (five) minutes as needed for Chest pain., Disp: 90 tablet, Rfl: 3    pantoprazole (PROTONIX) 40 MG tablet, Take 40 mg by mouth 2 (two) times daily. , Disp: , Rfl:     polyethylene glycol (GLYCOLAX) 17 gram PwPk, Take 17 g by mouth daily as needed (constipation)., Disp: 30 each, Rfl: 0    rosuvastatin (CRESTOR) 10 MG tablet, Take 1 tablet (10 mg total) by mouth once daily., Disp: 90 tablet, Rfl: 3    lactulose (CHRONULAC) 10 gram/15 mL solution, TK 30 ML PO ONCE D, Disp: , Rfl:       Scheduled Meds:   [START ON 10/2/2020] aspirin  81 mg Oral Daily    [START ON 10/2/2020] calcium acetate(phosphat bind)  667 mg Oral TID WM    isosorbide  mononitrate  30 mg Oral BID    lisinopriL  20 mg Oral Nightly    metoprolol succinate  50 mg Oral Nightly    pantoprazole  40 mg Oral BID    [START ON 10/2/2020] rosuvastatin  10 mg Oral Daily    [START ON 10/2/2020] sodium zirconium cyclosilicate  5 g Oral Every Mon, Wed, Fri, Sun     Continuous Infusions:   heparin (porcine) in D5W       PRN Meds:.acetaminophen, heparin (PORCINE), heparin (PORCINE), HYDROcodone-acetaminophen, nitroGLYCERIN, ondansetron, polyethylene glycol, sodium chloride 0.9%      Deepthi Restorationist  [facility] Hospitalist  10/01/2020

## 2020-10-02 NOTE — NURSING
Admitted from ED transported per stretcher, ambulated to the bed and positioned for comfort. Call light in reach, bed low locked with side rails up x 2. . Spouse at the bed side.

## 2020-10-02 NOTE — NURSING
Called and left a message for Dr. Olguin to call the icu. His phone went straight to voice mail. DR. Yadav notified of this.

## 2020-10-02 NOTE — PLAN OF CARE
Medicare Outpatient Observation Notice was signed, explained and given to patient/caregiver on 10/02/2020 at 9:14am     answered all questions

## 2020-10-02 NOTE — NURSING
Pt refusing lokelma, she states it tastes like bitter sand and she doesn't need it. I explained to her that it is to help lower her potassium which is 5.9. I explained the risks, she is still refusing, she is agreeable to getting the calcium gluconate, but she says she thinks it caused major chest pain in the past.   Heparin gtt stopped 1 hour prior to paracentesis, cardiology aware and says that she is not having anything acute.   1042-patient brought to paracentesis

## 2020-10-02 NOTE — ED NOTES
Assumed pt care from KAREN Moses. Pt updated on plan of care. Nitro given per NP order. Heparin bolus given & drip started at 17units/kg/hour per Dr. Mace. Report called to cardio Zaynab chapa RN.

## 2020-10-02 NOTE — PROGRESS NOTES
[facility] Medicine  Progress Note    Patient name: Aurelia Saucedo  MRN: [id number]  Admit Date: 10/1/2020   LOS: 0 days     SUBJECTIVE:     Principal problem: <principal problem not specified>    Interval History:    Pt reported intermittent CP , rapid response was called 2.15 pm for CP nearly 1 hr of HD, lasted for 5-10 mins gradually eased out after 2 gtn pill and 2mg morphine  Pt was transferred to ICU for close monitor  Initiated on Tridil drip, heparin drip held for paracentesis 6.7 L removed, restarted heparin drip with bolus  Labs troponin CBC and BMP ordered  Dr Gomez notified  Will continue with K binders and calcium gluconate since the dialysis was incomplete  Pt well aware of he complicated medical condition, but still want to consider CABG surgery if possible, awaitng for CTS consult    Scheduled Meds:   calcium acetate(phosphat bind)  667 mg Oral TID WM    calcium gluconate IVPB  1,000 mg Intravenous Once    metoprolol succinate  50 mg Oral Nightly    morphine  2 mg Intravenous Once    pantoprazole  40 mg Oral BID    rosuvastatin  10 mg Oral Daily    sodium zirconium cyclosilicate  10 g Oral Daily     Continuous Infusions:   heparin (porcine) in D5W      nitroGLYCERIN       PRN Meds:acetaminophen, heparin (PORCINE), HYDROcodone-acetaminophen, nitroGLYCERIN, ondansetron, polyethylene glycol, sodium chloride 0.9%    Review of patient's allergies indicates:   Allergen Reactions    Ciprofloxacin Itching    Levaquin [levofloxacin] Itching    Quinolones Itching    Codeine Nausea Only       Review of Systems: As per interval history    OBJECTIVE:     Vital Signs (Most Recent)  Temp: 97.9 °F (36.6 °C) (10/02/20 1300)  Pulse: 82 (10/02/20 1400)  Resp: 18 (10/02/20 1300)  BP: (!) 95/54 (10/02/20 1400)  SpO2: 99 % (10/02/20 1148)    Vital Signs Range (Last 24H):  Temp:  [97.5 °F (36.4 °C)-98 °F (36.7 °C)]   Pulse:  [73-94]   Resp:  [18-24]   BP: ()/(39-98)   SpO2:   [98 %-100 %]     I & O (Last 24H):No intake or output data in the 24 hours ending 10/02/20 1451    Physical Exam:  General appearance: Well-developed, cachectic, chronic ill  appearing White female.  Skin: Red rash to BLE.   Neuro: AAOx3. Motor and sensory exams grossly intact. Good tone. Power in all 4 extremities 5/5.   HENT: Atraumatic head. Moist mucous membranes of oral cavity.  Eyes: Normal extraocular movements. PERRLA  Neck: Supple. No evidence of lymphadenopathy. No thyroidomegaly.  Lungs: Clear to auscultation bilaterally. No wheezing present.   Heart: Regular rate and rhythm. S1 and S2 present with no murmurs/gallop/rub. +1 pedal edema. No JVD present.   Abdomen: Soft, not distended, non-tender. No rebound tenderness/guarding. No masses or organomegaly. Bowel sounds are normal. Bladder is not palpable.   Extremities: No cyanosis, clubbing. Dialysis fistula to LUE with +thrill/bruit  Psych/mental status:Anxious behavior. Cooperative. Responds appropriately to questions.     Laboratory:  CBC:   Recent Labs   Lab 10/02/20  1440   WBC 4.66   RBC 2.64*   HGB 8.1*   HCT 24.1*   *   MCV 91   MCH 30.7   MCHC 33.6     CMP:   Recent Labs   Lab 10/02/20  0625   GLU 88   CALCIUM 8.5*   ALBUMIN 2.5*   PROT 5.3*   *   K 5.9*   CO2 19*   CL 96   BUN 81*   CREATININE 8.9*   ALKPHOS 68   ALT 20   AST 23   BILITOT 0.7     Coagulation:   Recent Labs   Lab 10/01/20  1840  10/02/20  0625   INR 1.1  --   --    APTT 29.2  29.2   < > 40.5*    < > = values in this interval not displayed.     Cardiac markers:   Recent Labs   Lab 10/02/20  0625   TROPONINI 0.197*           ASSESSMENT/PLAN:   Chest Pain/NSTEMI  CAD  ESRD on HD   Hyperkalemia  ESLD with ascites secondary to Hep C  Anemia of chronic disease  Chronic diastolic heart failure  Tobacco abuse      Transfer to ICU  Patient with recurrent chest pain in the setting of known multivessel CAD. Left heart cath January 2020 with severe multivessel disease. She was  seen by Dr. Yeboah on previous admission and offered CABG, but she is high risk secondary to comorbidities. She refused at that time as she wanted to discuss it with her family before making a final decision. She was discharged home with antianginal and BB.  Trend trop- chronically elevated; appears to be at baseline; known CAD  Continue TRIDIL GTT/BB  Consult Cardiology appreciated not a candidate for LHC/CT surgery consult pending  Continue with Heparin gtt   Patient had an incomplete dialysis session today  Consult Nephrology appreciated  H/H stable; no signs of active bleeding; follow h/h  Smoking cessation  S/p paracentesis today       VTE Risk Mitigation (From admission, onward)         Ordered     heparin 25,000 units in dextrose 5% 250 mL (100 units/mL) infusion LOW INTENSITY nomogram - [facility]  Continuous     Question:  Angiotensin II Infusion Adjustment (DO NOT MODIFY ANSWER)  Answer:  [email or web address]\Images\Pharmacy\HeparinInfusions\heparin LOW INTENSITY nomogram for [facility] [id number].pdf    10/02/20 1440     heparin 25,000 units in dextrose 5% (100 units/ml) IV bolus from bag - ADDITIONAL PRN BOLUS - 60 units/kg (max bolus 4000 units)  As needed (PRN)     Question:  Angiotensin II Infusion Adjustment (DO NOT MODIFY ANSWER)  Answer:  \\[facility] [employer] [email or web address]\[email or web address]\Images\Pharmacy\HeparinInfusions\heparin LOW INTENSITY nomogram for [facility] [id number].pdf    10/02/20 1440     IP VTE HIGH RISK PATIENT  Once      10/01/20 1822     Place sequential compression device  Until discontinued      10/01/20 1822                  Department Hospital Medicine  [facility]  Smitha Yadav MD  Date of service: 10/02/2020

## 2020-10-02 NOTE — PLAN OF CARE
6.4 liters of clear yellow drainage obtained from paracentesis performed by dr daniels, no co pain no acute distress noted vs stable report called to charito garcia back to room via wheelchair with us tech, right abdominal dsg cdi

## 2020-10-02 NOTE — PROGRESS NOTES
1414 Called Floor Nurse Britany for nitro and stat ekg. Pt c/o chest pain radiating down left arm. Blood reinfused.      1416 Britany RN at bedside setting up ekg pt's chest pain  increased. Britany RN not able to give nitro d/t bp in 70's/   1418 Dr. Ramos  notifed pt rinse back d/t chest pain .    1425  Called RRT.      1430 bp 113/66 93 Dr. Ramos at beside and moved to ICU    Pt ran on tx for 1hr and 7 mins    No UF removed

## 2020-10-02 NOTE — PLAN OF CARE
10/02/20 1401   Discharge Assessment   Assessment Type Discharge Planning Reassessment   Confirmed/corrected address and phone number on facesheet? Yes   Assessment information obtained from? Patient   Expected Length of Stay (days) 2   Communicated expected length of stay with patient/caregiver yes   Prior to hospitilization cognitive status: Alert/Oriented   Prior to hospitalization functional status: Independent   Current cognitive status: Alert/Oriented   Current Functional Status: Independent   Lives With child(tegan), dependent;spouse   Able to Return to Prior Arrangements yes   Is patient able to care for self after discharge? Yes   Who are your caregiver(s) and their phone number(s)? [employer] Spouse [phone]   Patient's perception of discharge disposition home or selfcare   Readmission Within the Last 30 Days no previous admission in last 30 days   Patient currently being followed by outpatient case management? No   Patient currently receives any other outside agency services? No   Equipment Currently Used at Home none   Do you have any problems affording any of your prescribed medications? No   Is the patient taking medications as prescribed? yes   Does the patient have transportation home? Yes   Transportation Anticipated family or friend will provide   Does the patient receive services at the [facility]? No   Discharge Plan A Home with family   Discharge Plan B Home with family   DME Needed Upon Discharge  none   Patient/Family in Agreement with Plan yes

## 2020-10-03 NOTE — PROGRESS NOTES
[facility]    Cardiology Progress Note    Subjective:  Patient still not feeling well did have pain last night.  The patient has a history of three-vessel coronary disease awaiting surgical evaluation      Objective:  Vital Signs (Most Recent)  Temp: 98.4 °F (36.9 °C) (10/02/20 2200)  Pulse: 75 (10/03/20 0600)  Resp: 14 (10/03/20 0600)  BP: (!) 88/53 (10/03/20 0600)  SpO2: 98 % (10/03/20 0600)    Vital Signs Range (Last 24H):  Temp:  [97.5 °F (36.4 °C)-98.7 °F (37.1 °C)]   Pulse:  [73-99]   Resp:  [13-28]   BP: ()/(39-86)   SpO2:  [98 %-100 %]     I & O (Last 24H):    Intake/Output Summary (Last 24 hours) at 10/3/2020 0752  Last data filed at 10/3/2020 0609  Gross per 24 hour   Intake 1051.62 ml   Output 50 ml   Net 1001.62 ml       Current Diet:     Current Diet Order   Procedures    Diet renal        Allergies:  Review of patient's allergies indicates:   Allergen Reactions    Ciprofloxacin Itching    Levaquin [levofloxacin] Itching    Quinolones Itching    Codeine Nausea Only       Meds:  Scheduled Meds:   sodium chloride 0.9%   Intravenous Once    sodium chloride 0.9%   Intravenous Once    calcium carbonate  1,000 mg Oral TID AC    metoprolol succinate  50 mg Oral Nightly    pantoprazole  40 mg Oral BID    rosuvastatin  10 mg Oral Daily    sodium zirconium cyclosilicate  10 g Oral Daily     Continuous Infusions:   heparin (porcine) in D5W 14 Units/kg/hr (10/03/20 0609)    nitroGLYCERIN Stopped (10/03/20 0609)     PRN Meds:sodium chloride 0.9%, sodium chloride 0.9%, acetaminophen, heparin (porcine), heparin (PORCINE), HYDROcodone-acetaminophen, morphine, nitroGLYCERIN, ondansetron, polyethylene glycol, sodium chloride 0.9%    Lab Results :  Recent Results (from the past 24 hour(s))   Troponin I    Collection Time: 10/02/20  2:40 PM   Result Value Ref Range    Troponin I 0.181 (HH) <=0.040 ng/mL   CK    Collection Time: 10/02/20  2:40 PM   Result Value Ref Range    CPK 62 20 - 180  U/L   Basic metabolic panel    Collection Time: 10/02/20  2:40 PM   Result Value Ref Range    Sodium 134 (L) 136 - 145 mmol/L    Potassium 4.7 3.5 - 5.1 mmol/L    Chloride 100 95 - 110 mmol/L    CO2 23 23 - 29 mmol/L    Glucose 112 (H) 70 - 110 mg/dL    BUN, Bld 53 (H) 6 - 20 mg/dL    Creatinine 6.1 (H) 0.5 - 1.4 mg/dL    Calcium 8.1 (L) 8.7 - 10.5 mg/dL    Anion Gap 11 8 - 16 mmol/L    eGFR if African American 8.3 (A) >60 mL/min/1.73 m^2    eGFR if non  7.2 (A) >60 mL/min/1.73 m^2   APTT    Collection Time: 10/02/20  2:40 PM   Result Value Ref Range    aPTT 33.4 (H) 23.6 - 33.3 sec   Protime-INR    Collection Time: 10/02/20  2:40 PM   Result Value Ref Range    PT 13.5 10.6 - 14.8 sec    INR 1.1    CBC auto differential    Collection Time: 10/02/20  2:40 PM   Result Value Ref Range    WBC 4.66 3.90 - 12.70 K/uL    RBC 2.64 (L) 4.00 - 5.40 M/uL    Hemoglobin 8.1 (L) 12.0 - 16.0 g/dL    Hematocrit 24.1 (L) 37.0 - 48.5 %    Mean Corpuscular Volume 91 82 - 98 fL    Mean Corpuscular Hemoglobin 30.7 27.0 - 31.0 pg    Mean Corpuscular Hemoglobin Conc 33.6 32.0 - 36.0 g/dL    RDW 15.0 (H) 11.5 - 14.5 %    Platelets 140 (L) 150 - 350 K/uL    MPV 10.0 9.2 - 12.9 fL    Immature Granulocytes 0.6 (H) 0.0 - 0.5 %    Gran # (ANC) 3.3 1.8 - 7.7 K/uL    Immature Grans (Abs) 0.03 0.00 - 0.04 K/uL    Lymph # 0.9 (L) 1.0 - 4.8 K/uL    Mono # 0.4 0.3 - 1.0 K/uL    Eos # 0.1 0.0 - 0.5 K/uL    Baso # 0.01 0.00 - 0.20 K/uL    nRBC 0 0 /100 WBC    Gran% 71.1 38.0 - 73.0 %    Lymph% 18.2 18.0 - 48.0 %    Mono% 8.6 4.0 - 15.0 %    Eosinophil% 1.3 0.0 - 8.0 %    Basophil% 0.2 0.0 - 1.9 %    Differential Method Automated    APTT    Collection Time: 10/02/20 10:57 PM   Result Value Ref Range    aPTT 72.9 (H) 23.6 - 33.3 sec   APTT    Collection Time: 10/03/20  5:08 AM   Result Value Ref Range    aPTT 63.6 (H) 23.6 - 33.3 sec   CBC auto differential    Collection Time: 10/03/20  5:08 AM   Result Value Ref Range    WBC 4.15 3.90 -  12.70 K/uL    RBC 2.17 (L) 4.00 - 5.40 M/uL    Hemoglobin 6.7 (LL) 12.0 - 16.0 g/dL    Hematocrit 20.6 (LL) 37.0 - 48.5 %    Mean Corpuscular Volume 95 82 - 98 fL    Mean Corpuscular Hemoglobin 30.9 27.0 - 31.0 pg    Mean Corpuscular Hemoglobin Conc 32.5 32.0 - 36.0 g/dL    RDW 15.4 (H) 11.5 - 14.5 %    Platelets 116 (L) 150 - 350 K/uL    MPV 10.2 9.2 - 12.9 fL    Immature Granulocytes 1.2 (H) 0.0 - 0.5 %    Gran # (ANC) 2.8 1.8 - 7.7 K/uL    Immature Grans (Abs) 0.05 (H) 0.00 - 0.04 K/uL    Lymph # 0.8 (L) 1.0 - 4.8 K/uL    Mono # 0.4 0.3 - 1.0 K/uL    Eos # 0.1 0.0 - 0.5 K/uL    Baso # 0.02 0.00 - 0.20 K/uL    nRBC 0 0 /100 WBC    Gran% 67.2 38.0 - 73.0 %    Lymph% 19.3 18.0 - 48.0 %    Mono% 9.9 4.0 - 15.0 %    Eosinophil% 1.9 0.0 - 8.0 %    Basophil% 0.5 0.0 - 1.9 %    Differential Method Automated    APTT    Collection Time: 10/03/20  5:08 AM   Result Value Ref Range    aPTT 63.6 (H) 23.6 - 33.3 sec   Hemoglobin    Collection Time: 10/03/20  6:05 AM   Result Value Ref Range    Hemoglobin 7.1 (L) 12.0 - 16.0 g/dL   Hematocrit    Collection Time: 10/03/20  6:05 AM   Result Value Ref Range    Hematocrit 21.9 (L) 37.0 - 48.5 %       Diagnostic Results:  Imaging Results          X-Ray Chest AP Portable (Final result)  Result time 10/01/20 15:45:02    Final result by Guy Patton MD (10/01/20 15:45:02)                 Narrative:    REASON: chest pain Chest Pain?(for months); Shortness of Breath?;  abdominal swelling?    FINDINGS:    Portable chest radiograph with comparison chest x-ray September 25, 2020. The cardiomediastinal silhouette is within normal limits in  size.The pulmonary vascular structures are within normal limits. There  is redemonstration of mild prominence of the interstitium. No  confluent airspace opacities. Linear and hazy densities at the lung  bases likely reflect subsegmental atelectasis. No acute osseous  abnormality.    IMPRESSION:    1.  Redemonstration of mild prominence of interstitium  "may reflect  mild pulmonary edema in the proper clinical context.  2.  Bibasilar linear and hazy densities likely reflect subsegmental  atelectasis.    Electronically Signed by Guy Patton on 10/1/2020 3:50 PM                              Recent Cardiac Rhythm   (if applicable)      Physical Exam:  Objective:  General Appearance:  In no acute distress.    Vital signs: (most recent): Blood pressure (!) 88/53, pulse 75, temperature 98.4 °F (36.9 °C), resp. rate 14, height 5' 4" (1.626 m), weight 64.5 kg (142 lb 3.2 oz), last menstrual period 01/07/1999, SpO2 98 %, not currently breastfeeding.    Lungs:  Normal effort and normal respiratory rate.  Breath sounds clear to auscultation.    Heart: Normal rate.  Regular rhythm.  S1 normal and S2 normal.    Abdomen: Abdomen is soft.  Bowel sounds are normal.         Current Consults:  IP CONSULT TO HOSPITAL MEDICINE  IP CONSULT TO CARDIOLOGY  IP CONSULT TO NEPHROLOGY  IP CONSULT TO CARDIOLOGY  IP CONSULT TO CARDIOTHORACIC SURGERY  IP CONSULT TO NEPHROLOGY    Assessment/Plan:  Assessment:   Multivessel disease- was seen by Dr Yeboah on previous admission re CABG, has been re consulted on this admission as patient wants to proceed despite being high risk with multiple comorbid ites  On BB and nitrates- can not increase dose due to low BP  For HD today  For paracentesis today- ok to hold heparin for procedure  Plan pending [facility] consult   Plan:   Continue present therapy await surgical evaluation  "

## 2020-10-03 NOTE — PROGRESS NOTES
10/03/20 1630   Post-Hemodialysis Assessment   Rinseback Volume (mL) 250 mL   Blood Volume Processed (Liters) 36 L   Dialyzer Clearance Lightly streaked   Duration of Treatment (minutes) 120 minutes   Hemodialysis Intake (mL) 500 mL   Total UF (mL) 1200 mL   Net Fluid Removal 0   Patient Response to Treatment tolerated well   Post-Treatment Weight 64.5 kg (142 lb 3.2 oz)   Treatment Weight Change 0   Arterial bleeding stop time (min) 10 min   Venous bleeding stop time (min) 8 min   Post-Hemodialysis Comments no problems

## 2020-10-03 NOTE — PROGRESS NOTES
"[facility]  Adult Nutrition   Progress Note (Initial Assessment)     SUMMARY     Recommendations/Interventions:    Recommendation/Intervention: 1. Cont present diet order.  Will follow hospital stay. 2.  to assist in menu selections  Goals: 1. Pt to meet at least 75% of estimated needs via po intake  Nutrition Goal Status: new    Dietitian Rounds Brief:    Spoke with pt.  She does not like the food served.  Offered to send her a supplement- she declined.  Encouraged her to participate when  comes around to get meal orders and to ask for other choices if none of the choices stated appeal to pt.     Reason for Assessment  Reason For Assessment: other (see comments)(ICU)  Diagnosis: cardiac disease  Relevant Medical History: ESRD on HD, CHF, CAD, CHF, HTN  Interdisciplinary Rounds: did not attend    Nutrition Risk Screen  Nutrition Risk Screen: no indicators present     MST Score: 1  Have you recently lost weight without trying?: No  Weight loss score: 0  Have you been eating poorly because of a decreased appetite?: Yes  Appetite score: 1       Nutrition/Diet History  Spiritual, Cultural Beliefs, Sikh Practices, Values that Affect Care: no  Food Allergies: NKFA  Factors Affecting Nutritional Intake: None identified at this time    Anthropometrics  Temp: 97.1 °F (36.2 °C)  Height Method: Stated  Height: 5' 4" (162.6 cm)  Height (inches): 64 in  Weight Method: Stated  Weight: 64.5 kg (142 lb 3.2 oz)  Weight (lb): 142.2 lb  Ideal Body Weight (IBW), Female: 120 lb  % Ideal Body Weight, Female (lb): 118.5 %  BMI (Calculated): 24.4  BMI Grade: 18.5-24.9 - normal       Weight History:  Wt Readings from Last 10 Encounters:   10/03/20 64.5 kg (142 lb 3.2 oz)   09/25/20 62.2 kg (137 lb 2 oz)   09/17/20 66.4 kg (146 lb 4.8 oz)   09/08/20 61.3 kg (135 lb 2.3 oz)   09/04/20 60.8 kg (134 lb)   03/12/20 59.6 kg (131 lb 6.3 oz)   01/22/20 56.7 kg (125 lb)   01/15/20 57.8 kg (127 lb 6.8 oz)   01/10/20 " 52.3 kg (115 lb 4.8 oz)   01/08/20 64.7 kg (142 lb 10.2 oz)   }  Lab/Procedures/Meds: Pertinent Labs Reviewed  Clinical Chemistry:  Recent Labs   Lab 10/02/20  0625 10/03/20  1202   * 135*   K 5.9* 5.4*   CL 96 97   CO2 19* 24   GLU 88 115*   BUN 81* 65*   CREATININE 8.9* 8.1*   CALCIUM 8.5* 8.6*   PROT 5.3* 4.7*   ALBUMIN 2.5* 2.2*   BILITOT 0.7 0.4   ALKPHOS 68 58   AST 23 26   ALT 20 18   ANIONGAP 17* 14   ESTGFRAFRICA 5.3* 5.9*   EGFRNONAA 4.6* 5.1*   MG 2.5  --    PHOS 7.8*  --     < > = values in this interval not displayed.     CBC:   Recent Labs   Lab 10/03/20  1202   WBC 4.00   RBC 2.38*   HGB 7.3*   HCT 23.0*      MCV 97   MCH 30.7   MCHC 31.7*     Lipid Panel:  Recent Labs   Lab 10/02/20  0625   CHOL 111*   HDL 26*   LDLCALC 56.8*   TRIG 141   CHOLHDL 23.4     Cardiac Profile:  Recent Labs   Lab 10/01/20  1525  10/02/20  0033 10/02/20  0625 10/02/20  1440   BNP 2,099*  --   --   --   --    CPK  --   --   --   --  62   TROPONINI 0.137*   < > 0.164* 0.197* 0.181*    < > = values in this interval not displayed.       Medications: Pertinent Medications reviewed  Scheduled Meds:   sodium chloride 0.9%   Intravenous Once    sodium chloride 0.9%   Intravenous Once    calcium carbonate  1,000 mg Oral TID AC    metoprolol succinate  50 mg Oral Nightly    pantoprazole  40 mg Oral BID    rosuvastatin  10 mg Oral Daily    sodium zirconium cyclosilicate  10 g Oral Daily     Continuous Infusions:  PRN Meds:.sodium chloride, sodium chloride, acetaminophen, cyclobenzaprine, heparin (porcine), HYDROcodone-acetaminophen, morphine, nitroGLYCERIN, ondansetron, polyethylene glycol, sodium chloride 0.9%    Estimated/Assessed Needs    Weight Used For Calorie Calculations: 64.5 kg (142 lb 3.2 oz)  Energy Calorie Requirements (kcal): 2711-1381 calorie (30-35 kcal/kg BW 2' to HD)  Energy Need Method: Kcal/kg  Protein Requirements: 77-90 g (1.2-1.4 g/kg BW)  Weight Used For Protein Calculations: 64.5 kg (142 lb  3.2 oz)  Fluid Requirements (mL): 1000 ml or per MD       Nutrition Prescription Ordered    Current Diet Order: Renal  Nutrition Order Comments: Diet order is approrpiate for pt based on medical conditions    Evaluation of Received Nutrient/Fluid Intake    Energy Calories Required: meeting needs  Protein Required: meeting needs  Fluid Required: meeting needs  Tolerance: tolerating  % Intake of Estimated Energy Needs: 25 - 50 %  % Meal Intake: 25 - 50 %    Intake/Output Summary (Last 24 hours) at 10/3/2020 1352  Last data filed at 10/3/2020 0853  Gross per 24 hour   Intake 1291.62 ml   Output 50 ml   Net 1241.62 ml      Nutrition Risk    Level of Risk/Frequency of Follow-up: moderate - high   Monitor and Evaluation    Food and Nutrient Intake: food and beverage intake, energy intake  Food and Nutrient Adminstration: diet order  Knowledge/Beliefs/Attitudes: food and nutrition knowledge/skill, beliefs and attitudes  Physical Activity and Function: nutrition-related ADLs and IADLs  Anthropometric Measurements: weight change, weight  Biochemical Data, Medical Tests and Procedures: lipid profile, electrolyte and renal panel, gastrointestinal profile, glucose/endocrine profile, inflammatory profile  Nutrition-Focused Physical Findings: overall appearance     Nutrition Follow-Up    RD Follow-up?: Yes     Sharlene Dee 10/3/20

## 2020-10-03 NOTE — PROGRESS NOTES
[facility]  Progress Note    Patient name: Aurelia Saucedo  MRN: [id number]  Admit Date: 10/1/2020   LOS: 1 day     SUBJECTIVE:     Principal problem: NSTEMI    Interval History:    10/1 Pt reported intermittent CP , rapid response was called 2.15 pm for CP nearly 1 hr of HD, lasted for 5-10 mins slightly  eased out from 10/10 to 8/10 after 2 gtn pill and 2mg morphine  Pt was transferred to ICU for close monitor  Initiated on Tridil drip, heparin drip held for paracentesis 6.7 L removed, restarted heparin drip with bolus  Labs troponin CBC and BMP ordered  Dr Gomez notified  Will continue with K binders and calcium gluconate since the dialysis was incomplete  Pt well aware of he complicated medical condition, but still want to consider CABG surgery if possible, awaitng for CTS consult possibly Monday     10/2  Pt appears depressed abt her medical illness, feeling exhausted with multiple procedures  Denies any CP since last night, off tridil, Hb trending down, plan discussed with cardiology and nephrology  DC heparin drip, transfuse 2 unit PRBC with dialysis  All questions answered for patient and  at bedside, will repeat labs with dialysis    Scheduled Meds:   sodium chloride 0.9%   Intravenous Once    sodium chloride 0.9%   Intravenous Once    calcium carbonate  1,000 mg Oral TID AC    metoprolol succinate  50 mg Oral Nightly    pantoprazole  40 mg Oral BID    rosuvastatin  10 mg Oral Daily    sodium zirconium cyclosilicate  10 g Oral Daily     Continuous Infusions:    PRN Meds:sodium chloride 0.9%, sodium chloride 0.9%, acetaminophen, cyclobenzaprine, heparin (porcine), HYDROcodone-acetaminophen, morphine, nitroGLYCERIN, ondansetron, polyethylene glycol, sodium chloride 0.9%    Review of patient's allergies indicates:   Allergen Reactions    Ciprofloxacin Itching    Levaquin [levofloxacin] Itching    Quinolones Itching    Codeine Nausea Only       Review of  Systems: As per interval history    OBJECTIVE:     Vital Signs (Most Recent)  Temp: 97.9 °F (36.6 °C) (10/03/20 0701)  Pulse: 81 (10/03/20 0901)  Resp: (!) 26 (10/03/20 0901)  BP: 96/67 (10/03/20 0901)  SpO2: (!) 94 % (10/03/20 0901)    Vital Signs Range (Last 24H):  Temp:  [97.5 °F (36.4 °C)-98.7 °F (37.1 °C)]   Pulse:  [73-99]   Resp:  [13-28]   BP: ()/(39-86)   SpO2:  [94 %-100 %]     I & O (Last 24H):    Intake/Output Summary (Last 24 hours) at 10/3/2020 1103  Last data filed at 10/3/2020 0853  Gross per 24 hour   Intake 1291.62 ml   Output 50 ml   Net 1241.62 ml       Physical Exam:  General appearance: Well-developed, cachectic, chronic ill  appearing White female.  Skin: Red rash to BLE.   Neuro: AAOx3. Motor and sensory exams grossly intact. Good tone. Power in all 4 extremities 5/5.   HENT: Atraumatic head. Moist mucous membranes of oral cavity.  Eyes: Normal extraocular movements. PERRLA  Neck: Supple. No evidence of lymphadenopathy. No thyroidomegaly.  Lungs: Clear to auscultation bilaterally. No wheezing present.   Heart: Regular rate and rhythm. S1 and S2 present with no murmurs/gallop/rub. +1 pedal edema. No JVD present.   Abdomen: Soft, not distended, non-tender. No rebound tenderness/guarding. No masses or organomegaly. Bowel sounds are normal. Bladder is not palpable.   Extremities: No cyanosis, clubbing. Dialysis fistula to LUE with +thrill/bruit  Psych/mental status:Anxious behavior. Cooperative. Responds appropriately to questions.     Laboratory:  CBC:   Recent Labs   Lab 10/03/20  0508 10/03/20  0605   WBC 4.15  --    RBC 2.17*  --    HGB 6.7* 7.1*   HCT 20.6* 21.9*   *  --    MCV 95  --    MCH 30.9  --    MCHC 32.5  --      CMP:   Recent Labs   Lab 10/02/20  0625 10/02/20  1440   GLU 88 112*   CALCIUM 8.5* 8.1*   ALBUMIN 2.5*  --    PROT 5.3*  --    * 134*   K 5.9* 4.7   CO2 19* 23   CL 96 100   BUN 81* 53*   CREATININE 8.9* 6.1*   ALKPHOS 68  --    ALT 20  --    AST 23  --     BILITOT 0.7  --      Coagulation:   Recent Labs   Lab 10/02/20  1440  10/03/20  0508   INR 1.1  --   --    APTT 33.4*   < > 63.6*  63.6*    < > = values in this interval not displayed.     Cardiac markers:   Recent Labs   Lab 10/02/20  1440   TROPONINI 0.181*           ASSESSMENT/PLAN:   Chest Pain/NSTEMI  CAD  ESRD on HD   Hyperkalemia  ESLD with ascites secondary to Hep C  Anemia of chronic disease  Chronic diastolic heart failure  Tobacco abuse      Seen in  ICU  Patient with recurrent chest pain in the setting of known multivessel CAD. Left heart cath January 2020 with severe multivessel disease. She was seen by Dr. Yeboah on previous admission and offered CABG, but she is high risk secondary to comorbidities. She refused at that time as she wanted to discuss it with her family before making a final decision. She was discharged home with antianginal and BB.  Trend trop- chronically elevated; appears to be at baseline; known CAD  Continue TRIDIL GTT/BB  Consult Cardiology appreciated not a candidate for LHC/CT surgery consult pending  Continue with Heparin gtt   Patient had an incomplete dialysis session today  Consult Nephrology appreciated  H/H stable; no signs of active bleeding; follow h/h  Smoking cessation  S/p paracentesis today       VTE Risk Mitigation (From admission, onward)         Ordered     heparin (porcine) injection 5,000 Units  As needed (PRN)      10/02/20 1944     IP VTE HIGH RISK PATIENT  Once      10/01/20 1822     Place sequential compression device  Until discontinued      10/01/20 1822                  Department Hospital Medicine  [facility]  Smitha Yadav MD  Date of service: 10/03/2020

## 2020-10-04 NOTE — PROGRESS NOTES
[facility] Medicine  Progress Note    Patient name: Aurelia Saucedo  MRN: [id number]  Admit Date: 10/1/2020   LOS: 2 days     SUBJECTIVE:     Principal problem: NSTEMI    Interval History:    10/1 Pt reported intermittent CP , rapid response was called 2.15 pm for CP nearly 1 hr of HD, lasted for 5-10 mins slightly  eased out from 10/10 to 8/10 after 2 gtn pill and 2mg morphine  Pt was transferred to ICU for close monitor  Initiated on Tridil drip, heparin drip held for paracentesis 6.7 L removed, restarted heparin drip with bolus  Labs troponin CBC and BMP ordered  Dr Gomez notified  Will continue with K binders and calcium gluconate since the dialysis was incomplete  Pt well aware of he complicated medical condition, but still want to consider CABG surgery if possible, awaitng for CTS consult possibly Monday     10/2  Pt appears depressed abt her medical illness, feeling exhausted with multiple procedures  Denies any CP since last night, off tridil, Hb trending down, plan discussed with cardiology and nephrology  DC heparin drip, transfuse 2 unit PRBC with dialysis  All questions answered for patient and  at bedside, will repeat labs with dialysis    10/3 patient feels better, denies any chest pain or short of breath, had dialysis yesterday with 2 unit blood transfusion  Hemoglobin elevated appropriately, hyperkalemia resolved, awaiting for CT surgery evaluation Monday  Reports no bowel movement for 3 days, lactulose given    Scheduled Meds:   sodium chloride 0.9%   Intravenous Once    sodium chloride 0.9%   Intravenous Once    calcium carbonate  1,000 mg Oral TID AC    metoprolol succinate  50 mg Oral Nightly    pantoprazole  40 mg Oral BID    rosuvastatin  10 mg Oral Daily    sodium zirconium cyclosilicate  10 g Oral Daily     Continuous Infusions:    PRN Meds:sodium chloride, sodium chloride, acetaminophen, cyclobenzaprine, heparin (porcine), HYDROcodone-acetaminophen,  morphine, nitroGLYCERIN, ondansetron, polyethylene glycol, sodium chloride 0.9%    Review of patient's allergies indicates:   Allergen Reactions    Ciprofloxacin Itching    Levaquin [levofloxacin] Itching    Quinolones Itching    Codeine Nausea Only       Review of Systems: As per interval history    OBJECTIVE:     Vital Signs (Most Recent)  Temp: 98.2 °F (36.8 °C) (10/04/20 1038)  Pulse: 73 (10/04/20 1039)  Resp: 15 (10/04/20 1039)  BP: (!) 89/69 (10/04/20 1039)  SpO2: 98 % (10/04/20 1039)    Vital Signs Range (Last 24H):  Temp:  [97.6 °F (36.4 °C)-98.3 °F (36.8 °C)]   Pulse:  [70-83]   Resp:  [12-25]   BP: ()/(52-74)   SpO2:  [96 %-100 %]     I & O (Last 24H):    Intake/Output Summary (Last 24 hours) at 10/4/2020 1428  Last data filed at 10/4/2020 0600  Gross per 24 hour   Intake 1400 ml   Output 1250 ml   Net 150 ml       Physical Exam:  General appearance: Well-developed, cachectic, chronic ill  appearing White female.  Skin: Red rash to BLE.   Neuro: AAOx3. Motor and sensory exams grossly intact. Good tone. Power in all 4 extremities 5/5.   HENT: Atraumatic head. Moist mucous membranes of oral cavity.  Eyes: Normal extraocular movements. PERRLA  Neck: Supple. No evidence of lymphadenopathy. No thyroidomegaly.  Lungs: Clear to auscultation bilaterally. No wheezing present.   Heart: Regular rate and rhythm. S1 and S2 present with no murmurs/gallop/rub. +1 pedal edema improving. No JVD present.   Abdomen: Soft, not distended, non-tender. No rebound tenderness/guarding. No masses or organomegaly. Bowel sounds are normal. Bladder is not palpable.   Extremities: No cyanosis, clubbing. Dialysis fistula to LUE with +thrill/bruit  Psych/mental status:Anxious behavior. Cooperative. Responds appropriately to questions.     Laboratory:  CBC:   Recent Labs   Lab 10/04/20  0415   WBC 3.92   RBC 3.19*   HGB 9.7*   HCT 29.4*   *   MCV 92   MCH 30.4   MCHC 33.0     CMP:   Recent Labs   Lab 10/03/20  1202  10/04/20  0415   * 96  96   CALCIUM 8.6* 8.5*  8.5*   ALBUMIN 2.2* 2.1*   PROT 4.7*  --    * 137  137   K 5.4* 4.4  4.4   CO2 24 27  27   CL 97 97  97   BUN 65* 48*  48*   CREATININE 8.1* 6.6*  6.6*   ALKPHOS 58  --    ALT 18  --    AST 26  --    BILITOT 0.4  --      Coagulation:   Recent Labs   Lab 10/02/20  1440  10/03/20  0508   INR 1.1  --   --    APTT 33.4*   < > 63.6*  63.6*    < > = values in this interval not displayed.     Cardiac markers:   Recent Labs   Lab 10/02/20  1440   TROPONINI 0.181*           ASSESSMENT/PLAN:   Chest Pain/NSTEMI  CAD  ESRD on HD   Hyperkalemia  ESLD with ascites secondary to Hep C  Anemia of chronic disease  Chronic diastolic heart failure  Tobacco abuse      Seen in  ICU  Patient with recurrent chest pain in the setting of known multivessel CAD. Left heart cath January 2020 with severe multivessel disease. She was seen by Dr. Yeboah on previous admission and offered CABG, but she is high risk secondary to comorbidities. She refused at that time as she wanted to discuss it with her family before making a final decision. She was discharged home with antianginal and BB.  Trend trop- chronically elevated; appears to be at baseline; known CAD  Continue TRIDIL GTT/BB  Consult Cardiology appreciated not a candidate for LHC/CT surgery consult pending  Continue with Heparin gtt   Patient had an incomplete dialysis session today  Consult Nephrology appreciated  H/H stable; no signs of active bleeding; follow h/h  Smoking cessation  S/p paracentesis today       VTE Risk Mitigation (From admission, onward)         Ordered     heparin (porcine) injection 5,000 Units  As needed (PRN)      10/02/20 1944     IP VTE HIGH RISK PATIENT  Once      10/01/20 1822     Place sequential compression device  Until discontinued      10/01/20 1822                  Department Hospital Medicine  [facility]  Smitha Yadav MD  Date of service: 10/04/2020

## 2020-10-04 NOTE — PROGRESS NOTES
INPATIENT NEPHROLOGY CONSULT   [facility] NEPHROLOGY    Aurelia Griderakanksha YusufSaucedo  10/04/2020    Reason for consultation:  ESRD    Chief Complaint:   Chief Complaint   Patient presents with    Chest Pain     for months    Shortness of Breath    abdominal swelling        History of Present Illness:      Per H and P    Patient presents to the ED with the complaint of chest pain with associated SOB. She describes the pain as burning to mid chest with radiation to left arm. She reports associated nausea and diaphoresis.  Additionally the patient reports increased abdominal distention. She has a history of liver failure secondary to Hep C. She reports she gets a paracentesis every 2 weeks and she is due for her next paracentesis tomorrow. Last paracentesis was on 09/03/2020 and 7.4L was removed at that time. The patient states she believes her pain is secondary to the worsening ascites. She has known extensive Cardiac disease noted on heart cath earlier this year but she is too high risk for CABG. She has been managed medically. She denies fever, chills, vomiting dysuria, rectal bleeding, melena, numbness, tingling, or LOC    10/2  Seen on dialysis. .  Having chest pain.  Anxious.  No n,v.  Diaphoretic.  No n,v, diarrhea.  Abdominal distension    10/3  No further chest pain.  Didn't sleep well last night.     10/4 No nausea, chest pain, sob, fever, urinary or bowel complaint, new neurologic symptoms, new joint pain,       Plan of Care:       Assessment:    esrd  --dialysis monday    Hyperkalemia  --she got most of her treatment on a hypokalemic bath    Angina  --as above.   CT surgery consulted    Anemia  --erythropoiesis stimulating agent with renal replacement therapy  --got blood yesterday with supplemental hd    Mild acidosis  --35 bicarb bath    Hyponatremia  --ultrafilter 2-3 liters.  Use 140 sodium dialysate. Fluid restrict    Ascites  --paracentesis when stable from cardiac standpoint            Thank you for  allowing us to participate in this patient's care. We will continue to follow.    Vital Signs:  Temp Readings from Last 3 Encounters:   10/04/20 97.6 °F (36.4 °C) (Oral)   09/27/20 97.8 °F (36.6 °C) (Oral)   09/17/20 97 °F (36.1 °C)       Pulse Readings from Last 3 Encounters:   10/04/20 81   09/27/20 81   09/18/20 74       BP Readings from Last 3 Encounters:   10/04/20 105/74   09/27/20 113/62   09/18/20 (!) 153/101       Weight:  Wt Readings from Last 3 Encounters:   10/03/20 64.5 kg (142 lb 3.2 oz)   09/25/20 62.2 kg (137 lb 2 oz)   09/17/20 66.4 kg (146 lb 4.8 oz)       Past Medical & Surgical History:  Past Medical History:   Diagnosis Date    Anemia of chronic renal failure, stage 5 8/19/2015    Ascites     Bleeding duodenal ulcer     CAD (coronary artery disease)     CAD (coronary artery disease)     Chest pain     CHF (congestive heart failure)     Chronic hepatitis C 8/19/2015    Colitis     Elevated troponin     ESRD 2/2 MPGN on HD since 12/16/13 8/19/2015    Gastritis     Hypertension 8/19/2015    Mitral valve regurgitation     Renal dialysis status     M, W, F    Secondary hyperparathyroidism of renal origin 8/19/2015       Past Surgical History:   Procedure Laterality Date    ANGIOGRAM, CORONARY, WITH LEFT HEART CATHETERIZATION Left 1/13/2020    Procedure: ANGIOGRAM,CORONARY,WITH LEFT HEART CATHETERIZATION;  Surgeon: Yahir Starkey MD;  Location: [facility] CATH/EP LAB;  Service: Cardiology;  Laterality: Left;    AV FISTULA PLACEMENT  2013    COLONOSCOPY      CORONARY ANGIOPLASTY WITH STENT PLACEMENT      SALPINGOOPHORECTOMY Right 1997    TOTAL ABDOMINAL HYSTERECTOMY  1999       Past Social History:  Social History     Socioeconomic History    Marital status:      Spouse name: Not on file    Number of children: 2    Years of education: Not on file    Highest education level: Not on file   Occupational History    Occupation: baker    Social Needs    Financial resource  strain: Not on file    Food insecurity     Worry: Not on file     Inability: Not on file    Transportation needs     Medical: Not on file     Non-medical: Not on file   Tobacco Use    Smoking status: Current Every Day Smoker     Packs/day: 0.75     Years: 30.00     Pack years: 22.50     Types: Cigarettes     Last attempt to quit: 2019     Years since quittin.8    Smokeless tobacco: Never Used   Substance and Sexual Activity    Alcohol use: No     Alcohol/week: 0.0 standard drinks    Drug use: Yes     Types: Marijuana     Comment: occasionally    Sexual activity: Not on file   Lifestyle    Physical activity     Days per week: Not on file     Minutes per session: Not on file    Stress: To some extent   Relationships    Social connections     Talks on phone: Not on file     Gets together: Not on file     Attends Hinduism service: Not on file     Active member of club or organization: Not on file     Attends meetings of clubs or organizations: Not on file     Relationship status: Not on file   Other Topics Concern    Are you pregnant or think you may be? Not Asked    Breast-feeding Not Asked   Social History Narrative    Not on file       Medications:  No current facility-administered medications on file prior to encounter.      Current Outpatient Medications on File Prior to Encounter   Medication Sig Dispense Refill    aspirin (ECOTRIN) 81 MG EC tablet Take 81 mg by mouth once daily.      calcium acetate,phosphat bind, (PHOSLO) 667 mg capsule Take 667 mg by mouth 3 (three) times daily with meals.      FA-VIT B COMP&C-SELENIUM-ZINC 3-70-15 MG-MCG-MG ORAL TAB Take 1 tablet by mouth once daily.      HYDROcodone-acetaminophen (NORCO)  mg per tablet Take 1 tablet by mouth every 8 (eight) hours as needed for Pain. 60 tablet 0    isosorbide mononitrate (IMDUR) 30 MG 24 hr tablet Take 1 tablet (30 mg total) by mouth 2 (two) times a day. 180 tablet 0    lisinopril (PRINIVIL,ZESTRIL) 20 MG  "tablet Take 1 tablet by mouth nightly.   3    LOKELMA 5 gram PwPk Take 5 g by mouth. Five times a week; on  Non dialysis days      metoprolol succinate (TOPROL-XL) 50 MG 24 hr tablet Take 50 mg by mouth nightly.       nitroGLYCERIN (NITROSTAT) 0.4 MG SL tablet Place 1 tablet (0.4 mg total) under the tongue every 5 (five) minutes as needed for Chest pain. 90 tablet 3    pantoprazole (PROTONIX) 40 MG tablet Take 40 mg by mouth 2 (two) times daily.       polyethylene glycol (GLYCOLAX) 17 gram PwPk Take 17 g by mouth daily as needed (constipation). 30 each 0    rosuvastatin (CRESTOR) 10 MG tablet Take 1 tablet (10 mg total) by mouth once daily. 90 tablet 3    lactulose (CHRONULAC) 10 gram/15 mL solution TK 30 ML PO ONCE D       Scheduled Meds:   sodium chloride 0.9%   Intravenous Once    sodium chloride 0.9%   Intravenous Once    calcium carbonate  1,000 mg Oral TID AC    metoprolol succinate  50 mg Oral Nightly    pantoprazole  40 mg Oral BID    rosuvastatin  10 mg Oral Daily    sodium zirconium cyclosilicate  10 g Oral Daily     Continuous Infusions:    PRN Meds:.sodium chloride, sodium chloride, acetaminophen, cyclobenzaprine, heparin (porcine), HYDROcodone-acetaminophen, morphine, nitroGLYCERIN, ondansetron, polyethylene glycol, sodium chloride 0.9%    Allergies:  Ciprofloxacin, Levaquin [levofloxacin], Quinolones, and Codeine    Past Family History:  Reviewed; refer to Hospitalist Admission Note    Review of Systems:  Review of Systems - All 14 systems reviewed and negative, except as noted in HPI    Physical Exam:    /74   Pulse 81   Temp 97.6 °F (36.4 °C) (Oral)   Resp 19   Ht 5' 4" (1.626 m)   Wt 64.5 kg (142 lb 3.2 oz)   LMP 01/07/1999 (Exact Date)   SpO2 100%   Breastfeeding No   BMI 24.41 kg/m²     General Appearance:    Alert, cooperative, no distress, appears stated age   Head:    Normocephalic, without obvious abnormality, atraumatic   Eyes:    PER, conjunctiva/corneas clear, " EOM's intact in both eyes        Throat:   Lips, mucosa, and tongue normal; teeth and gums normal   Back:     Symmetric, no curvature, ROM normal, no CVA tenderness   Lungs:     Clear to auscultation bilaterally, respirations unlabored   Chest wall:    No tenderness or deformity   Heart:    Regular rate and rhythm, S1 and S2 normal, no murmur, rub   or gallop   Abdomen:     Soft, non-tender, bowel sounds active all four quadrants,     no masses, no organomegaly   Extremities:   Extremities normal, atraumatic, no cyanosis or edema   Pulses:   2+ and symmetric all extremities   MSK:   No joint or muscle swelling, tenderness or deformity   Skin:   Skin color, texture, turgor normal, no rashes or lesions   Neurologic:   CNII-XII intact, normal strength and sensation       Throughout.  No flap     Results:  Lab Results   Component Value Date     10/04/2020     10/04/2020    K 4.4 10/04/2020    K 4.4 10/04/2020    CL 97 10/04/2020    CL 97 10/04/2020    CO2 27 10/04/2020    CO2 27 10/04/2020    BUN 48 (H) 10/04/2020    BUN 48 (H) 10/04/2020    CREATININE 6.6 (H) 10/04/2020    CREATININE 6.6 (H) 10/04/2020    CALCIUM 8.5 (L) 10/04/2020    CALCIUM 8.5 (L) 10/04/2020    ANIONGAP 13 10/04/2020    ANIONGAP 13 10/04/2020    ESTGFRAFRICA 7.6 (A) 10/04/2020    ESTGFRAFRICA 7.6 (A) 10/04/2020    EGFRNONAA 6.6 (A) 10/04/2020    EGFRNONAA 6.6 (A) 10/04/2020       Lab Results   Component Value Date    CALCIUM 8.5 (L) 10/04/2020    CALCIUM 8.5 (L) 10/04/2020    PHOS 5.4 (H) 10/04/2020       Recent Labs   Lab 10/04/20  0415   WBC 3.92   RBC 3.19*   HGB 9.7*   HCT 29.4*   *   MCV 92   MCH 30.4   MCHC 33.0          I have personally reviewed pertinent radiological imaging and reports.    Patient care was time spent personally by me on the following activities:   · Obtaining a history  · Examination of patient.  · Providing medical care at the patients bedside.  · Developing a treatment plan with patient or surrogate  and bedside caregivers  · Ordering and reviewing laboratory studies, radiographic studies, pulse oximetry.  · Ordering and performing treatments and interventions.  · Evaluation of patient's response to treatment.  · Discussions with consultants while on the unit and immediately available to the patient.  · Re-evaluation of the patient's condition.  · Documentation in the medical record.     Mele Ramos MD  Nephrology  [facility]  [phone]

## 2020-10-05 NOTE — PROGRESS NOTES
I spoke to the patient about coronary artery bypass grafting.  Her questions were answered.  If she agrees to surgery I will plan for later this week.  Hopefully she is optimized medically and can be dialyzed on the day prior to the surgical procedure.  Obviously risk is elevated.

## 2020-10-05 NOTE — PROGRESS NOTES
[facility]    Cardiology Progress Note    Subjective:  Patient seen and examined this AM. Awaiting discussion with CTS today. She is to undergo HD today as well. Denies any significant cardiac complaints at this time. Hemoglobin 10.6, VSS.       Objective:  Vital Signs (Most Recent)  Temp: 97.8 °F (36.6 °C) (10/05/20 0701)  Pulse: 73 (10/05/20 0500)  Resp: 18 (10/05/20 0711)  BP: 115/83 (10/05/20 0500)  SpO2: (!) 94 % (10/05/20 0500)    Vital Signs Range (Last 24H):  Temp:  [97.8 °F (36.6 °C)-98.2 °F (36.8 °C)]   Pulse:  [70-84]   Resp:  [13-24]   BP: ()/(56-83)   SpO2:  [94 %-99 %]     I & O (Last 24H):    Intake/Output Summary (Last 24 hours) at 10/5/2020 0827  Last data filed at 10/4/2020 1800  Gross per 24 hour   Intake 200 ml   Output --   Net 200 ml       Current Diet:     Current Diet Order   Procedures    Diet renal        Allergies:  Review of patient's allergies indicates:   Allergen Reactions    Ciprofloxacin Itching    Levaquin [levofloxacin] Itching    Quinolones Itching    Codeine Nausea Only       Meds:  Scheduled Meds:   sodium chloride 0.9%   Intravenous Once    sodium chloride 0.9%   Intravenous Once    calcium carbonate  1,000 mg Oral TID AC    metoprolol succinate  50 mg Oral Nightly    pantoprazole  40 mg Oral BID    rosuvastatin  10 mg Oral Daily    sodium zirconium cyclosilicate  10 g Oral Daily     Continuous Infusions:    PRN Meds:sodium chloride, sodium chloride, acetaminophen, cyclobenzaprine, heparin (porcine), HYDROcodone-acetaminophen, morphine, nitroGLYCERIN, ondansetron, polyethylene glycol, sodium chloride 0.9%    Lab Results :  Recent Results (from the past 24 hour(s))   CBC auto differential    Collection Time: 10/05/20  4:28 AM   Result Value Ref Range    WBC 4.33 3.90 - 12.70 K/uL    RBC 3.54 (L) 4.00 - 5.40 M/uL    Hemoglobin 10.6 (L) 12.0 - 16.0 g/dL    Hematocrit 32.9 (L) 37.0 - 48.5 %    Mean Corpuscular Volume 93 82 - 98 fL    Mean Corpuscular  "Hemoglobin 29.9 27.0 - 31.0 pg    Mean Corpuscular Hemoglobin Conc 32.2 32.0 - 36.0 g/dL    RDW 15.7 (H) 11.5 - 14.5 %    Platelets 112 (L) 150 - 350 K/uL    MPV 10.1 9.2 - 12.9 fL    Immature Granulocytes 0.7 (H) 0.0 - 0.5 %    Gran # (ANC) 2.9 1.8 - 7.7 K/uL    Immature Grans (Abs) 0.03 0.00 - 0.04 K/uL    Lymph # 0.9 (L) 1.0 - 4.8 K/uL    Mono # 0.3 0.3 - 1.0 K/uL    Eos # 0.1 0.0 - 0.5 K/uL    Baso # 0.02 0.00 - 0.20 K/uL    nRBC 0 0 /100 WBC    Gran% 67.6 38.0 - 73.0 %    Lymph% 21.0 18.0 - 48.0 %    Mono% 7.9 4.0 - 15.0 %    Eosinophil% 2.3 0.0 - 8.0 %    Basophil% 0.5 0.0 - 1.9 %    Differential Method Automated        Diagnostic Results:  Imaging Results          X-Ray Chest AP Portable (Final result)  Result time 10/01/20 15:45:02    Final result by Guy Patton MD (10/01/20 15:45:02)                 Narrative:    REASON: chest pain Chest Pain?(for months); Shortness of Breath?;  abdominal swelling?    FINDINGS:    Portable chest radiograph with comparison chest x-ray September 25, 2020. The cardiomediastinal silhouette is within normal limits in  size.The pulmonary vascular structures are within normal limits. There  is redemonstration of mild prominence of the interstitium. No  confluent airspace opacities. Linear and hazy densities at the lung  bases likely reflect subsegmental atelectasis. No acute osseous  abnormality.    IMPRESSION:    1.  Redemonstration of mild prominence of interstitium may reflect  mild pulmonary edema in the proper clinical context.  2.  Bibasilar linear and hazy densities likely reflect subsegmental  atelectasis.    Electronically Signed by Guy Patton on 10/1/2020 3:50 PM                              Physical Exam:  Objective:  General Appearance:  In no acute distress.    Vital signs: (most recent): Blood pressure 115/83, pulse 73, temperature 97.8 °F (36.6 °C), temperature source Oral, resp. rate 18, height 5' 4" (1.626 m), weight 64.5 kg (142 lb 3.2 oz), last " menstrual period 01/07/1999, SpO2 (!) 94 %, not currently breastfeeding.    Lungs:  Normal effort and normal respiratory rate.  Breath sounds clear to auscultation.    Heart: Normal rate.  Regular rhythm.  S1 normal and S2 normal.    Abdomen: Abdomen is soft.  Bowel sounds are normal.         Current Consults:  IP CONSULT TO HOSPITAL MEDICINE  IP CONSULT TO CARDIOLOGY  IP CONSULT TO NEPHROLOGY  IP CONSULT TO CARDIOLOGY  IP CONSULT TO CARDIOTHORACIC SURGERY  IP CONSULT TO NEPHROLOGY    Assessment/Plan:  Assessment:   3v CAD - was seen by Dr Yeboah on previous admission re CABG, has been re consulted on this admission as patient wants to proceed despite being high risk with multiple comorbidites  ESRD  Cardiomyopathy  Liver cirrhosis  Hep C  ESRD  Valvular heart disease moderate to severe aortic stenosis, moderate mitral regurgitation  Pulmonary hypertension PA pressure 60 mmHg    Plan:   Continue current cardiac medications/plan of care.  Awaiting discussion with CTS regarding elevated risk CABG/possible AVR.     Alex Casas PA-C  10/05/2020

## 2020-10-05 NOTE — PLAN OF CARE
This note also relates to the following rows which could not be included:  SpO2 - Cannot attach notes to unvalidated device data  Pulse - Cannot attach notes to unvalidated device data       10/05/20 1031   PRE-TX-O2   O2 Device (Oxygen Therapy) room air   Pulse Oximetry Type Continuous   $ Pulse Oximetry - Multiple Charge Pulse Oximetry - Multiple   Resp 15   Respiratory Evaluation   $ Care Plan Tech Time 15 min

## 2020-10-05 NOTE — PROGRESS NOTES
INPATIENT NEPHROLOGY CONSULT   [facility] NEPHROLOGY    Aurelia Saucedo  10/05/2020    Reason for consultation:  ESRD    History of Present Illness:  55F on HD TS presents to the ED with the complaint of chest pain with associated SOB. She describes burning pain to mid chest with radiation to left arm, associated nausea and diaphoresis. Additionally, she reports increased abdominal distention. She has a history of liver failure secondary to Hep C and gets a paracentesis every 2 weeks and she is due now. Last paracentesis was on 09/03 and 7.4L was removed at that time. She has known extensive cardiac disease noted on heart cath earlier this yea, non-stentable, she is also high risk for CABG. She has been managed medically.     10/2  Seen on dialysis. .  Having chest pain.  Anxious.  No n,v.  Diaphoretic.  No n,v, diarrhea.  Abdominal distension  10/3  No further chest pain.  Didn't sleep well last night.   10/4 No nausea, chest pain, sob, fever, urinary or bowel complaint, new neurologic symptoms, new joint pain,  10/5 VSS, no new complains. Awaiting Surg eval, plan HD in AM or as needed.    Plan of Care:     Assessment:    ESRD on HD Tue and Sat  --dialysis per schedule    Hyperkalemia  --she got most of her treatment on a hypokalemic bath    Angina  --CT surgery consulted    Anemia  --erythropoiesis stimulating agent with renal replacement therapy  --got blood with hd    Mild acidosis  --35 bicarb bath    Hyponatremia  --ultrafilter 2-3 liters.  Use 140 sodium dialysate. Fluid restrict    Ascites  --paracentesis when stable from cardiac standpoint    Thank you for allowing us to participate in this patient's care. We will continue to follow.    Vital Signs:  Temp Readings from Last 3 Encounters:   10/05/20 97.8 °F (36.6 °C) (Oral)   09/27/20 97.8 °F (36.6 °C) (Oral)   09/17/20 97 °F (36.1 °C)       Pulse Readings from Last 3 Encounters:   10/05/20 73   09/27/20 81   09/18/20 74       BP Readings from Last 3  Encounters:   10/05/20 115/83   09/27/20 113/62   09/18/20 (!) 153/101       Weight:  Wt Readings from Last 3 Encounters:   10/03/20 64.5 kg (142 lb 3.2 oz)   09/25/20 62.2 kg (137 lb 2 oz)   09/17/20 66.4 kg (146 lb 4.8 oz)       Past Medical & Surgical History:  Past Medical History:   Diagnosis Date    Anemia of chronic renal failure, stage 5 8/19/2015    Ascites     Bleeding duodenal ulcer     CAD (coronary artery disease)     CAD (coronary artery disease)     Chest pain     CHF (congestive heart failure)     Chronic hepatitis C 8/19/2015    Colitis     Elevated troponin     ESRD 2/2 MPGN on HD since 12/16/13 8/19/2015    Gastritis     Hypertension 8/19/2015    Mitral valve regurgitation     Renal dialysis status     M, W, F    Secondary hyperparathyroidism of renal origin 8/19/2015       Past Surgical History:   Procedure Laterality Date    ANGIOGRAM, CORONARY, WITH LEFT HEART CATHETERIZATION Left 1/13/2020    Procedure: ANGIOGRAM,CORONARY,WITH LEFT HEART CATHETERIZATION;  Surgeon: Yahir Starkey MD;  Location: [facility] CATH/EP LAB;  Service: Cardiology;  Laterality: Left;    AV FISTULA PLACEMENT  2013    COLONOSCOPY      CORONARY ANGIOPLASTY WITH STENT PLACEMENT      SALPINGOOPHORECTOMY Right 1997    TOTAL ABDOMINAL HYSTERECTOMY  1999       Past Social History:  Social History     Socioeconomic History    Marital status:      Spouse name: Not on file    Number of children: 2    Years of education: Not on file    Highest education level: Not on file   Occupational History    Occupation: baker    Social Needs    Financial resource strain: Not on file    Food insecurity     Worry: Not on file     Inability: Not on file    Transportation needs     Medical: Not on file     Non-medical: Not on file   Tobacco Use    Smoking status: Current Every Day Smoker     Packs/day: 0.75     Years: 30.00     Pack years: 22.50     Types: Cigarettes     Last attempt to quit: 12/2019     Years  since quittin.8    Smokeless tobacco: Never Used   Substance and Sexual Activity    Alcohol use: No     Alcohol/week: 0.0 standard drinks    Drug use: Yes     Types: Marijuana     Comment: occasionally    Sexual activity: Not on file   Lifestyle    Physical activity     Days per week: Not on file     Minutes per session: Not on file    Stress: To some extent   Relationships    Social connections     Talks on phone: Not on file     Gets together: Not on file     Attends Spiritism service: Not on file     Active member of club or organization: Not on file     Attends meetings of clubs or organizations: Not on file     Relationship status: Not on file   Other Topics Concern    Are you pregnant or think you may be? Not Asked    Breast-feeding Not Asked   Social History Narrative    Not on file       Medications:  No current facility-administered medications on file prior to encounter.      Current Outpatient Medications on File Prior to Encounter   Medication Sig Dispense Refill    aspirin (ECOTRIN) 81 MG EC tablet Take 81 mg by mouth once daily.      calcium acetate,phosphat bind, (PHOSLO) 667 mg capsule Take 667 mg by mouth 3 (three) times daily with meals.      FA-VIT B COMP&C-SELENIUM-ZINC 3-70-15 MG-MCG-MG ORAL TAB Take 1 tablet by mouth once daily.      HYDROcodone-acetaminophen (NORCO)  mg per tablet Take 1 tablet by mouth every 8 (eight) hours as needed for Pain. 60 tablet 0    isosorbide mononitrate (IMDUR) 30 MG 24 hr tablet Take 1 tablet (30 mg total) by mouth 2 (two) times a day. 180 tablet 0    lisinopril (PRINIVIL,ZESTRIL) 20 MG tablet Take 1 tablet by mouth nightly.   3    LOKELMA 5 gram PwPk Take 5 g by mouth. Five times a week; on  Non dialysis days      metoprolol succinate (TOPROL-XL) 50 MG 24 hr tablet Take 50 mg by mouth nightly.       nitroGLYCERIN (NITROSTAT) 0.4 MG SL tablet Place 1 tablet (0.4 mg total) under the tongue every 5 (five) minutes as needed for Chest  "pain. 90 tablet 3    pantoprazole (PROTONIX) 40 MG tablet Take 40 mg by mouth 2 (two) times daily.       polyethylene glycol (GLYCOLAX) 17 gram PwPk Take 17 g by mouth daily as needed (constipation). 30 each 0    rosuvastatin (CRESTOR) 10 MG tablet Take 1 tablet (10 mg total) by mouth once daily. 90 tablet 3    lactulose (CHRONULAC) 10 gram/15 mL solution TK 30 ML PO ONCE D       Scheduled Meds:   calcium carbonate  1,000 mg Oral TID AC    metoprolol succinate  50 mg Oral Nightly    pantoprazole  40 mg Oral BID    rosuvastatin  10 mg Oral Daily    sodium zirconium cyclosilicate  10 g Oral Daily     Continuous Infusions:    PRN Meds:.acetaminophen, cyclobenzaprine, heparin (porcine), HYDROcodone-acetaminophen, morphine, nitroGLYCERIN, ondansetron, polyethylene glycol    Allergies:  Ciprofloxacin, Levaquin [levofloxacin], Quinolones, and Codeine    Past Family History:  Reviewed; refer to Hospitalist Admission Note    Review of Systems:  Review of Systems - All 14 systems reviewed and negative, except as noted in HPI    Physical Exam:    /83   Pulse 73   Temp 97.8 °F (36.6 °C) (Oral)   Resp 18   Ht 5' 4" (1.626 m)   Wt 64.5 kg (142 lb 3.2 oz)   LMP 01/07/1999 (Exact Date)   SpO2 (!) 94%   Breastfeeding No   BMI 24.41 kg/m²     General Appearance:    Alert, cooperative, no distress, appears stated age   Head:    Normocephalic, without obvious abnormality, atraumatic   Eyes:    PER, conjunctiva/corneas clear, EOM's intact in both eyes        Throat:   Lips, mucosa, and tongue normal; teeth and gums normal   Back:     Symmetric, no curvature, ROM normal, no CVA tenderness   Lungs:     Clear to auscultation bilaterally, respirations unlabored   Chest wall:    No tenderness or deformity   Heart:    Regular rate and rhythm, S1 and S2 normal, no murmur, rub   or gallop   Abdomen:     Soft, non-tender, bowel sounds active all four quadrants,     no masses, no organomegaly   Extremities:   Extremities " normal, atraumatic, no cyanosis or edema   Pulses:   2+ and symmetric all extremities   MSK:   No joint or muscle swelling, tenderness or deformity   Skin:   Skin color, texture, turgor normal, no rashes or lesions   Neurologic:   CNII-XII intact, normal strength and sensation       Throughout.  No flap     Results:  Lab Results   Component Value Date     10/04/2020     10/04/2020    K 4.4 10/04/2020    K 4.4 10/04/2020    CL 97 10/04/2020    CL 97 10/04/2020    CO2 27 10/04/2020    CO2 27 10/04/2020    BUN 48 (H) 10/04/2020    BUN 48 (H) 10/04/2020    CREATININE 6.6 (H) 10/04/2020    CREATININE 6.6 (H) 10/04/2020    CALCIUM 8.5 (L) 10/04/2020    CALCIUM 8.5 (L) 10/04/2020    ANIONGAP 13 10/04/2020    ANIONGAP 13 10/04/2020    ESTGFRAFRICA 7.6 (A) 10/04/2020    ESTGFRAFRICA 7.6 (A) 10/04/2020    EGFRNONAA 6.6 (A) 10/04/2020    EGFRNONAA 6.6 (A) 10/04/2020       Lab Results   Component Value Date    CALCIUM 8.5 (L) 10/04/2020    CALCIUM 8.5 (L) 10/04/2020    PHOS 5.4 (H) 10/04/2020       Recent Labs   Lab 10/05/20  0428   WBC 4.33   RBC 3.54*   HGB 10.6*   HCT 32.9*   *   MCV 93   MCH 29.9   MCHC 32.2          I have personally reviewed pertinent radiological imaging and reports.    Patient care was time spent personally by me on the following activities:   · Obtaining a history  · Examination of patient.  · Providing medical care at the patients bedside.  · Developing a treatment plan with patient or surrogate and bedside caregivers  · Ordering and reviewing laboratory studies, radiographic studies, pulse oximetry.  · Ordering and performing treatments and interventions.  · Evaluation of patient's response to treatment.  · Discussions with consultants while on the unit and immediately available to the patient.  · Re-evaluation of the patient's condition.  · Documentation in the medical record.     Luciano Campos MD  Nephrology  [facility] [place]  [phone]

## 2020-10-06 NOTE — PROGRESS NOTES
[facility] Medicine  Progress Note    Patient Name: Aurelia Saucedo  MRN: [id number]  Patient Class: IP- Inpatient   Admission Date: 10/1/2020  Length of Stay: 4 days  Attending Physician: Matthew Dickinson MD  Primary Care Provider: Dustin Ireland MD        Subjective:     Principal Problem:NSTEMI (non-ST elevated myocardial infarction)        HPI:  No notes on file    Overview/Hospital Course:  10/06  No new issues  Awaiting CABG  Denies chest pain     Interval History:     Review of Systems   Constitutional: Negative for activity change and appetite change.   HENT: Negative for congestion and dental problem.    Eyes: Negative for discharge and itching.   Respiratory: Negative for shortness of breath.    Cardiovascular: Negative for chest pain.   Gastrointestinal: Negative for abdominal distention and abdominal pain.   Endocrine: Negative for cold intolerance.   Genitourinary: Negative for difficulty urinating and dysuria.   Musculoskeletal: Negative for arthralgias and back pain.   Skin: Negative for color change.   Neurological: Negative for dizziness and facial asymmetry.   Hematological: Negative for adenopathy.   Psychiatric/Behavioral: Negative for agitation and behavioral problems.     Objective:     Vital Signs (Most Recent):  Temp: 98 °F (36.7 °C) (10/06/20 1241)  Pulse: 85 (10/06/20 1245)  Resp: 17 (10/06/20 1245)  BP: (!) 127/90 (10/06/20 1245)  SpO2: 100 % (10/06/20 1245) Vital Signs (24h Range):  Temp:  [98 °F (36.7 °C)-98.6 °F (37 °C)] 98 °F (36.7 °C)  Pulse:  [71-98] 85  Resp:  [12-30] 17  SpO2:  [94 %-100 %] 100 %  BP: (100-139)/(54-90) 127/90     Weight: 60.7 kg (133 lb 13.1 oz)  Body mass index is 22.97 kg/m².    Intake/Output Summary (Last 24 hours) at 10/6/2020 1747  Last data filed at 10/6/2020 1241  Gross per 24 hour   Intake 650 ml   Output 1500 ml   Net -850 ml      Physical Exam  Vitals signs and nursing note reviewed.   Constitutional:       General: She is not in acute  distress.  HENT:      Head: Atraumatic.      Right Ear: External ear normal.      Left Ear: External ear normal.      Nose: Nose normal.      Mouth/Throat:      Mouth: Mucous membranes are moist.   Eyes:      Conjunctiva/sclera: Conjunctivae normal.   Neck:      Musculoskeletal: Neck supple.   Cardiovascular:      Rate and Rhythm: Normal rate.   Pulmonary:      Effort: Pulmonary effort is normal.   Abdominal:      General: There is no distension.   Musculoskeletal: Normal range of motion.   Skin:     General: Skin is warm.   Neurological:      Mental Status: She is alert and oriented to person, place, and time.   Psychiatric:         Behavior: Behavior normal.         Significant Labs:   CBC:   Recent Labs   Lab 10/05/20  0428 10/06/20  0750   WBC 4.33 3.92   HGB 10.6* 10.6*   HCT 32.9* 32.5*   * 113*     CMP:   Recent Labs   Lab 10/05/20  0910      K 4.8      CO2 25      BUN 55*   CREATININE 7.0*   CALCIUM 8.4*   PROT 4.7*   ALBUMIN 2.1*   BILITOT 0.6   ALKPHOS 51*   AST 27   ALT 18   ANIONGAP 9   EGFRNONAA 6.1*       Significant Imaging: I have reviewed all pertinent imaging results/findings within the past 24 hours.      Assessment/Plan:      * NSTEMI (non-ST elevated myocardial infarction)  Patient in need of CABG because of Multivessel CAD  CTS MD on Board      ESRD 2/2 MPGN on HD since 12/16/13   HD sessions as scheduled      Essential hypertension  Chronic Stable issue  Continue present Rx      Chronic diastolic heart failure  Chronic Stable issue  Continue present Rx      Chronic hepatitis C  Chronic Stable issue        VTE Risk Mitigation (From admission, onward)         Ordered     heparin (porcine) injection 5,000 Units  As needed (PRN)      10/02/20 1944     IP VTE HIGH RISK PATIENT  Once      10/01/20 1822     Place sequential compression device  Until discontinued      10/01/20 1822                Discharge Planning   ADRIA:      Code Status: Full Code   Is the patient medically  ready for discharge?:     Reason for patient still in hospital (select all that apply): Treatment  Discharge Plan A: Home with family                  Matthew Dickinson MD  Department of Hospital Medicine   [facility]

## 2020-10-06 NOTE — CARE UPDATE
10/05/20 2000   Patient Assessment/Suction   Level of Consciousness (AVPU) alert   Respiratory Effort Unlabored   Expansion/Accessory Muscles/Retractions no retractions   All Lung Fields Breath Sounds clear;diminished   Rhythm/Pattern, Respiratory no shortness of breath reported   Cough Frequency no cough   PRE-TX-O2   O2 Device (Oxygen Therapy) room air   SpO2 99 %   Pulse Oximetry Type Continuous   $ Pulse Oximetry - Multiple Charge Pulse Oximetry - Multiple   Pulse 80   Resp 16   /64   Positioning   Head of Bed (HOB) HOB elevated   Respiratory Evaluation   $ Care Plan Tech Time 15 min

## 2020-10-06 NOTE — PROGRESS NOTES
INPATIENT NEPHROLOGY CONSULT   [facility] NEPHROLOGY    Aurelia Saucedo  10/06/2020    Reason for consultation:  ESRD    History of Present Illness:  55F on HD TS presents to the ED with the complaint of chest pain with associated SOB. She describes burning pain to mid chest with radiation to left arm, associated nausea and diaphoresis. Additionally, she reports increased abdominal distention. She has a history of liver failure secondary to Hep C and gets a paracentesis every 2 weeks and she is due now. Last paracentesis was on 09/03 and 7.4L was removed at that time. She has known extensive cardiac disease noted on heart cath earlier this yea, non-stentable, she is also high risk for CABG. She has been managed medically.     10/2  Seen on dialysis. .  Having chest pain.  Anxious.  No n,v.  Diaphoretic.  No n,v, diarrhea.  Abdominal distension  10/3  No further chest pain.  Didn't sleep well last night.   10/4 No nausea, chest pain, sob, fever, urinary or bowel complaint, new neurologic symptoms, new joint pain,  10/5 VSS, no new complains. Awaiting Surg eval, plan HD in AM or as needed.  10/6 VSS, seen and examined on HD today, CT surg input appreciated.    Plan of Care:    ESRD on HD Tue and Sat  --dialysis per schedule.    Hyperkalemia  --she got most of her treatment on a hypokalemic bath.    Angina  --CT surgery consulted, plan procedure this week if patient agrees, HD the day before surgery.    Anemia  --erythropoiesis stimulating agent with renal replacement therapy.  --got blood with hd.    Mild acidosis  --35 bicarb bath    Hyponatremia  --ultrafilter 2-3 liters. Use 140 sodium dialysate. Fluid restrict.    Ascites  --paracentesis when stable from cardiac standpoint.    Thank you for allowing us to participate in this patient's care. We will continue to follow.    Vital Signs:  Temp Readings from Last 3 Encounters:   10/06/20 98.2 °F (36.8 °C)   09/27/20 97.8 °F (36.6 °C) (Oral)   09/17/20 97 °F (36.1  °C)       Pulse Readings from Last 3 Encounters:   10/06/20 81   09/27/20 81   09/18/20 74       BP Readings from Last 3 Encounters:   10/06/20 132/84   09/27/20 113/62   09/18/20 (!) 153/101       Weight:  Wt Readings from Last 3 Encounters:   10/05/20 60.7 kg (133 lb 13.1 oz)   10/05/20 60.3 kg (133 lb)   09/25/20 62.2 kg (137 lb 2 oz)       Past Medical & Surgical History:  Past Medical History:   Diagnosis Date    Anemia of chronic renal failure, stage 5 8/19/2015    Ascites     Bleeding duodenal ulcer     CAD (coronary artery disease)     CAD (coronary artery disease)     Chest pain     CHF (congestive heart failure)     Chronic hepatitis C 8/19/2015    Colitis     Elevated troponin     ESRD 2/2 MPGN on HD since 12/16/13 8/19/2015    Gastritis     Hypertension 8/19/2015    Mitral valve regurgitation     Renal dialysis status     M, W, F    Secondary hyperparathyroidism of renal origin 8/19/2015       Past Surgical History:   Procedure Laterality Date    ANGIOGRAM, CORONARY, WITH LEFT HEART CATHETERIZATION Left 1/13/2020    Procedure: ANGIOGRAM,CORONARY,WITH LEFT HEART CATHETERIZATION;  Surgeon: Yahir tSarkey MD;  Location[facility] CATH[facility]EP LAB;  Service: Cardiology;  Laterality: Left;    AV FISTULA PLACEMENT  2013    COLONOSCOPY      CORONARY ANGIOPLASTY WITH STENT PLACEMENT      SALPINGOOPHORECTOMY Right 1997    TOTAL ABDOMINAL HYSTERECTOMY  1999       Past Social History:  Social History     Socioeconomic History    Marital status:      Spouse name: Not on file    Number of children: 2    Years of education: Not on file    Highest education level: Not on file   Occupational History    Occupation: baker    Social Needs    Financial resource strain: Not on file    Food insecurity     Worry: Not on file     Inability: Not on file    Transportation needs     Medical: Not on file     Non-medical: Not on file   Tobacco Use    Smoking status: Current Every Day Smoker      Packs/day: 0.75     Years: 30.00     Pack years: 22.50     Types: Cigarettes     Last attempt to quit: 2019     Years since quittin.8    Smokeless tobacco: Never Used   Substance and Sexual Activity    Alcohol use: No     Alcohol/week: 0.0 standard drinks    Drug use: Yes     Types: Marijuana     Comment: occasionally    Sexual activity: Not on file   Lifestyle    Physical activity     Days per week: Not on file     Minutes per session: Not on file    Stress: To some extent   Relationships    Social connections     Talks on phone: Not on file     Gets together: Not on file     Attends [employer] service: Not on file     Active member of club or organization: Not on file     Attends meetings of clubs or organizations: Not on file     Relationship status: Not on file   Other Topics Concern    Are you pregnant or think you may be? Not Asked    Breast-feeding Not Asked   Social History Narrative    Not on file       Medications:  No current facility-administered medications on file prior to encounter.      Current Outpatient Medications on File Prior to Encounter   Medication Sig Dispense Refill    aspirin (ECOTRIN) 81 MG EC tablet Take 81 mg by mouth once daily.      calcium acetate,phosphat bind, (PHOSLO) 667 mg capsule Take 667 mg by mouth 3 (three) times daily with meals.      FA-VIT B COMP&C-SELENIUM-ZINC 3-70-15 MG-MCG-MG ORAL TAB Take 1 tablet by mouth once daily.      HYDROcodone-acetaminophen (NORCO)  mg per tablet Take 1 tablet by mouth every 8 (eight) hours as needed for Pain. 60 tablet 0    isosorbide mononitrate (IMDUR) 30 MG 24 hr tablet Take 1 tablet (30 mg total) by mouth 2 (two) times a day. 180 tablet 0    lisinopril (PRINIVIL,ZESTRIL) 20 MG tablet Take 1 tablet by mouth nightly.   3    LOKELMA 5 gram PwPk Take 5 g by mouth. Five times a week; on  Non dialysis days      metoprolol succinate (TOPROL-XL) 50 MG 24 hr tablet Take 50 mg by mouth nightly.       nitroGLYCERIN  "(NITROSTAT) 0.4 MG SL tablet Place 1 tablet (0.4 mg total) under the tongue every 5 (five) minutes as needed for Chest pain. 90 tablet 3    pantoprazole (PROTONIX) 40 MG tablet Take 40 mg by mouth 2 (two) times daily.       polyethylene glycol (GLYCOLAX) 17 gram PwPk Take 17 g by mouth daily as needed (constipation). 30 each 0    rosuvastatin (CRESTOR) 10 MG tablet Take 1 tablet (10 mg total) by mouth once daily. 90 tablet 3    lactulose (CHRONULAC) 10 gram/15 mL solution TK 30 ML PO ONCE D       Scheduled Meds:   sodium chloride 0.9%   Intravenous Once    calcium carbonate  1,000 mg Oral TID AC    metoprolol succinate  50 mg Oral Nightly    pantoprazole  40 mg Oral BID    rosuvastatin  10 mg Oral Daily    sodium zirconium cyclosilicate  10 g Oral Daily     Continuous Infusions:    PRN Meds:.sodium chloride 0.9%, acetaminophen, cyclobenzaprine, heparin (porcine), HYDROcodone-acetaminophen, morphine, nitroGLYCERIN, ondansetron, polyethylene glycol    Allergies:  Ciprofloxacin, Levaquin [levofloxacin], Quinolones, and Codeine    Past Family History:  Reviewed; refer to Hospitalist Admission Note    Review of Systems:  Review of Systems - All 14 systems reviewed and negative, except as noted in HPI    Physical Exam:    /84   Pulse 81   Temp 98.2 °F (36.8 °C)   Resp (!) 30   Ht 5' 4" (1.626 m)   Wt 60.7 kg (133 lb 13.1 oz)   LMP 01/07/1999 (Exact Date)   SpO2 97%   Breastfeeding No   BMI 22.97 kg/m²     General Appearance:    Alert, cooperative, no distress, appears stated age   Head:    Normocephalic, without obvious abnormality, atraumatic   Eyes:    PER, conjunctiva/corneas clear, EOM's intact in both eyes        Throat:   Lips, mucosa, and tongue normal; teeth and gums normal   Back:     Symmetric, no curvature, ROM normal, no CVA tenderness   Lungs:     Clear to auscultation bilaterally, respirations unlabored   Chest wall:    No tenderness or deformity   Heart:    Regular rate and rhythm, " S1 and S2 normal, no murmur, rub   or gallop   Abdomen:     Soft, non-tender, bowel sounds active all four quadrants,     no masses, no organomegaly   Extremities:   Extremities normal, atraumatic, no cyanosis or edema   Pulses:   2+ and symmetric all extremities   MSK:   No joint or muscle swelling, tenderness or deformity   Skin:   Skin color, texture, turgor normal, no rashes or lesions   Neurologic:   CNII-XII intact, normal strength and sensation       Throughout.  No flap     Results:  Lab Results   Component Value Date     10/05/2020    K 4.8 10/05/2020     10/05/2020    CO2 25 10/05/2020    BUN 55 (H) 10/05/2020    CREATININE 7.0 (H) 10/05/2020    CALCIUM 8.4 (L) 10/05/2020    ANIONGAP 9 10/05/2020    ESTGFRAFRICA 7.1 (A) 10/05/2020    EGFRNONAA 6.1 (A) 10/05/2020       Lab Results   Component Value Date    CALCIUM 8.4 (L) 10/05/2020    PHOS 5.4 (H) 10/04/2020       Recent Labs   Lab 10/06/20  0750   WBC 3.92   RBC 3.49*   HGB 10.6*   HCT 32.5*   *   MCV 93   MCH 30.4   MCHC 32.6          I have personally reviewed pertinent radiological imaging and reports.    Patient care was time spent personally by me on the following activities:   · Obtaining a history  · Examination of patient.  · Providing medical care at the patients bedside.  · Developing a treatment plan with patient or surrogate and bedside caregivers  · Ordering and reviewing laboratory studies, radiographic studies, pulse oximetry.  · Ordering and performing treatments and interventions.  · Evaluation of patient's response to treatment.  · Discussions with consultants while on the unit and immediately available to the patient.  · Re-evaluation of the patient's condition.  · Documentation in the medical record.     Luciano Campos MD  Nephrology  [facility]  [phone]

## 2020-10-06 NOTE — SUBJECTIVE & OBJECTIVE
Interval History:     Review of Systems   Constitutional: Negative for activity change and appetite change.   HENT: Negative for congestion and dental problem.    Eyes: Negative for discharge and itching.   Respiratory: Negative for shortness of breath.    Cardiovascular: Negative for chest pain.   Gastrointestinal: Negative for abdominal distention and abdominal pain.   Endocrine: Negative for cold intolerance.   Genitourinary: Negative for difficulty urinating and dysuria.   Musculoskeletal: Negative for arthralgias and back pain.   Skin: Negative for color change.   Neurological: Negative for dizziness and facial asymmetry.   Hematological: Negative for adenopathy.   Psychiatric/Behavioral: Negative for agitation and behavioral problems.     Objective:     Vital Signs (Most Recent):  Temp: 98 °F (36.7 °C) (10/06/20 1241)  Pulse: 85 (10/06/20 1245)  Resp: 17 (10/06/20 1245)  BP: (!) 127/90 (10/06/20 1245)  SpO2: 100 % (10/06/20 1245) Vital Signs (24h Range):  Temp:  [98 °F (36.7 °C)-98.6 °F (37 °C)] 98 °F (36.7 °C)  Pulse:  [71-98] 85  Resp:  [12-30] 17  SpO2:  [94 %-100 %] 100 %  BP: (100-139)/(54-90) 127/90     Weight: 60.7 kg (133 lb 13.1 oz)  Body mass index is 22.97 kg/m².    Intake/Output Summary (Last 24 hours) at 10/6/2020 1747  Last data filed at 10/6/2020 1241  Gross per 24 hour   Intake 650 ml   Output 1500 ml   Net -850 ml      Physical Exam  Vitals signs and nursing note reviewed.   Constitutional:       General: She is not in acute distress.  HENT:      Head: Atraumatic.      Right Ear: External ear normal.      Left Ear: External ear normal.      Nose: Nose normal.      Mouth/Throat:      Mouth: Mucous membranes are moist.   Eyes:      Conjunctiva/sclera: Conjunctivae normal.   Neck:      Musculoskeletal: Neck supple.   Cardiovascular:      Rate and Rhythm: Normal rate.   Pulmonary:      Effort: Pulmonary effort is normal.   Abdominal:      General: There is no distension.   Musculoskeletal: Normal  range of motion.   Skin:     General: Skin is warm.   Neurological:      Mental Status: She is alert and oriented to person, place, and time.   Psychiatric:         Behavior: Behavior normal.         Significant Labs:   CBC:   Recent Labs   Lab 10/05/20  0428 10/06/20  0750   WBC 4.33 3.92   HGB 10.6* 10.6*   HCT 32.9* 32.5*   * 113*     CMP:   Recent Labs   Lab 10/05/20  0910      K 4.8      CO2 25      BUN 55*   CREATININE 7.0*   CALCIUM 8.4*   PROT 4.7*   ALBUMIN 2.1*   BILITOT 0.6   ALKPHOS 51*   AST 27   ALT 18   ANIONGAP 9   EGFRNONAA 6.1*       Significant Imaging: I have reviewed all pertinent imaging results/findings within the past 24 hours.

## 2020-10-06 NOTE — PLAN OF CARE
Pt has chest pain relieved with sublingual nitroglycerin.  Pt is on dialysis.  Pt is able to feed self

## 2020-10-06 NOTE — PROGRESS NOTES
[facility] Medicine  Progress Note    Patient name: Aurelia Saucedo  MRN: [id number]  Admit Date: 10/1/2020   LOS: 3 days     SUBJECTIVE:     Principal problem: NSTEMI    Interval History:    10/2Pt reported intermittent CP , rapid response was called 2.15 pm for CP nearly 1 hr of HD, lasted for 5-10 mins slightly  eased out from 10/10 to 8/10 after 2 gtn pill and 2mg morphine  Pt was transferred to ICU for close monitor  Initiated on Tridil drip, heparin drip held for paracentesis 6.7 L removed, restarted heparin drip with bolus  Labs troponin CBC and BMP ordered  Dr Gomez notified  Will continue with K binders and calcium gluconate since the dialysis was incomplete  Pt well aware of he complicated medical condition, but still want to consider CABG surgery if possible, awaitng for CTS consult possibly Monday     10/3 Pt appears depressed abt her medical illness, feeling exhausted with multiple procedures  Denies any CP since last night, off tridil, Hb trending down, plan discussed with cardiology and nephrology  DC heparin drip, transfuse 2 unit PRBC with dialysis  All questions answered for patient and  at bedside, will repeat labs with dialysis    10/4patient feels better, denies any chest pain or short of breath, had dialysis yesterday with 2 unit blood transfusion  Hemoglobin elevated appropriately, hyperkalemia resolved, awaiting for CT surgery evaluation Monday  Reports no bowel movement for 3 days, lactulose given    10/5 pt appears calm and hope full to see Dr Yeboah to decide on CABG,aware  She is a high risk pt but wanted to get his advice  Reports transient CP mostly at night  HD scheduled for next Tuesday, pottasium wnl, Hb stable  ascitics gradually developing again  Pt has OP GI eval next week ,considering weekly paracentesis   Possible downgrade tomorrow if stable clinically while waiting for sx    Scheduled Meds:   sodium chloride 0.9%   Intravenous Once     calcium carbonate  1,000 mg Oral TID AC    metoprolol succinate  50 mg Oral Nightly    pantoprazole  40 mg Oral BID    rosuvastatin  10 mg Oral Daily    sodium zirconium cyclosilicate  10 g Oral Daily     Continuous Infusions:    PRN Meds:sodium chloride 0.9%, acetaminophen, cyclobenzaprine, heparin (porcine), HYDROcodone-acetaminophen, morphine, nitroGLYCERIN, ondansetron, polyethylene glycol    Review of patient's allergies indicates:   Allergen Reactions    Ciprofloxacin Itching    Levaquin [levofloxacin] Itching    Quinolones Itching    Codeine Nausea Only       Review of Systems: As per interval history    OBJECTIVE:     Vital Signs (Most Recent)  Temp: 98.2 °F (36.8 °C) (10/05/20 1535)  Pulse: 80 (10/05/20 1753)  Resp: 18 (10/05/20 2131)  BP: 109/64 (10/05/20 1753)  SpO2: 100 % (10/05/20 1700)    Vital Signs Range (Last 24H):  Temp:  [97.8 °F (36.6 °C)-98.2 °F (36.8 °C)]   Pulse:  [64-84]   Resp:  [11-36]   BP: ()/(52-83)   SpO2:  [93 %-100 %]     I & O (Last 24H):  No intake or output data in the 24 hours ending 10/05/20 2209    Physical Exam:  General appearance: Well-developed, cachectic, chronic ill  appearing White female.  Skin: Red rash to BLE.   Neuro: AAOx3. Motor and sensory exams grossly intact. Good tone. Power in all 4 extremities 5/5.   HENT: Atraumatic head. Moist mucous membranes of oral cavity.  Eyes: Normal extraocular movements. PERRLA  Neck: Supple. No evidence of lymphadenopathy. No thyroidomegaly.  Lungs: Clear to auscultation bilaterally. No wheezing present.   Heart: Regular rate and rhythm. S1 and S2 present with no murmurs/gallop/rub. +1 pedal edema improving. No JVD present.   Abdomen: Soft,distended, non-tender. No rebound tenderness/guarding. No masses or organomegaly. Bowel sounds are normal. Bladder is not palpable.   Extremities: No cyanosis, clubbing.vasculis rash blt 2/2 hep C, Dialysis fistula to LUE with +thrill/bruit  Psych/mental status:Anxious behavior.  Cooperative. Responds appropriately to questions.     Laboratory:  CBC:   Recent Labs   Lab 10/05/20  0428   WBC 4.33   RBC 3.54*   HGB 10.6*   HCT 32.9*   *   MCV 93   MCH 29.9   MCHC 32.2     CMP:   Recent Labs   Lab 10/05/20  0910      CALCIUM 8.4*   ALBUMIN 2.1*   PROT 4.7*      K 4.8   CO2 25      BUN 55*   CREATININE 7.0*   ALKPHOS 51*   ALT 18   AST 27   BILITOT 0.6     Coagulation:   Recent Labs   Lab 10/02/20  1440  10/03/20  0508   INR 1.1  --   --    APTT 33.4*   < > 63.6*  63.6*    < > = values in this interval not displayed.     Cardiac markers:   Recent Labs   Lab 10/04/20  0415   TROPONINI 0.773*           ASSESSMENT/PLAN:   Chest Pain/NSTEMI  CAD  ESRD on HD   Hyperkalemia  ESLD with ascites secondary to Hep C  Anemia of chronic disease  Chronic diastolic heart failure  Tobacco abuse      Boarding in  ICU side of cardio A  Patient with recurrent chest pain in the setting of known multivessel CAD. Left heart cath January 2020 with severe multivessel disease. She was seen by Dr. Yeboah on previous admission and offered CABG, but she is high risk secondary to comorbidities. She refused at that time as she wanted to discuss it with her family before making a final decision. She was discharged home with antianginal and BB.  Trend trop- chronically elevated; worsening again nw; known CAD  Was treated with TRIDIL gtt/BB  Consult Cardiology appreciated not a candidate for LHC/CT surgery consult pending  Off  Heparin gtt   Patient had an incomplete dialysis session Friday , got repeat session on sat with 2 u PRBC transfusion on 10/3  Consult Nephrology appreciated  H/H stable; no signs of active bleeding; follow h/h  Smoking cessation  S/p paracentesis on  10/2      VTE Risk Mitigation (From admission, onward)         Ordered     heparin (porcine) injection 5,000 Units  As needed (PRN)      10/02/20 1944     IP VTE HIGH RISK PATIENT  Once      10/01/20 1822     Place sequential  compression device  Until discontinued      10/01/20 1822                  Department Hospital Medicine  [facility]  Smitha Yadav MD  Date of service: 10/05/2020

## 2020-10-06 NOTE — PLAN OF CARE
10/06/20 0743   Patient Assessment/Suction   Level of Consciousness (AVPU) alert   Respiratory Effort Normal;Unlabored   Expansion/Accessory Muscles/Retractions expansion symmetric;no use of accessory muscles   Rhythm/Pattern, Respiratory depth regular;pattern regular   PRE-TX-O2   O2 Device (Oxygen Therapy) room air   SpO2 97 %   Pulse Oximetry Type Continuous   $ Pulse Oximetry - Multiple Charge Pulse Oximetry - Multiple   Pulse 81   Resp (!) 30   Positioning HOB elevated 45 degrees   Respiratory Evaluation   $ Care Plan Tech Time 15 min

## 2020-10-06 NOTE — HOSPITAL COURSE
Patient admitted with NSTEMI in the background of multivessel CAD  Pt was seen by CTS team and CABG will be done next week  Pt was discharged to Home and she will return to Hospital for CABG as scheduled  Pt was instructed to hold lisinopril and Nitrates in view with hypotension  Patient doesn't think it is an issue and expressed her view to continue taking all her regular meds

## 2020-10-07 PROBLEM — I21.4 NSTEMI (NON-ST ELEVATED MYOCARDIAL INFARCTION): Status: RESOLVED | Noted: 2020-01-01 | Resolved: 2020-01-01

## 2020-10-07 NOTE — PROGRESS NOTES
[facility]    Cardiology Progress Note    Subjective:  Patient seen and examined this AM. Held discussion with Dr. Yeboah, plan for CABG next week. Patient denies any recent cardiac complaints. Lisinopril and Imdur were held due to hypotension. Currently SBP 100s. Tele SR HR controlled.       Objective:  Vital Signs (Most Recent)  Temp: 98.2 °F (36.8 °C) (10/07/20 0710)  Pulse: 81 (10/07/20 0853)  Resp: 15 (10/07/20 0853)  BP: 103/64 (10/07/20 0710)  SpO2: 96 % (10/07/20 0853)    Vital Signs Range (Last 24H):  Temp:  [98 °F (36.7 °C)-99 °F (37.2 °C)]   Pulse:  [71-98]   Resp:  [12-29]   BP: (100-139)/(59-90)   SpO2:  [93 %-100 %]     I & O (Last 24H):    Intake/Output Summary (Last 24 hours) at 10/7/2020 1011  Last data filed at 10/6/2020 1957  Gross per 24 hour   Intake 500 ml   Output 1600 ml   Net -1100 ml       Current Diet:     Current Diet Order   Procedures    Diet renal        Allergies:  Review of patient's allergies indicates:   Allergen Reactions    Ciprofloxacin Itching    Levaquin [levofloxacin] Itching    Quinolones Itching    Codeine Nausea Only       Meds:  Scheduled Meds:   sodium chloride 0.9%   Intravenous Once    aspirin  81 mg Oral Daily    calcium carbonate  1,000 mg Oral TID AC    isosorbide mononitrate  30 mg Oral Daily    metoprolol succinate  50 mg Oral Nightly    pantoprazole  40 mg Oral BID    rosuvastatin  10 mg Oral Daily    sodium zirconium cyclosilicate  10 g Oral Daily     Continuous Infusions:    PRN Meds:sodium chloride 0.9%, acetaminophen, cyclobenzaprine, heparin (porcine), HYDROcodone-acetaminophen, morphine, nitroGLYCERIN, ondansetron, polyethylene glycol    Lab Results :  Recent Results (from the past 24 hour(s))   Hepatitis B surface antigen    Collection Time: 10/06/20 10:21 AM   Result Value Ref Range    Hepatitis B Surface Ag Negative Negative   Hepatitis B surface antibody    Collection Time: 10/06/20 10:21 AM   Result Value Ref Range    Hep B S  Ab Non Reactive    Hepatitis B core antibody, total    Collection Time: 10/06/20 10:21 AM   Result Value Ref Range    Hep B Core Total Ab Negative Negative   CBC auto differential    Collection Time: 10/07/20  4:38 AM   Result Value Ref Range    WBC 3.79 (L) 3.90 - 12.70 K/uL    RBC 3.54 (L) 4.00 - 5.40 M/uL    Hemoglobin 10.7 (L) 12.0 - 16.0 g/dL    Hematocrit 33.6 (L) 37.0 - 48.5 %    Mean Corpuscular Volume 95 82 - 98 fL    Mean Corpuscular Hemoglobin 30.2 27.0 - 31.0 pg    Mean Corpuscular Hemoglobin Conc 31.8 (L) 32.0 - 36.0 g/dL    RDW 15.8 (H) 11.5 - 14.5 %    Platelets 121 (L) 150 - 350 K/uL    MPV 9.9 9.2 - 12.9 fL    Immature Granulocytes 0.8 (H) 0.0 - 0.5 %    Gran # (ANC) 2.6 1.8 - 7.7 K/uL    Immature Grans (Abs) 0.03 0.00 - 0.04 K/uL    Lymph # 0.8 (L) 1.0 - 4.8 K/uL    Mono # 0.3 0.3 - 1.0 K/uL    Eos # 0.1 0.0 - 0.5 K/uL    Baso # 0.02 0.00 - 0.20 K/uL    nRBC 0 0 /100 WBC    Gran% 68.3 38.0 - 73.0 %    Lymph% 20.3 18.0 - 48.0 %    Mono% 7.7 4.0 - 15.0 %    Eosinophil% 2.4 0.0 - 8.0 %    Basophil% 0.5 0.0 - 1.9 %    Differential Method Automated    Comprehensive Metabolic Panel    Collection Time: 10/07/20  4:38 AM   Result Value Ref Range    Sodium 135 (L) 136 - 145 mmol/L    Potassium 4.7 3.5 - 5.1 mmol/L    Chloride 97 95 - 110 mmol/L    CO2 26 23 - 29 mmol/L    Glucose 93 70 - 110 mg/dL    BUN, Bld 41 (H) 6 - 20 mg/dL    Creatinine 6.3 (H) 0.5 - 1.4 mg/dL    Calcium 8.6 (L) 8.7 - 10.5 mg/dL    Total Protein 4.8 (L) 6.0 - 8.4 g/dL    Albumin 2.2 (L) 3.5 - 5.2 g/dL    Total Bilirubin 0.8 0.1 - 1.0 mg/dL    Alkaline Phosphatase 51 (L) 55 - 135 U/L    AST 26 10 - 40 U/L    ALT 19 10 - 44 U/L    Anion Gap 12 8 - 16 mmol/L    eGFR if African American 8.0 (A) >60 mL/min/1.73 m^2    eGFR if non  7.0 (A) >60 mL/min/1.73 m^2   Protime-INR    Collection Time: 10/07/20  4:38 AM   Result Value Ref Range    PT 13.5 10.6 - 14.8 sec    INR 1.1        Diagnostic Results:  Imaging Results           "X-Ray Chest AP Portable (Final result)  Result time 10/01/20 15:45:02    Final result by Guy Patton MD (10/01/20 15:45:02)                 Narrative:    REASON: chest pain Chest Pain?(for months); Shortness of Breath?;  abdominal swelling?    FINDINGS:    Portable chest radiograph with comparison chest x-ray September 25, 2020. The cardiomediastinal silhouette is within normal limits in  size.The pulmonary vascular structures are within normal limits. There  is redemonstration of mild prominence of the interstitium. No  confluent airspace opacities. Linear and hazy densities at the lung  bases likely reflect subsegmental atelectasis. No acute osseous  abnormality.    IMPRESSION:    1.  Redemonstration of mild prominence of interstitium may reflect  mild pulmonary edema in the proper clinical context.  2.  Bibasilar linear and hazy densities likely reflect subsegmental  atelectasis.    Electronically Signed by Guy Patton on 10/1/2020 3:50 PM                              Physical Exam:  Objective:  General Appearance:  In no acute distress.    Vital signs: (most recent): Blood pressure 103/64, pulse 81, temperature 98.2 °F (36.8 °C), temperature source Oral, resp. rate 15, height 5' 4" (1.626 m), weight 60.7 kg (133 lb 13.1 oz), last menstrual period 01/07/1999, SpO2 96 %, not currently breastfeeding.    Lungs:  Normal effort and normal respiratory rate.  Breath sounds clear to auscultation.    Heart: Normal rate.  Regular rhythm.  S1 normal and S2 normal.    Abdomen: Abdomen is soft.  Bowel sounds are normal.         Current Consults:  IP CONSULT TO HOSPITAL MEDICINE  IP CONSULT TO CARDIOLOGY  IP CONSULT TO NEPHROLOGY  IP CONSULT TO CARDIOLOGY  IP CONSULT TO CARDIOTHORACIC SURGERY  IP CONSULT TO NEPHROLOGY    Assessment/Plan:  Assessment:   3v CAD   ESRD  Cardiomyopathy  Liver cirrhosis  Hep C  ESRD  Valvular heart disease moderate to severe aortic stenosis, moderate mitral regurgitation  Pulmonary " hypertension PA pressure 60 mmHg    Plan:   Clear for DC from cardiac standpoint.   Continue Toprol, ASA, statin, and Imdur. Withhold restarting Lisinopril at this time secondary to hypotension.   Plan for CABG early next week with Dr. Yeboah.    Alex Casas PA-C  10/07/2020

## 2020-10-07 NOTE — NURSING
Pt verbalized understanding of discharge education and instructions, made aware of prescriptions sent to pharmacy. Tele cables returned to monitor tech. Pt wheeled off unit with no signs of distress

## 2020-10-07 NOTE — PROGRESS NOTES
"CVT SURGERY PROGRESS NOTE  Aurelia Saucedo  55 y.o.  1965    Patient's Chief Complaint:  NSTEMI (non-ST elevated myocardial infarction)    HPI:  55-year-old female with PMH including disease.  Congestive heart failure, chronic hepatitis-C, colitis, gastritis, HTN, mitral valve regurgitation, end-stage renal disease on dialysis Monday Wednesday Friday, anemia chronic disease, and coronary artery disease.  She presented to the emergency department on 10/01/2020 with complaints of chest pain and shortness of breath.  She had elevated troponins went for left heart catheterization showed multivessel coronary artery disease.  Cardiothoracic surgery was consulted for CABG evaluation.    Last 24 hour Interval:  - denies any ACS symptoms  - hemodynamically stable    Subjective:  Symptoms:  Stable.  No shortness of breath, chest pain or chest pressure.    Diet:  Adequate intake.    Activity level: Normal.    Pain:  She complains of pain that is mild.  Pain is well controlled.                                                                 Objective:  General Appearance:  Comfortable and in no acute distress.    Vital signs: (most recent): Blood pressure 103/64, pulse 81, temperature 98.2 °F (36.8 °C), temperature source Oral, resp. rate 15, height 5' 4" (1.626 m), weight 60.7 kg (133 lb 13.1 oz), last menstrual period 01/07/1999, SpO2 96 %, not currently breastfeeding.  Vital signs are normal.  No fever.    Output: Producing urine and producing stool.    Lungs:  Normal effort and normal respiratory rate.    Heart: Normal rate.  Regular rhythm.    Abdomen: Abdomen is soft.  Bowel sounds are normal.   There is no abdominal tenderness.     Pulses: Distal pulses are intact.    Neurological: Patient is alert and oriented to person, place and time.    Pupils:  Pupils are equal, round, and reactive to light.    Skin:  Warm and dry.      Recent Vitals:  Vitals:    10/07/20 0300 10/07/20 0311 10/07/20 0400 10/07/20 0710   BP: " 102/68   103/64   BP Location: Right arm      Patient Position: Sitting      Pulse: 85   77   Resp: 20 18  14   Temp: 98.1 °F (36.7 °C)   98.2 °F (36.8 °C)   TempSrc: Oral   Oral   SpO2: 98%   98%   Weight:   60.7 kg (133 lb 13.1 oz)    Height:           INPATIENT MEDS     sodium chloride 0.9%   Intravenous Once    calcium carbonate  1,000 mg Oral TID AC    metoprolol succinate  50 mg Oral Nightly    pantoprazole  40 mg Oral BID    rosuvastatin  10 mg Oral Daily    sodium zirconium cyclosilicate  10 g Oral Daily     sodium chloride 0.9%, acetaminophen, cyclobenzaprine, heparin (porcine), HYDROcodone-acetaminophen, morphine, nitroGLYCERIN, ondansetron, polyethylene glycol  HEMODYNAMICS      Recent O2 Therapy/Vent Settings:  Nasal cannula  I/O last 24 hrs:  Intake/Output - Last 3 Shifts       10/05 0700 - 10/06 0659 10/06 0700 - 10/07 0659 10/07 0700 - 10/08 0659    P.O.  150     Other  500     Total Intake(mL/kg)  650 (10.7)     Urine (mL/kg/hr)  100 (0.1)     Other  1500     Total Output  1600     Net  -950            Urine Occurrence 1 x 0 x         Recent Cardiac Rhythm:  Regular rate and rhythm    Recent Pain Assessment:  Denies    CBC  Recent Labs   Lab 10/05/20  0428 10/06/20  0750 10/07/20  0438   WBC 4.33 3.92 3.79*   RBC 3.54* 3.49* 3.54*   HGB 10.6* 10.6* 10.7*   * 113* 121*   MCV 93 93 95   MCH 29.9 30.4 30.2   MCHC 32.2 32.6 31.8*     BMP  Recent Labs   Lab 10/04/20  0415 10/05/20  0910 10/07/20  0438   CO2 27  27 25 26   BUN 48*  48* 55* 41*   CREATININE 6.6*  6.6* 7.0* 6.3*   CALCIUM 8.5*  8.5* 8.4* 8.6*     CARDIAC ENZYMES  Recent Labs   Lab 10/02/20  0625 10/02/20  1440 10/04/20  0415   TROPONINI 0.197* 0.181* 0.773*   CPK  --  62  --      BNP  Recent Labs   Lab 10/01/20  1525   BNP 2,099*     PT/INR  INR   Date Value Ref Range Status   10/07/2020 1.1  Final     Comment:     Coumadin Therapy:  INR: 2.0-3.0 conventional anticoagulation  INR: 2.5-3.5 intensive anticoagulation      10/02/2020 1.1  Final     Comment:     Coumadin Therapy:  INR: 2.0-3.0 conventional anticoagulation  INR: 2.5-3.5 intensive anticoagulation     10/01/2020 1.1  Final     Comment:     Coumadin Therapy:  INR: 2.0-3.0 conventional anticoagulation  INR: 2.5-3.5 intensive anticoagulation     07/10/2019 1.0     05/14/2019 1.0     10/09/2016 1.0 0.8 - 1.2 Final     Comment:     Coumadin Therapy:  2.0 - 3.0 for INR for all indicators except mechanical heart valves  and antiphospholipid syndromes which should use 2.5 - 3.5.         ECG Results          EKG 12-lead (Final result)  Result time 10/03/20 18:46:09    Final result by Interface, Lab In [facility] (10/03/20 18:46:09)                 Narrative:    Test Reason : R07.9,    Vent. Rate : 089 BPM     Atrial Rate : 089 BPM     P-R Int : 134 ms          QRS Dur : 096 ms      QT Int : 358 ms       P-R-T Axes : 068 064 179 degrees     QTc Int : 435 ms    Normal sinus rhythm  Low voltage QRS  ST and T wave abnormality, consider lateral ischemia  Abnormal ECG  When compared with ECG of 25-SEP-2020 04:06,  Questionable change in The axis  Confirmed by Kimberlee Gonzalez MD ([id number]) on 10/3/2020 6:46:00 PM    Referred By: [email or web address]   SELF           Confirmed By:Kimberlee Gonzalez MD                            @[email or web address]{[email or web address]@    CURRENT CONSULTS:  IP CONSULT TO HOSPITAL MEDICINE  IP CONSULT TO CARDIOLOGY  IP CONSULT TO NEPHROLOGY  IP CONSULT TO CARDIOLOGY  IP CONSULT TO CARDIOTHORACIC SURGERY  IP CONSULT TO NEPHROLOGY    ASSESSMENT/PLAN:    NSTEMI  Multivessel coronary artery disease  - denies any current ACS symptoms  - hemodynamically stable with  - patient spoke with Dr. Yeboah and is willing to have CABG surgery.  - will plan for CABG early next week  - if cleared by Cardiology okay to discharge home and will admit early next week for scheduled CABG.    End-stage renal disease  Anemia of chronic disease  Chronic hepatitis-C  Hypertension, essential  - defer management to  primary care in consulting providers    GI Prophylaxis: proton pump inhibitor per orders  DVT Prophylaxis:   Anticoagulants   Medication Route Frequency    heparin (porcine) injection 5,000 Units Intravenous PRN           Mike Johnson AGACNP-BC  10/7/2020  9:44 AM

## 2020-10-07 NOTE — CARE UPDATE
10/07/20 0853   Patient Assessment/Suction   Level of Consciousness (AVPU) alert   Respiratory Effort Normal;Unlabored   Expansion/Accessory Muscles/Retractions no use of accessory muscles   All Lung Fields Breath Sounds equal bilaterally;clear   PRE-TX-O2   O2 Device (Oxygen Therapy) room air   SpO2 96 %   Pulse Oximetry Type Continuous   $ Pulse Oximetry - Multiple Charge Pulse Oximetry - Multiple   Pulse 81   Resp 15   Respiratory Evaluation   $ Care Plan Tech Time 15 min

## 2020-10-07 NOTE — PLAN OF CARE
This note also relates to the following rows which could not be included:  SpO2 - Cannot attach notes to unvalidated device data  Pulse - Cannot attach notes to unvalidated device data  Resp - Cannot attach notes to unvalidated device data       10/06/20 2000   Patient Assessment/Suction   Level of Consciousness (AVPU) alert   Respiratory Effort Normal;Unlabored   Expansion/Accessory Muscles/Retractions no use of accessory muscles   PRE-TX-O2   O2 Device (Oxygen Therapy) room air   Pulse Oximetry Type Continuous   $ Pulse Oximetry - Multiple Charge Pulse Oximetry - Multiple   Respiratory Evaluation   $ Care Plan Tech Time 15 min   Evaluation For Re-Eval 5+ day

## 2020-10-07 NOTE — NURSING
Pt c/o chest pain rated 10/10, gave 1 prn nitro  1120- pt rating pain 7/10 gave second prn nitro  1125-pt states chest pain has resolved.

## 2020-10-07 NOTE — PROGRESS NOTES
INPATIENT NEPHROLOGY CONSULT   [facility] NEPHROLOGY    Aurelia Saucedo  10/07/2020    Reason for consultation:  ESRD    History of Present Illness:  55F on HD TS presents to the ED with the complaint of chest pain with associated SOB. She describes burning pain to mid chest with radiation to left arm, associated nausea and diaphoresis. Additionally, she reports increased abdominal distention. She has a history of liver failure secondary to Hep C and gets a paracentesis every 2 weeks and she is due now. Last paracentesis was on 09/03 and 7.4L was removed at that time. She has known extensive cardiac disease noted on heart cath earlier this yea, non-stentable, she is also high risk for CABG. She has been managed medically.     10/2  Seen on dialysis. .  Having chest pain.  Anxious.  No n,v.  Diaphoretic.  No n,v, diarrhea.  Abdominal distension  10/3  No further chest pain.  Didn't sleep well last night.   10/4 No nausea, chest pain, sob, fever, urinary or bowel complaint, new neurologic symptoms, new joint pain,  10/5 VSS, no new complains. Awaiting Surg eval, plan HD in AM or as needed.  10/6 VSS, seen and examined on HD today, CT surg input appreciated.  10/7 VSS, plan HD on Sat or PRN based on decision on timing for CABG, CT surg input appreciated.    Plan of Care:    ESRD on HD Tue and Sat  --dialysis per schedule.    Hyperkalemia  --she got most of her treatment on a hypokalemic bath.    Angina  --CT surgery consulted, plan procedure this week if patient agrees, HD the day before surgery.    Anemia  --erythropoiesis stimulating agent with renal replacement therapy.  --got blood with hd.    Mild acidosis  --35 bicarb bath    Hyponatremia  --ultrafilter 2-3 liters. Use 140 sodium dialysate. Fluid restrict.    Ascites  --paracentesis when stable from cardiac standpoint.    Thank you for allowing us to participate in this patient's care. We will continue to follow.    Vital Signs:  Temp Readings from Last 3  Encounters:   10/07/20 98.2 °F (36.8 °C) (Oral)   09/27/20 97.8 °F (36.6 °C) (Oral)   09/17/20 97 °F (36.1 °C)       Pulse Readings from Last 3 Encounters:   10/07/20 77   09/27/20 81   09/18/20 74       BP Readings from Last 3 Encounters:   10/07/20 103/64   09/27/20 113/62   09/18/20 (!) 153/101       Weight:  Wt Readings from Last 3 Encounters:   10/07/20 60.7 kg (133 lb 13.1 oz)   10/05/20 60.3 kg (133 lb)   09/25/20 62.2 kg (137 lb 2 oz)       Past Medical & Surgical History:  Past Medical History:   Diagnosis Date    Anemia of chronic renal failure, stage 5 8/19/2015    Ascites     Bleeding duodenal ulcer     CAD (coronary artery disease)     CAD (coronary artery disease)     Chest pain     CHF (congestive heart failure)     Chronic hepatitis C 8/19/2015    Colitis     Elevated troponin     ESRD 2/2 MPGN on HD since 12/16/13 8/19/2015    Gastritis     Hypertension 8/19/2015    Mitral valve regurgitation     Renal dialysis status     M, W, F    Secondary hyperparathyroidism of renal origin 8/19/2015       Past Surgical History:   Procedure Laterality Date    ANGIOGRAM, CORONARY, WITH LEFT HEART CATHETERIZATION Left 1/13/2020    Procedure: ANGIOGRAM,CORONARY,WITH LEFT HEART CATHETERIZATION;  Surgeon: Yahir Starkey MD;  Location: [facility] CATH/EP LAB;  Service: Cardiology;  Laterality: Left;    AV FISTULA PLACEMENT  2013    COLONOSCOPY      CORONARY ANGIOPLASTY WITH STENT PLACEMENT      SALPINGOOPHORECTOMY Right 1997    TOTAL ABDOMINAL HYSTERECTOMY  1999       Past Social History:  Social History     Socioeconomic History    Marital status:      Spouse name: Not on file    Number of children: 2    Years of education: Not on file    Highest education level: Not on file   Occupational History    Occupation: baker    Social Needs    Financial resource strain: Not on file    Food insecurity     Worry: Not on file     Inability: Not on file    Transportation needs     Medical: Not on  file     Non-medical: Not on file   Tobacco Use    Smoking status: Current Every Day Smoker     Packs/day: 0.75     Years: 30.00     Pack years: 22.50     Types: Cigarettes     Last attempt to quit: 2019     Years since quittin.8    Smokeless tobacco: Never Used   Substance and Sexual Activity    Alcohol use: No     Alcohol/week: 0.0 standard drinks    Drug use: Yes     Types: Marijuana     Comment: occasionally    Sexual activity: Not on file   Lifestyle    Physical activity     Days per week: Not on file     Minutes per session: Not on file    Stress: To some extent   Relationships    Social connections     Talks on phone: Not on file     Gets together: Not on file     Attends Religion service: Not on file     Active member of club or organization: Not on file     Attends meetings of clubs or organizations: Not on file     Relationship status: Not on file   Other Topics Concern    Are you pregnant or think you may be? Not Asked    Breast-feeding Not Asked   Social History Narrative    Not on file       Medications:  No current facility-administered medications on file prior to encounter.      Current Outpatient Medications on File Prior to Encounter   Medication Sig Dispense Refill    aspirin (ECOTRIN) 81 MG EC tablet Take 81 mg by mouth once daily.      calcium acetate,phosphat bind, (PHOSLO) 667 mg capsule Take 667 mg by mouth 3 (three) times daily with meals.      FA-VIT B COMP&C-SELENIUM-ZINC 3-70-15 MG-MCG-MG ORAL TAB Take 1 tablet by mouth once daily.      HYDROcodone-acetaminophen (NORCO)  mg per tablet Take 1 tablet by mouth every 8 (eight) hours as needed for Pain. 60 tablet 0    isosorbide mononitrate (IMDUR) 30 MG 24 hr tablet Take 1 tablet (30 mg total) by mouth 2 (two) times a day. 180 tablet 0    lisinopril (PRINIVIL,ZESTRIL) 20 MG tablet Take 1 tablet by mouth nightly.   3    LOKELMA 5 gram PwPk Take 5 g by mouth. Five times a week; on  Non dialysis days       "metoprolol succinate (TOPROL-XL) 50 MG 24 hr tablet Take 50 mg by mouth nightly.       nitroGLYCERIN (NITROSTAT) 0.4 MG SL tablet Place 1 tablet (0.4 mg total) under the tongue every 5 (five) minutes as needed for Chest pain. 90 tablet 3    pantoprazole (PROTONIX) 40 MG tablet Take 40 mg by mouth 2 (two) times daily.       polyethylene glycol (GLYCOLAX) 17 gram PwPk Take 17 g by mouth daily as needed (constipation). 30 each 0    rosuvastatin (CRESTOR) 10 MG tablet Take 1 tablet (10 mg total) by mouth once daily. 90 tablet 3    lactulose (CHRONULAC) 10 gram/15 mL solution TK 30 ML PO ONCE D       Scheduled Meds:   sodium chloride 0.9%   Intravenous Once    calcium carbonate  1,000 mg Oral TID AC    metoprolol succinate  50 mg Oral Nightly    pantoprazole  40 mg Oral BID    rosuvastatin  10 mg Oral Daily    sodium zirconium cyclosilicate  10 g Oral Daily     Continuous Infusions:    PRN Meds:.sodium chloride 0.9%, acetaminophen, cyclobenzaprine, heparin (porcine), HYDROcodone-acetaminophen, morphine, nitroGLYCERIN, ondansetron, polyethylene glycol    Allergies:  Ciprofloxacin, Levaquin [levofloxacin], Quinolones, and Codeine    Past Family History:  Reviewed; refer to Hospitalist Admission Note    Review of Systems:  Review of Systems - All 14 systems reviewed and negative, except as noted in HPI    Physical Exam:    /64   Pulse 77   Temp 98.2 °F (36.8 °C) (Oral)   Resp 14   Ht 5' 4" (1.626 m)   Wt 60.7 kg (133 lb 13.1 oz)   LMP 01/07/1999 (Exact Date)   SpO2 98%   Breastfeeding No   BMI 22.97 kg/m²     General Appearance:    Alert, cooperative, no distress, appears stated age   Head:    Normocephalic, without obvious abnormality, atraumatic   Eyes:    PER, conjunctiva/corneas clear, EOM's intact in both eyes        Throat:   Lips, mucosa, and tongue normal; teeth and gums normal   Back:     Symmetric, no curvature, ROM normal, no CVA tenderness   Lungs:     Clear to auscultation bilaterally, " respirations unlabored   Chest wall:    No tenderness or deformity   Heart:    Regular rate and rhythm, S1 and S2 normal, no murmur, rub   or gallop   Abdomen:     Soft, non-tender, bowel sounds active all four quadrants,     no masses, no organomegaly   Extremities:   Extremities normal, atraumatic, no cyanosis or edema   Pulses:   2+ and symmetric all extremities   MSK:   No joint or muscle swelling, tenderness or deformity   Skin:   Skin color, texture, turgor normal, no rashes or lesions   Neurologic:   CNII-XII intact, normal strength and sensation       Throughout.  No flap     Results:  Lab Results   Component Value Date     (L) 10/07/2020    K 4.7 10/07/2020    CL 97 10/07/2020    CO2 26 10/07/2020    BUN 41 (H) 10/07/2020    CREATININE 6.3 (H) 10/07/2020    CALCIUM 8.6 (L) 10/07/2020    ANIONGAP 12 10/07/2020    ESTGFRAFRICA 8.0 (A) 10/07/2020    EGFRNONAA 7.0 (A) 10/07/2020       Lab Results   Component Value Date    CALCIUM 8.6 (L) 10/07/2020    PHOS 5.4 (H) 10/04/2020       Recent Labs   Lab 10/07/20  0438   WBC 3.79*   RBC 3.54*   HGB 10.7*   HCT 33.6*   *   MCV 95   MCH 30.2   MCHC 31.8*          I have personally reviewed pertinent radiological imaging and reports.    Patient care was time spent personally by me on the following activities:   · Obtaining a history  · Examination of patient.  · Providing medical care at the patients bedside.  · Developing a treatment plan with patient or surrogate and bedside caregivers  · Ordering and reviewing laboratory studies, radiographic studies, pulse oximetry.  · Ordering and performing treatments and interventions.  · Evaluation of patient's response to treatment.  · Discussions with consultants while on the unit and immediately available to the patient.  · Re-evaluation of the patient's condition.  · Documentation in the medical record.     Luciano Campos MD  Nephrology  [facility] [place]  [phone]

## 2020-10-07 NOTE — PROGRESS NOTES
Cardiac Rehab     Aurelia Saucedo   [id number]   10/7/2020         Cardiac Rehab Phase Taught: Phase 1    Teaching Method: Verbal    Handouts: None    Educational Videos: None    Understanding:  Knowledge indicated by feedback, Learning indicated by feedback and Verbalize understanding    Comments: pt states she is discharging with return next week for CABG. Pt voiced understanding. Will follow    Total Time Spent:            Amada Ceballos RN

## 2020-10-08 NOTE — DISCHARGE SUMMARY
[facility] Medicine  Discharge Summary      Patient Name: Aurelia Saucedo  MRN: [id number]  Admission Date: 10/1/2020  Hospital Length of Stay: 5 days  Discharge Date and Time:  10/07/2020 8:45 PM  Attending Physician: No att. providers found   Discharging Provider: Matthew Dickinson MD  Primary Care Provider: Dustin Ireland MD      HPI:   No notes on file    * No surgery found *      Hospital Course:   Patient admitted with NSTEMI in the background of multivessel CAD  Pt was seen by CTS team and CABG will be done next week  Pt was discharged to Home and she will return to Hospital for CABG as scheduled  Pt was instructed to hold lisinopril and Nitrates in view with hypotension  Patient doesn't think it is an issue and expressed her view to continue taking all her regular meds     Consults:   Consults (From admission, onward)        Status Ordering Provider     Inpatient consult to Cardiology  Once     Provider:  Yahir Starkey MD    Completed TASHIA FAYE          No new Assessment & Plan notes have been filed under this hospital service since the last note was generated.  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    Specialties: Cardiothoracic Surgery, Vascular Surgery, Cardiovascular Disease, Cardiology  Contact information:  [address]  [facility]  [address]  [facility] [address]                 Patient Instructions:      Diet Cardiac     Activity as tolerated       Significant Diagnostic Studies: Labs:   CMP   Recent Labs   Lab 10/07/20  0438   *   K 4.7   CL 97   CO2 26   GLU 93   BUN 41*   CREATININE 6.3*   CALCIUM 8.6*   PROT 4.8*   ALBUMIN 2.2*   BILITOT 0.8   ALKPHOS 51*   AST 26   ALT 19   ANIONGAP 12   ESTGFRAFRICA 8.0*   EGFRNONAA 7.0*    and CBC   Recent Labs   Lab 10/06/20  0750 10/07/20  0438   WBC 3.92 3.79*   HGB 10.6* 10.7*   HCT 32.5* 33.6*   * 121*       Pending Diagnostic Studies:     None         Medications:  Reconciled Home Medications:      Medication List      CHANGE how you take these medications    * nitroGLYCERIN 0.4 MG SL tablet  Commonly known as: NITROSTAT  Place 1 tablet (0.4 mg total) under the tongue every 5 (five) minutes as needed for Chest pain.  What changed: Another medication with the same name was added. Make sure you understand how and when to take each.     * nitroGLYCERIN 0.4 MG SL tablet  Commonly known as: NITROSTAT  Place 1 tablet (0.4 mg total) under the tongue every 5 (five) minutes as needed for Chest pain.  What changed: You were already taking a medication with the same name, and this prescription was added. Make sure you understand how and when to take each.         * This list has 2 medication(s) that are the same as other medications prescribed for you. Read the directions carefully, and ask your doctor or other care provider to review them with you.            CONTINUE taking these medications    aspirin 81 MG EC tablet  Commonly known as: ECOTRIN  Take 81 mg by mouth once daily.     calcium acetate(phosphat bind) 667 mg capsule  Commonly known as: PHOSLO  Take 667 mg by mouth 3 (three) times daily with meals.     DIALYVITE 3000 3-70-15 mg-mcg-mg Tab  Generic drug: folic acid-B wfpu-G-msrmp-zinc  Take 1 tablet by mouth once daily.     HYDROcodone-acetaminophen  mg per tablet  Commonly  known as: NORCO  Take 1 tablet by mouth every 8 (eight) hours as needed for Pain.     isosorbide mononitrate 30 MG 24 hr tablet  Commonly known as: IMDUR  Take 1 tablet (30 mg total) by mouth 2 (two) times a day.     lactulose 10 gram/15 mL solution  Commonly known as: CHRONULAC  TK 30 ML PO ONCE D     lisinopriL 20 MG tablet  Commonly known as: PRINIVIL,ZESTRIL  Take 1 tablet by mouth nightly.     LOKELMA 5 gram packet  Generic drug: sodium zirconium cyclosilicate  Take 5 g by mouth. Five times a week; on  Non dialysis days     metoprolol succinate 50 MG 24 hr tablet  Commonly known as: TOPROL-XL  Take 50 mg by mouth nightly.     pantoprazole 40 MG tablet  Commonly known as: PROTONIX  Take 40 mg by mouth 2 (two) times daily.     polyethylene glycol 17 gram Pwpk  Commonly known as: GLYCOLAX  Take 17 g by mouth daily as needed (constipation).     rosuvastatin 10 MG tablet  Commonly known as: CRESTOR  Take 1 tablet (10 mg total) by mouth once daily.            Indwelling Lines/Drains at time of discharge:   Lines/Drains/Airways     Drain                 Hemodialysis AV Fistula Left upper arm -- days                Time spent on the discharge of patient: 25  minutes  Patient was seen and examined on the date of discharge and determined to be suitable for discharge.         Matthew Dickinson MD  Department of Hospital Medicine  [facility]

## 2020-10-08 NOTE — PLAN OF CARE
10/08/20 0842   Final Note   Assessment Type Final Discharge Note   Anticipated Discharge Disposition Home-Health   Post-Acute Status   Post-Acute Authorization Home Health   Home Health Status Referrals Sent   Patient choice form signed by patient/caregiver List with quality metrics by geographic area provided   Discharge Delays None known at this time     Records sent to [facility] for TESFAYE. Spoke with Leigha who confirmed that records were received and she is on schedule to be seen.

## 2020-10-12 PROBLEM — E87.5 HYPERKALEMIA: Status: ACTIVE | Noted: 2020-01-01

## 2020-10-12 PROBLEM — E87.5 ACUTE HYPERKALEMIA: Status: ACTIVE | Noted: 2020-01-01

## 2020-10-12 NOTE — ED NOTES
Pt medicated per orders denies further needs at this time. Safety precautions in place. Call light within reach. Will continue to provide care accordingly.

## 2020-10-12 NOTE — ED PROVIDER NOTES
Encounter Date: 10/12/2020       History     Chief Complaint   Patient presents with    Chest Pain     OFF/ON    ABD DISTENTION     55-year-old female who has a history of coronary artery disease was due for bypass surgery, CHF, chronic hepatitis C and liver failure with associated ascites, hypertension, mitral regurg and chronic kidney disease on dialysis on Tuesdays and Saturdays, presents with complaints of having chest pain that began about 4:00 a.m. this morning.  Patient states that she has had problems with ascites and seemingly whenever she does have some increase intraperitoneal fluid she developed chest pain.  The patient denies any complaints of palpitations.  She is normally taking nitrates with relief but ran out last night.  The patient is last dialyzed 1 week ago and missed her dialysis 2 days ago.  She states that she has been normally getting paracentesis every 2 weeks at this facility.  She has been recently assigned to a Dr. Laughlin at [facility] G[facility].  Her primary physician is Dustin Ireland.        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 since 12/16/13 8/19/2015    Gastritis     Hypertension 8/19/2015    Mitral valve regurgitation     Renal dialysis status     M, W, F    Secondary hyperparathyroidism of renal origin 8/19/2015     Past Surgical History:   Procedure Laterality Date    ANGIOGRAM, CORONARY, WITH LEFT HEART CATHETERIZATION Left 1/13/2020    Procedure: ANGIOGRAM,CORONARY,WITH  LEFT HEART CATHETERIZATION;  Surgeon: Yahir Starkey MD;  Location: [facility] CATH/EP LAB;  Service: Cardiology;  Laterality: Left;    AV FISTULA PLACEMENT      COLONOSCOPY      CORONARY ANGIOPLASTY WITH STENT PLACEMENT      SALPINGOOPHORECTOMY Right     TOTAL ABDOMINAL HYSTERECTOMY       Family History   Problem Relation Age of Onset    Kidney disease Mother     Stroke Father     Psoriasis Brother     Breast cancer Neg Hx     Cancer Neg Hx     Colon cancer Neg Hx     Ovarian cancer Neg Hx      Social History     Tobacco Use    Smoking status: Current Every Day Smoker     Packs/day: 0.75     Years: 30.00     Pack years: 22.50     Types: Cigarettes     Last attempt to quit: 2019     Years since quittin.8    Smokeless tobacco: Never Used   Substance Use Topics    Alcohol use: No     Alcohol/week: 0.0 standard drinks    Drug use: Yes     Types: Marijuana     Comment: occasionally     Review of Systems   Constitutional: Negative.  Negative for activity change, appetite change, chills, diaphoresis and fever.   HENT: Negative for congestion, ear pain, facial swelling, sneezing, sore throat and trouble swallowing.    Respiratory: Positive for chest tightness and shortness of breath. Negative for cough, wheezing and stridor.    Cardiovascular: Negative for chest pain, palpitations and leg swelling.   Gastrointestinal: Positive for abdominal distention and abdominal pain. Negative for constipation, diarrhea, nausea and vomiting.   Genitourinary: Negative for decreased urine volume, dysuria, flank pain and hematuria.   Musculoskeletal: Negative for back pain.   Skin: Negative for rash.   Neurological: Negative for dizziness, syncope and weakness.   Hematological: Does not bruise/bleed easily.   All other systems reviewed and are negative.      Physical Exam     Initial Vitals [10/12/20 1153]   BP Pulse Resp Temp SpO2   101/60 85 20 97.4 °F (36.3 °C) 96 %      MAP       --         Physical  Exam    Vitals reviewed.  Constitutional: She appears well-developed and well-nourished. She is not diaphoretic. No distress.   HENT:   Head: Normocephalic and atraumatic.   Right Ear: External ear normal.   Left Ear: External ear normal.   Nose: Nose normal.   Mouth/Throat: Oropharynx is clear and moist. No oropharyngeal exudate.   Eyes: Conjunctivae are normal. Pupils are equal, round, and reactive to light. Right eye exhibits no discharge. Left eye exhibits no discharge. No scleral icterus.   Neck: Normal range of motion. Neck supple. No JVD present.   Cardiovascular: Normal rate, regular rhythm, normal heart sounds and intact distal pulses. Exam reveals no gallop and no friction rub.    No murmur heard.  Pulmonary/Chest: Breath sounds normal. No respiratory distress. She has no wheezes. She has no rhonchi. She has no rales.   Abdominal: Soft. Bowel sounds are normal. She exhibits distension. There is abdominal tenderness. There is no rebound and no guarding.   Tender more over the lower abdomen than elsewhere   Musculoskeletal: Normal range of motion. No tenderness or edema.   Lymphadenopathy:     She has no cervical adenopathy.   Neurological: She is alert and oriented to person, place, and time. She has normal strength. GCS score is 15. GCS eye subscore is 4. GCS verbal subscore is 5. GCS motor subscore is 6.   Skin: Skin is warm and dry. Capillary refill takes less than 2 seconds. No rash noted. No erythema. No pallor.   Psychiatric: She has a normal mood and affect. Her behavior is normal. Judgment and thought content normal.         ED Course   Procedures  Labs Reviewed   CBC W/ AUTO DIFFERENTIAL - Abnormal; Notable for the following components:       Result Value    RBC 3.97 (*)     RDW 15.6 (*)     Platelets 136 (*)     MPV 9.1 (*)     Immature Granulocytes 0.9 (*)     Immature Grans (Abs) 0.05 (*)     Lymph # 0.6 (*)     Gran% 81.4 (*)     Lymph% 11.0 (*)     All other components within normal limits    COMPREHENSIVE METABOLIC PANEL - Abnormal; Notable for the following components:    Sodium 133 (*)     Potassium 6.0 (*)     BUN, Bld 70 (*)     Creatinine 9.9 (*)     Total Protein 5.5 (*)     Albumin 2.6 (*)     eGFR if  4.6 (*)     eGFR if non  4.0 (*)     All other components within normal limits   TROPONIN I - Abnormal; Notable for the following components:    Troponin I 0.106 (*)     All other components within normal limits    Narrative:     Trop critical result(s) repeated. Called and verbal readback obtained   from Ebonie Wheeler RN/ED by [facility] 10/12/2020 14:01   B-TYPE NATRIURETIC PEPTIDE - Abnormal; Notable for the following components:    BNP 1,710 (*)     All other components within normal limits   SARS-COV-2 RNA AMPLIFICATION, QUAL   POCT GLUCOSE MONITORING CONTINUOUS        ECG Results          EKG 12-lead (In process)  Result time 10/12/20 12:43:26    In process by Interface, Lab In [facility] (10/12/20 12:43:26)                 Narrative:    Test Reason : R07.9,    Vent. Rate : 078 BPM     Atrial Rate : 078 BPM     P-R Int : 146 ms          QRS Dur : 086 ms      QT Int : 418 ms       P-R-T Axes : 044 007 168 degrees     QTc Int : 476 ms    Normal sinus rhythm  Cannot rule out Inferior infarct ,age undetermined  ST and T wave abnormality, consider anterolateral ischemia  Abnormal ECG  When compared with ECG of 02-OCT-2020 14:26,  No significant change was found    Referred By: [email or web address]   SELF           Confirmed By:                             Imaging Results          X-Ray Chest AP Portable (Final result)  Result time 10/12/20 12:10:35    Final result by Phoebe Pfeiffer MD (10/12/20 12:10:35)                 Narrative:    CLINICAL HISTORY:  Chest pain    TECHNIQUE:  Portable AP radiograph the chest.    COMPARISON:  10/1/2020 FINDINGS:  The lungs appear clear. There is no pneumothorax. Costophrenic angles  are seen without effusion. The heart is normal in size .   The  mediastinum is within normal limits. Osseous structures and visualized  upper abdomen appear within normal limits.    IMPRESSION:  No acute cardiac or pulmonary process.            Electronically Signed by Phoebe Pfeiffer M.D. on 10/12/2020 12:12 PM                                            Attending Attestation:             Attending ED Notes:   This patient has a history of chronic kidney disease on hemodialysis missed dialysis 2 days ago and who presented with complaints of chest pain, at this time is no discomfort.  The patient states that she is due soon for CABG and awaiting this to be arranged by CT surgery.  She also complained of edematous abdomen and has ascites with a history of hepatitis C and cirrhosis.  The patient's EKG shows no acute changes in his similar to prior.  Her labs were pertinent that she has a BNP of 17 10, potassium of 6, troponin of 0.106 which is probably because of renal disease.  Her BUN is 70 and creatinine of 9.9.  During the ED course I did speak to Dr. Ramos who recommended dextrose and insulin but he will plan on dialyzing her soon.  Hospital Medicine is being consulted for admission.                    Clinical Impression:       ICD-10-CM ICD-9-CM   1. Stage 5 chronic kidney disease on chronic dialysis  N18.6 585.6    Z99.2 V45.11   2. Chest pain  R07.9 786.50   3. Hyperkalemia  E87.5 276.7   4. Cirrhosis of liver with ascites, unspecified hepatic cirrhosis type  K74.60 571.5    R18.8                           ED Disposition Condition    Observation                             Rafael Singh Jr., MD  10/12/20 9412

## 2020-10-12 NOTE — ED NOTES
"Patient updated with plan of care and room assignment, 3003  Report has been called to receiving RNJm  Vitals signs have been re-assessed within one hour of transfer and the patient is clean and dry at this time.   There is no acute changes in vital signs or mentation of the patient at time of re-evaluation before transfer.   "Belongings" Will they be sent to the floor or sent home with family? To the floor    The patient at this time is in stable condition and ready to transfer to the floor   5:04 PM   Ebonie Wheeler    "

## 2020-10-12 NOTE — FIRST PROVIDER EVALUATION
Medical screening exam completed.  I have conducted a focused provider triage encounter, findings are as follows:    Brief history of present illness:  Chest pain on and off   Onset this AM Denies chest pain at present + SOB  Pt has history of ascites and reports her abdomen is larger then usual     There were no vitals filed for this visit.    Pertinent physical exam:  Pt appears in no acute distress her abdomen is distended soft to the touch tender to the touch  HRR  Lungs CTA     Brief workup plan:  Cardiac work up     Preliminary workup initiated; this workup will be continued and followed by the physician or advanced practice provider that is assigned to the patient when roomed.

## 2020-10-12 NOTE — ED NOTES
First encounter with pt. Pt AxOx3. Pt cc of chest pain. Pt states chest pain began about 4a this morning and ahs been on and off since.  Pt also notes increases abdominal distention, normall get duialyisis but missed it 2 days ago. Patient states that she has had problems with ascites and thinks whenever she does have some increase intraperitoneal fluid she gets chest pain.  The patient denies any complaints of palpitations, shortness of breath, numbness, dizziness. Awaiting further orders. Safety precautions in place. Call light within reach. Will continue to provide care accordingly.

## 2020-10-13 PROBLEM — Z99.2 STAGE 5 CHRONIC KIDNEY DISEASE ON CHRONIC DIALYSIS: Status: ACTIVE | Noted: 2020-01-01

## 2020-10-13 PROBLEM — I95.81 HYPOTENSION AFTER PROCEDURE: Status: ACTIVE | Noted: 2020-01-01

## 2020-10-13 PROBLEM — J69.0 ASPIRATION PNEUMONIA: Status: ACTIVE | Noted: 2020-01-01

## 2020-10-13 PROBLEM — N18.6 STAGE 5 CHRONIC KIDNEY DISEASE ON CHRONIC DIALYSIS: Status: ACTIVE | Noted: 2020-01-01

## 2020-10-13 NOTE — ASSESSMENT & PLAN NOTE
Supposed to have paracentesis as an OP every 2 weeks for ESLD  Didn't had Paracentesis  Pt Short of breath and need paracentesis  Pt doesn't want paracentesis today and she only want it Tomorrow  Will scheduled Paracentesis Tomorrow

## 2020-10-13 NOTE — PLAN OF CARE
7 liters of yellow drainage obtained from paracentesis performed by dr cintron no labs ordered on fluid, large bandaide to left abdominal paracentesis insertion site cdi, report called to caleb garcia verbalized understanding, pt back to floor to room with transport

## 2020-10-13 NOTE — NURSING
Patient is still complaining of pain and feeling dehydrated. MD notified . MD  Is coming up to assess patient.

## 2020-10-13 NOTE — HPI
55 year old patient admitted with acute Hyperkalemia/symptomatic ascitis and Chest pain  Patient was in this Hospital very recently with NSTEMI and plan was made to have CABG this Month  Pt was discharged and was doing well  She didn't had paracentesis as planned  Also she didn't went to HD center for dialysis  Patient later today developed chest pain , Shortness of breath along with generalized weakness  Symptoms went worse and she came to ER  Decision was made to have emergent HD today

## 2020-10-13 NOTE — CONSULTS
INPATIENT NEPHROLOGY CONSULT   [facility] NEPHROLOGY    Aurelia Buckner Doyle  10/13/2020    Reason for consultation:    esrd    Chief Complaint:   Chief Complaint   Patient presents with    Chest Pain     OFF/ON    ABD DISTENTION          History of Present Illness:    55-year-old female who has a history of coronary artery disease was due for bypass surgery, CHF, chronic hepatitis C and liver failure with associated ascites, hypertension, mitral regurg and chronic kidney disease on dialysis on Tuesdays and Saturdays, presents with complaints of having chest pain that began about 4:00 a.m. this morning.  Patient states that she has had problems with ascites and seemingly whenever she does have some increase intraperitoneal fluid she developed chest pain.  The patient denies any complaints of palpitations.  She is normally taking nitrates with relief but ran out last night.  The patient is last dialyzed 1 week ago and missed her dialysis 2 days ago.  She states that she has been normally getting paracentesis every 2 weeks at this facility.    10/13 s/p hd last pm.  Has abd distention and difficulty breathing.  No angina.  No focal neurologic complaints.  No fever        Plan of Care:       Assessment:    esrd  --continue dialysis per routine  --fluid restrict  --renal dose medication per routine  --continue outpt medication  --continue binders with meals    Ascites  --recommend paracentesis    Hyperkalemia  --s/p hd last pm    CAD  --awaiting CT surgery evaluation  --she is agreeable to CABG now    Anemia  --sue with dialysis         Thank you for allowing us to participate in this patient's care. We will continue to follow.    Vital Signs:  Temp Readings from Last 3 Encounters:   10/13/20 98.6 °F (37 °C) (Oral)   10/07/20 98.4 °F (36.9 °C) (Oral)   09/27/20 97.8 °F (36.6 °C) (Oral)       Pulse Readings from Last 3 Encounters:   10/13/20 91   10/07/20 76   09/27/20 81       BP Readings from Last 3 Encounters:    10/13/20 (!) 90/55   10/07/20 110/76   20 113/62       Weight:  Wt Readings from Last 3 Encounters:   10/12/20 63.2 kg (139 lb 4.8 oz)   10/07/20 60.7 kg (133 lb 13.1 oz)   10/05/20 60.3 kg (133 lb)       Past Medical & Surgical History:  Past Medical History:   Diagnosis Date    Anemia of chronic renal failure, stage 5 2015    Ascites     Bleeding duodenal ulcer     CAD (coronary artery disease)     CAD (coronary artery disease)     Chest pain     CHF (congestive heart failure)     Chronic hepatitis C 2015    Colitis     Elevated troponin     ESRD 2/2 MPGN on HD since 13    Gastritis     Hypertension 2015    Mitral valve regurgitation     Renal dialysis status     M, W, F    Secondary hyperparathyroidism of renal origin 2015       Past Surgical History:   Procedure Laterality Date    ANGIOGRAM, CORONARY, WITH LEFT HEART CATHETERIZATION Left 2020    Procedure: ANGIOGRAM,CORONARY,WITH LEFT HEART CATHETERIZATION;  Surgeon: Yahir Starkey MD;  Location: [facility] CATH/EP LAB;  Service: Cardiology;  Laterality: Left;    AV FISTULA PLACEMENT      COLONOSCOPY      CORONARY ANGIOPLASTY WITH STENT PLACEMENT      SALPINGOOPHORECTOMY Right     TOTAL ABDOMINAL HYSTERECTOMY         Past Social History:  Social History     Socioeconomic History    Marital status:      Spouse name: Not on file    Number of children: 2    Years of education: Not on file    Highest education level: Not on file   Occupational History    Occupation: baker    Social Needs    Financial resource strain: Not on file    Food insecurity     Worry: Not on file     Inability: Not on file    Transportation needs     Medical: Not on file     Non-medical: Not on file   Tobacco Use    Smoking status: Current Every Day Smoker     Packs/day: 0.75     Years: 30.00     Pack years: 22.50     Types: Cigarettes     Last attempt to quit: 2019     Years since quittin.8     Smokeless tobacco: Never Used   Substance and Sexual Activity    Alcohol use: No     Alcohol/week: 0.0 standard drinks    Drug use: Yes     Types: Marijuana     Comment: occasionally    Sexual activity: Not on file   Lifestyle    Physical activity     Days per week: Not on file     Minutes per session: Not on file    Stress: To some extent   Relationships    Social connections     Talks on phone: Not on file     Gets together: Not on file     Attends Yazidi service: Not on file     Active member of club or organization: Not on file     Attends meetings of clubs or organizations: Not on file     Relationship status: Not on file   Other Topics Concern    Are you pregnant or think you may be? Not Asked    Breast-feeding Not Asked   Social History Narrative    Not on file       Medications:  No current facility-administered medications on file prior to encounter.      Current Outpatient Medications on File Prior to Encounter   Medication Sig Dispense Refill    aspirin (ECOTRIN) 81 MG EC tablet Take 81 mg by mouth once daily.      FA-VIT B COMP&C-SELENIUM-ZINC 3-70-15 MG-MCG-MG ORAL TAB Take 1 tablet by mouth once daily.      HYDROcodone-acetaminophen (NORCO)  mg per tablet Take 1 tablet by mouth every 8 (eight) hours as needed for Pain. 60 tablet 0    isosorbide mononitrate (IMDUR) 30 MG 24 hr tablet Take 1 tablet (30 mg total) by mouth 2 (two) times a day. 180 tablet 0    lactulose (CHRONULAC) 10 gram/15 mL solution Take 20 g by mouth daily as needed.       lisinopril (PRINIVIL,ZESTRIL) 20 MG tablet Take 1 tablet by mouth nightly.   3    metoprolol succinate (TOPROL-XL) 50 MG 24 hr tablet Take 50 mg by mouth nightly.       nitroGLYCERIN (NITROSTAT) 0.4 MG SL tablet Place 1 tablet (0.4 mg total) under the tongue every 5 (five) minutes as needed for Chest pain. 90 tablet 3    pantoprazole (PROTONIX) 40 MG tablet Take 40 mg by mouth 2 (two) times daily.       polyethylene glycol (GLYCOLAX)  "17 gram/dose powder Take 17 g by mouth once daily.      rosuvastatin (CRESTOR) 10 MG tablet Take 1 tablet (10 mg total) by mouth once daily. 90 tablet 3    calcium acetate,phosphat bind, (PHOSLO) 667 mg capsule Take 667 mg by mouth 3 (three) times daily with meals.      LOKELMA 5 gram PwPk Take 5 g by mouth. Five times a week; on  Non dialysis days      nitroGLYCERIN (NITROSTAT) 0.4 MG SL tablet Place 1 tablet (0.4 mg total) under the tongue every 5 (five) minutes as needed for Chest pain. 21 tablet 0     Scheduled Meds:   sodium chloride 0.9%   Intravenous Once    aspirin  81 mg Oral Daily    calcium acetate(phosphat bind)  667 mg Oral TID WM    enoxaparin  30 mg Subcutaneous Q24H    lisinopriL  2.5 mg Oral Daily    metoprolol tartrate  12.5 mg Oral BID    mupirocin   Nasal BID    pantoprazole  40 mg Oral BID    rosuvastatin  10 mg Oral QHS     Continuous Infusions:  PRN Meds:.sodium chloride 0.9%, heparin (porcine), HYDROcodone-acetaminophen, ondansetron    Allergies:  Ciprofloxacin, Levaquin [levofloxacin], Quinolones, and Codeine    Past Family History:  Reviewed; refer to Hospitalist Admission Note    Review of Systems:  Review of Systems - All 14 systems reviewed and negative, except as noted in HPI    Physical Exam:    BP (!) 90/55 Comment: Notified nurse  Pulse 91   Temp 98.6 °F (37 °C) (Oral)   Resp 17   Ht 5' 4" (1.626 m)   Wt 63.2 kg (139 lb 4.8 oz)   LMP 01/07/1999 (Exact Date)   SpO2 99%   Breastfeeding No   BMI 23.91 kg/m²     General Appearance:    Alert, cooperative, no distress, appears stated age   Head:    Normocephalic, without obvious abnormality, atraumatic   Eyes:    PER, conjunctiva/corneas clear, EOM's intact in both eyes        Throat:   Lips, mucosa, and tongue normal; teeth and gums normal   Back:     Symmetric, no curvature, ROM normal, no CVA tenderness   Lungs:     Clear to auscultation bilaterally, respirations unlabored   Chest wall:    No tenderness or " deformity   Heart:    Regular rate and rhythm, S1 and S2 normal, no murmur, rub   or gallop   Abdomen:    distended   Extremities:   edema   Pulses:   2+ and symmetric all extremities   MSK:   No joint or muscle swelling, tenderness or deformity   Skin:   Skin color, texture, turgor normal, no rashes or lesions   Neurologic:   CNII-XII intact, normal strength and sensation       Throughout.  No flap     Results:  Lab Results   Component Value Date     (L) 10/13/2020    K 5.2 (H) 10/13/2020    CL 98 10/13/2020    CO2 24 10/13/2020    BUN 44 (H) 10/13/2020    CREATININE 7.3 (H) 10/13/2020    CALCIUM 8.1 (L) 10/13/2020    ANIONGAP 12 10/13/2020    ESTGFRAFRICA 6.7 (A) 10/13/2020    EGFRNONAA 5.8 (A) 10/13/2020       Lab Results   Component Value Date    CALCIUM 8.1 (L) 10/13/2020    PHOS 5.4 (H) 10/04/2020       Recent Labs   Lab 10/13/20  0443   WBC 4.48   RBC 3.10*   HGB 9.6*   HCT 29.1*   *   MCV 94   MCH 31.0   MCHC 33.0          I have personally reviewed pertinent radiological imaging and reports.    Patient care was time spent personally by me on the following activities:   · Obtaining a history  · Examination of patient.  · Providing medical care at the patients bedside.  · Developing a treatment plan with patient or surrogate and bedside caregivers  · Ordering and reviewing laboratory studies, radiographic studies, pulse oximetry.  · Ordering and performing treatments and interventions.  · Evaluation of patient's response to treatment.  · Discussions with consultants while on the unit and immediately available to the patient.  · Re-evaluation of the patient's condition.  · Documentation in the medical record.     Mele Ramos MD  Nephrology  [facility]  [phone]

## 2020-10-13 NOTE — NURSING
@ 4399 Patient arrived back to floor requesting nitro instantly for c/o chest pain. Dr. Dickinson  notified ordered 0.5 of dilaudid.  Gave by Melany MURGUIA RN      @ 5472 Dr. Yeboah went I to evaluate the patient. Patient still c/o chest pain. He the ordered for 0.4 nitro SL.    @ 3508 second dose of nitro given. Will cont to monitor. Patient v/s stable.     @3264 second dose of dilaudid given by RN.

## 2020-10-13 NOTE — SUBJECTIVE & OBJECTIVE
Past Medical History:   Diagnosis Date    Anemia of chronic renal failure, stage 5 8/19/2015    Ascites     Bleeding duodenal ulcer     CAD (coronary artery disease)     CAD (coronary artery disease)     Chest pain     CHF (congestive heart failure)     Chronic hepatitis C 8/19/2015    Colitis     Elevated troponin     ESRD 2/2 MPGN on HD since 12/16/13 8/19/2015    Gastritis     Hypertension 8/19/2015    Mitral valve regurgitation     Renal dialysis status     M, W, F    Secondary hyperparathyroidism of renal origin 8/19/2015       Past Surgical History:   Procedure Laterality Date    ANGIOGRAM, CORONARY, WITH LEFT HEART CATHETERIZATION Left 1/13/2020    Procedure: ANGIOGRAM,CORONARY,WITH LEFT HEART CATHETERIZATION;  Surgeon: Yahir Starkey MD;  Location: [facility] CATH/EP LAB;  Service: Cardiology;  Laterality: Left;    AV FISTULA PLACEMENT  2013    COLONOSCOPY      CORONARY ANGIOPLASTY WITH STENT PLACEMENT      SALPINGOOPHORECTOMY Right 1997    TOTAL ABDOMINAL HYSTERECTOMY  1999       Review of patient's allergies indicates:   Allergen Reactions    Ciprofloxacin Itching    Levaquin [levofloxacin] Itching    Quinolones Itching    Codeine Nausea Only       No current facility-administered medications on file prior to encounter.      Current Outpatient Medications on File Prior to Encounter   Medication Sig    aspirin (ECOTRIN) 81 MG EC tablet Take 81 mg by mouth once daily.    FA-VIT B COMP&C-SELENIUM-ZINC 3-70-15 MG-MCG-MG ORAL TAB Take 1 tablet by mouth once daily.    HYDROcodone-acetaminophen (NORCO)  mg per tablet Take 1 tablet by mouth every 8 (eight) hours as needed for Pain.    isosorbide mononitrate (IMDUR) 30 MG 24 hr tablet Take 1 tablet (30 mg total) by mouth 2 (two) times a day.    lactulose (CHRONULAC) 10 gram/15 mL solution Take 20 g by mouth daily as needed.     lisinopril (PRINIVIL,ZESTRIL) 20 MG tablet Take 1 tablet by mouth nightly.     metoprolol succinate  (TOPROL-XL) 50 MG 24 hr tablet Take 50 mg by mouth nightly.     nitroGLYCERIN (NITROSTAT) 0.4 MG SL tablet Place 1 tablet (0.4 mg total) under the tongue every 5 (five) minutes as needed for Chest pain.    pantoprazole (PROTONIX) 40 MG tablet Take 40 mg by mouth 2 (two) times daily.     polyethylene glycol (GLYCOLAX) 17 gram/dose powder Take 17 g by mouth once daily.    rosuvastatin (CRESTOR) 10 MG tablet Take 1 tablet (10 mg total) by mouth once daily.    calcium acetate,phosphat bind, (PHOSLO) 667 mg capsule Take 667 mg by mouth 3 (three) times daily with meals.    LOKELMA 5 gram PwPk Take 5 g by mouth. Five times a week; on  Non dialysis days    nitroGLYCERIN (NITROSTAT) 0.4 MG SL tablet Place 1 tablet (0.4 mg total) under the tongue every 5 (five) minutes as needed for Chest pain.     Family History     Problem Relation (Age of Onset)    Kidney disease Mother    Psoriasis Brother    Stroke Father        Tobacco Use    Smoking status: Current Every Day Smoker     Packs/day: 0.75     Years: 30.00     Pack years: 22.50     Types: Cigarettes     Last attempt to quit: 2019     Years since quittin.8    Smokeless tobacco: Never Used   Substance and Sexual Activity    Alcohol use: No     Alcohol/week: 0.0 standard drinks    Drug use: Yes     Types: Marijuana     Comment: occasionally    Sexual activity: Not on file     Review of Systems   Constitutional: Negative for activity change and appetite change.   HENT: Negative for congestion and dental problem.    Eyes: Negative for discharge and itching.   Respiratory: Positive for cough and shortness of breath.    Cardiovascular: Positive for chest pain.   Gastrointestinal: Negative for abdominal distention and abdominal pain.   Endocrine: Negative for cold intolerance.   Genitourinary: Negative for difficulty urinating and dysuria.   Musculoskeletal: Negative for arthralgias and back pain.   Skin: Negative for color change.   Neurological: Negative for  dizziness and facial asymmetry.   Hematological: Negative for adenopathy.   Psychiatric/Behavioral: Negative for agitation and behavioral problems.     Objective:     Vital Signs (Most Recent):  Temp: 98 °F (36.7 °C) (10/12/20 2000)  Pulse: 88 (10/12/20 2000)  Resp: 17 (10/12/20 2108)  BP: 109/81 (10/12/20 2000)  SpO2: 99 % (10/12/20 1640) Vital Signs (24h Range):  Temp:  [97.4 °F (36.3 °C)-98 °F (36.7 °C)] 98 °F (36.7 °C)  Pulse:  [75-88] 88  Resp:  [16-30] 17  SpO2:  [96 %-100 %] 99 %  BP: ()/(60-81) 109/81     Weight: 63.2 kg (139 lb 4.8 oz)  Body mass index is 23.91 kg/m².    Physical Exam  Vitals signs and nursing note reviewed.   Constitutional:       General: She is not in acute distress.  HENT:      Head: Atraumatic.      Right Ear: External ear normal.      Left Ear: External ear normal.      Nose: Nose normal.      Mouth/Throat:      Mouth: Mucous membranes are moist.   Eyes:      Conjunctiva/sclera: Conjunctivae normal.   Neck:      Musculoskeletal: Normal range of motion.   Cardiovascular:      Rate and Rhythm: Normal rate.   Pulmonary:      Effort: Pulmonary effort is normal.   Abdominal:      Comments: ascitis   Musculoskeletal: Normal range of motion.   Skin:     General: Skin is warm.   Neurological:      Mental Status: She is alert and oriented to person, place, and time.   Psychiatric:         Behavior: Behavior normal.             Significant Labs:   CBC:   Recent Labs   Lab 10/12/20  1220   WBC 5.75   HGB 12.1   HCT 37.0   *     CMP:   Recent Labs   Lab 10/12/20  1220   *   K 6.0*   CL 95   CO2 23   GLU 96   BUN 70*   CREATININE 9.9*   CALCIUM 9.0   PROT 5.5*   ALBUMIN 2.6*   BILITOT 0.7   ALKPHOS 78   AST 33   ALT 25   ANIONGAP 15   EGFRNONAA 4.0*       Significant Imaging: I have reviewed all pertinent imaging results/findings within the past 24 hours.

## 2020-10-13 NOTE — ASSESSMENT & PLAN NOTE
Will Consult CTS to have CABG upon this admission  Pt was supposed to get admitted this week anyway for CABG

## 2020-10-13 NOTE — H&P
[facility] Medicine  History & Physical    Patient Name: Aurelia Saucedo  MRN: [id number]  Admission Date: 10/12/2020  Attending Physician: Matthew Dickinson MD   Primary Care Provider: Dustin Ireland MD         Patient information was obtained from patient and ER records.     Subjective:     Principal Problem:Acute hyperkalemia    Chief Complaint:   Chief Complaint   Patient presents with    Chest Pain     OFF/ON    ABD DISTENTION        HPI: 55 year old patient admitted with acute Hyperkalemia/symptomatic ascitis and Chest pain  Patient was in this Hospital very recently with NSTEMI and plan was made to have CABG this Month  Pt was discharged and was doing well  She didn't had paracentesis as planned  Also she didn't went to HD center for dialysis  Patient later today developed chest pain , Shortness of breath along with generalized weakness  Symptoms went worse and she came to ER  Decision was made to have emergent HD today      Past Medical History:   Diagnosis Date    Anemia of chronic renal failure, stage 5 8/19/2015    Ascites     Bleeding duodenal ulcer     CAD (coronary artery disease)     CAD (coronary artery disease)     Chest pain     CHF (congestive heart failure)     Chronic hepatitis C 8/19/2015    Colitis     Elevated troponin     ESRD 2/2 MPGN on HD since 12/16/13 8/19/2015    Gastritis     Hypertension 8/19/2015    Mitral valve regurgitation     Renal dialysis status     M, W, F    Secondary hyperparathyroidism of renal origin 8/19/2015       Past Surgical History:   Procedure Laterality Date    ANGIOGRAM, CORONARY, WITH LEFT HEART CATHETERIZATION Left 1/13/2020    Procedure: ANGIOGRAM,CORONARY,WITH LEFT HEART CATHETERIZATION;  Surgeon: Yahir Starkey MD;  Location: [facility] CATH[facility]EP LAB;  Service: Cardiology;  Laterality: Left;    AV FISTULA PLACEMENT  2013    COLONOSCOPY      CORONARY ANGIOPLASTY WITH STENT PLACEMENT      SALPINGOOPHORECTOMY Right 1997     TOTAL ABDOMINAL HYSTERECTOMY  1999       Review of patient's allergies indicates:   Allergen Reactions    Ciprofloxacin Itching    Levaquin [levofloxacin] Itching    Quinolones Itching    Codeine Nausea Only       No current facility-administered medications on file prior to encounter.      Current Outpatient Medications on File Prior to Encounter   Medication Sig    aspirin (ECOTRIN) 81 MG EC tablet Take 81 mg by mouth once daily.    FA-VIT B COMP&C-SELENIUM-ZINC 3-70-15 MG-MCG-MG ORAL TAB Take 1 tablet by mouth once daily.    HYDROcodone-acetaminophen (NORCO)  mg per tablet Take 1 tablet by mouth every 8 (eight) hours as needed for Pain.    isosorbide mononitrate (IMDUR) 30 MG 24 hr tablet Take 1 tablet (30 mg total) by mouth 2 (two) times a day.    lactulose (CHRONULAC) 10 gram/15 mL solution Take 20 g by mouth daily as needed.     lisinopril (PRINIVIL,ZESTRIL) 20 MG tablet Take 1 tablet by mouth nightly.     metoprolol succinate (TOPROL-XL) 50 MG 24 hr tablet Take 50 mg by mouth nightly.     nitroGLYCERIN (NITROSTAT) 0.4 MG SL tablet Place 1 tablet (0.4 mg total) under the tongue every 5 (five) minutes as needed for Chest pain.    pantoprazole (PROTONIX) 40 MG tablet Take 40 mg by mouth 2 (two) times daily.     polyethylene glycol (GLYCOLAX) 17 gram/dose powder Take 17 g by mouth once daily.    rosuvastatin (CRESTOR) 10 MG tablet Take 1 tablet (10 mg total) by mouth once daily.    calcium acetate,phosphat bind, (PHOSLO) 667 mg capsule Take 667 mg by mouth 3 (three) times daily with meals.    LOKELMA 5 gram PwPk Take 5 g by mouth. Five times a week; on  Non dialysis days    nitroGLYCERIN (NITROSTAT) 0.4 MG SL tablet Place 1 tablet (0.4 mg total) under the tongue every 5 (five) minutes as needed for Chest pain.     Family History     Problem Relation (Age of Onset)    Kidney disease Mother    Psoriasis Brother    Stroke Father        Tobacco Use    Smoking status: Current Every Day Smoker      Packs/day: 0.75     Years: 30.00     Pack years: 22.50     Types: Cigarettes     Last attempt to quit: 2019     Years since quittin.8    Smokeless tobacco: Never Used   Substance and Sexual Activity    Alcohol use: No     Alcohol/week: 0.0 standard drinks    Drug use: Yes     Types: Marijuana     Comment: occasionally    Sexual activity: Not on file     Review of Systems   Constitutional: Negative for activity change and appetite change.   HENT: Negative for congestion and dental problem.    Eyes: Negative for discharge and itching.   Respiratory: Positive for cough and shortness of breath.    Cardiovascular: Positive for chest pain.   Gastrointestinal: Negative for abdominal distention and abdominal pain.   Endocrine: Negative for cold intolerance.   Genitourinary: Negative for difficulty urinating and dysuria.   Musculoskeletal: Negative for arthralgias and back pain.   Skin: Negative for color change.   Neurological: Negative for dizziness and facial asymmetry.   Hematological: Negative for adenopathy.   Psychiatric/Behavioral: Negative for agitation and behavioral problems.     Objective:     Vital Signs (Most Recent):  Temp: 98 °F (36.7 °C) (10/12/20 2000)  Pulse: 88 (10/12/20 2000)  Resp: 17 (10/12/20 2108)  BP: 109/81 (10/12/20 2000)  SpO2: 99 % (10/12/20 1640) Vital Signs (24h Range):  Temp:  [97.4 °F (36.3 °C)-98 °F (36.7 °C)] 98 °F (36.7 °C)  Pulse:  [75-88] 88  Resp:  [16-30] 17  SpO2:  [96 %-100 %] 99 %  BP: ()/(60-81) 109/81     Weight: 63.2 kg (139 lb 4.8 oz)  Body mass index is 23.91 kg/m².    Physical Exam  Vitals signs and nursing note reviewed.   Constitutional:       General: She is not in acute distress.  HENT:      Head: Atraumatic.      Right Ear: External ear normal.      Left Ear: External ear normal.      Nose: Nose normal.      Mouth/Throat:      Mouth: Mucous membranes are moist.   Eyes:      Conjunctiva/sclera: Conjunctivae normal.   Neck:      Musculoskeletal: Normal  range of motion.   Cardiovascular:      Rate and Rhythm: Normal rate.   Pulmonary:      Effort: Pulmonary effort is normal.   Abdominal:      Comments: ascitis   Musculoskeletal: Normal range of motion.   Skin:     General: Skin is warm.   Neurological:      Mental Status: She is alert and oriented to person, place, and time.   Psychiatric:         Behavior: Behavior normal.             Significant Labs:   CBC:   Recent Labs   Lab 10/12/20  1220   WBC 5.75   HGB 12.1   HCT 37.0   *     CMP:   Recent Labs   Lab 10/12/20  1220   *   K 6.0*   CL 95   CO2 23   GLU 96   BUN 70*   CREATININE 9.9*   CALCIUM 9.0   PROT 5.5*   ALBUMIN 2.6*   BILITOT 0.7   ALKPHOS 78   AST 33   ALT 25   ANIONGAP 15   EGFRNONAA 4.0*       Significant Imaging: I have reviewed all pertinent imaging results/findings within the past 24 hours.    Assessment/Plan:     * Acute hyperkalemia  Admitted with Hyperkalemia in the background of Noncompliance  Need emergent HD today      Recurrent ascites in the setting of end-stage liver disease with portal hypertension  Supposed to have paracentesis as an OP every 2 weeks for ESLD  Didn't had Paracentesis  Pt Short of breath and need paracentesis  Pt doesn't want paracentesis today and she only want it Tomorrow  Will scheduled Paracentesis Tomorrow       Dyspnea  From Missed Dialysis sessions and Missed paracentesis  Need HD and Paracentesis      ESRD 2/2 MPGN on HD since 12/16/13   HD as scheduled  Pt will have emergent HD today      Chest pain with known history of multivessel coronary artery disease  Will Consult CTS to have CABG upon this admission  Pt was supposed to get admitted this week anyway for CABG      Anemia of chronic renal failure, stage 5  AOCD from ESRD      Cryoglobulinemia  In the background of Hep C and associated ESLD      VTE Risk Mitigation (From admission, onward)         Ordered     heparin (porcine) injection 5,000 Units  As needed (PRN)      10/12/20 2023      enoxaparin injection 30 mg  Every 24 hours      10/12/20 1604     IP VTE HIGH RISK PATIENT  Once      10/12/20 1604     Place sequential compression device  Until discontinued      10/12/20 1604                   Matthew Dickinson MD  Department of Hospital Medicine   [facility]

## 2020-10-14 VITALS
HEART RATE: 116 BPM | DIASTOLIC BLOOD PRESSURE: 23 MMHG | WEIGHT: 139.31 LBS | OXYGEN SATURATION: 66 % | RESPIRATION RATE: 21 BRPM | HEIGHT: 64 IN | SYSTOLIC BLOOD PRESSURE: 50 MMHG | TEMPERATURE: 99 F | BODY MASS INDEX: 23.78 KG/M2

## 2020-10-14 NOTE — PROGRESS NOTES
[facility] Medicine  Progress Note    Patient Name: Aurelia Saucedo  MRN: [id number]  Patient Class: IP- Inpatient   Admission Date: 10/12/2020  Length of Stay: 1 days  Attending Physician: Matthew Dickinson MD  Primary Care Provider: Dustin Ireland MD        Subjective:     Principal Problem:Acute hyperkalemia        HPI:  55 year old patient admitted with acute Hyperkalemia/symptomatic ascitis and Chest pain  Patient was in this Hospital very recently with NSTEMI and plan was made to have CABG this Month  Pt was discharged and was doing well  She didn't had paracentesis as planned  Also she didn't went to HD center for dialysis  Patient later today developed chest pain , Shortness of breath along with generalized weakness  Symptoms went worse and she came to ER  Decision was made to have emergent HD today      Overview/Hospital Course:  10/13  Pt Today had Paracentesis and following she was c/o chest pain on L side  Patient had NG tabs/Pain killers with no effect  CTS team evaluated pt    Interval History:     Review of Systems   Constitutional: Negative for activity change and appetite change.   HENT: Negative for congestion and dental problem.    Eyes: Negative for discharge and itching.   Respiratory: Negative for shortness of breath.    Cardiovascular: Positive for chest pain.   Gastrointestinal: Negative for abdominal distention and abdominal pain.   Endocrine: Negative for cold intolerance.   Genitourinary: Negative for difficulty urinating and dysuria.   Musculoskeletal: Negative for arthralgias and back pain.   Skin: Negative for color change.   Neurological: Negative for dizziness and facial asymmetry.   Hematological: Negative for adenopathy.   Psychiatric/Behavioral: Negative for agitation and behavioral problems.     Objective:     Vital Signs (Most Recent):  Temp: 98.6 °F (37 °C) (10/13/20 1145)  Pulse: 106 (10/13/20 1539)  Resp: 20 (10/13/20 1614)  BP: 110/70 (10/13/20 1639)  SpO2:  100 % (10/13/20 1539) Vital Signs (24h Range):  Temp:  [97.8 °F (36.6 °C)-98.6 °F (37 °C)] 98.6 °F (37 °C)  Pulse:  [] 106  Resp:  [15-27] 20  SpO2:  [97 %-100 %] 100 %  BP: ()/(55-87) 110/70     Weight: 63.2 kg (139 lb 4.8 oz)  Body mass index is 23.91 kg/m².    Intake/Output Summary (Last 24 hours) at 10/13/2020 2057  Last data filed at 10/13/2020 0900  Gross per 24 hour   Intake 540 ml   Output --   Net 540 ml      Physical Exam  Vitals signs and nursing note reviewed.   Constitutional:       General: She is not in acute distress.  HENT:      Head: Atraumatic.      Right Ear: External ear normal.      Left Ear: External ear normal.      Nose: Nose normal.      Mouth/Throat:      Mouth: Mucous membranes are moist.   Eyes:      Conjunctiva/sclera: Conjunctivae normal.   Neck:      Musculoskeletal: Neck supple.   Cardiovascular:      Rate and Rhythm: Normal rate.   Pulmonary:      Effort: Pulmonary effort is normal.   Abdominal:      Comments: ascites   Musculoskeletal: Normal range of motion.   Skin:     General: Skin is warm.   Neurological:      Mental Status: She is alert and oriented to person, place, and time.   Psychiatric:         Behavior: Behavior normal.         Significant Labs:   CBC:   Recent Labs   Lab 10/12/20  1220 10/13/20  0443   WBC 5.75 4.48   HGB 12.1 9.6*   HCT 37.0 29.1*   * 142*     CMP:   Recent Labs   Lab 10/12/20  1220 10/13/20  0443   * 134*   K 6.0* 5.2*   CL 95 98   CO2 23 24   GLU 96 81   BUN 70* 44*   CREATININE 9.9* 7.3*   CALCIUM 9.0 8.1*   PROT 5.5* 4.7*   ALBUMIN 2.6* 2.1*   BILITOT 0.7 0.8   ALKPHOS 78 67   AST 33 28   ALT 25 21   ANIONGAP 15 12   EGFRNONAA 4.0* 5.8*       Significant Imaging: I have reviewed all pertinent imaging results/findings within the past 24 hours.      Assessment/Plan:      * Acute hyperkalemia  Admitted with Hyperkalemia in the background of Noncompliance  Had Emergent HD yesterday      Recurrent ascites in the setting of  end-stage liver disease with portal hypertension  Supposed to have paracentesis as an OP every 2 weeks for ESLD  Didn't had Paracentesis as an OP  Pt today had paracentesis      Aspiration pneumonia  On Iv Zosyn for suspected aspiration Pneumonia  Today CXR showed new opacities on RLL area compared to yesterday X ray      Dyspnea  From Missed Dialysis sessions and Missed paracentesis  Had HD and Paracentesis      ESRD 2/2 MPGN on HD since 12/16/13   HD as scheduled  Pt had emergent HD yesterday    Chest pain with known history of multivessel coronary artery disease  Pt presented with chest pain  Pt will have CABG (CTS evaluated pt)    Today after paracentesis pt had chest pain which was refractory to NG tabs/Pain killers  Start pt on iv NG drip  Can have pain killers PRN basis        Anemia of chronic renal failure, stage 5  AOCD from ESRD      Hypotension after procedure  Patient on baseline has hypotension  Today had Paracentesis and now started on iv NG gtt  May need albumin / Pressors      Cryoglobulinemia  In the background of Hep C and associated ESLD        VTE Risk Mitigation (From admission, onward)         Ordered     heparin (porcine) injection 5,000 Units  As needed (PRN)      10/12/20 2023     enoxaparin injection 30 mg  Every 24 hours      10/12/20 1604     IP VTE HIGH RISK PATIENT  Once      10/12/20 1604     Place sequential compression device  Until discontinued      10/12/20 1604                Discharge Planning   ADRIA:      Code Status: Full Code   Is the patient medically ready for discharge?:     Reason for patient still in hospital (select all that apply): Treatment                     Matthew Dickinson MD  Department of Hospital Medicine   [facility]

## 2020-10-14 NOTE — DISCHARGE SUMMARY
[facility] Medicine  Death Summary      Patient Name: Aurelia Saucedo  MRN: [id number]  Admission Date: 10/12/2020  Hospital Length of Stay: 1 days  Discharge Date and Time:  10/13/2020 at 9:17 p.m.   Attending Physician: Matthew Dickinson MD   Discharging Provider: Guy Gale MD  Primary Care Provider: Dustin Ireland MD    HPI:   55 year old patient admitted with acute Hyperkalemia/symptomatic ascitis and Chest pain  Patient was in this Hospital very recently with NSTEMI and plan was made to have CABG this Month  Pt was discharged and was doing well  She didn't had paracentesis as planned  Also she didn't went to HD center for dialysis  Patient later today developed chest pain , Shortness of breath along with generalized weakness  Symptoms went worse and she came to ER  Decision was made to have emergent HD today    Hospital course:  This 55-year-old female has a history of ESRD on hemodialysis, anemia, cryoglobulinemia, end-stage liver disease with portal hypertensive gastropathy, and a recent NSTEMI due to multi-vessel CAD for which recent CABG was recommended.  The patient was discharged from the hospital without surgical intervention and returned to the hospital yesterday due to recurrent chest pain and shortness of breath in addition to hyperkalemia.  She was treated with emergent hemodialysis overnight by Nephrology.  She was admitted for evaluation by Cardiology and CT surgery.  Today she had paracentesis with removal of 7 L of fluid (no labs ordered).  Subsequently she developed worsening chest pain after approximately 3:00 p.m. EKG and chest x-ray were performed this afternoon.  She had nitroglycerin and IV Dilaudid administered with some relief. She was transferred to Cardiology A unit for IV nitroglycerin.  Shortly after transfer she developed hypotension for which RRT was activated.  Patient reports that she gets worsening chest pain and low blood pressure after prior  paracentesis.      On my arrival at bedside the patient was awake, following commands, and had fluent speech.  She was moving extremities symmetrically.  She reported persistent chest pain, midsternal location, severe intensity, constant timing, associated with shortness of breath.  She reported this got worse today about 3:00 p.m. just after paracentesis.  She denied fever or chills.  She denied significant productive cough.  IV nitroglycerin had not been started due to low blood pressure - on my arrival systolic in the 50s.  Initial oxygen sats not available due to probe malfunction but later found to be 99% on non-rebreather.  Heart rate 110s irregularly irregular on monitor consistent with atrial fibrillation.  Patient had clear lungs anteriorly without wheezes or crackles with but mildly increased work of breathing with tachypnea.  2+ radial pulses, irregularly irregular rhythm.  Abdomen soft without distension, tender to palpation on left lower quadrant at site of recent paracentesis which is without bleeding or drainage.  Skin dry and warm with diminished skin turgor.  Bilateral lower extremities appear mottled with evidence of chronic edema and chronic venous stasis dermatitis.  Mucous membranes are dry.  After some manipulation and repositioning manual SBP 79.    The patient was transferred to the ICU for close cardio pulmonary monitoring.  IV albumin was ordered.  Stat labs ordered.     Subsequently I was called emergently to bedside for code blue. 55-year-old female has a history of ESRD on hemodialysis, anemia, cryoglobulinemia, end-stage liver disease with portal hypertensive gastropathy, recent NSTEMI due to multi-vessel CAD for which recent CABG was recommended, who was admitted for chest pain and hyperkalemia yesterday and earlier this evening had rapid response for worsening chest pain and shortness of breath the setting of hypotension after large volume paracentesis (please see separate RRT note).   Shortly after transfer to ICU the patient developed worsening hypotension and became pulseless.  Initial rhythm PEA.  CPR was initiated.  The patient received treatment including IV calcium, 2 doses of IV bicarb, and recurrent dosing of IV epinephrine.  The patient was intubated by emergency room physician.  Additional IV and IO access was obtained.  After some time the patient developed wide complex consistent with ventricular tachycardia for which the patient received shock.  The patient had refractory V-tach/VFib which required multiple shocks.  The patient received 2 doses of IV amiodarone (300 mg and 150 mg).  Subsequently the patient received IV lidocaine 100 mg.  The patient converted back to PEA.  The patient continued to receive IV epinephrine as well as CPR.  Despite maximal medical efforts, the patient did not regain pulse and CPR was discontinued.  The patient was declared  at 9:17 p.m. on 10/13/2020.  Subsequently I sat down and discussed with the patient's  including and offering of condolences.     Final exam:  Patient does not respond to any stimuli.  Pulseless, asystolic, and absent heart sounds.  Apneic with absent breath sounds and no spontaneous respirations.  Pupils dilated and fixed.  No oculocephalic or corneal reflex.    Consults:   Consults (From admission, onward)        Status Ordering Provider     Inpatient consult to Cardiology  Once     Provider:  Asher Mcdaniels MD    Acknowledged DIANNA CHE     Inpatient consult to Cardiothoracic Surgery  Once     Provider:  Jose Yeboah MD    Acknowledged DIANNA CHE     Inpatient consult to Hospitalist  Once     Provider:  Aurelia Lin NP    Acknowledged DIANNA CHE     Inpatient consult to Nephrology  Once     Provider:  Mele Ramos MD    Acknowledged DIANNA CHE        Discharge diagnoses:  Acute hypotension suspect due to hypovolemia from fluid shift after large volume paracentesis  Worsening chest pain and shortness of  breath symptoms suspect due to underlying CAD in the setting of hypotension  Hyperkalemia, improved  Possible aspiration  Multi-vessel CAD with recent NSTEMI needing surgical intervention  ESRD on hemodialysis  End-stage liver disease with portal hypertensive gastropathy and abdominal ascites  Anemia  Cryoglobulinemia  Additional discharge Diagnoses:    Diagnosis Date Noted POA    PRINCIPAL PROBLEM:  Acute hyperkalemia [E87.5] 10/12/2020 Yes    Hypotension after procedure [I95.81] 10/13/2020 Yes    Aspiration pneumonia [J69.0] 10/13/2020 Unknown    Chest pain with known history of multivessel coronary artery disease [R07.9] 2020 Yes     Chronic    Dyspnea [R06.00] 2019 Yes    Recurrent ascites in the setting of end-stage liver disease with portal hypertension [R18.8] 2019 Yes     Chronic    Cryoglobulinemia [D89.1] 2018 Yes    ESRD 2/2 MPGN on HD since 13  [N18.6] 2015 Yes     Chronic    Anemia of chronic renal failure, stage 5 [N18.5, D63.1] 2015 Yes     Chronic       Discharged Condition:        Guy Gale MD  Department of Hospital Medicine  [facility]

## 2020-10-14 NOTE — SIGNIFICANT EVENT
HOSPITALIST CODE BLUE NOTE    I was called emergently to bedside for code blue. 55-year-old female has a history of ESRD on hemodialysis, anemia, cryoglobulinemia, end-stage liver disease with portal hypertensive gastropathy, recent NSTEMI due to multi-vessel CAD for which recent CABG was recommended, who was admitted for chest pain and hyperkalemia yesterday and earlier this evening had rapid response for worsening chest pain and shortness of breath the setting of hypotension after large volume paracentesis (please see separate RRT note).  Shortly after transfer to ICU the patient developed worsening hypotension and became pulseless.  Initial rhythm PEA.  CPR was initiated.  The patient received treatment including IV calcium, 2 doses of IV bicarb, and recurrent dosing of IV epinephrine.  The patient was intubated by emergency room physician.  Additional IV and IO access was obtained.  After some time the patient developed wide complex consistent with ventricular tachycardia for which the patient received shock.  The patient had refractory V-tach/VFib which required multiple shocks.  The patient received 2 doses of IV amiodarone (300 mg and 150 mg).  Subsequently the patient received IV lidocaine 100 mg.  The patient converted back to PEA.  The patient continued to receive IV epinephrine as well as CPR.  Despite maximal medical efforts, the patient did not regain pulse and CPR was discontinued.  The patient was declared  at 9:17 p.m. on 10/13/2020.  Subsequently I sat down and discussed with the patient's  including and offering of condolences.     Final exam:  Patient does not respond to any stimuli.  Pulseless, asystolic, and absent heart sounds.  Apneic with absent breath sounds and no spontaneous respirations.  Pupils dilated and fixed.  No oculocephalic or corneal reflex.    Guy Gale MD  [facility] Hospitalist

## 2020-10-14 NOTE — ASSESSMENT & PLAN NOTE
Patient on baseline has hypotension  Today had Paracentesis and now started on iv NG gtt  May need albumin / Pressors

## 2020-10-14 NOTE — SUBJECTIVE & OBJECTIVE
Interval History:     Review of Systems   Constitutional: Negative for activity change and appetite change.   HENT: Negative for congestion and dental problem.    Eyes: Negative for discharge and itching.   Respiratory: Negative for shortness of breath.    Cardiovascular: Positive for chest pain.   Gastrointestinal: Negative for abdominal distention and abdominal pain.   Endocrine: Negative for cold intolerance.   Genitourinary: Negative for difficulty urinating and dysuria.   Musculoskeletal: Negative for arthralgias and back pain.   Skin: Negative for color change.   Neurological: Negative for dizziness and facial asymmetry.   Hematological: Negative for adenopathy.   Psychiatric/Behavioral: Negative for agitation and behavioral problems.     Objective:     Vital Signs (Most Recent):  Temp: 98.6 °F (37 °C) (10/13/20 1145)  Pulse: 106 (10/13/20 1539)  Resp: 20 (10/13/20 1614)  BP: 110/70 (10/13/20 1639)  SpO2: 100 % (10/13/20 1539) Vital Signs (24h Range):  Temp:  [97.8 °F (36.6 °C)-98.6 °F (37 °C)] 98.6 °F (37 °C)  Pulse:  [] 106  Resp:  [15-27] 20  SpO2:  [97 %-100 %] 100 %  BP: ()/(55-87) 110/70     Weight: 63.2 kg (139 lb 4.8 oz)  Body mass index is 23.91 kg/m².    Intake/Output Summary (Last 24 hours) at 10/13/2020 2057  Last data filed at 10/13/2020 0900  Gross per 24 hour   Intake 540 ml   Output --   Net 540 ml      Physical Exam  Vitals signs and nursing note reviewed.   Constitutional:       General: She is not in acute distress.  HENT:      Head: Atraumatic.      Right Ear: External ear normal.      Left Ear: External ear normal.      Nose: Nose normal.      Mouth/Throat:      Mouth: Mucous membranes are moist.   Eyes:      Conjunctiva/sclera: Conjunctivae normal.   Neck:      Musculoskeletal: Neck supple.   Cardiovascular:      Rate and Rhythm: Normal rate.   Pulmonary:      Effort: Pulmonary effort is normal.   Abdominal:      Comments: ascites   Musculoskeletal: Normal range of motion.    Skin:     General: Skin is warm.   Neurological:      Mental Status: She is alert and oriented to person, place, and time.   Psychiatric:         Behavior: Behavior normal.         Significant Labs:   CBC:   Recent Labs   Lab 10/12/20  1220 10/13/20  0443   WBC 5.75 4.48   HGB 12.1 9.6*   HCT 37.0 29.1*   * 142*     CMP:   Recent Labs   Lab 10/12/20  1220 10/13/20  0443   * 134*   K 6.0* 5.2*   CL 95 98   CO2 23 24   GLU 96 81   BUN 70* 44*   CREATININE 9.9* 7.3*   CALCIUM 9.0 8.1*   PROT 5.5* 4.7*   ALBUMIN 2.6* 2.1*   BILITOT 0.7 0.8   ALKPHOS 78 67   AST 33 28   ALT 25 21   ANIONGAP 15 12   EGFRNONAA 4.0* 5.8*       Significant Imaging: I have reviewed all pertinent imaging results/findings within the past 24 hours.

## 2020-10-14 NOTE — ASSESSMENT & PLAN NOTE
Supposed to have paracentesis as an OP every 2 weeks for ESLD  Didn't had Paracentesis as an OP  Pt today had paracentesis

## 2020-10-14 NOTE — ASSESSMENT & PLAN NOTE
On Iv Zosyn for suspected aspiration Pneumonia  Today CXR showed new opacities on RLL area compared to yesterday X ray

## 2020-10-14 NOTE — NURSING
Patient was transferred to [facility] from [address] floor by SIMON Vega at 1920. Patient with pallor, lethargy, faint pulses, cold extremities. She was c/o dizziness, nausea, and chest pain. A blood pressure or oxygen saturation could not be obtained. Extremities were warmed with blankets and multiple sites were attempted to obtain for blood pressure, attempts unsuccessful.  Charge nurse was at bedside and rapid response was called at 1939.     BP 50/23, obtained by ICU nurse. Non-rebreather placed on patient and obtained O2 sats of 98%. Bolus was given. Dr. Gale was at bedside. Patient was then transferred to ICU.

## 2020-10-14 NOTE — ASSESSMENT & PLAN NOTE
Pt presented with chest pain  Pt will have CABG (CTS evaluated pt)    Today after paracentesis pt had chest pain which was refractory to NG tabs/Pain killers  Start pt on iv NG drip  Can have pain killers PRN basis

## 2020-10-14 NOTE — SIGNIFICANT EVENT
HOSPITALIST RAPID RESPONSE NOTE    I was called emergently to bedside in response to RRT after the patient developed hypotension is the setting of persistent chest pain and shortness of breath.  This 55-year-old female has a history of ESRD on hemodialysis, anemia, cryoglobulinemia, end-stage liver disease with portal hypertensive gastropathy, and a recent NSTEMI due to multi-vessel CAD for which recent CABG was recommended.  The patient was discharged from the hospital without surgical intervention and returned to the hospital yesterday due to recurrent chest pain and shortness of breath in addition to hyperkalemia.  She was treated with emergent hemodialysis overnight by Nephrology.  She was admitted for evaluation by Cardiology and CT surgery.  Today she had paracentesis with removal of 7 L of fluid (no labs ordered).  Subsequently she developed worsening chest pain after approximately 3:00 p.m. EKG and chest x-ray were performed this afternoon.  She had nitroglycerin and IV Dilaudid administered with some relief.  She was transferred to Cardiology A unit for IV nitroglycerin.  Shortly after transfer she developed hypotension for which RRT was activated.  Patient reports that she gets worsening chest pain and low blood pressure after prior paracentesis.     On my arrival at bedside the patient was awake, following commands, and had fluent speech.  She was moving extremities symmetrically.  She reported persistent chest pain, midsternal location, severe intensity, constant timing, associated with shortness of breath.  She reported this got worse today about 3:00 p.m. just after paracentesis.  She denied fever or chills.  She denied significant productive cough.  IV nitroglycerin had not been started due to low blood pressure - on my arrival systolic in the 50s.  Initial oxygen sats not available due to probe malfunction but later found to be 99% on non-rebreather.  Heart rate 110s irregularly irregular on monitor  consistent with atrial fibrillation.  Patient had clear lungs anteriorly without wheezes or crackles with but mildly increased work of breathing with tachypnea.  2+ radial pulses, irregularly irregular rhythm.  Abdomen soft without distension, tender to palpation on left lower quadrant at site of recent paracentesis which is without bleeding or drainage.  Skin dry and warm with diminished skin turgor.  Bilateral lower extremities appear mottled with evidence of chronic edema and chronic venous stasis dermatitis.  Mucous membranes are dry.  After some manipulation and repositioning manual SBP 79.    A.m. labs reviewed  Potassium 5.2  Stat ABG attempted at bedside although difficult stick and unable to obtain    Chest x-ray 5/13 personally reviewed:  Bilateral air lower lung zone ground-glass opacities right greater than left, possible infiltrate?    EKG 5/13 personally reviewed:  Irregular Tachycardia, No acute ST elevation    Assessment:  Acute hypotension suspect due to hypovolemia from fluid shift after large volume paracentesis  Worsening chest pain and shortness of breath symptoms suspect due to underlying CAD in the setting of hypotension  Hyperkalemia, improved  Multi-vessel CAD with recent NSTEMI needing surgical intervention  ESRD on hemodialysis  End-stage liver disease with portal hypertensive gastropathy and abdominal ascites  Anemia  Cryoglobulinemia    Plan:  Transfer to ICU for close hemodynamic monitoring  IV albumin 50 g infusion x1  If blood pressure remains low may need small IV fluid bolus.  Consider vasopressor support.  Stat labs including CBC, CMP, troponin, lactate, and procalcitonin  Low-dose IV narcotics for pain control  If blood pressure improves consider additional nitrates/IV nitroglycerin for chest pain  Awaiting Cardiology/CT surgery evaluation  This patient is high risk of life-threatening deterioration secondary to hypotension after procedure requiring IV albumin and close  hemodynamic monitoring with ICU transfer - in the setting chronic multi-system organ failure (ESRD and ESLD)  Critical care time spent 36 minutes    Guy Gale MD  [facility] Hospitalist     none

## 2020-10-14 NOTE — PLAN OF CARE
10/14/20 0755   Final Note   Assessment Type Final Discharge Note   Anticipated Discharge Disposition

## 2020-10-16 ENCOUNTER — DOCUMENT SCAN (OUTPATIENT)
Dept: HOME HEALTH SERVICES | Facility: HOSPITAL | Age: 55
End: 2020-10-16

## 2024-10-14 NOTE — PROGRESS NOTES
OCCUPATIONAL THERAPY LYMPHEDEMA TREATMENT    [facility] OCCUPATIONAL THERAPY-[facility]MPHEDEMA [place] [address]  Phone: [phone]; Fax: [phone]      Date:  10/14/2024   Initial Evaluation Date: 10/3/2024                     Evaluating Therapist: Rosalinda Green, RENARD/ANABELLE, ARNALDO     Patient Name:  Uriel Braswell               :  1961     Restrictions/Precautions:  L UE, fall risk, wears back brace for pain management  Diagnosis:  Stage IV breast cancer in female (HCC) (C50.919)                                                        Date of Surgery/Injury: Left partial mastectomy with SLND and re-excision for margin - 2016; metastatic recurrence with diffuse osseous involvement in 2024      Insurance/Certification information:    Payor: [employer] [[id number]]  Plan: [employer] - [facility] [[id number]]  ID: [id number]  Plan of care signed (Y/N): yes 10/8/24-SENT 10/3/24  Visit# / total visits: evaluation + 3     Referring Practitioner/NPI#:    Pedro Stephens, APRN - CNP NPI: [id number]      Specific Practitioner Orders: Evaluation and treatment     Assessment of current deficits   []Pain  []Skin Integrity   [x]Lymphedema   []Functional transfers/mobility   []ADLs   []Strength    []Cognition  []IADLs   []Safety Awareness   []  Motor Endurance    []Fine Motor Coordination   []Balance   []Vision/perception  []Sensation []Gross Motor Coordination  []ROM      OT PLAN OF CARE   OT POC based on physician orders, patient diagnosis and results of clinical assessment     Frequency/Duration: 1-2x/week for 24 treatment sessions from 10/3/2024 through 24   Projected units: 96  Approved days/units: NA on eval     Specific OT Treatment to include:      Plan of Care: 78104, 35836, 18844, 31728     [x]97140-Manual Lymph Drainage and Combined Decongestive Therapy  [x]44558- Therapeutic Exercise/ HEP including   INPATIENT NEPHROLOGY CONSULT   [facility] NEPHROLOGY    Aurelia Saucedo  10/03/2020    Reason for consultation:  ESRD    Chief Complaint:   Chief Complaint   Patient presents with    Chest Pain     for months    Shortness of Breath    abdominal swelling        History of Present Illness:      Per H and P    Patient presents to the ED with the complaint of chest pain with associated SOB. She describes the pain as burning to mid chest with radiation to left arm. She reports associated nausea and diaphoresis.  Additionally the patient reports increased abdominal distention. She has a history of liver failure secondary to Hep C. She reports she gets a paracentesis every 2 weeks and she is due for her next paracentesis tomorrow. Last paracentesis was on 09/03/2020 and 7.4L was removed at that time. The patient states she believes her pain is secondary to the worsening ascites. She has known extensive Cardiac disease noted on heart cath earlier this year but she is too high risk for CABG. She has been managed medically. She denies fever, chills, vomiting dysuria, rectal bleeding, melena, numbness, tingling, or LOC    10/2  Seen on dialysis. .  Having chest pain.  Anxious.  No n,v.  Diaphoretic.  No n,v, diarrhea.  Abdominal distension    10/3  No further chest pain.  Didn't sleep well last night.      Plan of Care:       Assessment:    esrd  --dialysis monday    Hyperkalemia  --she got most of her treatment on a hypokalemic bath    Angina  --as above.   CT surgery consulted    Anemia  --erythropoiesis stimulating agent with renal replacement therapy    Mild acidosis  --35 bicarb bath    Hyponatremia  --ultrafilter 2-3 liters.  Use 140 sodium dialysate. Fluid restrict    Ascites  --paracentesis when stable from cardiac standpoint            Thank you for allowing us to participate in this patient's care. We will continue to follow.    Vital Signs:  Temp Readings from Last 3 Encounters:   10/03/20 97.9 °F (36.6 °C)  (Oral)   09/27/20 97.8 °F (36.6 °C) (Oral)   09/17/20 97 °F (36.1 °C)       Pulse Readings from Last 3 Encounters:   10/03/20 81   09/27/20 81   09/18/20 74       BP Readings from Last 3 Encounters:   10/03/20 96/67   09/27/20 113/62   09/18/20 (!) 153/101       Weight:  Wt Readings from Last 3 Encounters:   10/03/20 64.5 kg (142 lb 3.2 oz)   09/25/20 62.2 kg (137 lb 2 oz)   09/17/20 66.4 kg (146 lb 4.8 oz)       Past Medical & Surgical History:  Past Medical History:   Diagnosis Date    Anemia of chronic renal failure, stage 5 8/19/2015    Ascites     Bleeding duodenal ulcer     CAD (coronary artery disease)     CAD (coronary artery disease)     Chest pain     CHF (congestive heart failure)     Chronic hepatitis C 8/19/2015    Colitis     Elevated troponin     ESRD 2/2 MPGN on HD since 12/16/13 8/19/2015    Gastritis     Hypertension 8/19/2015    Mitral valve regurgitation     Renal dialysis status     M, W, F    Secondary hyperparathyroidism of renal origin 8/19/2015       Past Surgical History:   Procedure Laterality Date    ANGIOGRAM, CORONARY, WITH LEFT HEART CATHETERIZATION Left 1/13/2020    Procedure: ANGIOGRAM,CORONARY,WITH LEFT HEART CATHETERIZATION;  Surgeon: Yahir Starkey MD;  Location: [facility] CATH/EP LAB;  Service: Cardiology;  Laterality: Left;    AV FISTULA PLACEMENT  2013    COLONOSCOPY      CORONARY ANGIOPLASTY WITH STENT PLACEMENT      SALPINGOOPHORECTOMY Right 1997    TOTAL ABDOMINAL HYSTERECTOMY  1999       Past Social History:  Social History     Socioeconomic History    Marital status:      Spouse name: Not on file    Number of children: 2    Years of education: Not on file    Highest education level: Not on file   Occupational History    Occupation: baker    Social Needs    Financial resource strain: Not on file    Food insecurity     Worry: Not on file     Inability: Not on file    Transportation needs     Medical: Not on file     Non-medical: Not on file    Tobacco Use    Smoking status: Current Every Day Smoker     Packs/day: 0.75     Years: 30.00     Pack years: 22.50     Types: Cigarettes     Last attempt to quit: 2019     Years since quittin.8    Smokeless tobacco: Never Used   Substance and Sexual Activity    Alcohol use: No     Alcohol/week: 0.0 standard drinks    Drug use: Yes     Types: Marijuana     Comment: occasionally    Sexual activity: Not on file   Lifestyle    Physical activity     Days per week: Not on file     Minutes per session: Not on file    Stress: To some extent   Relationships    Social connections     Talks on phone: Not on file     Gets together: Not on file     Attends Evangelical service: Not on file     Active member of club or organization: Not on file     Attends meetings of clubs or organizations: Not on file     Relationship status: Not on file   Other Topics Concern    Are you pregnant or think you may be? Not Asked    Breast-feeding Not Asked   Social History Narrative    Not on file       Medications:  No current facility-administered medications on file prior to encounter.      Current Outpatient Medications on File Prior to Encounter   Medication Sig Dispense Refill    aspirin (ECOTRIN) 81 MG EC tablet Take 81 mg by mouth once daily.      calcium acetate,phosphat bind, (PHOSLO) 667 mg capsule Take 667 mg by mouth 3 (three) times daily with meals.      FA-VIT B COMP&C-SELENIUM-ZINC 3-70-15 MG-MCG-MG ORAL TAB Take 1 tablet by mouth once daily.      HYDROcodone-acetaminophen (NORCO)  mg per tablet Take 1 tablet by mouth every 8 (eight) hours as needed for Pain. 60 tablet 0    isosorbide mononitrate (IMDUR) 30 MG 24 hr tablet Take 1 tablet (30 mg total) by mouth 2 (two) times a day. 180 tablet 0    lisinopril (PRINIVIL,ZESTRIL) 20 MG tablet Take 1 tablet by mouth nightly.   3    LOKELMA 5 gram PwPk Take 5 g by mouth. Five times a week; on  Non dialysis days      metoprolol succinate (TOPROL-XL) 50 MG 24  "hr tablet Take 50 mg by mouth nightly.       nitroGLYCERIN (NITROSTAT) 0.4 MG SL tablet Place 1 tablet (0.4 mg total) under the tongue every 5 (five) minutes as needed for Chest pain. 90 tablet 3    pantoprazole (PROTONIX) 40 MG tablet Take 40 mg by mouth 2 (two) times daily.       polyethylene glycol (GLYCOLAX) 17 gram PwPk Take 17 g by mouth daily as needed (constipation). 30 each 0    rosuvastatin (CRESTOR) 10 MG tablet Take 1 tablet (10 mg total) by mouth once daily. 90 tablet 3    lactulose (CHRONULAC) 10 gram/15 mL solution TK 30 ML PO ONCE D       Scheduled Meds:   sodium chloride 0.9%   Intravenous Once    sodium chloride 0.9%   Intravenous Once    calcium carbonate  1,000 mg Oral TID AC    metoprolol succinate  50 mg Oral Nightly    pantoprazole  40 mg Oral BID    rosuvastatin  10 mg Oral Daily    sodium zirconium cyclosilicate  10 g Oral Daily     Continuous Infusions:   heparin (porcine) in D5W 14 Units/kg/hr (10/03/20 0609)    nitroGLYCERIN Stopped (10/03/20 0609)     PRN Meds:.sodium chloride 0.9%, sodium chloride 0.9%, acetaminophen, cyclobenzaprine, heparin (porcine), heparin (PORCINE), HYDROcodone-acetaminophen, morphine, nitroGLYCERIN, ondansetron, polyethylene glycol, sodium chloride 0.9%    Allergies:  Ciprofloxacin, Levaquin [levofloxacin], Quinolones, and Codeine    Past Family History:  Reviewed; refer to Hospitalist Admission Note    Review of Systems:  Review of Systems - All 14 systems reviewed and negative, except as noted in HPI    Physical Exam:    BP 96/67   Pulse 81   Temp 97.9 °F (36.6 °C) (Oral)   Resp (!) 26   Ht 5' 4" (1.626 m)   Wt 64.5 kg (142 lb 3.2 oz)   LMP 01/07/1999 (Exact Date)   SpO2 (!) 94%   Breastfeeding No   BMI 24.41 kg/m²     General Appearance:    Alert, cooperative, no distress, appears stated age   Head:    Normocephalic, without obvious abnormality, atraumatic   Eyes:    PER, conjunctiva/corneas clear, EOM's intact in both eyes      "   Throat:   Lips, mucosa, and tongue normal; teeth and gums normal   Back:     Symmetric, no curvature, ROM normal, no CVA tenderness   Lungs:     Clear to auscultation bilaterally, respirations unlabored   Chest wall:    No tenderness or deformity   Heart:    Regular rate and rhythm, S1 and S2 normal, no murmur, rub   or gallop   Abdomen:     Soft, non-tender, bowel sounds active all four quadrants,     no masses, no organomegaly   Extremities:   Extremities normal, atraumatic, no cyanosis or edema   Pulses:   2+ and symmetric all extremities   MSK:   No joint or muscle swelling, tenderness or deformity   Skin:   Skin color, texture, turgor normal, no rashes or lesions   Neurologic:   CNII-XII intact, normal strength and sensation       Throughout.  No flap     Results:  Lab Results   Component Value Date     (L) 10/02/2020    K 4.7 10/02/2020     10/02/2020    CO2 23 10/02/2020    BUN 53 (H) 10/02/2020    CREATININE 6.1 (H) 10/02/2020    CALCIUM 8.1 (L) 10/02/2020    ANIONGAP 11 10/02/2020    ESTGFRAFRICA 8.3 (A) 10/02/2020    EGFRNONAA 7.2 (A) 10/02/2020       Lab Results   Component Value Date    CALCIUM 8.1 (L) 10/02/2020    PHOS 7.8 (H) 10/02/2020       Recent Labs   Lab 10/03/20  0508 10/03/20  0605   WBC 4.15  --    RBC 2.17*  --    HGB 6.7* 7.1*   HCT 20.6* 21.9*   *  --    MCV 95  --    MCH 30.9  --    MCHC 32.5  --           I have personally reviewed pertinent radiological imaging and reports.    Patient care was time spent personally by me on the following activities:   · Obtaining a history  · Examination of patient.  · Providing medical care at the patients bedside.  · Developing a treatment plan with patient or surrogate and bedside caregivers  · Ordering and reviewing laboratory studies, radiographic studies, pulse oximetry.  · Ordering and performing treatments and interventions.  · Evaluation of patient's response to treatment.  · Discussions with consultants while on the unit  and immediately available to the patient.  · Re-evaluation of the patient's condition.  · Documentation in the medical record.     Mele Ramos MD  Nephrology  [facility]  [phone]

## (undated) DEVICE — CATHETER EXPO MLTPK 6FR 100X110CM

## (undated) DEVICE — SHEATH INTRODUCER PRECISION ACCESS 6FX10